# Patient Record
Sex: FEMALE | Race: WHITE | NOT HISPANIC OR LATINO | Employment: PART TIME | URBAN - METROPOLITAN AREA
[De-identification: names, ages, dates, MRNs, and addresses within clinical notes are randomized per-mention and may not be internally consistent; named-entity substitution may affect disease eponyms.]

---

## 2020-08-19 ENCOUNTER — OFFICE VISIT (OUTPATIENT)
Dept: FAMILY MEDICINE | Facility: CLINIC | Age: 60
End: 2020-08-19
Payer: MEDICAID

## 2020-08-19 VITALS
HEART RATE: 93 BPM | TEMPERATURE: 99 F | SYSTOLIC BLOOD PRESSURE: 128 MMHG | WEIGHT: 292 LBS | OXYGEN SATURATION: 98 % | DIASTOLIC BLOOD PRESSURE: 86 MMHG | HEIGHT: 65 IN | BODY MASS INDEX: 48.65 KG/M2

## 2020-08-19 DIAGNOSIS — R79.9 ABNORMAL BLOOD CHEMISTRY: ICD-10-CM

## 2020-08-19 DIAGNOSIS — Z11.59 ENCOUNTER FOR HEPATITIS C SCREENING TEST FOR LOW RISK PATIENT: ICD-10-CM

## 2020-08-19 DIAGNOSIS — R53.83 FATIGUE, UNSPECIFIED TYPE: ICD-10-CM

## 2020-08-19 DIAGNOSIS — Z11.4 SCREENING FOR HIV (HUMAN IMMUNODEFICIENCY VIRUS): ICD-10-CM

## 2020-08-19 DIAGNOSIS — G89.29 CHRONIC PAIN OF LEFT KNEE: ICD-10-CM

## 2020-08-19 DIAGNOSIS — F41.8 DEPRESSION WITH ANXIETY: ICD-10-CM

## 2020-08-19 DIAGNOSIS — E55.9 VITAMIN D DEFICIENCY: ICD-10-CM

## 2020-08-19 DIAGNOSIS — I10 ESSENTIAL HYPERTENSION: Primary | ICD-10-CM

## 2020-08-19 DIAGNOSIS — E53.8 VITAMIN B 12 DEFICIENCY: ICD-10-CM

## 2020-08-19 DIAGNOSIS — Z12.11 ENCOUNTER FOR SCREENING COLONOSCOPY FOR NON-HIGH-RISK PATIENT: ICD-10-CM

## 2020-08-19 DIAGNOSIS — Z23 NEED FOR DIPHTHERIA-TETANUS-PERTUSSIS (TDAP) VACCINE: ICD-10-CM

## 2020-08-19 DIAGNOSIS — M79.7 FIBROMYALGIA: ICD-10-CM

## 2020-08-19 DIAGNOSIS — Z12.31 ENCOUNTER FOR SCREENING MAMMOGRAM FOR BREAST CANCER: ICD-10-CM

## 2020-08-19 DIAGNOSIS — M25.50 ARTHRALGIA, UNSPECIFIED JOINT: ICD-10-CM

## 2020-08-19 DIAGNOSIS — Z13.220 SCREENING, LIPID: ICD-10-CM

## 2020-08-19 DIAGNOSIS — M25.562 CHRONIC PAIN OF LEFT KNEE: ICD-10-CM

## 2020-08-19 PROCEDURE — 90471 IMMUNIZATION ADMIN: CPT | Mod: PBBFAC | Performed by: NURSE PRACTITIONER

## 2020-08-19 PROCEDURE — 99205 PR OFFICE/OUTPT VISIT, NEW, LEVL V, 60-74 MIN: ICD-10-PCS | Mod: S$PBB,,, | Performed by: NURSE PRACTITIONER

## 2020-08-19 PROCEDURE — 99205 OFFICE O/P NEW HI 60 MIN: CPT | Performed by: NURSE PRACTITIONER

## 2020-08-19 PROCEDURE — 99205 OFFICE O/P NEW HI 60 MIN: CPT | Mod: S$PBB,,, | Performed by: NURSE PRACTITIONER

## 2020-08-19 RX ORDER — DULOXETIN HYDROCHLORIDE 60 MG/1
60 CAPSULE, DELAYED RELEASE ORAL DAILY
COMMUNITY
End: 2020-08-19 | Stop reason: SDUPTHER

## 2020-08-19 RX ORDER — AMLODIPINE BESYLATE 2.5 MG/1
2.5 TABLET ORAL DAILY
COMMUNITY
End: 2020-08-19 | Stop reason: SDUPTHER

## 2020-08-19 RX ORDER — HYDROCHLOROTHIAZIDE 25 MG/1
25 TABLET ORAL DAILY
COMMUNITY
End: 2020-08-19 | Stop reason: SDUPTHER

## 2020-08-19 RX ORDER — ALPRAZOLAM 1 MG/1
1 TABLET ORAL DAILY
COMMUNITY
End: 2020-08-19 | Stop reason: SDUPTHER

## 2020-08-19 RX ORDER — AMLODIPINE BESYLATE 2.5 MG/1
2.5 TABLET ORAL DAILY
Qty: 30 TABLET | Refills: 5 | Status: SHIPPED | OUTPATIENT
Start: 2020-08-19 | End: 2021-02-22 | Stop reason: SDUPTHER

## 2020-08-19 RX ORDER — LOSARTAN POTASSIUM 100 MG/1
100 TABLET ORAL DAILY
COMMUNITY
End: 2020-08-19 | Stop reason: SDUPTHER

## 2020-08-19 RX ORDER — FUROSEMIDE 20 MG/1
20 TABLET ORAL 2 TIMES DAILY
COMMUNITY
End: 2020-08-19 | Stop reason: SDUPTHER

## 2020-08-19 RX ORDER — DULOXETIN HYDROCHLORIDE 60 MG/1
60 CAPSULE, DELAYED RELEASE ORAL DAILY
Qty: 30 CAPSULE | Refills: 5 | Status: SHIPPED | OUTPATIENT
Start: 2020-08-19 | End: 2021-02-22 | Stop reason: SDUPTHER

## 2020-08-19 RX ORDER — MELOXICAM 15 MG/1
15 TABLET ORAL DAILY
Qty: 30 TABLET | Refills: 4 | Status: SHIPPED | OUTPATIENT
Start: 2020-08-19 | End: 2021-02-22 | Stop reason: SDUPTHER

## 2020-08-19 RX ORDER — MELOXICAM 15 MG/1
15 TABLET ORAL DAILY
COMMUNITY
End: 2020-08-19 | Stop reason: SDUPTHER

## 2020-08-19 RX ORDER — FUROSEMIDE 20 MG/1
20 TABLET ORAL 2 TIMES DAILY
Qty: 30 TABLET | Refills: 0 | Status: SHIPPED | OUTPATIENT
Start: 2020-08-19 | End: 2021-03-23 | Stop reason: SDUPTHER

## 2020-08-19 RX ORDER — LOSARTAN POTASSIUM 100 MG/1
100 TABLET ORAL DAILY
Qty: 30 TABLET | Refills: 5 | Status: ON HOLD | OUTPATIENT
Start: 2020-08-19 | End: 2020-10-20 | Stop reason: SDUPTHER

## 2020-08-19 RX ORDER — ALPRAZOLAM 1 MG/1
1 TABLET ORAL DAILY
Qty: 30 TABLET | Refills: 1 | Status: SHIPPED | OUTPATIENT
Start: 2020-08-19 | End: 2020-12-17 | Stop reason: SDUPTHER

## 2020-08-19 RX ORDER — HYDROCHLOROTHIAZIDE 25 MG/1
25 TABLET ORAL DAILY
Qty: 30 TABLET | Refills: 5 | Status: SHIPPED | OUTPATIENT
Start: 2020-08-19 | End: 2021-02-22 | Stop reason: SDUPTHER

## 2020-08-19 NOTE — PATIENT INSTRUCTIONS
Potassium-Rich Foods  The normal adult diet usually contains 2,000 mg to 4,000 mg of potassium per day. More potassium is needed when you lose too much potassium from your body. This can happen if you have diarrhea or vomiting. It can also happen if you take a medicine to make you urinate more (diuretic). To increase the amount of potassium in your diet, include these high-potassium foods.     [The (*) indicates foods highest in potassium.]  Vegetables  Artichokes. Cooked 1/2 cup, 200 mg to 300 mg*  Asparagus. Cooked 1/2 cup, 200 mg to 300 mg  Beans. White, red, robin cooked 1/2 cup, 300 mg to 500 mg*  Beets. Cooked 1/2 cup, 200 mg to 300 mg  Broccoli. Cooked or raw 1 cup, 200 mg to 500 mg*  [place] sprouts. Cooked 1/2 cup, 200 mg to 300 mg  Cabbage. Raw 1 cup, 100 mg to 200 mg  Carrots. Raw or cooked 1/2 cup, 100 mg to 200 mg  Celery. Raw 1 cup, 200 mg to 300 mg  Lima beans. Fresh or frozen 1/2 cup, 300 mg to 500 mg*   Mushrooms. Raw or cooked 1/2 cup, 100 mg to 300 mg  Peas. Cooked 1/2 cup, 150 mg to 250 mg   Potatoes. Baked 1 medium, 500 mg to 900 mg*   Spinach. Cooked 1 cup, 800 mg to 900 mg*   Spinach. Raw 2 cups, 300 mg to 400 mg *  Squash, winter. Fresh, frozen, or cooked 1/2 cup, 200 mg to 400 mg   Tomato. Fresh 1 medium, 200 mg to 300 mg   Tomato juice. Canned 1/2 cup, 200 mg to 300 mg   Fruits  Apple juice. Unsweetened 1 cup, 200 mg to 300 mg   Apricots. Canned 1/2 cup, 200 mg to 300 mg   Apricots. Dried 4 pieces, 100 mg to 200 mg   Avocado. Raw 1/2 cup, 300 mg to 400 mg*  Banana. Fresh 1 small, 300 mg to 400 mg*   Cantaloupe. Fresh 1 cup diced, 300 mg to 400 mg*   Grape juice. Unsweetened 1 cup, 200 mg to 300 mg   Honeydew melon. Fresh 1 cup diced, 300 mg to 400 mg*   Orange. Fresh 1 medium, 200 mg to 300 mg    Orange juice. Unsweetened, fresh or frozen 1/2 cup, 200 mg to 300 mg  Pineapple juice. Unsweetened 1 cup, 300 mg to 400 mg   Prune juice. Unsweetened 1/2 cup, 300 mg to 400 mg*   Prunes. Dried 5  pieces, 300 mg to 400 mg*   Strawberries. Fresh or frozen 1 cup, 200 mg to 300 mg  Meat  Red meat. Cooked 3 ounces, 100 mg to 300 mg   Seafood  Cod, flounder, halibut. Cooked 3 ounces, 100 mg to 300 mg*  [place]. Cooked, 3 ounces 300 mg to 400 mg*   Scallops. Cooked 3 ounces, 200 mg to 300 mg*  Shrimp. Cooked 3/4 cup, 100 mg to 200 mg   Tuna. Fresh or canned 3/4 cup, 200 mg to 500 mg   Date Last Reviewed: 10/1/2016  © 9044-8603 [employer]. [address] [place] [address], [place] [address]. All rights reserved. This information is not intended as a substitute for professional medical care. Always follow your healthcare professional's instructions.        Low-Salt Diet  This diet removes foods that are high in salt. It also limits the amount of salt you use when cooking. It is most often used for people with high blood pressure, edema (fluid retention), and kidney, liver, or heart disease.  Table salt contains the mineral sodium. Your body needs sodium to work normally. But too much sodium can make your health problems worse. Your healthcare provider is recommending a low-salt (also called low-sodium) diet for you. Your total daily allowance of salt is 1,500 to 2,300 milligrams (mg). It is less than 1 teaspoon of table salt. This means you can have only about 500 to 700 mg of sodium at each meal. People with certain health problems should limit salt intake to the lower end of the recommended range.    When you cook, dont add much salt. If you can cook without using salt, even better. Dont add salt to your food at the table.  When shopping, read food labels. Salt is often called sodium on the label. Choose foods that are salt-free, low salt, or very low salt. Note that foods with reduced salt may not lower your salt intake enough.    Beans, potatoes, and pasta  Ok: Dry beans, split peas, lentils, potatoes, rice, macaroni, pasta, spaghetti without added salt  Avoid: Potato chips, tortilla chips, and similar  products  Breads and cereals  Ok: Low-sodium breads, rolls, cereals, and cakes; low-salt crackers, matzo crackers  Avoid: Salted crackers, pretzels, popcorn, [place] toast, pancakes, muffins  Dairy  Ok: Milk, chocolate milk, hot chocolate mix, low-salt cheeses, and yogurt  Avoid: Processed cheese and cheese spreads; Roquefort, Camembert, and cottage cheese; buttermilk, instant breakfast drink  Desserts  Ok: Ice cream, frozen yogurt, juice bars, gelatin, cookies and pies, sugar, honey, jelly, hard candy  Avoid: Most pies, cakes and cookies prepared or processed with salt; instant pudding  Drinks  Ok: Tea, coffee, fizzy (carbonated) drinks, juices  Avoid: Flavored coffees, electrolyte replacement drinks, sports drinks  Meats  Ok: All fresh meat, fish, poultry, low-salt tuna, eggs, egg substitute  Avoid: Smoked, pickled, brine-cured, or salted meats and fish. This includes cheema, chipped beef, corned beef, hot dogs, deli meats, ham, kosher meats, salt pork, sausage, canned tuna, salted codfish, smoked salmon, herring, sardines, or anchovies.  Seasonings and spices  Ok: Most seasonings are okay. Good substitutes for salt include: fresh herb blends, hot sauce, lemon, garlic, leg[place], vinegar, dry mustard, parsley, cilantro, horseradish, tomato paste, regular margarine, mayonnaise, unsalted butter, cream cheese, vegetable oil, cream, low-salt salad dressing and gravy.  Avoid: Regular ketchup, relishes, pickles, soy sauce, teriyaki sauce, Worcestershire sauce, BBQ sauce, tartar sauce, meat tenderizer, chili sauce, regular gravy, regular salad dressing, salted butter  Soups  Ok: Low-salt soups and broths made with allowed foods  Avoid: Bouillon cubes, soups with smoked or salted meats, regular soup and broth  Vegetables  Ok: Most vegetables are okay; also low-salt tomato and vegetable juices  Avoid: Sauerkraut and other brine-soaked vegetables; pickles and other pickled vegetables; tomato juice, olives  Date Last Reviewed:  8/1/2016  © 5836-4238 [employer]. [address] [place] [address], [place] [address]. All rights reserved. This information is not intended as a substitute for professional medical care. Always follow your healthcare professional's instructions.        Eating Heart-Healthy Foods  Eating has a big impact on your heart health. In fact, eating healthier can improve several of your heart risks at once. For instance, it helps you manage weight, cholesterol, and blood pressure. Here are ideas to help you make heart-healthy changes without giving up all the foods and flavors you love.  Getting started  · Talk with your health care provider about eating plans, such as the DASH or Mediterranean diet. You may also be referred to a dietitian.  · Change a few things at a time. Give yourself time to get used to a few eating changes before adding more.  · Work to create a tasty, healthy eating plan that you can stick to for the rest of your life.    Goals for healthy eating  Below are some tips to improve your eating habits:  · Limit saturated fats and trans fats. Saturated fats raise your levels of cholesterol, so keep these fats to a minimum. They are found in foods such as fatty meats, whole milk, cheese, and palm and coconut oils. Avoid trans fats because they lower good cholesterol as well as raise bad cholesterol. Trans fats are most often found in processed foods.  · Reduce sodium (salt) intake. Eating too much salt may increase your blood pressure. Limit your sodium intake to 2,300 milligrams (mg) per day, or less if your health care provider recommends it. Dining out less often and eating fewer processed foods are two great ways to decrease the amount of salt you consume.  · Managing calories. A calorie is a unit of energy. Your body burns calories for fuel, but if you eat more calories than your body burns, the extras are stored as fat. Your health care provider can help you create a diet plan to manage your  calories. This will likely include eating healthier foods as well as exercising regularly. To help you track your progress, keep a diary to record what you eat and how often you exercise.  Choose the right foods  Aim to make these foods staples of your diet. If you have diabetes, you may have different recommendations than what is listed here:  · Fruits and vegetable provide plenty of nutrients without a lot of calories. At meals, fill half your plate with these foods. Split the other half of your plate between whole grains and lean protein.  · Whole grains are high in fiber and rich in vitamins and nutrients. Good choices include whole-wheat bread, pasta, and brown rice.  · Lean proteins give you nutrition with less fat. Good choices include fish, skinless chicken, and beans.  · Low-fat or nonfat dairy provides nutrients without a lot of fat. Try low-fat or nonfat milk, cheese, or yogurt.  · Healthy fats can be good for you in small amounts. These are unsaturated fats, such as olive oil, nuts, and fish. Try to have at least 2 servings per week of fatty fish such as salmon, sardines, mackerel, rainbow trout, and albacore tuna. These contain omega-3 fatty acids, which are good for your heart. Flaxseed is another source of a heart-healthy fat.  More on heart healthy eating    Read food labels  Healthy eating starts at the grocery store. Be sure to pay attention to food labels on packaged foods. Look for products that are high in fiber and protein, and low in saturated fat, cholesterol, and sodium. Avoid products that contain trans fat. And pay close attention to serving size. For instance, if you plan to eat two servings, double all the numbers on the label.  Prepare food right  A key part of healthy cooking is cutting down on added fat and salt. Look on the internet for lower-fat, lower-sodium recipes. Also, try these tips:  · Remove fat from meat and skin from poultry before cooking.  · Skim fat from the surface of  soups and sauces.  · Broil, boil, bake, steam, grill, and microwave food without added fats.  · Choose ingredients that spice up your food without adding calories, fat, or sodium. Try these items: horseradish, hot sauce, lemon, mustard, nonfat salad dressings, and vinegar. For salt-free herbs and spices, try basil, cilantro, cinnamon, pepper, and rosemary.  Date Last Reviewed: 6/25/2015  © 2907-6662 [employer]. [address] [place] [address]. All rights reserved. This information is not intended as a substitute for professional medical care. Always follow your healthcare professional's instructions.

## 2020-08-19 NOTE — PROGRESS NOTES
SUBJECTIVE:    Patient ID: Sonia Muse is a 60 y.o. female.    Chief Complaint: Hypertension, Anxiety, and Depression    Denies any chest pain, palpitations or dyspnea.  Denies dyspepsia, cough, hemoptysis or melena.      Hypertension  This is a chronic problem. The current episode started more than 1 year ago. The problem is unchanged. The problem is controlled. Associated symptoms include anxiety. There are no associated agents to hypertension. Risk factors for coronary artery disease include stress, post-menopausal state, obesity, smoking/tobacco exposure and sedentary lifestyle. Past treatments include calcium channel blockers, angiotensin blockers, diuretics and lifestyle changes. The current treatment provides significant improvement. Compliance problems include exercise.    Anxiety  Presents for initial visit. Onset was 1 to 5 years ago. The problem has been waxing and waning. Symptoms include decreased concentration, depressed mood, excessive worry and irritability. Symptoms occur most days. The symptoms are aggravated by family issues.     Risk factors include a major life event and family history. Her past medical history is significant for anxiety/panic attacks and depression. There is no history of hyperthyroidism or suicide attempts. Past treatments include benzodiazephines and SSRIs.   Depression  Visit Type: initial  Onset of symptoms: more than 5 years ago  Progression since onset: stable  Patient presents with the following symptoms: decreased concentration, depressed mood, excessive worry and irritability.  Severity: moderate   Aggravated by: family issues  Sleep quality: fair  Patient has a history of: anxiety/panic attacks  No history of: hyperthyroidism        Past Medical History:   Diagnosis Date    Allergy     Anxiety     Asthma     DDD (degenerative disc disease), cervical     Depression     Fibromyalgia     Hypertension 2005    Neuromuscular disorder      Past Surgical  History:   Procedure Laterality Date    ADENOIDECTOMY      carpel tunnel Right      SECTION      knee replacement Right     ROTATOR CUFF REPAIR Right     TONSILLECTOMY       Family History   Problem Relation Age of Onset    Diabetes Mother     Hypertension Mother     Heart disease Mother     Stroke Mother     Glaucoma Mother     Diabetes Father     Hypertension Father     Heart disease Father     Hypertension Sister     Heart disease Sister     Graves' disease Daughter     Anxiety disorder Daughter        Marital Status:   Alcohol History:  reports current alcohol use.  Tobacco History:  reports that she quit smoking about 36 years ago. She has a 11.00 pack-year smoking history. She has never used smokeless tobacco.  Drug History:  reports no history of drug use.    Review of patient's allergies indicates:   Allergen Reactions    Erythromycin Swelling    Codeine        Current Outpatient Medications:     albuterol sulfate (VENTOLIN INHL), Inhale into the lungs., Disp: , Rfl:     ALPRAZolam (XANAX) 1 MG tablet, Take 1 tablet (1 mg total) by mouth once daily., Disp: 30 tablet, Rfl: 1    amLODIPine (NORVASC) 2.5 MG tablet, Take 1 tablet (2.5 mg total) by mouth once daily., Disp: 30 tablet, Rfl: 5    DULoxetine (CYMBALTA) 60 MG capsule, Take 1 capsule (60 mg total) by mouth once daily., Disp: 30 capsule, Rfl: 5    furosemide (LASIX) 20 MG tablet, Take 1 tablet (20 mg total) by mouth 2 (two) times daily., Disp: 30 tablet, Rfl: 0    hydroCHLOROthiazide (HYDRODIURIL) 25 MG tablet, Take 1 tablet (25 mg total) by mouth once daily., Disp: 30 tablet, Rfl: 5    losartan (COZAAR) 100 MG tablet, Take 1 tablet (100 mg total) by mouth once daily., Disp: 30 tablet, Rfl: 5    meloxicam (MOBIC) 15 MG tablet, Take 1 tablet (15 mg total) by mouth once daily., Disp: 30 tablet, Rfl: 4    Review of Systems   Constitutional: Positive for irritability.   Psychiatric/Behavioral: Positive for  "decreased concentration and depression.          Objective:      Vitals:    08/19/20 1355   BP: 128/86   Pulse: 93   Temp: 98.7 °F (37.1 °C)   SpO2: 98%   Weight: 132.5 kg (292 lb)   Height: 5' 4.5" (1.638 m)     Body mass index is 49.35 kg/m².  Physical Exam      Assessment:       1. Essential hypertension    2. Arthralgia, unspecified joint    3. Chronic pain of left knee    4. Depression with anxiety    5. Fibromyalgia    6. Fatigue, unspecified type    7. Need for diphtheria-tetanus-pertussis (Tdap) vaccine    8. Vitamin D deficiency    9. Vitamin B 12 deficiency    10. Abnormal blood chemistry    11. Screening, lipid    12. Encounter for screening colonoscopy for non-high-risk patient    13. Encounter for screening mammogram for breast cancer    14. Screening for HIV (human immunodeficiency virus)    15. Encounter for hepatitis C screening test for low risk patient         Plan:       Essential hypertension  -     amLODIPine (NORVASC) 2.5 MG tablet; Take 1 tablet (2.5 mg total) by mouth once daily.  Dispense: 30 tablet; Refill: 5  -     furosemide (LASIX) 20 MG tablet; Take 1 tablet (20 mg total) by mouth 2 (two) times daily.  Dispense: 30 tablet; Refill: 0  -     hydroCHLOROthiazide (HYDRODIURIL) 25 MG tablet; Take 1 tablet (25 mg total) by mouth once daily.  Dispense: 30 tablet; Refill: 5  -     losartan (COZAAR) 100 MG tablet; Take 1 tablet (100 mg total) by mouth once daily.  Dispense: 30 tablet; Refill: 5    Arthralgia, unspecified joint  -     DULoxetine (CYMBALTA) 60 MG capsule; Take 1 capsule (60 mg total) by mouth once daily.  Dispense: 30 capsule; Refill: 5  -     meloxicam (MOBIC) 15 MG tablet; Take 1 tablet (15 mg total) by mouth once daily.  Dispense: 30 tablet; Refill: 4    Chronic pain of left knee  -     Ambulatory referral/consult to Orthopedics; Future; Expected date: 08/26/2020    Depression with anxiety  -     ALPRAZolam (XANAX) 1 MG tablet; Take 1 tablet (1 mg total) by mouth once daily.  " Dispense: 30 tablet; Refill: 1  -     DULoxetine (CYMBALTA) 60 MG capsule; Take 1 capsule (60 mg total) by mouth once daily.  Dispense: 30 capsule; Refill: 5    Fibromyalgia  -     DULoxetine (CYMBALTA) 60 MG capsule; Take 1 capsule (60 mg total) by mouth once daily.  Dispense: 30 capsule; Refill: 5    Fatigue, unspecified type  -     CBC auto differential; Future; Expected date: 08/19/2020  -     Comprehensive metabolic panel; Future; Expected date: 08/19/2020  -     TSH; Future; Expected date: 08/19/2020  -     T4, free; Future; Expected date: 08/19/2020    Need for diphtheria-tetanus-pertussis (Tdap) vaccine  -     Tdap Vaccine    Vitamin D deficiency  -     Vitamin D; Future; Expected date: 08/19/2020    Vitamin B 12 deficiency  -     Vitamin B12; Future; Expected date: 08/19/2020    Abnormal blood chemistry  -     Hemoglobin A1C; Future; Expected date: 08/19/2020    Screening, lipid  -     Lipid Panel; Future; Expected date: 08/19/2020    Encounter for screening colonoscopy for non-high-risk patient  -     Ambulatory referral/consult to Gastroenterology; Future; Expected date: 08/26/2020    Encounter for screening mammogram for breast cancer  -     Mammo Digital Screening Bilat without CA; Future    Screening for HIV (human immunodeficiency virus)  -     HIV 1/2 Ag/Ab (4th Gen); Future; Expected date: 08/19/2020    Encounter for hepatitis C screening test for low risk patient  -     Hepatitis C Antibody; Future; Expected date: 08/19/2020      Follow up in about 3 months (around 11/19/2020), or if symptoms worsen or fail to improve.

## 2020-08-27 ENCOUNTER — OFFICE VISIT (OUTPATIENT)
Dept: ORTHOPEDICS | Facility: CLINIC | Age: 60
End: 2020-08-27
Payer: MEDICAID

## 2020-08-27 ENCOUNTER — TELEPHONE (OUTPATIENT)
Dept: ORTHOPEDICS | Facility: CLINIC | Age: 60
End: 2020-08-27

## 2020-08-27 VITALS
DIASTOLIC BLOOD PRESSURE: 102 MMHG | WEIGHT: 284 LBS | BODY MASS INDEX: 48 KG/M2 | SYSTOLIC BLOOD PRESSURE: 146 MMHG | HEART RATE: 99 BPM

## 2020-08-27 DIAGNOSIS — M25.562 CHRONIC PAIN OF LEFT KNEE: ICD-10-CM

## 2020-08-27 DIAGNOSIS — M17.12 PRIMARY OSTEOARTHRITIS OF LEFT KNEE: Primary | ICD-10-CM

## 2020-08-27 DIAGNOSIS — G89.29 CHRONIC PAIN OF LEFT KNEE: ICD-10-CM

## 2020-08-27 PROCEDURE — 99203 OFFICE O/P NEW LOW 30 MIN: CPT | Mod: S$GLB,,, | Performed by: ORTHOPAEDIC SURGERY

## 2020-08-27 PROCEDURE — 99203 PR OFFICE/OUTPT VISIT, NEW, LEVL III, 30-44 MIN: ICD-10-PCS | Mod: S$GLB,,, | Performed by: ORTHOPAEDIC SURGERY

## 2020-08-27 NOTE — PROGRESS NOTES
[facility]    Subjective:     Chief Complaint:   Chief Complaint   Patient presents with    Left Knee - Pain     Left knee pain x years. No injury       Past Medical History:   Diagnosis Date    Allergy     Anxiety     Asthma     DDD (degenerative disc disease), cervical     Depression     Fibromyalgia     Hypertension 2005    Neuromuscular disorder        Past Surgical History:   Procedure Laterality Date    ADENOIDECTOMY      carpel tunnel Right      SECTION      knee replacement Right     ROTATOR CUFF REPAIR Right     TONSILLECTOMY         Current Outpatient Medications   Medication Sig    albuterol sulfate (VENTOLIN INHL) Inhale into the lungs.    ALPRAZolam (XANAX) 1 MG tablet Take 1 tablet (1 mg total) by mouth once daily.    amLODIPine (NORVASC) 2.5 MG tablet Take 1 tablet (2.5 mg total) by mouth once daily.    DULoxetine (CYMBALTA) 60 MG capsule Take 1 capsule (60 mg total) by mouth once daily.    furosemide (LASIX) 20 MG tablet Take 1 tablet (20 mg total) by mouth 2 (two) times daily.    hydroCHLOROthiazide (HYDRODIURIL) 25 MG tablet Take 1 tablet (25 mg total) by mouth once daily.    losartan (COZAAR) 100 MG tablet Take 1 tablet (100 mg total) by mouth once daily.    meloxicam (MOBIC) 15 MG tablet Take 1 tablet (15 mg total) by mouth once daily.     No current facility-administered medications for this visit.        Review of patient's allergies indicates:   Allergen Reactions    Erythromycin Swelling    Codeine        Family History   Problem Relation Age of Onset    Diabetes Mother     Hypertension Mother     Heart disease Mother     Stroke Mother     Glaucoma Mother     Diabetes Father     Hypertension Father     Heart disease Father     Hypertension Sister     Heart disease Sister     Graves' disease Daughter     Anxiety disorder Daughter        Social History     Socioeconomic History    Marital status:      Spouse name: Not on file     Number of children: 3    Years of education: Not on file    Highest education level: Not on file   Occupational History    Occupation: [employer]     Comment: primary sub for [employer]   Social Needs    Financial resource strain: Not on file    Food insecurity     Worry: Not on file     Inability: Not on file    Transportation needs     Medical: Not on file     Non-medical: Not on file   Tobacco Use    Smoking status: Former Smoker     Packs/day: 1.00     Years: 11.00     Pack years: 11.00     Quit date:      Years since quittin.6    Smokeless tobacco: Never Used   Substance and Sexual Activity    Alcohol use: Yes     Comment: occasional 2x/y    Drug use: Never    Sexual activity: Yes     Partners: Male   Lifestyle    Physical activity     Days per week: Not on file     Minutes per session: Not on file    Stress: Rather much   Relationships    Social connections     Talks on phone: Not on file     Gets together: Not on file     Attends Gnosticist service: Not on file     Active member of club or organization: Not on file     Attends meetings of clubs or organizations: Not on file     Relationship status: Not on file   Other Topics Concern    Not on file   Social History Narrative    Not on file       History of present illness:  Patient comes in today for the left knee.  She has had longstanding severe left knee pain.  She has had a right total knee.  She has been told that she needs a left total knee for many years.  She has had multiple Depo-Medrol injections.  Her knee gives out frequently.  Locks up.      Review of Systems:    Constitution: Negative for chills, fever, and sweats.  Negative for unexplained weight loss.    HENT:  Negative for headaches and blurry vision.    Cardiovascular:Negative for chest pain or irregular heart beat. Negative for hypertension.    Respiratory:  Negative for cough and shortness of breath.    Gastrointestinal: Negative for abdominal pain, heartburn, melena,  nausea, and vomitting.    Genitourinary:  Negative bladder incontinence and dysuria.    Musculoskeletal:  See HPI for details.     Neurological: Negative for numbness.    Psychiatric/Behavioral: Negative for depression.  The patient is not nervous/anxious.      Endocrine: Negative for polyuria    Hematologic/Lymphatic: Negative for bleeding problem.  Does not bruise/bleed easily.    Skin: Negative for poor would healing and rash    Objective:      Physical Examination:    Vital Signs:    Vitals:    08/27/20 1303   BP: (!) 146/102   Pulse: 99       Body mass index is 48 kg/m².    This a well-developed, well nourished patient in no acute distress.  They are alert and oriented and cooperative to examination.        Patient appears to be stated age.  She has range of motion over left knee 5-90 degrees.  She has palpable loose bodies.  She has a 2+ effusion.  She has well-healed incision over the right knee.  Dorsalis pedis is 2/2.  Negative straight leg raises  Pertinent New Results:    XRAY Report / Interpretation:   AP lateral sunrise views of the left knee demonstrates severe end-stage bone-on-bone arthritis with complete loss of the joint space and multiple loose bodies.    Assessment/Plan:      Severe degenerative arthritis of the left knee.  I have offered her left total knee.  Risks and benefits discussed with her in great detail including DVT RSD infection other complications.  She understood and wished to proceed      This note was created using Dragon voice recognition software that occasionally misinterpreted phrases or words.

## 2020-08-27 NOTE — LETTER
August 27, 2020      Dorina Murillo, KALIA  [address] [facility]  [address]  [facility] [address]           [facility]  [address] [facility] [address]  [facility] [address]  Phone: [phone]  Fax: [phone]          Patient: Sonia Muse   MR Number: [id number]   YOB: 1960   Date of Visit: 8/27/2020       Dear Dorina Murillo:    Thank you for referring Sonia Muse to me for evaluation. Attached you will find relevant portions of my assessment and plan of care.    If you have questions, please do not hesitate to call me. I look forward to following Sonia Muse along with you.    Sincerely,    Felipe Herring MD    Enclosure  CC:  No Recipients    If you would like to receive this communication electronically, please contact [email or web address] or [phone] to request more information on [employer] access.    For providers and/or their staff who would like to refer a patient to [facility], please contact us through our one-stop-shop provider referral line, [facility], at [phone].    If you feel you have received this communication in error or would no longer like to receive these types of communications, please e-mail [email or web address]

## 2020-09-17 DIAGNOSIS — M17.12 OSTEOARTHRITIS OF LEFT KNEE: ICD-10-CM

## 2020-09-17 DIAGNOSIS — Z01.818 PREOP EXAMINATION: ICD-10-CM

## 2020-09-17 DIAGNOSIS — M17.12 PRIMARY OSTEOARTHRITIS OF LEFT KNEE: Primary | ICD-10-CM

## 2020-09-17 RX ORDER — MUPIROCIN 20 MG/G
OINTMENT TOPICAL
Status: CANCELLED | OUTPATIENT
Start: 2020-09-17

## 2020-09-22 ENCOUNTER — OFFICE VISIT (OUTPATIENT)
Dept: FAMILY MEDICINE | Facility: CLINIC | Age: 60
End: 2020-09-22
Payer: MEDICAID

## 2020-09-22 VITALS
DIASTOLIC BLOOD PRESSURE: 82 MMHG | WEIGHT: 292 LBS | HEART RATE: 95 BPM | OXYGEN SATURATION: 98 % | HEIGHT: 65 IN | BODY MASS INDEX: 48.65 KG/M2 | SYSTOLIC BLOOD PRESSURE: 128 MMHG | TEMPERATURE: 98 F

## 2020-09-22 DIAGNOSIS — J45.909 ASTHMA, UNSPECIFIED ASTHMA SEVERITY, UNSPECIFIED WHETHER COMPLICATED, UNSPECIFIED WHETHER PERSISTENT: ICD-10-CM

## 2020-09-22 DIAGNOSIS — J00 CORYZA: Primary | ICD-10-CM

## 2020-09-22 DIAGNOSIS — R05.9 COUGH: ICD-10-CM

## 2020-09-22 DIAGNOSIS — M17.12 PRIMARY OSTEOARTHRITIS OF LEFT KNEE: ICD-10-CM

## 2020-09-22 DIAGNOSIS — I10 ESSENTIAL HYPERTENSION: ICD-10-CM

## 2020-09-22 DIAGNOSIS — L71.9 ROSACEA: ICD-10-CM

## 2020-09-22 PROCEDURE — 99213 OFFICE O/P EST LOW 20 MIN: CPT | Mod: S$PBB,,, | Performed by: NURSE PRACTITIONER

## 2020-09-22 PROCEDURE — 99213 PR OFFICE/OUTPT VISIT, EST, LEVL III, 20-29 MIN: ICD-10-PCS | Mod: S$PBB,,, | Performed by: NURSE PRACTITIONER

## 2020-09-22 PROCEDURE — 99215 OFFICE O/P EST HI 40 MIN: CPT | Performed by: NURSE PRACTITIONER

## 2020-09-22 RX ORDER — ALBUTEROL SULFATE 90 UG/1
2 AEROSOL, METERED RESPIRATORY (INHALATION) EVERY 6 HOURS PRN
Qty: 18 G | Refills: 1 | Status: SHIPPED | OUTPATIENT
Start: 2020-09-22 | End: 2021-03-23 | Stop reason: SDUPTHER

## 2020-09-22 RX ORDER — BENZONATATE 200 MG/1
200 CAPSULE ORAL 3 TIMES DAILY PRN
Qty: 30 CAPSULE | Refills: 0 | Status: SHIPPED | OUTPATIENT
Start: 2020-09-22 | End: 2020-10-02

## 2020-09-22 RX ORDER — CETIRIZINE HYDROCHLORIDE 10 MG/1
10 TABLET ORAL DAILY
Qty: 30 TABLET | Refills: 5 | Status: SHIPPED | OUTPATIENT
Start: 2020-09-22 | End: 2021-03-23 | Stop reason: SDUPTHER

## 2020-09-22 RX ORDER — MONTELUKAST SODIUM 10 MG/1
10 TABLET ORAL NIGHTLY
Qty: 30 TABLET | Refills: 5 | Status: SHIPPED | OUTPATIENT
Start: 2020-09-22 | End: 2020-10-22

## 2020-09-22 RX ORDER — DEXAMETHASONE SODIUM PHOSPHATE 4 MG/ML
8 INJECTION, SOLUTION INTRA-ARTICULAR; INTRALESIONAL; INTRAMUSCULAR; INTRAVENOUS; SOFT TISSUE ONCE
Status: COMPLETED | OUTPATIENT
Start: 2020-09-22 | End: 2020-09-22

## 2020-09-22 RX ADMIN — DEXAMETHASONE SODIUM PHOSPHATE 8 MG: 4 INJECTION, SOLUTION INTRA-ARTICULAR; INTRALESIONAL; INTRAMUSCULAR; INTRAVENOUS; SOFT TISSUE at 02:09

## 2020-09-22 NOTE — PATIENT INSTRUCTIONS
**Mucinex over the counter as directed to help thin secretions, must keep well-hydrated, increase water/fluid intake or it cannot work. Nasal saline spray in addition will also help. Continue other prescriptions.      Adult Self-Care for Colds  Colds are caused by viruses. They can't be cured with antibiotics. However, you can ease symptoms and support your body's efforts to heal itself.  No matter which symptoms you have, be sure to:  · Drink plenty of fluids (water or clear soup)  · Stop smoking and drinking alcohol  · Get plenty of rest    Understand a fever  · Take your temperature several times a day. If your fever is 100.4°F (38.0°C) for more than a day, call your healthcare provider.  · Relax, lie down. Go to bed if you want. Just get off your feet and rest. Also, drink plenty of fluids to avoid dehydration.  · Take acetaminophen or a nonsteroidal anti-inflammatory agent (NSAID), such as ibuprofen.  Treat a troubled nose kindly  · Breathe steam or heated humidified air to open blocked nasal passages.  a hot shower or use a vaporizer. Be careful not to get burned by the steam.  · Saline nasal sprays and decongestant tablets help open a stuffy nose. Antihistamines can also help, but they can cause side effects such as drowsiness and drying of the eyes, nose, and mouth.  Soothe a sore throat and cough  · Gargle every 2 hours with 1/4 teaspoon of salt dissolved in 1/2 cup of warm water. Suck on throat lozenges and cough drops to moisten your throat.  · Cough medicines are available but it is unclear how well they actually work.  · Take acetaminophen or an NSAID, such as ibuprofen, to ease throat pain  Ease digestive problems  · Put fluids back into your body. Take frequent sips of clear liquids such as water or broth. Avoid drinks that have a lot of sugar in them, such as juices and sodas. These can make diarrhea worse. Older children and adults can drink sports drinks.  · As your appetite returns, you  can resume your normal diet. Ask your healthcare provider if there are any foods you should avoid.  When to seek medical care  When you first notice symptoms, ask your healthcare provider if antiviral medicines are appropriate. Antibiotics should not be taken for colds or flu. Also, call your healthcare provider if you have any of the following symptoms or if you aren't feeling better after 7 days:  · Shortness of breath  · Pain or pressure in the chest or belly (abdomen)  · Worsening symptoms, especially after a period of improvement  · Fever of 100.4°F  (38.0°C) or higher, or fever that doesn't go down with medicine  · Sudden dizziness or confusion  · Severe or continued vomiting  · Signs of dehydration, including extreme thirst, dark urine, infrequent urination, dry mouth  · Spotted, red, or very sore throat   Date Last Reviewed: 12/1/2016  © 8511-8941 [employer]. [address] [place] [address]. All rights reserved. This information is not intended as a substitute for professional medical care. Always follow your healthcare professional's instructions.          Potassium-Rich Foods  The normal adult diet usually contains 2,000 mg to 4,000 mg of potassium per day. More potassium is needed when you lose too much potassium from your body. This can happen if you have diarrhea or vomiting. It can also happen if you take a medicine to make you urinate more (diuretic). To increase the amount of potassium in your diet, include these high-potassium foods.     [The (*) indicates foods highest in potassium.]  Vegetables  Artichokes. Cooked 1/2 cup, 200 mg to 300 mg*  Asparagus. Cooked 1/2 cup, 200 mg to 300 mg  Beans. White, red, robin cooked 1/2 cup, 300 mg to 500 mg*  Beets. Cooked 1/2 cup, 200 mg to 300 mg  Broccoli. Cooked or raw 1 cup, 200 mg to 500 mg*  [place] sprouts. Cooked 1/2 cup, 200 mg to 300 mg  Cabbage. Raw 1 cup, 100 mg to 200 mg  Carrots. Raw or cooked 1/2 cup, 100 mg to 200  mg  Celery. Raw 1 cup, 200 mg to 300 mg  Lima beans. Fresh or frozen 1/2 cup, 300 mg to 500 mg*   Mushrooms. Raw or cooked 1/2 cup, 100 mg to 300 mg  Peas. Cooked 1/2 cup, 150 mg to 250 mg   Potatoes. Baked 1 medium, 500 mg to 900 mg*   Spinach. Cooked 1 cup, 800 mg to 900 mg*   Spinach. Raw 2 cups, 300 mg to 400 mg *  Squash, winter. Fresh, frozen, or cooked 1/2 cup, 200 mg to 400 mg   Tomato. Fresh 1 medium, 200 mg to 300 mg   Tomato juice. Canned 1/2 cup, 200 mg to 300 mg   Fruits  Apple juice. Unsweetened 1 cup, 200 mg to 300 mg   Apricots. Canned 1/2 cup, 200 mg to 300 mg   Apricots. Dried 4 pieces, 100 mg to 200 mg   Avocado. Raw 1/2 cup, 300 mg to 400 mg*  Banana. Fresh 1 small, 300 mg to 400 mg*   Cantaloupe. Fresh 1 cup diced, 300 mg to 400 mg*   Grape juice. Unsweetened 1 cup, 200 mg to 300 mg   Honeydew melon. Fresh 1 cup diced, 300 mg to 400 mg*   Orange. Fresh 1 medium, 200 mg to 300 mg    Orange juice. Unsweetened, fresh or frozen 1/2 cup, 200 mg to 300 mg  Pineapple juice. Unsweetened 1 cup, 300 mg to 400 mg   Prune juice. Unsweetened 1/2 cup, 300 mg to 400 mg*   Prunes. Dried 5 pieces, 300 mg to 400 mg*   Strawberries. Fresh or frozen 1 cup, 200 mg to 300 mg  Meat  Red meat. Cooked 3 ounces, 100 mg to 300 mg   Seafood  Cod, flounder, halibut. Cooked 3 ounces, 100 mg to 300 mg*  [place]. Cooked, 3 ounces 300 mg to 400 mg*   Scallops. Cooked 3 ounces, 200 mg to 300 mg*  Shrimp. Cooked 3/4 cup, 100 mg to 200 mg   Tuna. Fresh or canned 3/4 cup, 200 mg to 500 mg   Date Last Reviewed: 10/1/2016  © 1671-6057 [employer]. [address] [place] [address]. All rights reserved. This information is not intended as a substitute for professional medical care. Always follow your healthcare professional's instructions.        Low-Salt Diet  This diet removes foods that are high in salt. It also limits the amount of salt you use when cooking. It is most often used for people with high blood  pressure, edema (fluid retention), and kidney, liver, or heart disease.  Table salt contains the mineral sodium. Your body needs sodium to work normally. But too much sodium can make your health problems worse. Your healthcare provider is recommending a low-salt (also called low-sodium) diet for you. Your total daily allowance of salt is 1,500 to 2,300 milligrams (mg). It is less than 1 teaspoon of table salt. This means you can have only about 500 to 700 mg of sodium at each meal. People with certain health problems should limit salt intake to the lower end of the recommended range.    When you cook, dont add much salt. If you can cook without using salt, even better. Dont add salt to your food at the table.  When shopping, read food labels. Salt is often called sodium on the label. Choose foods that are salt-free, low salt, or very low salt. Note that foods with reduced salt may not lower your salt intake enough.    Beans, potatoes, and pasta  Ok: Dry beans, split peas, lentils, potatoes, rice, macaroni, pasta, spaghetti without added salt  Avoid: Potato chips, tortilla chips, and similar products  Breads and cereals  Ok: Low-sodium breads, rolls, cereals, and cakes; low-salt crackers, matzo crackers  Avoid: Salted crackers, pretzels, popcorn, [place] toast, pancakes, muffins  Dairy  Ok: Milk, chocolate milk, hot chocolate mix, low-salt cheeses, and yogurt  Avoid: Processed cheese and cheese spreads; Roquefort, Camembert, and cottage cheese; buttermilk, instant breakfast drink  Desserts  Ok: Ice cream, frozen yogurt, juice bars, gelatin, cookies and pies, sugar, honey, jelly, hard candy  Avoid: Most pies, cakes and cookies prepared or processed with salt; instant pudding  Drinks  Ok: Tea, coffee, fizzy (carbonated) drinks, juices  Avoid: Flavored coffees, electrolyte replacement drinks, sports drinks  Meats  Ok: All fresh meat, fish, poultry, low-salt tuna, eggs, egg substitute  Avoid: Smoked, pickled,  brine-cured, or salted meats and fish. This includes cheema, chipped beef, corned beef, hot dogs, deli meats, ham, kosher meats, salt pork, sausage, canned tuna, salted codfish, smoked salmon, herring, sardines, or anchovies.  Seasonings and spices  Ok: Most seasonings are okay. Good substitutes for salt include: fresh herb blends, hot sauce, lemon, garlic, leggett, vinegar, dry mustard, parsley, cilantro, horseradish, tomato paste, regular margarine, mayonnaise, unsalted butter, cream cheese, vegetable oil, cream, low-salt salad dressing and gravy.  Avoid: Regular ketchup, relishes, pickles, soy sauce, teriyaki sauce, Worcestershire sauce, BBQ sauce, tartar sauce, meat tenderizer, chili sauce, regular gravy, regular salad dressing, salted butter  Soups  Ok: Low-salt soups and broths made with allowed foods  Avoid: Bouillon cubes, soups with smoked or salted meats, regular soup and broth  Vegetables  Ok: Most vegetables are okay; also low-salt tomato and vegetable juices  Avoid: Sauerkraut and other brine-soaked vegetables; pickles and other pickled vegetables; tomato juice, olives  Date Last Reviewed: 8/1/2016  © 9867-8282 [employer]. [address] [place] [address]. All rights reserved. This information is not intended as a substitute for professional medical care. Always follow your healthcare professional's instructions.        Eating Heart-Healthy Foods  Eating has a big impact on your heart health. In fact, eating healthier can improve several of your heart risks at once. For instance, it helps you manage weight, cholesterol, and blood pressure. Here are ideas to help you make heart-healthy changes without giving up all the foods and flavors you love.  Getting started  · Talk with your health care provider about eating plans, such as the DASH or Mediterranean diet. You may also be referred to a dietitian.  · Change a few things at a time. Give yourself time to get used to a few eating  changes before adding more.  · Work to create a tasty, healthy eating plan that you can stick to for the rest of your life.    Goals for healthy eating  Below are some tips to improve your eating habits:  · Limit saturated fats and trans fats. Saturated fats raise your levels of cholesterol, so keep these fats to a minimum. They are found in foods such as fatty meats, whole milk, cheese, and palm and coconut oils. Avoid trans fats because they lower good cholesterol as well as raise bad cholesterol. Trans fats are most often found in processed foods.  · Reduce sodium (salt) intake. Eating too much salt may increase your blood pressure. Limit your sodium intake to 2,300 milligrams (mg) per day, or less if your health care provider recommends it. Dining out less often and eating fewer processed foods are two great ways to decrease the amount of salt you consume.  · Managing calories. A calorie is a unit of energy. Your body burns calories for fuel, but if you eat more calories than your body burns, the extras are stored as fat. Your health care provider can help you create a diet plan to manage your calories. This will likely include eating healthier foods as well as exercising regularly. To help you track your progress, keep a diary to record what you eat and how often you exercise.  Choose the right foods  Aim to make these foods staples of your diet. If you have diabetes, you may have different recommendations than what is listed here:  · Fruits and vegetable provide plenty of nutrients without a lot of calories. At meals, fill half your plate with these foods. Split the other half of your plate between whole grains and lean protein.  · Whole grains are high in fiber and rich in vitamins and nutrients. Good choices include whole-wheat bread, pasta, and brown rice.  · Lean proteins give you nutrition with less fat. Good choices include fish, skinless chicken, and beans.  · Low-fat or nonfat dairy provides nutrients  without a lot of fat. Try low-fat or nonfat milk, cheese, or yogurt.  · Healthy fats can be good for you in small amounts. These are unsaturated fats, such as olive oil, nuts, and fish. Try to have at least 2 servings per week of fatty fish such as salmon, sardines, mackerel, rainbow trout, and albacore tuna. These contain omega-3 fatty acids, which are good for your heart. Flaxseed is another source of a heart-healthy fat.  More on heart healthy eating    Read food labels  Healthy eating starts at the grocery store. Be sure to pay attention to food labels on packaged foods. Look for products that are high in fiber and protein, and low in saturated fat, cholesterol, and sodium. Avoid products that contain trans fat. And pay close attention to serving size. For instance, if you plan to eat two servings, double all the numbers on the label.  Prepare food right  A key part of healthy cooking is cutting down on added fat and salt. Look on the internet for lower-fat, lower-sodium recipes. Also, try these tips:  · Remove fat from meat and skin from poultry before cooking.  · Skim fat from the surface of soups and sauces.  · Broil, boil, bake, steam, grill, and microwave food without added fats.  · Choose ingredients that spice up your food without adding calories, fat, or sodium. Try these items: horseradish, hot sauce, lemon, mustard, nonfat salad dressings, and vinegar. For salt-free herbs and spices, try basil, cilantro, cinnamon, pepper, and rosemary.  Date Last Reviewed: 6/25/2015  © 1964-5595 [employer]. [address] [place] [address], [place] [address]. All rights reserved. This information is not intended as a substitute for professional medical care. Always follow your healthcare professional's instructions.

## 2020-09-22 NOTE — PROGRESS NOTES
SUBJECTIVE:    Patient ID: Sonia Muse is a 60 y.o. female.    Chief Complaint: URI (3 days), Cough, Fever, Sinus Problem, and Otalgia (right)    Ms Muse is a 59yo lady here today with complaint of UR s/s x3d. History of exercise induced asthma. Complains of sinus congestion, PND and occasionally productive cough for small amount yellowish sputum. Complains of R ear and facial soreness, but also notes she used her teeth to open a jar. Reports 'fever' this am of 98.2. Has not taken any otc tx. Has not used her rescue inhaler as she did not want to 'mask any symptoms' for today's visit.     Denies any chest pain, palpitations or dyspnea.  Denies dyspepsia, hemoptysis or melena.     Knee replacement is scheduled for  per Dr Herring.     URI   This is a new problem. The current episode started in the past 7 days. The problem has been unchanged. There has been no fever. Associated symptoms include coughing. She has tried nothing for the symptoms.   Cough  This is a new problem. The current episode started in the past 7 days. The problem has been unchanged. The problem occurs every few hours. The cough is productive of sputum. Associated symptoms include postnasal drip. Pertinent negatives include no eye redness or myalgias. She has tried nothing for the symptoms. Her past medical history is significant for asthma and environmental allergies.       Past Medical History:   Diagnosis Date    Allergy     Anxiety     Asthma     DDD (degenerative disc disease), cervical     Depression     Fibromyalgia     Hypertension 2005    Neuromuscular disorder      Past Surgical History:   Procedure Laterality Date    ADENOIDECTOMY      carpel tunnel Right      SECTION      knee replacement Right     ROTATOR CUFF REPAIR Right     TONSILLECTOMY       Family History   Problem Relation Age of Onset    Diabetes Mother     Hypertension Mother     Heart disease Mother     Stroke Mother      Glaucoma Mother     Diabetes Father     Hypertension Father     Heart disease Father     Hypertension Sister     Heart disease Sister     Graves' disease Daughter     Anxiety disorder Daughter        Marital Status:   Alcohol History:  reports current alcohol use.  Tobacco History:  reports that she quit smoking about 36 years ago. She has a 11.00 pack-year smoking history. She has never used smokeless tobacco.  Drug History:  reports no history of drug use.    Review of patient's allergies indicates:   Allergen Reactions    Erythromycin Swelling    Codeine        Current Outpatient Medications:     ALPRAZolam (XANAX) 1 MG tablet, Take 1 tablet (1 mg total) by mouth once daily., Disp: 30 tablet, Rfl: 1    amLODIPine (NORVASC) 2.5 MG tablet, Take 1 tablet (2.5 mg total) by mouth once daily., Disp: 30 tablet, Rfl: 5    DULoxetine (CYMBALTA) 60 MG capsule, Take 1 capsule (60 mg total) by mouth once daily., Disp: 30 capsule, Rfl: 5    furosemide (LASIX) 20 MG tablet, Take 1 tablet (20 mg total) by mouth 2 (two) times daily., Disp: 30 tablet, Rfl: 0    hydroCHLOROthiazide (HYDRODIURIL) 25 MG tablet, Take 1 tablet (25 mg total) by mouth once daily., Disp: 30 tablet, Rfl: 5    losartan (COZAAR) 100 MG tablet, Take 1 tablet (100 mg total) by mouth once daily., Disp: 30 tablet, Rfl: 5    meloxicam (MOBIC) 15 MG tablet, Take 1 tablet (15 mg total) by mouth once daily., Disp: 30 tablet, Rfl: 4    albuterol (VENTOLIN HFA) 90 mcg/actuation inhaler, Inhale 2 puffs into the lungs every 6 (six) hours as needed for Wheezing or Shortness of Breath. Rescue, Disp: 18 g, Rfl: 1    benzonatate (TESSALON) 200 MG capsule, Take 1 capsule (200 mg total) by mouth 3 (three) times daily as needed for Cough., Disp: 30 capsule, Rfl: 0    cetirizine (ZYRTEC) 10 MG tablet, Take 1 tablet (10 mg total) by mouth once daily., Disp: 30 tablet, Rfl: 5    montelukast (SINGULAIR) 10 mg tablet, Take 1 tablet (10 mg total) by mouth  "every evening., Disp: 30 tablet, Rfl: 5  No current facility-administered medications for this visit.     Review of Systems   Constitutional: Negative for activity change, appetite change and fatigue.   HENT: Positive for postnasal drip.    Eyes: Negative for redness and itching.   Respiratory: Positive for cough.    Gastrointestinal: Negative for constipation.   Genitourinary: Negative for difficulty urinating and frequency.   Musculoskeletal: Negative for myalgias.   Allergic/Immunologic: Positive for environmental allergies.   Neurological: Negative for dizziness.   Psychiatric/Behavioral: Negative for agitation, behavioral problems, sleep disturbance and suicidal ideas. The patient is not nervous/anxious.    All other systems reviewed and are negative.         Objective:      Vitals:    09/22/20 1401   BP: 128/82   Pulse: 95   Temp: 97.7 °F (36.5 °C)   SpO2: 98%   Weight: 132.5 kg (292 lb)   Height: 5' 4.5" (1.638 m)     Body mass index is 49.35 kg/m².  Physical Exam  Vitals signs and nursing note reviewed.   Constitutional:       Appearance: Normal appearance. She is well-developed. She is morbidly obese.   HENT:      Head: Normocephalic.      Right Ear: Tympanic membrane, ear canal and external ear normal.      Left Ear: Tympanic membrane, ear canal and external ear normal.      Mouth/Throat:      Pharynx: No oropharyngeal exudate.      Comments: Cobblestoning with clear PND, without erythema  Eyes:      Extraocular Movements: Extraocular movements intact.      Conjunctiva/sclera: Conjunctivae normal.      Pupils: Pupils are equal, round, and reactive to light.   Neck:      Musculoskeletal: Normal range of motion and neck supple.   Cardiovascular:      Rate and Rhythm: Normal rate and regular rhythm.      Pulses: Normal pulses.      Heart sounds: Normal heart sounds.   Pulmonary:      Effort: Pulmonary effort is normal.      Breath sounds: Normal breath sounds.   Abdominal:      General: Bowel sounds are " normal.      Palpations: Abdomen is soft.      Tenderness: There is no abdominal tenderness.      Comments: Protuberant   Musculoskeletal: Normal range of motion.      Right lower leg: Edema (nonpitting) present.      Left lower leg: Edema (nonpitting) present.   Skin:     General: Skin is warm and dry.      Capillary Refill: Capillary refill takes less than 2 seconds.   Neurological:      Mental Status: She is alert and oriented to person, place, and time.   Psychiatric:         Thought Content: Thought content normal.           Assessment:       1. Coryza    2. Cough    3. Asthma, unspecified asthma severity, unspecified whether complicated, unspecified whether persistent    4. Essential hypertension    5. Rosacea    6. Primary osteoarthritis of left knee         Plan:       Coryza  -     dexamethasone injection 8 mg    Cough  -     benzonatate (TESSALON) 200 MG capsule; Take 1 capsule (200 mg total) by mouth 3 (three) times daily as needed for Cough.  Dispense: 30 capsule; Refill: 0    Asthma, unspecified asthma severity, unspecified whether complicated, unspecified whether persistent  -     montelukast (SINGULAIR) 10 mg tablet; Take 1 tablet (10 mg total) by mouth every evening.  Dispense: 30 tablet; Refill: 5  -     cetirizine (ZYRTEC) 10 MG tablet; Take 1 tablet (10 mg total) by mouth once daily.  Dispense: 30 tablet; Refill: 5  -     albuterol (VENTOLIN HFA) 90 mcg/actuation inhaler; Inhale 2 puffs into the lungs every 6 (six) hours as needed for Wheezing or Shortness of Breath. Rescue  Dispense: 18 g; Refill: 1    Essential hypertension  Well controlled on current regimen.  Continue same.    Rosacea  -     Ambulatory referral/consult to Dermatology; Future; Expected date: 09/29/2020    Primary osteoarthritis of left knee  To follow-up with Dr. Herring as previously scheduled.      Follow up sooner if symptoms worsen or fail to improve.

## 2020-09-23 ENCOUNTER — TELEPHONE (OUTPATIENT)
Dept: ORTHOPEDICS | Facility: CLINIC | Age: 60
End: 2020-09-23

## 2020-09-23 NOTE — TELEPHONE ENCOUNTER
Spoke with pt, she wanted to change her preop appointment, offered several choices but none work for her. She asked me to leave as it is now

## 2020-09-23 NOTE — TELEPHONE ENCOUNTER
----- Message from Symone Upton sent at 9/23/2020  8:25 AM CDT -----  Contact: Pt  Pt states that she has to work on 10/5 and needs to R/S her pre-admit to 10/6.     Pt call back # [phone]

## 2020-09-30 LAB
25(OH)D3 SERPL-MCNC: 30 NG/ML (ref 30–100)
ALBUMIN SERPL-MCNC: 4 G/DL (ref 3.6–5.1)
ALBUMIN/GLOB SERPL: 1.4 (CALC) (ref 1–2.5)
ALP SERPL-CCNC: 71 U/L (ref 37–153)
ALT SERPL-CCNC: 16 U/L (ref 6–29)
AST SERPL-CCNC: 14 U/L (ref 10–35)
BASOPHILS # BLD AUTO: 60 CELLS/UL (ref 0–200)
BASOPHILS NFR BLD AUTO: 0.9 %
BILIRUB SERPL-MCNC: 0.8 MG/DL (ref 0.2–1.2)
BUN SERPL-MCNC: 22 MG/DL (ref 7–25)
BUN/CREAT SERPL: ABNORMAL (CALC) (ref 6–22)
CALCIUM SERPL-MCNC: 9.1 MG/DL (ref 8.6–10.4)
CHLORIDE SERPL-SCNC: 102 MMOL/L (ref 98–110)
CHOLEST SERPL-MCNC: 171 MG/DL
CHOLEST/HDLC SERPL: 3.4 (CALC)
CO2 SERPL-SCNC: 31 MMOL/L (ref 20–32)
CREAT SERPL-MCNC: 0.83 MG/DL (ref 0.5–1.05)
EOSINOPHIL # BLD AUTO: 302 CELLS/UL (ref 15–500)
EOSINOPHIL NFR BLD AUTO: 4.5 %
ERYTHROCYTE [DISTWIDTH] IN BLOOD BY AUTOMATED COUNT: 13 % (ref 11–15)
GFRSERPLBLD MDRD-ARVRAT: 78 ML/MIN/1.73M2
GLOBULIN SER CALC-MCNC: 2.8 G/DL (CALC) (ref 1.9–3.7)
GLUCOSE SERPL-MCNC: 150 MG/DL (ref 65–99)
HBA1C MFR BLD: 5.7 % OF TOTAL HGB
HCT VFR BLD AUTO: 40.4 % (ref 35–45)
HCV AB S/CO SERPL IA: 0.02
HCV AB SERPL QL IA: NORMAL
HDLC SERPL-MCNC: 50 MG/DL
HGB BLD-MCNC: 13.2 G/DL (ref 11.7–15.5)
HIV 1+2 AB+HIV1 P24 AG SERPL QL IA: NORMAL
LDLC SERPL CALC-MCNC: 94 MG/DL (CALC)
LYMPHOCYTES # BLD AUTO: 1655 CELLS/UL (ref 850–3900)
LYMPHOCYTES NFR BLD AUTO: 24.7 %
MCH RBC QN AUTO: 28.3 PG (ref 27–33)
MCHC RBC AUTO-ENTMCNC: 32.7 G/DL (ref 32–36)
MCV RBC AUTO: 86.7 FL (ref 80–100)
MONOCYTES # BLD AUTO: 496 CELLS/UL (ref 200–950)
MONOCYTES NFR BLD AUTO: 7.4 %
NEUTROPHILS # BLD AUTO: 4188 CELLS/UL (ref 1500–7800)
NEUTROPHILS NFR BLD AUTO: 62.5 %
NONHDLC SERPL-MCNC: 121 MG/DL (CALC)
PLATELET # BLD AUTO: 381 THOUSAND/UL (ref 140–400)
PMV BLD REES-ECKER: 10 FL (ref 7.5–12.5)
POTASSIUM SERPL-SCNC: 4.1 MMOL/L (ref 3.5–5.3)
PROT SERPL-MCNC: 6.8 G/DL (ref 6.1–8.1)
RBC # BLD AUTO: 4.66 MILLION/UL (ref 3.8–5.1)
SODIUM SERPL-SCNC: 142 MMOL/L (ref 135–146)
T4 FREE SERPL-MCNC: 1 NG/DL (ref 0.8–1.8)
TRIGL SERPL-MCNC: 176 MG/DL
TSH SERPL-ACNC: 3.6 MIU/L (ref 0.4–4.5)
VIT B12 SERPL-MCNC: 459 PG/ML (ref 200–1100)
WBC # BLD AUTO: 6.7 THOUSAND/UL (ref 3.8–10.8)

## 2020-10-01 ENCOUNTER — TELEPHONE (OUTPATIENT)
Dept: FAMILY MEDICINE | Facility: CLINIC | Age: 60
End: 2020-10-01

## 2020-10-01 NOTE — TELEPHONE ENCOUNTER
----- Message from Dorina Murillo NP sent at 9/30/2020 10:24 AM CDT -----  Please contact the patient and let them know that their labs were fine and do not require any change in treatment.

## 2020-10-02 ENCOUNTER — TELEPHONE (OUTPATIENT)
Dept: FAMILY MEDICINE | Facility: CLINIC | Age: 60
End: 2020-10-02

## 2020-10-05 ENCOUNTER — HOSPITAL ENCOUNTER (OUTPATIENT)
Dept: RADIOLOGY | Facility: HOSPITAL | Age: 60
Discharge: HOME | End: 2020-10-05
Attending: ORTHOPAEDIC SURGERY
Payer: MEDICAID

## 2020-10-05 ENCOUNTER — TELEPHONE (OUTPATIENT)
Dept: ORTHOPEDICS | Facility: CLINIC | Age: 60
End: 2020-10-05

## 2020-10-05 ENCOUNTER — ANESTHESIA EVENT (OUTPATIENT)
Dept: SURGERY | Facility: HOSPITAL | Age: 60
End: 2020-10-05
Payer: MEDICAID

## 2020-10-05 ENCOUNTER — HOSPITAL ENCOUNTER (OUTPATIENT)
Dept: PREADMISSION TESTING | Facility: HOSPITAL | Age: 60
Discharge: HOME | End: 2020-10-05
Attending: ORTHOPAEDIC SURGERY
Payer: MEDICAID

## 2020-10-05 VITALS — WEIGHT: 292 LBS | BODY MASS INDEX: 48.65 KG/M2 | HEIGHT: 65 IN

## 2020-10-05 DIAGNOSIS — Z01.818 PREOP EXAMINATION: Primary | ICD-10-CM

## 2020-10-05 DIAGNOSIS — M17.12 PRIMARY OSTEOARTHRITIS OF LEFT KNEE: ICD-10-CM

## 2020-10-05 LAB
ABO + RH BLD: NORMAL
BLD GP AB SCN CELLS X3 SERPL QL: NORMAL

## 2020-10-05 PROCEDURE — 71046 X-RAY EXAM CHEST 2 VIEWS: CPT | Mod: 26,,, | Performed by: RADIOLOGY

## 2020-10-05 PROCEDURE — 93010 ELECTROCARDIOGRAM REPORT: CPT | Mod: ,,, | Performed by: INTERNAL MEDICINE

## 2020-10-05 PROCEDURE — 71046 XR CHEST PA AND LATERAL: ICD-10-PCS | Mod: 26,,, | Performed by: RADIOLOGY

## 2020-10-05 PROCEDURE — 93010 EKG 12-LEAD: ICD-10-PCS | Mod: ,,, | Performed by: INTERNAL MEDICINE

## 2020-10-05 PROCEDURE — 93005 ELECTROCARDIOGRAM TRACING: CPT

## 2020-10-05 PROCEDURE — 99900104 DSU ONLY-NO CHARGE-EA ADD'L HR (STAT)

## 2020-10-05 PROCEDURE — 86850 RBC ANTIBODY SCREEN: CPT

## 2020-10-05 PROCEDURE — 71046 X-RAY EXAM CHEST 2 VIEWS: CPT | Mod: TC,FY

## 2020-10-05 PROCEDURE — 36415 COLL VENOUS BLD VENIPUNCTURE: CPT

## 2020-10-05 PROCEDURE — 87081 CULTURE SCREEN ONLY: CPT

## 2020-10-05 PROCEDURE — 99900103 DSU ONLY-NO CHARGE-INITIAL HR (STAT)

## 2020-10-05 NOTE — PRE ADMISSION SCREENING
"               [facility] Risk Assessment Scale    Patient Name: Sonia Muse  YOB: 1960  MRN: [id number]            RIsk Factor Measure Recommendation Patient Data Scale/Score   BMI >40 Reconsider surgery, weight loss   Estimated body mass index is 49.35 kg/m² as calculated from the following:    Height as of this encounter: 5' 4.5" (1.638 m).    Weight as of this encounter: 132.5 kg (292 lb).   [] 0 = 1 - 24.9  [] 1 = 25-29.9  [] 2 = 30-34.9  [] 3 = 35-39.9  [] 4 = 40-44.9  [x] 5 = 45-99.9   Hemoglobin AIC (if applicable) >9 Delay surgery until DM under control  Refer for:  · Nutrition Therapy  · Exercise   · Medication    Lab Results   Component Value Date    HGBA1C 5.7 (H) 09/29/2020       Lab Results   Component Value Date     (H) 09/29/2020      [] 0 = 4.0-5.6  [x] 1 = 5.7-6.4  [] 2 = 6.5-6.9  [] 3 = 7.0-7.9  [] 4 = 8.0-8.9  [] 5 = 9.0-12   Hemoglobin (Anemia) <9 Delay surgery   Correct anemia Lab Results   Component Value Date    HGB 13.2 09/29/2020    [] 20 - <9.0                    Albumin <3 Delay surgery &Workup Lab Results   Component Value Date    ALBUMIN 4.0 09/29/2020    [] 20 - <3.0   Smoking Cessation >4 Weeks Delay Surgery  Refer to OP Cessation Class    Former Smoker  Quit: 1984 [] 20 - current smoker                                _____ PPD                    Hx of MI, PE, Arrhythmia, CVA, DVT <30 Days Delay Surgery    N/A [] 20      Infection Variable Delay surgery and re-evaluate   N/A [] 20 - recent/current infection     Depression (PHQ) >10 out of 27 Delay Surgery and re-evaluate  Medication  Counseling              [x] 0     []1     []2     []3      []4      [] 5                    (1-4)      (5-9)  (10-14)  (15-19)   (20-27)     Memory Impairment & Memory loss (Mini-Cog Screening Tool) Advanced dementia and/or Parkinson's Reconsider surgery     [x] 0     []1     []2     []3     []4     [] 5     Physical Conditioning (Modified AM-PAC Per Physical Therapy at [facility]) " Unable to ambulate on day of surgery Delay surgery and re-evaluate  Pre-Rehabilitation   (PT evaluation)       [x]  0   []4       []8     []12        []16     []20       (<20%)   (<40%)   (<60%)   (<80% )    (>80%)     Home Environment/Caregiver support  (Per /Navigator Interview)    Availability of basic services and/or approprate assistance during post-operative period Delay surgery and re-evaluate  Safe home environment  Health   1 week post-surgery  Transportation  availability  Ability to obtain DME/Medications post-op    [x] 0     []1     []2     []3     []4     [] 5  [x] 0     []1     []2     []3     []4     [] 5  [x] 0     []1     []2     []3     []4     [] 5  [x] 0     []1     []2     []3     []4     [] 5         MD Contact: Dr. Herring Comments:  Total Score:  6

## 2020-10-05 NOTE — PRE ADMISSION SCREENING
JOINT CAMP ASSESSMENT    Name Sonia Muse   MRN [id number]    Age/Sex 60 y.o. female    Surgeon Dr. Felipe Herring   Joint Camp Date 10/5/2020   Surgery Date 10/19/2020   Procedure Left Knee Arthroplasty   Insurance Payor: MEDICAID / Plan: HE[place] BLUE [place] / Product Type: Managed Medicaid /    Care Team Patient Care Team:  Dorina Murillo NP as PCP - General (Family Medicine)  Felipe Herring MD as Consulting Physician (Orthopedic Surgery)    Pharmacy   [id number] - [place], [place] - [address]  [address] [address]  Phone: [phone] Fax: [phone]     AM-PAC Score   24   Risk Assessment Score 6     Past Medical History:   Diagnosis Date    Allergy     Anxiety     Asthma     DDD (degenerative disc disease), cervical     Depression     Fibromyalgia     Hypertension 2005    Neuromuscular disorder        Past Surgical History:   Procedure Laterality Date    ADENOIDECTOMY      carpel tunnel Right      SECTION      knee replacement Right     ROTATOR CUFF REPAIR Right     TONSILLECTOMY           Home Enviroment     Living Arrangement: Lives with family  Home Environment: 1-story house/ trailer, number of outside stairs: 1, tub-shower  Home Safety Concerns: Pets in the home: dogs (1).    DISCHARGE CAREGIVER/SUPPORT SYSTEM     Identified post-op caregiver: Patient has children / family / friends to help, mother and daughter.  Patient's caregiver(s) will be able to provide physical assistance. Patient will have someone to assist overnight.      Caregiver present at pre-op interview:  No      PRE-OPERATIVE FUNCTIONAL STATUS     Employment: Unemployed    Pre-op Functional Status: Patient is independent with mobility/ambulation, transfers, ADL's, IADL's.    Use of assistive device for ambulation: none  ADL: self care  ADL Limitations: difficulty with walking  Medical Restrictions: Decreased range of motions in extremities    POTENTIAL BARRIERS TO  DISCHARGE/POTENTIAL POST-OP COMPLICATIONS     Patient with hx of post-operative nausea and vomiting, neuromuscular disorder, HTN, fibromyalgia, and asthma.    DISCHARGE PLAN     Expected LOS of 2 days or less for joint replacement discussed with patient.     Patient in agreement with discharge plan: Yes    Discharge to: Outpatient PT and Home with 24 hour assistance     HH:  None per insurance coverage     OP PT: [facility]     Home DME: rolling walker, shower chair and tub transfer bench    Needed DME at D/C: none     Rx: Per Dr. Herring at discharge     Meds to Beds: N/A  Patient expected to discharge on Aspirin 81mg by mouth twice daily for DVT prophylaxis.

## 2020-10-05 NOTE — DISCHARGE INSTRUCTIONS
To confirm, Your doctor has instructed you that surgery is scheduled for: 10/19/20   Ly  [phone]      Covid test  - 10/16/20    Please report to [facility], Registration the morning of surgery. You must check-in and receive a wristband before going to your procedure.    Pre-Op will call the afternoon prior to surgery between 1:00 and 6:00 PM with the final arrival time.  Phone number: [phone]    PLEASE NOTE:  The surgery schedule has many variables which may affect the time of your surgery case.  Family members should be available if your surgery time changes.  Plan to be here the day of your procedure between 4-6 hours.    MEDICATIONS:  TAKE ONLY THESE MEDICATIONS WITH A SMALL SIP OF WATER THE MORNING OF YOUR PROCEDURE:    Inhalers, Xanax, Amlodipine, Cymbalta      DO NOT TAKE THESE MEDICATIONS 5-7 DAYS PRIOR to your procedure or per your surgeon's request: ASPIRIN, ALEVE, ADVIL, MOBIC, IBUPROFEN, FISH OIL VITAMIN E, HERBALS  (May take Tylenol)                                       LAST DOSE -  10/11/20    ONLY if you are prescribed any types of blood thinners such as:  Aspirin, Coumadin, Plavix, Pradaxa, Xarelto, Aggrenox, Effient, Eliquis, Savasya, Brilinta, or any other, ask your surgeon whether you should stop taking them and how long before surgery you should stop.  You may also need to verify with the prescribing physician if it is ok to stop your medication.      INSTRUCTIONS IMPORTANT!!  · Do not eat or drink anything between midnight and the time of your procedure- this includes gum, mints, and candy.  · Do not smoke or drink alcoholic beverages 24 hours prior to your procedure.  · Shower the night before AND the morning of your procedure with a Chlorhexidine wash such as Hibiclens or Dial antibacterial soap from the neck down.  Do not get it on your face or in your eyes.  You may use your own shampoo and face wash. This helps your skin to be as bacteria free as  possible.    · If you wear contact lenses, dentures, hearing aids or glasses, bring a container to put them in during surgery and give to a family member for safe keeping.  Please leave all jewelry, piercing's and valuables at home.   · DO NOT remove hair from the surgery site.  Do not shave the incision site unless you are given specific instructions to do so.    · ONLY if you have been diagnosed with sleep apnea please bring your C-PAP machine.  · ONLY if you wear home oxygen please bring your portable oxygen tank the day of your procedure.  · ONLY if you have a history of OPEN HEART SURGERY you will need a clearance from your Cardiologist per Anesthesia.      · ONLY for patients requiring bowel prep, written instructions will be given by your doctor's office.  · ONLY if you have a neuro stimulator, please bring the controller with you the morning of surgery  · ONLY if a type and screen test is needed before surgery, please return:  · If your doctor has scheduled you for an overnight stay, bring a small overnight bag with any personal items you need.  · Make arrangements in advance for transportation home by a responsible adult.  It is not safe to drive a vehicle during the 24 hours after anesthesia.     · ONLY ONE VISITOR PER PATIENT IS ALLOWED TO COME IN THE HOSPITAL THE DAY OF PROCEDURE.   · Visiting hours are 8:00AM-6:00PM. For the safety of all patients, visitors under the age of 12 are not allowed above the first floor of the hospital.    · All [facility] facilities and properties are tobacco free.  Smoking is NOT allowed.       If you have any questions about these instructions, call Pre-Op Admit  Nursing at [phone] or the Pre-Op Day Surgery Unit at [phone].

## 2020-10-05 NOTE — TELEPHONE ENCOUNTER
----- Message from April Branham sent at 10/5/2020  4:22 PM CDT -----  P#-[phone]-she got appt with Dr Perdue and need to fax clearance forms

## 2020-10-05 NOTE — OR NURSING
Dr Saab reviewed pts chart and most recent EKG (10/5/20). Cardiac clearance needs to be obtained before we can proceed with procedure. Message sent to Yu Krishna LPN.

## 2020-10-05 NOTE — PRE ADMISSION SCREENING
Patient Name: Sonia Muse  YOB: 1960   MRN: [id number]     [facility]-PAC   Basic Mobility Inpatient Short Form 6 Clicks         How much difficulty does the patient currently have  Unable  A Lot  A Little  None      1. Turning over in bed (including adjusting bedclothes, sheets and blankets)?     1 []    2 []    3 []    4 [x]        2. Sitting down on and standing up from a chair with arms (e.g., wheelchair, bedside commode, etc.)     1 []  2 []  3 []     4 [x]      3. Moving from lying on back to sitting on the side of the bed?     1 []  2 []  3 []    4 [x]    How much help from another person does the patient currently need  Total  A Lot  A Little  None      4. Moving to and from a bed to a chair (including a wheelchair)?    1 []  2 []  3 []    4 [x]      5. Need to walk in hospital room?    1 []  2 []  3 []    4 [x]      6. Climbing 3-5 steps with a railing?    1 []  2 []  3 []    4 [x]       Raw Score:      24            CMS 0-100% Score:     0       %   Standardized Score:    61.14           CMS Modifier:       [facility] AM-PAC   Basic Mobility Inpatient Short Form 6 Clicks Score Conversion Table*         *Use this form to convert -PAC Basic Mobility Inpatient Raw Scores.   [facility] Inpatient Basic Mobility Short Form Scoring Example   1. Add the number values associated with the response to each item. For example, items totals yield a Raw Score of 21.   2. Match the raw score to the t-Scale scores (t-Scale score = 50.25, SE = 4.69).   3. Find the associated CMS % (CMS % = 28.97%).   4. Locate the correct CMS Functional Modifier Code, or G Code (G code = CJ)     NOTE: Each -PAC Short Form has a separate conversion table. Make sure that you use the correct conversion table.       Instruction Manual - page 45 contains conversion table

## 2020-10-07 LAB — MRSA SPEC QL CULT: NORMAL

## 2020-10-09 DIAGNOSIS — R94.31 NONSPECIFIC ABNORMAL ELECTROCARDIOGRAM (ECG) (EKG): Primary | ICD-10-CM

## 2020-10-09 DIAGNOSIS — Z01.810 PRE-OPERATIVE CARDIOVASCULAR EXAMINATION: ICD-10-CM

## 2020-10-13 ENCOUNTER — TELEPHONE (OUTPATIENT)
Dept: CARDIOLOGY | Facility: HOSPITAL | Age: 60
End: 2020-10-13

## 2020-10-14 ENCOUNTER — CLINICAL SUPPORT (OUTPATIENT)
Dept: CARDIOLOGY | Facility: HOSPITAL | Age: 60
End: 2020-10-14
Attending: SPECIALIST
Payer: MEDICAID

## 2020-10-14 ENCOUNTER — HOSPITAL ENCOUNTER (OUTPATIENT)
Dept: RADIOLOGY | Facility: HOSPITAL | Age: 60
Discharge: HOME | End: 2020-10-14
Attending: SPECIALIST
Payer: MEDICAID

## 2020-10-14 VITALS — WEIGHT: 293 LBS | BODY MASS INDEX: 48.82 KG/M2 | HEIGHT: 65 IN

## 2020-10-14 DIAGNOSIS — R94.31 NONSPECIFIC ABNORMAL ELECTROCARDIOGRAM (ECG) (EKG): ICD-10-CM

## 2020-10-14 DIAGNOSIS — Z96.652 STATUS POST TOTAL KNEE REPLACEMENT, LEFT: Primary | ICD-10-CM

## 2020-10-14 DIAGNOSIS — Z01.810 PRE-OPERATIVE CARDIOVASCULAR EXAMINATION: ICD-10-CM

## 2020-10-14 LAB
CV PHARM DOSE: 0.4 MG
CV STRESS BASE HR: 91 BPM
DIASTOLIC BLOOD PRESSURE: 92 MMHG
OHS CV CPX 85 PERCENT MAX PREDICTED HEART RATE MALE: 132
OHS CV CPX MAX PREDICTED HEART RATE: 155
OHS CV CPX PATIENT IS FEMALE: 1
OHS CV CPX PATIENT IS MALE: 0
OHS CV CPX PEAK DIASTOLIC BLOOD PRESSURE: 93 MMHG
OHS CV CPX PEAK HEAR RATE: 116 BPM
OHS CV CPX PEAK RATE PRESSURE PRODUCT: NORMAL
OHS CV CPX PEAK SYSTOLIC BLOOD PRESSURE: 154 MMHG
OHS CV CPX PERCENT MAX PREDICTED HEART RATE ACHIEVED: 75
OHS CV CPX RATE PRESSURE PRODUCT PRESENTING: NORMAL
SYSTOLIC BLOOD PRESSURE: 134 MMHG

## 2020-10-14 PROCEDURE — 93016 CV STRESS TEST SUPVJ ONLY: CPT | Mod: ,,, | Performed by: INTERNAL MEDICINE

## 2020-10-14 PROCEDURE — 93016 NUCLEAR STRESS TEST (CUPID ONLY): ICD-10-PCS | Mod: ,,, | Performed by: INTERNAL MEDICINE

## 2020-10-14 PROCEDURE — 93017 CV STRESS TEST TRACING ONLY: CPT

## 2020-10-14 PROCEDURE — 93018 CV STRESS TEST I&R ONLY: CPT | Mod: ,,, | Performed by: INTERNAL MEDICINE

## 2020-10-14 PROCEDURE — 93018 NUCLEAR STRESS TEST (CUPID ONLY): ICD-10-PCS | Mod: ,,, | Performed by: INTERNAL MEDICINE

## 2020-10-14 PROCEDURE — A9502 TC99M TETROFOSMIN: HCPCS

## 2020-10-14 PROCEDURE — 63600175 PHARM REV CODE 636 W HCPCS: Performed by: SPECIALIST

## 2020-10-14 RX ORDER — REGADENOSON 0.08 MG/ML
0.4 INJECTION, SOLUTION INTRAVENOUS ONCE
Status: COMPLETED | OUTPATIENT
Start: 2020-10-14 | End: 2020-10-14

## 2020-10-14 RX ADMIN — REGADENOSON 0.4 MG: 0.08 INJECTION, SOLUTION INTRAVENOUS at 08:10

## 2020-10-15 ENCOUNTER — HOSPITAL ENCOUNTER (OUTPATIENT)
Dept: RADIOLOGY | Facility: HOSPITAL | Age: 60
Discharge: HOME | End: 2020-10-15
Attending: SPECIALIST
Payer: MEDICAID

## 2020-10-16 ENCOUNTER — LAB VISIT (OUTPATIENT)
Dept: PRIMARY CARE CLINIC | Facility: CLINIC | Age: 60
End: 2020-10-16
Payer: MEDICAID

## 2020-10-16 DIAGNOSIS — Z01.818 PREOP EXAMINATION: ICD-10-CM

## 2020-10-16 PROCEDURE — U0003 INFECTIOUS AGENT DETECTION BY NUCLEIC ACID (DNA OR RNA); SEVERE ACUTE RESPIRATORY SYNDROME CORONAVIRUS 2 (SARS-COV-2) (CORONAVIRUS DISEASE [COVID-19]), AMPLIFIED PROBE TECHNIQUE, MAKING USE OF HIGH THROUGHPUT TECHNOLOGIES AS DESCRIBED BY CMS-2020-01-R: HCPCS

## 2020-10-17 LAB — SARS-COV-2 RNA RESP QL NAA+PROBE: NOT DETECTED

## 2020-10-19 ENCOUNTER — HOSPITAL ENCOUNTER (OUTPATIENT)
Facility: HOSPITAL | Age: 60
Discharge: HOME | End: 2020-10-20
Attending: ORTHOPAEDIC SURGERY | Admitting: INTERNAL MEDICINE
Payer: MEDICAID

## 2020-10-19 ENCOUNTER — ANESTHESIA (OUTPATIENT)
Dept: SURGERY | Facility: HOSPITAL | Age: 60
End: 2020-10-19
Payer: MEDICAID

## 2020-10-19 DIAGNOSIS — M17.12 PRIMARY OSTEOARTHRITIS OF LEFT KNEE: ICD-10-CM

## 2020-10-19 DIAGNOSIS — M17.12 OSTEOARTHRITIS OF LEFT KNEE: Primary | ICD-10-CM

## 2020-10-19 DIAGNOSIS — I10 ESSENTIAL HYPERTENSION: ICD-10-CM

## 2020-10-19 DIAGNOSIS — Z01.818 PREOP EXAMINATION: ICD-10-CM

## 2020-10-19 PROCEDURE — 63600175 PHARM REV CODE 636 W HCPCS: Performed by: ANESTHESIOLOGY

## 2020-10-19 PROCEDURE — 99900104 DSU ONLY-NO CHARGE-EA ADD'L HR (STAT): Performed by: ORTHOPAEDIC SURGERY

## 2020-10-19 PROCEDURE — 63600175 PHARM REV CODE 636 W HCPCS: Performed by: ORTHOPAEDIC SURGERY

## 2020-10-19 PROCEDURE — D9220A PRA ANESTHESIA: Mod: ANES,,, | Performed by: ANESTHESIOLOGY

## 2020-10-19 PROCEDURE — 25000003 PHARM REV CODE 250: Performed by: ANESTHESIOLOGY

## 2020-10-19 PROCEDURE — 36000710: Performed by: ORTHOPAEDIC SURGERY

## 2020-10-19 PROCEDURE — C9290 INJ, BUPIVACAINE LIPOSOME: HCPCS | Performed by: ANESTHESIOLOGY

## 2020-10-19 PROCEDURE — 99900103 DSU ONLY-NO CHARGE-INITIAL HR (STAT): Performed by: ORTHOPAEDIC SURGERY

## 2020-10-19 PROCEDURE — 76942 ECHO GUIDE FOR BIOPSY: CPT | Mod: 26,,, | Performed by: ANESTHESIOLOGY

## 2020-10-19 PROCEDURE — 25000003 PHARM REV CODE 250: Performed by: ORTHOPAEDIC SURGERY

## 2020-10-19 PROCEDURE — 63600175 PHARM REV CODE 636 W HCPCS: Performed by: NURSE ANESTHETIST, CERTIFIED REGISTERED

## 2020-10-19 PROCEDURE — 37000008 HC ANESTHESIA 1ST 15 MINUTES: Performed by: ORTHOPAEDIC SURGERY

## 2020-10-19 PROCEDURE — 36000711: Performed by: ORTHOPAEDIC SURGERY

## 2020-10-19 PROCEDURE — 27201423 OPTIME MED/SURG SUP & DEVICES STERILE SUPPLY: Performed by: ORTHOPAEDIC SURGERY

## 2020-10-19 PROCEDURE — C1776 JOINT DEVICE (IMPLANTABLE): HCPCS | Performed by: ORTHOPAEDIC SURGERY

## 2020-10-19 PROCEDURE — D9220A PRA ANESTHESIA: ICD-10-PCS | Mod: CRNA,,, | Performed by: NURSE ANESTHETIST, CERTIFIED REGISTERED

## 2020-10-19 PROCEDURE — 76942 PR U/S GUIDANCE FOR NEEDLE GUIDANCE: ICD-10-PCS | Mod: 26,,, | Performed by: ANESTHESIOLOGY

## 2020-10-19 PROCEDURE — 25000003 PHARM REV CODE 250: Performed by: NURSE ANESTHETIST, CERTIFIED REGISTERED

## 2020-10-19 PROCEDURE — 01402 ANES OPN/ARTH TOT KNE ARTHRP: CPT | Performed by: ORTHOPAEDIC SURGERY

## 2020-10-19 PROCEDURE — 27200688 HC TRAY, SPINAL-HYPER/ ISOBARIC: Performed by: ANESTHESIOLOGY

## 2020-10-19 PROCEDURE — 37000009 HC ANESTHESIA EA ADD 15 MINS: Performed by: ORTHOPAEDIC SURGERY

## 2020-10-19 PROCEDURE — D9220A PRA ANESTHESIA: Mod: CRNA,,, | Performed by: NURSE ANESTHETIST, CERTIFIED REGISTERED

## 2020-10-19 PROCEDURE — 64447 NJX AA&/STRD FEMORAL NRV IMG: CPT | Performed by: ANESTHESIOLOGY

## 2020-10-19 PROCEDURE — 71000039 HC RECOVERY, EACH ADD'L HOUR: Performed by: ORTHOPAEDIC SURGERY

## 2020-10-19 PROCEDURE — C1713 ANCHOR/SCREW BN/BN,TIS/BN: HCPCS | Performed by: ORTHOPAEDIC SURGERY

## 2020-10-19 PROCEDURE — D9220A PRA ANESTHESIA: ICD-10-PCS | Mod: ANES,,, | Performed by: ANESTHESIOLOGY

## 2020-10-19 PROCEDURE — 97162 PT EVAL MOD COMPLEX 30 MIN: CPT

## 2020-10-19 PROCEDURE — 64447 NJX AA&/STRD FEMORAL NRV IMG: CPT | Mod: 59,LT,, | Performed by: ANESTHESIOLOGY

## 2020-10-19 PROCEDURE — 97116 GAIT TRAINING THERAPY: CPT

## 2020-10-19 PROCEDURE — 64447 PR NERVE BLOCK INJ, ANES/STEROID, FEMORAL, INCL IMAG GUIDANCE: ICD-10-PCS | Mod: 59,LT,, | Performed by: ANESTHESIOLOGY

## 2020-10-19 PROCEDURE — 27200750 HC INSULATED NEEDLE/ STIMUPLEX: Performed by: ANESTHESIOLOGY

## 2020-10-19 PROCEDURE — 71000033 HC RECOVERY, INTIAL HOUR: Performed by: ORTHOPAEDIC SURGERY

## 2020-10-19 PROCEDURE — 63600175 PHARM REV CODE 636 W HCPCS

## 2020-10-19 PROCEDURE — 97110 THERAPEUTIC EXERCISES: CPT

## 2020-10-19 PROCEDURE — C1751 CATH, INF, PER/CENT/MIDLINE: HCPCS | Performed by: ANESTHESIOLOGY

## 2020-10-19 DEVICE — CEMENT BONE ORTHOSET 1 40 GRAM: Type: IMPLANTABLE DEVICE | Site: KNEE | Status: FUNCTIONAL

## 2020-10-19 DEVICE — BASEPLATE TIB EVOLTN SZ 4 L: Type: IMPLANTABLE DEVICE | Site: KNEE | Status: FUNCTIONAL

## 2020-10-19 DEVICE — PATELLA ONLAY 32MM TRI PEG: Type: IMPLANTABLE DEVICE | Site: KNEE | Status: FUNCTIONAL

## 2020-10-19 DEVICE — COMP FEM PRI EVOLUTION SZ5 L: Type: IMPLANTABLE DEVICE | Site: KNEE | Status: FUNCTIONAL

## 2020-10-19 RX ORDER — ACETAMINOPHEN 500 MG
1000 TABLET ORAL
Status: DISCONTINUED | OUTPATIENT
Start: 2020-10-19 | End: 2020-10-19 | Stop reason: SDUPTHER

## 2020-10-19 RX ORDER — OXYCODONE HCL 10 MG/1
10 TABLET, FILM COATED, EXTENDED RELEASE ORAL ONCE
Status: DISCONTINUED | OUTPATIENT
Start: 2020-10-19 | End: 2020-10-19 | Stop reason: HOSPADM

## 2020-10-19 RX ORDER — ONDANSETRON HYDROCHLORIDE 2 MG/ML
INJECTION, SOLUTION INTRAMUSCULAR; INTRAVENOUS
Status: DISCONTINUED | OUTPATIENT
Start: 2020-10-19 | End: 2020-10-19

## 2020-10-19 RX ORDER — DULOXETIN HYDROCHLORIDE 30 MG/1
60 CAPSULE, DELAYED RELEASE ORAL DAILY
Status: DISCONTINUED | OUTPATIENT
Start: 2020-10-20 | End: 2020-10-20 | Stop reason: HOSPADM

## 2020-10-19 RX ORDER — LIDOCAINE HYDROCHLORIDE 10 MG/ML
1 INJECTION, SOLUTION EPIDURAL; INFILTRATION; INTRACAUDAL; PERINEURAL ONCE
Status: COMPLETED | OUTPATIENT
Start: 2020-10-19 | End: 2020-10-19

## 2020-10-19 RX ORDER — FAMOTIDINE 20 MG/1
20 TABLET, FILM COATED ORAL 2 TIMES DAILY
Status: DISCONTINUED | OUTPATIENT
Start: 2020-10-19 | End: 2020-10-20 | Stop reason: HOSPADM

## 2020-10-19 RX ORDER — TRANEXAMIC ACID 100 MG/ML
1000 INJECTION, SOLUTION INTRAVENOUS
Status: DISCONTINUED | OUTPATIENT
Start: 2020-10-19 | End: 2020-10-19 | Stop reason: HOSPADM

## 2020-10-19 RX ORDER — OXYCODONE AND ACETAMINOPHEN 7.5; 325 MG/1; MG/1
1 TABLET ORAL EVERY 4 HOURS PRN
Qty: 28 TABLET | Refills: 0 | Status: SHIPPED | OUTPATIENT
Start: 2020-10-19 | End: 2020-10-27 | Stop reason: SDUPTHER

## 2020-10-19 RX ORDER — PREGABALIN 75 MG/1
75 CAPSULE ORAL 2 TIMES DAILY
Status: DISCONTINUED | OUTPATIENT
Start: 2020-10-19 | End: 2020-10-20 | Stop reason: HOSPADM

## 2020-10-19 RX ORDER — DEXTROSE MONOHYDRATE AND SODIUM CHLORIDE 5; .45 G/100ML; G/100ML
INJECTION, SOLUTION INTRAVENOUS CONTINUOUS
Status: DISCONTINUED | OUTPATIENT
Start: 2020-10-19 | End: 2020-10-19

## 2020-10-19 RX ORDER — BUPIVACAINE HYDROCHLORIDE 5 MG/ML
INJECTION, SOLUTION EPIDURAL; INTRACAUDAL
Status: DISCONTINUED | OUTPATIENT
Start: 2020-10-19 | End: 2020-10-19

## 2020-10-19 RX ORDER — KETAMINE HYDROCHLORIDE 100 MG/ML
INJECTION, SOLUTION INTRAMUSCULAR; INTRAVENOUS
Status: DISCONTINUED | OUTPATIENT
Start: 2020-10-19 | End: 2020-10-19

## 2020-10-19 RX ORDER — ALPRAZOLAM 1 MG/1
1 TABLET ORAL DAILY
Status: DISCONTINUED | OUTPATIENT
Start: 2020-10-20 | End: 2020-10-20 | Stop reason: HOSPADM

## 2020-10-19 RX ORDER — EPHEDRINE SULFATE 50 MG/ML
INJECTION, SOLUTION INTRAVENOUS
Status: DISCONTINUED | OUTPATIENT
Start: 2020-10-19 | End: 2020-10-19

## 2020-10-19 RX ORDER — MIDAZOLAM HYDROCHLORIDE 1 MG/ML
INJECTION, SOLUTION INTRAMUSCULAR; INTRAVENOUS
Status: DISCONTINUED | OUTPATIENT
Start: 2020-10-19 | End: 2020-10-19

## 2020-10-19 RX ORDER — FENTANYL CITRATE 50 UG/ML
INJECTION, SOLUTION INTRAMUSCULAR; INTRAVENOUS
Status: DISCONTINUED | OUTPATIENT
Start: 2020-10-19 | End: 2020-10-19

## 2020-10-19 RX ORDER — OXYCODONE HYDROCHLORIDE 5 MG/1
5 TABLET ORAL
Status: DISCONTINUED | OUTPATIENT
Start: 2020-10-19 | End: 2020-10-19

## 2020-10-19 RX ORDER — BUPIVACAINE HYDROCHLORIDE 7.5 MG/ML
INJECTION, SOLUTION INTRASPINAL
Status: DISCONTINUED | OUTPATIENT
Start: 2020-10-19 | End: 2020-10-19

## 2020-10-19 RX ORDER — CELECOXIB 100 MG/1
200 CAPSULE ORAL 2 TIMES DAILY
Status: DISCONTINUED | OUTPATIENT
Start: 2020-10-19 | End: 2020-10-19 | Stop reason: SDUPTHER

## 2020-10-19 RX ORDER — POLYETHYLENE GLYCOL 3350 17 G/17G
17 POWDER, FOR SOLUTION ORAL DAILY
Status: DISCONTINUED | OUTPATIENT
Start: 2020-10-19 | End: 2020-10-20 | Stop reason: HOSPADM

## 2020-10-19 RX ORDER — FUROSEMIDE 20 MG/1
20 TABLET ORAL 2 TIMES DAILY
Status: DISCONTINUED | OUTPATIENT
Start: 2020-10-20 | End: 2020-10-20 | Stop reason: HOSPADM

## 2020-10-19 RX ORDER — ACETAMINOPHEN 500 MG
1000 TABLET ORAL
Status: COMPLETED | OUTPATIENT
Start: 2020-10-19 | End: 2020-10-19

## 2020-10-19 RX ORDER — PREGABALIN 75 MG/1
75 CAPSULE ORAL 2 TIMES DAILY
Status: DISCONTINUED | OUTPATIENT
Start: 2020-10-19 | End: 2020-10-19 | Stop reason: SDUPTHER

## 2020-10-19 RX ORDER — NAPROXEN SODIUM 220 MG/1
81 TABLET, FILM COATED ORAL 2 TIMES DAILY
Status: DISCONTINUED | OUTPATIENT
Start: 2020-10-19 | End: 2020-10-20 | Stop reason: HOSPADM

## 2020-10-19 RX ORDER — BISACODYL 10 MG
10 SUPPOSITORY, RECTAL RECTAL DAILY
Status: DISCONTINUED | OUTPATIENT
Start: 2020-10-19 | End: 2020-10-20 | Stop reason: HOSPADM

## 2020-10-19 RX ORDER — ACETAMINOPHEN 500 MG
1000 TABLET ORAL
Status: DISCONTINUED | OUTPATIENT
Start: 2020-10-19 | End: 2020-10-19

## 2020-10-19 RX ORDER — OXYCODONE HYDROCHLORIDE 10 MG/1
10 TABLET ORAL
Status: DISCONTINUED | OUTPATIENT
Start: 2020-10-19 | End: 2020-10-19

## 2020-10-19 RX ORDER — ZOLPIDEM TARTRATE 5 MG/1
5 TABLET ORAL NIGHTLY PRN
Status: DISCONTINUED | OUTPATIENT
Start: 2020-10-19 | End: 2020-10-20 | Stop reason: HOSPADM

## 2020-10-19 RX ORDER — FENTANYL CITRATE 50 UG/ML
25 INJECTION, SOLUTION INTRAMUSCULAR; INTRAVENOUS EVERY 5 MIN PRN
Status: DISCONTINUED | OUTPATIENT
Start: 2020-10-19 | End: 2020-10-19 | Stop reason: HOSPADM

## 2020-10-19 RX ORDER — SODIUM CHLORIDE 0.9 % (FLUSH) 0.9 %
5 SYRINGE (ML) INJECTION
Status: DISCONTINUED | OUTPATIENT
Start: 2020-10-19 | End: 2020-10-20 | Stop reason: HOSPADM

## 2020-10-19 RX ORDER — MORPHINE SULFATE 2 MG/ML
2 INJECTION, SOLUTION INTRAMUSCULAR; INTRAVENOUS EVERY 4 HOURS PRN
Status: DISCONTINUED | OUTPATIENT
Start: 2020-10-19 | End: 2020-10-20 | Stop reason: HOSPADM

## 2020-10-19 RX ORDER — TRANEXAMIC ACID 100 MG/ML
INJECTION, SOLUTION INTRAVENOUS
Status: DISCONTINUED | OUTPATIENT
Start: 2020-10-19 | End: 2020-10-19

## 2020-10-19 RX ORDER — PREGABALIN 75 MG/1
75 CAPSULE ORAL ONCE
Status: COMPLETED | OUTPATIENT
Start: 2020-10-19 | End: 2020-10-19

## 2020-10-19 RX ORDER — OXYCODONE HYDROCHLORIDE 10 MG/1
10 TABLET ORAL EVERY 4 HOURS PRN
Status: DISCONTINUED | OUTPATIENT
Start: 2020-10-19 | End: 2020-10-19 | Stop reason: SDUPTHER

## 2020-10-19 RX ORDER — CELECOXIB 100 MG/1
200 CAPSULE ORAL 2 TIMES DAILY
Status: DISCONTINUED | OUTPATIENT
Start: 2020-10-20 | End: 2020-10-20 | Stop reason: HOSPADM

## 2020-10-19 RX ORDER — OXYCODONE HYDROCHLORIDE 10 MG/1
10 TABLET ORAL
Status: DISCONTINUED | OUTPATIENT
Start: 2020-10-19 | End: 2020-10-20 | Stop reason: HOSPADM

## 2020-10-19 RX ORDER — OXYCODONE HYDROCHLORIDE 5 MG/1
5 TABLET ORAL
Status: DISCONTINUED | OUTPATIENT
Start: 2020-10-19 | End: 2020-10-19 | Stop reason: HOSPADM

## 2020-10-19 RX ORDER — PROPOFOL 10 MG/ML
VIAL (ML) INTRAVENOUS CONTINUOUS PRN
Status: DISCONTINUED | OUTPATIENT
Start: 2020-10-19 | End: 2020-10-19

## 2020-10-19 RX ORDER — ACETAMINOPHEN 325 MG/1
650 TABLET ORAL EVERY 4 HOURS PRN
Status: DISCONTINUED | OUTPATIENT
Start: 2020-10-19 | End: 2020-10-20 | Stop reason: HOSPADM

## 2020-10-19 RX ORDER — ACETAMINOPHEN 10 MG/ML
1000 INJECTION, SOLUTION INTRAVENOUS ONCE
Status: COMPLETED | OUTPATIENT
Start: 2020-10-19 | End: 2020-10-19

## 2020-10-19 RX ORDER — CEFAZOLIN SODIUM 2 G/50ML
2 SOLUTION INTRAVENOUS
Status: COMPLETED | OUTPATIENT
Start: 2020-10-19 | End: 2020-10-19

## 2020-10-19 RX ORDER — CELECOXIB 100 MG/1
400 CAPSULE ORAL ONCE
Status: COMPLETED | OUTPATIENT
Start: 2020-10-19 | End: 2020-10-19

## 2020-10-19 RX ORDER — OXYCODONE HYDROCHLORIDE 5 MG/1
5 TABLET ORAL
Status: DISCONTINUED | OUTPATIENT
Start: 2020-10-19 | End: 2020-10-20 | Stop reason: HOSPADM

## 2020-10-19 RX ORDER — HYDROMORPHONE HYDROCHLORIDE 2 MG/ML
0.2 INJECTION, SOLUTION INTRAMUSCULAR; INTRAVENOUS; SUBCUTANEOUS EVERY 5 MIN PRN
Status: DISCONTINUED | OUTPATIENT
Start: 2020-10-19 | End: 2020-10-19 | Stop reason: HOSPADM

## 2020-10-19 RX ORDER — CEFAZOLIN SODIUM 2 G/50ML
2 SOLUTION INTRAVENOUS
Status: COMPLETED | OUTPATIENT
Start: 2020-10-19 | End: 2020-10-20

## 2020-10-19 RX ORDER — MUPIROCIN 20 MG/G
OINTMENT TOPICAL
Status: DISCONTINUED | OUTPATIENT
Start: 2020-10-19 | End: 2020-10-19 | Stop reason: HOSPADM

## 2020-10-19 RX ORDER — CETIRIZINE HYDROCHLORIDE 10 MG/1
10 TABLET ORAL DAILY
Status: DISCONTINUED | OUTPATIENT
Start: 2020-10-20 | End: 2020-10-20 | Stop reason: HOSPADM

## 2020-10-19 RX ORDER — SCOLOPAMINE TRANSDERMAL SYSTEM 1 MG/1
1 PATCH, EXTENDED RELEASE TRANSDERMAL
Status: DISCONTINUED | OUTPATIENT
Start: 2020-10-19 | End: 2020-10-19

## 2020-10-19 RX ORDER — PHENYLEPHRINE HYDROCHLORIDE 10 MG/ML
INJECTION INTRAVENOUS
Status: DISCONTINUED | OUTPATIENT
Start: 2020-10-19 | End: 2020-10-19

## 2020-10-19 RX ORDER — ACETAMINOPHEN 325 MG/1
650 TABLET ORAL EVERY 4 HOURS PRN
Status: DISCONTINUED | OUTPATIENT
Start: 2020-10-19 | End: 2020-10-19

## 2020-10-19 RX ORDER — ONDANSETRON 8 MG/1
8 TABLET, ORALLY DISINTEGRATING ORAL EVERY 8 HOURS PRN
Status: DISCONTINUED | OUTPATIENT
Start: 2020-10-19 | End: 2020-10-20 | Stop reason: HOSPADM

## 2020-10-19 RX ADMIN — MIDAZOLAM 1 MG: 1 INJECTION INTRAMUSCULAR; INTRAVENOUS at 12:10

## 2020-10-19 RX ADMIN — ONDANSETRON 4 MG: 2 INJECTION, SOLUTION INTRAMUSCULAR; INTRAVENOUS at 01:10

## 2020-10-19 RX ADMIN — OXYCODONE HYDROCHLORIDE 10 MG: 10 TABLET ORAL at 11:10

## 2020-10-19 RX ADMIN — CELECOXIB 400 MG: 100 CAPSULE ORAL at 09:10

## 2020-10-19 RX ADMIN — PHENYLEPHRINE HYDROCHLORIDE 100 MCG: 10 INJECTION INTRAVENOUS at 01:10

## 2020-10-19 RX ADMIN — EPHEDRINE SULFATE 15 MG: 50 INJECTION, SOLUTION INTRAMUSCULAR; INTRAVENOUS; SUBCUTANEOUS at 12:10

## 2020-10-19 RX ADMIN — SCOPALAMINE 1 PATCH: 1 PATCH, EXTENDED RELEASE TRANSDERMAL at 10:10

## 2020-10-19 RX ADMIN — LIDOCAINE HYDROCHLORIDE 10 MG: 10 INJECTION, SOLUTION EPIDURAL; INFILTRATION; INTRACAUDAL; PERINEURAL at 09:10

## 2020-10-19 RX ADMIN — POLYETHYLENE GLYCOL 3350 17 G: 17 POWDER, FOR SOLUTION ORAL at 03:10

## 2020-10-19 RX ADMIN — ROPIVACAINE HYDROCHLORIDE: 5 INJECTION, SOLUTION EPIDURAL; INFILTRATION; PERINEURAL at 12:10

## 2020-10-19 RX ADMIN — EPHEDRINE SULFATE 20 MG: 50 INJECTION, SOLUTION INTRAMUSCULAR; INTRAVENOUS; SUBCUTANEOUS at 12:10

## 2020-10-19 RX ADMIN — ACETAMINOPHEN 1000 MG: 500 TABLET ORAL at 03:10

## 2020-10-19 RX ADMIN — EPHEDRINE SULFATE 5 MG: 50 INJECTION, SOLUTION INTRAMUSCULAR; INTRAVENOUS; SUBCUTANEOUS at 01:10

## 2020-10-19 RX ADMIN — FENTANYL CITRATE 50 MCG: 50 INJECTION, SOLUTION INTRAMUSCULAR; INTRAVENOUS at 12:10

## 2020-10-19 RX ADMIN — KETAMINE HYDROCHLORIDE 10 MG: 100 INJECTION, SOLUTION, CONCENTRATE INTRAMUSCULAR; INTRAVENOUS at 01:10

## 2020-10-19 RX ADMIN — OXYCODONE HYDROCHLORIDE 5 MG: 5 TABLET ORAL at 08:10

## 2020-10-19 RX ADMIN — CEFAZOLIN SODIUM 2 G: 2 SOLUTION INTRAVENOUS at 12:10

## 2020-10-19 RX ADMIN — KETAMINE HYDROCHLORIDE 10 MG: 100 INJECTION, SOLUTION, CONCENTRATE INTRAMUSCULAR; INTRAVENOUS at 12:10

## 2020-10-19 RX ADMIN — PREGABALIN 75 MG: 75 CAPSULE ORAL at 09:10

## 2020-10-19 RX ADMIN — BUPIVACAINE HYDROCHLORIDE IN DEXTROSE 15 MG: 7.5 INJECTION, SOLUTION SUBARACHNOID at 12:10

## 2020-10-19 RX ADMIN — SODIUM CHLORIDE, SODIUM GLUCONATE, SODIUM ACETATE, POTASSIUM CHLORIDE, MAGNESIUM CHLORIDE, SODIUM PHOSPHATE, DIBASIC, AND POTASSIUM PHOSPHATE: .53; .5; .37; .037; .03; .012; .00082 INJECTION, SOLUTION INTRAVENOUS at 12:10

## 2020-10-19 RX ADMIN — EPHEDRINE SULFATE 10 MG: 50 INJECTION, SOLUTION INTRAMUSCULAR; INTRAVENOUS; SUBCUTANEOUS at 01:10

## 2020-10-19 RX ADMIN — BUPIVACAINE 20 ML: 13.3 INJECTION, SUSPENSION, LIPOSOMAL INFILTRATION at 11:10

## 2020-10-19 RX ADMIN — TRANEXAMIC ACID 1000 MG: 100 INJECTION, SOLUTION INTRAVENOUS at 12:10

## 2020-10-19 RX ADMIN — SODIUM CHLORIDE, SODIUM GLUCONATE, SODIUM ACETATE, POTASSIUM CHLORIDE, MAGNESIUM CHLORIDE, SODIUM PHOSPHATE, DIBASIC, AND POTASSIUM PHOSPHATE: .53; .5; .37; .037; .03; .012; .00082 INJECTION, SOLUTION INTRAVENOUS at 09:10

## 2020-10-19 RX ADMIN — ASPIRIN 81 MG 81 MG: 81 TABLET ORAL at 08:10

## 2020-10-19 RX ADMIN — MUPIROCIN: 20 OINTMENT TOPICAL at 09:10

## 2020-10-19 RX ADMIN — PROPOFOL 40 MCG/KG/MIN: 10 INJECTION, EMULSION INTRAVENOUS at 12:10

## 2020-10-19 RX ADMIN — MORPHINE SULFATE 2 MG: 2 INJECTION, SOLUTION INTRAMUSCULAR; INTRAVENOUS at 03:10

## 2020-10-19 RX ADMIN — BUPIVACAINE HYDROCHLORIDE 10 ML: 5 INJECTION, SOLUTION EPIDURAL; INTRACAUDAL; PERINEURAL at 11:10

## 2020-10-19 RX ADMIN — CEFAZOLIN SODIUM 2 G: 2 SOLUTION INTRAVENOUS at 08:10

## 2020-10-19 RX ADMIN — FAMOTIDINE 20 MG: 20 TABLET ORAL at 08:10

## 2020-10-19 RX ADMIN — MIDAZOLAM 1 MG: 1 INJECTION INTRAMUSCULAR; INTRAVENOUS at 11:10

## 2020-10-19 RX ADMIN — PHENYLEPHRINE HYDROCHLORIDE 200 MCG: 10 INJECTION INTRAVENOUS at 01:10

## 2020-10-19 RX ADMIN — SODIUM CHLORIDE, SODIUM GLUCONATE, SODIUM ACETATE, POTASSIUM CHLORIDE, MAGNESIUM CHLORIDE, SODIUM PHOSPHATE, DIBASIC, AND POTASSIUM PHOSPHATE: .53; .5; .37; .037; .03; .012; .00082 INJECTION, SOLUTION INTRAVENOUS at 01:10

## 2020-10-19 RX ADMIN — ACETAMINOPHEN 1000 MG: 10 INJECTION, SOLUTION INTRAVENOUS at 09:10

## 2020-10-19 RX ADMIN — KETAMINE HYDROCHLORIDE 5 MG: 100 INJECTION, SOLUTION, CONCENTRATE INTRAMUSCULAR; INTRAVENOUS at 01:10

## 2020-10-19 RX ADMIN — PREGABALIN 75 MG: 75 CAPSULE ORAL at 08:10

## 2020-10-19 RX ADMIN — FENTANYL CITRATE 50 MCG: 50 INJECTION, SOLUTION INTRAMUSCULAR; INTRAVENOUS at 11:10

## 2020-10-19 NOTE — ANESTHESIA POSTPROCEDURE EVALUATION
Anesthesia Post Evaluation    Patient: Sonia Muse    Procedure(s) Performed: Procedure(s) (LRB):  ARTHROPLASTY, KNEE (Left)    Final Anesthesia Type: spinal    Patient location during evaluation: PACU  Patient participation: Yes- Able to Participate  Level of consciousness: awake and alert, oriented and awake  Post-procedure vital signs: reviewed and stable  Pain management: adequate  Airway patency: patent    PONV status at discharge: No PONV  Anesthetic complications: no      Cardiovascular status: blood pressure returned to baseline and hemodynamically stable  Respiratory status: unassisted, spontaneous ventilation and room air  Hydration status: euvolemic  Follow-up not needed.          Vitals Value Taken Time   /72 10/19/20 1543   Temp 36.5 °C (97.7 °F) 10/19/20 1543   Pulse 91 10/19/20 1543   Resp 18 10/19/20 1555   SpO2 98 % 10/19/20 1543         Event Time   Out of Recovery 15:28:09         Pain/Lori Score: Pain Rating Prior to Med Admin: 7 (10/19/2020  3:55 PM)  Lori Score: 10 (10/19/2020  3:15 PM)

## 2020-10-19 NOTE — PLAN OF CARE
Problem: Physical Therapy Goal  Goal: Physical Therapy Goal  Description: Goals to be met by: 10-     Patient will increase functional independence with mobility by performin. Supine to sit with Contact Guard Assistance  2. Sit to stand transfer with Contact Guard Assistance  3. Bed to chair transfer with Contact Guard Assistance using Rolling Walker  4. Gait  x 250x2 feet with Contact Guard Assistance using Rolling Walker.   5. Lower extremity exercise program x20 reps     Outcome: Ongoing, Progressing   PT eval and treat completed. Gait 250ft with RW min assist. Thera ex with LK flexion 90 degrees

## 2020-10-19 NOTE — ANESTHESIA PREPROCEDURE EVALUATION
10/19/2020  Sonia Muse is a 60 y.o., female.    Anesthesia Evaluation    I have reviewed the Patient Summary Reports.    I have reviewed the Nursing Notes.       Review of Systems  Anesthesia Hx:  Hx of Anesthetic complications PONV   Cardiovascular:   Hypertension    Pulmonary:   Asthma    Musculoskeletal:   Arthritis     Neurological:   Neuromuscular Disease,    Psych:   Psychiatric History          Physical Exam  General:  Well nourished, Morbid Obesity    Airway/Jaw/Neck:  Airway Findings: Mouth Opening: Normal Tongue: Normal  General Airway Assessment: Adult  Mallampati: I  TM Distance: Normal, at least 6 cm  Jaw/Neck Findings:  Neck ROM: Normal ROM     Eyes/Ears/Nose:  Eyes/Ears/Nose Findings:    Dental:  Dental Findings:   Chest/Lungs:  Chest/Lungs Findings: Normal Respiratory Rate     Heart/Vascular:  Heart Findings: Rate: Normal  Rhythm: Regular Rhythm        Mental Status:  Mental Status Findings:  Cooperative, Alert and Oriented         Anesthesia Plan  Type of Anesthesia, risks & benefits discussed:  Anesthesia Type:  spinal  Patient's Preference:   Intra-op Monitoring Plan: standard ASA monitors  Intra-op Monitoring Plan Comments:   Post Op Pain Control Plan: multimodal analgesia, peripheral nerve block, IV/PO Opioids PRN and per primary service following discharge from PACU  Post Op Pain Control Plan Comments:   Induction:    Beta Blocker:  Patient is not currently on a Beta-Blocker (No further documentation required).       Informed Consent: Patient understands risks and agrees with Anesthesia plan.  Questions answered. Anesthesia consent signed with patient.  ASA Score: 3     Day of Surgery Review of History & Physical:    H&P update referred to the surgeon.         Ready For Surgery From Anesthesia Perspective.

## 2020-10-19 NOTE — H&P
[facility] Hospital Medicine  History & Physical    Patient Name: Sonia Muse  MRN: [id number]  Admission Date: 10/19/2020  Attending Physician: Felipe Herring MD   Primary Care Provider: Dorina Murillo NP         Patient information was obtained from patient.     Subjective:     Principal Problem:Osteoarthritis of left knee    Chief Complaint:  Postop medical management status post left total knee arthroplasty     HPI: 60-year-old  female with past medical history significant for hypertension, osteoarthritis and anxiety/depression who underwent left total knee arthroplasty performed by Dr. Herring. Post-operatively, patient denied chest pain, shortness of breath, abdominal pain, nausea, vomiting, headache, vision changes, focal neuro-deficits, cough or fever.      Past Medical History:   Diagnosis Date    Allergy     Anxiety     Asthma     DDD (degenerative disc disease), cervical     Depression     Fibromyalgia     Hypertension     Neuromuscular disorder     PONV (postoperative nausea and vomiting)        Past Surgical History:   Procedure Laterality Date    ADENOIDECTOMY      carpel tunnel Right      SECTION      JOINT REPLACEMENT Right 2016    knee    knee replacement Right     ROTATOR CUFF REPAIR Right     TONSILLECTOMY         Review of patient's allergies indicates:   Allergen Reactions    Erythromycin Swelling    Codeine Nausea And Vomiting     And migraine h/a       No current facility-administered medications on file prior to encounter.      Current Outpatient Medications on File Prior to Encounter   Medication Sig    ALPRAZolam (XANAX) 1 MG tablet Take 1 tablet (1 mg total) by mouth once daily.    amLODIPine (NORVASC) 2.5 MG tablet Take 1 tablet (2.5 mg total) by mouth once daily.    DULoxetine (CYMBALTA) 60 MG capsule Take 1 capsule (60 mg total) by mouth once daily.    furosemide (LASIX) 20 MG tablet Take 1 tablet (20 mg total) by  mouth 2 (two) times daily.    hydroCHLOROthiazide (HYDRODIURIL) 25 MG tablet Take 1 tablet (25 mg total) by mouth once daily.    losartan (COZAAR) 100 MG tablet Take 1 tablet (100 mg total) by mouth once daily.    meloxicam (MOBIC) 15 MG tablet Take 1 tablet (15 mg total) by mouth once daily.     Family History     Problem Relation (Age of Onset)    Anxiety disorder Daughter    Diabetes Mother, Father    Glaucoma Mother    Graves' disease Daughter    Heart disease Mother, Father, Sister    Hypertension Mother, Father, Sister    Stroke Mother        Tobacco Use    Smoking status: Former Smoker     Packs/day: 1.00     Years: 11.00     Pack years: 11.00     Quit date:      Years since quittin.8    Smokeless tobacco: Never Used   Substance and Sexual Activity    Alcohol use: Yes     Comment: occasional 2x/y    Drug use: Never    Sexual activity: Yes     Partners: Male     Review of Systems   Musculoskeletal:        Left knee pain   All other systems reviewed and are negative.    Objective:     Vital Signs (Most Recent):  Temp: 98 °F (36.7 °C) (10/19/20 0945)  Pulse: 90 (10/19/20 0945)  Resp: 18 (10/19/20 0945)  BP: (!) 142/88 (10/19/20 0945)  SpO2: 97 % (10/19/20 0945) Vital Signs (24h Range):  Temp:  [98 °F (36.7 °C)] 98 °F (36.7 °C)  Pulse:  [90] 90  Resp:  [18] 18  SpO2:  [97 %] 97 %  BP: (142)/(88) 142/88     Weight: 132.5 kg (292 lb)  Body mass index is 49.35 kg/m².    Physical Exam  Constitutional:       Appearance: She is well-developed.   HENT:      Head: Normocephalic and atraumatic.   Eyes:      Conjunctiva/sclera: Conjunctivae normal.      Pupils: Pupils are equal, round, and reactive to light.   Neck:      Musculoskeletal: Neck supple.      Thyroid: No thyromegaly.      Vascular: No JVD.   Cardiovascular:      Rate and Rhythm: Normal rate and regular rhythm.      Heart sounds: No murmur. No friction rub. No gallop.    Pulmonary:      Effort: Pulmonary effort is normal.      Breath sounds:  Normal breath sounds.   Abdominal:      General: Bowel sounds are normal. There is no distension.      Palpations: Abdomen is soft. There is no mass.      Tenderness: There is no abdominal tenderness.   Musculoskeletal: Normal range of motion.      Comments: Left knee surgical dressing clean dry and intact.   Skin:     General: Skin is warm and dry.   Neurological:      Mental Status: She is oriented to person, place, and time.      Cranial Nerves: No cranial nerve deficit.   Psychiatric:         Behavior: Behavior normal.           CRANIAL NERVES     CN III, IV, VI   Pupils are equal, round, and reactive to light.       Significant Labs: CBC: No results for input(s): WBC, HGB, HCT, PLT in the last 48 hours.  CMP: No results for input(s): NA, K, CL, CO2, GLU, BUN, CREATININE, CALCIUM, PROT, ALBUMIN, BILITOT, ALKPHOS, AST, ALT, ANIONGAP, EGFRNONAA in the last 48 hours.    Invalid input(s): ESTGFAFR[place]    Significant Imaging: None    Assessment/Plan:     * Osteoarthritis of left knee  Continue to follow Orthopedic recommendations.  Needs aggressive incentive spirometry.  Follow hemoglobin and hematocrit closely.  Pain control with IV narcotics and antiemetics as needed.  Physical therapy as per Orthopedics protocol with fall precautions.          Depression with anxiety  Continue use of Xanax and Cymbalta as before.      Essential hypertension  Chronic problem. Will continue chronic medications and monitor for any changes, adjusting as needed.            DVT prophylaxis:  Use sequential compression stockings and Perry hose.    On aspirin 81 mg twice daily as per recommendations by Dr. Herring. Consider Lovenox 40 mg SQ q 12 hrs if okay with Dr. Herring.     Sabas Dickey MD  Department of Hospital Medicine   [facility]

## 2020-10-19 NOTE — PT/OT/SLP EVAL
"Physical Therapy Evaluation    Patient Name:  Sonia Muse   MRN:  [id number]    Recommendations:     Discharge Recommendations:  home health PT   Discharge Equipment Recommendations: none   Barriers to discharge: None    Assessment:     Sonia Muse is a 60 y.o. female admitted with a medical diagnosis of Osteoarthritis of left knee.  She presents with the following impairments/functional limitations:  weakness, impaired functional mobilty, gait instability, decreased lower extremity function, impaired balance, pain, decreased ROM, orthopedic precautions . Pt seen post morphine shot. Pt tolerated gait training 250ft with RW and thera ex. OOB chair.    Rehab Prognosis: Good; patient would benefit from acute skilled PT services to address these deficits and reach maximum level of function.    Recent Surgery: Procedure(s) (LRB):  ARTHROPLASTY, KNEE (Left) Day of Surgery    Plan:     During this hospitalization, patient to be seen BID to address the identified rehab impairments via gait training, therapeutic activities, therapeutic exercises and progress toward the following goals:    · Plan of Care Expires:  10/30/20    Subjective   Stated had R shoulder and knee replaced already  Was in a lot of pain prior to this surgery  Stated " my Pain is less walking than when in bed"  Chief Complaint: pain anterior knee  Patient/Family Comments/goals: get well  Pain/Comfort:  · Pain Rating 1: 5/10  · Location - Side 1: Left  · Location - Orientation 1: anterior  · Location 1: knee  · Pain Addressed 1: Pre-medicate for activity, Reposition    Patients cultural, spiritual, Mosque conflicts given the current situation:      Living Environment:  Home with mom and daughter  Prior to admission, patients level of function was independent.  Equipment used at home: none.  DME owned (not currently used): rolling walker.  Upon discharge, patient will have assistance from family.    Objective:     Communicated with nurse " Melody prior to session.  Patient found HOB elevated with SCD, cryotherapy  upon PT entry to room.    General Precautions: Standard, fall   Orthopedic Precautions:LLE weight bearing as tolerated   Braces: N/A     Exams:  · Postural Exam:  Patient presented with the following abnormalities:    · -       Rounded shoulders  · -       Forward head  · -       obese  · RLE ROM: WFL  · RLE Strength: WFL  · LLE ROM: Deficits: 90 degrees flexion  · LLE Strength: Deficits: 3-/5    Functional Mobility:  · Bed Mobility:     · Rolling Right: minimum assistance  · Scooting: minimum assistance  · Supine to Sit: minimum assistance  · Transfers:     · Sit to Stand:  minimum assistance with rolling walker  · Bed to Chair: minimum assistance with  rolling walker  using  Stand Pivot  · Toilet Transfer: minimum assistance with  rolling walker  using  Stand Pivot  · Gait: 250ft with RW min assist    Therapeutic Activities and Exercises:   Patient was educated on the importance of OOB activity and functional mobility to negate negative effects of prolonged bed rest during hospitalization, safe transfers and ambulation, and D/C planning   thera ex x 20 reps with painful L SLR  Gait to hallways  Bathroom use to void min assist  OOB chair/reclined with all needs within reach    AM-PAC 6 CLICK MOBILITY  Total Score:16     Patient left up in chair with all lines intact, call button in reach and friend present.    GOALS:   Multidisciplinary Problems     Physical Therapy Goals        Problem: Physical Therapy Goal    Goal Priority Disciplines Outcome Goal Variances Interventions   Physical Therapy Goal     PT, PT/OT Ongoing, Progressing     Description: Goals to be met by: 10-     Patient will increase functional independence with mobility by performin. Supine to sit with Contact Guard Assistance  2. Sit to stand transfer with Contact Guard Assistance  3. Bed to chair transfer with Contact Guard Assistance using Rolling  Walker  4. Gait  x 250x2 feet with Contact Guard Assistance using Rolling Walker.   5. Lower extremity exercise program x20 reps                      History:     Past Medical History:   Diagnosis Date    Allergy     Anxiety     Asthma     DDD (degenerative disc disease), cervical     Depression     Fibromyalgia     Hypertension     Neuromuscular disorder     PONV (postoperative nausea and vomiting)        Past Surgical History:   Procedure Laterality Date    ADENOIDECTOMY      carpel tunnel Right      SECTION      JOINT REPLACEMENT Right 2016    knee    knee replacement Right     ROTATOR CUFF REPAIR Right     TONSILLECTOMY         Time Tracking:     PT Received On: 10/19/20  PT Start Time: 1620     PT Stop Time: 1658  PT Total Time (min): 38 min     Billable Minutes: Evaluation 10, Gait Training 20 and Therapeutic Exercise 8      Rabia Huffman, PT  10/19/2020

## 2020-10-19 NOTE — SUBJECTIVE & OBJECTIVE
Past Medical History:   Diagnosis Date    Allergy     Anxiety     Asthma     DDD (degenerative disc disease), cervical     Depression     Fibromyalgia     Hypertension 2005    Neuromuscular disorder     PONV (postoperative nausea and vomiting)        Past Surgical History:   Procedure Laterality Date    ADENOIDECTOMY      carpel tunnel Right      SECTION      JOINT REPLACEMENT Right 2016    knee    knee replacement Right     ROTATOR CUFF REPAIR Right     TONSILLECTOMY         Review of patient's allergies indicates:   Allergen Reactions    Erythromycin Swelling    Codeine Nausea And Vomiting     And migraine h/a       No current facility-administered medications on file prior to encounter.      Current Outpatient Medications on File Prior to Encounter   Medication Sig    ALPRAZolam (XANAX) 1 MG tablet Take 1 tablet (1 mg total) by mouth once daily.    amLODIPine (NORVASC) 2.5 MG tablet Take 1 tablet (2.5 mg total) by mouth once daily.    DULoxetine (CYMBALTA) 60 MG capsule Take 1 capsule (60 mg total) by mouth once daily.    furosemide (LASIX) 20 MG tablet Take 1 tablet (20 mg total) by mouth 2 (two) times daily.    hydroCHLOROthiazide (HYDRODIURIL) 25 MG tablet Take 1 tablet (25 mg total) by mouth once daily.    losartan (COZAAR) 100 MG tablet Take 1 tablet (100 mg total) by mouth once daily.    meloxicam (MOBIC) 15 MG tablet Take 1 tablet (15 mg total) by mouth once daily.     Family History     Problem Relation (Age of Onset)    Anxiety disorder Daughter    Diabetes Mother, Father    Glaucoma Mother    Graves' disease Daughter    Heart disease Mother, Father, Sister    Hypertension Mother, Father, Sister    Stroke Mother        Tobacco Use    Smoking status: Former Smoker     Packs/day: 1.00     Years: 11.00     Pack years: 11.00     Quit date:      Years since quittin.8    Smokeless tobacco: Never Used   Substance and Sexual Activity    Alcohol use: Yes     Comment:  occasional 2x/y    Drug use: Never    Sexual activity: Yes     Partners: Male     Review of Systems   Musculoskeletal:        Left knee pain   All other systems reviewed and are negative.    Objective:     Vital Signs (Most Recent):  Temp: 98 °F (36.7 °C) (10/19/20 0945)  Pulse: 90 (10/19/20 0945)  Resp: 18 (10/19/20 0945)  BP: (!) 142/88 (10/19/20 0945)  SpO2: 97 % (10/19/20 0945) Vital Signs (24h Range):  Temp:  [98 °F (36.7 °C)] 98 °F (36.7 °C)  Pulse:  [90] 90  Resp:  [18] 18  SpO2:  [97 %] 97 %  BP: (142)/(88) 142/88     Weight: 132.5 kg (292 lb)  Body mass index is 49.35 kg/m².    Physical Exam  Constitutional:       Appearance: She is well-developed.   HENT:      Head: Normocephalic and atraumatic.   Eyes:      Conjunctiva/sclera: Conjunctivae normal.      Pupils: Pupils are equal, round, and reactive to light.   Neck:      Musculoskeletal: Neck supple.      Thyroid: No thyromegaly.      Vascular: No JVD.   Cardiovascular:      Rate and Rhythm: Normal rate and regular rhythm.      Heart sounds: No murmur. No friction rub. No gallop.    Pulmonary:      Effort: Pulmonary effort is normal.      Breath sounds: Normal breath sounds.   Abdominal:      General: Bowel sounds are normal. There is no distension.      Palpations: Abdomen is soft. There is no mass.      Tenderness: There is no abdominal tenderness.   Musculoskeletal: Normal range of motion.      Comments: Left knee surgical dressing clean dry and intact.   Skin:     General: Skin is warm and dry.   Neurological:      Mental Status: She is oriented to person, place, and time.      Cranial Nerves: No cranial nerve deficit.   Psychiatric:         Behavior: Behavior normal.           CRANIAL NERVES     CN III, IV, VI   Pupils are equal, round, and reactive to light.       Significant Labs: CBC: No results for input(s): WBC, HGB, HCT, PLT in the last 48 hours.  CMP: No results for input(s): NA, K, CL, CO2, GLU, BUN, CREATININE, CALCIUM, PROT, ALBUMIN,  BILITOT, ALKPHOS, AST, ALT, ANIONGAP, EGFRNONAA in the last 48 hours.    Invalid input(s): ADAMAAFHEMANTH    Significant Imaging: None

## 2020-10-19 NOTE — ANESTHESIA PROCEDURE NOTES
Peripheral Block    Patient location during procedure: pre-op   Block not for primary anesthetic.  Reason for block: at surgeon's request and post-op pain management   Post-op Pain Location: Left knee  Start time: 10/19/2020 11:06 AM  Timeout: 10/19/2020 11:05 AM   End time: 10/19/2020 11:10 AM    Staffing  Authorizing Provider: Debbie Pablo MD  Performing Provider: Debbie Pablo MD    Preanesthetic Checklist  Completed: patient identified, site marked, surgical consent, pre-op evaluation, timeout performed, IV checked, risks and benefits discussed and monitors and equipment checked  Peripheral Block  Patient position: supine  Prep: ChloraPrep  Patient monitoring: heart rate, cardiac monitor, continuous pulse ox, continuous capnometry and frequent blood pressure checks  Block type: adductor canal  Laterality: left  Injection technique: single shot  Needle  Needle type: Stimuplex   Needle gauge: 21 G  Needle length: 4 in  Needle localization: anatomical landmarks and ultrasound guidance   -ultrasound image captured on disc.  Assessment  Injection assessment: negative aspiration, negative parasthesia and local visualized surrounding nerve  Paresthesia pain: none  Heart rate change: no  Slow fractionated injection: yes  Additional Notes  VSS.  [facility] RN monitoring vitals throughout procedure.  Patient tolerated procedure well.     Exparel 20mL  Bupivacaine 0.5% 10mL

## 2020-10-19 NOTE — OP NOTE
[facility]  Surgery Department  Operative Note    SUMMARY     Date of Procedure: 10/19/2020     Procedure:  Left total knee arthroplasty    Surgeon(s) and Role:     * Felipe Herring MD - Primary    Assisting Surgeon:  jai    Pre-Operative Diagnosis: Primary osteoarthritis of left knee [M17.12]  Preop examination [Z01.818]    Post-Operative Diagnosis: Post-Op Diagnosis Codes:     * Primary osteoarthritis of left knee [M17.12]     * Preop examination [Z01.818]    Anesthesia: Spinal    Intraoperative Findings:  Severe bone-on-bone arthritis left knee with multiple loose bodies    Description of the Findings of the Procedure:  Patient was placed on the operating table in supine position.  The left knee was prepped and draped in the usual sterile manner for surgery.  An anterior approach was undertaken to the knee.  It was carried down sharply through the skin.  The medial parapatellar arthrotomy was undertaken in the patella was taken laterally.  There were multiple enormous loose bodies in the knee.  Some of them had soft tissue tethers some of them were free-floating.  We spent a lot of time removing them we removed a total of 9 large loose bodies.  We pulsed and irrigated.  Removed the osteophytes from this knee.  They were tremendous and there were many of them.  We now flex the knee to 90°.  There was deformity and absolutely no cartilage.  We used a drill and gained access to the femur.  We placed a jessica up the femur and then placed a distal cutting femoral jig into position such that would make a 5 degree valgus cut intake 9 mm of distal bone.  It was checked secondarily in the cut was made.  We now sized the femur sized to a size 5.  Size 5 cutting jig was placed into position in the cuts were made.  Trial was placed.  It fit perfectly.    The tibia was subluxed anteriorly.  We placed a tibial cutting jig into position such that would make a neutral cut intake 2 mm of lateral bone.  The cut  was checked multiple times secondarily we made the cut.  We now placed a femoral trial and tibial trial we had full extension full flexion and symmetric gaps.  It was very stable.  We now broached the tibia.  The patella was calibrated and cut the button was placed.  There was tremendous deformity to the patella but we were able to place a button.  The construct trialed beautifully with no lift off.  We pulsed and irrigated and dried the bony surfaces.  We mixed up bone cement and cemented 1st the tibia next the femur and finally the patella.  All excess cement was removed at the time see mentation in the construct was held in full extension to all the cement hardened.  We copiously irrigated again.  The actual spacer was tapped into position.  We took intraoperative x-rays because there was so many loose bodies we wanted to confirm that there were none left.  We closed the extensor mechanism with a combination of 2.  FiberWire.  We closed the subcu with 2 0 Vicryl and we closed the skin with 4-0 Monocryl.    Complications: No    Estimated Blood Loss (EBL): * No values recorded between 10/19/2020  1:07 PM and 10/19/2020  1:52 PM *           Implants:   Implant Name Type Inv. Item Serial No.  Lot No. LRB No. Used Action   COMP FEM MITRA EVOLUTION SZ5 L - [id number]  COMP FEM MITRA EVOLUTION SZ5 L  [employer] [id number] Left 1 Implanted   CEMENT BONE ORTHOSET 1 40 GRAM - [id number]  CEMENT BONE ORTHOSET 1 40 GRAM  [employer] [id number] Left 2 Implanted   PATELLA ONLAY 32MM TRI PEG - [id number]  PATELLA ONLAY 32MM TRI PEG  [employer] [id number] Left 1 Implanted   BASEPLATE TIB EVOLTN SZ 4 L - [id number]  BASEPLATE TIB EVOLTN SZ 4 L  [employer] [id number] Left 1 Implanted       Specimens:   Specimen (12h ago, onward)    None                  Condition: Good    Disposition: PACU - hemodynamically stable.

## 2020-10-19 NOTE — H&P
CC/Indication for Procedure: 60 y.o. female with Primary osteoarthritis of left knee [M17.12]  Preop examination [Z01.818]  Osteoarthritis of left knee [M17.12].    Patient scheduled for NC TOTAL KNEE ARTHROPLASTY [[id number]] (ARTHROPLASTY, KNEE).    Past Medical History:   Diagnosis Date    Allergy     Anxiety     Asthma     DDD (degenerative disc disease), cervical     Depression     Fibromyalgia     Hypertension     Neuromuscular disorder     PONV (postoperative nausea and vomiting)      Past Surgical History:   Procedure Laterality Date    ADENOIDECTOMY      carpel tunnel Right      SECTION      JOINT REPLACEMENT Right 2016    knee    knee replacement Right     ROTATOR CUFF REPAIR Right     TONSILLECTOMY       Family History   Problem Relation Age of Onset    Diabetes Mother     Hypertension Mother     Heart disease Mother     Stroke Mother     Glaucoma Mother     Diabetes Father     Hypertension Father     Heart disease Father     Hypertension Sister     Heart disease Sister     Graves' disease Daughter     Anxiety disorder Daughter      Social History     Socioeconomic History    Marital status:      Spouse name: Not on file    Number of children: 3    Years of education: Not on file    Highest education level: Not on file   Occupational History    Occupation: [employer]     Comment: primary sub for [employer]   Social Needs    Financial resource strain: Not on file    Food insecurity     Worry: Not on file     Inability: Not on file    Transportation needs     Medical: Not on file     Non-medical: Not on file   Tobacco Use    Smoking status: Former Smoker     Packs/day: 1.00     Years: 11.00     Pack years: 11.00     Quit date:      Years since quittin.8    Smokeless tobacco: Never Used   Substance and Sexual Activity    Alcohol use: Yes     Comment: occasional 2x/y    Drug use: Never    Sexual activity: Yes     Partners: Male   Lifestyle     Physical activity     Days per week: Not on file     Minutes per session: Not on file    Stress: Rather much   Relationships    Social connections     Talks on phone: Not on file     Gets together: Not on file     Attends Episcopal service: Not on file     Active member of club or organization: Not on file     Attends meetings of clubs or organizations: Not on file     Relationship status: Not on file   Other Topics Concern    Not on file   Social History Narrative    Not on file       Review of patient's allergies indicates:   Allergen Reactions    Erythromycin Swelling    Codeine Nausea And Vomiting     And migraine h/a         Current Facility-Administered Medications:     acetaminophen 1,000 mg/100 mL (10 mg/mL) injection 1,000 mg, 1,000 mg, Intravenous, Once, Debbie Pablo MD    cefazolin (ANCEF) 2 gram in dextrose 5% 50 mL IVPB (premix), 2 g, Intravenous, On Call Procedure, Felipe Herring MD    electrolyte-S (ISOLYTE), , Intravenous, Continuous, Nilton Saab MD    mupirocin 2 % ointment, , Nasal, On Call Procedure, Felipe Herring MD    oxyCODONE 12 hr tablet 10 mg, 10 mg, Oral, Once, Nilton Saab MD    tranexamic acid (CYKLOKAPRON) 1,000 mg in sodium chloride 0.9 % 100 mL (ready to mix system), 1,000 mg, Intravenous, On Call Procedure, Felipe Herring MD    ROS:    Denies chest pain or palpitations  Denies shortness of breath  Denies fevers or chills  Denies chest pain  Denies abdominal pain    PE:    General Appearance: Well nourished  Orientation: Oriented to time, place, person  Mental Status: Alert  Heart: RRR  Lungs: CTA  Abdomen: Soft and non-tender    Anesthesia/Surgery risks, benefits and alternative options discussed and understood by patient/family.    This note was created using Dragon voice recognition software that occasionally misinterpreted phrases or words.

## 2020-10-19 NOTE — HPI
60-year-old  female with past medical history significant for hypertension, osteoarthritis and anxiety/depression who underwent left total knee arthroplasty performed by Dr. Herring. Post-operatively, patient denied chest pain, shortness of breath, abdominal pain, nausea, vomiting, headache, vision changes, focal neuro-deficits, cough or fever.

## 2020-10-19 NOTE — PLAN OF CARE
Patient is stable at this time.  VSS. Anesthesiologist at patient's bedside and is ok for patient to transfer to the floor.  Dressings clean, dry and intact.  Pain is a 3/10 to left hip.  No complaints of nausea or vomiting.  Patient tolerating po intake well. Glasses returned to patient in pacu and other belongings transferred to the room by her friend.  Dr Dickey aware of consult.

## 2020-10-19 NOTE — TRANSFER OF CARE
"Anesthesia Transfer of Care Note    Patient: Sonia Muse    Procedure(s) Performed: Procedure(s) (LRB):  ARTHROPLASTY, KNEE (Left)    Patient location: PACU    Anesthesia Type: spinal    Transport from OR: Transported from OR on 2-3 L/min O2 by NC with adequate spontaneous ventilation    Post pain: adequate analgesia    Post assessment: no apparent anesthetic complications    Post vital signs: stable    Level of consciousness: awake, alert and oriented    Nausea/Vomiting: no nausea/vomiting    Complications: none    Transfer of care protocol was followed      Last vitals:   Visit Vitals  BP (!) 142/80 (BP Location: Left arm, Patient Position: Lying)   Pulse 80   Temp 36.7 °C (98 °F) (Temporal)   Resp 17   Ht 5' 4.5" (1.638 m)   Wt 132.5 kg (292 lb)   SpO2 97%   Breastfeeding No   BMI 49.35 kg/m²     "

## 2020-10-19 NOTE — PLAN OF CARE
Problem: Adult Inpatient Plan of Care  Goal: Plan of Care Review  10/19/2020 1754 by Melody Crane, RN  Outcome: Ongoing, Progressing  10/19/2020 1754 by Melody Crane, RN  Outcome: Ongoing, Progressing   Pt medicated for as needed. Pt up w PT today. NAD noted. POC reviewed w pt and they verbalized understanding.     Pt free from falls.  Bed in low position, side rails up x 2. Call light in reach, bed alarm on and wheels locked. Will continue to monitor.

## 2020-10-19 NOTE — ANESTHESIA PROCEDURE NOTES
Spinal    Diagnosis: OA  Patient location during procedure: OR  Start time: 10/19/2020 12:31 PM  Timeout: 10/19/2020 12:30 PM  End time: 10/19/2020 12:44 PM    Staffing  Authorizing Provider: Debbie Pablo MD  Performing Provider: Debbie Pablo MD    Preanesthetic Checklist  Completed: patient identified, site marked, surgical consent, pre-op evaluation, timeout performed, IV checked, risks and benefits discussed and monitors and equipment checked  Spinal Block  Patient position: sitting  Prep: ChloraPrep  Patient monitoring: heart rate, cardiac monitor, continuous pulse ox and frequent blood pressure checks  Approach: midline  Location: L3-4  Injection technique: single shot  CSF Fluid: clear free-flowing CSF  Needle  Needle type: Shen   Needle gauge: 25 G  Needle length: 5 in  Additional Documentation: incremental injection, negative aspiration for heme and no paresthesia on injection  Needle localization: anatomical landmarks  Assessment  Sensory level: T10   Dermatomal levels determined by alcohol wipe  Ease of block: difficult  Patient's tolerance of the procedure: comfortable throughout block and no complaints  Additional Notes  Bupivacaine hyperbaric 0.75% 15mg

## 2020-10-20 LAB
ANION GAP SERPL CALC-SCNC: 10 MMOL/L (ref 8–16)
BASOPHILS # BLD AUTO: 0.06 K/UL (ref 0–0.2)
BASOPHILS NFR BLD: 0.7 % (ref 0–1.9)
BUN SERPL-MCNC: 16 MG/DL (ref 6–20)
CALCIUM SERPL-MCNC: 8.5 MG/DL (ref 8.7–10.5)
CHLORIDE SERPL-SCNC: 101 MMOL/L (ref 95–110)
CO2 SERPL-SCNC: 29 MMOL/L (ref 23–29)
CREAT SERPL-MCNC: 0.9 MG/DL (ref 0.5–1.4)
DIFFERENTIAL METHOD: ABNORMAL
EOSINOPHIL # BLD AUTO: 0.1 K/UL (ref 0–0.5)
EOSINOPHIL NFR BLD: 1 % (ref 0–8)
ERYTHROCYTE [DISTWIDTH] IN BLOOD BY AUTOMATED COUNT: 13.7 % (ref 11.5–14.5)
EST. GFR  (AFRICAN AMERICAN): >60 ML/MIN/1.73 M^2
EST. GFR  (NON AFRICAN AMERICAN): >60 ML/MIN/1.73 M^2
GLUCOSE SERPL-MCNC: 149 MG/DL (ref 70–110)
HCT VFR BLD AUTO: 35.9 % (ref 37–48.5)
HGB BLD-MCNC: 11.2 G/DL (ref 12–16)
IMM GRANULOCYTES # BLD AUTO: 0.07 K/UL (ref 0–0.04)
IMM GRANULOCYTES NFR BLD AUTO: 0.8 % (ref 0–0.5)
LYMPHOCYTES # BLD AUTO: 0.9 K/UL (ref 1–4.8)
LYMPHOCYTES NFR BLD: 10.2 % (ref 18–48)
MCH RBC QN AUTO: 28.6 PG (ref 27–31)
MCHC RBC AUTO-ENTMCNC: 31.2 G/DL (ref 32–36)
MCV RBC AUTO: 92 FL (ref 82–98)
MONOCYTES # BLD AUTO: 1 K/UL (ref 0.3–1)
MONOCYTES NFR BLD: 11 % (ref 4–15)
NEUTROPHILS # BLD AUTO: 6.6 K/UL (ref 1.8–7.7)
NEUTROPHILS NFR BLD: 76.3 % (ref 38–73)
NRBC BLD-RTO: 0 /100 WBC
PLATELET # BLD AUTO: 320 K/UL (ref 150–350)
PMV BLD AUTO: 9.7 FL (ref 9.2–12.9)
POTASSIUM SERPL-SCNC: 3.9 MMOL/L (ref 3.5–5.1)
RBC # BLD AUTO: 3.92 M/UL (ref 4–5.4)
SODIUM SERPL-SCNC: 140 MMOL/L (ref 136–145)
WBC # BLD AUTO: 8.65 K/UL (ref 3.9–12.7)

## 2020-10-20 PROCEDURE — 36415 COLL VENOUS BLD VENIPUNCTURE: CPT

## 2020-10-20 PROCEDURE — 63600175 PHARM REV CODE 636 W HCPCS: Performed by: ORTHOPAEDIC SURGERY

## 2020-10-20 PROCEDURE — 25000003 PHARM REV CODE 250: Performed by: ANESTHESIOLOGY

## 2020-10-20 PROCEDURE — 94761 N-INVAS EAR/PLS OXIMETRY MLT: CPT

## 2020-10-20 PROCEDURE — 80048 BASIC METABOLIC PNL TOTAL CA: CPT

## 2020-10-20 PROCEDURE — 97116 GAIT TRAINING THERAPY: CPT

## 2020-10-20 PROCEDURE — 25000003 PHARM REV CODE 250: Performed by: INTERNAL MEDICINE

## 2020-10-20 PROCEDURE — 97165 OT EVAL LOW COMPLEX 30 MIN: CPT

## 2020-10-20 PROCEDURE — 97535 SELF CARE MNGMENT TRAINING: CPT

## 2020-10-20 PROCEDURE — 97530 THERAPEUTIC ACTIVITIES: CPT

## 2020-10-20 PROCEDURE — 85025 COMPLETE CBC W/AUTO DIFF WBC: CPT

## 2020-10-20 PROCEDURE — 25000003 PHARM REV CODE 250: Performed by: ORTHOPAEDIC SURGERY

## 2020-10-20 RX ORDER — LOSARTAN POTASSIUM 100 MG/1
50 TABLET ORAL DAILY
Qty: 30 TABLET | Refills: 5
Start: 2020-10-20 | End: 2021-02-22 | Stop reason: SDUPTHER

## 2020-10-20 RX ORDER — NAPROXEN SODIUM 220 MG/1
81 TABLET, FILM COATED ORAL 2 TIMES DAILY
Qty: 60 TABLET | Refills: 0
Start: 2020-10-20 | End: 2020-11-30

## 2020-10-20 RX ADMIN — OXYCODONE HYDROCHLORIDE 10 MG: 10 TABLET ORAL at 03:10

## 2020-10-20 RX ADMIN — FUROSEMIDE 20 MG: 20 TABLET ORAL at 09:10

## 2020-10-20 RX ADMIN — CETIRIZINE HYDROCHLORIDE 10 MG: 10 TABLET, FILM COATED ORAL at 09:10

## 2020-10-20 RX ADMIN — ASPIRIN 81 MG 81 MG: 81 TABLET ORAL at 09:10

## 2020-10-20 RX ADMIN — POLYETHYLENE GLYCOL 3350 17 G: 17 POWDER, FOR SOLUTION ORAL at 09:10

## 2020-10-20 RX ADMIN — OXYCODONE HYDROCHLORIDE 10 MG: 10 TABLET ORAL at 11:10

## 2020-10-20 RX ADMIN — CEFAZOLIN SODIUM 2 G: 2 SOLUTION INTRAVENOUS at 04:10

## 2020-10-20 RX ADMIN — PREGABALIN 75 MG: 75 CAPSULE ORAL at 09:10

## 2020-10-20 RX ADMIN — CELECOXIB 200 MG: 100 CAPSULE ORAL at 09:10

## 2020-10-20 RX ADMIN — FAMOTIDINE 20 MG: 20 TABLET ORAL at 09:10

## 2020-10-20 RX ADMIN — OXYCODONE HYDROCHLORIDE 10 MG: 10 TABLET ORAL at 07:10

## 2020-10-20 RX ADMIN — DULOXETINE 60 MG: 30 CAPSULE, DELAYED RELEASE ORAL at 09:10

## 2020-10-20 NOTE — PLAN OF CARE
Pt alert and oriented. All discharge instructions given. IV removed. Prescription given.  at bedside. Assisted getting dressed. Dressing changed. Left via wheelchair.

## 2020-10-20 NOTE — PLAN OF CARE
Plan of care reviewed with patient, verbalizes understanding. VSS. IV antibiotics infused per order. Pain controlled with PRN meds. Cryotherapy maintained. SCDs/foot pump in place. Safety maintained, bed in lowest position, wheels locked, call light in reach.

## 2020-10-20 NOTE — PLAN OF CARE
Ok to pull rolling walker from [facility] closet per Nevaeh with [facility] [phone].  SW delivered rolling walker to patient's hospital room.  Patient signed delivery ticket.       10/20/20 1253   Post-Acute Status   Post-Acute Authorization E   E Status Set-up Complete

## 2020-10-20 NOTE — SUBJECTIVE & OBJECTIVE
"Principal Problem:Osteoarthritis of left knee    Principal Orthopedic Problem: S/P L TKA    Interval History: pain    Review of patient's allergies indicates:   Allergen Reactions    Erythromycin Swelling    Codeine Nausea And Vomiting     And migraine h/a       Current Facility-Administered Medications   Medication    acetaminophen tablet 650 mg    ALPRAZolam tablet 1 mg    aspirin chewable tablet 81 mg    bisacodyL suppository 10 mg    celecoxib capsule 200 mg    cetirizine tablet 10 mg    DULoxetine DR capsule 60 mg    famotidine tablet 20 mg    furosemide tablet 20 mg    morphine injection 2 mg    ondansetron disintegrating tablet 8 mg    oxyCODONE immediate release tablet 5 mg    oxyCODONE immediate release tablet Tab 10 mg    polyethylene glycol packet 17 g    pregabalin capsule 75 mg    promethazine (PHENERGAN) 6.25 mg in dextrose 5 % 50 mL IVPB    sodium chloride 0.9% flush 5 mL    zolpidem tablet 5 mg     Objective:     Vital Signs (Most Recent):  Temp: 98.6 °F (37 °C) (10/20/20 0420)  Pulse: 98 (10/20/20 0420)  Resp: 20 (10/20/20 0420)  BP: 111/67 (10/20/20 0420)  SpO2: (!) 92 % (10/20/20 0420) Vital Signs (24h Range):  Temp:  [96.7 °F (35.9 °C)-98.8 °F (37.1 °C)] 98.6 °F (37 °C)  Pulse:  [79-98] 98  Resp:  [14-20] 20  SpO2:  [92 %-98 %] 92 %  BP: (100-144)/(57-90) 111/67     Weight: 132.5 kg (292 lb)  Height: 5' 4.5" (163.8 cm)  Body mass index is 49.35 kg/m².      Intake/Output Summary (Last 24 hours) at 10/20/2020 0700  Last data filed at 10/20/2020 0614  Gross per 24 hour   Intake 4010 ml   Output --   Net 4010 ml       General    Nursing note and vitals reviewed.  Constitutional: She is oriented to person, place, and time. She appears well-developed and well-nourished.   Pulmonary/Chest: Effort normal.   Neurological: She is alert and oriented to person, place, and time.   Psychiatric: She has a normal mood and affect. Her behavior is normal.             Left Knee Exam "     Comments:  LLE DNVI. Dressing C/D/I.      Significant Labs:   CBC:   Recent Labs   Lab 10/20/20  0529   WBC 8.65   HGB 11.2*   HCT 35.9*        CMP: No results for input(s): NA, K, CL, CO2, GLU, BUN, CREATININE, CALCIUM, PROT, ALBUMIN, BILITOT, ALKPHOS, AST, ALT, ANIONGAP, EGFRNONAA in the last 48 hours.    Invalid input(s): ESTGF[place]  All pertinent labs within the past 24 hours have been reviewed.    Significant Imaging: X-Ray: I have reviewed all pertinent results/findings and my personal findings are:  The patient has undergone a left knee joint replacement with metallic femoral and tibial components in good position.  An acute fracture is not identified

## 2020-10-20 NOTE — NURSING
Received call from PT, while walking patient complained of feeling a pop in her left knee and then excruciating pain down her leg, but still walked back to room. LEAH Kent notified. Xray ordered, if clear pt can dc.    Yes

## 2020-10-20 NOTE — PLAN OF CARE
Problem: Physical Therapy Goal  Goal: Physical Therapy Goal  Description: Goals to be met by: 10-     Patient will increase functional independence with mobility by performin. Supine to sit with Contact Guard Assistance  2. Sit to stand transfer with Contact Guard Assistance  3. Bed to chair transfer with Contact Guard Assistance using Rolling Walker  4. Gait  x 250x2 feet with Contact Guard Assistance using Rolling Walker.   5. Lower extremity exercise program x20 reps     Outcome: Ongoing, Progressing

## 2020-10-20 NOTE — PLAN OF CARE
SW met with patient at bedside to complete discharge planning assessment.  Patient alert and oriented xs 4.  Patient verified all demographic information on facesheet is correct.  Patient verified PCP is NP Dorina Murillo.  Patient verified primary health insurance is [employer] ([place] Medicaid).  Patient with NO home health or DME.  Patient will need rolling walker and decline bedside commode.  Patient with POA (Josué Heckippo) and Living Will.  Patient not on dialysis or medication coumadin.  Patient with no 30 day admission.  Patient with no financial issues at this time.  Patient family will provide transportation upon discharge from facility.  Patient independent with ADLs, live with spouse and adult daughter, drives self.  Patient uses [facility] - [place], [place] - [address].       10/20/20 7138   Discharge Assessment   Assessment Type Discharge Planning Assessment   Confirmed/corrected address and phone number on facesheet? Yes   Assessment information obtained from? Patient   Communicated expected length of stay with patient/caregiver yes   Prior to hospitilization cognitive status: Alert/Oriented   Prior to hospitalization functional status: Independent   Current cognitive status: Alert/Oriented   Current Functional Status: Independent   Facility Arrived From: home   Lives With spouse   Able to Return to Prior Arrangements yes   Is patient able to care for self after discharge? Yes   Patient's perception of discharge disposition home or selfcare   Readmission Within the Last 30 Days no previous admission in last 30 days   Patient currently being followed by outpatient case management? No   Patient currently receives any other outside agency services? No   Equipment Currently Used at Home none   Do you have any problems affording any of your prescribed medications? No   Is the patient taking medications as prescribed? yes   Does the patient have transportation home? Yes   Transportation  Anticipated family or friend will provide   Does the patient receive services at the [facility]? No   Discharge Plan A Home   Discharge Plan B Home with family   DME Needed Upon Discharge  walker, rolling   Patient/Family in Agreement with Plan yes

## 2020-10-20 NOTE — PROGRESS NOTES
"[facility]  Orthopedics  Progress Note    Patient Name: Sonia Muse  MRN: [id number]  Admission Date: 10/19/2020  Hospital Length of Stay: 0 days  Attending Provider: Felipe Herring MD  Primary Care Provider: Dorina Murillo NP  Follow-up For: Procedure(s) (LRB):  ARTHROPLASTY, KNEE (Left)    Post-Operative Day: 1 Day Post-Op  Subjective:     Principal Problem:Osteoarthritis of left knee    Principal Orthopedic Problem: S/P L TKA    Interval History: pain    Review of patient's allergies indicates:   Allergen Reactions    Erythromycin Swelling    Codeine Nausea And Vomiting     And migraine h/a       Current Facility-Administered Medications   Medication    acetaminophen tablet 650 mg    ALPRAZolam tablet 1 mg    aspirin chewable tablet 81 mg    bisacodyL suppository 10 mg    celecoxib capsule 200 mg    cetirizine tablet 10 mg    DULoxetine DR capsule 60 mg    famotidine tablet 20 mg    furosemide tablet 20 mg    morphine injection 2 mg    ondansetron disintegrating tablet 8 mg    oxyCODONE immediate release tablet 5 mg    oxyCODONE immediate release tablet Tab 10 mg    polyethylene glycol packet 17 g    pregabalin capsule 75 mg    promethazine (PHENERGAN) 6.25 mg in dextrose 5 % 50 mL IVPB    sodium chloride 0.9% flush 5 mL    zolpidem tablet 5 mg     Objective:     Vital Signs (Most Recent):  Temp: 98.6 °F (37 °C) (10/20/20 0420)  Pulse: 98 (10/20/20 0420)  Resp: 20 (10/20/20 0420)  BP: 111/67 (10/20/20 0420)  SpO2: (!) 92 % (10/20/20 0420) Vital Signs (24h Range):  Temp:  [96.7 °F (35.9 °C)-98.8 °F (37.1 °C)] 98.6 °F (37 °C)  Pulse:  [79-98] 98  Resp:  [14-20] 20  SpO2:  [92 %-98 %] 92 %  BP: (100-144)/(57-90) 111/67     Weight: 132.5 kg (292 lb)  Height: 5' 4.5" (163.8 cm)  Body mass index is 49.35 kg/m².      Intake/Output Summary (Last 24 hours) at 10/20/2020 0700  Last data filed at 10/20/2020 0614  Gross per 24 hour   Intake 4010 ml   Output --   Net 4010 ml "       General    Nursing note and vitals reviewed.  Constitutional: She is oriented to person, place, and time. She appears well-developed and well-nourished.   Pulmonary/Chest: Effort normal.   Neurological: She is alert and oriented to person, place, and time.   Psychiatric: She has a normal mood and affect. Her behavior is normal.             Left Knee Exam     Comments:  LLE DNVI. Dressing C/D/I.      Significant Labs:   CBC:   Recent Labs   Lab 10/20/20  0529   WBC 8.65   HGB 11.2*   HCT 35.9*        CMP: No results for input(s): NA, K, CL, CO2, GLU, BUN, CREATININE, CALCIUM, PROT, ALBUMIN, BILITOT, ALKPHOS, AST, ALT, ANIONGAP, EGFRNONAA in the last 48 hours.    Invalid input(s): [place]  All pertinent labs within the past 24 hours have been reviewed.    Significant Imaging: X-Ray: I have reviewed all pertinent results/findings and my personal findings are:  The patient has undergone a left knee joint replacement with metallic femoral and tibial components in good position.  An acute fracture is not identified    Assessment/Plan:     * Osteoarthritis of left knee  Cont to increase OOB with PT and nursing  Change dressing this am  DC home with LEAH MORALEZ  Orthopedics  [facility]

## 2020-10-20 NOTE — PT/OT/SLP PROGRESS
Physical Therapy Treatment    Patient Name:  Sonia Muse   MRN:  [id number]    Recommendations:     Discharge Recommendations:  home health PT   Discharge Equipment Recommendations: none   Barriers to discharge: None    Assessment:     Sonia Muse is a 60 y.o. female admitted with a medical diagnosis of Osteoarthritis of left knee.  She presents with the following impairments/functional limitations:  weakness, impaired endurance, impaired functional mobilty, gait instability, pain, decreased ROM, edema, orthopedic precautions. Pt reported having a difficult night due to pain, but eager to walk again this am. Pt is moving well with antalgic gait pattern, but unable to walk as far today, limited by pain.    Rehab Prognosis: Good; patient would benefit from acute skilled PT services to address these deficits and reach maximum level of function.    Recent Surgery: Procedure(s) (LRB):  ARTHROPLASTY, KNEE (Left) 1 Day Post-Op    Plan:     During this hospitalization, patient to be seen BID to address the identified rehab impairments via gait training, therapeutic activities, therapeutic exercises and progress toward the following goals:    · Plan of Care Expires:  10/30/20    Subjective     Chief Complaint: pain  Patient/Family Comments/goals: Pt hoping to go home today as planned  Pain/Comfort:  · Pain Rating 1: 4/10  · Location - Side 1: Left  · Location 1: knee  · Pain Addressed 1: Pre-medicate for activity, Reposition, Distraction, Nurse notified  · Pain Rating Post-Intervention 1: 7/10      Objective:     Communicated with DAVEY Hatch prior to session.  Patient found HOB elevated with cryotherapy, bed alarm, peripheral IV, telemetry, SCD upon PT entry to room.     General Precautions: Standard, fall   Orthopedic Precautions:LLE weight bearing as tolerated   Braces: N/A     Functional Mobility:  · Bed Mobility:     · Scooting: stand by assistance  · Supine to Sit: contact guard assistance  · Transfers:      · Sit to Stand:  stand by assistance and contact guard assistance with rolling walker  · Toilet Transfer: stand by assistance and contact guard assistance with  rolling walker  using  Step Transfer  · Gait: x 2 trials of 100 ft with RW and CGA > SBA for safety.      AM-PAC 6 CLICK MOBILITY          Therapeutic Activities and Exercises:   Toileting with transfers as noted above and mod Indep for hygiene.    Patient left up in chair with all lines intact, call button in reach, chair alarm on and RN notified..    GOALS:   Multidisciplinary Problems     Physical Therapy Goals        Problem: Physical Therapy Goal    Goal Priority Disciplines Outcome Goal Variances Interventions   Physical Therapy Goal     PT, PT/OT Ongoing, Progressing     Description: Goals to be met by: 10-     Patient will increase functional independence with mobility by performin. Supine to sit with Contact Guard Assistance  2. Sit to stand transfer with Contact Guard Assistance  3. Bed to chair transfer with Contact Guard Assistance using Rolling Walker  4. Gait  x 250x2 feet with Contact Guard Assistance using Rolling Walker.   5. Lower extremity exercise program x20 reps                      Time Tracking:     PT Received On: 10/20/20  PT Start Time: 0945     PT Stop Time: 1010  PT Total Time (min): 25 min     Billable Minutes: Gait Training 15 and Therapeutic Activity 10    Treatment Type: Treatment  PT/PTA: PT     PTA Visit Number: 0     Maria Elena Constantino, PT  10/20/2020

## 2020-10-20 NOTE — PLAN OF CARE
Patient cleared for discharge from case management standpoint.       10/20/20 1254   Final Note   Assessment Type Final Discharge Note   Anticipated Discharge Disposition Home   Hospital Follow Up  Appt(s) scheduled? Yes   Discharge plans and expectations educations in teach back method with documentation complete? Yes   Right Care Referral Info   Post Acute Recommendation No Care

## 2020-10-20 NOTE — PT/OT/SLP PROGRESS
Physical Therapy Treatment    Patient Name:  Sonia Muse   MRN:  [id number]    Recommendations:     Discharge Recommendations:  home health PT   Discharge Equipment Recommendations: none   Barriers to discharge: None    Assessment:     Sonia Muse is a 60 y.o. female admitted with a medical diagnosis of Osteoarthritis of left knee.  She presents with the following impairments/functional limitations:  weakness, impaired endurance, impaired functional mobilty, gait instability, pain, decreased ROM, edema, orthopedic precautions. Pt tolerated gait during pm session better than in am until she suddenly c/o feeling a pop at lateral aspect of L knee.     Rehab Prognosis: Good; patient would benefit from acute skilled PT services to address these deficits and reach maximum level of function.    Recent Surgery: Procedure(s) (LRB):  ARTHROPLASTY, KNEE (Left) 1 Day Post-Op    Plan:     During this hospitalization, patient to be seen BID to address the identified rehab impairments via gait training, therapeutic activities, therapeutic exercises and progress toward the following goals:    · Plan of Care Expires:  10/30/20    Subjective     Chief Complaint: pain & a pop at lateral aspect of L knee during gait training.  Patient/Family Comments/goals:   Pain/Comfort:  · Pain Rating 1: 6/10  · Location - Side 1: Left  · Location 1: knee  · Pain Addressed 1: Pre-medicate for activity, Reposition, Distraction, Nurse notified  · Pain Rating Post-Intervention 1: 8/10      Objective:     Communicated with Charge DAVEY Randolph and pt's DAVEY Hatch prior to session.  Patient found up in chair with cryotherapy, peripheral IV, telemetry upon PT entry to room.     General Precautions: Standard, fall   Orthopedic Precautions:LLE weight bearing as tolerated   Braces: N/A     Functional Mobility:  · Transfers:     · Sit to Stand:  stand by assistance with rolling walker  · Toilet Transfer: stand by assistance with  rolling walker and grab  bars  using  Step Transfer  · Gait: x 175' and 20' and 60' with RW and CGA > SBA for safety. During 2nd trial pt suddenly vick up L LE (PT thought pt had a spasm, but she reported feeling a pop in L knee). After sitting a few minutes pt was able to continue with gait training back to her room and RN was notified.      AM-PAC 6 CLICK MOBILITY          Patient left up in chair with call button in reach, chair alarm on and RN notified..    GOALS:   Multidisciplinary Problems     Physical Therapy Goals        Problem: Physical Therapy Goal    Goal Priority Disciplines Outcome Goal Variances Interventions   Physical Therapy Goal     PT, PT/OT Ongoing, Progressing     Description: Goals to be met by: 10-     Patient will increase functional independence with mobility by performin. Supine to sit with Contact Guard Assistance  2. Sit to stand transfer with Contact Guard Assistance  3. Bed to chair transfer with Contact Guard Assistance using Rolling Walker  4. Gait  x 250x2 feet with Contact Guard Assistance using Rolling Walker.   5. Lower extremity exercise program x20 reps                      Time Tracking:     PT Received On: 10/20/20  PT Start Time: 1415     PT Stop Time: 1440  PT Total Time (min): 25 min     Billable Minutes: Gait Training 25    Treatment Type: Treatment  PT/PTA: PT     PTA Visit Number: 0     Maria Elena Constantino, PT  10/20/2020

## 2020-10-20 NOTE — PT/OT/SLP EVAL
Occupational Therapy   Evaluation and Discharge Note    Name: Sonia Muse  MRN: [id number]  Admitting Diagnosis:  Osteoarthritis of left knee 1 Day Post-Op    Recommendations:     Discharge Recommendations: home with home health  Discharge Equipment Recommendations:  none  Barriers to discharge:  None    Assessment:     Sonia Muse is a 60 y.o. female with a medical diagnosis of Osteoarthritis of left knee. OT education completed on LB ADLs and toilet transfers with use of adaptive equipment and on safety with all ADL tasks using assistive devices with pt able to provide return demonstration. No further OT needs at this time.    Plan:     During this hospitalization, patient does not require further acute OT services.  Please re-consult if situation changes.    · Plan of Care Reviewed with: patient    Subjective     Chief Complaint: L knee pain  Patient/Family Comments/goals: To get my pain under control    Occupational Profile:  Living Environment: Pt lives with her mother and her daughter in a [facility] with 1 BECCA and a tub.  Previous level of function: (I)/Mod I with all IADL's, driving, working and using a walker as needed.  Roles and Routines: Pt is a .  Equipment Used at home:  raised toilet, walker, rolling, shower chair, grab bars  Assistance upon Discharge: Family will assist pt at home.    Pain/Comfort:  · Pain Rating 1: 7/10  · Location - Side 1: Left  · Location 1: knee  · Pain Addressed 1: Reposition, Distraction, Cessation of Activity, Pre-medicate for activity  · Pain Rating Post-Intervention 1: (no c/o pain)    Patients cultural, spiritual, Quaker conflicts given the current situation:      Objective:     Communicated with: nurse Mamie prior to session.  Patient found up in chair with cryotherapy(chair alarm) upon OT entry to room.    General Precautions: Standard, fall   Orthopedic Precautions:LLE weight bearing as tolerated   Braces: N/A     Occupational  Performance:    Functional Mobility/Transfers:  · Patient completed Sit <> Stand Transfer with stand by assistance  with  rolling walker   · Patient completed Toilet Transfer Stand Pivot technique with stand by assistance with  rolling walker and grab bars  · Functional Mobility: Pt walked from bedside to bathroom, then to sink & back to bedside using walker with SBA & no LOB noted.  · Pt needed cues to keep walker in front of her as she approached the sink.    Activities of Daily Living:  · Grooming: supervision standing at sink for all tasks  · Lower Body Dressing: Pt declined, stating she preferred to have her family assist her at home    · Toileting: supervision at toilet for all task components    Cognitive/Visual Perceptual:  Cognitive/Psychosocial Skills:     -       Safety awareness/insight to disability: intact   -       Mood/Affect/Coping skills/emotional control: Appropriate to situation and Cooperative    Physical Exam:  Edema:  None noted  Upper Extremity Range of Motion:     -       Right Upper Extremity: WFL  -       Left Upper Extremity: WFL  Upper Extremity Strength:    -       Right Upper Extremity: WNL  -       Left Upper Extremity: WNL   Strength:    -       Right Upper Extremity: WNL  -       Left Upper Extremity: WNL  Fine Motor Coordination:    -       Intact  Gross motor coordination:   WFL    AMPAC 6 Click ADL:  AMPAC Total Score: 19    Treatment & Education:  OT ed pt on OT role & discharge recommendations including a transfer tub bench to increase safety with bathing. Pt stated that she would just sponge bather till she can step into her tub.  OT educated patient on operation of polar ice machine including, use of power cord, how to fill it with ice/frozen water bottles and water, how to connect and disconnect the tube, placement of bladder over knee with a towel between skin and bladder and securing it with Velcro straps.  Pt verbalized understanding.  OT educated patient on toilet  transfer techniques using rolling walker.  OT ed pt on fall risk and strongly advised pt to call for help for all OOB mobility.  Pt verbalized understanding.    Education:    Patient left up in chair with all lines intact, call button in reach, chair alarm on and nurse Mamie notified    GOALS:   Multidisciplinary Problems     Occupational Therapy Goals     Not on file                History:     Past Medical History:   Diagnosis Date    Allergy     Anxiety     Asthma     DDD (degenerative disc disease), cervical     Depression     Fibromyalgia     Hypertension     Neuromuscular disorder     PONV (postoperative nausea and vomiting)        Past Surgical History:   Procedure Laterality Date    ADENOIDECTOMY      carpel tunnel Right      SECTION      JOINT REPLACEMENT Right 2016    knee    KNEE ARTHROPLASTY Left 10/19/2020    Procedure: ARTHROPLASTY, KNEE;  Surgeon: Felipe Herring MD;  Location: [facility];  Service: Orthopedics;  Laterality: Left;  Louie Liz    knee replacement Right     ROTATOR CUFF REPAIR Right     TONSILLECTOMY         Time Tracking:     OT Date of Treatment: 10/20/20  OT Start Time: 1024  OT Stop Time: 1048  OT Total Time (min): 24 min    Billable Minutes:Evaluation 10  Self Care/Home Management 14    BIN Chou  10/20/2020

## 2020-10-20 NOTE — RESPIRATORY THERAPY
10/20/20 0822   Patient Assessment/Suction   Level of Consciousness (AVPU) alert   All Lung Fields Breath Sounds diminished   PRE-TX-O2   O2 Device (Oxygen Therapy) room air   SpO2 (!) 94 %   Pulse Oximetry Type Intermittent   $ Pulse Oximetry - Multiple Charge Pulse Oximetry - Multiple   Pulse 94   Resp 18

## 2020-10-20 NOTE — DISCHARGE SUMMARY
[facility] Medicine  Discharge Summary      Patient Name: Sonia Muse  MRN: [id number]  Admission Date: 10/19/2020  Hospital Length of Stay: 0 days  Discharge Date and Time:  10/20/2020 11:02 AM  Attending Physician: Sabas Dickey MD   Discharging Provider: Sabas Dickey MD  Primary Care Provider: Dorina Murillo NP      HPI:   60-year-old  female with past medical history significant for hypertension, osteoarthritis and anxiety/depression who underwent left total knee arthroplasty performed by Dr. Herring. Post-operatively, patient denied chest pain, shortness of breath, abdominal pain, nausea, vomiting, headache, vision changes, focal neuro-deficits, cough or fever.    Procedure(s) (LRB):  ARTHROPLASTY, KNEE (Left)      Hospital Course:   Post-operative, patient did well. Pain adequately controlled. Patient participated with physical therapy. Home health and home physical therapy has been arranged. Fall precautions discussed with the patient. Patient to follow up with primary care physician next week and orthopedic doctor in 2 weeks. Post-operative anti-coagulation as per orthopedics recommendations advised. In case of chest pain, shortness of breath, stroke or stroke like symptoms, high grade fever or any signs or symptoms of surgical site wound infection symptoms, patient to return to nearest emergency room as soon as possible.    Consults:  Dr. Herring    Final Active Diagnoses:    Diagnosis Date Noted POA    PRINCIPAL PROBLEM:  Osteoarthritis of left knee [M17.12] 09/17/2020 Yes    Depression with anxiety [F41.8] 08/19/2020 Yes    Essential hypertension [I10] 08/19/2020 Yes      Problems Resolved During this Admission:     Discharged Condition: good    Disposition:     Follow Up:  Follow-up Information     Go to [facility] [address]    Why: Oct 21, 2020 @ 3:00pm  Contact information:  [address]   [place] [address] [phone]      "      Go to [facility].    Specialty: Orthopedic Surgery  Why: Nov 2, 2020 @ 10:00am  Contact information:  [facility] [address]  [phone]                 Patient Instructions:      WALKER FOR HOME USE     Order Specific Question Answer Comments   Type of Walker: Adult (5'4"-6'6")    With wheels? Yes    Height: 5' 4.5" (1.638 m)    Weight: 132.5 kg (292 lb)    Length of need (1-99 months): 12    Does patient have medical equipment at home? none    Please check all that apply: Patient's condition impairs ambulation.      Referral to Home health   Referral Priority: Routine Referral Type: Home Health   Referral Reason: Specialty Services Required   Requested Specialty: Home Health Services   Number of Visits Requested: 1     Diet Cardiac     Other restrictions (specify):   Order Comments: PLEASE OBSERVE FALL PRECAUTIONS     Call MD for:   Order Comments: For worsening symptoms, chest pain, shortness of breath, increased abdominal pain, high grade fever, stroke or stroke like symptoms, immediately go to the nearest Emergency Room or call 911 as soon as possible.       Significant Diagnostic Studies: Labs:   CMP   Recent Labs   Lab 10/20/20  0529      K 3.9      CO2 29   *   BUN 16   CREATININE 0.9   CALCIUM 8.5*   ANIONGAP 10   ESTGFRAFRICA >60   EGFRNONAA >60    and CBC   Recent Labs   Lab 10/20/20  0529   WBC 8.65   HGB 11.2*   HCT 35.9*          Pending Diagnostic Studies:     None         Medications:  Reconciled Home Medications:      Medication List      START taking these medications    aspirin 81 MG Chew  Take 1 tablet (81 mg total) by mouth 2 (two) times daily.     oxyCODONE-acetaminophen 7.5-325 mg per tablet  Commonly known as: PERCOCET  Take 1 tablet by mouth every 4 (four) hours as needed for Pain.        CHANGE how you take these medications    losartan 100 MG tablet  Commonly known as: COZAAR  Take 0.5 tablets (50 mg total) by mouth once " daily.  What changed: how much to take        CONTINUE taking these medications    albuterol 90 mcg/actuation inhaler  Commonly known as: VENTOLIN HFA  Inhale 2 puffs into the lungs every 6 (six) hours as needed for Wheezing or Shortness of Breath. Rescue     ALPRAZolam 1 MG tablet  Commonly known as: XANAX  Take 1 tablet (1 mg total) by mouth once daily.     amLODIPine 2.5 MG tablet  Commonly known as: NORVASC  Take 1 tablet (2.5 mg total) by mouth once daily.     cetirizine 10 MG tablet  Commonly known as: ZYRTEC  Take 1 tablet (10 mg total) by mouth once daily.     DULoxetine 60 MG capsule  Commonly known as: CYMBALTA  Take 1 capsule (60 mg total) by mouth once daily.     furosemide 20 MG tablet  Commonly known as: LASIX  Take 1 tablet (20 mg total) by mouth 2 (two) times daily.     hydroCHLOROthiazide 25 MG tablet  Commonly known as: HYDRODIURIL  Take 1 tablet (25 mg total) by mouth once daily.     meloxicam 15 MG tablet  Commonly known as: MOBIC  Take 1 tablet (15 mg total) by mouth once daily.     montelukast 10 mg tablet  Commonly known as: SINGULAIR  Take 1 tablet (10 mg total) by mouth every evening.            Indwelling Lines/Drains at time of discharge:   Lines/Drains/Airways     None                 Time spent on the discharge of patient: 33 minutes  Patient was seen and examined on the date of discharge and determined to be suitable for discharge.         Sabas Dickey MD  Department of Hospital Medicine  [facility]

## 2020-10-21 ENCOUNTER — CLINICAL SUPPORT (OUTPATIENT)
Dept: REHABILITATION | Facility: HOSPITAL | Age: 60
End: 2020-10-21
Attending: ORTHOPAEDIC SURGERY
Payer: MEDICAID

## 2020-10-21 DIAGNOSIS — R29.898 LEFT LEG WEAKNESS: ICD-10-CM

## 2020-10-21 DIAGNOSIS — Z96.652 STATUS POST TOTAL KNEE REPLACEMENT, LEFT: ICD-10-CM

## 2020-10-21 DIAGNOSIS — M25.662 DECREASED RANGE OF MOTION (ROM) OF LEFT KNEE: Primary | ICD-10-CM

## 2020-10-21 DIAGNOSIS — R26.9 ABNORMAL GAIT: ICD-10-CM

## 2020-10-21 PROCEDURE — 97161 PT EVAL LOW COMPLEX 20 MIN: CPT | Mod: PN

## 2020-10-21 NOTE — PLAN OF CARE
[facility]  Physical Therapy Initial Evaluation    Name: Sonia Muse  Clinic Number: [id number]    Therapy Diagnosis:   Encounter Diagnoses   Name Primary?    Status post total knee replacement, left     Decreased range of motion (ROM) of left knee Yes    Abnormal gait     Left leg weakness      Physician: Felipe Herring MD  Physician Orders: PT Eval and Treat  Medical Diagnosis from Referral: Z96.652 (ICD-10-CM) - Status post total knee replacement, left    Evaluation Date: 10/21/2020  Plan of Care Certification Period: 10/21/2020 - 2020  Authorization Period Start - Expiration: 10/19/2020 - 2020  Visit # / Visits POC / Visits authorized:     Time In: 1500  Time Out: 1545  Total Billable Timed: 0 minutes  Total Billable Un-timed: 45 minutes    Precautions: Standard    Subjective   Date of onset: DOS 10/19/2020  History of current condition - Sonia reports: s/p left TKA performed on 10/19. Currently living with family for support. Reports hx of right TKA ~4years ago with successful rehab. She just returned home from hospital and has been performing heel slides     Medical History:   Past Medical History:   Diagnosis Date    Allergy     Anxiety     Asthma     DDD (degenerative disc disease), cervical     Depression     Fibromyalgia     Hypertension 2005    Neuromuscular disorder     PONV (postoperative nausea and vomiting)      Surgical History:   Sonia Muse  has a past surgical history that includes knee replacement (Right); carpel tunnel (Right); Rotator cuff repair (Right);  section; Tonsillectomy; Adenoidectomy; Joint replacement (Right, ); and Knee Arthroplasty (Left, 10/19/2020).    Medications:   Sonia has a current medication list which includes the following prescription(s): albuterol, alprazolam, amlodipine, aspirin, cetirizine, duloxetine, furosemide, hydrochlorothiazide, losartan, meloxicam, montelukast, and  oxycodone-acetaminophen.    Allergies:   Review of patient's allergies indicates:   Allergen Reactions    Erythromycin Swelling    Codeine Nausea And Vomiting     And migraine h/a      Imaging, none:    Prior Therapy: right RTC, right TKA  Social History: lives with their family  Occupation: none  Prior Level of Function: independent   Current Level of Function: ADL Min A, gait SBA, bed mobility Min Ax1, transfers Mod I    Pain:  Current 3/10, worst 9/10, best 3/10   Location: left knee   Description: Aching, Throbbing and Tight  Aggravating Factors: Standing, Bending, Walking, Flexing and Getting out of bed/chair  Easing Factors: pain medication and rest    Pts goals: improve left knee ROM and strength in order to return to independent function    Objective     Obese female ambulating with 4 wheel RW Mod I and step to pattern with increased BUE support  Sit <> stand with BUE Mod  Bed mobility supine <> sit Min A   Knee circumference suprapatellar: left 25.5, right 23  Knee circumference peripatellar: left 22.5, right 20.5  Wound demonstrates good heeling with no discharge, noted ecchymosis posterior knee  Left knee AROM (8,85)  Left knee PROM (5,91)  Right LE MMT WFL  Left LE MMT hip flexion/extension/IR/ER 4+/5, left knee ext 3/5, flexion 3+/5    Limitation/Restriction for FOTO LEFS Survey    Therapist reviewed FOTO scores for Sonia Muse on 10/21/2020.   FOTO documents entered into [employer] - see Media section.    Limitation Score: 67%       TREATMENT   Treatment Time In: 1535  Treatment Time Out: 1545  Total Treatment time separate from Evaluation: 10 minutes    Sonia received 1:1 therapeutic exercises to develop strength, endurance, ROM and flexibility for 10 minutes including:  Heel slides 10 x 10 with 5 sec hold  Hamstring stretch 3 x 30 sec  Calf stretch 3 x 30 sec  Quad set 10 x 10 with 10 sec hold    To be preformed:  Edema management  Patellar mobilizations  Bike pendulums   Manual  stretches  Supine hip abd/add/IR/ER    Patient Education and Home Exercise Program     Education provided:   - gait with RW, ice application, HEP    Written Home Exercises Provided: yes.  Exercises were reviewed and Sonia was able to demonstrate them prior to the end of the session.  Sonia demonstrated good  understanding of the education provided.     See EMR under Patient Instructions for exercises provided 10/21/2020.    Assessment   Sonia is a 60 y.o. female referred to outpatient Physical Therapy with a medical diagnosis of s/p TKA. The patient demonstrates decreased left knee ROM and strength decreasing her functional independence. Pt will benefit from skilled PT to address impairments in order to restore PLOF.     The patient will benefit from skilled outpatient Physical Therapy to address the deficits stated above and in the chart below, provide pt/family education, and to maximize pt's level of independence.     Pt prognosis is Excellent.     Plan of care discussed with patient: Yes  Pt's spiritual, cultural and educational needs considered and patient is agreeable to the plan of care and goals as stated below:     Anticipated Barriers for therapy: none    Medical Necessity is demonstrated by the following  History  Co-morbidities and personal factors that may impact the plan of care Co-morbidities:   depression    Personal Factors:   no deficits     low   Examination  Body Structures and Functions, activity limitations and participation restrictions that may impact the plan of care Body Regions:   lower extremities    Body Systems:    gross symmetry  ROM  strength  balance  gait  transfers  motor control  edema  skin integrity    Participation Restrictions:   none    Activity limitations:   Learning and applying knowledge  no deficits    General Tasks and Commands  no deficits    Communication  no deficits    Mobility  lifting and carrying objects  walking  driving (bike, car, motorcycle)    Self  "care  washing oneself (bathing, drying, washing hands)  caring for body parts (brushing teeth, shaving, grooming)  toileting  dressing  looking after one's health    Domestic Life  shopping  cooking  doing house work (cleaning house, washing dishes, laundry)  assisting others    Interactions/Relationships  no deficits    Life Areas  no deficits    Community and Social Life  no deficits         low   Clinical Presentation stable and uncomplicated low   Decision Making/ Complexity Score: low     Goals:  Short Term Goals (STG) # weeks Goal Review Date Reviewed Date Met   Pt will demonstrate independence with initial HEP to facilitate therex progression and improvements in functional mobility 1 Initial 10/21/2020    The patient will increase left knee AROM to 0-105 3 Initial 10/21/2020    Decrease left knee circumference by 0.5" to facilitate ROM goals  Initial 10/21/2020    Pt to ambulate community distances with SPC independently and safely 3 Initial 10/21/2020    Pt to perform bed mobility and transfers independently  3 Initial 10/21/2020                                           Long Term Goals (LTG) # weeks Goal Review Date Reviewed Date Met   The patient will improve the Lower Extremity Functional Scale to less than 46% Disability 6 Initial 10/21/2020    The patient will increase left knee AROM to WFL  6 Initial 10/21/2020    The patient will increase left LE MMT to WFL in order to return to independent home management 6 Initial 10/21/2020    Pt will be able to ambulate community distances independently and safely  6 Initial 10/21/2020                                                  Plan   Plan of care Certification: 10/21/2020 to 12/28/2020.    Outpatient Physical Therapy 3 times weekly for 6 weeks to include the following interventions: Electrical Stimulation IFC, NMES, Gait Training, Manual Therapy, Moist Heat/ Ice, Neuromuscular Re-ed, Orthotic Management and Training, Patient Education, Self Care, " Therapeutic Activites, Therapeutic Exercise and Ultrasound.     Ai Ramos, PT

## 2020-10-22 ENCOUNTER — TELEPHONE (OUTPATIENT)
Dept: ORTHOPEDICS | Facility: CLINIC | Age: 60
End: 2020-10-22

## 2020-10-22 VITALS
RESPIRATION RATE: 18 BRPM | WEIGHT: 292 LBS | HEIGHT: 65 IN | DIASTOLIC BLOOD PRESSURE: 57 MMHG | SYSTOLIC BLOOD PRESSURE: 107 MMHG | TEMPERATURE: 100 F | OXYGEN SATURATION: 93 % | BODY MASS INDEX: 48.65 KG/M2 | HEART RATE: 90 BPM

## 2020-10-22 NOTE — TELEPHONE ENCOUNTER
----- Message from Virginie Martell sent at 10/22/2020 10:38 AM CDT -----  Regarding: Post Op Instructions  Contact: Patient  She lost her instructions to care for her knee, call her back at [phone].

## 2020-10-23 ENCOUNTER — CLINICAL SUPPORT (OUTPATIENT)
Dept: REHABILITATION | Facility: HOSPITAL | Age: 60
End: 2020-10-23
Attending: ORTHOPAEDIC SURGERY
Payer: MEDICAID

## 2020-10-23 DIAGNOSIS — R29.898 LEFT LEG WEAKNESS: ICD-10-CM

## 2020-10-23 DIAGNOSIS — M25.662 DECREASED RANGE OF MOTION (ROM) OF LEFT KNEE: ICD-10-CM

## 2020-10-23 DIAGNOSIS — R26.9 ABNORMAL GAIT: ICD-10-CM

## 2020-10-23 PROCEDURE — 97110 THERAPEUTIC EXERCISES: CPT | Mod: PN,CQ

## 2020-10-23 NOTE — PROGRESS NOTES
"  Physical Therapy Daily Treatment Note     Name: Sonia GIL [facility]  Clinic Number: [id number]    Therapy Diagnosis:   Encounter Diagnoses   Name Primary?    Decreased range of motion (ROM) of left knee     Abnormal gait     Left leg weakness      Physician: Dorina Murillo NP    Visit Date: 10/23/2020  Physician Orders: PT Eval and Treat  Medical Diagnosis from Referral: Z96.652 (ICD-10-CM) - Status post total knee replacement, left     Evaluation Date: 10/21/2020  Plan of Care Certification Period: 10/21/2020 - 12/28/2020  Authorization Period Start - Expiration: 10/19/2020 - 11/19/2020  Visit # / Visits POC / Visits authorized: 2/ 18 / 1    Time In: 1315  Time Out:1400  Total Billable Time: 45 minutes    Precautions: Standard    Subjective     Pt reports: Pain radiating into hip.  She was compliant with home exercise program.  Response to previous treatment: positive  Functional change: First treat after eval    Pain: 6/10  Location: left knee      Objective     Sonia  1:1 therapeutic exercises to develop strength, endurance, ROM and flexibility for 35 minutes including:  Heel slides 10 x 10 with 5 sec hold  Hamstring stretch 3 x 30 sec in long sitting  Calf stretch 3 x 30 sec  Flexion stretch: 3/30" with R foot over L  LAQ x10   SAQ-NP       To be preformed:  Edema management  Patellar mobilizations  Bike pendulums   Manual stretches  Supine hip abd/add/IR/ER    Sonia participated in neuromuscular re-education activities to improve: Kinesthetic for 5 minutes. The following activities were included:  Quad set 10 x 10 with 10 sec hold    Reapplied surgical bandage to cover incision this visit      Education provided:   - Continue HEP    Written Home Exercises Provided: Patient instructed to cont prior HEP.  Exercises were reviewed and Sonia was able to demonstrate them prior to the end of the session.  Sonia demonstrated good  understanding of the education provided.     See EMR under Patient Instructions " "for exercises provided prior visit.    Assessment   Pt ambulated into clinic using RW with antalgic gait. Pt slow to perform exercises secondary to post-op pain and weakness. Pt demo ability to perform exercises with proper form and control with extra time.   Sonia is progressing well towards her goals. Pt with short step length on L with lack of hip flex and extension.   Pt prognosis is Good.     Pt will continue to benefit from skilled outpatient physical therapy to address the deficits listed in the problem list box on initial evaluation, provide pt/family education and to maximize pt's level of independence in the home and community environment.     Pt's spiritual, cultural and educational needs considered and pt agreeable to plan of care and goals.    Anticipated barriers to physical therapy: none    Goals:   Short Term Goals (STG) # weeks Goal Review Date Reviewed Date Met   Pt will demonstrate independence with initial HEP to facilitate therex progression and improvements in functional mobility 1 Initial 10/21/2020     The patient will increase left knee AROM to 0-105 3 Initial 10/21/2020     Decrease left knee circumference by 0.5" to facilitate ROM goals   Initial 10/21/2020     Pt to ambulate community distances with SPC independently and safely 3 Initial 10/21/2020     Pt to perform bed mobility and transfers independently  3 Initial 10/21/2020                                                                       Long Term Goals (LTG) # weeks Goal Review Date Reviewed Date Met   The patient will improve the Lower Extremity Functional Scale to less than 46% Disability 6 Initial 10/21/2020     The patient will increase left knee AROM to WFL  6 Initial 10/21/2020     The patient will increase left LE MMT to WFL in order to return to independent home management 6 Initial 10/21/2020     Pt will be able to ambulate community distances independently and safely  6 Initial 10/21/2020                               "                                                      Plan     Continue to treat and progress per POC and pt tolerance       Rakesh Calle PTA

## 2020-10-26 ENCOUNTER — CLINICAL SUPPORT (OUTPATIENT)
Dept: REHABILITATION | Facility: HOSPITAL | Age: 60
End: 2020-10-26
Attending: ORTHOPAEDIC SURGERY
Payer: MEDICAID

## 2020-10-26 DIAGNOSIS — R26.9 ABNORMAL GAIT: ICD-10-CM

## 2020-10-26 DIAGNOSIS — R29.898 LEFT LEG WEAKNESS: ICD-10-CM

## 2020-10-26 DIAGNOSIS — M25.662 DECREASED RANGE OF MOTION (ROM) OF LEFT KNEE: ICD-10-CM

## 2020-10-26 PROCEDURE — 97110 THERAPEUTIC EXERCISES: CPT | Mod: PN

## 2020-10-26 NOTE — PROGRESS NOTES
"  Physical Therapy Daily Treatment Note     Name: Sonia GIL [facility] Number: [id number]    Therapy Diagnosis:   Encounter Diagnoses   Name Primary?    Decreased range of motion (ROM) of left knee     Abnormal gait     Left leg weakness      Physician: Dorina Murillo NP    Visit Date: 10/26/2020  Physician Orders: PT Eval and Treat  Medical Diagnosis from Referral: Z96.652 (ICD-10-CM) - Status post total knee replacement, left     Evaluation Date: 10/21/2020  Plan of Care Certification Period: 10/21/2020 - 12/28/2020  Authorization Period Start - Expiration: 10/19/2020 - 11/19/2020  Visit # / Visits POC / Visits authorized: 3/ 18 / 1    Time In: 1:53 PM  Time Out: 2:45 PM  Total Billable Time:  42 minutes    Precautions: Standard    Subjective     Pt reports: Pt reports left hip pain. States she has had pain in the buttock and posterior and lateral hip since surgery. "It keeps me up at night"  She was compliant with home exercise program.  Response to previous treatment: positive  Functional change: None reported    Pain: 3/10  Location: left knee      Objective   Manual Therapy 20min:  Pt with + long sit to supine test indicating left anterior innominate rotation.  MET performed using left HS/ right hip flexors to improve SIJ symmetry.  Lateral hip distraction mobilizations with strap   IASTM with roller left hip, buttock and ITB  Patellar mobilizations   Posterior distal femoral glides to improve knee flexion - Grade II/III    Sonia  1:1 therapeutic exercises to develop strength, endurance, ROM and flexibility for 22 minutes including:  Heel slides 10 x 10 with 5 sec hold  Hamstring stretch 3 x 30 sec in long sitting  Calf stretch 3 x 30 sec  Flexion stretch: 3/30" with R foot over L  LAQ  2x10   +Standing heel raises 1 x 10  +Mini squats 1 x 10     To be preformed:  Edema management  Bike pendulums   Manual stretches  Supine hip abd/add/IR/ER    Sonia participated in neuromuscular re-education " "activities to improve: Kinesthetic for 5 minutes. The following activities were included:  Quad set 3 x 10 with 10 sec hold    CP applied to left knee x 10 min after treatment      Education provided:   - Continue HEP    Written Home Exercises Provided: Patient instructed to cont prior HEP.  Exercises were reviewed and Sonia was able to demonstrate them prior to the end of the session.  Sonia demonstrated good  understanding of the education provided.     See EMR under Patient Instructions for exercises provided prior visit.    Assessment   Pt with noted SIJ dysfunction which is causing hip/LBP. Corrected with MET today with pt reporting decreased left hip pain with ambulation following session. Continued quad weakness, decreased left knee AROM 1 week s/p TKA surgery.      Sonia is progressing well towards her goals. Pt with short step length on L with lack of hip flex and extension.   Pt prognosis is Good.     Pt will continue to benefit from skilled outpatient physical therapy to address the deficits listed in the problem list box on initial evaluation, provide pt/family education and to maximize pt's level of independence in the home and community environment.     Pt's spiritual, cultural and educational needs considered and pt agreeable to plan of care and goals.    Anticipated barriers to physical therapy: none    Goals:   Short Term Goals (STG) # weeks Goal Review Date Reviewed Date Met   Pt will demonstrate independence with initial HEP to facilitate therex progression and improvements in functional mobility 1 Initial 10/21/2020     The patient will increase left knee AROM to 0-105 3 Initial 10/21/2020     Decrease left knee circumference by 0.5" to facilitate ROM goals   Initial 10/21/2020     Pt to ambulate community distances with SPC independently and safely 3 Initial 10/21/2020     Pt to perform bed mobility and transfers independently  3 Initial 10/21/2020                                              "                          Long Term Goals (LTG) # weeks Goal Review Date Reviewed Date Met   The patient will improve the Lower Extremity Functional Scale to less than 46% Disability 6 Initial 10/21/2020     The patient will increase left knee AROM to WFL  6 Initial 10/21/2020     The patient will increase left LE MMT to WFL in order to return to independent home management 6 Initial 10/21/2020     Pt will be able to ambulate community distances independently and safely  6 Initial 10/21/2020                                                                                    Plan     Continue to treat and progress per POC and pt tolerance       Melody Roche, PT

## 2020-10-27 RX ORDER — OXYCODONE AND ACETAMINOPHEN 7.5; 325 MG/1; MG/1
1 TABLET ORAL EVERY 6 HOURS PRN
Qty: 28 TABLET | Refills: 0 | Status: SHIPPED | OUTPATIENT
Start: 2020-10-27 | End: 2020-11-03

## 2020-11-02 ENCOUNTER — OFFICE VISIT (OUTPATIENT)
Dept: ORTHOPEDICS | Facility: CLINIC | Age: 60
End: 2020-11-02
Payer: MEDICAID

## 2020-11-02 ENCOUNTER — TELEPHONE (OUTPATIENT)
Dept: ORTHOPEDICS | Facility: CLINIC | Age: 60
End: 2020-11-02

## 2020-11-02 VITALS
SYSTOLIC BLOOD PRESSURE: 140 MMHG | HEIGHT: 65 IN | OXYGEN SATURATION: 97 % | DIASTOLIC BLOOD PRESSURE: 92 MMHG | WEIGHT: 292 LBS | BODY MASS INDEX: 48.65 KG/M2 | HEART RATE: 102 BPM

## 2020-11-02 DIAGNOSIS — Z96.652 STATUS POST TOTAL KNEE REPLACEMENT, LEFT: Primary | ICD-10-CM

## 2020-11-02 PROCEDURE — 99024 POSTOP FOLLOW-UP VISIT: CPT | Mod: S$GLB,,, | Performed by: PHYSICIAN ASSISTANT

## 2020-11-02 PROCEDURE — 99024 PR POST-OP FOLLOW-UP VISIT: ICD-10-PCS | Mod: S$GLB,,, | Performed by: PHYSICIAN ASSISTANT

## 2020-11-02 NOTE — PROGRESS NOTES
[facility]  [address]  [place] [address]  Phone: [phone]   [phone]    Patient's PCP:Dorina Murillo NP  Referring Provider: No ref. provider found    POST-OP Note:    Subjective:        Chief Complaint:   Chief Complaint   Patient presents with    Left Knee - Post-op Evaluation     Post op left TKA and suture removal       Past Medical History:   Diagnosis Date    Allergy     Anxiety     Asthma     DDD (degenerative disc disease), cervical     Depression     Fibromyalgia     Hypertension     Neuromuscular disorder     PONV (postoperative nausea and vomiting)        Past Surgical History:   Procedure Laterality Date    ADENOIDECTOMY      carpel tunnel Right      SECTION      JOINT REPLACEMENT Right 2016    knee    KNEE ARTHROPLASTY Left 10/19/2020    Procedure: ARTHROPLASTY, KNEE;  Surgeon: Felipe Herring MD;  Location: [facility];  Service: Orthopedics;  Laterality: Left;  Louie Liz    knee replacement Right     ROTATOR CUFF REPAIR Right     TONSILLECTOMY         Current Outpatient Medications   Medication Sig    albuterol (VENTOLIN HFA) 90 mcg/actuation inhaler Inhale 2 puffs into the lungs every 6 (six) hours as needed for Wheezing or Shortness of Breath. Rescue    ALPRAZolam (XANAX) 1 MG tablet Take 1 tablet (1 mg total) by mouth once daily.    amLODIPine (NORVASC) 2.5 MG tablet Take 1 tablet (2.5 mg total) by mouth once daily.    aspirin 81 MG Chew Take 1 tablet (81 mg total) by mouth 2 (two) times daily.    cetirizine (ZYRTEC) 10 MG tablet Take 1 tablet (10 mg total) by mouth once daily.    DULoxetine (CYMBALTA) 60 MG capsule Take 1 capsule (60 mg total) by mouth once daily.    furosemide (LASIX) 20 MG tablet Take 1 tablet (20 mg total) by mouth 2 (two) times daily.    hydroCHLOROthiazide (HYDRODIURIL) 25 MG tablet Take 1 tablet (25 mg total) by mouth once daily.    losartan (COZAAR) 100 MG tablet Take 0.5 tablets (50 mg total)  by mouth once daily.    meloxicam (MOBIC) 15 MG tablet Take 1 tablet (15 mg total) by mouth once daily.    oxyCODONE-acetaminophen (PERCOCET) 7.5-325 mg per tablet Take 1 tablet by mouth every 6 (six) hours as needed for Pain.     No current facility-administered medications for this visit.        Review of patient's allergies indicates:   Allergen Reactions    Erythromycin Swelling    Codeine Nausea And Vomiting     And migraine h/a       Family History   Problem Relation Age of Onset    Diabetes Mother     Hypertension Mother     Heart disease Mother     Stroke Mother     Glaucoma Mother     Diabetes Father     Hypertension Father     Heart disease Father     Hypertension Sister     Heart disease Sister     Graves' disease Daughter     Anxiety disorder Daughter        Social History     Socioeconomic History    Marital status:      Spouse name: Not on file    Number of children: 3    Years of education: Not on file    Highest education level: Not on file   Occupational History    Occupation: [employer]     Comment: primary sub for [employer]   Social Needs    Financial resource strain: Not on file    Food insecurity     Worry: Not on file     Inability: Not on file    Transportation needs     Medical: Not on file     Non-medical: Not on file   Tobacco Use    Smoking status: Former Smoker     Packs/day: 1.00     Years: 11.00     Pack years: 11.00     Quit date:      Years since quittin.8    Smokeless tobacco: Never Used   Substance and Sexual Activity    Alcohol use: Yes     Comment: occasional 2x/y    Drug use: Never    Sexual activity: Yes     Partners: Male   Lifestyle    Physical activity     Days per week: Not on file     Minutes per session: Not on file    Stress: Rather much   Relationships    Social connections     Talks on phone: Not on file     Gets together: Not on file     Attends Jew service: Not on file     Active member of club or organization: Not  on file     Attends meetings of clubs or organizations: Not on file     Relationship status: Not on file   Other Topics Concern    Not on file   Social History Narrative    Not on file       History of present illness:  Patient returns to the clinic today to follow-up on her left total knee.  She had left total knee arthroplasty on October 19th.  She has been complaining of some mild pain and some feelings of instability.  This is much different than when she had her right knee.      Review of Systems:    Musculoskeletal:  See HPI       Objective:        Physical Examination:    Vital Signs:   Vitals:    11/02/20 1004   BP: (!) 140/92   Pulse: 102       Body mass index is 49.35 kg/m².    This a well-developed, well nourished patient in no acute distress.  They are alert and oriented and cooperative to examination.        Examination of the left knee, midline surgical incision consistent with history of left total knee arthroplasty.  Wound appears to be well healing, no evidence of wound failure dehiscence or infection.  Patient has good range of motion she has extension to -10, flexion to greater than 90.  Calf is soft and nontender.    Pertinent New Results:     XRAY Report / Interpretation:        Assessment:       1. Status post total knee replacement, left      Plan:     Status post total knee replacement, left        Follow up in about 4 weeks (around 11/30/2020) for P/O L TKA 10/19/2020.    Stable status post left total knee arthroplasty, continue to take aspirin b.i.d. for another 2 weeks.  Start physical therapy, she does not need a refill on her pain medication at this time.  Follow-up in 1 month    [unfilled]  ALENA Mendez PA-C    This note was created using [employer] voice recognition software that occasionally misinterprets words or phrases.

## 2020-11-04 ENCOUNTER — CLINICAL SUPPORT (OUTPATIENT)
Dept: REHABILITATION | Facility: HOSPITAL | Age: 60
End: 2020-11-04
Attending: ORTHOPAEDIC SURGERY
Payer: MEDICAID

## 2020-11-04 DIAGNOSIS — R29.898 LEFT LEG WEAKNESS: ICD-10-CM

## 2020-11-04 DIAGNOSIS — M25.662 DECREASED RANGE OF MOTION (ROM) OF LEFT KNEE: ICD-10-CM

## 2020-11-04 DIAGNOSIS — R26.9 ABNORMAL GAIT: ICD-10-CM

## 2020-11-04 PROCEDURE — 97110 THERAPEUTIC EXERCISES: CPT | Mod: PN,CQ

## 2020-11-04 NOTE — PROGRESS NOTES
"  Physical Therapy Daily Treatment Note     Name: Sonia GIL [facility]  Clinic Number: [id number]    Therapy Diagnosis:   Encounter Diagnoses   Name Primary?    Decreased range of motion (ROM) of left knee     Abnormal gait     Left leg weakness      Physician: Dorina Murillo NP    Visit Date: 11/4/2020  Physician Orders: PT Eval and Treat  Medical Diagnosis from Referral: Z96.652 (ICD-10-CM) - Status post total knee replacement, left     Evaluation Date: 10/21/2020  Plan of Care Certification Period: 10/21/2020 - 12/28/2020  Authorization Period Start - Expiration: 10/19/2020 - 11/19/2020  Visit # / Visits POC / Visits authorized: 4/ 18 / 1    Time In: 1655  Time Out: 1803  Total Billable Time:  68 minutes    Precautions: Standard    Subjective     Pt reports: Been walking a lot today, it is really swollen.  She was compliant with home exercise program.  Response to previous treatment: positive  Functional change: None reported    Pain: 3/10  Post: 0/10  Location: left knee      Objective   Manual Therapy 10min:  STM to posterior L knee and HS for TrP releasex: 13 min    Sonia  1:1 therapeutic exercises to develop strength, endurance, ROM and flexibility for 50 minutes including:  Heel slides 2 x 10 with 5 sec hold with QS at full ext 3" hold   Hamstring stretch 3 x 30 sec in long sitting  Calf stretch 3 x 30 sec  Flexion stretch: 3/30" with R foot over L  Knee flexion stretch on HOB with R assist  LAQ  2x10   Standing heel raises 1 x 10  Mini squats 1 x 10       To be preformed:  Edema management  Bike pendulums   Manual stretches  Supine hip abd/add/IR/ER    Sonia participated in neuromuscular re-education activities to improve: Kinesthetic for 5 minutes. The following activities were included:  Quad set 3 x 10 with 10 sec hold    CP applied to left knee x 10 min after treatment      Education provided:   - Continue HEP    Written Home Exercises Provided: Patient instructed to cont prior HEP.  Exercises were " "reviewed and Sonia was able to demonstrate them prior to the end of the session.  Sonia demonstrated good  understanding of the education provided.     See EMR under Patient Instructions for exercises provided prior visit.    Assessment   Pt with increased swelling today but continues to demo improved ROM. Pt able to perform increased number of ROM activities with moderate increased pain.     Sonia is progressing well towards her goals. Pt with short step length on L with lack of hip flex and extension.   Pt prognosis is Good.     Pt will continue to benefit from skilled outpatient physical therapy to address the deficits listed in the problem list box on initial evaluation, provide pt/family education and to maximize pt's level of independence in the home and community environment.     Pt's spiritual, cultural and educational needs considered and pt agreeable to plan of care and goals.    Anticipated barriers to physical therapy: none    Goals:   Short Term Goals (STG) # weeks Goal Review Date Reviewed Date Met   Pt will demonstrate independence with initial HEP to facilitate therex progression and improvements in functional mobility 1 Initial 10/21/2020     The patient will increase left knee AROM to 0-105 3 Initial 10/21/2020     Decrease left knee circumference by 0.5" to facilitate ROM goals   Initial 10/21/2020     Pt to ambulate community distances with SPC independently and safely 3 Initial 10/21/2020     Pt to perform bed mobility and transfers independently  3 Initial 10/21/2020                                                                       Long Term Goals (LTG) # weeks Goal Review Date Reviewed Date Met   The patient will improve the Lower Extremity Functional Scale to less than 46% Disability 6 Initial 10/21/2020     The patient will increase left knee AROM to WFL  6 Initial 10/21/2020     The patient will increase left LE MMT to WFL in order to return to independent home management 6 Initial " 10/21/2020     Pt will be able to ambulate community distances independently and safely  6 Initial 10/21/2020                                                                                    Plan     Continue to treat and progress per POC and pt tolerance       Rakesh Calle, GURU

## 2020-11-06 ENCOUNTER — CLINICAL SUPPORT (OUTPATIENT)
Dept: REHABILITATION | Facility: HOSPITAL | Age: 60
End: 2020-11-06
Attending: ORTHOPAEDIC SURGERY
Payer: MEDICAID

## 2020-11-06 DIAGNOSIS — R26.9 ABNORMAL GAIT: ICD-10-CM

## 2020-11-06 DIAGNOSIS — R29.898 LEFT LEG WEAKNESS: ICD-10-CM

## 2020-11-06 DIAGNOSIS — M25.662 DECREASED RANGE OF MOTION (ROM) OF LEFT KNEE: ICD-10-CM

## 2020-11-06 PROCEDURE — 97110 THERAPEUTIC EXERCISES: CPT | Mod: PN

## 2020-11-06 NOTE — PROGRESS NOTES
"  Physical Therapy Daily Treatment Note     Name: Sonia GIL [facility]  Clinic Number: [id number]    Therapy Diagnosis:   Encounter Diagnoses   Name Primary?    Decreased range of motion (ROM) of left knee     Abnormal gait     Left leg weakness      Physician: Dorina Murillo NP    Visit Date: 11/6/2020  Physician Orders: PT Eval and Treat  Medical Diagnosis from Referral: Z96.652 (ICD-10-CM) - Status post total knee replacement, left     Evaluation Date: 10/21/2020  Plan of Care Certification Period: 10/21/2020 - 12/28/2020  Authorization Period Start - Expiration: 10/19/2020 - 11/30/2020  Visit # / Visits POC / Visits authorized: 4/ 18 / 24    Time In: 1:40 PM  Time Out: 2:40  Total Billable Time:  50 minutes    Precautions: Standard    Subjective     Pt reports: Having an OK day; not feeling great - stomach upset from pain meds  She was compliant with home exercise program.  Response to previous treatment: positive - massage helped pain in posterior knee  Functional change: None reported    Pain: 3/10  Post: 0/10  Location: left knee      Objective   Manual Therapy 0 min:  STM to posterior L knee and HS for TrP releasex:     Sonia  1:1 therapeutic exercises to develop strength, endurance, ROM and flexibility for 30 minutes including:    Heel slides 2 x 10 with 5 sec hold with strap and QS at full ext 3" hold   Hamstring stretch 3 x 30 sec in long sitting  Flexion stretch: 3/30" with R foot over L  Knee flexion stretch on HOB with R assist  LAQ  2x10   Calf stretch on wedge 3 x 30 sec  Standing hip abduction 3 x 5 reps B (holding // bar)  Standing heel raises 1 x 10  Mini squats 1 x 10        To be preformed:  Edema management  Bike pendulums   Manual stretches  Supine hip abd/add/IR/ER    Sonia participated in neuromuscular re-education activities to improve: Kinesthetic for 20 minutes. The following activities were included:  Quad set 3 x 10 with 10 sec hold  Gait training in // bars heel strike with glute " "activation through push off at toe off. Pt able to take steps in // bars without holding bar.      CP applied to left knee with elevation x 10 min after treatment      Education provided:   - Continue HEP    Written Home Exercises Provided: Patient instructed to cont prior HEP.  Exercises were reviewed and Sonia was able to demonstrate them prior to the end of the session.  Sonia demonstrated good  understanding of the education provided.     See EMR under Patient Instructions for exercises provided prior visit.    Assessment   Pt is demonstrating improved knee flexion. Able to perform gait with correct sequencing in // bars and should wean from walker shortly.      Sonia is progressing well towards her goals. Pt with short step length on L with lack of hip flex and extension.   Pt prognosis is Good.     Pt will continue to benefit from skilled outpatient physical therapy to address the deficits listed in the problem list box on initial evaluation, provide pt/family education and to maximize pt's level of independence in the home and community environment.     Pt's spiritual, cultural and educational needs considered and pt agreeable to plan of care and goals.    Anticipated barriers to physical therapy: none    Goals:   Short Term Goals (STG) # weeks Goal Review Date Reviewed Date Met   Pt will demonstrate independence with initial HEP to facilitate therex progression and improvements in functional mobility 1 Initial 10/21/2020     The patient will increase left knee AROM to 0-105 3 Initial 10/21/2020     Decrease left knee circumference by 0.5" to facilitate ROM goals   Initial 10/21/2020     Pt to ambulate community distances with SPC independently and safely 3 Initial 10/21/2020     Pt to perform bed mobility and transfers independently  3 Initial 10/21/2020                                                                       Long Term Goals (LTG) # weeks Goal Review Date Reviewed Date Met   The patient will " improve the Lower Extremity Functional Scale to less than 46% Disability 6 Initial 10/21/2020     The patient will increase left knee AROM to WFL  6 Initial 10/21/2020     The patient will increase left LE MMT to WFL in order to return to independent home management 6 Initial 10/21/2020     Pt will be able to ambulate community distances independently and safely  6 Initial 10/21/2020                                                                                    Plan     Continue to treat and progress per POC and pt tolerance       Melody Roche, PT

## 2020-11-09 ENCOUNTER — CLINICAL SUPPORT (OUTPATIENT)
Dept: REHABILITATION | Facility: HOSPITAL | Age: 60
End: 2020-11-09
Attending: ORTHOPAEDIC SURGERY
Payer: MEDICAID

## 2020-11-09 DIAGNOSIS — R26.9 ABNORMAL GAIT: ICD-10-CM

## 2020-11-09 DIAGNOSIS — R29.898 LEFT LEG WEAKNESS: ICD-10-CM

## 2020-11-09 DIAGNOSIS — M25.662 DECREASED RANGE OF MOTION (ROM) OF LEFT KNEE: ICD-10-CM

## 2020-11-09 PROCEDURE — 97140 MANUAL THERAPY 1/> REGIONS: CPT | Mod: PN,CQ

## 2020-11-09 PROCEDURE — 97110 THERAPEUTIC EXERCISES: CPT | Mod: PN,CQ

## 2020-11-09 NOTE — PROGRESS NOTES
"  Physical Therapy Daily Treatment Note     Name: Sonia GIL [facility]  Clinic Number: [id number]    Therapy Diagnosis:   Encounter Diagnoses   Name Primary?    Decreased range of motion (ROM) of left knee     Abnormal gait     Left leg weakness      Physician: Dorina Murillo NP    Visit Date: 11/9/2020  Physician Orders: PT Eval and Treat  Medical Diagnosis from Referral: Z96.652 (ICD-10-CM) - Status post total knee replacement, left     Evaluation Date: 10/21/2020  Plan of Care Certification Period: 10/21/2020 - 12/28/2020  Authorization Period Start - Expiration: 10/19/2020 - 11/30/2020  Visit # / Visits POC / Visits authorized: 4/ 18 / 24    Time In: 1400  Time Out: 1500  Total Billable Time:  60 minutes    Precautions: Standard    Subjective     Pt reports: Pain and tightness in L knee is returning  She was compliant with home exercise program.  Response to previous treatment: positive - massage helped pain in posterior knee  Functional change: None reported    Pain: 2/10  Post: 2/10  Location: left knee      Objective   Manual Therapy 10 min:  STM to posterior L knee and HS for TrP release    Sonia  1:1 therapeutic exercises to develop strength, endurance, ROM and flexibility for 50 minutes including:  Recumbent Bike: x8'      Heel slides 2 x 10 with 5 sec hold with strap and QS at full ext 3" hold   Hamstring stretch 3 x 30 sec in long sitting  Flexion stretch: 3/30" with R foot over L  Knee flexion stretch on HOB with R assist:  LAQ  2x10   Calf stretch on wedge 3 x 30 sec  Standing hip abduction 3 x 5 reps B (holding // bar)  Standing heel raises 1 x 10  Mini squats 1 x 10        To be preformed:  Edema management  Bike pendulums   Manual stretches  Supine hip abd/add/IR/ER    Manual TX:  STM to calf for TrP release x10 min    Sonia participated in neuromuscular re-education activities to improve: Kinesthetic for 0 minutes. The following activities were included:  Quad set 3 x 10 with 10 sec hold  Gait " "training in // bars heel strike with glute activation through push off at toe off. Pt able to take steps in // bars without holding bar.      CP applied to left knee with elevation x 10 min after treatment-NP      Education provided:   - Continue HEP    Written Home Exercises Provided: Patient instructed to cont prior HEP.  Exercises were reviewed and Sonia was able to demonstrate them prior to the end of the session.  Sonia demonstrated good  understanding of the education provided.     See EMR under Patient Instructions for exercises provided prior visit.    Assessment   Pt able to ambulate inside clinic without RW today with good gait cycle; slightly decreased stance time and step length on L. Pt with increased knee flexion with the ability to use the recumbent bike x8min performing full revolutions. Pt continue to have multiple TrP's and tightness in L HS. Pt @ 130* AROM L knee flexion, 134* PROM.     Sonia is progressing well towards her goals. Pt with short step length on L with lack of hip flex and extension.   Pt prognosis is Good.     Pt will continue to benefit from skilled outpatient physical therapy to address the deficits listed in the problem list box on initial evaluation, provide pt/family education and to maximize pt's level of independence in the home and community environment.     Pt's spiritual, cultural and educational needs considered and pt agreeable to plan of care and goals.    Anticipated barriers to physical therapy: none    Goals:   Short Term Goals (STG) # weeks Goal Review Date Reviewed Date Met   Pt will demonstrate independence with initial HEP to facilitate therex progression and improvements in functional mobility 1 Initial 10/21/2020     The patient will increase left knee AROM to 0-105 3 Initial 10/21/2020     Decrease left knee circumference by 0.5" to facilitate ROM goals   Initial 10/21/2020     Pt to ambulate community distances with SPC independently and safely 3 Initial " 10/21/2020     Pt to perform bed mobility and transfers independently  3 Initial 10/21/2020                                                                       Long Term Goals (LTG) # weeks Goal Review Date Reviewed Date Met   The patient will improve the Lower Extremity Functional Scale to less than 46% Disability 6 Initial 10/21/2020     The patient will increase left knee AROM to WFL  6 Initial 10/21/2020     The patient will increase left LE MMT to WFL in order to return to independent home management 6 Initial 10/21/2020     Pt will be able to ambulate community distances independently and safely  6 Initial 10/21/2020                                                                                    Plan     Continue to treat and progress per POC and pt tolerance       Rakesh Calle, GURU

## 2020-11-11 ENCOUNTER — CLINICAL SUPPORT (OUTPATIENT)
Dept: REHABILITATION | Facility: HOSPITAL | Age: 60
End: 2020-11-11
Attending: ORTHOPAEDIC SURGERY
Payer: MEDICAID

## 2020-11-11 DIAGNOSIS — M25.662 DECREASED RANGE OF MOTION (ROM) OF LEFT KNEE: ICD-10-CM

## 2020-11-11 DIAGNOSIS — R26.9 ABNORMAL GAIT: ICD-10-CM

## 2020-11-11 DIAGNOSIS — R29.898 LEFT LEG WEAKNESS: ICD-10-CM

## 2020-11-11 PROCEDURE — 97010 HOT OR COLD PACKS THERAPY: CPT | Mod: PN,CQ

## 2020-11-11 PROCEDURE — 97110 THERAPEUTIC EXERCISES: CPT | Mod: PN,CQ

## 2020-11-11 NOTE — PROGRESS NOTES
"  Physical Therapy Daily Treatment Note     Name: Sonia GIL [facility]  Clinic Number: [id number]    Therapy Diagnosis:   Encounter Diagnoses   Name Primary?    Decreased range of motion (ROM) of left knee     Abnormal gait     Left leg weakness      Physician: Dorina Murillo NP    Visit Date: 11/11/2020  Physician Orders: PT Eval and Treat  Medical Diagnosis from Referral: Z96.652 (ICD-10-CM) - Status post total knee replacement, left     Evaluation Date: 10/21/2020  Plan of Care Certification Period: 10/21/2020 - 12/28/2020  Authorization Period Start - Expiration: 10/19/2020 - 11/30/2020  Visit # / Visits POC / Visits authorized: 5/ 18 / 24    Time In: 1304  Time Out: 1412  Total Billable Time: 68 minutes    Precautions: Standard    Subjective     Pt reports: Stiffness in knee; did well all day yesterday with little to no pain and could tell she was walking better.   She was compliant with home exercise program.  Response to previous treatment: positive - massage helped pain in posterior knee  Functional change: Walking better    Pain: 1/10  Post: 0/10  Location: left knee      Objective   Manual Therapy 0  min:  STM to posterior L knee and HS for TrP release    Sonia  1:1 therapeutic exercises to develop strength, endurance, ROM and flexibility for 58 minutes including:  Recumbent Bike: x10'      Heel slides 2 x 10 with 5 sec hold with strap and QS at full ext 3" hold   Hamstring stretch 3 x 30 sec in long sitting  Flexion stretch: 3/30" with R foot over L-NP  Knee flexion stretch on HOB with R assist: 10x10"  LAQ  2x10 2#  Calf stretch on wedge 3 x 30 sec  Standing hip abduction 3 x 5 reps B (holding // bar)-NP  Standing heel raises 2x10  Mini squats 2 x 10        To be preformed:  Edema management  Manual stretches  Supine hip abd/add/IR/ER        Sonia participated in neuromuscular re-education activities to improve: Kinesthetic for 0 minutes. The following activities were included:  Quad set 3 x 10 with " "10 sec hold  Gait training in // bars heel strike with glute activation through push off at toe off. Pt able to take steps in // bars without holding bar.      CP applied to left knee with elevation x 10 min after treatment      Education provided:   - Continue HEP    Written Home Exercises Provided: Patient instructed to cont prior HEP.  Exercises were reviewed and Sonia was able to demonstrate them prior to the end of the session.  Sonia demonstrated good  understanding of the education provided.     See EMR under Patient Instructions for exercises provided prior visit.    Assessment   Pt able to progress to increased weight with LAQ to 2# and increase reps to 30 with no c/o increased pan or loss of quality with exercises. Pt continuing to demo good ROM and strength in LLE; able to get all the way around on recumbent bike and increase time on recumbent bike with no adverse effects.  Sonia is progressing well towards her goals. Pt with short step length on L with lack of hip flex and extension.   Pt prognosis is Good.     Pt will continue to benefit from skilled outpatient physical therapy to address the deficits listed in the problem list box on initial evaluation, provide pt/family education and to maximize pt's level of independence in the home and community environment.     Pt's spiritual, cultural and educational needs considered and pt agreeable to plan of care and goals.    Anticipated barriers to physical therapy: none    Goals:   Short Term Goals (STG) # weeks Goal Review Date Reviewed Date Met   Pt will demonstrate independence with initial HEP to facilitate therex progression and improvements in functional mobility 1 Initial 10/21/2020     The patient will increase left knee AROM to 0-105 3 Initial 10/21/2020     Decrease left knee circumference by 0.5" to facilitate ROM goals   Initial 10/21/2020     Pt to ambulate community distances with SPC independently and safely 3 Initial 10/21/2020     Pt to " perform bed mobility and transfers independently  3 Initial 10/21/2020                                                                       Long Term Goals (LTG) # weeks Goal Review Date Reviewed Date Met   The patient will improve the Lower Extremity Functional Scale to less than 46% Disability 6 Initial 10/21/2020     The patient will increase left knee AROM to WFL  6 Initial 10/21/2020     The patient will increase left LE MMT to WFL in order to return to independent home management 6 Initial 10/21/2020     Pt will be able to ambulate community distances independently and safely  6 Initial 10/21/2020                                                                                    Plan     Continue to treat and progress per POC and pt tolerance       Rakesh Calle, GURU

## 2020-11-13 ENCOUNTER — CLINICAL SUPPORT (OUTPATIENT)
Dept: REHABILITATION | Facility: HOSPITAL | Age: 60
End: 2020-11-13
Attending: ORTHOPAEDIC SURGERY
Payer: MEDICAID

## 2020-11-13 DIAGNOSIS — R29.898 LEFT LEG WEAKNESS: ICD-10-CM

## 2020-11-13 DIAGNOSIS — M25.662 DECREASED RANGE OF MOTION (ROM) OF LEFT KNEE: ICD-10-CM

## 2020-11-13 DIAGNOSIS — R26.9 ABNORMAL GAIT: ICD-10-CM

## 2020-11-13 PROCEDURE — 97110 THERAPEUTIC EXERCISES: CPT | Mod: PN

## 2020-11-13 NOTE — PROGRESS NOTES
"  Physical Therapy Daily Treatment Note     Name: Sonia GIL [facility]  Clinic Number: [id number]    Therapy Diagnosis:   Encounter Diagnoses   Name Primary?    Decreased range of motion (ROM) of left knee     Abnormal gait     Left leg weakness      Physician: Dorina Murillo NP    Visit Date: 11/13/2020  Physician Orders: PT Eval and Treat  Medical Diagnosis from Referral: Z96.652 (ICD-10-CM) - Status post total knee replacement, left     Evaluation Date: 10/21/2020  Plan of Care Certification Period: 10/21/2020 - 12/28/2020  Authorization Period Start - Expiration: 10/19/2020 - 11/30/2020  Visit # / Visits POC / Visits authorized: 6/ 18 / 24    Time In: 1:45 PM  Time Out: 2:35 PM  Total Billable Time: 50  minutes    Precautions: Standard    Subjective     Pt reports: Sat at sewing machine for 40 min and had increased edema and redness in knee.    She was compliant with home exercise program.  Response to previous treatment: positive - massage helped pain in posterior knee  Functional change: No longer using walker    Pain: 2/10  Post: 0/10  Location: left knee      Objective     Manual Therapy 13  min:  IASTM to posterior knee/hamstrings  Prone anterior proximal tibial glides Grade II/III      Sonia  1:1 therapeutic exercises to develop strength, endurance, ROM and flexibility for 37 minutes including:    Recumbent Bike: x10'    Heel slides 2 x 10 with 5 sec hold with strap and QS at full ext 3" hold   +Prone quad stretch/knee flexion with strap 10 x 10 sec  Hamstring stretch 3 x 30 sec in long sitting  Flexion stretch: 3/30" with R foot over L-NP  Knee flexion stretch on HOB with R assist: 10x10" NP  LAQ  2x10 2#  Calf stretch on wedge 3 x 30 sec  Standing hip abduction 3 x 5 reps B (holding // bar)-NP  Standing heel raises 2x10  Mini squats 2 x 10        To be preformed:  Edema management  Supine hip abd/add/IR/ER        Sonia participated in neuromuscular re-education activities to improve: Kinesthetic for " "0 minutes. The following activities were included:  Quad set 3 x 10 with 10 sec hold  Gait training in // bars heel strike with glute activation through push off at toe off. Pt able to take steps in // bars without holding bar.      CP applied to left knee with elevation x 0 min after treatment      Education provided:   - Continue HEP    Written Home Exercises Provided: Patient instructed to cont prior HEP.  Exercises were reviewed and Sonia was able to demonstrate them prior to the end of the session.  Sonia demonstrated good  understanding of the education provided.     See EMR under Patient Instructions for exercises provided prior visit.    Assessment   Pt continues to have pain at lateral HS insertion which was addressed with IASTM. AROM is improving with pt able to perform quad/ hip flexor stretch in prone today with strap.   Sonia is progressing well towards her goals. Pt with short step length on L with lack of hip flex and extension.   Pt prognosis is Good.     Pt will continue to benefit from skilled outpatient physical therapy to address the deficits listed in the problem list box on initial evaluation, provide pt/family education and to maximize pt's level of independence in the home and community environment.     Pt's spiritual, cultural and educational needs considered and pt agreeable to plan of care and goals.    Anticipated barriers to physical therapy: none    Goals:   Short Term Goals (STG) # weeks Goal Review Date Reviewed Date Met   Pt will demonstrate independence with initial HEP to facilitate therex progression and improvements in functional mobility 1 Initial 10/21/2020     The patient will increase left knee AROM to 0-105 3 Initial 10/21/2020     Decrease left knee circumference by 0.5" to facilitate ROM goals   Initial 10/21/2020     Pt to ambulate community distances with SPC independently and safely 3 Initial 10/21/2020     Pt to perform bed mobility and transfers independently  3 " Initial 10/21/2020                                                                       Long Term Goals (LTG) # weeks Goal Review Date Reviewed Date Met   The patient will improve the Lower Extremity Functional Scale to less than 46% Disability 6 Initial 10/21/2020     The patient will increase left knee AROM to WFL  6 Initial 10/21/2020     The patient will increase left LE MMT to WFL in order to return to independent home management 6 Initial 10/21/2020     Pt will be able to ambulate community distances independently and safely  6 Initial 10/21/2020                                                                                    Plan     Continue to treat and progress per POC and pt tolerance       Melody Roche, PT

## 2020-11-16 ENCOUNTER — CLINICAL SUPPORT (OUTPATIENT)
Dept: REHABILITATION | Facility: HOSPITAL | Age: 60
End: 2020-11-16
Attending: ORTHOPAEDIC SURGERY
Payer: MEDICAID

## 2020-11-16 DIAGNOSIS — R26.9 ABNORMAL GAIT: ICD-10-CM

## 2020-11-16 DIAGNOSIS — R29.898 LEFT LEG WEAKNESS: ICD-10-CM

## 2020-11-16 DIAGNOSIS — M25.662 DECREASED RANGE OF MOTION (ROM) OF LEFT KNEE: ICD-10-CM

## 2020-11-16 PROCEDURE — 97110 THERAPEUTIC EXERCISES: CPT | Mod: PN

## 2020-11-16 NOTE — PROGRESS NOTES
"  Physical Therapy Daily Treatment Note     Name: Sonia GIL [facility]  Clinic Number: [id number]    Therapy Diagnosis:   Encounter Diagnoses   Name Primary?    Decreased range of motion (ROM) of left knee     Abnormal gait     Left leg weakness      Physician: Dorina Murillo NP    Visit Date: 11/16/2020  Physician Orders: PT Eval and Treat  Medical Diagnosis from Referral: Z96.652 (ICD-10-CM) - Status post total knee replacement, left     Evaluation Date: 10/21/2020  Plan of Care Certification Period: 10/21/2020 - 12/28/2020  Authorization Period Start - Expiration: 10/19/2020 - 11/30/2020  Visit # / Visits POC / Visits authorized: 7/ 18 / 24    Time In: 11:45 AM  Time Out:  12:44 PM  Total Billable Time: 59  minutes    Precautions: Standard    Subjective     Pt reports: Still having pain at posterior-lateral knee which she feels is hindering her ability to walk without a limp.   She was compliant with home exercise program.  Response to previous treatment: positive - massage helped pain in posterior knee  Functional change: No longer using walker    Pain: 3/10  Post: 0/10  Location: left knee      Objective     Manual Therapy 13  min:  IASTM to posterior knee/hamstrings  Prone anterior proximal tibial glides Grade II/III NP  Contract/relax technique hamstrings  Scar mobilization  Patella mobilization      Sonia  1:1 therapeutic exercises to develop strength, endurance, ROM and flexibility for 42 minutes including:    Recumbent Bike: x10'    Heel slides  x 10 with 10 sec hold with strap   Prone quad stretch/knee flexion with strap 10 x 10 sec    Hamstring stretch 3 x 30 sec in long sitting  Flexion stretch: 3/30" with R foot over L-NP  Knee flexion stretch on HOB with R assist: 10x10" NP  +SLR 2x10  +Bridging 2 x 10  LAQ  2x10 2#    Not performed today:  Calf stretch on wedge 3 x 30 sec  Standing hip abduction 3 x 5 reps B (holding // bar)-NP  Standing heel raises 2x10  Mini squats 2 x 10        To be " "preformed:  Edema management  Supine hip abd/add/IR/ER      Sonia participated in neuromuscular re-education activities to improve: Kinesthetic for 5 minutes. The following activities were included:  Quad set 3 x 10 with 5 sec hold  +Slow controlled prone HS curl with YTB 1 x 10        Education provided:   - Continue HEP  -Use of desensitization and massage at home for scar    Written Home Exercises Provided: Patient instructed to cont prior HEP.  Exercises were reviewed and Sonia was able to demonstrate them prior to the end of the session.  Sonia demonstrated good  understanding of the education provided.     See EMR under Patient Instructions for exercises provided prior visit.    Assessment   Pt continues to have pain at lateral HS insertion which was addressed with IASTM. AROM is improving with pt able to perform quad/ hip flexor stretch in prone with strap. Some incision adhesion noted at distal 1/3 with scar hypersensitivity - addressed with STM.    Sonia is progressing well towards her goals. Pt with short step length on L with lack of hip flex and extension.   Pt prognosis is Good.     Pt will continue to benefit from skilled outpatient physical therapy to address the deficits listed in the problem list box on initial evaluation, provide pt/family education and to maximize pt's level of independence in the home and community environment.     Pt's spiritual, cultural and educational needs considered and pt agreeable to plan of care and goals.    Anticipated barriers to physical therapy: none    Goals:   Short Term Goals (STG) # weeks Goal Review Date Reviewed Date Met   Pt will demonstrate independence with initial HEP to facilitate therex progression and improvements in functional mobility 1 Initial  Met 11/16/2020    The patient will increase left knee AROM to 0-105 3 Initial 10/21/2020     Decrease left knee circumference by 0.5" to facilitate ROM goals   Initial 10/21/2020     Pt to ambulate " community distances with SPC independently and safely 3 Initial  Met 11/16/2020    Pt to perform bed mobility and transfers independently  3 Initial     Met 11/16/2020                                                                   Long Term Goals (LTG) # weeks Goal Review Date Reviewed Date Met   The patient will improve the Lower Extremity Functional Scale to less than 46% Disability 6 Initial 10/21/2020     The patient will increase left knee AROM to WFL  6 Initial 10/21/2020     The patient will increase left LE MMT to WFL in order to return to independent home management 6 Initial 10/21/2020     Pt will be able to ambulate community distances independently and safely  6 Initial 10/21/2020                                                                                    Plan     Continue to treat and progress per POC and pt tolerance       Melody Roche, PT

## 2020-11-18 ENCOUNTER — CLINICAL SUPPORT (OUTPATIENT)
Dept: REHABILITATION | Facility: HOSPITAL | Age: 60
End: 2020-11-18
Attending: ORTHOPAEDIC SURGERY
Payer: MEDICAID

## 2020-11-18 DIAGNOSIS — M25.662 DECREASED RANGE OF MOTION (ROM) OF LEFT KNEE: ICD-10-CM

## 2020-11-18 DIAGNOSIS — R29.898 LEFT LEG WEAKNESS: ICD-10-CM

## 2020-11-18 DIAGNOSIS — R26.9 ABNORMAL GAIT: ICD-10-CM

## 2020-11-18 PROCEDURE — 97110 THERAPEUTIC EXERCISES: CPT | Mod: PN,CQ

## 2020-11-18 NOTE — PROGRESS NOTES
"  Physical Therapy Daily Treatment Note     Name: Sonia GIL [facility]  Clinic Number: [id number]    Therapy Diagnosis:   No diagnosis found.  Physician: Dorina Murillo NP    Visit Date: 11/18/2020  Physician Orders: PT Eval and Treat  Medical Diagnosis from Referral: Z96.652 (ICD-10-CM) - Status post total knee replacement, left     Evaluation Date: 10/21/2020  Plan of Care Certification Period: 10/21/2020 - 12/28/2020  Authorization Period Start - Expiration: 10/19/2020 - 11/30/2020  Visit # / Visits POC / Visits authorized: 8/ 18 / 24    Time In: 1301  Time Out:  1355  Total Billable Time: 54  minutes    Precautions: Standard    Subjective     Pt reports: "Tight behind me knee"  She was compliant with home exercise program.  Response to previous treatment: positive - massage helped pain in posterior knee  Functional change: No longer using walker    Pain: 1/10  Post: 1/10  Location: left knee      Objective     Manual Therapy 10 min:  IASTM to posterior knee/hamstrings  Prone anterior proximal tibial glides Grade II/III NP  Contract/relax technique hamstrings-NP  Scar mobilization-NP  Patella mobilization-NP      Sonia  1:1 therapeutic exercises to develop strength, endurance, ROM and flexibility for 44  minutes including:    Recumbent Bike: x10' seat # 7    Heel slides  x 10 with 10 sec hold with strap-NP  Prone quad stretch/knee flexion with strap 10 x 10 sec-NP  Hamstring stretch 3 x 30 sec in long sitting  Flexion stretch: 3/30" with R foot over L  Knee flexion stretch on HOB with R assist: 10x10" NP  SLR 2x10  Bridging 2 x 10  LAQ  3x10 2#  Calf stretch on 1/2 roll 3 x 30 sec  Standing hip abduction 3 x 5 reps B (holding // bar)-NP  Not performed today  Standing heel raises 2x10  Mini squats 2 x 10        To be preformed:  Edema management  Supine hip abd/add/IR/ER      Sonia participated in neuromuscular re-education activities to improve: Kinesthetic for 0 minutes. The following activities were " "included:  Quad set 3 x 10 with 5 sec hold  +Slow controlled prone HS curl with YTB 1 x 10        Education provided:   - Continue HEP  -Use of desensitization and massage at home for scar    Written Home Exercises Provided: Patient instructed to cont prior HEP.  Exercises were reviewed and Sonia was able to demonstrate them prior to the end of the session.  Sonia demonstrated good  understanding of the education provided.     See EMR under Patient Instructions for exercises provided prior visit.    Assessment   Pt with increased TrP's and tightness in HS today; improved ROM and decreased pin after STM/IASTM with mm rolling. Pt continues to demo increased active flexion of L knee.   Sonia is progressing well towards her goals. Pt with short step length on L with lack of hip flex and extension.   Pt prognosis is Good.     Pt will continue to benefit from skilled outpatient physical therapy to address the deficits listed in the problem list box on initial evaluation, provide pt/family education and to maximize pt's level of independence in the home and community environment.     Pt's spiritual, cultural and educational needs considered and pt agreeable to plan of care and goals.    Anticipated barriers to physical therapy: none    Goals:   Short Term Goals (STG) # weeks Goal Review Date Reviewed Date Met   Pt will demonstrate independence with initial HEP to facilitate therex progression and improvements in functional mobility 1 Initial  Met 11/16/2020    The patient will increase left knee AROM to 0-105 3 Initial 10/21/2020     Decrease left knee circumference by 0.5" to facilitate ROM goals   Initial 10/21/2020     Pt to ambulate community distances with SPC independently and safely 3 Initial  Met 11/16/2020    Pt to perform bed mobility and transfers independently  3 Initial     Met 11/16/2020                                                                   Long Term Goals (LTG) # weeks Goal Review Date " Reviewed Date Met   The patient will improve the Lower Extremity Functional Scale to less than 46% Disability 6 Initial 10/21/2020     The patient will increase left knee AROM to WFL  6 Initial 10/21/2020     The patient will increase left LE MMT to WFL in order to return to independent home management 6 Initial 10/21/2020     Pt will be able to ambulate community distances independently and safely  6 Initial 10/21/2020                                                                                    Plan     Continue to treat and progress per POC and pt tolerance       Rakesh Calle PTA

## 2020-11-19 ENCOUNTER — OFFICE VISIT (OUTPATIENT)
Dept: FAMILY MEDICINE | Facility: CLINIC | Age: 60
End: 2020-11-19
Payer: MEDICAID

## 2020-11-19 VITALS
HEART RATE: 103 BPM | HEIGHT: 65 IN | SYSTOLIC BLOOD PRESSURE: 122 MMHG | BODY MASS INDEX: 48.15 KG/M2 | OXYGEN SATURATION: 98 % | DIASTOLIC BLOOD PRESSURE: 72 MMHG | TEMPERATURE: 98 F | WEIGHT: 289 LBS

## 2020-11-19 DIAGNOSIS — R00.0 TACHYCARDIA: ICD-10-CM

## 2020-11-19 DIAGNOSIS — R73.9 HYPERGLYCEMIA: ICD-10-CM

## 2020-11-19 DIAGNOSIS — R53.83 FATIGUE, UNSPECIFIED TYPE: ICD-10-CM

## 2020-11-19 DIAGNOSIS — R79.9 ABNORMAL BLOOD CHEMISTRY: ICD-10-CM

## 2020-11-19 DIAGNOSIS — Z83.438 FAMILY HISTORY OF ELEVATED BLOOD LIPIDS: ICD-10-CM

## 2020-11-19 DIAGNOSIS — R01.1 CARDIAC MURMUR: Primary | ICD-10-CM

## 2020-11-19 DIAGNOSIS — Z12.11 ENCOUNTER FOR SCREENING COLONOSCOPY: ICD-10-CM

## 2020-11-19 PROCEDURE — 99215 OFFICE O/P EST HI 40 MIN: CPT | Performed by: NURSE PRACTITIONER

## 2020-11-19 PROCEDURE — 99214 OFFICE O/P EST MOD 30 MIN: CPT | Mod: S$PBB,,, | Performed by: NURSE PRACTITIONER

## 2020-11-19 PROCEDURE — 99214 PR OFFICE/OUTPT VISIT, EST, LEVL IV, 30-39 MIN: ICD-10-PCS | Mod: S$PBB,,, | Performed by: NURSE PRACTITIONER

## 2020-11-19 RX ORDER — METOPROLOL SUCCINATE 25 MG/1
25 TABLET, EXTENDED RELEASE ORAL DAILY
Qty: 30 TABLET | Refills: 5 | Status: SHIPPED | OUTPATIENT
Start: 2020-11-19 | End: 2021-03-23 | Stop reason: SDUPTHER

## 2020-11-19 NOTE — PROGRESS NOTES
SUBJECTIVE:    Patient ID: Sonia Muse is a 60 y.o. female.    Chief Complaint: Hypertension, Obesity, Depression, Anxiety, and Fibromyalgia    Ms Muse is a 61yo lady here today for routine follow up and medication refill. History of exercise induced asthma.      Denies any chest pain, palpitations or dyspnea.  Denies dyspepsia, hemoptysis or melena.     Healing well after L TKR per Dr Herring in October.  Continues in PT.     Note she had abnormal EKG preoperatively.  Stress test did not show any indication reversible ischemia per Dr. Perdue.        10/14/2020 - [facility]vina stress MV:      The EKG portion of this study is negative for ischemia.    The patient reported no chest pain during the stress test.    ECG Stress Nuclear portion of this study will be reported separately.    IMPRESSION:  1. No definite scintigraphic evidence of reversible myocardial  ischemia.    Diminished uptake at the anterior wall and anteroapex is  likely on the basis of soft tissue attenuation artifact.  2. Estimated ejection fraction is 60 %.     Electronically Signed by Lucian Garza M.D. on 10/15/2020 1:43 PM            Past Medical History:   Diagnosis Date    Allergy     Anxiety     Asthma     DDD (degenerative disc disease), cervical     Depression     Fibromyalgia     Hypertension     Neuromuscular disorder     PONV (postoperative nausea and vomiting)      Past Surgical History:   Procedure Laterality Date    ADENOIDECTOMY      carpel tunnel Right      SECTION      JOINT REPLACEMENT Right     knee    KNEE ARTHROPLASTY Left 10/19/2020    Procedure: ARTHROPLASTY, KNEE;  Surgeon: Felipe Herring MD;  Location: [facility];  Service: Orthopedics;  Laterality: Left;  Louie Liz    knee replacement Right     ROTATOR CUFF REPAIR Right     TONSILLECTOMY       Family History   Problem Relation Age of Onset    Diabetes Mother     Hypertension Mother     Heart disease Mother     Stroke Mother      Glaucoma Mother     Diabetes Father     Hypertension Father     Heart disease Father     Hypertension Sister     Heart disease Sister     Graves' disease Daughter     Anxiety disorder Daughter        Marital Status:   Alcohol History:  reports current alcohol use.  Tobacco History:  reports that she quit smoking about 36 years ago. She has a 11.00 pack-year smoking history. She has never used smokeless tobacco.  Drug History:  reports no history of drug use.    Review of patient's allergies indicates:   Allergen Reactions    Erythromycin Swelling    Codeine Nausea And Vomiting     And migraine h/a       Current Outpatient Medications:     albuterol (VENTOLIN HFA) 90 mcg/actuation inhaler, Inhale 2 puffs into the lungs every 6 (six) hours as needed for Wheezing or Shortness of Breath. Rescue, Disp: 18 g, Rfl: 1    ALPRAZolam (XANAX) 1 MG tablet, Take 1 tablet (1 mg total) by mouth once daily., Disp: 30 tablet, Rfl: 1    amLODIPine (NORVASC) 2.5 MG tablet, Take 1 tablet (2.5 mg total) by mouth once daily., Disp: 30 tablet, Rfl: 5    aspirin 81 MG Chew, Take 1 tablet (81 mg total) by mouth 2 (two) times daily., Disp: 60 tablet, Rfl: 0    cetirizine (ZYRTEC) 10 MG tablet, Take 1 tablet (10 mg total) by mouth once daily., Disp: 30 tablet, Rfl: 5    DULoxetine (CYMBALTA) 60 MG capsule, Take 1 capsule (60 mg total) by mouth once daily., Disp: 30 capsule, Rfl: 5    furosemide (LASIX) 20 MG tablet, Take 1 tablet (20 mg total) by mouth 2 (two) times daily., Disp: 30 tablet, Rfl: 0    hydroCHLOROthiazide (HYDRODIURIL) 25 MG tablet, Take 1 tablet (25 mg total) by mouth once daily., Disp: 30 tablet, Rfl: 5    losartan (COZAAR) 100 MG tablet, Take 0.5 tablets (50 mg total) by mouth once daily., Disp: 30 tablet, Rfl: 5    meloxicam (MOBIC) 15 MG tablet, Take 1 tablet (15 mg total) by mouth once daily., Disp: 30 tablet, Rfl: 4    metoprolol succinate (TOPROL-XL) 25 MG 24 hr tablet, Take 1 tablet (25  "mg total) by mouth once daily., Disp: 30 tablet, Rfl: 5    Review of Systems   Constitutional: Negative for activity change, appetite change, chills, fatigue and fever.   HENT: Negative for congestion, ear pain, rhinorrhea and sore throat.    Eyes: Negative for redness and itching.   Respiratory: Negative for cough and shortness of breath.    Cardiovascular: Negative for chest pain.   Gastrointestinal: Negative for abdominal pain, constipation, diarrhea and nausea.   Genitourinary: Negative for difficulty urinating and frequency.   Musculoskeletal: Negative for myalgias.   Neurological: Negative for dizziness and headaches.   Psychiatric/Behavioral: Negative for agitation, behavioral problems, sleep disturbance and suicidal ideas. The patient is not nervous/anxious.    All other systems reviewed and are negative.         Objective:      Vitals:    11/19/20 1502   BP: 122/72   Pulse: 103   Temp: 98.1 °F (36.7 °C)   SpO2: 98%   Weight: 131.1 kg (289 lb)   Height: 5' 4.5" (1.638 m)     Body mass index is 48.84 kg/m².  Physical Exam  Vitals signs and nursing note reviewed.   Constitutional:       Appearance: Normal appearance. She is well-developed. She is morbidly obese.   HENT:      Head: Normocephalic.   Eyes:      Extraocular Movements: Extraocular movements intact.      Conjunctiva/sclera: Conjunctivae normal.      Pupils: Pupils are equal, round, and reactive to light.   Neck:      Musculoskeletal: Normal range of motion and neck supple.      Vascular: No carotid bruit.   Cardiovascular:      Rate and Rhythm: Regular rhythm. Tachycardia present.      Pulses: Normal pulses.      Heart sounds: Murmur present. Systolic murmur present with a grade of 1/6.   Pulmonary:      Effort: Pulmonary effort is normal.      Breath sounds: Normal breath sounds.   Abdominal:      General: Bowel sounds are normal.      Palpations: Abdomen is soft.      Tenderness: There is no abdominal tenderness.      Comments: protuberant "   Musculoskeletal: Normal range of motion.      Right lower leg: Edema (trace) present.      Left lower leg: Edema (trace) present.   Skin:     General: Skin is warm and dry.      Capillary Refill: Capillary refill takes less than 2 seconds.   Neurological:      Mental Status: She is alert and oriented to person, place, and time.   Psychiatric:         Thought Content: Thought content normal.           Assessment:       1. Cardiac murmur    2. Tachycardia    3. Fatigue, unspecified type    4. Abnormal blood chemistry    5. Hyperglycemia    6. Family history of elevated blood lipids    7. Encounter for screening colonoscopy         Plan:       Cardiac murmur  -     Echo Color Flow Doppler? Yes; Future    Tachycardia  -     metoprolol succinate (TOPROL-XL) 25 MG 24 hr tablet; Take 1 tablet (25 mg total) by mouth once daily.  Dispense: 30 tablet; Refill: 5  -     CBC Auto Differential; Future; Expected date: 11/19/2020  -     TSH; Future; Expected date: 11/19/2020  -     T4, Free; Future; Expected date: 11/19/2020    Fatigue, unspecified type  Check CBC, TSH.    Abnormal blood chemistry  -     Comprehensive Metabolic Panel; Future; Expected date: 11/19/2020    Hyperglycemia  -     Comprehensive Metabolic Panel; Future; Expected date: 11/19/2020  -     Hemoglobin A1C; Future; Expected date: 11/19/2020    Family history of elevated blood lipids  -     Lipid Panel; Future; Expected date: 11/19/2020    Encounter for screening colonoscopy  -     Ambulatory referral/consult to Gastroenterology; Future; Expected date: 11/26/2020      Follow up in about 4 months (around 3/19/2021), or if symptoms worsen or fail to improve.

## 2020-11-19 NOTE — PATIENT INSTRUCTIONS
Potassium-Rich Foods  The normal adult diet usually contains 2,000 mg to 4,000 mg of potassium per day. More potassium is needed when you lose too much potassium from your body. This can happen if you have diarrhea or vomiting. It can also happen if you take a medicine to make you urinate more (diuretic). To increase the amount of potassium in your diet, include these high-potassium foods.     [The (*) indicates foods highest in potassium.]  Vegetables  Artichokes. Cooked 1/2 cup, 200 mg to 300 mg*  Asparagus. Cooked 1/2 cup, 200 mg to 300 mg  Beans. White, red, robin cooked 1/2 cup, 300 mg to 500 mg*  Beets. Cooked 1/2 cup, 200 mg to 300 mg  Broccoli. Cooked or raw 1 cup, 200 mg to 500 mg*  Irving sprouts. Cooked 1/2 cup, 200 mg to 300 mg  Cabbage. Raw 1 cup, 100 mg to 200 mg  Carrots. Raw or cooked 1/2 cup, 100 mg to 200 mg  Celery. Raw 1 cup, 200 mg to 300 mg  Lima beans. Fresh or frozen 1/2 cup, 300 mg to 500 mg*   Mushrooms. Raw or cooked 1/2 cup, 100 mg to 300 mg  Peas. Cooked 1/2 cup, 150 mg to 250 mg   Potatoes. Baked 1 medium, 500 mg to 900 mg*   Spinach. Cooked 1 cup, 800 mg to 900 mg*   Spinach. Raw 2 cups, 300 mg to 400 mg *  Squash, winter. Fresh, frozen, or cooked 1/2 cup, 200 mg to 400 mg   Tomato. Fresh 1 medium, 200 mg to 300 mg   Tomato juice. Canned 1/2 cup, 200 mg to 300 mg   Fruits  Apple juice. Unsweetened 1 cup, 200 mg to 300 mg   Apricots. Canned 1/2 cup, 200 mg to 300 mg   Apricots. Dried 4 pieces, 100 mg to 200 mg   Avocado. Raw 1/2 cup, 300 mg to 400 mg*  Banana. Fresh 1 small, 300 mg to 400 mg*   Cantaloupe. Fresh 1 cup diced, 300 mg to 400 mg*   Grape juice. Unsweetened 1 cup, 200 mg to 300 mg   Honeydew melon. Fresh 1 cup diced, 300 mg to 400 mg*   Orange. Fresh 1 medium, 200 mg to 300 mg    Orange juice. Unsweetened, fresh or frozen 1/2 cup, 200 mg to 300 mg  Pineapple juice. Unsweetened 1 cup, 300 mg to 400 mg   Prune juice. Unsweetened 1/2 cup, 300 mg to 400 mg*   Prunes. Dried 5  pieces, 300 mg to 400 mg*   Strawberries. Fresh or frozen 1 cup, 200 mg to 300 mg  Meat  Red meat. Cooked 3 ounces, 100 mg to 300 mg   Seafood  Cod, flounder, halibut. Cooked 3 ounces, 100 mg to 300 mg*  Noonan. Cooked, 3 ounces 300 mg to 400 mg*   Scallops. Cooked 3 ounces, 200 mg to 300 mg*  Shrimp. Cooked 3/4 cup, 100 mg to 200 mg   Tuna. Fresh or canned 3/4 cup, 200 mg to 500 mg   Date Last Reviewed: 10/1/2016  © 6856-1080 [employer] [address] [place] [address], [place] [address]. All rights reserved. This information is not intended as a substitute for professional medical care. Always follow your healthcare professional's instructions.        Low-Salt Diet  This diet removes foods that are high in salt. It also limits the amount of salt you use when cooking. It is most often used for people with high blood pressure, edema (fluid retention), and kidney, liver, or heart disease.  Table salt contains the mineral sodium. Your body needs sodium to work normally. But too much sodium can make your health problems worse. Your healthcare provider is recommending a low-salt (also called low-sodium) diet for you. Your total daily allowance of salt is 1,500 to 2,300 milligrams (mg). It is less than 1 teaspoon of table salt. This means you can have only about 500 to 700 mg of sodium at each meal. People with certain health problems should limit salt intake to the lower end of the recommended range.    When you cook, dont add much salt. If you can cook without using salt, even better. Dont add salt to your food at the table.  When shopping, read food labels. Salt is often called sodium on the label. Choose foods that are salt-free, low salt, or very low salt. Note that foods with reduced salt may not lower your salt intake enough.    Beans, potatoes, and pasta  Ok: Dry beans, split peas, lentils, potatoes, rice, macaroni, pasta, spaghetti without added salt  Avoid: Potato chips, tortilla chips, and similar  products  Breads and cereals  Ok: Low-sodium breads, rolls, cereals, and cakes; low-salt crackers, matzo crackers  Avoid: Salted crackers, pretzels, popcorn, Bulgarian toast, pancakes, muffins  Dairy  Ok: Milk, chocolate milk, hot chocolate mix, low-salt cheeses, and yogurt  Avoid: Processed cheese and cheese spreads; Roquefort, Camembert, and cottage cheese; buttermilk, instant breakfast drink  Desserts  Ok: Ice cream, frozen yogurt, juice bars, gelatin, cookies and pies, sugar, honey, jelly, hard candy  Avoid: Most pies, cakes and cookies prepared or processed with salt; instant pudding  Drinks  Ok: Tea, coffee, fizzy (carbonated) drinks, juices  Avoid: Flavored coffees, electrolyte replacement drinks, sports drinks  Meats  Ok: All fresh meat, fish, poultry, low-salt tuna, eggs, egg substitute  Avoid: Smoked, pickled, brine-cured, or salted meats and fish. This includes cheema, chipped beef, corned beef, hot dogs, deli meats, ham, kosher meats, salt pork, sausage, canned tuna, salted codfish, smoked salmon, herring, sardines, or anchovies.  Seasonings and spices  Ok: Most seasonings are okay. Good substitutes for salt include: fresh herb blends, hot sauce, lemon, garlic, leggett, vinegar, dry mustard, parsley, cilantro, horseradish, tomato paste, regular margarine, mayonnaise, unsalted butter, cream cheese, vegetable oil, cream, low-salt salad dressing and gravy.  Avoid: Regular ketchup, relishes, pickles, soy sauce, teriyaki sauce, Worcestershire sauce, BBQ sauce, tartar sauce, meat tenderizer, chili sauce, regular gravy, regular salad dressing, salted butter  Soups  Ok: Low-salt soups and broths made with allowed foods  Avoid: Bouillon cubes, soups with smoked or salted meats, regular soup and broth  Vegetables  Ok: Most vegetables are okay; also low-salt tomato and vegetable juices  Avoid: Sauerkraut and other brine-soaked vegetables; pickles and other pickled vegetables; tomato juice, olives  Date Last Reviewed:  8/1/2016  © 1483-0501 [employer]. [address] [place] [address], [place] [address]. All rights reserved. This information is not intended as a substitute for professional medical care. Always follow your healthcare professional's instructions.        Eating Heart-Healthy Foods  Eating has a big impact on your heart health. In fact, eating healthier can improve several of your heart risks at once. For instance, it helps you manage weight, cholesterol, and blood pressure. Here are ideas to help you make heart-healthy changes without giving up all the foods and flavors you love.  Getting started  · Talk with your health care provider about eating plans, such as the DASH or Mediterranean diet. You may also be referred to a dietitian.  · Change a few things at a time. Give yourself time to get used to a few eating changes before adding more.  · Work to create a tasty, healthy eating plan that you can stick to for the rest of your life.    Goals for healthy eating  Below are some tips to improve your eating habits:  · Limit saturated fats and trans fats. Saturated fats raise your levels of cholesterol, so keep these fats to a minimum. They are found in foods such as fatty meats, whole milk, cheese, and palm and coconut oils. Avoid trans fats because they lower good cholesterol as well as raise bad cholesterol. Trans fats are most often found in processed foods.  · Reduce sodium (salt) intake. Eating too much salt may increase your blood pressure. Limit your sodium intake to 2,300 milligrams (mg) per day, or less if your health care provider recommends it. Dining out less often and eating fewer processed foods are two great ways to decrease the amount of salt you consume.  · Managing calories. A calorie is a unit of energy. Your body burns calories for fuel, but if you eat more calories than your body burns, the extras are stored as fat. Your health care provider can help you create a diet plan to manage your  calories. This will likely include eating healthier foods as well as exercising regularly. To help you track your progress, keep a diary to record what you eat and how often you exercise.  Choose the right foods  Aim to make these foods staples of your diet. If you have diabetes, you may have different recommendations than what is listed here:  · Fruits and vegetable provide plenty of nutrients without a lot of calories. At meals, fill half your plate with these foods. Split the other half of your plate between whole grains and lean protein.  · Whole grains are high in fiber and rich in vitamins and nutrients. Good choices include whole-wheat bread, pasta, and brown rice.  · Lean proteins give you nutrition with less fat. Good choices include fish, skinless chicken, and beans.  · Low-fat or nonfat dairy provides nutrients without a lot of fat. Try low-fat or nonfat milk, cheese, or yogurt.  · Healthy fats can be good for you in small amounts. These are unsaturated fats, such as olive oil, nuts, and fish. Try to have at least 2 servings per week of fatty fish such as salmon, sardines, mackerel, rainbow trout, and albacore tuna. These contain omega-3 fatty acids, which are good for your heart. Flaxseed is another source of a heart-healthy fat.  More on heart healthy eating    Read food labels  Healthy eating starts at the grocery store. Be sure to pay attention to food labels on packaged foods. Look for products that are high in fiber and protein, and low in saturated fat, cholesterol, and sodium. Avoid products that contain trans fat. And pay close attention to serving size. For instance, if you plan to eat two servings, double all the numbers on the label.  Prepare food right  A key part of healthy cooking is cutting down on added fat and salt. Look on the internet for lower-fat, lower-sodium recipes. Also, try these tips:  · Remove fat from meat and skin from poultry before cooking.  · Skim fat from the surface of  soups and sauces.  · Broil, boil, bake, steam, grill, and microwave food without added fats.  · Choose ingredients that spice up your food without adding calories, fat, or sodium. Try these items: horseradish, hot sauce, lemon, mustard, nonfat salad dressings, and vinegar. For salt-free herbs and spices, try basil, cilantro, cinnamon, pepper, and rosemary.  Date Last Reviewed: 6/25/2015 © 2000-2017 [employer]. [address] [place] [address]. All rights reserved. This information is not intended as a substitute for professional medical care. Always follow your healthcare professional's instructions.    Diabetes: Learning About Serving and Portion Sizes     A good rule of thumb: Devote half your plate to vegetables and green salad. Split the other half between protein and starchy carbohydrates. Fruit makes a good dessert.     Servings and portions. Whats the difference? These terms can be very confusing. But learning to measure serving sizes can help you figure out how many carbohydrates (carbs) and other foods you eat each day. They are also powerful tools for managing your weight.  Servings and portions  Many different words are used to describe amounts of food. If your health care provider uses a term youre not sure of, dont be afraid to ask. It helps to know the difference between servings and portions:  · A serving size is a fixed size. Food producers use this term to describe their products. For example, the label on a cereal box could say that 1 cup of dry cereal = 1 serving.  · A portion (also called a helping) is how much you eat or how much you put on your plate at a meal. For example, you might eat 2 cups of cereal at breakfast.  Using serving information  The portion you choose to eat (such as 2 cups of cereal) may be more than one serving as listed on the food label (such as 1 cup of cereal). Thats why it helps to measure or weigh the food you eat. Because the food label  values are based on servings, youll need to know how many servings you eat at one sitting.     Ounces: 2 to 3 ounces is about the size of your palm.       1 Cup: 1 cup (or a medium-sized piece) is about the size of your fist.       1/2 Cup: 1/2 cup is about the size of your cupped hand.      One tablespoon is about the size of your thumb.  One teaspoon is about the size of the tip of your thumb.  Keeping track of serving sizes  When youre planning for a snack or a meal, keep servings in mind. If you dont have measuring cups or a scale handy, there are ways to eyeball serving sizes, such as comparing your food to the size of your hand (see pictures above).  Managing portion sizes  If your weight is a concern, reducing your portions can help. You can eat more than one serving of a food at once. But to keep from eating too much at one meal, learn how to manage your portions. A portion is the amount of each type of food on your plate. See the plate diagram for an example of balanced portions.  Date Last Reviewed: 3/1/2016  © 7282-3651 [employer]. [address] [place] [address], [place] [address]. All rights reserved. This information is not intended as a substitute for professional medical care. Always follow your healthcare professional's instructions.        Eating Out When You Have Diabetes  Eating right is an important part of keeping your blood sugar in your target range. You just need to make healthy choices.    Tips for restaurant meals  When you eat away from home try these tips:  · Try to schedule your dining-out meal at your normal meal time. Make a reservation if possible, so you don't have to wait to eat. If you can't make a reservation, try to arrive at the restaurant at a less-busy time to cut down your wait time. Eat a small fruit or starch snack at your regular mealtime if your restaurant meal is going to be later than usual.   · Call ahead to see if the restaurant can meet your dietary needs  if you've never been there before. Or you can go online to see the menu ahead of time.  · Carry some crackers with you in case the restaurant needs you to wait until you can be served.  · Ask how foods are prepared before you order.  · Instead of fried, sautéed, or breaded foods, choose ones that are broiled, steamed, grilled, or baked.  · Ask for sauces, gravies, and dressings on the side.  · Only eat an amount that fits your meal plan. Remember: You can take home the leftovers.  · Save dessert for special occasions. Then choose a small dessert or share one with a friend or family member.  Make healthy choices  Fast food  · Garden salad with light dressing on the side  · Baked potato with vegetables or herbs  · Broiled, roasted, or grilled chicken sandwich  · Sliced turkey or lean roast beef sandwich  Mexican  · Chicken enchilada, without cheese or sour cream   · Small burrito with whole beans and chicken  · Whole beans (not refried) and rice  · Chicken or fish fajitas  Steakhouse  · Grilled or broiled lean cuts of beef  · Baked potato with vegetables or herbs  · Broiled or baked chicken. Dont eat the skin.  · Steamed vegetables  Asian  · Steamed dumplings or potstickers  · Broiled, boiled, or steamed meats or fish  · Sushi or sashimi  · Steamed rice or boiled noodles. One serving is equal to 1/3 cup.  Date Last Reviewed: 6/1/2016  © 2520-2616 [employer]. [address] [place] [address]. All rights reserved. This information is not intended as a substitute for professional medical care. Always follow your healthcare professional's instructions.        Diabetes: Meal Planning    You can help keep your blood sugar level in your target range by eating healthy foods. Your healthcare team can help you create a low-fat, nutritious meal plan. Take an active role in your diabetes management by following your meal plan and working with your healthcare team.  Make your meal plan  A meal plan gives  guidelines for the types and amounts of food you should eat. The goal is to balance food and insulin (or other diabetes medications) so your blood sugars will be in your target range. Your dietitian will help you make a flexible meal plan that includes many foods that you like.  Watch serving sizes  Your meal plan will group foods by servings. To learn how much a serving is, start by measuring food portions at each meal. Soon youll know what a serving looks like on your plate. Ask your healthcare provider about how to balance servings of different foods.  Eat from all the food groups  The basis of a healthy meal plan is variety (eating lots of different foods). Choose lean meats, fresh fruits and vegetables, whole grains, and low-fat or nonfat dairy products. Eating a wide variety of foods provides the nutrients your body needs. It can also keep you from getting bored with your meal plan.  Learn about carbohydrates, fats, and protein  · Carbohydrates are starches, sugars, and fiber. They are found in many foods, including fruit, bread, pasta, milk, and sweets. Of all the foods you eat, carbohydrates have the most effect on your blood sugar. Your dietitian may teach you about carb counting, a way to figure out the number of carbohydrates in a meal.  · Fats have the most calories. They also have the most effect on your weight and your risk of heart disease. When you have diabetes, its important to control your weight and protect your heart. Foods that are high in fat include whole milk, cheese, snack foods, and desserts.  · Protein is important for building and repairing muscles and bones. Choose low-fat protein sources, such as fish, egg whites, and skinless chicken.  Reduce liquid sugars  Extra calories from sodas, sports drinks, and fruit drinks make it hard to keep blood sugar in range. Cut as many liquid sugars from your meal plan as you can.  This includes most fruit juices, which are often high in natural or  added sugar. Instead, drink plenty of water and other sugar-free beverages.  Eat less fat  If you need to lose weight, try to reduce the amount of fat in your diet. This can also help lower your cholesterol level to keep blood vessels healthier. Cut fat by using only small amounts of liquid oil for cooking. Read food labels carefully to avoid foods with unhealthy trans fats.  Timing your meals  When it comes to blood sugar control, when you eat is as important as what you eat. You may need to eat several small meals spaced evenly throughout the day to stay in your target range. So dont skip breakfast or wait until late in the day to get most of your calories. Doing so can cause your blood sugar to rise too high or fall too low.   Date Last Reviewed: 3/1/2016  © 2810-7259 [employer]. [address] [place] [address], [place] [address]. All rights reserved. This information is not intended as a substitute for professional medical care. Always follow your healthcare professional's instructions.        Healthy Meals for Diabetes     A healthcare provider will help you develop a meal plan that fits your needs.   Ask your healthcare team to help you make a meal plan that fits your needs. Your meal plan tells you when to eat your meals and snacks, what kinds of foods to eat, and how much of each food to eat. You dont have to give up all the foods you like. But you do need to follow some guidelines.  Choose healthy carbohydrates  Starches, sugars, and fiber are all types of carbohydrates. Fiber can help lower your cholesterol and triglycerides. Fiber is also healthy for your heart. You should have 20 to 35 grams of total fiber each day. Fiber-rich foods include:  · Whole-grain breads and cereals  · Bulgur wheat  · Brown rice     · Whole-wheat pasta  · Fruits and vegetables  · Dry beans, and peas   Keep track of the amount of carbohydrates you eat. This can help you keep the right balance of physical activity and  medicine. The amount of carbohydrates needed will vary for each person. It depends on many things such as your health, the medicines you take, and how active you are. Your healthcare team will help you figure out the right amount of carbohydrates for you. You may start with around 45 to 60 grams of carbohydrates per meal, depending on your situation.   Here are some examples of foods containing about 15 grams of carbohydrates (1 serving of carbohydrates):  · 1/2 cup of canned or frozen fruit  · A small piece of fresh fruit (4 ounces)  · 1 slice of bread  · 1/2 cup of oatmeal  · 1/3 cup of rice  · 4 to 6 crackers  · 1/2 English muffin  · 1/2 cup of black beans  · 1/4 of a large baked potato (3 ounces)  · 2/3 cup of plain fat-free yogurt  · 1 cup of soup  · 1/2 cup of casserole  · 6 chicken nuggets  · 2-inch-square brownie or cake without frosting  · 2 small cookies  · 1/2 cup of ice cream or sherbet  Choose healthy protein foods  Eating protein that is low in fat can help you control your weight. It also helps keep your heart healthy. Low-fat protein foods include:  · Fish  · Plant proteins, such as dry beans and peas, nuts, and soy products like tofu and soymilk  · Lean meat with all visible fat removed  · Poultry with the skin removed  · Low-fat or nonfat milk, cheese, and yogurt  Limit unhealthy fats and sugar  Saturated and trans fats are unhealthy for your heart. They raise LDL (bad) cholesterol. Fat is also high in calories, so it can make you gain weight. To cut down on unhealthy fats and sugar, limit these foods:  · Butter or margarine  · Palm and palm kernel oils and coconut oil  · Cream  · Cheese  · Early  · Lunch meats     · Ice cream  · Sweet bakery goods such as pies, muffins, and donuts  · Jams and jellies  · Candy bars  · Regular sodas   How much to eat  The amount of food you eat affects your blood sugar. It also affects your weight. Your healthcare team will tell you how much of each type of food you  should eat.  · Use measuring cups and spoons and a food scale to measure serving sizes.  · Learn what a correct serving size looks like on your plate. This will help when you are away from home and cant measure your servings.  · Eat only the number of servings given on your meal plan for each food. Dont take seconds.  · Learn to read food labels. Be sure to look at serving size, total carbohydrates, fiber, calories, sugar, and saturated and trans fats. Look for healthier alternatives to foods that have added sugar.  · Plan ahead for parties so you can still have a good time without going overboard with unhealthy food choices. Set a good example yourself by bringing a healthy dish to pot lucks.   Choose healthy snacks  When it comes to snacks, we usually think about foods with added sugar and fats. But there are many other options for healthier snack choices. Here are a few snack ideas to choose from:  Snacks with less than 5 grams of carbohydrates  · 1 piece of string cheese  · 3 celery sticks plus 1 tablespoon of peanut butter  · 5 cherry tomatoes plus 1 tablespoon of ranch dressing  · 1 hard-boiled egg  · 1/4 cup of fresh blueberries  ·  5 baby carrots  · 1 cup of light popcorn  · 1/2 cup of sugar-free gelatin  · 15 almonds  Snacks with about 10 to 20 grams of carbohydrates  · 1/3 cup of hummus plus 1 cup of fresh cut nonstarchy vegetables (carrots, green peppers, broccoli, celery, or a combination)  · 1/2 cup of fresh or canned fruit plus 1/4 cup of cottage cheese  · 1/2 cup of tuna salad with 4 crackers  · 2 rice cakes and a tablespoon of peanut butter  · 1 small apple or orange  · 3 cups light popcorn  · 1/2 of a turkey sandwich (1 slice of whole-wheat bread, 2 ounces of turkey, and mustard)  Portion sizes are important to controlling your blood sugar and staying at a healthy weight. Stock up on healthy snack items so you always have them on hand.  When to eat  Your meal plan will likely include breakfast,  lunch, dinner, and some snacks.  · Try to eat your meals and snacks at about the same times each day.  · Eat all your meals and snacks. Skipping a meal or snack can make your blood sugar drop too low. It can also cause you to eat too much at the next meal or snack. Then your blood sugar could get too high.  Date Last Reviewed: 7/1/2016  © 2040-8560 [employer]. [address] [place] [address], [place] [address]. All rights reserved. This information is not intended as a substitute for professional medical care. Always follow your healthcare professional's instructions.        Diet: Diabetes  Food is an important tool that you can use to control diabetes and stay healthy. Eating well-balanced meals in the correct amounts will help you control your blood glucose levels and prevent low blood sugar reactions. It will also help you reduce the health risks of diabetes. There is no one specific diet that is right for everyone with diabetes. But there are general guidelines to follow. A registered dietitian (RD) will create a tailored diet approach thats just right for you. He or she will also help you plan healthy meals and snacks. If you have any questions, call your dietitian for advice.     Guidelines for success  Talk with your healthcare provider before starting a diabetes diet or weight loss program. If you haven't talked with a dietitian yet, ask your provider for a referral. The following guidelines can help you succeed:  · Select foods from the 6 food groups below. Your dietitian will help you find food choices within each group. He or she will also show you serving sizes and how many servings you can have at each meal.  ¨ Grains, beans, and starchy vegetables  ¨ Vegetables  ¨ Fruit  ¨ Milk or yogurt  ¨ Meat, poultry, fish, or tofu  ¨ Healthy fats  · Check your blood sugar levels as directed by your provider. Take any medicine as prescribed by your provider.  · Learn to read food labels and pick the right  portion sizes.  · Eat only the amount of food in your meal plan. Eat about the same amount of food at regular times each day. Dont skip meals. Eat meals 4 to 5 hours apart, with snacks in between.  · Limit alcohol. It raises blood sugar levels. Drink water or calorie-free diet drinks that use safe sweeteners.  · Eat less fat to help lower your risk of heart disease. Use nonfat or low-fat dairy products and lean meats. Avoid fried foods. Use cooking oils that are unsaturated, such as olive, canola, or peanut oil.  · Talk with your dietitian about safe sugar substitutes.  · Avoid added salt. It can contribute to high blood pressure, which can cause heart disease. People with diabetes already have a risk of high blood pressure and heart disease.  · Stay at a healthy weight. If you need to lose weight, cut down on your portion sizes. But dont skip meals. Exercise is an important part of any weight management program. Talk with your provider about an exercise program thats right for you.  · For more information about the best diet plan for you, talk with a registered dietitian (RD). To find an RD in your area, contact:  ¨ Academy of Nutrition and Dietetics www.eatright.org  ¨ The American Diabetes Association 827-851-7970 www.diabetes.org  Date Last Reviewed: 8/1/2016 © 2000-2017 [employer]. [address] [place] [address], [place] [address]. All rights reserved. This information is not intended as a substitute for professional medical care. Always follow your healthcare professional's instructions.

## 2020-11-20 ENCOUNTER — CLINICAL SUPPORT (OUTPATIENT)
Dept: REHABILITATION | Facility: HOSPITAL | Age: 60
End: 2020-11-20
Attending: ORTHOPAEDIC SURGERY
Payer: MEDICAID

## 2020-11-20 DIAGNOSIS — R29.898 LEFT LEG WEAKNESS: ICD-10-CM

## 2020-11-20 DIAGNOSIS — R26.9 ABNORMAL GAIT: ICD-10-CM

## 2020-11-20 DIAGNOSIS — M25.662 DECREASED RANGE OF MOTION (ROM) OF LEFT KNEE: ICD-10-CM

## 2020-11-20 PROCEDURE — 97110 THERAPEUTIC EXERCISES: CPT | Mod: PN

## 2020-11-20 NOTE — PROGRESS NOTES
"  Physical Therapy Daily Treatment Note     Name: Sonia GIL [facility]  Clinic Number: [id number]    Therapy Diagnosis:   Encounter Diagnoses   Name Primary?    Decreased range of motion (ROM) of left knee     Abnormal gait     Left leg weakness      Physician: Dorina Murillo NP    Visit Date: 11/20/2020  Physician Orders: PT Eval and Treat  Medical Diagnosis from Referral: Z96.652 (ICD-10-CM) - Status post total knee replacement, left     Evaluation Date: 10/21/2020  Plan of Care Certification Period: 10/21/2020 - 12/28/2020  Authorization Period Start - Expiration: 10/19/2020 - 11/30/2020  Visit # / Visits POC / Visits authorized: 9/ 18 / 24    Time In: 1:45 PM  Time Out:  2:35 PM  Total Billable Time:  50  minutes    Precautions: Standard    Subjective     Pt reports: Still has one spot behind knee that hurts when I bend the knee.   She was compliant with home exercise program.  Response to previous treatment: positive - massage helped pain in posterior knee  Functional change: No longer using walker    Pain: 1/10  Post: 1/10  Location: left knee      Objective     Manual Therapy 15 min:  IASTM to posterior knee/hamstrings  Prone anterior proximal tibial glides Grade II/III   Contract/relax technique hamstrings  Scar mobilization  Patella mobilization      Sonia  1:1 therapeutic exercises to develop strength, endurance, ROM and flexibility for 35  minutes including:    Recumbent Bike: 5 min Level 1.2 Seat #10    Heel slides  x 10 with 10 sec hold with strap  Prone quad stretch/knee flexion with strap 10 x 10 sec  Hamstring stretch 3 x 30 sec in long sitting  Flexion stretch: 3/30" with R foot over L NP  Knee flexion stretch on HOB with R assist: 10x10" NP  SLR  1# 3x10  Bridging 2 x 10  LAQ  3x10 2#    Calf stretch on 1/2 roll 3 x 30 sec  Standing hip abduction 3 x 5 reps B (holding // bar)-NP  Standing heel raises 2x10  Mini squats 2 x 10    Gait sequence on Airtex (heel strike with knee extended/ push off " "through toe off)        Sonia participated in neuromuscular re-education activities to improve: Kinesthetic awareness during gait for 5 minutes. The following activities were included:    TM .9mph x 5 min to improve gait quality.    Education provided:   - Continue HEP  -Use of desensitization and massage at home for scar    Written Home Exercises Provided: Patient instructed to cont prior HEP.  Exercises were reviewed and Sonia was able to demonstrate them prior to the end of the session.  Snoia demonstrated good  understanding of the education provided.     See EMR under Patient Instructions for exercises provided prior visit.    Assessment   Pt with decreased left knee flexion following toe off during swing phase of gait. Improved with gait sequencing and TM walking. Continues to have posterior knee pain mid range flexion but improving.    Sonia is progressing well towards her goals.   Pt prognosis is Good.     Pt will continue to benefit from skilled outpatient physical therapy to address the deficits listed in the problem list box on initial evaluation, provide pt/family education and to maximize pt's level of independence in the home and community environment.     Pt's spiritual, cultural and educational needs considered and pt agreeable to plan of care and goals.    Anticipated barriers to physical therapy: none    Goals:   Short Term Goals (STG) # weeks Goal Review Date Reviewed Date Met   Pt will demonstrate independence with initial HEP to facilitate therex progression and improvements in functional mobility 1 Initial  Met 11/16/2020    The patient will increase left knee AROM to 0-105 3 Initial 10/21/2020     Decrease left knee circumference by 0.5" to facilitate ROM goals   Initial 10/21/2020     Pt to ambulate community distances with SPC independently and safely 3 Initial  Met 11/16/2020    Pt to perform bed mobility and transfers independently  3 Initial     Met 11/16/2020                          "                                          Long Term Goals (LTG) # weeks Goal Review Date Reviewed Date Met   The patient will improve the Lower Extremity Functional Scale to less than 46% Disability 6 Initial 10/21/2020     The patient will increase left knee AROM to WFL  6 Initial 10/21/2020     The patient will increase left LE MMT to WFL in order to return to independent home management 6 Initial 10/21/2020     Pt will be able to ambulate community distances independently and safely  6 Initial 10/21/2020                                                                                    Plan     Continue to treat and progress per POC and pt tolerance       Melody Roche, PT

## 2020-11-23 ENCOUNTER — CLINICAL SUPPORT (OUTPATIENT)
Dept: REHABILITATION | Facility: HOSPITAL | Age: 60
End: 2020-11-23
Attending: ORTHOPAEDIC SURGERY
Payer: MEDICAID

## 2020-11-23 DIAGNOSIS — R29.898 LEFT LEG WEAKNESS: ICD-10-CM

## 2020-11-23 DIAGNOSIS — R26.9 ABNORMAL GAIT: ICD-10-CM

## 2020-11-23 DIAGNOSIS — M25.662 DECREASED RANGE OF MOTION (ROM) OF LEFT KNEE: ICD-10-CM

## 2020-11-23 PROCEDURE — 97110 THERAPEUTIC EXERCISES: CPT | Mod: PN

## 2020-11-23 NOTE — PROGRESS NOTES
"  Physical Therapy Daily Treatment Note     Name: Sonia GIL [facility]  Clinic Number: [id number]    Therapy Diagnosis:   Encounter Diagnoses   Name Primary?    Decreased range of motion (ROM) of left knee     Abnormal gait     Left leg weakness      Physician: Dorina Murillo NP    Visit Date: 11/23/2020  Physician Orders: PT Eval and Treat  Medical Diagnosis from Referral: Z96.652 (ICD-10-CM) - Status post total knee replacement, left     Evaluation Date: 10/21/2020  Plan of Care Certification Period: 10/21/2020 - 12/28/2020  Authorization Period Start - Expiration: 10/19/2020 - 11/30/2020  Visit # / Visits POC / Visits authorized: 10/ 18 / 24    Time In: 12:30 PM  Time Out: 1:20 PM  Total Billable Time:  50  minutes    Precautions: Standard    Subjective     Pt reports: Posterior knee pain is improving. Limp is improving.   She was compliant with home exercise program.  Response to previous treatment: Treadmill walking helped improve walking pattern.   Functional change: Walking with less limp    Pain: 1/10  Post: 1/10  Location: left posterior knee      Objective     Manual Therapy 10 min:  IASTM to posterior knee/hamstrings/gastroc  Prone anterior proximal tibial glides Grade II/III     Sonia participated in neuromuscular re-education activities to improve: Kinesthetic awareness during gait for 7 minutes. The following activities were included:    TM .9mph x 5 min to improve gait quality.  Gait following TM walk to reinforce correct gait pattern    Sonia  1:1 therapeutic exercises to develop strength, endurance, ROM and flexibility for 33  minutes including:    Recumbent Bike: 7 min Level 1.7 Seat #10    Heel slides  x 10 with 10 sec hold with strap  Prone quad stretch/knee flexion with strap 10 x 10 sec  Hamstring stretch 3 x 30 sec in long sitting  Flexion stretch: 3/30" with R foot over L NP  Knee flexion stretch on HOB with R assist: 10x10" NP  SLR  1# 3x10  Bridging 2 x 10  LAQ  3x10 2#    Not " "performed:  Calf stretch on 1/2 roll 3 x 30 sec  Standing hip abduction 3 x 5 reps B (holding // bar)-NP  Standing heel raises 2x10  Mini squats 2 x 10    Gait sequence on Airtex (heel strike with knee extended/ push off through toe off)      Education provided:   - Continue HEP  -Correct gait sequence    Written Home Exercises Provided: Patient instructed to cont prior HEP.  Exercises were reviewed and Sonia was able to demonstrate them prior to the end of the session.  Sonia demonstrated good  understanding of the education provided.     See EMR under Patient Instructions for exercises provided prior visit.    Assessment   Pt with improving left knee flexion following toe off during swing phase of gait.  Continues to have posterior knee pain mid range flexion but improving.    Sonia is progressing well towards her goals.   Pt prognosis is Good.     Pt will continue to benefit from skilled outpatient physical therapy to address the deficits listed in the problem list box on initial evaluation, provide pt/family education and to maximize pt's level of independence in the home and community environment.     Pt's spiritual, cultural and educational needs considered and pt agreeable to plan of care and goals.    Anticipated barriers to physical therapy: none    Goals:   Short Term Goals (STG) # weeks Goal Review Date Reviewed Date Met   Pt will demonstrate independence with initial HEP to facilitate therex progression and improvements in functional mobility 1 Initial  Met 11/16/2020    The patient will increase left knee AROM to 0-105 3 Initial 10/21/2020     Decrease left knee circumference by 0.5" to facilitate ROM goals   Initial 10/21/2020     Pt to ambulate community distances with SPC independently and safely 3 Initial  Met 11/16/2020    Pt to perform bed mobility and transfers independently  3 Initial     Met 11/16/2020                                                                   Long Term Goals (LTG) # " weeks Goal Review Date Reviewed Date Met   The patient will improve the Lower Extremity Functional Scale to less than 46% Disability 6 Initial 10/21/2020     The patient will increase left knee AROM to WFL  6 Initial 10/21/2020     The patient will increase left LE MMT to WFL in order to return to independent home management 6 Initial 10/21/2020     Pt will be able to ambulate community distances independently and safely  6 Initial 10/21/2020                                                                                    Plan     Continue to treat and progress per POC and pt tolerance       Melody Roche, PT

## 2020-11-30 ENCOUNTER — OFFICE VISIT (OUTPATIENT)
Dept: ORTHOPEDICS | Facility: CLINIC | Age: 60
End: 2020-11-30
Payer: MEDICAID

## 2020-11-30 VITALS
BODY MASS INDEX: 48.15 KG/M2 | HEIGHT: 65 IN | SYSTOLIC BLOOD PRESSURE: 132 MMHG | HEART RATE: 84 BPM | WEIGHT: 289 LBS | DIASTOLIC BLOOD PRESSURE: 82 MMHG

## 2020-11-30 DIAGNOSIS — Z96.652 STATUS POST TOTAL KNEE REPLACEMENT, LEFT: Primary | ICD-10-CM

## 2020-11-30 PROCEDURE — 99024 PR POST-OP FOLLOW-UP VISIT: ICD-10-PCS | Mod: S$GLB,,, | Performed by: PHYSICIAN ASSISTANT

## 2020-11-30 PROCEDURE — 99024 POSTOP FOLLOW-UP VISIT: CPT | Mod: S$GLB,,, | Performed by: PHYSICIAN ASSISTANT

## 2020-11-30 NOTE — PROGRESS NOTES
[facility]  [address]  [place] [address]  Phone: [phone]   [phone]    Patient's PCP:Dorina Murillo NP  Referring Provider: No ref. provider found    POST-OP Note:    Subjective:        Chief Complaint:   Chief Complaint   Patient presents with    Left Knee - Post-op Evaluation     PO LT TKA 10/19/20 f/u from 20. She is attending PT. She has some stiffness intermittently, overall doing well. Some sharp pains at night. She has some sensitivity in her LT shin/ankle area       Past Medical History:   Diagnosis Date    Allergy     Anxiety     Asthma     DDD (degenerative disc disease), cervical     Depression     Fibromyalgia     Hypertension     Neuromuscular disorder     PONV (postoperative nausea and vomiting)        Past Surgical History:   Procedure Laterality Date    ADENOIDECTOMY      carpel tunnel Right      SECTION      JOINT REPLACEMENT Right     knee    KNEE ARTHROPLASTY Left 10/19/2020    Procedure: ARTHROPLASTY, KNEE;  Surgeon: Felipe Herring MD;  Location: [facility];  Service: Orthopedics;  Laterality: Left;  Louie Liz    knee replacement Right     ROTATOR CUFF REPAIR Right     TONSILLECTOMY         Current Outpatient Medications   Medication Sig    albuterol (VENTOLIN HFA) 90 mcg/actuation inhaler Inhale 2 puffs into the lungs every 6 (six) hours as needed for Wheezing or Shortness of Breath. Rescue    ALPRAZolam (XANAX) 1 MG tablet Take 1 tablet (1 mg total) by mouth once daily.    amLODIPine (NORVASC) 2.5 MG tablet Take 1 tablet (2.5 mg total) by mouth once daily.    cetirizine (ZYRTEC) 10 MG tablet Take 1 tablet (10 mg total) by mouth once daily.    DULoxetine (CYMBALTA) 60 MG capsule Take 1 capsule (60 mg total) by mouth once daily.    furosemide (LASIX) 20 MG tablet Take 1 tablet (20 mg total) by mouth 2 (two) times daily.    hydroCHLOROthiazide (HYDRODIURIL) 25 MG tablet Take 1 tablet (25 mg total) by mouth  once daily.    losartan (COZAAR) 100 MG tablet Take 0.5 tablets (50 mg total) by mouth once daily.    meloxicam (MOBIC) 15 MG tablet Take 1 tablet (15 mg total) by mouth once daily.    metoprolol succinate (TOPROL-XL) 25 MG 24 hr tablet Take 1 tablet (25 mg total) by mouth once daily.     No current facility-administered medications for this visit.        Review of patient's allergies indicates:   Allergen Reactions    Erythromycin Swelling    Codeine Nausea And Vomiting     And migraine h/a       Family History   Problem Relation Age of Onset    Diabetes Mother     Hypertension Mother     Heart disease Mother     Stroke Mother     Glaucoma Mother     Diabetes Father     Hypertension Father     Heart disease Father     Hypertension Sister     Heart disease Sister     Graves' disease Daughter     Anxiety disorder Daughter        Social History     Socioeconomic History    Marital status:      Spouse name: Not on file    Number of children: 3    Years of education: Not on file    Highest education level: Not on file   Occupational History    Occupation: [employer]     Comment: primary sub for [employer]   Social Needs    Financial resource strain: Not on file    Food insecurity     Worry: Not on file     Inability: Not on file    Transportation needs     Medical: Not on file     Non-medical: Not on file   Tobacco Use    Smoking status: Former Smoker     Packs/day: 1.00     Years: 11.00     Pack years: 11.00     Quit date:      Years since quittin.9    Smokeless tobacco: Never Used   Substance and Sexual Activity    Alcohol use: Yes     Comment: occasional 2x/y    Drug use: Never    Sexual activity: Yes     Partners: Male   Lifestyle    Physical activity     Days per week: Not on file     Minutes per session: Not on file    Stress: Rather much   Relationships    Social connections     Talks on phone: Not on file     Gets together: Not on file     Attends Worship service:  Not on file     Active member of club or organization: Not on file     Attends meetings of clubs or organizations: Not on file     Relationship status: Not on file   Other Topics Concern    Not on file   Social History Narrative    Not on file       History of present illness:  Follow-up left total knee arthroplasty done mid October, 6 week follow-up.  Still has some vague complaints of discomfort nothing specific.  Much better than preoperatively however!      Review of Systems:    Musculoskeletal:  See HPI       Objective:        Physical Examination:    Vital Signs:   Vitals:    11/30/20 1130   BP: 132/82   Pulse: 84       Body mass index is 48.84 kg/m².    This a well-developed, well nourished patient in no acute distress.  They are alert and oriented and cooperative to examination.        Examination of the left knee, patient lacks about 5° of extension, flexion is to 135.  Calf is soft and nontender.  Surgical site well healed.    Pertinent New Results:     XRAY Report / Interpretation:  AP lateral sunrise views of the left knee taken today in the office demonstrate a left total knee arthroplasty to be in appropriate position without any evidence of hardware failure or loosening.  Visualized soft tissues appear normal.       Assessment:       1. Status post total knee replacement, left      Plan:     Status post total knee replacement, left  -     X-Ray Knee 3 View Left        Follow up in about 6 weeks (around 1/12/2021) for LT TKA 10/19/2020 w/ Dr. Barakat.    Stable status post left total knee arthroplasty, patient will follow-up in 6 weeks.  Continue physical therapy, she does need a refill of her pain medications.    [unfilled]  ALENA Mendez PA-C    This note was created using [employer] voice recognition software that occasionally misinterprets words or phrases.

## 2020-12-02 ENCOUNTER — TELEPHONE (OUTPATIENT)
Dept: CARDIOLOGY | Facility: HOSPITAL | Age: 60
End: 2020-12-02

## 2020-12-03 ENCOUNTER — CLINICAL SUPPORT (OUTPATIENT)
Dept: CARDIOLOGY | Facility: HOSPITAL | Age: 60
End: 2020-12-03
Attending: NURSE PRACTITIONER
Payer: MEDICAID

## 2020-12-03 VITALS — BODY MASS INDEX: 49.34 KG/M2 | HEIGHT: 64 IN | WEIGHT: 289 LBS

## 2020-12-03 DIAGNOSIS — R01.1 CARDIAC MURMUR: ICD-10-CM

## 2020-12-03 PROCEDURE — 93306 TTE W/DOPPLER COMPLETE: CPT | Mod: 26,,, | Performed by: INTERNAL MEDICINE

## 2020-12-03 PROCEDURE — 93306 ECHO (CUPID ONLY): ICD-10-PCS | Mod: 26,,, | Performed by: INTERNAL MEDICINE

## 2020-12-03 PROCEDURE — 93306 TTE W/DOPPLER COMPLETE: CPT

## 2020-12-04 ENCOUNTER — CLINICAL SUPPORT (OUTPATIENT)
Dept: REHABILITATION | Facility: HOSPITAL | Age: 60
End: 2020-12-04
Attending: ORTHOPAEDIC SURGERY
Payer: MEDICAID

## 2020-12-04 DIAGNOSIS — R26.9 ABNORMAL GAIT: ICD-10-CM

## 2020-12-04 DIAGNOSIS — R29.898 LEFT LEG WEAKNESS: ICD-10-CM

## 2020-12-04 DIAGNOSIS — M25.662 DECREASED RANGE OF MOTION (ROM) OF LEFT KNEE: ICD-10-CM

## 2020-12-04 LAB
AORTIC ROOT ANNULUS: 2.91 CM
AORTIC VALVE CUSP SEPERATION: 2.2 CM
AV INDEX (PROSTH): 0.77
AV MEAN GRADIENT: 4 MMHG
AV PEAK GRADIENT: 6 MMHG
AV VALVE AREA: 3.06 CM2
AV VELOCITY RATIO: 93.84
BSA FOR ECHO PROCEDURE: 2.43 M2
CV ECHO LV RWT: 0.44 CM
DOP CALC AO PEAK VEL: 1.24 M/S
DOP CALC AO VTI: 27.83 CM
DOP CALC LVOT AREA: 4 CM2
DOP CALC LVOT DIAMETER: 2.25 CM
DOP CALC LVOT PEAK VEL: 116.36 M/S
DOP CALC LVOT STROKE VOLUME: 85.12 CM3
DOP CALCLVOT PEAK VEL VTI: 21.42 CM
E WAVE DECELERATION TIME: 231.25 MSEC
E/A RATIO: 0.81
E/E' RATIO: 10.17 M/S
ECHO LV POSTERIOR WALL: 1.1 CM (ref 0.6–1.1)
FRACTIONAL SHORTENING: 43 % (ref 28–44)
INTERVENTRICULAR SEPTUM: 1 CM (ref 0.6–1.1)
IVRT: 124.32 MSEC
LEFT INTERNAL DIMENSION IN SYSTOLE: 2.86 CM (ref 2.1–4)
LEFT VENTRICLE MASS INDEX: 85 G/M2
LEFT VENTRICULAR INTERNAL DIMENSION IN DIASTOLE: 5 CM (ref 3.5–6)
LEFT VENTRICULAR MASS: 194.38 G
LV LATERAL E/E' RATIO: 8.71 M/S
LV SEPTAL E/E' RATIO: 12.2 M/S
MV PEAK A VEL: 0.75 M/S
MV PEAK E VEL: 0.61 M/S
PISA TR MAX VEL: 2.49 M/S
PV PEAK VELOCITY: 76.59 CM/S
RA PRESSURE: 3 MMHG
RIGHT VENTRICULAR END-DIASTOLIC DIMENSION: 370 CM
TDI LATERAL: 0.07 M/S
TDI SEPTAL: 0.05 M/S
TDI: 0.06 M/S
TR MAX PG: 25 MMHG
TV REST PULMONARY ARTERY PRESSURE: 28 MMHG

## 2020-12-04 PROCEDURE — 97110 THERAPEUTIC EXERCISES: CPT | Mod: PN

## 2020-12-04 NOTE — PROGRESS NOTES
"  Physical Therapy Daily Treatment Note     Name: Sonia GIL [facility]  Clinic Number: [id number]    Therapy Diagnosis:   Encounter Diagnoses   Name Primary?    Decreased range of motion (ROM) of left knee     Abnormal gait     Left leg weakness      Physician: Dorina Murillo NP    Visit Date: 12/4/2020  Physician Orders: PT Eval and Treat  Medical Diagnosis from Referral: Z96.652 (ICD-10-CM) - Status post total knee replacement, left     Evaluation Date: 10/21/2020  Plan of Care Certification Period: 10/21/2020 - 12/28/2020  Authorization Period Start - Expiration: 12/1/2020 - 1/31/2021  Visit # / Visits authorized: 1/12 (new auth)  (POC 12/18)    Time In: 1:00 PM  Time Out: 1:53 PM  Total Billable Time:  53  minutes    Precautions: Standard    Subjective     Pt reports: Posterior knee pain is improving. Limp is improving. Saw PA and he was very pleased with her progress.    She was compliant with home exercise program.  Response to previous treatment: Treadmill walking helped improve walking pattern.   Functional change: Walking with less limp    Pain: 1/10  Post: 1/10  Location: left posterior knee      Objective     AROM left knee: -4 to 120 deg    Manual Therapy 0 min:  IASTM to posterior knee/hamstrings/gastroc  Prone anterior proximal tibial glides Grade II/III     Sonia participated in neuromuscular re-education activities to improve: Kinesthetic awareness during gait for 15  minutes. The following activities were included:    TM 1.0 -  mph x 7 min to improve gait quality.  Gait following TM walk to reinforce correct gait pattern  Mini squats 2 x 10  (verbal cues for form - glute activation and no UE support)  Step ups 4" step 2 x 10 (using LLE concentrically ascending and eccentrically descending)    Sonia  1:1 therapeutic exercises to develop strength, endurance, ROM and flexibility for 38  minutes including:    Recumbent Bike: 7 min Level 1.7 Seat #10     Heel slides  x 20 with 10 sec hold with " "strap  Prone quad stretch/knee flexion with strap 10 x 10 sec  Hamstring stretch 3 x 30 sec in long sitting  SLR  1# 3x10  Bridging 3 x 10  SAQ  3x10 2#    Not performed:  Calf stretch on 1/2 roll 3 x 30 sec  Standing hip abduction 3 x 5 reps B (holding // bar)-NP  Standing heel raises 2x10  Gait sequence on Airtex (heel strike with knee extended/ push off through toe off)      Education provided:   - Continue HEP  -Correct gait sequence    Written Home Exercises Provided: Patient instructed to cont prior HEP.  Exercises were reviewed and Sonia was able to demonstrate them prior to the end of the session.  Sonia demonstrated good  understanding of the education provided.     See EMR under Patient Instructions for exercises provided prior visit.    Assessment   Excellent ROM. Continued weakness noted in hip and quads...most notably with eccentric loading. Progressed to step ups without difficulty and eliminated hand support with squats.   Sonia is progressing well towards her goals.   Pt prognosis is Good.     Pt will continue to benefit from skilled outpatient physical therapy to address the deficits listed in the problem list box on initial evaluation, provide pt/family education and to maximize pt's level of independence in the home and community environment.     Pt's spiritual, cultural and educational needs considered and pt agreeable to plan of care and goals.    Anticipated barriers to physical therapy: none    Goals:   Short Term Goals (STG) # weeks Goal Review Date Reviewed Date Met   Pt will demonstrate independence with initial HEP to facilitate therex progression and improvements in functional mobility 1 Initial  Met 11/16/2020    The patient will increase left knee AROM to 0-105 3 Initial 10/21/2020     Decrease left knee circumference by 0.5" to facilitate ROM goals   Initial 10/21/2020     Pt to ambulate community distances with SPC independently and safely 3 Initial  Met 11/16/2020    Pt to perform " bed mobility and transfers independently  3 Initial     Met 11/16/2020                                                                   Long Term Goals (LTG) # weeks Goal Review Date Reviewed Date Met   The patient will improve the Lower Extremity Functional Scale to less than 46% Disability 6 Initial 10/21/2020     The patient will increase left knee AROM to WFL  6 Initial 10/21/2020     The patient will increase left LE MMT to WFL in order to return to independent home management 6 Initial 10/21/2020     Pt will be able to ambulate community distances independently and safely  6 Initial 10/21/2020                                                                                    Plan     Continue to treat and progress per POC and pt tolerance. Progress [facility] ex.       Melody Roche, PT

## 2020-12-07 ENCOUNTER — TELEPHONE (OUTPATIENT)
Dept: FAMILY MEDICINE | Facility: CLINIC | Age: 60
End: 2020-12-07

## 2020-12-07 NOTE — TELEPHONE ENCOUNTER
----- Message from Dorina Murillo NP sent at 12/4/2020  2:06 PM CST -----  Please contact the patient and let them know that her echocardiogram showed some mild leaking of the mitral and tricuspid valves. Need to keep control of her BP.  Will review in detail at her next appt.

## 2020-12-08 ENCOUNTER — CLINICAL SUPPORT (OUTPATIENT)
Dept: REHABILITATION | Facility: HOSPITAL | Age: 60
End: 2020-12-08
Attending: ORTHOPAEDIC SURGERY
Payer: MEDICAID

## 2020-12-08 DIAGNOSIS — M25.662 DECREASED RANGE OF MOTION (ROM) OF LEFT KNEE: ICD-10-CM

## 2020-12-08 DIAGNOSIS — R26.9 ABNORMAL GAIT: ICD-10-CM

## 2020-12-08 DIAGNOSIS — R29.898 LEFT LEG WEAKNESS: ICD-10-CM

## 2020-12-08 PROCEDURE — 97110 THERAPEUTIC EXERCISES: CPT | Mod: PN

## 2020-12-08 NOTE — PROGRESS NOTES
"  Physical Therapy Daily Treatment Note     Name: Sonia GIL [facility]  Clinic Number: [id number]    Therapy Diagnosis:   Encounter Diagnoses   Name Primary?    Decreased range of motion (ROM) of left knee     Abnormal gait     Left leg weakness      Physician: Dorina Murillo NP    Visit Date: 12/8/2020  Physician Orders: PT Eval and Treat  Medical Diagnosis from Referral: Z96.652 (ICD-10-CM) - Status post total knee replacement, left     Evaluation Date: 10/21/2020  Plan of Care Certification Period: 10/21/2020 - 12/28/2020  Authorization Period Start - Expiration: 12/1/2020 - 1/31/2021  Visit # / Visits authorized: 2/12 (new auth)  (POC 13/18)    Time In: 1:03 PM  Time Out: 1:56 PM  Total Billable Time: 53 minutes    Precautions: Standard    Subjective     Pt reports: Posterior knee pain is improving. Sore in front of shin today. [facility]. Family members are commenting on her improved posture and gait.   She was compliant with home exercise program.  Response to previous treatment: Positive  Functional change: Walking with less limp    Pain: 1/10  Post: 1/10  Location: left posterior knee and anterior lower leg (soreness)     Objective     AROM left knee: -4 to 120 deg    Manual Therapy 0 min:  IASTM to posterior knee/hamstrings/gastroc  Prone anterior proximal tibial glides Grade II/III     Sonia participated in neuromuscular re-education activities to improve: Kinesthetic awareness during gait for 30 minutes. The following activities were included:    TM 1.2 - 1.5   mph x 7 min to improve gait quality.  Mini squats 2 x 10  (verbal cues for form - glute activation and no UE support)  Step ups 4" step 2 x 10 (using LLE concentrically ascending and eccentrically descending)  +Eccentric step down off 2" step (LLE on step) 2 x 10  +TKE with ball x30 hold 5 sec (cues to activate quads and glutes)  +Lateral stepping with RTB     Sonia  1:1 therapeutic exercises to develop " "strength, endurance, ROM and flexibility for 23 minutes including:    Recumbent Bike: 5 min Level 2 Seat #10     Heel slides  x 20 with 10 sec hold with strap  Prone quad stretch/knee flexion with strap 10 x 10 sec  Hamstring stretch 3 x 30 sec in long sitting  SLR  1# 3x10  Bridging 2 x 10  LAQ  3x10 3#    Not performed:  Calf stretch on 1/2 roll 3 x 30 sec  Standing hip abduction 3 x 5 reps B (holding // bar)-NP  Standing heel raises 2x10    Education provided:   - Continue HEP  -Correct gait sequence    Written Home Exercises Provided: Patient instructed to cont prior HEP.  Exercises were reviewed and Sonia was able to demonstrate them prior to the end of the session.  Sonia demonstrated good  understanding of the education provided.     See EMR under Patient Instructions for exercises provided prior visit.    Assessment   Excellent ROM. Continued weakness noted in hip and quads...most notably with eccentric loading. Progressed CKC ex without symptom provocation.    Sonia is progressing well towards her goals.   Pt prognosis is Good.     Pt will continue to benefit from skilled outpatient physical therapy to address the deficits listed in the problem list box on initial evaluation, provide pt/family education and to maximize pt's level of independence in the home and community environment.     Pt's spiritual, cultural and educational needs considered and pt agreeable to plan of care and goals.    Anticipated barriers to physical therapy: none    Goals:   Short Term Goals (STG) # weeks Goal Review Date Reviewed Date Met   Pt will demonstrate independence with initial HEP to facilitate therex progression and improvements in functional mobility 1 Initial  Met 11/16/2020    The patient will increase left knee AROM to 0-105 3 Initial 10/21/2020     Decrease left knee circumference by 0.5" to facilitate ROM goals   Initial 10/21/2020     Pt to ambulate community distances with SPC independently and safely 3 Initial  " Met 11/16/2020    Pt to perform bed mobility and transfers independently  3 Initial     Met 11/16/2020                                                                   Long Term Goals (LTG) # weeks Goal Review Date Reviewed Date Met   The patient will improve the Lower Extremity Functional Scale to less than 46% Disability 6 Initial 10/21/2020     The patient will increase left knee AROM to WFL  6 Initial 10/21/2020     The patient will increase left LE MMT to WFL in order to return to independent home management 6 Initial 10/21/2020     Pt will be able to ambulate community distances independently and safely  6 Initial 10/21/2020                                                                                    Plan     Continue to treat and progress per POC and pt tolerance. Progress CKC ex.       Melody Roche, PT

## 2020-12-10 ENCOUNTER — CLINICAL SUPPORT (OUTPATIENT)
Dept: REHABILITATION | Facility: HOSPITAL | Age: 60
End: 2020-12-10
Attending: ORTHOPAEDIC SURGERY
Payer: MEDICAID

## 2020-12-10 DIAGNOSIS — M25.662 DECREASED RANGE OF MOTION (ROM) OF LEFT KNEE: ICD-10-CM

## 2020-12-10 DIAGNOSIS — R29.898 LEFT LEG WEAKNESS: ICD-10-CM

## 2020-12-10 DIAGNOSIS — R26.9 ABNORMAL GAIT: ICD-10-CM

## 2020-12-10 PROCEDURE — 97110 THERAPEUTIC EXERCISES: CPT | Mod: PN,CQ

## 2020-12-10 NOTE — PROGRESS NOTES
"  Physical Therapy Daily Treatment Note     Name: Sonia GIL [facility]  Clinic Number: [id number]    Therapy Diagnosis:   Encounter Diagnoses   Name Primary?    Decreased range of motion (ROM) of left knee     Abnormal gait     Left leg weakness      Physician: Dorina Murillo NP    Visit Date: 12/10/2020  Physician Orders: PT Eval and Treat  Medical Diagnosis from Referral: Z96.652 (ICD-10-CM) - Status post total knee replacement, left     Evaluation Date: 10/21/2020  Plan of Care Certification Period: 10/21/2020 - 12/28/2020  Authorization Period Start - Expiration: 12/1/2020 - 1/31/2021  Visit # / Visits authorized: 4/12 (new auth)  (POC 15/18)    Time In: 1145  Time Out: 1238  Total Billable Time: 53 minutes    Precautions: Standard    Subjective     Pt reports: Knee has been feeling better pain is noticeable but not as severe.  She was compliant with home exercise program.  Response to previous treatment: Positive  Functional change: none new    Pain: 1/10  Post: 0/10  Location: left posterior knee and anterior lower leg (soreness)     Objective     AROM left knee: -4 to 120 deg    Manual Therapy 0 min:  IASTM to posterior knee/hamstrings/gastroc  Prone anterior proximal tibial glides Grade II/III     Sonia participated in neuromuscular re-education activities to improve: Kinesthetic awareness during gait for 30 minutes. The following activities were included:    TM 1.2 - 1.5   mph x 7 min to improve gait quality.  Mini squats 2 x 10  (verbal cues for form - glute activation and no UE support)  Step ups 6" step 2 x 10 (using LLE concentrically ascending and eccentrically descending)  Eccentric step down off 2" step (LLE on step) 2 x 10  TKE with ball x30 hold 5 sec (cues to activate quads and glutes)  Lateral stepping with RTB in alvaro, x1 lap    Sonia  1:1 therapeutic exercises to develop strength, endurance, ROM and flexibility for 23 minutes including:    Recumbent Bike: 5 min Level 2 Seat #10     Heel " "slides  x 20 with 10 sec hold with strap  Prone quad stretch/knee flexion with strap 10 x 10 sec  Hamstring stretch 3 x 30 sec in long sitting  SLR  1# 3x10  Bridging 2 x 10  LAQ  3x10 3#    Not performed:  Calf stretch on 1/2 roll 3 x 30 sec  Standing hip abduction 3 x 5 reps B (holding // bar)-NP  Standing heel raises 2x10    Education provided:   - Continue HEP  -Correct gait sequence    Written Home Exercises Provided: Patient instructed to cont prior HEP.  Exercises were reviewed and Sonai was able to demonstrate them prior to the end of the session.  Sonia demonstrated good  understanding of the education provided.     See EMR under Patient Instructions for exercises provided prior visit.    Assessment   Pt completed therapy session with good tolerance with reported decreased pain and improved ROM. Pt continues to lack strength with knee stabilizers but is making gains with the progression to 4" step with step up exercises with mm fasciculations with eccentric movements.     Sonia is progressing well towards her goals.   Pt prognosis is Good.     Pt will continue to benefit from skilled outpatient physical therapy to address the deficits listed in the problem list box on initial evaluation, provide pt/family education and to maximize pt's level of independence in the home and community environment.     Pt's spiritual, cultural and educational needs considered and pt agreeable to plan of care and goals.    Anticipated barriers to physical therapy: none    Goals:   Short Term Goals (STG) # weeks Goal Review Date Reviewed Date Met   Pt will demonstrate independence with initial HEP to facilitate therex progression and improvements in functional mobility 1 Initial  Met 11/16/2020    The patient will increase left knee AROM to 0-105 3 Initial 10/21/2020     Decrease left knee circumference by 0.5" to facilitate ROM goals   Initial 10/21/2020     Pt to ambulate community distances with SPC independently and " safely 3 Initial  Met 11/16/2020    Pt to perform bed mobility and transfers independently  3 Initial     Met 11/16/2020                                                                   Long Term Goals (LTG) # weeks Goal Review Date Reviewed Date Met   The patient will improve the Lower Extremity Functional Scale to less than 46% Disability 6 Initial 10/21/2020     The patient will increase left knee AROM to WFL  6 Initial 10/21/2020     The patient will increase left LE MMT to WFL in order to return to independent home management 6 Initial 10/21/2020     Pt will be able to ambulate community distances independently and safely  6 Initial 10/21/2020                                                                                    Plan     Continue to treat and progress per POC and pt tolerance. Progress CKC ex.       Rakesh Calle, PTA

## 2020-12-15 ENCOUNTER — CLINICAL SUPPORT (OUTPATIENT)
Dept: REHABILITATION | Facility: HOSPITAL | Age: 60
End: 2020-12-15
Attending: ORTHOPAEDIC SURGERY
Payer: MEDICAID

## 2020-12-15 DIAGNOSIS — R26.9 ABNORMAL GAIT: ICD-10-CM

## 2020-12-15 DIAGNOSIS — R29.898 LEFT LEG WEAKNESS: ICD-10-CM

## 2020-12-15 DIAGNOSIS — M25.662 DECREASED RANGE OF MOTION (ROM) OF LEFT KNEE: ICD-10-CM

## 2020-12-15 PROCEDURE — 97110 THERAPEUTIC EXERCISES: CPT | Mod: PN

## 2020-12-15 PROCEDURE — 97112 NEUROMUSCULAR REEDUCATION: CPT | Mod: PN

## 2020-12-15 NOTE — PROGRESS NOTES
"  Physical Therapy Daily Treatment Note     Name: Sonia GIL [facility]  Clinic Number: [id number]    Therapy Diagnosis:   Encounter Diagnoses   Name Primary?    Decreased range of motion (ROM) of left knee     Abnormal gait     Left leg weakness      Physician: Dorina Murillo NP    Visit Date: 12/15/2020  Physician Orders: PT Eval and Treat  Medical Diagnosis from Referral: Z96.652 (ICD-10-CM) - Status post total knee replacement, left     Evaluation Date: 10/21/2020  Plan of Care Certification Period: 10/21/2020 - 12/28/2020  Authorization Period Start - Expiration: 12/1/2020 - 1/31/2021  Visit # / Visits authorized: 4/12 (new auth)  (POC 16/18)    Time In: 1:05  Time Out:  2:04  Total Billable Time: 59 minutes    Precautions: Standard    Subjective     Pt reports: Went walking with mother for exercise. Did squats with her grandson - knee is a little sore  She was compliant with home exercise program.  Response to previous treatment: Positive  Functional change:     Pain: 2/10  Post: 0/10  Location: left posterior knee and anterior lower leg (soreness)     Objective       Manual Therapy 0 min:  IASTM to posterior knee/hamstrings/gastroc  Prone anterior proximal tibial glides Grade II/III     Sonia participated in neuromuscular re-education activities to improve: Kinesthetic awareness during gait for 34 minutes. The following activities were included:    TM 1.4 -1.6 mph x 8':30"  to improve gait quality.  Mini squats 2 x 10  (verbal cues for form - glute activation and no UE support)  Step ups 8" step 2 x 10 (using LLE concentrically ascending and eccentrically descending)  Eccentric step down off 4" step (LLE on step) 2 x 10 (requires hands on // bar)  TKE with ball x30 hold 5 sec (cues to activate quads and glutes)  Lateral stepping with GTB in claros, x1 lap    Sonia  1:1 therapeutic exercises to develop strength, endurance, ROM and flexibility for 23 minutes including:    Recumbent Bike: 5 min Level 2 Seat #10 " "    Heel slides  x 20 with 10 sec hold with strap  Prone quad stretch/knee flexion with strap 10 x 10 sec  Hamstring stretch 3 x 30 sec in long sitting  SLR  2# 2 x10  Bridging 3 x 10  LAQ  3x10 3#    Not performed:  Calf stretch on 1/2 roll 3 x 30 sec  Standing hip abduction 3 x 5 reps B (holding // bar)-NP  Standing heel raises 2x10    Education provided:   - Continue HEP  -Correct gait sequence    Written Home Exercises Provided: Patient instructed to cont prior HEP.  Exercises were reviewed and Sonia was able to demonstrate them prior to the end of the session.  Sonia demonstrated good  understanding of the education provided.     See EMR under Patient Instructions for exercises provided prior visit.    Assessment   Pt is demonstrating excellent AROM of left knee s/p TKA. Continued weakness of quads and posterior chain musculature limits ability to perform reciprocal stair descent, functional squatting, getting up from the floor and multi-directional gait required for her job duties. Requires continued PT to progress high level balance and mobility training.      Sonia is progressing well towards her goals.   Pt prognosis is Good.     Pt will continue to benefit from skilled outpatient physical therapy to address the deficits listed in the problem list box on initial evaluation, provide pt/family education and to maximize pt's level of independence in the home and community environment.     Pt's spiritual, cultural and educational needs considered and pt agreeable to plan of care and goals.    Anticipated barriers to physical therapy: none    Goals:   Short Term Goals (STG) # weeks Goal Review Date Reviewed Date Met   Pt will demonstrate independence with initial HEP to facilitate therex progression and improvements in functional mobility 1 Initial  Met 11/16/2020    The patient will increase left knee AROM to 0-105 3 Initial 10/21/2020     Decrease left knee circumference by 0.5" to facilitate ROM goals   " Initial 10/21/2020     Pt to ambulate community distances with SPC independently and safely 3 Initial  Met 11/16/2020    Pt to perform bed mobility and transfers independently  3 Initial     Met 11/16/2020                                                                   Long Term Goals (LTG) # weeks Goal Review Date Reviewed Date Met   The patient will improve the Lower Extremity Functional Scale to less than 46% Disability 6 Initial 10/21/2020     The patient will increase left knee AROM to WFL  6 Initial 10/21/2020     The patient will increase left LE MMT to WFL in order to return to independent home management 6 Initial 10/21/2020     Pt will be able to ambulate community distances independently and safely  6 Initial 10/21/2020                                                                                    Plan     Continue to treat and progress per POC and pt tolerance. Progress CKC ex.       Melody Roche, PT

## 2020-12-17 ENCOUNTER — CLINICAL SUPPORT (OUTPATIENT)
Dept: REHABILITATION | Facility: HOSPITAL | Age: 60
End: 2020-12-17
Attending: ORTHOPAEDIC SURGERY
Payer: MEDICAID

## 2020-12-17 ENCOUNTER — PATIENT MESSAGE (OUTPATIENT)
Dept: FAMILY MEDICINE | Facility: CLINIC | Age: 60
End: 2020-12-17

## 2020-12-17 DIAGNOSIS — R29.898 LEFT LEG WEAKNESS: ICD-10-CM

## 2020-12-17 DIAGNOSIS — R26.9 ABNORMAL GAIT: ICD-10-CM

## 2020-12-17 DIAGNOSIS — F41.8 DEPRESSION WITH ANXIETY: ICD-10-CM

## 2020-12-17 DIAGNOSIS — M25.662 DECREASED RANGE OF MOTION (ROM) OF LEFT KNEE: ICD-10-CM

## 2020-12-17 PROCEDURE — 97110 THERAPEUTIC EXERCISES: CPT | Mod: PN,CQ

## 2020-12-17 RX ORDER — ALPRAZOLAM 1 MG/1
1 TABLET ORAL DAILY
Qty: 30 TABLET | Refills: 0 | Status: SHIPPED | OUTPATIENT
Start: 2020-12-17 | End: 2021-03-23 | Stop reason: SDUPTHER

## 2020-12-17 NOTE — PROGRESS NOTES
"  Physical Therapy Daily Treatment Note     Name: Sonia GIL [facility]  Clinic Number: [id number]    Therapy Diagnosis:   No diagnosis found.  Physician: Dorina Murillo NP    Visit Date: 12/17/2020  Physician Orders: PT Eval and Treat  Medical Diagnosis from Referral: Z96.652 (ICD-10-CM) - Status post total knee replacement, left     Evaluation Date: 10/21/2020  Plan of Care Certification Period: 10/21/2020 - 12/28/2020  Authorization Period Start - Expiration: 12/1/2020 - 1/31/2021  Visit # / Visits authorized: 5/12 (new auth)  (POC 16/18)    Time In: 1:03  Time Out:  1:50  Total Billable Time: 47 minutes    Precautions: Standard    Subjective     Pt reports: Sore after last session. Pt reports having to get on the floor to prep for the holiday which caused pain in knee.   She was compliant with home exercise program.  Response to previous treatment: Positive  Functional change:     Pain: 3/10  Post: 0/10  Location: left posterior knee and anterior lower leg (soreness)     Objective       Manual Therapy 10 min:  STM to L quad to decrease tightness  IASTM to posterior knee/hamstrings/gastroc-NP  Prone anterior proximal tibial glides Grade II/III-NP    Sonia participated in neuromuscular re-education activities to improve: Kinesthetic awareness during gait for 14 minutes. The following activities were included:    TM 1.4 -1.6 mph x 8':30"  to improve gait quality.-NP  Mini squats 2 x 10  (verbal cues for form - glute activation and no UE support)  Step ups 8" step 2 x 10 (using LLE concentrically ascending and eccentrically descending)  Eccentric step down off 4" step (LLE on step) 2 x 10 (requires hands on // bar)  TKE with ball x30 hold 5 sec (cues to activate quads and glutes)-NP  Lateral stepping with GTB in claros, x1 lap-NP    Sonia  1:1 therapeutic exercises to develop strength, endurance, ROM and flexibility for 23 minutes including:    Recumbent Bike: 5 min Level 2 Seat #10 -NP    Heel slides  x 20 with 10 " "sec hold with strap  Prone quad stretch/knee flexion with strap 10 x 10 sec  Hamstring stretch 3 x 30 sec in long sitting  SLR  2# 2 x10  Bridging 3 x 10  LAQ  3x10 3#    Not performed:  Calf stretch on 1/2 roll 3 x 30 sec  Standing hip abduction 3 x 5 reps B (holding // bar)-NP  Standing heel raises 2x10    Education provided:   - Continue HEP  -Correct gait sequence    Written Home Exercises Provided: Patient instructed to cont prior HEP.  Exercises were reviewed and Sonia was able to demonstrate them prior to the end of the session.  Sonia demonstrated good  understanding of the education provided.     See EMR under Patient Instructions for exercises provided prior visit.    Assessment     Pt presents with increased mm tightness in L quad with tenderness; improved after STM to quad with mm roller. Pt with improved form and tolerance with squats in the // bars with no rest breaks required to complete. Pt with difficulty breathing after standing therex and required use of her inhaler and 5 mins seated to recover. Pt able to ambulate out of clinic independently with no c/o increased pain.      Sonia is progressing well towards her goals.   Pt prognosis is Good.     Pt will continue to benefit from skilled outpatient physical therapy to address the deficits listed in the problem list box on initial evaluation, provide pt/family education and to maximize pt's level of independence in the home and community environment.     Pt's spiritual, cultural and educational needs considered and pt agreeable to plan of care and goals.    Anticipated barriers to physical therapy: none    Goals:   Short Term Goals (STG) # weeks Goal Review Date Reviewed Date Met   Pt will demonstrate independence with initial HEP to facilitate therex progression and improvements in functional mobility 1 Initial  Met 11/16/2020    The patient will increase left knee AROM to 0-105 3 Initial 10/21/2020     Decrease left knee circumference by 0.5" " to facilitate ROM goals   Initial 10/21/2020     Pt to ambulate community distances with SPC independently and safely 3 Initial  Met 11/16/2020    Pt to perform bed mobility and transfers independently  3 Initial     Met 11/16/2020                                                                   Long Term Goals (LTG) # weeks Goal Review Date Reviewed Date Met   The patient will improve the Lower Extremity Functional Scale to less than 46% Disability 6 Initial 10/21/2020     The patient will increase left knee AROM to WFL  6 Initial 10/21/2020     The patient will increase left LE MMT to WFL in order to return to independent home management 6 Initial 10/21/2020     Pt will be able to ambulate community distances independently and safely  6 Initial 10/21/2020                                                                                    Plan     Continue to treat and progress per POC and pt tolerance.        Rakesh Calle, PTA

## 2020-12-22 ENCOUNTER — CLINICAL SUPPORT (OUTPATIENT)
Dept: REHABILITATION | Facility: HOSPITAL | Age: 60
End: 2020-12-22
Attending: ORTHOPAEDIC SURGERY
Payer: MEDICAID

## 2020-12-22 DIAGNOSIS — M25.662 DECREASED RANGE OF MOTION (ROM) OF LEFT KNEE: ICD-10-CM

## 2020-12-22 DIAGNOSIS — R29.898 LEFT LEG WEAKNESS: ICD-10-CM

## 2020-12-22 DIAGNOSIS — R26.9 ABNORMAL GAIT: ICD-10-CM

## 2020-12-22 PROCEDURE — 97110 THERAPEUTIC EXERCISES: CPT | Mod: PN

## 2020-12-22 NOTE — PLAN OF CARE
Outpatient Therapy Updated Plan of Care     Visit Date: 12/22/2020    Name: Sonia Heckippo  Clinic Number: [id number]    Therapy Diagnosis:   Encounter Diagnoses   Name Primary?    Decreased range of motion (ROM) of left knee     Abnormal gait     Left leg weakness      Physician: Dorina Murillo NP    Physician Orders: PT Eval and Treat  Medical Diagnosis from Referral: Z96.652 (ICD-10-CM) - Status post total knee replacement, left     Evaluation Date: 10/21/2020  Plan of Care Certification Period: 10/21/2020 - 12/28/2020  Authorization Period Start - Expiration: 12/1/2020 - 1/31/2021  Visit # / Visits authorized: 6/12 (new auth)  (18 total)    Precautions: Standard  Functional Level Prior to Evaluation:  Independent    Subjective     Update:    Objective     Update: Sit <> stand I with no UE assist  Bed mobility supine <> Independent  Knee circumference suprapatellar: left 25.5, right 23  Knee circumference peripatellar: left 22.5, right 20.5    Left knee AROM 5* hyperextension -120*  Left knee PROM 123* flexiont  Right LE MMT WFL  Left LE MMT hip flexion/extension/IR/ER 4+/5,  Hip abduction 4/5, left knee ext 5/5, flexion 4-/5  SLS: R 15 sec   L 10 sec    Assessment     Update:  Pt has progressed well with functional AROM noted in left knee. She is  Independent with community gait without AD. Continues to have antalgic gait pattern but is working on TM to correct. Continued weakness in hip and hamstrings persists. Pt is now able to reciprocally ascend and descend 5 steps with railing support. 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 She requires continued PT to reach full functional strength and mobility s/p left TKA.        Short Term Goals (STG) # weeks Goal Review Date Reviewed Date Met   Pt will demonstrate independence with initial HEP to facilitate therex progression and improvements in functional mobility 1 Initial  Met 11/16/2020    The  "patient will increase left knee AROM to 0-105 3 Initial  12/22/2020    Decrease left knee circumference by 0.5" to facilitate ROM goals   Initial 12/22/2020     Pt to ambulate community distances with SPC independently and safely 3 Initial  Met 11/16/2020    Pt to perform bed mobility and transfers independently  3 Initial     Met 11/16/2020     New: Pt will ambulate multi-directionally without difficulty  2                                                            Long Term Goals (LTG) # weeks Goal Review Date Reviewed Date Met   The patient will improve the Lower Extremity Functional Scale to less than 46% Disability 6 Initial 10/21/2020     The patient will increase left knee AROM to WFL  6 Initial 10/21/2020 12/22/2020    The patient will increase left LE MMT to WFL in order to return to independent home management 6 Initial 12/22/2020     Pt will be able to ambulate community distances independently and safely  6 Initial 10/21/2020 12/22/2020    NEW: Pt will reciprocally ascend and descend a flight of stairs without pain 4       NEW: Pt will get on and off ground independently as required for job duties  4              Reasons for Recertification of Therapy:     -Muscle weakness  -Limited ability to climb a full flight of stairs  -Decreased SLS/balance prevents multi-directional gait  -Unable to get on and off of floor    Plan     Updated Certification Period: 12/22/2020 to 1/22/2021  Recommended Treatment Plan: 2 times per week for 4 weeks: Manual Therapy, Neuromuscular Re-ed, Patient Education, Therapeutic Activites and Therapeutic Exercise      Melody Roche, PT  12/22/2020      I CERTIFY THE NEED FOR THESE SERVICES FURNISHED UNDER THIS PLAN OF TREATMENT AND WHILE UNDER MY CARE    Physician's comments:        Physician's Signature: ___________________________________________________    "

## 2020-12-22 NOTE — PROGRESS NOTES
"  Physical Therapy Daily Treatment Note     Name: Sonia GIL [facility]  Clinic Number: [id number]    Therapy Diagnosis:   Encounter Diagnoses   Name Primary?    Decreased range of motion (ROM) of left knee     Abnormal gait     Left leg weakness      Physician: Dorina Murillo NP    Visit Date: 12/22/2020  Physician Orders: PT Eval and Treat  Medical Diagnosis from Referral: Z96.652 (ICD-10-CM) - Status post total knee replacement, left     Evaluation Date: 10/21/2020  Plan of Care Certification Period: 10/21/2020 - 12/28/2020  Authorization Period Start - Expiration: 12/1/2020 - 1/31/2021  Visit # / Visits authorized: 5/12 (new auth)  (POC 16/18)    Time In: 1:05  Time Out:  2:45  Total Billable Time: 40  minutes    Precautions: Standard    Subjective     Pt reports: Sore at posterior and ant medial knee today. Didn't sleep well last night.   See updated POC     She was compliant with home exercise program.  Response to previous treatment: Positive  Functional change:     Pain: 3/10  Post: 0/10  Location: left posterior knee and anterior knee (soreness)     Objective       Manual Therapy 10 min:    IASTM to posterior knee/hamstrings/gastroc and anterior knee/quad  Prone anterior proximal tibial glides Grade II/III-NP    Sonia participated in neuromuscular re-education activities to improve: Kinesthetic awareness during gait for 15 minutes. The following activities were included:    TM 1.4 -1.6 mph x 8':30"  to improve gait quality.-NP  Mini squats 2 x 10  (verbal cues for form - glute activation and no UE support)  Step ups 8" step 2 x 10 (using LLE concentrically ascending and eccentrically descending)  Eccentric step down off 4" step (LLE on step) 2 x 10 (requires hands on // bar)  TKE with ball x30 hold 5 sec (cues to activate quads and glutes)-NP  Lateral stepping with GTB in claros, x1 lap-KALIA Scott  1:1 therapeutic exercises to develop strength, endurance, ROM and flexibility for 15 minutes " "including:    Recumbent Bike: 5 min Level 2 Seat #10 -NP    Heel slides  x 20 with 10 sec hold with strap  Prone quad stretch/knee flexion with strap 10 x 10 sec  Hamstring stretch 3 x 30 sec in long sitting  SLR  2# 2 x10  Bridging 3 x 10  LAQ  3x10 3#    Not performed:  Calf stretch on 1/2 roll 3 x 30 sec  Standing hip abduction 3 x 5 reps B (holding // bar)-NP  Standing heel raises 2x10    Education provided:   - Continue HEP  -Correct gait sequence    Written Home Exercises Provided: Patient instructed to cont prior HEP.  Exercises were reviewed and Sonia was able to demonstrate them prior to the end of the session.  Sonia demonstrated good  understanding of the education provided.     See EMR under Patient Instructions for exercises provided prior visit.    Assessment     See updated POC        Sonia is progressing well towards her goals.   Pt prognosis is Good.     Pt will continue to benefit from skilled outpatient physical therapy to address the deficits listed in the problem list box on initial evaluation, provide pt/family education and to maximize pt's level of independence in the home and community environment.     Pt's spiritual, cultural and educational needs considered and pt agreeable to plan of care and goals.    Anticipated barriers to physical therapy: none    Goals:   Short Term Goals (STG) # weeks Goal Review Date Reviewed Date Met   Pt will demonstrate independence with initial HEP to facilitate therex progression and improvements in functional mobility 1 Initial   Met 11/16/2020    The patient will increase left knee AROM to 0-105 3 Initial   12/22/2020    Decrease left knee circumference by 0.5" to facilitate ROM goals   Initial 12/22/2020     Pt to ambulate community distances with SPC independently and safely 3 Initial   Met 11/16/2020    Pt to perform bed mobility and transfers independently  3 Initial      Met 11/16/2020     New: Pt will ambulate multi-directionally without difficulty "  2                                                            Long Term Goals (LTG) # weeks Goal Review Date Reviewed Date Met   The patient will improve the Lower Extremity Functional Scale to less than 46% Disability 6 Initial 10/21/2020     The patient will increase left knee AROM to WFL  6 Initial 10/21/2020 12/22/2020    The patient will increase left LE MMT to WFL in order to return to independent home management 6 Initial 12/22/2020     Pt will be able to ambulate community distances independently and safely  6 Initial 10/21/2020 12/22/2020    NEW: Pt will reciprocally ascend and descend a flight of stairs without pain 4          NEW: Pt will get on and off ground independently as required for job duties  4                  Plan   Updated POC 12/22/2020 to 1/22/2021  Continue to treat and progress per updated POC and pt tolerance.        Melody Roche, PT

## 2020-12-24 ENCOUNTER — CLINICAL SUPPORT (OUTPATIENT)
Dept: REHABILITATION | Facility: HOSPITAL | Age: 60
End: 2020-12-24
Attending: ORTHOPAEDIC SURGERY
Payer: MEDICAID

## 2020-12-24 DIAGNOSIS — R29.898 LEFT LEG WEAKNESS: ICD-10-CM

## 2020-12-24 DIAGNOSIS — R26.9 ABNORMAL GAIT: ICD-10-CM

## 2020-12-24 DIAGNOSIS — M25.662 DECREASED RANGE OF MOTION (ROM) OF LEFT KNEE: ICD-10-CM

## 2020-12-24 PROCEDURE — 97110 THERAPEUTIC EXERCISES: CPT | Mod: PN,CQ

## 2020-12-24 NOTE — PROGRESS NOTES
"  Physical Therapy Daily Treatment Note     Name: Sonia GIL [facility]  Clinic Number: [id number]    Therapy Diagnosis:        Encounter Diagnoses   Name Primary?    Decreased range of motion (ROM) of left knee      Abnormal gait      Left leg weakness        Physician: Dorina Murillo NP    Visit Date: 12/24/2020  Physician Orders: PT Eval and Treat  Medical Diagnosis from Referral: Z96.652 (ICD-10-CM) - Status post total knee replacement, left     Evaluation Date: 10/21/2020  Plan of Care Certification Period: 10/21/2020 - 12/28/2020  Authorization Period Start - Expiration: 12/1/2020 - 1/31/2021  Visit # / Visits authorized: 7/12 (new auth)  (POC 17/18)    Time In: 1100  Time Out:  1156  Total Billable Time: 56 minutes    Precautions: Standard    Subjective     Pt reports: 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  Response to previous treatment: Positive  Functional change:     Pain: 2/10   Post: 0/10  Location: left posterior knee and anterior knee (soreness)     Objective       Manual Therapy 10 min:  Retrograde massage for edema- x10  IASTM to posterior knee/hamstrings/gastroc and anterior knee/quad-NP  Prone anterior proximal tibial glides Grade II/III-NP    Sonia participated in neuromuscular re-education activities to improve: Kinesthetic awareness during gait for 23 minutes. The following activities were included:    TM 1.4 -1.6 mph x 8':30"  to improve gait quality.-NP  Mini squats 2 x 10  (verbal cues for form - glute activation and no UE support)-NP  Step ups 8" step 2 x 10 (using LLE concentrically ascending and eccentrically descending)  Eccentric step down off 4" step (LLE on step) 2 x 10 (requires hands on // bar)  TKE with ball x30 hold 5 sec (cues to activate quads and glutes)-NP  Lateral stepping with in claros, x1 lap YTB at ankle  Sonia  1:1 therapeutic exercises to develop strength, endurance, ROM and flexibility for 23 minutes " "including:    Recumbent Bike: 5 min Level 2 Seat #10 -NP    Heel slides  x 20 with 10 sec hold with strap  Prone quad stretch/knee flexion with strap 10 x 10 sec  Hamstring stretch 3 x 30 sec in long sitting-NP  SLR  2# 2 x10  Bridging with HS iso  3 x 10  LAQ  3x10 3#    Not performed:  Calf stretch on 1/2 roll 3 x 30 sec  Standing hip abduction 3 x 5 reps B (holding // bar)-NP  Standing heel raises 2x10    Education provided:   - Continue HEP  -Correct gait sequence    Written Home Exercises Provided: Patient instructed to cont prior HEP.  Exercises were reviewed and Sonia was able to demonstrate them prior to the end of the session.  Sonia demonstrated good  understanding of the education provided.     See EMR under Patient Instructions for exercises provided prior visit.    Assessment   Pt with increased soreness this visit due to holiday preparations, peripatellar swelling in LLE improved after MT. Pt with good toleration to therapy session with decreased pain reported at end of session.    Sonia is progressing well towards her goals.   Pt prognosis is Good.     Pt will continue to benefit from skilled outpatient physical therapy to address the deficits listed in the problem list box on initial evaluation, provide pt/family education and to maximize pt's level of independence in the home and community environment.     Pt's spiritual, cultural and educational needs considered and pt agreeable to plan of care and goals.    Anticipated barriers to physical therapy: none    Goals:   Short Term Goals (STG) # weeks Goal Review Date Reviewed Date Met   Pt will demonstrate independence with initial HEP to facilitate therex progression and improvements in functional mobility 1 Progressing   Met 11/16/2020    The patient will increase left knee AROM to 0-105 3 Progressing   12/22/2020    Decrease left knee circumference by 0.5" to facilitate ROM goals   Progressing 12/22/2020     Pt to ambulate community distances " with SPC independently and safely 3 Progressing   Met 11/16/2020    Pt to perform bed mobility and transfers independently  3 Progressing      Met 11/16/2020     New: Pt will ambulate multi-directionally without difficulty  2                                                            Long Term Goals (LTG) # weeks Goal Review Date Reviewed Date Met   The patient will improve the Lower Extremity Functional Scale to less than 46% Disability 6 Progressing 10/21/2020     The patient will increase left knee AROM to WFL  6 Progressing 10/21/2020 12/22/2020    The patient will increase left LE MMT to WFL in order to return to independent home management 6 Progressing 12/22/2020     Pt will be able to ambulate community distances independently and safely  6 Progressing 10/21/2020 12/22/2020    NEW: Pt will reciprocally ascend and descend a flight of stairs without pain 4          NEW: Pt will get on and off ground independently as required for job duties  4                  Plan   Updated POC 12/22/2020 to 1/22/2021  Continue to treat and progress per updated POC and pt tolerance.        Rakesh Calle, PTA

## 2020-12-30 ENCOUNTER — CLINICAL SUPPORT (OUTPATIENT)
Dept: REHABILITATION | Facility: HOSPITAL | Age: 60
End: 2020-12-30
Attending: ORTHOPAEDIC SURGERY
Payer: MEDICAID

## 2020-12-30 DIAGNOSIS — R26.9 ABNORMAL GAIT: ICD-10-CM

## 2020-12-30 DIAGNOSIS — M25.662 DECREASED RANGE OF MOTION (ROM) OF LEFT KNEE: ICD-10-CM

## 2020-12-30 DIAGNOSIS — R29.898 LEFT LEG WEAKNESS: ICD-10-CM

## 2020-12-30 PROCEDURE — 97110 THERAPEUTIC EXERCISES: CPT | Mod: PN,CQ

## 2020-12-30 NOTE — PROGRESS NOTES
"  Physical Therapy Daily Treatment Note     Name: Sonia GIL [facility]  Clinic Number: [id number]    Therapy Diagnosis:        Encounter Diagnoses   Name Primary?    Decreased range of motion (ROM) of left knee      Abnormal gait      Left leg weakness        Physician: Dorina Murillo NP    Visit Date: 12/30/2020  Physician Orders: PT Eval and Treat  Medical Diagnosis from Referral: Z96.652 (ICD-10-CM) - Status post total knee replacement, left     Evaluation Date: 10/21/2020  Plan of Care Certification Period: 10/21/2020 - 12/28/2020  Authorization Period Start - Expiration: 12/1/2020 - 1/31/2021  Visit # / Visits authorized: 8/12 (new auth)  (POC 18/18)    Time In: 1515  Time Out:  1609  Total Billable Time: 54 minutes    Precautions: Standard    Subjective     Pt reports: Improved knee pain this visit. Tight behind L knee  She was compliant with home exercise program.  Response to previous treatment: Positive  Functional change:     Pain: 2/10   Post: 0/10  Location: left posterior knee and anterior knee (soreness)     Objective       Manual Therapy 0 min:  Retrograde massage for edema- x10  IASTM to posterior knee/hamstrings/gastroc and anterior knee/quad-NP  Prone anterior proximal tibial glides Grade II/III-NP    Sonia participated in neuromuscular re-education activities to improve: Kinesthetic awareness during gait for 23 minutes. The following activities were included:    TM 1.4 -1.6 mph x 8'  to improve gait quality  Mini squats 2 x 10  (verbal cues for form - glute activation and no UE support)  Step ups 8" step 2 x 10 (using LLE concentrically ascending and eccentrically descending)  Eccentric step down off 4" step (LLE on step) 2 x 10 (requires hands on // bar)  TKE with ball x30 hold 5 sec (cues to activate quads and glutes)-NP  Lateral stepping with in claros, x1 lap YTB at ankle  Sonia  1:1 therapeutic exercises to develop strength, endurance, ROM and flexibility for 30 minutes " "including:    Recumbent Bike: 5 min Level 2 Seat #10 -NP    Heel slides  x 20 with 10 sec hold with strap  Prone quad stretch/knee flexion with strap 10 x 10 sec  Hamstring stretch 3 x 30 sec   SLR  2# 2 x10  Bridging 3 x 10  LAQ  3x10 3#     Not performed:  Calf stretch on 1/2 roll 3 x 30 sec  Standing hip abduction 3 x 5 reps B (holding // bar)-NP  Standing heel raises 2x10    Education provided:   - Continue HEP  -Correct gait sequence    Written Home Exercises Provided: Patient instructed to cont prior HEP.  Exercises were reviewed and Sonia was able to demonstrate them prior to the end of the session.  Sonia demonstrated good  understanding of the education provided.     See EMR under Patient Instructions for exercises provided prior visit.    Assessment   Pt continues to have quad weakness on L side effecting gait and quality with step downs; requires use of UE's. Pt required VC to use less UE's and land on heel during step downs for increased quad activation promoting strengthening. Pt able to perform 8' on TM with no adverse effects with improved gait.    Sonia is progressing well towards her goals.   Pt prognosis is Good.     Pt will continue to benefit from skilled outpatient physical therapy to address the deficits listed in the problem list box on initial evaluation, provide pt/family education and to maximize pt's level of independence in the home and community environment.     Pt's spiritual, cultural and educational needs considered and pt agreeable to plan of care and goals.    Anticipated barriers to physical therapy: none    Goals:   Short Term Goals (STG) # weeks Goal Review Date Reviewed Date Met   Pt will demonstrate independence with initial HEP to facilitate therex progression and improvements in functional mobility 1 Progressing   Met 11/16/2020    The patient will increase left knee AROM to 0-105 3 Progressing   12/22/2020    Decrease left knee circumference by 0.5" to facilitate ROM " goals   Progressing 12/22/2020     Pt to ambulate community distances with SPC independently and safely 3 Progressing   Met 11/16/2020    Pt to perform bed mobility and transfers independently  3 Progressing      Met 11/16/2020     New: Pt will ambulate multi-directionally without difficulty  2                                                            Long Term Goals (LTG) # weeks Goal Review Date Reviewed Date Met   The patient will improve the Lower Extremity Functional Scale to less than 46% Disability 6 Progressing 10/21/2020     The patient will increase left knee AROM to WFL  6 Progressing 10/21/2020 12/22/2020    The patient will increase left LE MMT to WFL in order to return to independent home management 6 Progressing 12/22/2020     Pt will be able to ambulate community distances independently and safely  6 Progressing 10/21/2020 12/22/2020    NEW: Pt will reciprocally ascend and descend a flight of stairs without pain 4          NEW: Pt will get on and off ground independently as required for job duties  4                  Plan   Updated POC 12/22/2020 to 1/22/2021  Continue to treat and progress per updated POC and pt tolerance.        Rakesh Calle, PTA

## 2021-01-05 ENCOUNTER — CLINICAL SUPPORT (OUTPATIENT)
Dept: REHABILITATION | Facility: HOSPITAL | Age: 61
End: 2021-01-05
Attending: ORTHOPAEDIC SURGERY
Payer: MEDICAID

## 2021-01-05 DIAGNOSIS — R29.898 LEFT LEG WEAKNESS: ICD-10-CM

## 2021-01-05 DIAGNOSIS — R26.9 ABNORMAL GAIT: ICD-10-CM

## 2021-01-05 DIAGNOSIS — M25.662 DECREASED RANGE OF MOTION (ROM) OF LEFT KNEE: ICD-10-CM

## 2021-01-05 PROCEDURE — 97110 THERAPEUTIC EXERCISES: CPT | Mod: PN

## 2021-01-12 ENCOUNTER — OFFICE VISIT (OUTPATIENT)
Dept: ORTHOPEDICS | Facility: CLINIC | Age: 61
End: 2021-01-12
Payer: MEDICAID

## 2021-01-12 VITALS — WEIGHT: 285 LBS | HEIGHT: 64 IN | BODY MASS INDEX: 48.65 KG/M2

## 2021-01-12 DIAGNOSIS — Z96.652 STATUS POST TOTAL KNEE REPLACEMENT, LEFT: Primary | ICD-10-CM

## 2021-01-12 PROCEDURE — 99024 PR POST-OP FOLLOW-UP VISIT: ICD-10-PCS | Mod: S$GLB,,, | Performed by: ORTHOPAEDIC SURGERY

## 2021-01-12 PROCEDURE — 99024 POSTOP FOLLOW-UP VISIT: CPT | Mod: S$GLB,,, | Performed by: ORTHOPAEDIC SURGERY

## 2021-01-12 RX ORDER — LIDOCAINE AND PRILOCAINE 25; 25 MG/G; MG/G
2.5 CREAM TOPICAL
COMMUNITY
Start: 2021-01-11 | End: 2023-07-18

## 2021-01-12 RX ORDER — METRONIDAZOLE 7.5 MG/G
0.75 CREAM TOPICAL
COMMUNITY
Start: 2021-01-11 | End: 2021-07-26 | Stop reason: SDUPTHER

## 2021-01-12 RX ORDER — HYDROCORTISONE 25 MG/G
2.5 CREAM TOPICAL
COMMUNITY
Start: 2021-01-11 | End: 2021-07-26 | Stop reason: SDUPTHER

## 2021-01-13 DIAGNOSIS — F41.8 DEPRESSION WITH ANXIETY: ICD-10-CM

## 2021-01-13 RX ORDER — ALPRAZOLAM 1 MG/1
1 TABLET ORAL DAILY
Qty: 30 TABLET | Refills: 0 | OUTPATIENT
Start: 2021-01-13

## 2021-01-19 ENCOUNTER — PATIENT MESSAGE (OUTPATIENT)
Dept: FAMILY MEDICINE | Facility: CLINIC | Age: 61
End: 2021-01-19

## 2021-02-21 ENCOUNTER — PATIENT MESSAGE (OUTPATIENT)
Dept: FAMILY MEDICINE | Facility: CLINIC | Age: 61
End: 2021-02-21

## 2021-02-21 DIAGNOSIS — F41.8 DEPRESSION WITH ANXIETY: ICD-10-CM

## 2021-02-21 DIAGNOSIS — M25.50 ARTHRALGIA, UNSPECIFIED JOINT: ICD-10-CM

## 2021-02-21 DIAGNOSIS — M79.7 FIBROMYALGIA: ICD-10-CM

## 2021-02-21 DIAGNOSIS — I10 ESSENTIAL HYPERTENSION: ICD-10-CM

## 2021-02-23 RX ORDER — HYDROCHLOROTHIAZIDE 25 MG/1
25 TABLET ORAL DAILY
Qty: 30 TABLET | Refills: 1 | Status: SHIPPED | OUTPATIENT
Start: 2021-02-23 | End: 2021-03-23 | Stop reason: SDUPTHER

## 2021-02-23 RX ORDER — AMLODIPINE BESYLATE 2.5 MG/1
2.5 TABLET ORAL DAILY
Qty: 30 TABLET | Refills: 1 | Status: SHIPPED | OUTPATIENT
Start: 2021-02-23 | End: 2021-03-23 | Stop reason: SDUPTHER

## 2021-02-23 RX ORDER — LOSARTAN POTASSIUM 100 MG/1
50 TABLET ORAL DAILY
Qty: 30 TABLET | Refills: 1
Start: 2021-02-23 | End: 2021-02-24 | Stop reason: SDUPTHER

## 2021-02-23 RX ORDER — MELOXICAM 15 MG/1
15 TABLET ORAL DAILY
Qty: 30 TABLET | Refills: 1 | Status: SHIPPED | OUTPATIENT
Start: 2021-02-23 | End: 2021-03-23 | Stop reason: SDUPTHER

## 2021-02-23 RX ORDER — DULOXETIN HYDROCHLORIDE 60 MG/1
60 CAPSULE, DELAYED RELEASE ORAL DAILY
Qty: 30 CAPSULE | Refills: 1 | Status: SHIPPED | OUTPATIENT
Start: 2021-02-23 | End: 2021-03-23 | Stop reason: SDUPTHER

## 2021-02-24 DIAGNOSIS — I10 ESSENTIAL HYPERTENSION: ICD-10-CM

## 2021-02-24 RX ORDER — LOSARTAN POTASSIUM 100 MG/1
50 TABLET ORAL DAILY
Qty: 30 TABLET | Refills: 1
Start: 2021-02-24 | End: 2021-03-23 | Stop reason: SDUPTHER

## 2021-02-25 ENCOUNTER — TELEPHONE (OUTPATIENT)
Dept: FAMILY MEDICINE | Facility: CLINIC | Age: 61
End: 2021-02-25

## 2021-03-22 ENCOUNTER — LAB VISIT (OUTPATIENT)
Dept: LAB | Facility: HOSPITAL | Age: 61
End: 2021-03-22
Attending: NURSE PRACTITIONER
Payer: MEDICAID

## 2021-03-22 DIAGNOSIS — R00.0 TACHYCARDIA, UNSPECIFIED: Primary | ICD-10-CM

## 2021-03-22 DIAGNOSIS — Z83.438 FAMILY HISTORY OF HYPERLIPIDEMIA: ICD-10-CM

## 2021-03-22 DIAGNOSIS — R73.9 BLOOD GLUCOSE ELEVATED: ICD-10-CM

## 2021-03-22 LAB
ALBUMIN SERPL BCP-MCNC: 3.8 G/DL (ref 3.5–5.2)
ALP SERPL-CCNC: 66 U/L (ref 55–135)
ALT SERPL W/O P-5'-P-CCNC: 20 U/L (ref 10–44)
ANION GAP SERPL CALC-SCNC: 10 MMOL/L (ref 8–16)
AST SERPL-CCNC: 19 U/L (ref 10–40)
BASOPHILS # BLD AUTO: 0.04 K/UL (ref 0–0.2)
BASOPHILS NFR BLD: 0.6 % (ref 0–1.9)
BILIRUB SERPL-MCNC: 0.9 MG/DL (ref 0.1–1)
BUN SERPL-MCNC: 18 MG/DL (ref 6–20)
CALCIUM SERPL-MCNC: 9.4 MG/DL (ref 8.7–10.5)
CHLORIDE SERPL-SCNC: 102 MMOL/L (ref 95–110)
CHOLEST SERPL-MCNC: 181 MG/DL (ref 120–199)
CHOLEST/HDLC SERPL: 3.9 {RATIO} (ref 2–5)
CO2 SERPL-SCNC: 29 MMOL/L (ref 23–29)
CREAT SERPL-MCNC: 0.8 MG/DL (ref 0.5–1.4)
DIFFERENTIAL METHOD: ABNORMAL
EOSINOPHIL # BLD AUTO: 0.3 K/UL (ref 0–0.5)
EOSINOPHIL NFR BLD: 3.9 % (ref 0–8)
ERYTHROCYTE [DISTWIDTH] IN BLOOD BY AUTOMATED COUNT: 13.8 % (ref 11.5–14.5)
EST. GFR  (AFRICAN AMERICAN): >60 ML/MIN/1.73 M^2
EST. GFR  (NON AFRICAN AMERICAN): >60 ML/MIN/1.73 M^2
ESTIMATED AVG GLUCOSE: 123 MG/DL (ref 68–131)
GLUCOSE SERPL-MCNC: 135 MG/DL (ref 70–110)
HBA1C MFR BLD: 5.9 % (ref 4.5–6.2)
HCT VFR BLD AUTO: 39.2 % (ref 37–48.5)
HDLC SERPL-MCNC: 47 MG/DL (ref 40–75)
HDLC SERPL: 26 % (ref 20–50)
HGB BLD-MCNC: 12.4 G/DL (ref 12–16)
IMM GRANULOCYTES # BLD AUTO: 0.05 K/UL (ref 0–0.04)
IMM GRANULOCYTES NFR BLD AUTO: 0.7 % (ref 0–0.5)
LDLC SERPL CALC-MCNC: 105.2 MG/DL (ref 63–159)
LYMPHOCYTES # BLD AUTO: 1.7 K/UL (ref 1–4.8)
LYMPHOCYTES NFR BLD: 24.6 % (ref 18–48)
MCH RBC QN AUTO: 28 PG (ref 27–31)
MCHC RBC AUTO-ENTMCNC: 31.6 G/DL (ref 32–36)
MCV RBC AUTO: 89 FL (ref 82–98)
MONOCYTES # BLD AUTO: 0.6 K/UL (ref 0.3–1)
MONOCYTES NFR BLD: 8.1 % (ref 4–15)
NEUTROPHILS # BLD AUTO: 4.3 K/UL (ref 1.8–7.7)
NEUTROPHILS NFR BLD: 62.1 % (ref 38–73)
NONHDLC SERPL-MCNC: 134 MG/DL
NRBC BLD-RTO: 0 /100 WBC
PLATELET # BLD AUTO: 324 K/UL (ref 150–350)
PMV BLD AUTO: 9.1 FL (ref 9.2–12.9)
POTASSIUM SERPL-SCNC: 4 MMOL/L (ref 3.5–5.1)
PROT SERPL-MCNC: 7.4 G/DL (ref 6–8.4)
RBC # BLD AUTO: 4.43 M/UL (ref 4–5.4)
SODIUM SERPL-SCNC: 141 MMOL/L (ref 136–145)
T4 FREE SERPL-MCNC: 0.72 NG/DL (ref 0.71–1.51)
TRIGL SERPL-MCNC: 144 MG/DL (ref 30–150)
TSH SERPL DL<=0.005 MIU/L-ACNC: 5.03 UIU/ML (ref 0.34–5.6)
WBC # BLD AUTO: 6.88 K/UL (ref 3.9–12.7)

## 2021-03-22 PROCEDURE — 36415 COLL VENOUS BLD VENIPUNCTURE: CPT | Performed by: NURSE PRACTITIONER

## 2021-03-22 PROCEDURE — 80053 COMPREHEN METABOLIC PANEL: CPT | Performed by: NURSE PRACTITIONER

## 2021-03-22 PROCEDURE — 80061 LIPID PANEL: CPT | Performed by: NURSE PRACTITIONER

## 2021-03-22 PROCEDURE — 84439 ASSAY OF FREE THYROXINE: CPT | Performed by: NURSE PRACTITIONER

## 2021-03-22 PROCEDURE — 84443 ASSAY THYROID STIM HORMONE: CPT | Performed by: NURSE PRACTITIONER

## 2021-03-22 PROCEDURE — 83036 HEMOGLOBIN GLYCOSYLATED A1C: CPT | Performed by: NURSE PRACTITIONER

## 2021-03-22 PROCEDURE — 85025 COMPLETE CBC W/AUTO DIFF WBC: CPT | Performed by: NURSE PRACTITIONER

## 2021-03-23 ENCOUNTER — OFFICE VISIT (OUTPATIENT)
Dept: FAMILY MEDICINE | Facility: CLINIC | Age: 61
End: 2021-03-23
Payer: MEDICAID

## 2021-03-23 VITALS
SYSTOLIC BLOOD PRESSURE: 126 MMHG | TEMPERATURE: 99 F | DIASTOLIC BLOOD PRESSURE: 80 MMHG | WEIGHT: 289 LBS | HEIGHT: 64 IN | BODY MASS INDEX: 49.34 KG/M2 | OXYGEN SATURATION: 97 % | HEART RATE: 88 BPM

## 2021-03-23 DIAGNOSIS — Z12.11 ENCOUNTER FOR SCREENING COLONOSCOPY: ICD-10-CM

## 2021-03-23 DIAGNOSIS — M79.7 FIBROMYALGIA: ICD-10-CM

## 2021-03-23 DIAGNOSIS — R00.0 TACHYCARDIA: Primary | ICD-10-CM

## 2021-03-23 DIAGNOSIS — J45.909 ASTHMA, UNSPECIFIED ASTHMA SEVERITY, UNSPECIFIED WHETHER COMPLICATED, UNSPECIFIED WHETHER PERSISTENT: ICD-10-CM

## 2021-03-23 DIAGNOSIS — M25.50 ARTHRALGIA, UNSPECIFIED JOINT: ICD-10-CM

## 2021-03-23 DIAGNOSIS — F41.8 DEPRESSION WITH ANXIETY: ICD-10-CM

## 2021-03-23 DIAGNOSIS — I10 ESSENTIAL HYPERTENSION: ICD-10-CM

## 2021-03-23 PROCEDURE — 99214 PR OFFICE/OUTPT VISIT, EST, LEVL IV, 30-39 MIN: ICD-10-PCS | Mod: S$PBB,,, | Performed by: NURSE PRACTITIONER

## 2021-03-23 PROCEDURE — 99214 OFFICE O/P EST MOD 30 MIN: CPT | Mod: S$PBB,,, | Performed by: NURSE PRACTITIONER

## 2021-03-23 PROCEDURE — 99215 OFFICE O/P EST HI 40 MIN: CPT | Performed by: NURSE PRACTITIONER

## 2021-03-23 RX ORDER — CETIRIZINE HYDROCHLORIDE 10 MG/1
10 TABLET ORAL DAILY
Qty: 90 TABLET | Refills: 1 | Status: SHIPPED | OUTPATIENT
Start: 2021-03-23 | End: 2021-07-26 | Stop reason: SDUPTHER

## 2021-03-23 RX ORDER — AMLODIPINE BESYLATE 2.5 MG/1
2.5 TABLET ORAL DAILY
Qty: 90 TABLET | Refills: 1 | Status: SHIPPED | OUTPATIENT
Start: 2021-03-23 | End: 2021-07-26 | Stop reason: SDUPTHER

## 2021-03-23 RX ORDER — ALBUTEROL SULFATE 90 UG/1
2 AEROSOL, METERED RESPIRATORY (INHALATION) EVERY 6 HOURS PRN
Qty: 18 G | Refills: 1 | Status: SHIPPED | OUTPATIENT
Start: 2021-03-23 | End: 2021-07-26 | Stop reason: SDUPTHER

## 2021-03-23 RX ORDER — METOPROLOL SUCCINATE 25 MG/1
25 TABLET, EXTENDED RELEASE ORAL DAILY
Qty: 90 TABLET | Refills: 1 | Status: SHIPPED | OUTPATIENT
Start: 2021-03-23 | End: 2021-07-26 | Stop reason: SDUPTHER

## 2021-03-23 RX ORDER — POTASSIUM CHLORIDE 750 MG/1
10 CAPSULE, EXTENDED RELEASE ORAL DAILY
Qty: 90 CAPSULE | Refills: 1 | Status: SHIPPED | OUTPATIENT
Start: 2021-03-23 | End: 2022-04-21 | Stop reason: SDUPTHER

## 2021-03-23 RX ORDER — MINOCYCLINE HYDROCHLORIDE 100 MG/1
1 CAPSULE ORAL 2 TIMES DAILY PRN
COMMUNITY
Start: 2021-02-18 | End: 2023-02-07

## 2021-03-23 RX ORDER — MONTELUKAST SODIUM 10 MG/1
10 TABLET ORAL NIGHTLY
Qty: 90 TABLET | Refills: 1 | Status: SHIPPED | OUTPATIENT
Start: 2021-03-23 | End: 2021-06-29 | Stop reason: SDUPTHER

## 2021-03-23 RX ORDER — MELOXICAM 15 MG/1
15 TABLET ORAL DAILY
Qty: 30 TABLET | Refills: 1 | Status: SHIPPED | OUTPATIENT
Start: 2021-03-23 | End: 2021-06-16 | Stop reason: SDUPTHER

## 2021-03-23 RX ORDER — MONTELUKAST SODIUM 10 MG/1
1 TABLET ORAL NIGHTLY
COMMUNITY
Start: 2021-02-18 | End: 2021-03-23 | Stop reason: SDUPTHER

## 2021-03-23 RX ORDER — HYDROCHLOROTHIAZIDE 25 MG/1
25 TABLET ORAL DAILY
Qty: 90 TABLET | Refills: 1 | Status: SHIPPED | OUTPATIENT
Start: 2021-03-23 | End: 2021-07-26 | Stop reason: SDUPTHER

## 2021-03-23 RX ORDER — LOSARTAN POTASSIUM 100 MG/1
50 TABLET ORAL DAILY
Qty: 90 TABLET | Refills: 1 | Status: SHIPPED | OUTPATIENT
Start: 2021-03-23 | End: 2021-07-26 | Stop reason: SDUPTHER

## 2021-03-23 RX ORDER — ALPRAZOLAM 1 MG/1
1 TABLET ORAL DAILY
Qty: 30 TABLET | Refills: 0 | Status: SHIPPED | OUTPATIENT
Start: 2021-03-23 | End: 2021-07-26 | Stop reason: SDUPTHER

## 2021-03-23 RX ORDER — FUROSEMIDE 20 MG/1
20 TABLET ORAL DAILY
Qty: 90 TABLET | Refills: 1 | Status: SHIPPED | OUTPATIENT
Start: 2021-03-23 | End: 2022-01-25

## 2021-03-23 RX ORDER — DULOXETIN HYDROCHLORIDE 60 MG/1
60 CAPSULE, DELAYED RELEASE ORAL DAILY
Qty: 90 CAPSULE | Refills: 1 | Status: SHIPPED | OUTPATIENT
Start: 2021-03-23 | End: 2021-07-26 | Stop reason: SDUPTHER

## 2021-04-13 ENCOUNTER — OFFICE VISIT (OUTPATIENT)
Dept: ORTHOPEDICS | Facility: CLINIC | Age: 61
End: 2021-04-13
Payer: MEDICAID

## 2021-04-13 VITALS
HEART RATE: 73 BPM | SYSTOLIC BLOOD PRESSURE: 126 MMHG | DIASTOLIC BLOOD PRESSURE: 82 MMHG | HEIGHT: 64 IN | BODY MASS INDEX: 49.34 KG/M2 | WEIGHT: 289 LBS

## 2021-04-13 DIAGNOSIS — M70.50 PES ANSERINE BURSITIS: ICD-10-CM

## 2021-04-13 DIAGNOSIS — Z96.652 HISTORY OF TOTAL KNEE ARTHROPLASTY, LEFT: Primary | ICD-10-CM

## 2021-04-13 DIAGNOSIS — Z96.651 HISTORY OF TOTAL KNEE ARTHROPLASTY, RIGHT: ICD-10-CM

## 2021-04-13 PROCEDURE — 99213 OFFICE O/P EST LOW 20 MIN: CPT | Mod: 25,S$GLB,, | Performed by: ORTHOPAEDIC SURGERY

## 2021-04-13 PROCEDURE — 20551 TENDON ORIGIN: ICD-10-PCS | Mod: LT,S$GLB,, | Performed by: ORTHOPAEDIC SURGERY

## 2021-04-13 PROCEDURE — 99213 PR OFFICE/OUTPT VISIT, EST, LEVL III, 20-29 MIN: ICD-10-PCS | Mod: 25,S$GLB,, | Performed by: ORTHOPAEDIC SURGERY

## 2021-04-13 PROCEDURE — 20551 NJX 1 TENDON ORIGIN/INSJ: CPT | Mod: LT,S$GLB,, | Performed by: ORTHOPAEDIC SURGERY

## 2021-04-13 RX ORDER — METHYLPREDNISOLONE ACETATE 40 MG/ML
40 INJECTION, SUSPENSION INTRA-ARTICULAR; INTRALESIONAL; INTRAMUSCULAR; SOFT TISSUE
Status: DISCONTINUED | OUTPATIENT
Start: 2021-04-13 | End: 2021-04-13 | Stop reason: HOSPADM

## 2021-04-13 RX ADMIN — METHYLPREDNISOLONE ACETATE 40 MG: 40 INJECTION, SUSPENSION INTRA-ARTICULAR; INTRALESIONAL; INTRAMUSCULAR; SOFT TISSUE at 10:04

## 2021-04-29 ENCOUNTER — PATIENT MESSAGE (OUTPATIENT)
Dept: RESEARCH | Facility: HOSPITAL | Age: 61
End: 2021-04-29

## 2021-06-16 DIAGNOSIS — M25.50 ARTHRALGIA, UNSPECIFIED JOINT: ICD-10-CM

## 2021-06-16 RX ORDER — MELOXICAM 15 MG/1
15 TABLET ORAL DAILY
Qty: 30 TABLET | Refills: 1 | Status: SHIPPED | OUTPATIENT
Start: 2021-06-16 | End: 2021-07-26 | Stop reason: SDUPTHER

## 2021-06-22 ENCOUNTER — DOCUMENTATION ONLY (OUTPATIENT)
Dept: REHABILITATION | Facility: HOSPITAL | Age: 61
End: 2021-06-22

## 2021-06-22 PROBLEM — M25.662 DECREASED RANGE OF MOTION (ROM) OF LEFT KNEE: Status: RESOLVED | Noted: 2020-10-21 | Resolved: 2021-06-22

## 2021-06-22 PROBLEM — R26.9 ABNORMAL GAIT: Status: RESOLVED | Noted: 2020-10-21 | Resolved: 2021-06-22

## 2021-06-22 PROBLEM — R29.898 LEFT LEG WEAKNESS: Status: RESOLVED | Noted: 2020-10-21 | Resolved: 2021-06-22

## 2021-06-29 ENCOUNTER — OFFICE VISIT (OUTPATIENT)
Dept: FAMILY MEDICINE | Facility: CLINIC | Age: 61
End: 2021-06-29
Payer: MEDICAID

## 2021-06-29 ENCOUNTER — TELEPHONE (OUTPATIENT)
Dept: FAMILY MEDICINE | Facility: CLINIC | Age: 61
End: 2021-06-29

## 2021-06-29 VITALS
WEIGHT: 293 LBS | HEIGHT: 64 IN | HEART RATE: 85 BPM | SYSTOLIC BLOOD PRESSURE: 136 MMHG | BODY MASS INDEX: 50.02 KG/M2 | OXYGEN SATURATION: 98 % | TEMPERATURE: 99 F | DIASTOLIC BLOOD PRESSURE: 82 MMHG

## 2021-06-29 DIAGNOSIS — M12.812 ROTATOR CUFF ARTHROPATHY, LEFT: Primary | ICD-10-CM

## 2021-06-29 DIAGNOSIS — Z83.438 FAMILY HISTORY OF ELEVATED BLOOD LIPIDS: ICD-10-CM

## 2021-06-29 DIAGNOSIS — R60.9 DEPENDENT EDEMA: ICD-10-CM

## 2021-06-29 DIAGNOSIS — I10 ESSENTIAL HYPERTENSION: ICD-10-CM

## 2021-06-29 DIAGNOSIS — L98.9 SKIN LESION OF FACE: ICD-10-CM

## 2021-06-29 DIAGNOSIS — H10.9 BACTERIAL CONJUNCTIVITIS OF BOTH EYES: Primary | ICD-10-CM

## 2021-06-29 DIAGNOSIS — Z12.11 ENCOUNTER FOR SCREENING COLONOSCOPY: ICD-10-CM

## 2021-06-29 DIAGNOSIS — R73.9 HYPERGLYCEMIA: ICD-10-CM

## 2021-06-29 DIAGNOSIS — B96.89 BACTERIAL CONJUNCTIVITIS OF BOTH EYES: Primary | ICD-10-CM

## 2021-06-29 DIAGNOSIS — M25.512 CHRONIC LEFT SHOULDER PAIN: ICD-10-CM

## 2021-06-29 DIAGNOSIS — R53.83 FATIGUE, UNSPECIFIED TYPE: ICD-10-CM

## 2021-06-29 DIAGNOSIS — G89.29 CHRONIC LEFT SHOULDER PAIN: ICD-10-CM

## 2021-06-29 DIAGNOSIS — J45.909 ASTHMA, UNSPECIFIED ASTHMA SEVERITY, UNSPECIFIED WHETHER COMPLICATED, UNSPECIFIED WHETHER PERSISTENT: ICD-10-CM

## 2021-06-29 PROCEDURE — 99215 OFFICE O/P EST HI 40 MIN: CPT | Performed by: NURSE PRACTITIONER

## 2021-06-29 PROCEDURE — 99214 OFFICE O/P EST MOD 30 MIN: CPT | Mod: S$PBB,,, | Performed by: NURSE PRACTITIONER

## 2021-06-29 PROCEDURE — 99214 PR OFFICE/OUTPT VISIT, EST, LEVL IV, 30-39 MIN: ICD-10-PCS | Mod: S$PBB,,, | Performed by: NURSE PRACTITIONER

## 2021-06-29 RX ORDER — MONTELUKAST SODIUM 10 MG/1
10 TABLET ORAL NIGHTLY
Qty: 90 TABLET | Refills: 1 | Status: SHIPPED | OUTPATIENT
Start: 2021-06-29 | End: 2021-07-26 | Stop reason: SDUPTHER

## 2021-06-29 RX ORDER — MUPIROCIN 20 MG/G
OINTMENT TOPICAL 3 TIMES DAILY
Qty: 15 G | Refills: 0 | Status: SHIPPED | OUTPATIENT
Start: 2021-06-29

## 2021-06-29 RX ORDER — GENTAMICIN SULFATE 3 MG/ML
1 SOLUTION/ DROPS OPHTHALMIC EVERY 4 HOURS
Qty: 7 ML | Refills: 0 | Status: SHIPPED | OUTPATIENT
Start: 2021-06-29 | End: 2021-07-06

## 2021-07-23 ENCOUNTER — LAB VISIT (OUTPATIENT)
Dept: LAB | Facility: HOSPITAL | Age: 61
End: 2021-07-23
Attending: NURSE PRACTITIONER
Payer: MEDICAID

## 2021-07-23 DIAGNOSIS — I10 ESSENTIAL HYPERTENSION: ICD-10-CM

## 2021-07-23 DIAGNOSIS — Z83.438 FAMILY HISTORY OF ELEVATED BLOOD LIPIDS: ICD-10-CM

## 2021-07-23 DIAGNOSIS — R53.83 FATIGUE, UNSPECIFIED TYPE: ICD-10-CM

## 2021-07-23 DIAGNOSIS — R73.9 HYPERGLYCEMIA: ICD-10-CM

## 2021-07-23 DIAGNOSIS — R60.9 DEPENDENT EDEMA: ICD-10-CM

## 2021-07-23 LAB
ALBUMIN SERPL BCP-MCNC: 3.9 G/DL (ref 3.5–5.2)
ALBUMIN/CREAT UR: 9.8 UG/MG (ref 0–30)
ALP SERPL-CCNC: 80 U/L (ref 55–135)
ALT SERPL W/O P-5'-P-CCNC: 23 U/L (ref 10–44)
ANION GAP SERPL CALC-SCNC: 13 MMOL/L (ref 8–16)
AST SERPL-CCNC: 22 U/L (ref 10–40)
BASOPHILS # BLD AUTO: 0.05 K/UL (ref 0–0.2)
BASOPHILS NFR BLD: 0.8 % (ref 0–1.9)
BILIRUB SERPL-MCNC: 1.2 MG/DL (ref 0.1–1)
BNP SERPL-MCNC: 15 PG/ML (ref 0–99)
BUN SERPL-MCNC: 18 MG/DL (ref 6–20)
CALCIUM SERPL-MCNC: 9.1 MG/DL (ref 8.7–10.5)
CHLORIDE SERPL-SCNC: 100 MMOL/L (ref 95–110)
CHOLEST SERPL-MCNC: 182 MG/DL (ref 120–199)
CHOLEST/HDLC SERPL: 4 {RATIO} (ref 2–5)
CO2 SERPL-SCNC: 27 MMOL/L (ref 23–29)
CREAT SERPL-MCNC: 0.9 MG/DL (ref 0.5–1.4)
CREAT UR-MCNC: 181 MG/DL (ref 15–325)
DIFFERENTIAL METHOD: ABNORMAL
EOSINOPHIL # BLD AUTO: 0.3 K/UL (ref 0–0.5)
EOSINOPHIL NFR BLD: 4 % (ref 0–8)
ERYTHROCYTE [DISTWIDTH] IN BLOOD BY AUTOMATED COUNT: 13.3 % (ref 11.5–14.5)
EST. GFR  (AFRICAN AMERICAN): >60 ML/MIN/1.73 M^2
EST. GFR  (NON AFRICAN AMERICAN): >60 ML/MIN/1.73 M^2
ESTIMATED AVG GLUCOSE: 117 MG/DL (ref 68–131)
GLUCOSE SERPL-MCNC: 157 MG/DL (ref 70–110)
HBA1C MFR BLD: 5.7 % (ref 4.5–6.2)
HCT VFR BLD AUTO: 40.6 % (ref 37–48.5)
HDLC SERPL-MCNC: 46 MG/DL (ref 40–75)
HDLC SERPL: 25.3 % (ref 20–50)
HGB BLD-MCNC: 13.2 G/DL (ref 12–16)
IMM GRANULOCYTES # BLD AUTO: 0.05 K/UL (ref 0–0.04)
IMM GRANULOCYTES NFR BLD AUTO: 0.8 % (ref 0–0.5)
LDLC SERPL CALC-MCNC: 108 MG/DL (ref 63–159)
LYMPHOCYTES # BLD AUTO: 1.4 K/UL (ref 1–4.8)
LYMPHOCYTES NFR BLD: 21.2 % (ref 18–48)
MCH RBC QN AUTO: 28.9 PG (ref 27–31)
MCHC RBC AUTO-ENTMCNC: 32.5 G/DL (ref 32–36)
MCV RBC AUTO: 89 FL (ref 82–98)
MICROALBUMIN UR DL<=1MG/L-MCNC: 17.8 UG/ML
MONOCYTES # BLD AUTO: 0.5 K/UL (ref 0.3–1)
MONOCYTES NFR BLD: 7.6 % (ref 4–15)
NEUTROPHILS # BLD AUTO: 4.3 K/UL (ref 1.8–7.7)
NEUTROPHILS NFR BLD: 65.6 % (ref 38–73)
NONHDLC SERPL-MCNC: 136 MG/DL
NRBC BLD-RTO: 0 /100 WBC
PLATELET # BLD AUTO: 346 K/UL (ref 150–450)
PMV BLD AUTO: 9.4 FL (ref 9.2–12.9)
POTASSIUM SERPL-SCNC: 3.9 MMOL/L (ref 3.5–5.1)
PROT SERPL-MCNC: 7.8 G/DL (ref 6–8.4)
RBC # BLD AUTO: 4.57 M/UL (ref 4–5.4)
SODIUM SERPL-SCNC: 140 MMOL/L (ref 136–145)
TRIGL SERPL-MCNC: 140 MG/DL (ref 30–150)
TSH SERPL DL<=0.005 MIU/L-ACNC: 4.74 UIU/ML (ref 0.34–5.6)
WBC # BLD AUTO: 6.57 K/UL (ref 3.9–12.7)

## 2021-07-23 PROCEDURE — 82570 ASSAY OF URINE CREATININE: CPT | Performed by: NURSE PRACTITIONER

## 2021-07-23 PROCEDURE — 83036 HEMOGLOBIN GLYCOSYLATED A1C: CPT | Performed by: NURSE PRACTITIONER

## 2021-07-23 PROCEDURE — 80053 COMPREHEN METABOLIC PANEL: CPT | Performed by: NURSE PRACTITIONER

## 2021-07-23 PROCEDURE — 36415 COLL VENOUS BLD VENIPUNCTURE: CPT | Performed by: NURSE PRACTITIONER

## 2021-07-23 PROCEDURE — 85025 COMPLETE CBC W/AUTO DIFF WBC: CPT | Performed by: NURSE PRACTITIONER

## 2021-07-23 PROCEDURE — 82043 UR ALBUMIN QUANTITATIVE: CPT | Performed by: NURSE PRACTITIONER

## 2021-07-23 PROCEDURE — 83880 ASSAY OF NATRIURETIC PEPTIDE: CPT | Performed by: NURSE PRACTITIONER

## 2021-07-23 PROCEDURE — 80061 LIPID PANEL: CPT | Performed by: NURSE PRACTITIONER

## 2021-07-23 PROCEDURE — 84443 ASSAY THYROID STIM HORMONE: CPT | Performed by: NURSE PRACTITIONER

## 2021-07-26 ENCOUNTER — TELEPHONE (OUTPATIENT)
Dept: FAMILY MEDICINE | Facility: CLINIC | Age: 61
End: 2021-07-26

## 2021-07-26 ENCOUNTER — OFFICE VISIT (OUTPATIENT)
Dept: FAMILY MEDICINE | Facility: CLINIC | Age: 61
End: 2021-07-26
Payer: MEDICAID

## 2021-07-26 VITALS
WEIGHT: 293 LBS | HEART RATE: 83 BPM | SYSTOLIC BLOOD PRESSURE: 128 MMHG | TEMPERATURE: 98 F | OXYGEN SATURATION: 95 % | BODY MASS INDEX: 50.02 KG/M2 | DIASTOLIC BLOOD PRESSURE: 72 MMHG | HEIGHT: 64 IN

## 2021-07-26 DIAGNOSIS — I10 ESSENTIAL HYPERTENSION: ICD-10-CM

## 2021-07-26 DIAGNOSIS — R73.9 HYPERGLYCEMIA: ICD-10-CM

## 2021-07-26 DIAGNOSIS — M25.50 ARTHRALGIA, UNSPECIFIED JOINT: ICD-10-CM

## 2021-07-26 DIAGNOSIS — Z83.438 FAMILY HISTORY OF ELEVATED BLOOD LIPIDS: ICD-10-CM

## 2021-07-26 DIAGNOSIS — J45.909 ASTHMA, UNSPECIFIED ASTHMA SEVERITY, UNSPECIFIED WHETHER COMPLICATED, UNSPECIFIED WHETHER PERSISTENT: ICD-10-CM

## 2021-07-26 DIAGNOSIS — L30.9 ECZEMA, UNSPECIFIED TYPE: ICD-10-CM

## 2021-07-26 DIAGNOSIS — R00.0 TACHYCARDIA: ICD-10-CM

## 2021-07-26 DIAGNOSIS — M79.7 FIBROMYALGIA: ICD-10-CM

## 2021-07-26 DIAGNOSIS — R60.9 DEPENDENT EDEMA: ICD-10-CM

## 2021-07-26 DIAGNOSIS — F41.8 DEPRESSION WITH ANXIETY: Primary | ICD-10-CM

## 2021-07-26 PROCEDURE — 99214 OFFICE O/P EST MOD 30 MIN: CPT | Performed by: NURSE PRACTITIONER

## 2021-07-26 PROCEDURE — 99214 OFFICE O/P EST MOD 30 MIN: CPT | Mod: S$PBB,,, | Performed by: NURSE PRACTITIONER

## 2021-07-26 PROCEDURE — 99214 PR OFFICE/OUTPT VISIT, EST, LEVL IV, 30-39 MIN: ICD-10-PCS | Mod: S$PBB,,, | Performed by: NURSE PRACTITIONER

## 2021-07-26 RX ORDER — DULOXETIN HYDROCHLORIDE 60 MG/1
60 CAPSULE, DELAYED RELEASE ORAL DAILY
Qty: 90 CAPSULE | Refills: 1 | Status: SHIPPED | OUTPATIENT
Start: 2021-07-26 | End: 2022-01-25 | Stop reason: SDUPTHER

## 2021-07-26 RX ORDER — HYDROCHLOROTHIAZIDE 25 MG/1
25 TABLET ORAL DAILY
Qty: 90 TABLET | Refills: 1 | Status: SHIPPED | OUTPATIENT
Start: 2021-07-26 | End: 2022-04-21 | Stop reason: SDUPTHER

## 2021-07-26 RX ORDER — ALBUTEROL SULFATE 90 UG/1
2 AEROSOL, METERED RESPIRATORY (INHALATION) EVERY 6 HOURS PRN
Qty: 18 G | Refills: 1 | Status: SHIPPED | OUTPATIENT
Start: 2021-07-26 | End: 2023-10-11 | Stop reason: SDUPTHER

## 2021-07-26 RX ORDER — METRONIDAZOLE 7.5 MG/G
0.75 CREAM TOPICAL 2 TIMES DAILY
Qty: 1 TUBE | Refills: 5 | Status: SHIPPED | OUTPATIENT
Start: 2021-07-26 | End: 2021-07-26 | Stop reason: SDUPTHER

## 2021-07-26 RX ORDER — CETIRIZINE HYDROCHLORIDE 10 MG/1
10 TABLET ORAL DAILY
Qty: 90 TABLET | Refills: 1 | Status: SHIPPED | OUTPATIENT
Start: 2021-07-26 | End: 2022-03-16 | Stop reason: SDUPTHER

## 2021-07-26 RX ORDER — MELOXICAM 15 MG/1
15 TABLET ORAL DAILY
Qty: 30 TABLET | Refills: 1 | Status: SHIPPED | OUTPATIENT
Start: 2021-07-26 | End: 2021-10-20 | Stop reason: SDUPTHER

## 2021-07-26 RX ORDER — LOSARTAN POTASSIUM 100 MG/1
50 TABLET ORAL DAILY
Qty: 90 TABLET | Refills: 1 | Status: SHIPPED | OUTPATIENT
Start: 2021-07-26 | End: 2021-08-10 | Stop reason: SDUPTHER

## 2021-07-26 RX ORDER — AMLODIPINE BESYLATE 2.5 MG/1
2.5 TABLET ORAL DAILY
Qty: 90 TABLET | Refills: 1 | Status: SHIPPED | OUTPATIENT
Start: 2021-07-26 | End: 2022-01-25 | Stop reason: SDUPTHER

## 2021-07-26 RX ORDER — ALPRAZOLAM 1 MG/1
1 TABLET ORAL DAILY
Qty: 30 TABLET | Refills: 0 | Status: SHIPPED | OUTPATIENT
Start: 2021-07-26 | End: 2021-12-20 | Stop reason: SDUPTHER

## 2021-07-26 RX ORDER — METOPROLOL SUCCINATE 25 MG/1
25 TABLET, EXTENDED RELEASE ORAL DAILY
Qty: 90 TABLET | Refills: 3 | Status: SHIPPED | OUTPATIENT
Start: 2021-07-26 | End: 2022-01-25 | Stop reason: SDUPTHER

## 2021-07-26 RX ORDER — HYDROCORTISONE 25 MG/G
2.5 CREAM TOPICAL 2 TIMES DAILY
Qty: 15 G | Refills: 1 | Status: SHIPPED | OUTPATIENT
Start: 2021-07-26

## 2021-07-26 RX ORDER — ALPRAZOLAM 1 MG/1
1 TABLET ORAL DAILY
Qty: 30 TABLET | Refills: 0 | Status: CANCELLED | OUTPATIENT
Start: 2021-07-26

## 2021-07-26 RX ORDER — HYDROCORTISONE 25 MG/G
2.5 CREAM TOPICAL 2 TIMES DAILY
Qty: 1 TUBE | Refills: 5 | Status: CANCELLED | OUTPATIENT
Start: 2021-07-26

## 2021-07-26 RX ORDER — MONTELUKAST SODIUM 10 MG/1
10 TABLET ORAL NIGHTLY
Qty: 90 TABLET | Refills: 1 | Status: SHIPPED | OUTPATIENT
Start: 2021-07-26 | End: 2022-01-25 | Stop reason: SDUPTHER

## 2021-07-26 RX ORDER — METRONIDAZOLE 7.5 MG/G
1 CREAM TOPICAL 2 TIMES DAILY
Qty: 1 TUBE | Refills: 5 | Status: SHIPPED | OUTPATIENT
Start: 2021-07-26

## 2021-08-10 DIAGNOSIS — I10 ESSENTIAL HYPERTENSION: ICD-10-CM

## 2021-08-10 RX ORDER — LOSARTAN POTASSIUM 100 MG/1
50 TABLET ORAL DAILY
Qty: 90 TABLET | Refills: 0 | Status: SHIPPED | OUTPATIENT
Start: 2021-08-10 | End: 2021-08-16 | Stop reason: SDUPTHER

## 2021-08-15 ENCOUNTER — PATIENT MESSAGE (OUTPATIENT)
Dept: FAMILY MEDICINE | Facility: CLINIC | Age: 61
End: 2021-08-15

## 2021-08-15 DIAGNOSIS — I10 ESSENTIAL HYPERTENSION: ICD-10-CM

## 2021-08-16 RX ORDER — LOSARTAN POTASSIUM 100 MG/1
100 TABLET ORAL DAILY
Qty: 90 TABLET | Refills: 1 | Status: SHIPPED | OUTPATIENT
Start: 2021-08-16 | End: 2022-02-16 | Stop reason: SDUPTHER

## 2021-09-03 ENCOUNTER — PATIENT MESSAGE (OUTPATIENT)
Dept: FAMILY MEDICINE | Facility: CLINIC | Age: 61
End: 2021-09-03

## 2021-10-20 DIAGNOSIS — M25.50 ARTHRALGIA, UNSPECIFIED JOINT: ICD-10-CM

## 2021-10-20 RX ORDER — MELOXICAM 15 MG/1
15 TABLET ORAL DAILY
Qty: 30 TABLET | Refills: 1 | Status: SHIPPED | OUTPATIENT
Start: 2021-10-20 | End: 2021-12-20

## 2021-12-20 ENCOUNTER — PATIENT MESSAGE (OUTPATIENT)
Dept: FAMILY MEDICINE | Facility: CLINIC | Age: 61
End: 2021-12-20
Payer: MEDICAID

## 2021-12-20 DIAGNOSIS — F41.8 DEPRESSION WITH ANXIETY: ICD-10-CM

## 2021-12-20 DIAGNOSIS — M25.50 ARTHRALGIA, UNSPECIFIED JOINT: ICD-10-CM

## 2021-12-22 RX ORDER — ALPRAZOLAM 1 MG/1
1 TABLET ORAL DAILY
Qty: 30 TABLET | Refills: 0 | Status: SHIPPED | OUTPATIENT
Start: 2021-12-22 | End: 2022-04-21 | Stop reason: SDUPTHER

## 2021-12-22 RX ORDER — MELOXICAM 15 MG/1
15 TABLET ORAL DAILY
Qty: 30 TABLET | Refills: 1 | Status: SHIPPED | OUTPATIENT
Start: 2021-12-22 | End: 2022-03-21 | Stop reason: SDUPTHER

## 2022-01-17 ENCOUNTER — PATIENT MESSAGE (OUTPATIENT)
Dept: FAMILY MEDICINE | Facility: CLINIC | Age: 62
End: 2022-01-17
Payer: MEDICAID

## 2022-01-24 ENCOUNTER — LAB VISIT (OUTPATIENT)
Dept: LAB | Facility: HOSPITAL | Age: 62
End: 2022-01-24
Attending: NURSE PRACTITIONER
Payer: MEDICAID

## 2022-01-24 DIAGNOSIS — I10 ESSENTIAL HYPERTENSION: ICD-10-CM

## 2022-01-24 DIAGNOSIS — R60.9 DEPENDENT EDEMA: ICD-10-CM

## 2022-01-24 DIAGNOSIS — Z83.438 FAMILY HISTORY OF ELEVATED BLOOD LIPIDS: ICD-10-CM

## 2022-01-24 DIAGNOSIS — R00.0 TACHYCARDIA: ICD-10-CM

## 2022-01-24 DIAGNOSIS — R73.9 HYPERGLYCEMIA: ICD-10-CM

## 2022-01-24 LAB
ALBUMIN SERPL BCP-MCNC: 4.2 G/DL (ref 3.5–5.2)
ALP SERPL-CCNC: 77 U/L (ref 55–135)
ALT SERPL W/O P-5'-P-CCNC: 20 U/L (ref 10–44)
ANION GAP SERPL CALC-SCNC: 11 MMOL/L (ref 8–16)
AST SERPL-CCNC: 22 U/L (ref 10–40)
BASOPHILS # BLD AUTO: 0.07 K/UL (ref 0–0.2)
BASOPHILS NFR BLD: 0.9 % (ref 0–1.9)
BILIRUB SERPL-MCNC: 1.1 MG/DL (ref 0.1–1)
BNP SERPL-MCNC: 39 PG/ML (ref 0–99)
BUN SERPL-MCNC: 17 MG/DL (ref 6–20)
CALCIUM SERPL-MCNC: 9.4 MG/DL (ref 8.7–10.5)
CHLORIDE SERPL-SCNC: 100 MMOL/L (ref 95–110)
CHOLEST SERPL-MCNC: 196 MG/DL (ref 120–199)
CHOLEST/HDLC SERPL: 4.3 {RATIO} (ref 2–5)
CO2 SERPL-SCNC: 30 MMOL/L (ref 23–29)
CREAT SERPL-MCNC: 0.9 MG/DL (ref 0.5–1.4)
DIFFERENTIAL METHOD: ABNORMAL
EOSINOPHIL # BLD AUTO: 0.3 K/UL (ref 0–0.5)
EOSINOPHIL NFR BLD: 3.9 % (ref 0–8)
ERYTHROCYTE [DISTWIDTH] IN BLOOD BY AUTOMATED COUNT: 13.4 % (ref 11.5–14.5)
EST. GFR  (AFRICAN AMERICAN): >60 ML/MIN/1.73 M^2
EST. GFR  (NON AFRICAN AMERICAN): >60 ML/MIN/1.73 M^2
ESTIMATED AVG GLUCOSE: 111 MG/DL (ref 68–131)
GLUCOSE SERPL-MCNC: 170 MG/DL (ref 70–110)
HBA1C MFR BLD: 5.5 % (ref 4.5–6.2)
HCT VFR BLD AUTO: 43.9 % (ref 37–48.5)
HDLC SERPL-MCNC: 46 MG/DL (ref 40–75)
HDLC SERPL: 23.5 % (ref 20–50)
HGB BLD-MCNC: 13.9 G/DL (ref 12–16)
IMM GRANULOCYTES # BLD AUTO: 0.05 K/UL (ref 0–0.04)
IMM GRANULOCYTES NFR BLD AUTO: 0.7 % (ref 0–0.5)
LDLC SERPL CALC-MCNC: 115.2 MG/DL (ref 63–159)
LYMPHOCYTES # BLD AUTO: 1.7 K/UL (ref 1–4.8)
LYMPHOCYTES NFR BLD: 22.5 % (ref 18–48)
MCH RBC QN AUTO: 28 PG (ref 27–31)
MCHC RBC AUTO-ENTMCNC: 31.7 G/DL (ref 32–36)
MCV RBC AUTO: 89 FL (ref 82–98)
MONOCYTES # BLD AUTO: 0.6 K/UL (ref 0.3–1)
MONOCYTES NFR BLD: 7.8 % (ref 4–15)
NEUTROPHILS # BLD AUTO: 4.8 K/UL (ref 1.8–7.7)
NEUTROPHILS NFR BLD: 64.2 % (ref 38–73)
NONHDLC SERPL-MCNC: 150 MG/DL
NRBC BLD-RTO: 0 /100 WBC
PLATELET # BLD AUTO: 365 K/UL (ref 150–450)
PMV BLD AUTO: 9.2 FL (ref 9.2–12.9)
POTASSIUM SERPL-SCNC: 4.2 MMOL/L (ref 3.5–5.1)
PROT SERPL-MCNC: 8.2 G/DL (ref 6–8.4)
RBC # BLD AUTO: 4.96 M/UL (ref 4–5.4)
SODIUM SERPL-SCNC: 141 MMOL/L (ref 136–145)
T4 FREE SERPL-MCNC: 0.73 NG/DL (ref 0.71–1.51)
TRIGL SERPL-MCNC: 174 MG/DL (ref 30–150)
TSH SERPL DL<=0.005 MIU/L-ACNC: 6.42 UIU/ML (ref 0.34–5.6)
WBC # BLD AUTO: 7.43 K/UL (ref 3.9–12.7)

## 2022-01-24 PROCEDURE — 83880 ASSAY OF NATRIURETIC PEPTIDE: CPT | Performed by: NURSE PRACTITIONER

## 2022-01-24 PROCEDURE — 83036 HEMOGLOBIN GLYCOSYLATED A1C: CPT | Performed by: NURSE PRACTITIONER

## 2022-01-24 PROCEDURE — 84443 ASSAY THYROID STIM HORMONE: CPT | Performed by: NURSE PRACTITIONER

## 2022-01-24 PROCEDURE — 84439 ASSAY OF FREE THYROXINE: CPT | Performed by: NURSE PRACTITIONER

## 2022-01-24 PROCEDURE — 85025 COMPLETE CBC W/AUTO DIFF WBC: CPT | Performed by: NURSE PRACTITIONER

## 2022-01-24 PROCEDURE — 80053 COMPREHEN METABOLIC PANEL: CPT | Performed by: NURSE PRACTITIONER

## 2022-01-24 PROCEDURE — 36415 COLL VENOUS BLD VENIPUNCTURE: CPT | Performed by: NURSE PRACTITIONER

## 2022-01-24 PROCEDURE — 80061 LIPID PANEL: CPT | Performed by: NURSE PRACTITIONER

## 2022-01-25 ENCOUNTER — OFFICE VISIT (OUTPATIENT)
Dept: FAMILY MEDICINE | Facility: CLINIC | Age: 62
End: 2022-01-25
Payer: MEDICAID

## 2022-01-25 VITALS
HEIGHT: 64 IN | RESPIRATION RATE: 20 BRPM | DIASTOLIC BLOOD PRESSURE: 90 MMHG | SYSTOLIC BLOOD PRESSURE: 130 MMHG | OXYGEN SATURATION: 97 % | TEMPERATURE: 99 F | WEIGHT: 286.19 LBS | HEART RATE: 86 BPM | BODY MASS INDEX: 48.86 KG/M2

## 2022-01-25 DIAGNOSIS — E03.8 SUBCLINICAL HYPOTHYROIDISM: ICD-10-CM

## 2022-01-25 DIAGNOSIS — Z12.31 ENCOUNTER FOR SCREENING MAMMOGRAM FOR BREAST CANCER: ICD-10-CM

## 2022-01-25 DIAGNOSIS — F41.8 DEPRESSION WITH ANXIETY: Primary | ICD-10-CM

## 2022-01-25 DIAGNOSIS — I10 ESSENTIAL HYPERTENSION: ICD-10-CM

## 2022-01-25 DIAGNOSIS — M25.50 ARTHRALGIA, UNSPECIFIED JOINT: ICD-10-CM

## 2022-01-25 DIAGNOSIS — M79.7 FIBROMYALGIA: ICD-10-CM

## 2022-01-25 DIAGNOSIS — J45.909 ASTHMA, UNSPECIFIED ASTHMA SEVERITY, UNSPECIFIED WHETHER COMPLICATED, UNSPECIFIED WHETHER PERSISTENT: ICD-10-CM

## 2022-01-25 PROBLEM — R53.83 FATIGUE: Status: RESOLVED | Noted: 2020-08-19 | Resolved: 2022-01-25

## 2022-01-25 PROBLEM — B96.89 BACTERIAL CONJUNCTIVITIS OF BOTH EYES: Status: RESOLVED | Noted: 2021-06-29 | Resolved: 2022-01-25

## 2022-01-25 PROBLEM — H10.9 BACTERIAL CONJUNCTIVITIS OF BOTH EYES: Status: RESOLVED | Noted: 2021-06-29 | Resolved: 2022-01-25

## 2022-01-25 PROBLEM — Z23 NEED FOR DIPHTHERIA-TETANUS-PERTUSSIS (TDAP) VACCINE: Status: RESOLVED | Noted: 2020-08-19 | Resolved: 2022-01-25

## 2022-01-25 PROBLEM — R79.9 ABNORMAL BLOOD CHEMISTRY: Status: RESOLVED | Noted: 2020-08-19 | Resolved: 2022-01-25

## 2022-01-25 PROCEDURE — 3044F PR MOST RECENT HEMOGLOBIN A1C LEVEL <7.0%: ICD-10-PCS | Mod: ,,, | Performed by: NURSE PRACTITIONER

## 2022-01-25 PROCEDURE — 99215 OFFICE O/P EST HI 40 MIN: CPT | Performed by: NURSE PRACTITIONER

## 2022-01-25 PROCEDURE — 1160F RVW MEDS BY RX/DR IN RCRD: CPT | Mod: ,,, | Performed by: NURSE PRACTITIONER

## 2022-01-25 PROCEDURE — 3075F PR MOST RECENT SYSTOLIC BLOOD PRESS GE 130-139MM HG: ICD-10-PCS | Mod: ,,, | Performed by: NURSE PRACTITIONER

## 2022-01-25 PROCEDURE — 4010F PR ACE/ARB THEARPY RXD/TAKEN: ICD-10-PCS | Mod: ,,, | Performed by: NURSE PRACTITIONER

## 2022-01-25 PROCEDURE — 3008F PR BODY MASS INDEX (BMI) DOCUMENTED: ICD-10-PCS | Mod: ,,, | Performed by: NURSE PRACTITIONER

## 2022-01-25 PROCEDURE — 99214 OFFICE O/P EST MOD 30 MIN: CPT | Mod: S$PBB,,, | Performed by: NURSE PRACTITIONER

## 2022-01-25 PROCEDURE — 4010F ACE/ARB THERAPY RXD/TAKEN: CPT | Mod: ,,, | Performed by: NURSE PRACTITIONER

## 2022-01-25 PROCEDURE — 3044F HG A1C LEVEL LT 7.0%: CPT | Mod: ,,, | Performed by: NURSE PRACTITIONER

## 2022-01-25 PROCEDURE — 3008F BODY MASS INDEX DOCD: CPT | Mod: ,,, | Performed by: NURSE PRACTITIONER

## 2022-01-25 PROCEDURE — 99214 PR OFFICE/OUTPT VISIT, EST, LEVL IV, 30-39 MIN: ICD-10-PCS | Mod: S$PBB,,, | Performed by: NURSE PRACTITIONER

## 2022-01-25 PROCEDURE — 3080F PR MOST RECENT DIASTOLIC BLOOD PRESSURE >= 90 MM HG: ICD-10-PCS | Mod: ,,, | Performed by: NURSE PRACTITIONER

## 2022-01-25 PROCEDURE — 3080F DIAST BP >= 90 MM HG: CPT | Mod: ,,, | Performed by: NURSE PRACTITIONER

## 2022-01-25 PROCEDURE — 3075F SYST BP GE 130 - 139MM HG: CPT | Mod: ,,, | Performed by: NURSE PRACTITIONER

## 2022-01-25 PROCEDURE — 1160F PR REVIEW ALL MEDS BY PRESCRIBER/CLIN PHARMACIST DOCUMENTED: ICD-10-PCS | Mod: ,,, | Performed by: NURSE PRACTITIONER

## 2022-01-25 RX ORDER — MONTELUKAST SODIUM 10 MG/1
10 TABLET ORAL NIGHTLY
Qty: 90 TABLET | Refills: 1 | Status: SHIPPED | OUTPATIENT
Start: 2022-01-25 | End: 2022-07-18

## 2022-01-25 RX ORDER — METOPROLOL SUCCINATE 25 MG/1
25 TABLET, EXTENDED RELEASE ORAL DAILY
Qty: 90 TABLET | Refills: 1 | Status: SHIPPED | OUTPATIENT
Start: 2022-01-25 | End: 2022-12-01 | Stop reason: SDUPTHER

## 2022-01-25 RX ORDER — DULOXETIN HYDROCHLORIDE 60 MG/1
60 CAPSULE, DELAYED RELEASE ORAL DAILY
Qty: 90 CAPSULE | Refills: 1 | Status: SHIPPED | OUTPATIENT
Start: 2022-01-25 | End: 2022-12-01 | Stop reason: SDUPTHER

## 2022-01-25 RX ORDER — AMLODIPINE BESYLATE 2.5 MG/1
2.5 TABLET ORAL DAILY
Qty: 90 TABLET | Refills: 1 | Status: SHIPPED | OUTPATIENT
Start: 2022-01-25 | End: 2022-04-13 | Stop reason: SDUPTHER

## 2022-01-25 NOTE — PROGRESS NOTES
SUBJECTIVE:      Patient ID: Sonia Muse is a 61 y.o. female.    Chief Complaint: Hypertension    Sonia is here to establish care with me today - her previous provider left the practice.  She has a past medical history of hypertension, fibromyalgia anxiety and depression, asthma, allergies and DDD. She is taking her medications as prescribed daily- denies SE or complaints.  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  Reports feeling fine and blood pressure is usually normal at home.  She does take as needed Xanax for very stressful and anxious days.  Who reviewed all recent blood work that was completed.  Patient will monitor her blood pressure at home and return to clinic in 3-4 weeks if still elevated.     We discussed her outstanding health maintenance patient does not have much time to complete them at this time.  Mammogram was ordered the patient will be scheduled for an annual visit to discuss other screenings.  She declines the recommended pneumonia vaccination today but will contemplate it with her history of asthma.    Lab Visit on 01/24/2022  WBC                                           Date: 01/24/2022  Value: 7.43        Ref range: 3.90 - 12.70 K/uL  Status: Final  RBC                                           Date: 01/24/2022  Value: 4.96        Ref range: 4.00 - 5.40 M/uL   Status: Final  Hemoglobin                                    Date: 01/24/2022  Value: 13.9        Ref range: 12.0 - 16.0 g/dL   Status: Final  Hematocrit                                    Date: 01/24/2022  Value: 43.9        Ref range: 37.0 - 48.5 %      Status: Final  MCV                                           Date: 01/24/2022  Value: 89          Ref range: 82 - 98 fL         Status: Final  MCH                                           Date: 01/24/2022  Value: 28.0        Ref range: 27.0 - 31.0  pg     Status: Final  MCHC                                          Date: 01/24/2022  Value: 31.7*       Ref range: 32.0 - 36.0 g/dL   Status: Final  RDW                                           Date: 01/24/2022  Value: 13.4        Ref range: 11.5 - 14.5 %      Status: Final  Platelets                                     Date: 01/24/2022  Value: 365         Ref range: 150 - 450 K/uL     Status: Final  MPV                                           Date: 01/24/2022  Value: 9.2         Ref range: 9.2 - 12.9 fL      Status: Final  Immature Granulocytes                         Date: 01/24/2022  Value: 0.7*        Ref range: 0.0 - 0.5 %        Status: Final  Gran # (ANC)                                  Date: 01/24/2022  Value: 4.8         Ref range: 1.8 - 7.7 K/uL     Status: Final  Immature Grans (Abs)                          Date: 01/24/2022  Value: 0.05*       Ref range: 0.00 - 0.04 K/uL   Status: Final                Comment: Mild elevation in immature granulocytes is non specific and   can be seen in a variety of conditions including stress response,   acute inflammation, trauma and pregnancy. Correlation with other   laboratory and clinical findings is essential.    Lymph #                                       Date: 01/24/2022  Value: 1.7         Ref range: 1.0 - 4.8 K/uL     Status: Final  Mono #                                        Date: 01/24/2022  Value: 0.6         Ref range: 0.3 - 1.0 K/uL     Status: Final  Eos #                                         Date: 01/24/2022  Value: 0.3         Ref range: 0.0 - 0.5 K/uL     Status: Final  Baso #                                        Date: 01/24/2022  Value: 0.07        Ref range: 0.00 - 0.20 K/uL   Status: Final  nRBC                                          Date: 01/24/2022  Value: 0           Ref range: 0 /100 WBC         Status: Final  Gran %                                        Date: 01/24/2022  Value: 64.2        Ref range: 38.0 - 73.0 %      Status:  Final  Lymph %                                       Date: 01/24/2022  Value: 22.5        Ref range: 18.0 - 48.0 %      Status: Final  Mono %                                        Date: 01/24/2022  Value: 7.8         Ref range: 4.0 - 15.0 %       Status: Final  Eosinophil %                                  Date: 01/24/2022  Value: 3.9         Ref range: 0.0 - 8.0 %        Status: Final  Basophil %                                    Date: 01/24/2022  Value: 0.9         Ref range: 0.0 - 1.9 %        Status: Final  Differential Method                           Date: 01/24/2022  Value: Automated     Status: Final  Sodium                                        Date: 01/24/2022  Value: 141         Ref range: 136 - 145 mmol/L   Status: Final  Potassium                                     Date: 01/24/2022  Value: 4.2         Ref range: 3.5 - 5.1 mmol/L   Status: Final  Chloride                                      Date: 01/24/2022  Value: 100         Ref range: 95 - 110 mmol/L    Status: Final  CO2                                           Date: 01/24/2022  Value: 30*         Ref range: 23 - 29 mmol/L     Status: Final  Glucose                                       Date: 01/24/2022  Value: 170*        Ref range: 70 - 110 mg/dL     Status: Final  BUN                                           Date: 01/24/2022  Value: 17          Ref range: 6 - 20 mg/dL       Status: Final  Creatinine                                    Date: 01/24/2022  Value: 0.9         Ref range: 0.5 - 1.4 mg/dL    Status: Final  Calcium                                       Date: 01/24/2022  Value: 9.4         Ref range: 8.7 - 10.5 mg/dL   Status: Final  Total Protein                                 Date: 01/24/2022  Value: 8.2         Ref range: 6.0 - 8.4 g/dL     Status: Final  Albumin                                       Date: 01/24/2022  Value: 4.2         Ref range: 3.5 - 5.2 g/dL     Status: Final  Total Bilirubin                                Date: 01/24/2022  Value: 1.1*        Ref range: 0.1 - 1.0 mg/dL    Status: Final                Comment: For infants and newborns, interpretation of results should be based  on gestational age, weight and in agreement with clinical  observations.    Premature Infant recommended reference ranges:  Up to 24 hours.............<8.0 mg/dL  Up to 48 hours............<12.0 mg/dL  3-5 days..................<15.0 mg/dL  6-29 days.................<15.0 mg/dL    Alkaline Phosphatase                          Date: 01/24/2022  Value: 77          Ref range: 55 - 135 U/L       Status: Final  AST                                           Date: 01/24/2022  Value: 22          Ref range: 10 - 40 U/L        Status: Final  ALT                                           Date: 01/24/2022  Value: 20          Ref range: 10 - 44 U/L        Status: Final  Anion Gap                                     Date: 01/24/2022  Value: 11          Ref range: 8 - 16 mmol/L      Status: Final  eGFR if                       Date: 01/24/2022  Value: >60.0       Ref range: >60 mL/min/1.73 *  Status: Final  eGFR if non                   Date: 01/24/2022  Value: >60.0       Ref range: >60 mL/min/1.73 *  Status: Final                Comment: Calculation used to obtain the estimated glomerular filtration  rate (eGFR) is the CKD-EPI equation.     Cholesterol                                   Date: 01/24/2022  Value: 196         Ref range: 120 - 199 mg/dL    Status: Final                Comment: The National Cholesterol Education Program (NCEP) has set the  following guidelines (reference ranges) for Cholesterol:  Optimal.....................<200 mg/dL  Borderline High.............200-239 mg/dL  High........................> or = 240 mg/dL    Triglycerides                                 Date: 01/24/2022  Value: 174*        Ref range: 30 - 150 mg/dL     Status: Final                Comment: The National Cholesterol Education Program  (NCEP) has set the  following guidelines (reference values) for triglycerides:  Normal......................<150 mg/dL  Borderline High.............150-199 mg/dL  High........................200-499 mg/dL    HDL                                           Date: 01/24/2022  Value: 46          Ref range: 40 - 75 mg/dL      Status: Final                Comment: The National Cholesterol Education Program (NCEP) has set the  following guidelines (reference values) for HDL Cholesterol:  Low...............<40 mg/dL  Optimal...........>60 mg/dL    LDL Cholesterol                               Date: 01/24/2022  Value: 115.2       Ref range: 63.0 - 159.0 mg/*  Status: Final                Comment: The National Cholesterol Education Program (NCEP) has set the  following guidelines (reference values) for LDL Cholesterol:  Optimal.......................<130 mg/dL  Borderline High...............130-159 mg/dL  High..........................160-189 mg/dL  Very High.....................>190 mg/dL    HDL/Cholesterol Ratio                         Date: 01/24/2022  Value: 23.5        Ref range: 20.0 - 50.0 %      Status: Final  Total Cholesterol/HDL Ratio                   Date: 01/24/2022  Value: 4.3         Ref range: 2.0 - 5.0          Status: Final  Non-HDL Cholesterol                           Date: 01/24/2022  Value: 150         Ref range: mg/dL              Status: Final                Comment: Risk category and Non-HDL cholesterol goals:  Coronary heart disease (CHD)or equivalent (10-year risk of CHD >20%):  Non-HDL cholesterol goal     <130 mg/dL  Two or more CHD risk factors and 10-year risk of CHD <= 20%:  Non-HDL cholesterol goal     <160 mg/dL  0 to 1 CHD risk factor:  Non-HDL cholesterol goal     <190 mg/dL    TSH                                           Date: 01/24/2022  Value: 6.420*      Ref range: 0.340 - 5.600 uI*  Status: Final  BNP                                           Date: 01/24/2022  Value: 39          Ref  range: 0 - 99 pg/mL       Status: Final                Comment: Values of less than 100 pg/ml are consistent with non-CHF populations.  Hemoglobin A1C                                Date: 01/24/2022  Value: 5.5         Ref range: 4.5 - 6.2 %        Status: Final                Comment: According to ADA guidelines, hemoglobin A1C <7.0% represents  optimal control in non-pregnant diabetic patients.  Different  metrics may apply to specific populations.   Standards of Medical Care in Diabetes - 2016.    For the purpose of screening for the presence of diabetes:  <5.7%     Consistent with the absence of diabetes  5.7-6.4%  Consistent with increasing risk for diabetes   (prediabetes)  >or=6.5%  Consistent with diabetes    Currently no consensus exists for use of hemoglobin A1C  for diagnosis of diabetes for children.    Estimated Avg Glucose                         Date: 01/24/2022  Value: 111         Ref range: 68 - 131 mg/dL     Status: Final  Free T4                                       Date: 01/24/2022  Value: 0.73        Ref range: 0.71 - 1.51 ng/dL  Status: Final  ------------)  Hypertension  This is a chronic problem. The current episode started more than 1 year ago. The problem has been waxing and waning since onset. The problem is uncontrolled. Associated symptoms include anxiety and peripheral edema. Pertinent negatives include no blurred vision, chest pain, headaches, palpitations or shortness of breath. Agents associated with hypertension include NSAIDs. Risk factors for coronary artery disease include obesity, post-menopausal state, sedentary lifestyle, stress and family history. Past treatments include beta blockers and calcium channel blockers. The current treatment provides moderate improvement.       Family History   Problem Relation Age of Onset    Diabetes Mother     Hypertension Mother     Heart disease Mother     Stroke Mother     Glaucoma Mother     Diabetes Father     Hypertension Father      Heart disease Father     Hypertension Sister     Heart disease Sister     Graves' disease Daughter     Anxiety disorder Daughter       Social History     Socioeconomic History    Marital status:     Number of children: 3   Occupational History    Occupation: [id number]     Comment: primary sub for [employer]   Tobacco Use    Smoking status: Former Smoker     Packs/day: 1.00     Years: 11.00     Pack years: 11.00     Quit date:      Years since quittin.0    Smokeless tobacco: Never Used   Substance and Sexual Activity    Alcohol use: Yes     Comment: occasional 2x/y    Drug use: Never    Sexual activity: Yes     Partners: Male     Current Outpatient Medications   Medication Sig Dispense Refill    albuterol (VENTOLIN HFA) 90 mcg/actuation inhaler Inhale 2 puffs into the lungs every 6 (six) hours as needed for Wheezing or Shortness of Breath. Rescue 18 g 1    ALPRAZolam (XANAX) 1 MG tablet Take 1 tablet (1 mg total) by mouth once daily. 30 tablet 0    cetirizine (ZYRTEC) 10 MG tablet Take 1 tablet (10 mg total) by mouth once daily. 90 tablet 1    hydroCHLOROthiazide (HYDRODIURIL) 25 MG tablet Take 1 tablet (25 mg total) by mouth once daily. 90 tablet 1    hydrocortisone 2.5 % cream Apply 2.5 mg topically 2 (two) times daily. 15 g 1    losartan (COZAAR) 100 MG tablet Take 1 tablet (100 mg total) by mouth once daily. 90 tablet 1    meloxicam (MOBIC) 15 MG tablet Take 1 tablet (15 mg total) by mouth once daily. 30 tablet 1    metronidazole 0.75% (METROCREAM) 0.75 % Crea Apply 1 application topically 2 (two) times daily. 1 Tube 5    minocycline (MINOCIN,DYNACIN) 100 MG capsule Take 1 capsule by mouth 2 (two) times daily as needed.      mupirocin (BACTROBAN) 2 % ointment Apply topically 3 (three) times daily. To skin lesion near left eye, do no instill into the eye 15 g 0    potassium chloride (MICRO-K) 10 MEQ CpSR Take 1 capsule (10 mEq total) by mouth once daily. With lasix  (furosemide) 90 capsule 1    amLODIPine (NORVASC) 2.5 MG tablet Take 1 tablet (2.5 mg total) by mouth once daily. 90 tablet 1    DULoxetine (CYMBALTA) 60 MG capsule Take 1 capsule (60 mg total) by mouth once daily. 90 capsule 1    lidocaine-prilocaine (EMLA) cream 2.5 mg.      metoprolol succinate (TOPROL-XL) 25 MG 24 hr tablet Take 1 tablet (25 mg total) by mouth once daily. 90 tablet 1    montelukast (SINGULAIR) 10 mg tablet Take 1 tablet (10 mg total) by mouth every evening. 90 tablet 1     No current facility-administered medications for this visit.     Review of patient's allergies indicates:   Allergen Reactions    Erythromycin Swelling    Codeine Nausea And Vomiting     And migraine h/a      Past Medical History:   Diagnosis Date    Allergy     Anxiety     Asthma     DDD (degenerative disc disease), cervical     Depression     Fibromyalgia     Hypertension     Neuromuscular disorder     PONV (postoperative nausea and vomiting)      Past Surgical History:   Procedure Laterality Date    ADENOIDECTOMY      carpel tunnel Right      SECTION      JOINT REPLACEMENT Right 2016    knee    KNEE ARTHROPLASTY Left 10/19/2020    Procedure: ARTHROPLASTY, KNEE;  Surgeon: Felipe Herring MD;  Location: [facility];  Service: Orthopedics;  Laterality: Left;  Louie Liz    knee replacement Right     ROTATOR CUFF REPAIR Right     TONSILLECTOMY         Review of Systems   Constitutional: Negative for activity change, appetite change, chills, fatigue, fever and unexpected weight change.   HENT: Negative for congestion, ear pain, rhinorrhea and sore throat.    Eyes: Negative for blurred vision, redness and itching.   Respiratory: Negative for cough and shortness of breath.    Cardiovascular: Negative for chest pain, palpitations and leg swelling.   Gastrointestinal: Negative for abdominal pain, constipation, diarrhea, nausea and vomiting.   Endocrine: Negative for cold intolerance, heat  "intolerance, polydipsia and polyuria.   Genitourinary: Negative for difficulty urinating, dysuria and frequency.   Musculoskeletal: Positive for arthralgias. Negative for myalgias.   Skin: Negative for pallor and rash.   Neurological: Negative for dizziness, weakness and headaches.   Hematological: Negative for adenopathy. Does not bruise/bleed easily.   Psychiatric/Behavioral: Negative for agitation, behavioral problems, dysphoric mood, sleep disturbance and suicidal ideas. The patient is nervous/anxious.       OBJECTIVE:      Vitals:    01/25/22 1101 01/25/22 1111   BP: (!) 160/100 (!) 130/90   BP Location: Right arm Left arm   Patient Position: Sitting Sitting   BP Method: Large (Manual) Large (Manual)   Pulse: 86    Resp: 20    Temp: 98.9 °F (37.2 °C)    TempSrc: Oral    SpO2: 97%    Weight: 129.8 kg (286 lb 3.2 oz)    Height: 5' 4" (1.626 m)      Physical Exam  Vitals and nursing note reviewed.   Constitutional:       General: She is not in acute distress.     Appearance: Normal appearance. She is well-developed. She is obese. She is not ill-appearing.   HENT:      Head: Normocephalic.      Mouth/Throat:      Mouth: Mucous membranes are moist.   Eyes:      Extraocular Movements: Extraocular movements intact.      Conjunctiva/sclera: Conjunctivae normal.      Pupils: Pupils are equal, round, and reactive to light.   Neck:      Thyroid: No thyroid mass, thyromegaly or thyroid tenderness.      Vascular: No carotid bruit.   Cardiovascular:      Rate and Rhythm: Normal rate and regular rhythm.      Pulses: Normal pulses.      Heart sounds: Murmur heard.    Systolic murmur is present with a grade of 1/6.      Pulmonary:      Effort: Pulmonary effort is normal. No respiratory distress.      Breath sounds: Normal breath sounds. No wheezing, rhonchi or rales.   Abdominal:      General: Bowel sounds are normal.      Palpations: Abdomen is soft.      Tenderness: There is no abdominal tenderness.      Comments: protuberant "   Musculoskeletal:         General: Normal range of motion.      Cervical back: Normal range of motion and neck supple.      Right lower leg: No edema.      Left lower leg: No edema.   Lymphadenopathy:      Cervical: No cervical adenopathy.   Skin:     General: Skin is warm and dry.      Capillary Refill: Capillary refill takes less than 2 seconds.      Coloration: Skin is not jaundiced or pale.   Neurological:      Mental Status: She is alert and oriented to person, place, and time.   Psychiatric:         Mood and Affect: Mood normal.         Behavior: Behavior normal.         Thought Content: Thought content normal.         Judgment: Judgment normal.        Assessment:       1. Depression with anxiety    2. Essential hypertension    3. Arthralgia, unspecified joint    4. Fibromyalgia    5. Asthma, unspecified asthma severity, unspecified whether complicated, unspecified whether persistent    6. Subclinical hypothyroidism    7. Encounter for screening mammogram for breast cancer        Plan:       Depression with anxiety  -     DULoxetine (CYMBALTA) 60 MG capsule; Take 1 capsule (60 mg total) by mouth once daily.  Dispense: 90 capsule; Refill: 1    Essential hypertension  -     metoprolol succinate (TOPROL-XL) 25 MG 24 hr tablet; Take 1 tablet (25 mg total) by mouth once daily.  Dispense: 90 tablet; Refill: 1  -     amLODIPine (NORVASC) 2.5 MG tablet; Take 1 tablet (2.5 mg total) by mouth once daily.  Dispense: 90 tablet; Refill: 1  -declines med increase today; monitor and RTC in 3-4 weeks if not below goal 140/90    Arthralgia, unspecified joint  -     DULoxetine (CYMBALTA) 60 MG capsule; Take 1 capsule (60 mg total) by mouth once daily.  Dispense: 90 capsule; Refill: 1    Fibromyalgia  -     DULoxetine (CYMBALTA) 60 MG capsule; Take 1 capsule (60 mg total) by mouth once daily.  Dispense: 90 capsule; Refill: 1    Asthma, unspecified asthma severity, unspecified whether complicated, unspecified whether  persistent  -     montelukast (SINGULAIR) 10 mg tablet; Take 1 tablet (10 mg total) by mouth every evening.  Dispense: 90 tablet; Refill: 1    Subclinical hypothyroidism  -     TSH; Future; Expected date: 03/11/2022   -monitor in 6 weeks    Encounter for screening mammogram for breast cancer  -     Mammo Digital Screening Bilat w/ Danial; Future; Expected date: 01/25/2022        Follow up in about 3 months (around 4/25/2022) for annual .      1/25/2022 JODIE Grider, FNP    This note was created using [employer] voice recognition software that occasionally misinterprets phrases or words.

## 2022-01-25 NOTE — PATIENT INSTRUCTIONS
Patient Education       Pneumococcal Polysaccharide Vaccine (23-Valent) (phyllis salazar i SAK a ele vahannah SEEN, TWEN velma three VAROSANGELA rosenberg)   Brand Names: US Pneumovax 23   Brand Names: Mayo Pneumo 23; Pneumovax 23   What is this drug used for?   · It is used to prevent pneumococcal disease.    What do I need to tell my doctor BEFORE I take this drug?   · If you are allergic to this drug; any part of this drug; or any other drugs, foods, or substances. Tell your doctor about the allergy and what signs you had.  · If you have an infection or an illness with a fever.  This is not a list of all drugs or health problems that interact with this drug.  Tell your doctor and pharmacist about all of your drugs (prescription or OTC, natural products, vitamins) and health problems. You must check to make sure that it is safe for you to take this drug with all of your drugs and health problems. Do not start, stop, or change the dose of any drug without checking with your doctor.  What are some things I need to know or do while I take this drug?   · Tell all of your health care providers that you take this drug. This includes your doctors, nurses, pharmacists, and dentists.  · This drug may not protect all people who use it. Talk with the doctor.  · Tell your doctor if you are pregnant, plan on getting pregnant, or are breast-feeding. You will need to talk about the benefits and risks to you and the baby.    What are some side effects that I need to call my doctor about right away?   WARNING/CAUTION: Even though it may be rare, some people may have very bad and sometimes deadly side effects when taking a drug. Tell your doctor or get medical help right away if you have any of the following signs or symptoms that may be related to a very bad side effect:  · Signs of an allergic reaction, like rash; hives; itching; red, swollen, blistered, or peeling skin with or without fever; wheezing; tightness in the chest or throat; trouble  breathing, swallowing, or talking; unusual hoarseness; or swelling of the mouth, face, lips, tongue, or throat.  What are some other side effects of this drug?   All drugs may cause side effects. However, many people have no side effects or only have minor side effects. Call your doctor or get medical help if any of these side effects or any other side effects bother you or do not go away:  · Pain, redness, or swelling where the shot was given.  · Headache.  · Feeling tired or weak.  · Muscle pain.  These are not all of the side effects that may occur. If you have questions about side effects, call your doctor. Call your doctor for medical advice about side effects.  You may report side effects to your national health agency.  Report side effects to the FDA/CDC Vaccine Adverse Event Reporting System (VAERS) at https://vaers.hhs.gov/reportevent.html or by calling 1-941.237.1014.  How is this drug best taken?   Use this drug as ordered by your doctor. Read all information given to you. Follow all instructions closely.  · It is given as a shot into the fatty part of the skin or a muscle.  What do I do if I miss a dose?   · Call your doctor to find out what to do.    How do I store and/or throw out this drug?   · If you need to store this drug at home, talk with your doctor, nurse, or pharmacist about how to store it.    General drug facts   · If your symptoms or health problems do not get better or if they become worse, call your doctor.  · Do not share your drugs with others and do not take anyone else's drugs.  · Keep all drugs in a safe place. Keep all drugs out of the reach of children and pets.  · Throw away unused or  drugs. Do not flush down a toilet or pour down a drain unless you are told to do so. Check with your pharmacist if you have questions about the best way to throw out drugs. There may be drug take-back programs in your area.  · Some drugs may have another patient information leaflet. If you  have any questions about this drug, please talk with your doctor, nurse, pharmacist, or other health care provider.  · Some drugs may have another patient information leaflet. Check with your pharmacist. If you have any questions about this drug, please talk with your doctor, nurse, pharmacist, or other health care provider.  · If you think there has been an overdose, call your poison control center or get medical care right away. Be ready to tell or show what was taken, how much, and when it happened.    CDC Information   Vaccine Information Statements (VIS) are made by the staff of the Centers for Disease Control and Prevention (CDC). Each VIS gives information to properly inform the adult receiving the vaccine or, in the case of a minor, the child's parent or legal representative about the risks and benefits of each vaccine. Before a doctor vaccinates a child or an adult, the provider is required by the National Childhood Vaccine Injury Act to give a copy of the VIS. You can also get foreign language versions.  https://www.cdc.gov/vaccines/hcp/vis/vis-statements/ppv.html   Consumer Information Use and Disclaimer   This generalized information is a limited summary of diagnosis, treatment, and/or medication information. It is not meant to be comprehensive and should be used as a tool to help the user understand and/or assess potential diagnostic and treatment options. It does NOT include all information about conditions, treatments, medications, side effects, or risks that may apply to a specific patient. It is not intended to be medical advice or a substitute for the medical advice, diagnosis, or treatment of a health care provider based on the health care provider's examination and assessment of a patient's specific and unique circumstances. Patients must speak with a health care provider for complete information about their health, medical questions, and treatment options, including any risks or benefits  regarding use of medications. This information does not endorse any treatments or medications as safe, effective, or approved for treating a specific patient. UpToDate, Inc. and its affiliates disclaim any warranty or liability relating to this information or the use thereof. The use of this information is governed by the Terms of Use, available at https://www.[employer]/en/solutions/lexicomp/about/raul.  Last Reviewed Date   2019-11-25  Copyright   © 2021 UpToDate, Inc. and its affiliates and/or licensors. All rights reserved.

## 2022-02-16 ENCOUNTER — PATIENT MESSAGE (OUTPATIENT)
Dept: FAMILY MEDICINE | Facility: CLINIC | Age: 62
End: 2022-02-16
Payer: MEDICAID

## 2022-02-16 DIAGNOSIS — I10 ESSENTIAL HYPERTENSION: ICD-10-CM

## 2022-02-17 RX ORDER — LOSARTAN POTASSIUM 100 MG/1
100 TABLET ORAL DAILY
Qty: 90 TABLET | Refills: 1 | Status: SHIPPED | OUTPATIENT
Start: 2022-02-17 | End: 2022-05-25

## 2022-03-16 ENCOUNTER — PATIENT MESSAGE (OUTPATIENT)
Dept: FAMILY MEDICINE | Facility: CLINIC | Age: 62
End: 2022-03-16
Payer: MEDICAID

## 2022-03-16 DIAGNOSIS — J45.909 ASTHMA, UNSPECIFIED ASTHMA SEVERITY, UNSPECIFIED WHETHER COMPLICATED, UNSPECIFIED WHETHER PERSISTENT: ICD-10-CM

## 2022-03-16 RX ORDER — CETIRIZINE HYDROCHLORIDE 10 MG/1
10 TABLET ORAL DAILY
Qty: 90 TABLET | Refills: 1 | Status: SHIPPED | OUTPATIENT
Start: 2022-03-16 | End: 2022-09-11 | Stop reason: SDUPTHER

## 2022-03-20 ENCOUNTER — PATIENT MESSAGE (OUTPATIENT)
Dept: FAMILY MEDICINE | Facility: CLINIC | Age: 62
End: 2022-03-20
Payer: MEDICAID

## 2022-03-20 DIAGNOSIS — M25.50 ARTHRALGIA, UNSPECIFIED JOINT: ICD-10-CM

## 2022-03-21 RX ORDER — MELOXICAM 15 MG/1
15 TABLET ORAL DAILY
Qty: 30 TABLET | Refills: 1 | Status: SHIPPED | OUTPATIENT
Start: 2022-03-21 | End: 2022-04-21 | Stop reason: SDUPTHER

## 2022-04-13 ENCOUNTER — PATIENT MESSAGE (OUTPATIENT)
Dept: FAMILY MEDICINE | Facility: CLINIC | Age: 62
End: 2022-04-13

## 2022-04-13 DIAGNOSIS — I10 ESSENTIAL HYPERTENSION: ICD-10-CM

## 2022-04-13 RX ORDER — AMLODIPINE BESYLATE 2.5 MG/1
2.5 TABLET ORAL DAILY
Qty: 90 TABLET | Refills: 1 | Status: SHIPPED | OUTPATIENT
Start: 2022-04-13 | End: 2022-10-12 | Stop reason: SDUPTHER

## 2022-04-13 RX ORDER — AMLODIPINE BESYLATE 2.5 MG/1
2.5 TABLET ORAL DAILY
Qty: 90 TABLET | Refills: 1 | OUTPATIENT
Start: 2022-04-13

## 2022-04-21 ENCOUNTER — OFFICE VISIT (OUTPATIENT)
Dept: FAMILY MEDICINE | Facility: CLINIC | Age: 62
End: 2022-04-21
Payer: MEDICAID

## 2022-04-21 VITALS
WEIGHT: 285 LBS | DIASTOLIC BLOOD PRESSURE: 80 MMHG | HEART RATE: 97 BPM | OXYGEN SATURATION: 97 % | HEIGHT: 64 IN | TEMPERATURE: 99 F | SYSTOLIC BLOOD PRESSURE: 132 MMHG | RESPIRATION RATE: 18 BRPM | BODY MASS INDEX: 48.65 KG/M2

## 2022-04-21 DIAGNOSIS — Z12.31 ENCOUNTER FOR SCREENING MAMMOGRAM FOR BREAST CANCER: ICD-10-CM

## 2022-04-21 DIAGNOSIS — I10 ESSENTIAL HYPERTENSION: ICD-10-CM

## 2022-04-21 DIAGNOSIS — M25.50 ARTHRALGIA, UNSPECIFIED JOINT: ICD-10-CM

## 2022-04-21 DIAGNOSIS — Z12.11 SCREENING FOR COLON CANCER: ICD-10-CM

## 2022-04-21 DIAGNOSIS — F41.8 DEPRESSION WITH ANXIETY: ICD-10-CM

## 2022-04-21 DIAGNOSIS — Z12.4 ENCOUNTER FOR SCREENING FOR CERVICAL CANCER: ICD-10-CM

## 2022-04-21 DIAGNOSIS — Z00.01 ENCOUNTER FOR ROUTINE ADULT HEALTH EXAMINATION WITH ABNORMAL FINDINGS: Primary | ICD-10-CM

## 2022-04-21 DIAGNOSIS — E66.01 MORBID OBESITY WITH BMI OF 45.0-49.9, ADULT: ICD-10-CM

## 2022-04-21 PROCEDURE — 3079F PR MOST RECENT DIASTOLIC BLOOD PRESSURE 80-89 MM HG: ICD-10-PCS | Mod: ,,, | Performed by: NURSE PRACTITIONER

## 2022-04-21 PROCEDURE — 3075F SYST BP GE 130 - 139MM HG: CPT | Mod: ,,, | Performed by: NURSE PRACTITIONER

## 2022-04-21 PROCEDURE — 3008F BODY MASS INDEX DOCD: CPT | Mod: ,,, | Performed by: NURSE PRACTITIONER

## 2022-04-21 PROCEDURE — 1160F RVW MEDS BY RX/DR IN RCRD: CPT | Mod: ,,, | Performed by: NURSE PRACTITIONER

## 2022-04-21 PROCEDURE — 4010F PR ACE/ARB THEARPY RXD/TAKEN: ICD-10-PCS | Mod: ,,, | Performed by: NURSE PRACTITIONER

## 2022-04-21 PROCEDURE — 99215 OFFICE O/P EST HI 40 MIN: CPT | Performed by: NURSE PRACTITIONER

## 2022-04-21 PROCEDURE — 1160F PR REVIEW ALL MEDS BY PRESCRIBER/CLIN PHARMACIST DOCUMENTED: ICD-10-PCS | Mod: ,,, | Performed by: NURSE PRACTITIONER

## 2022-04-21 PROCEDURE — 3044F HG A1C LEVEL LT 7.0%: CPT | Mod: ,,, | Performed by: NURSE PRACTITIONER

## 2022-04-21 PROCEDURE — 3079F DIAST BP 80-89 MM HG: CPT | Mod: ,,, | Performed by: NURSE PRACTITIONER

## 2022-04-21 PROCEDURE — 3044F PR MOST RECENT HEMOGLOBIN A1C LEVEL <7.0%: ICD-10-PCS | Mod: ,,, | Performed by: NURSE PRACTITIONER

## 2022-04-21 PROCEDURE — 99396 PREV VISIT EST AGE 40-64: CPT | Mod: S$PBB,,, | Performed by: NURSE PRACTITIONER

## 2022-04-21 PROCEDURE — 4010F ACE/ARB THERAPY RXD/TAKEN: CPT | Mod: ,,, | Performed by: NURSE PRACTITIONER

## 2022-04-21 PROCEDURE — 99396 PR PREVENTIVE VISIT,EST,40-64: ICD-10-PCS | Mod: S$PBB,,, | Performed by: NURSE PRACTITIONER

## 2022-04-21 PROCEDURE — 3008F PR BODY MASS INDEX (BMI) DOCUMENTED: ICD-10-PCS | Mod: ,,, | Performed by: NURSE PRACTITIONER

## 2022-04-21 PROCEDURE — 3075F PR MOST RECENT SYSTOLIC BLOOD PRESS GE 130-139MM HG: ICD-10-PCS | Mod: ,,, | Performed by: NURSE PRACTITIONER

## 2022-04-21 RX ORDER — ALPRAZOLAM 1 MG/1
1 TABLET ORAL DAILY
Qty: 30 TABLET | Refills: 0 | Status: SHIPPED | OUTPATIENT
Start: 2022-04-21 | End: 2022-07-15 | Stop reason: SDUPTHER

## 2022-04-21 RX ORDER — HYDROCHLOROTHIAZIDE 25 MG/1
25 TABLET ORAL DAILY
Qty: 90 TABLET | Refills: 1 | Status: SHIPPED | OUTPATIENT
Start: 2022-04-21 | End: 2022-07-24 | Stop reason: SDUPTHER

## 2022-04-21 RX ORDER — POTASSIUM CHLORIDE 750 MG/1
10 CAPSULE, EXTENDED RELEASE ORAL DAILY
Qty: 90 CAPSULE | Refills: 1 | Status: SHIPPED | OUTPATIENT
Start: 2022-04-21 | End: 2022-12-01

## 2022-04-21 RX ORDER — MELOXICAM 15 MG/1
15 TABLET ORAL DAILY
Qty: 30 TABLET | Refills: 1 | Status: SHIPPED | OUTPATIENT
Start: 2022-04-21 | End: 2022-07-18

## 2022-04-21 NOTE — PROGRESS NOTES
SUBJECTIVE:      Patient ID: Sonia Muse is a 61 y.o. female.    Chief Complaint: Annual Exam    Sonia is here for an annual physical and medication refills today. She is taking her medications as prescribed daily- denies SE or complaints.  Her blood pressure is normal today in clinic. Having a lot of stress lately at work and home since the death of her mother in February. Anxiety and depression are well-controlled on meds at this time- She does take as needed Xanax for very stressful and anxious days. LA  reviewed today.     Mammogram pending- denies breast complaints.   Cologuard ordered for colon cancer screening- denies fam hx of colon cancer or changes in bowel habits/dark or bloody stools. Declines colonoscopy screening at present due to schedule.   Overdue for pap with gyn by 10 years- will schedule with her gyn, no hx of abnormal paps  Vaccines reviewed today       Family History   Problem Relation Age of Onset    Diabetes Mother     Hypertension Mother     Heart disease Mother     Stroke Mother     Glaucoma Mother     Diabetes Father     Hypertension Father     Heart disease Father     Hypertension Sister     Heart disease Sister     Graves' disease Daughter     Anxiety disorder Daughter       Social History     Socioeconomic History    Marital status:     Number of children: 3   Occupational History    Occupation: [employer]     Comment: primary sub for [employer]   Tobacco Use    Smoking status: Former Smoker     Packs/day: 1.00     Years: 11.00     Pack years: 11.00     Quit date:      Years since quittin.3    Smokeless tobacco: Never Used   Substance and Sexual Activity    Alcohol use: Yes     Comment: occasional 2x/y    Drug use: Never    Sexual activity: Yes     Partners: Male     Current Outpatient Medications   Medication Sig Dispense Refill    albuterol (VENTOLIN HFA) 90 mcg/actuation inhaler Inhale 2 puffs into the lungs every 6 (six) hours as  needed for Wheezing or Shortness of Breath. Rescue 18 g 1    amLODIPine (NORVASC) 2.5 MG tablet Take 1 tablet (2.5 mg total) by mouth once daily. 90 tablet 1    cetirizine (ZYRTEC) 10 MG tablet Take 1 tablet (10 mg total) by mouth once daily. 90 tablet 1    DULoxetine (CYMBALTA) 60 MG capsule Take 1 capsule (60 mg total) by mouth once daily. 90 capsule 1    hydrocortisone 2.5 % cream Apply 2.5 mg topically 2 (two) times daily. 15 g 1    lidocaine-prilocaine (EMLA) cream 2.5 mg.      losartan (COZAAR) 100 MG tablet Take 1 tablet (100 mg total) by mouth once daily. 90 tablet 1    metoprolol succinate (TOPROL-XL) 25 MG 24 hr tablet Take 1 tablet (25 mg total) by mouth once daily. 90 tablet 1    metronidazole 0.75% (METROCREAM) 0.75 % Crea Apply 1 application topically 2 (two) times daily. 1 Tube 5    montelukast (SINGULAIR) 10 mg tablet Take 1 tablet (10 mg total) by mouth every evening. 90 tablet 1    mupirocin (BACTROBAN) 2 % ointment Apply topically 3 (three) times daily. To skin lesion near left eye, do no instill into the eye 15 g 0    ALPRAZolam (XANAX) 1 MG tablet Take 1 tablet (1 mg total) by mouth once daily. 30 tablet 0    hydroCHLOROthiazide (HYDRODIURIL) 25 MG tablet Take 1 tablet (25 mg total) by mouth once daily. 90 tablet 1    meloxicam (MOBIC) 15 MG tablet Take 1 tablet (15 mg total) by mouth once daily. 30 tablet 1    minocycline (MINOCIN,DYNACIN) 100 MG capsule Take 1 capsule by mouth 2 (two) times daily as needed.      potassium chloride (MICRO-K) 10 MEQ CpSR Take 1 capsule (10 mEq total) by mouth once daily. With hctz 90 capsule 1     No current facility-administered medications for this visit.     Review of patient's allergies indicates:   Allergen Reactions    Erythromycin Swelling    Codeine Nausea And Vomiting     And migraine h/a      Past Medical History:   Diagnosis Date    Allergy     Anxiety     Asthma     DDD (degenerative disc disease), cervical     Depression      "Fibromyalgia     Hypertension 2005    Neuromuscular disorder     PONV (postoperative nausea and vomiting)      Past Surgical History:   Procedure Laterality Date    ADENOIDECTOMY      carpel tunnel Right      SECTION      JOINT REPLACEMENT Right 2016    knee    KNEE ARTHROPLASTY Left 10/19/2020    Procedure: ARTHROPLASTY, KNEE;  Surgeon: Felipe Herring MD;  Location: [facility];  Service: Orthopedics;  Laterality: Left;  Louie Liz    knee replacement Right     ROTATOR CUFF REPAIR Right     TONSILLECTOMY         Review of Systems   Constitutional: Negative for activity change, appetite change, chills, fatigue, fever and unexpected weight change.   HENT: Negative for congestion, ear pain, rhinorrhea and sore throat.    Eyes: Negative for redness and itching.   Respiratory: Negative for cough and shortness of breath.    Cardiovascular: Negative for chest pain, palpitations and leg swelling.   Gastrointestinal: Negative for abdominal pain, blood in stool, constipation, diarrhea, nausea and vomiting.   Endocrine: Negative for cold intolerance, heat intolerance, polydipsia and polyuria.   Genitourinary: Negative for dysuria, frequency, hematuria, pelvic pain, vaginal bleeding and vaginal discharge.   Musculoskeletal: Positive for arthralgias. Negative for myalgias.   Skin: Negative for pallor and rash.   Neurological: Negative for dizziness, weakness and headaches.   Hematological: Negative for adenopathy. Does not bruise/bleed easily.   Psychiatric/Behavioral: Negative for agitation, behavioral problems, dysphoric mood, sleep disturbance and suicidal ideas. The patient is nervous/anxious.       OBJECTIVE:      Vitals:    22 1539 22 1558   BP: (!) 140/82 132/80   BP Location: Right arm    Patient Position: Sitting    BP Method: Large (Manual)    Pulse: 97    Resp: 18    Temp: 98.7 °F (37.1 °C)    TempSrc: Oral    SpO2: 97%    Weight: 129.3 kg (285 lb)    Height: 5' 4" (1.626 m)      Physical " Exam  Vitals and nursing note reviewed.   Constitutional:       General: She is not in acute distress.     Appearance: Normal appearance. She is well-developed. She is obese. She is not ill-appearing.   HENT:      Head: Normocephalic and atraumatic.      Right Ear: Tympanic membrane, ear canal and external ear normal.      Left Ear: Tympanic membrane, ear canal and external ear normal.      Nose: Nose normal.      Mouth/Throat:      Mouth: Mucous membranes are moist.      Pharynx: Oropharynx is clear. No oropharyngeal exudate or posterior oropharyngeal erythema.   Eyes:      General: No scleral icterus.     Extraocular Movements: Extraocular movements intact.      Conjunctiva/sclera: Conjunctivae normal.      Pupils: Pupils are equal, round, and reactive to light.   Neck:      Thyroid: No thyroid mass, thyromegaly or thyroid tenderness.      Vascular: No carotid bruit.   Cardiovascular:      Rate and Rhythm: Normal rate and regular rhythm.      Pulses: Normal pulses.      Heart sounds: Murmur heard.    Systolic murmur is present with a grade of 1/6.  Pulmonary:      Effort: Pulmonary effort is normal. No respiratory distress.      Breath sounds: Normal breath sounds. No wheezing, rhonchi or rales.   Abdominal:      General: Bowel sounds are normal.      Palpations: Abdomen is soft.      Tenderness: There is no abdominal tenderness.      Comments: protuberant   Musculoskeletal:         General: Normal range of motion.      Cervical back: Normal range of motion and neck supple.      Right lower leg: No edema.      Left lower leg: No edema.   Lymphadenopathy:      Cervical: No cervical adenopathy.   Skin:     General: Skin is warm and dry.      Capillary Refill: Capillary refill takes less than 2 seconds.      Coloration: Skin is not jaundiced or pale.   Neurological:      Mental Status: She is alert and oriented to person, place, and time.   Psychiatric:         Mood and Affect: Mood normal.         Behavior: Behavior  normal.         Thought Content: Thought content normal.         Judgment: Judgment normal.        Assessment:       1. Encounter for routine adult health examination with abnormal findings    2. Depression with anxiety    3. Essential hypertension    4. Arthralgia, unspecified joint    5. Encounter for screening mammogram for breast cancer    6. Screening for colon cancer    7. Encounter for screening for cervical cancer    8. Morbid obesity with BMI of 45.0-49.9, adult        Plan:       Encounter for routine adult health examination with abnormal findings    Depression with anxiety  -     ALPRAZolam (XANAX) 1 MG tablet; Take 1 tablet (1 mg total) by mouth once daily.  Dispense: 30 tablet; Refill: 0    Essential hypertension  -     hydroCHLOROthiazide (HYDRODIURIL) 25 MG tablet; Take 1 tablet (25 mg total) by mouth once daily.  Dispense: 90 tablet; Refill: 1  -     potassium chloride (MICRO-K) 10 MEQ CpSR; Take 1 capsule (10 mEq total) by mouth once daily. With hctz  Dispense: 90 capsule; Refill: 1    Arthralgia, unspecified joint  -     meloxicam (MOBIC) 15 MG tablet; Take 1 tablet (15 mg total) by mouth once daily.  Dispense: 30 tablet; Refill: 1    Encounter for screening mammogram for breast cancer    Screening for colon cancer  -     Cologuard Screening (Multitarget Stool DNA); Future; Expected date: 04/21/2022    Encounter for screening for cervical cancer    Morbid obesity with BMI of 45.0-49.9, adult        Follow up in about 6 months (around 10/21/2022) for f/u HTN .      4/22/2022 Nasra Galvez, APRN, FNP    This note was created using [employer] voice recognition software that occasionally misinterprets phrases or words.

## 2022-07-15 ENCOUNTER — PATIENT MESSAGE (OUTPATIENT)
Dept: FAMILY MEDICINE | Facility: CLINIC | Age: 62
End: 2022-07-15

## 2022-10-27 ENCOUNTER — HOSPITAL ENCOUNTER (EMERGENCY)
Facility: HOSPITAL | Age: 62
Discharge: HOME | End: 2022-10-27
Attending: EMERGENCY MEDICINE
Payer: OTHER MISCELLANEOUS

## 2022-10-27 VITALS
BODY MASS INDEX: 46.95 KG/M2 | RESPIRATION RATE: 18 BRPM | HEART RATE: 74 BPM | DIASTOLIC BLOOD PRESSURE: 86 MMHG | OXYGEN SATURATION: 99 % | TEMPERATURE: 98 F | SYSTOLIC BLOOD PRESSURE: 141 MMHG | WEIGHT: 275 LBS | HEIGHT: 64 IN
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
## 2022-10-27 DIAGNOSIS — S83.412A SPRAIN OF MEDIAL COLLATERAL LIGAMENT OF LEFT KNEE, INITIAL ENCOUNTER: ICD-10-CM

## 2022-10-27 DIAGNOSIS — S80.02XA CONTUSION OF LEFT KNEE, INITIAL ENCOUNTER: ICD-10-CM

## 2022-10-27 DIAGNOSIS — R52 PAIN: ICD-10-CM

## 2022-10-27 DIAGNOSIS — S89.92XA INJURY OF LEFT KNEE, INITIAL ENCOUNTER: ICD-10-CM

## 2022-10-27 PROCEDURE — 99283 EMERGENCY DEPT VISIT LOW MDM: CPT

## 2022-10-27 NOTE — DISCHARGE INSTRUCTIONS
Wear your knee immobilizer until pain-free.  Use your walker.  Continue meloxicam 15 mg once daily.  Schedule a follow-up appointment with Dr. Herring.

## 2022-10-27 NOTE — ED PROVIDER NOTES
Encounter Date: 10/27/2022       History     Chief Complaint   Patient presents with    Knee Injury     LEFT KNEE INJURED TODAY     62-year-old female who has a history of anxiety, degenerative disc disease, fibromyalgia, hypertension, asthma who also had a left knee arthroplasty done 2 years ago by Dr. 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  Since that time she has noted some swelling in that area.  No complaints of any numbness to the foot.  No complaint of any hip or ankle pain.  Patient still been able ambulate though with a limp.  Incident occurred approximately noon today.    Review of patient's allergies indicates:   Allergen Reactions    Erythromycin Swelling    Codeine Nausea And Vomiting     And migraine h/a     Past Medical History:   Diagnosis Date    Allergy     Anxiety     Asthma     DDD (degenerative disc disease), cervical     Depression     Fibromyalgia     Hypertension 2005    Neuromuscular disorder     PONV (postoperative nausea and vomiting)      Past Surgical History:   Procedure Laterality Date    ADENOIDECTOMY      carpel tunnel Right      SECTION      JOINT REPLACEMENT Right 2016    knee    KNEE ARTHROPLASTY Left 10/19/2020    Procedure: ARTHROPLASTY, KNEE;  Surgeon: Felipe Herring MD;  [facility]: [facility];  Service: Orthopedics;  Laterality: Left;  Louie Liz    knee replacement Right     ROTATOR CUFF REPAIR Right     TONSILLECTOMY       Family History   Problem Relation Age of Onset    Diabetes Mother     Hypertension Mother     Heart disease Mother     Stroke Mother     Glaucoma Mother     Diabetes Father     Hypertension Father     Heart disease Father     Hypertension Sister     Heart disease Sister     Graves' disease Daughter     Anxiety disorder Daughter      Social History     Tobacco Use    Smoking status: Former     Packs/day: 1.00     Years:  11.00     Pack years: 11.00     Types: Cigarettes     Quit date:      Years since quittin.8    Smokeless tobacco: Never   Substance Use Topics    Alcohol use: Yes     Comment: occasional 2x/y    Drug use: Never     Review of Systems   Constitutional:  Positive for activity change. Negative for fever.   Musculoskeletal:  Positive for arthralgias, gait problem and myalgias.   Skin:  Negative for pallor and wound.   Hematological:  Does not bruise/bleed easily.   All other systems reviewed and are negative.    Physical Exam     Initial Vitals [10/27/22 1308]   BP Pulse Resp Temp SpO2   (!) 155/101 80 18 98.3 °F (36.8 °C) 97 %      MAP       --         Physical Exam    Constitutional: She appears well-developed and well-nourished.   Is mildly uncomfortable   Musculoskeletal:         General: Tenderness present.      Comments: Examination of the left knee has a healed surgical scar anteriorly from the prior arthroplasty.  There is some some mild swelling over the anterior knee.  No crepitus the patient is able flex at least beyond 90°.  Negative anterior posterior drawer sign.  Patient has some mild laxity of the medial collateral ligament.  No lateral collateral ligament laxity is noted.  No thigh or calf tenderness.  Distal pulses are intact.     Neurological: She is alert.   Skin: Skin is warm and dry. Capillary refill takes less than 2 seconds.       ED Course   Procedures  Labs Reviewed - No data to display       Imaging Results              X-Ray Knee Complete 4 or more Views Left (Final result)  Result time 10/27/22 14:47:14   Procedure changed from X-Ray Knee 3 View Left     Final result by Marito Perdue MD (10/27/22 14:47:14)                   Narrative:    Reason: Pain    FINDINGS:    Multiple views of the left knee demonstrate postsurgical changes of total left knee arthroplasty . The orthopedic hardware is intact. There is no periarticular lucency. A suprapatellar knee joint effusion is  noted.    IMPRESSION:    1.  Intact postsurgical changes of total left knee arthroplasty.  2.  Suprapatellar knee joint effusion.    Electronically signed by:  Marito Perdue DO  10/27/2022 2:47 PM CDT Workstation: [id number]                                     Medications - No data to display             Attending Attestation:             Attending ED Notes:   X-ray of the left knee does not show any evidence of fracture.  The patient will be placed in a knee immobilizer and advised to continue the use of a walker.  He is instructed to keep an ice pack on the knee 48 hours and continue her daily meloxicam.  She is also suggested to his contact Dr. Herring's office for follow-up appointment.                 Clinical Impression:   Final diagnoses:  [W19.XXXA] Fall, initial encounter (Primary)  [S80.02XA] Contusion of left knee, initial encounter  [S89.92XA] Injury of left knee, initial encounter  [S83.412A] Sprain of medial collateral ligament of left knee, initial encounter      ED Disposition Condition    Discharge Stable          ED Prescriptions    None       Follow-up Information    None          Chris Post Jr., MD  10/27/22 1518       Chris Post Jr., MD  10/27/22 1534

## 2022-11-08 ENCOUNTER — PATIENT MESSAGE (OUTPATIENT)
Dept: FAMILY MEDICINE | Facility: CLINIC | Age: 62
End: 2022-11-08

## 2022-11-08 ENCOUNTER — LAB VISIT (OUTPATIENT)
Dept: LAB | Facility: HOSPITAL | Age: 62
End: 2022-11-08
Attending: NURSE PRACTITIONER
Payer: MEDICAID

## 2022-11-08 DIAGNOSIS — R00.0 TACHYCARDIA: ICD-10-CM

## 2022-11-08 DIAGNOSIS — R73.9 HYPERGLYCEMIA: Primary | ICD-10-CM

## 2022-11-08 DIAGNOSIS — Z83.438 FAMILY HISTORY OF ELEVATED BLOOD LIPIDS: ICD-10-CM

## 2022-11-08 DIAGNOSIS — E66.01 MORBID OBESITY WITH BMI OF 45.0-49.9, ADULT: ICD-10-CM

## 2022-11-08 DIAGNOSIS — E03.8 SUBCLINICAL HYPOTHYROIDISM: ICD-10-CM

## 2022-11-08 DIAGNOSIS — I10 ESSENTIAL HYPERTENSION: ICD-10-CM

## 2022-11-08 LAB — TSH SERPL DL<=0.005 MIU/L-ACNC: 3.45 UIU/ML (ref 0.34–5.6)

## 2022-11-08 PROCEDURE — 84443 ASSAY THYROID STIM HORMONE: CPT | Performed by: NURSE PRACTITIONER

## 2022-11-08 PROCEDURE — 36415 COLL VENOUS BLD VENIPUNCTURE: CPT | Performed by: NURSE PRACTITIONER

## 2022-12-01 ENCOUNTER — OFFICE VISIT (OUTPATIENT)
Dept: FAMILY MEDICINE | Facility: CLINIC | Age: 62
End: 2022-12-01
Payer: MEDICAID

## 2022-12-01 VITALS
WEIGHT: 289.5 LBS | OXYGEN SATURATION: 99 % | TEMPERATURE: 98 F | SYSTOLIC BLOOD PRESSURE: 134 MMHG | RESPIRATION RATE: 20 BRPM | DIASTOLIC BLOOD PRESSURE: 87 MMHG | BODY MASS INDEX: 49.42 KG/M2 | HEIGHT: 64 IN | HEART RATE: 97 BPM

## 2022-12-01 DIAGNOSIS — E03.8 SUBCLINICAL HYPOTHYROIDISM: ICD-10-CM

## 2022-12-01 DIAGNOSIS — F41.8 DEPRESSION WITH ANXIETY: ICD-10-CM

## 2022-12-01 DIAGNOSIS — Z13.1 DIABETES MELLITUS SCREENING: ICD-10-CM

## 2022-12-01 DIAGNOSIS — J45.909 ASTHMA, UNSPECIFIED ASTHMA SEVERITY, UNSPECIFIED WHETHER COMPLICATED, UNSPECIFIED WHETHER PERSISTENT: ICD-10-CM

## 2022-12-01 DIAGNOSIS — E78.5 DYSLIPIDEMIA: ICD-10-CM

## 2022-12-01 DIAGNOSIS — M25.50 ARTHRALGIA, UNSPECIFIED JOINT: ICD-10-CM

## 2022-12-01 DIAGNOSIS — M79.7 FIBROMYALGIA: ICD-10-CM

## 2022-12-01 DIAGNOSIS — I10 ESSENTIAL HYPERTENSION: Primary | ICD-10-CM

## 2022-12-01 PROCEDURE — 99214 OFFICE O/P EST MOD 30 MIN: CPT | Mod: S$PBB,,, | Performed by: NURSE PRACTITIONER

## 2022-12-01 PROCEDURE — 3079F PR MOST RECENT DIASTOLIC BLOOD PRESSURE 80-89 MM HG: ICD-10-PCS | Mod: CPTII,,, | Performed by: NURSE PRACTITIONER

## 2022-12-01 PROCEDURE — 1160F PR REVIEW ALL MEDS BY PRESCRIBER/CLIN PHARMACIST DOCUMENTED: ICD-10-PCS | Mod: CPTII,,, | Performed by: NURSE PRACTITIONER

## 2022-12-01 PROCEDURE — 4010F ACE/ARB THERAPY RXD/TAKEN: CPT | Mod: CPTII,,, | Performed by: NURSE PRACTITIONER

## 2022-12-01 PROCEDURE — 3044F PR MOST RECENT HEMOGLOBIN A1C LEVEL <7.0%: ICD-10-PCS | Mod: CPTII,,, | Performed by: NURSE PRACTITIONER

## 2022-12-01 PROCEDURE — 1159F PR MEDICATION LIST DOCUMENTED IN MEDICAL RECORD: ICD-10-PCS | Mod: CPTII,,, | Performed by: NURSE PRACTITIONER

## 2022-12-01 PROCEDURE — 3008F BODY MASS INDEX DOCD: CPT | Mod: CPTII,,, | Performed by: NURSE PRACTITIONER

## 2022-12-01 PROCEDURE — 4010F PR ACE/ARB THEARPY RXD/TAKEN: ICD-10-PCS | Mod: CPTII,,, | Performed by: NURSE PRACTITIONER

## 2022-12-01 PROCEDURE — 1159F MED LIST DOCD IN RCRD: CPT | Mod: CPTII,,, | Performed by: NURSE PRACTITIONER

## 2022-12-01 PROCEDURE — 99215 OFFICE O/P EST HI 40 MIN: CPT | Performed by: NURSE PRACTITIONER

## 2022-12-01 PROCEDURE — 1160F RVW MEDS BY RX/DR IN RCRD: CPT | Mod: CPTII,,, | Performed by: NURSE PRACTITIONER

## 2022-12-01 PROCEDURE — 3075F PR MOST RECENT SYSTOLIC BLOOD PRESS GE 130-139MM HG: ICD-10-PCS | Mod: CPTII,,, | Performed by: NURSE PRACTITIONER

## 2022-12-01 PROCEDURE — 3008F PR BODY MASS INDEX (BMI) DOCUMENTED: ICD-10-PCS | Mod: CPTII,,, | Performed by: NURSE PRACTITIONER

## 2022-12-01 PROCEDURE — 99214 PR OFFICE/OUTPT VISIT, EST, LEVL IV, 30-39 MIN: ICD-10-PCS | Mod: S$PBB,,, | Performed by: NURSE PRACTITIONER

## 2022-12-01 PROCEDURE — 3044F HG A1C LEVEL LT 7.0%: CPT | Mod: CPTII,,, | Performed by: NURSE PRACTITIONER

## 2022-12-01 PROCEDURE — 3079F DIAST BP 80-89 MM HG: CPT | Mod: CPTII,,, | Performed by: NURSE PRACTITIONER

## 2022-12-01 PROCEDURE — 3075F SYST BP GE 130 - 139MM HG: CPT | Mod: CPTII,,, | Performed by: NURSE PRACTITIONER

## 2022-12-01 RX ORDER — METOPROLOL SUCCINATE 25 MG/1
25 TABLET, EXTENDED RELEASE ORAL DAILY
Qty: 90 TABLET | Refills: 1 | Status: SHIPPED | OUTPATIENT
Start: 2022-12-01 | End: 2023-07-10

## 2022-12-01 RX ORDER — ALPRAZOLAM 1 MG/1
1 TABLET ORAL DAILY
Qty: 30 TABLET | Refills: 0 | Status: SHIPPED | OUTPATIENT
Start: 2022-12-01

## 2022-12-01 RX ORDER — HYDROCHLOROTHIAZIDE 25 MG/1
25 TABLET ORAL DAILY
Qty: 90 TABLET | Refills: 1 | Status: SHIPPED | OUTPATIENT
Start: 2022-12-01 | End: 2023-01-13

## 2022-12-01 RX ORDER — MONTELUKAST SODIUM 10 MG/1
10 TABLET ORAL NIGHTLY
Qty: 90 TABLET | Refills: 1 | Status: SHIPPED | OUTPATIENT
Start: 2022-12-01 | End: 2023-06-27 | Stop reason: SDUPTHER

## 2022-12-01 RX ORDER — DULOXETIN HYDROCHLORIDE 60 MG/1
60 CAPSULE, DELAYED RELEASE ORAL DAILY
Qty: 90 CAPSULE | Refills: 1 | Status: SHIPPED | OUTPATIENT
Start: 2022-12-01 | End: 2023-07-18 | Stop reason: SDUPTHER

## 2022-12-01 RX ORDER — LOSARTAN POTASSIUM 100 MG/1
100 TABLET ORAL DAILY
Qty: 90 TABLET | Refills: 0 | Status: SHIPPED | OUTPATIENT
Start: 2022-12-01 | End: 2023-03-30 | Stop reason: SDUPTHER

## 2022-12-01 NOTE — PROGRESS NOTES
SUBJECTIVE:      Patient ID: Sonia Heckippo is a 62 y.o. female.    Chief Complaint: Hypertension    Sonia is here to for medication refills today. BP is below goal- feeling well overall besides some stress and anxiousness. She is taking her medications as prescribed daily- denies SE or complaints.  She does take as needed Xanax for very stressful and anxious days. Last TSH normal- yearly labs due in 1/23.      We discussed her outstanding health maintenance- patient needs to schedule mammogram, cologuard (lost box) and pap smear; will schedule when she is able- understands the importance of these screenings     Hypertension  This is a chronic problem. The current episode started more than 1 year ago. The problem has been gradually improving since onset. The problem is controlled. Associated symptoms include anxiety and peripheral edema. Pertinent negatives include no blurred vision, chest pain, headaches, malaise/fatigue, palpitations, shortness of breath or sweats. Agents associated with hypertension include NSAIDs. Risk factors for coronary artery disease include obesity, post-menopausal state, sedentary lifestyle, stress and family history. Past treatments include beta blockers, calcium channel blockers and diuretics. The current treatment provides moderate improvement. Compliance problems include diet, exercise and psychosocial issues.  There is no history of a thyroid problem.   Anxiety  Presents for follow-up visit. Symptoms include depressed mood, excessive worry and nervous/anxious behavior. Patient reports no chest pain, confusion, dizziness, insomnia, irritability, nausea, palpitations, shortness of breath or suicidal ideas. The severity of symptoms is mild. The quality of sleep is fair. Nighttime awakenings: occasional.     Compliance with medications is %.     Family History   Problem Relation Age of Onset    Diabetes Mother     Hypertension Mother     Heart disease Mother     Stroke  Mother     Glaucoma Mother     Diabetes Father     Hypertension Father     Heart disease Father     Hypertension Sister     Heart disease Sister     Graves' disease Daughter     Anxiety disorder Daughter       Social History     Socioeconomic History    Marital status:     Number of children: 3   Occupational History    Occupation: [employer]     Comment: primary sub for [employer]   Tobacco Use    Smoking status: Former     Packs/day: 1.00     Years: 11.00     Pack years: 11.00     Types: Cigarettes     Quit date:      Years since quittin.9    Smokeless tobacco: Never   Substance and Sexual Activity    Alcohol use: Yes     Comment: occasional 2x/y    Drug use: Never    Sexual activity: Yes     Partners: Male     Current Outpatient Medications   Medication Sig Dispense Refill    albuterol (VENTOLIN HFA) 90 mcg/actuation inhaler Inhale 2 puffs into the lungs every 6 (six) hours as needed for Wheezing or Shortness of Breath. Rescue 18 g 1    amLODIPine (NORVASC) 2.5 MG tablet Take 1 tablet (2.5 mg total) by mouth once daily. 90 tablet 1    cetirizine (ZYRTEC) 10 MG tablet Take 1 tablet (10 mg total) by mouth once daily. 90 tablet 1    hydrocortisone 2.5 % cream Apply 2.5 mg topically 2 (two) times daily. 15 g 1    meloxicam (MOBIC) 15 MG tablet Take 1 tablet (15 mg total) by mouth once daily. 30 tablet 1    metronidazole 0.75% (METROCREAM) 0.75 % Crea Apply 1 application topically 2 (two) times daily. 1 Tube 5    mupirocin (BACTROBAN) 2 % ointment Apply topically 3 (three) times daily. To skin lesion near left eye, do no instill into the eye 15 g 0    ALPRAZolam (XANAX) 1 MG tablet Take 1 tablet (1 mg total) by mouth once daily. 30 tablet 0    DULoxetine (CYMBALTA) 60 MG capsule Take 1 capsule (60 mg total) by mouth once daily. 90 capsule 1    hydroCHLOROthiazide (HYDRODIURIL) 25 MG tablet Take 1 tablet (25 mg total) by mouth once daily. 90 tablet 1    lidocaine-prilocaine (EMLA) cream 2.5 mg.       losartan (COZAAR) 100 MG tablet Take 1 tablet (100 mg total) by mouth once daily. 90 tablet 0    metoprolol succinate (TOPROL-XL) 25 MG 24 hr tablet Take 1 tablet (25 mg total) by mouth once daily. 90 tablet 1    minocycline (MINOCIN,DYNACIN) 100 MG capsule Take 1 capsule by mouth 2 (two) times daily as needed.      montelukast (SINGULAIR) 10 mg tablet Take 1 tablet (10 mg total) by mouth every evening. 90 tablet 1     No current facility-administered medications for this visit.     Review of patient's allergies indicates:   Allergen Reactions    Erythromycin Swelling    Codeine Nausea And Vomiting     And migraine h/a      Past Medical History:   Diagnosis Date    Allergy     Anxiety     Asthma     DDD (degenerative disc disease), cervical     Depression     Fibromyalgia     Hypertension 2005    Neuromuscular disorder     PONV (postoperative nausea and vomiting)      Past Surgical History:   Procedure Laterality Date    ADENOIDECTOMY      carpel tunnel Right      SECTION      JOINT REPLACEMENT Right 2016    knee    KNEE ARTHROPLASTY Left 10/19/2020    Procedure: ARTHROPLASTY, KNEE;  Surgeon: Felipe Herring MD;  Location: [facility];  Service: Orthopedics;  Laterality: Left;  Louie Liz    knee replacement Right     ROTATOR CUFF REPAIR Right     TONSILLECTOMY         Review of Systems   Constitutional:  Negative for activity change, appetite change, chills, fatigue, fever, irritability, malaise/fatigue and unexpected weight change.   HENT:  Negative for congestion, ear pain, rhinorrhea and sore throat.    Eyes:  Negative for blurred vision, redness and itching.   Respiratory:  Negative for cough and shortness of breath.    Cardiovascular:  Negative for chest pain, palpitations and leg swelling.   Gastrointestinal:  Negative for abdominal pain, blood in stool, constipation, diarrhea, nausea and vomiting.   Endocrine: Negative for cold intolerance, heat intolerance, polydipsia and polyuria.   Genitourinary:   "Negative for dysuria, frequency, hematuria, pelvic pain, vaginal bleeding and vaginal discharge.   Musculoskeletal:  Positive for arthralgias. Negative for back pain and myalgias.   Skin:  Negative for pallor and rash.   Neurological:  Negative for dizziness, weakness and headaches.   Hematological:  Negative for adenopathy. Does not bruise/bleed easily.   Psychiatric/Behavioral:  Negative for agitation, behavioral problems, confusion, dysphoric mood, sleep disturbance and suicidal ideas. The patient is nervous/anxious. The patient does not have insomnia.     OBJECTIVE:      Vitals:    12/01/22 1303   BP: 134/87   BP Location: Left arm   Patient Position: Sitting   BP Method: Medium (Automatic)   Pulse: 97   Resp: 20   Temp: 98.3 °F (36.8 °C)   TempSrc: Oral   SpO2: 99%   Weight: 131.3 kg (289 lb 8 oz)   Height: 5' 4" (1.626 m)     Physical Exam  Vitals and nursing note reviewed.   Constitutional:       General: She is not in acute distress.     Appearance: Normal appearance. She is well-developed. She is obese. She is not ill-appearing.   HENT:      Head: Normocephalic and atraumatic.      Right Ear: Tympanic membrane, ear canal and external ear normal.      Left Ear: Tympanic membrane, ear canal and external ear normal.      Nose: Nose normal.      Mouth/Throat:      Mouth: Mucous membranes are moist.      Pharynx: Oropharynx is clear. No oropharyngeal exudate or posterior oropharyngeal erythema.   Eyes:      General: No scleral icterus.     Extraocular Movements: Extraocular movements intact.      Conjunctiva/sclera: Conjunctivae normal.      Pupils: Pupils are equal, round, and reactive to light.   Neck:      Thyroid: No thyroid mass, thyromegaly or thyroid tenderness.      Vascular: No carotid bruit.   Cardiovascular:      Rate and Rhythm: Normal rate and regular rhythm.      Heart sounds: Murmur heard.   Systolic murmur is present with a grade of 1/6.   Pulmonary:      Effort: Pulmonary effort is normal. No " respiratory distress.      Breath sounds: Normal breath sounds. No wheezing, rhonchi or rales.   Abdominal:      General: Bowel sounds are normal.      Palpations: Abdomen is soft.      Tenderness: There is no abdominal tenderness.      Comments: protuberant   Musculoskeletal:         General: Normal range of motion.      Cervical back: Normal range of motion and neck supple.      Right lower leg: No edema.      Left lower leg: No edema.   Lymphadenopathy:      Cervical: No cervical adenopathy.   Skin:     General: Skin is warm and dry.      Capillary Refill: Capillary refill takes less than 2 seconds.      Coloration: Skin is not jaundiced or pale.   Neurological:      Mental Status: She is alert and oriented to person, place, and time.   Psychiatric:         Mood and Affect: Mood normal.         Behavior: Behavior normal.         Thought Content: Thought content normal.         Judgment: Judgment normal.      Assessment:       1. Essential hypertension    2. Subclinical hypothyroidism    3. Diabetes mellitus screening    4. Dyslipidemia    5. Asthma, unspecified asthma severity, unspecified whether complicated, unspecified whether persistent    6. Depression with anxiety    7. Arthralgia, unspecified joint    8. Fibromyalgia        Plan:       Essential hypertension  -     CBC Auto Differential; Future; Expected date: 12/01/2022  -     Comprehensive Metabolic Panel; Future; Expected date: 12/01/2022  -     Lipid Panel; Future; Expected date: 12/01/2022  -     Urinalysis; Future; Expected date: 12/01/2022  -     losartan (COZAAR) 100 MG tablet; Take 1 tablet (100 mg total) by mouth once daily.  Dispense: 90 tablet; Refill: 0  -     hydroCHLOROthiazide (HYDRODIURIL) 25 MG tablet; Take 1 tablet (25 mg total) by mouth once daily.  Dispense: 90 tablet; Refill: 1  -     metoprolol succinate (TOPROL-XL) 25 MG 24 hr tablet; Take 1 tablet (25 mg total) by mouth once daily.  Dispense: 90 tablet; Refill:  1  -stable    Subclinical hypothyroidism  -     TSH; Future; Expected date: 12/01/2022  -     T4, free; Future; Expected date: 12/01/2022    Diabetes mellitus screening  -     Comprehensive Metabolic Panel; Future; Expected date: 12/01/2022  -     Hemoglobin A1C; Future; Expected date: 12/01/2022    Dyslipidemia  -     Lipid Panel; Future; Expected date: 12/01/2022    Asthma, unspecified asthma severity, unspecified whether complicated, unspecified whether persistent  -     montelukast (SINGULAIR) 10 mg tablet; Take 1 tablet (10 mg total) by mouth every evening.  Dispense: 90 tablet; Refill: 1    Depression with anxiety  -     DULoxetine (CYMBALTA) 60 MG capsule; Take 1 capsule (60 mg total) by mouth once daily.  Dispense: 90 capsule; Refill: 1  -     ALPRAZolam (XANAX) 1 MG tablet; Take 1 tablet (1 mg total) by mouth once daily.  Dispense: 30 tablet; Refill: 0    Arthralgia, unspecified joint  -     DULoxetine (CYMBALTA) 60 MG capsule; Take 1 capsule (60 mg total) by mouth once daily.  Dispense: 90 capsule; Refill: 1    Fibromyalgia  -     DULoxetine (CYMBALTA) 60 MG capsule; Take 1 capsule (60 mg total) by mouth once daily.  Dispense: 90 capsule; Refill: 1      Follow up in about 6 months (around 6/1/2023) for annual.      12/1/2022 JODIE Grider, DERICKP    This note was created using "[employer]" voice recognition software that occasionally misinterprets phrases or words.

## 2023-01-17 DIAGNOSIS — M25.50 ARTHRALGIA, UNSPECIFIED JOINT: ICD-10-CM

## 2023-01-17 RX ORDER — MELOXICAM 15 MG/1
15 TABLET ORAL DAILY
Qty: 30 TABLET | Refills: 1 | Status: SHIPPED | OUTPATIENT
Start: 2023-01-17 | End: 2023-03-20 | Stop reason: SDUPTHER

## 2023-02-07 ENCOUNTER — OFFICE VISIT (OUTPATIENT)
Dept: FAMILY MEDICINE | Facility: CLINIC | Age: 63
End: 2023-02-07
Payer: MEDICAID

## 2023-02-07 VITALS
HEIGHT: 64 IN | TEMPERATURE: 98 F | WEIGHT: 283.5 LBS | HEART RATE: 96 BPM | RESPIRATION RATE: 20 BRPM | SYSTOLIC BLOOD PRESSURE: 136 MMHG | BODY MASS INDEX: 48.4 KG/M2 | DIASTOLIC BLOOD PRESSURE: 92 MMHG | OXYGEN SATURATION: 97 %

## 2023-02-07 DIAGNOSIS — J20.9 ACUTE BRONCHITIS, UNSPECIFIED ORGANISM: ICD-10-CM

## 2023-02-07 DIAGNOSIS — H65.03 NON-RECURRENT ACUTE SEROUS OTITIS MEDIA OF BOTH EARS: ICD-10-CM

## 2023-02-07 DIAGNOSIS — J45.21 MILD INTERMITTENT ASTHMA WITH ACUTE EXACERBATION: Primary | ICD-10-CM

## 2023-02-07 PROCEDURE — 4010F PR ACE/ARB THEARPY RXD/TAKEN: ICD-10-PCS | Mod: CPTII,,, | Performed by: NURSE PRACTITIONER

## 2023-02-07 PROCEDURE — 1160F RVW MEDS BY RX/DR IN RCRD: CPT | Mod: CPTII,,, | Performed by: NURSE PRACTITIONER

## 2023-02-07 PROCEDURE — 3075F SYST BP GE 130 - 139MM HG: CPT | Mod: CPTII,,, | Performed by: NURSE PRACTITIONER

## 2023-02-07 PROCEDURE — 99213 PR OFFICE/OUTPT VISIT, EST, LEVL III, 20-29 MIN: ICD-10-PCS | Mod: S$PBB,,, | Performed by: NURSE PRACTITIONER

## 2023-02-07 PROCEDURE — 3080F PR MOST RECENT DIASTOLIC BLOOD PRESSURE >= 90 MM HG: ICD-10-PCS | Mod: CPTII,,, | Performed by: NURSE PRACTITIONER

## 2023-02-07 PROCEDURE — 1160F PR REVIEW ALL MEDS BY PRESCRIBER/CLIN PHARMACIST DOCUMENTED: ICD-10-PCS | Mod: CPTII,,, | Performed by: NURSE PRACTITIONER

## 2023-02-07 PROCEDURE — 99213 OFFICE O/P EST LOW 20 MIN: CPT | Mod: S$PBB,,, | Performed by: NURSE PRACTITIONER

## 2023-02-07 PROCEDURE — 1159F PR MEDICATION LIST DOCUMENTED IN MEDICAL RECORD: ICD-10-PCS | Mod: CPTII,,, | Performed by: NURSE PRACTITIONER

## 2023-02-07 PROCEDURE — 99215 OFFICE O/P EST HI 40 MIN: CPT | Performed by: NURSE PRACTITIONER

## 2023-02-07 PROCEDURE — 3080F DIAST BP >= 90 MM HG: CPT | Mod: CPTII,,, | Performed by: NURSE PRACTITIONER

## 2023-02-07 PROCEDURE — 4010F ACE/ARB THERAPY RXD/TAKEN: CPT | Mod: CPTII,,, | Performed by: NURSE PRACTITIONER

## 2023-02-07 PROCEDURE — 3008F PR BODY MASS INDEX (BMI) DOCUMENTED: ICD-10-PCS | Mod: CPTII,,, | Performed by: NURSE PRACTITIONER

## 2023-02-07 PROCEDURE — 1159F MED LIST DOCD IN RCRD: CPT | Mod: CPTII,,, | Performed by: NURSE PRACTITIONER

## 2023-02-07 PROCEDURE — 3008F BODY MASS INDEX DOCD: CPT | Mod: CPTII,,, | Performed by: NURSE PRACTITIONER

## 2023-02-07 PROCEDURE — 3075F PR MOST RECENT SYSTOLIC BLOOD PRESS GE 130-139MM HG: ICD-10-PCS | Mod: CPTII,,, | Performed by: NURSE PRACTITIONER

## 2023-02-07 RX ORDER — AMOXICILLIN AND CLAVULANATE POTASSIUM 875; 125 MG/1; MG/1
1 TABLET, FILM COATED ORAL 2 TIMES DAILY
Qty: 20 TABLET | Refills: 0 | Status: SHIPPED | OUTPATIENT
Start: 2023-02-07 | End: 2023-02-17

## 2023-02-07 RX ORDER — PREDNISONE 20 MG/1
40 TABLET ORAL DAILY
Qty: 10 TABLET | Refills: 0 | Status: SHIPPED | OUTPATIENT
Start: 2023-02-07 | End: 2023-02-12

## 2023-02-07 RX ORDER — PROMETHAZINE HYDROCHLORIDE AND DEXTROMETHORPHAN HYDROBROMIDE 6.25; 15 MG/5ML; MG/5ML
5 SYRUP ORAL NIGHTLY PRN
Qty: 118 ML | Refills: 0 | Status: SHIPPED | OUTPATIENT
Start: 2023-02-07 | End: 2023-05-15

## 2023-02-07 NOTE — PROGRESS NOTES
SUBJECTIVE:      Patient ID: Sonia GIL [facility] is a 62 y.o. female.    Chief Complaint: Cough and Nasal Congestion    Mrs. Scott presents to the clinic for coughing. Pt has a hx of asthma. She reports that she has been requiring the use of her Ventolin inhaler daily for 3 weeks. She reports that she was getting better, but over the past few days she has been getting worse with the inability to take a deep breath without going into a coughing spell, wheezing and chest tightness. Pt denies CP. Pt reports that she did have a productive cough at one point, but that has resolved. Pt continues to have a post nasal drip and reports in the morning she feels her ears draining and ear fullness during the day. She states she does have a sore throat and feels that it is due to her freq coughing. Pt has been continuing her singular and zyrtec. She is unable tolerate Flonase d/t causing her migraines.  Denies any recent fevers, sinus pressure or ear pain. Has been exposed to illness at work. 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 She is about to tolerate oral intake at this time.     Cough  This is a recurrent problem. The current episode started 1 to 4 weeks ago. The problem has been gradually worsening. The problem occurs constantly. The cough is Non-productive. Associated symptoms include ear congestion, postnasal drip, a sore throat (slight), shortness of breath and wheezing. Pertinent negatives include no chest pain, chills, ear pain, eye redness, fever, headaches, myalgias, nasal congestion, rash, rhinorrhea or sweats. She has tried a beta-agonist inhaler, leukotriene antagonists, rest, cool air and body position changes for the symptoms. The treatment provided no relief. Her past medical history is significant for asthma and environmental allergies. There is no history of COPD.     Family History   Problem Relation Age of Onset    Diabetes Mother     Hypertension Mother     Heart disease Mother     Stroke Mother      Glaucoma Mother     Diabetes Father     Hypertension Father     Heart disease Father     Hypertension Sister     Heart disease Sister     Graves' disease Daughter     Anxiety disorder Daughter       Social History     Socioeconomic History    Marital status:     Number of children: 3   Occupational History    Occupation: [employer]     Comment: primary sub for [employer]   Tobacco Use    Smoking status: Former     Packs/day: 1.00     Years: 11.00     Pack years: 11.00     Types: Cigarettes     Quit date:      Years since quittin.1    Smokeless tobacco: Never   Substance and Sexual Activity    Alcohol use: Yes     Comment: occasional 2x/y    Drug use: Never    Sexual activity: Yes     Partners: Male     Current Outpatient Medications   Medication Sig Dispense Refill    albuterol (VENTOLIN HFA) 90 mcg/actuation inhaler Inhale 2 puffs into the lungs every 6 (six) hours as needed for Wheezing or Shortness of Breath. Rescue 18 g 1    ALPRAZolam (XANAX) 1 MG tablet Take 1 tablet (1 mg total) by mouth once daily. 30 tablet 0    amLODIPine (NORVASC) 2.5 MG tablet Take 1 tablet (2.5 mg total) by mouth once daily. 90 tablet 1    cetirizine (ZYRTEC) 10 MG tablet Take 1 tablet (10 mg total) by mouth once daily. 90 tablet 1    DULoxetine (CYMBALTA) 60 MG capsule Take 1 capsule (60 mg total) by mouth once daily. 90 capsule 1    hydroCHLOROthiazide (HYDRODIURIL) 25 MG tablet TAKE 1 TABLET (25 MG TOTAL) BY MOUTH ONCE DAILY. 90 tablet 1    hydrocortisone 2.5 % cream Apply 2.5 mg topically 2 (two) times daily. 15 g 1    lidocaine-prilocaine (EMLA) cream 2.5 mg.      losartan (COZAAR) 100 MG tablet Take 1 tablet (100 mg total) by mouth once daily. 90 tablet 0    meloxicam (MOBIC) 15 MG tablet Take 1 tablet (15 mg total) by mouth once daily. 30 tablet 1    metoprolol succinate (TOPROL-XL) 25 MG 24 hr tablet Take 1 tablet (25 mg total) by mouth once daily. 90 tablet 1    metronidazole 0.75% (METROCREAM) 0.75 % Crea Apply 1  application topically 2 (two) times daily. 1 Tube 5    montelukast (SINGULAIR) 10 mg tablet Take 1 tablet (10 mg total) by mouth every evening. 90 tablet 1    mupirocin (BACTROBAN) 2 % ointment Apply topically 3 (three) times daily. To skin lesion near left eye, do no instill into the eye 15 g 0    amoxicillin-clavulanate 875-125mg (AUGMENTIN) 875-125 mg per tablet Take 1 tablet by mouth 2 (two) times daily. for 10 days 20 tablet 0    predniSONE (DELTASONE) 20 MG tablet Take 2 tablets (40 mg total) by mouth once daily. for 5 days 10 tablet 0    promethazine-dextromethorphan (PROMETHAZINE-DM) 6.25-15 mg/5 mL Syrp Take 5 mLs by mouth nightly as needed (cough). 118 mL 0     No current facility-administered medications for this visit.     Review of patient's allergies indicates:   Allergen Reactions    Erythromycin Swelling    Codeine Nausea And Vomiting     And migraine h/a      Past Medical History:   Diagnosis Date    Allergy     Anxiety     Asthma     DDD (degenerative disc disease), cervical     Depression     Fibromyalgia     Hypertension 2005    Neuromuscular disorder     PONV (postoperative nausea and vomiting)      Past Surgical History:   Procedure Laterality Date    ADENOIDECTOMY      carpel tunnel Right      SECTION      JOINT REPLACEMENT Right 2016    knee    KNEE ARTHROPLASTY Left 10/19/2020    Procedure: ARTHROPLASTY, KNEE;  Surgeon: Felipe Herring MD;  Location: [facility];  Service: Orthopedics;  Laterality: Left;  Louie Liz    knee replacement Right     ROTATOR CUFF REPAIR Right     TONSILLECTOMY         Review of Systems   Constitutional:  Negative for activity change, chills, fatigue, fever and unexpected weight change.   HENT:  Positive for congestion, postnasal drip and sore throat (slight). Negative for ear discharge, ear pain, hearing loss, rhinorrhea, sinus pressure and trouble swallowing.    Eyes:  Negative for pain, discharge, redness and visual disturbance.   Respiratory:  Positive  "for cough, shortness of breath and wheezing. Negative for chest tightness.    Cardiovascular:  Negative for chest pain and palpitations.   Gastrointestinal:  Negative for blood in stool, constipation, diarrhea and vomiting.   Endocrine: Negative for polydipsia and polyuria.   Genitourinary:  Negative for difficulty urinating, dysuria, hematuria and menstrual problem.   Musculoskeletal:  Negative for arthralgias, joint swelling, myalgias and neck pain.   Skin:  Negative for color change and rash.   Allergic/Immunologic: Positive for environmental allergies.   Neurological:  Negative for dizziness, weakness, numbness and headaches.   Hematological:  Does not bruise/bleed easily.   Psychiatric/Behavioral:  Negative for confusion, dysphoric mood and sleep disturbance. The patient is not hyperactive.     OBJECTIVE:      Vitals:    02/07/23 1443   BP: (!) 136/92   BP Location: Right arm   Patient Position: Sitting   BP Method: Large (Automatic)   Pulse: 96   Resp: 20   Temp: 97.9 °F (36.6 °C)   SpO2: 97%   Weight: 128.6 kg (283 lb 8 oz)   Height: 5' 4" (1.626 m)     Physical Exam  Constitutional:       General: She is not in acute distress.     Appearance: Normal appearance. She is well-developed. She is obese. She is ill-appearing. She is not toxic-appearing or diaphoretic.   HENT:      Head: Normocephalic and atraumatic.      Right Ear: Ear canal and external ear normal. No tenderness. A middle ear effusion (serous) is present. No mastoid tenderness. Tympanic membrane is bulging. Tympanic membrane is not perforated or erythematous.      Left Ear: Ear canal and external ear normal. No tenderness. A middle ear effusion (serous) is present. Tympanic membrane is bulging. Tympanic membrane is not perforated or erythematous.      Nose: No mucosal edema, congestion or rhinorrhea.      Right Turbinates: Not enlarged or swollen.      Right Sinus: No maxillary sinus tenderness or frontal sinus tenderness.      Left Sinus: No " maxillary sinus tenderness or frontal sinus tenderness.      Mouth/Throat:      Mouth: Mucous membranes are moist.      Pharynx: Oropharynx is clear. Uvula midline. Posterior oropharyngeal erythema (cobblestone) present. No oropharyngeal exudate.      Tonsils: 0 on the right. 0 on the left.   Eyes:      General:         Right eye: No discharge.         Left eye: No discharge.      Conjunctiva/sclera: Conjunctivae normal.      Pupils: Pupils are equal, round, and reactive to light.   Neck:      Thyroid: No thyromegaly.   Cardiovascular:      Rate and Rhythm: Normal rate and regular rhythm.      Heart sounds: Normal heart sounds.   Pulmonary:      Effort: Accessory muscle usage present. No respiratory distress.      Breath sounds: Decreased air movement present. No decreased breath sounds, wheezing, rhonchi or rales.   Musculoskeletal:      Cervical back: Normal range of motion and neck supple.   Lymphadenopathy:      Head:      Right side of head: Submandibular adenopathy present. No submental or posterior auricular adenopathy.      Left side of head: No submental or posterior auricular adenopathy.      Cervical: No cervical adenopathy.   Skin:     General: Skin is warm and dry.      Coloration: Skin is not pale.      Findings: No erythema.   Neurological:      Mental Status: She is alert and oriented to person, place, and time.   Psychiatric:         Behavior: Behavior normal.         Thought Content: Thought content normal.         Judgment: Judgment normal.      Assessment:       1. Mild intermittent asthma with acute exacerbation    2. Non-recurrent acute serous otitis media of both ears    3. Acute bronchitis, unspecified organism          Plan:       Mild intermittent asthma with acute exacerbation  -     amoxicillin-clavulanate 875-125mg (AUGMENTIN) 875-125 mg per tablet; Take 1 tablet by mouth 2 (two) times daily. for 10 days  Dispense: 20 tablet; Refill: 0  -     predniSONE (DELTASONE) 20 MG tablet; Take 2  tablets (40 mg total) by mouth once daily. for 5 days  Dispense: 10 tablet; Refill: 0    Non-recurrent acute serous otitis media of both ears  -     amoxicillin-clavulanate 875-125mg (AUGMENTIN) 875-125 mg per tablet; Take 1 tablet by mouth 2 (two) times daily. for 10 days  Dispense: 20 tablet; Refill: 0    Acute bronchitis, unspecified organism  -     predniSONE (DELTASONE) 20 MG tablet; Take 2 tablets (40 mg total) by mouth once daily. for 5 days  Dispense: 10 tablet; Refill: 0  -     promethazine-dextromethorphan (PROMETHAZINE-DM) 6.25-15 mg/5 mL Syrp; Take 5 mLs by mouth nightly as needed (cough).  Dispense: 118 mL; Refill: 0        Follow up if symptoms worsen or fail to improve.      2/7/2023 JODIE Ríos, FNP    This note was created using [email or web address] voice recognition software that occasionally misinterprets phrases or words.

## 2023-03-15 DIAGNOSIS — J45.909 ASTHMA, UNSPECIFIED ASTHMA SEVERITY, UNSPECIFIED WHETHER COMPLICATED, UNSPECIFIED WHETHER PERSISTENT: ICD-10-CM

## 2023-03-15 DIAGNOSIS — M25.50 ARTHRALGIA, UNSPECIFIED JOINT: ICD-10-CM

## 2023-03-16 RX ORDER — MELOXICAM 15 MG/1
15 TABLET ORAL DAILY
Qty: 30 TABLET | Refills: 1 | OUTPATIENT
Start: 2023-03-16

## 2023-03-16 RX ORDER — CETIRIZINE HYDROCHLORIDE 10 MG/1
10 TABLET ORAL DAILY
Qty: 90 TABLET | Refills: 1 | OUTPATIENT
Start: 2023-03-16

## 2023-03-18 ENCOUNTER — PATIENT MESSAGE (OUTPATIENT)
Dept: FAMILY MEDICINE | Facility: CLINIC | Age: 63
End: 2023-03-18

## 2023-03-18 DIAGNOSIS — M25.50 ARTHRALGIA, UNSPECIFIED JOINT: ICD-10-CM

## 2023-03-19 LAB
ALBUMIN SERPL-MCNC: 4.2 G/DL (ref 3.6–5.1)
ALBUMIN/GLOB SERPL: 1.5 (CALC) (ref 1–2.5)
ALP SERPL-CCNC: 83 U/L (ref 37–153)
ALT SERPL-CCNC: 17 U/L (ref 6–29)
APPEARANCE UR: CLEAR
AST SERPL-CCNC: 16 U/L (ref 10–35)
BASOPHILS # BLD AUTO: 60 CELLS/UL (ref 0–200)
BASOPHILS NFR BLD AUTO: 0.9 %
BILIRUB SERPL-MCNC: 0.8 MG/DL (ref 0.2–1.2)
BILIRUB UR QL STRIP: NEGATIVE
BUN SERPL-MCNC: 22 MG/DL (ref 7–25)
BUN/CREAT SERPL: ABNORMAL (CALC) (ref 6–22)
CALCIUM SERPL-MCNC: 9.3 MG/DL (ref 8.6–10.4)
CHLORIDE SERPL-SCNC: 99 MMOL/L (ref 98–110)
CHOLEST SERPL-MCNC: 173 MG/DL
CHOLEST/HDLC SERPL: 3.6 (CALC)
CO2 SERPL-SCNC: 31 MMOL/L (ref 20–32)
COLOR UR: YELLOW
CREAT SERPL-MCNC: 0.91 MG/DL (ref 0.5–1.05)
EGFR: 72 ML/MIN/1.73M2
EOSINOPHIL # BLD AUTO: 248 CELLS/UL (ref 15–500)
EOSINOPHIL NFR BLD AUTO: 3.7 %
ERYTHROCYTE [DISTWIDTH] IN BLOOD BY AUTOMATED COUNT: 13.1 % (ref 11–15)
GLOBULIN SER CALC-MCNC: 2.8 G/DL (CALC) (ref 1.9–3.7)
GLUCOSE SERPL-MCNC: 144 MG/DL (ref 65–99)
GLUCOSE UR QL STRIP: NEGATIVE
HBA1C MFR BLD: 5.9 % OF TOTAL HGB
HCT VFR BLD AUTO: 39.5 % (ref 35–45)
HDLC SERPL-MCNC: 48 MG/DL
HGB BLD-MCNC: 13.1 G/DL (ref 11.7–15.5)
HGB UR QL STRIP: NEGATIVE
KETONES UR QL STRIP: NEGATIVE
LDLC SERPL CALC-MCNC: 99 MG/DL (CALC)
LEUKOCYTE ESTERASE UR QL STRIP: NEGATIVE
LYMPHOCYTES # BLD AUTO: 1333 CELLS/UL (ref 850–3900)
LYMPHOCYTES NFR BLD AUTO: 19.9 %
MCH RBC QN AUTO: 29 PG (ref 27–33)
MCHC RBC AUTO-ENTMCNC: 33.2 G/DL (ref 32–36)
MCV RBC AUTO: 87.4 FL (ref 80–100)
MONOCYTES # BLD AUTO: 489 CELLS/UL (ref 200–950)
MONOCYTES NFR BLD AUTO: 7.3 %
NEUTROPHILS # BLD AUTO: 4569 CELLS/UL (ref 1500–7800)
NEUTROPHILS NFR BLD AUTO: 68.2 %
NITRITE UR QL STRIP: NEGATIVE
NONHDLC SERPL-MCNC: 125 MG/DL (CALC)
PH UR STRIP: 8 [PH] (ref 5–8)
PLATELET # BLD AUTO: 348 THOUSAND/UL (ref 140–400)
PMV BLD REES-ECKER: 9.7 FL (ref 7.5–12.5)
POTASSIUM SERPL-SCNC: 4.1 MMOL/L (ref 3.5–5.3)
PROT SERPL-MCNC: 7 G/DL (ref 6.1–8.1)
PROT UR QL STRIP: ABNORMAL
RBC # BLD AUTO: 4.52 MILLION/UL (ref 3.8–5.1)
SODIUM SERPL-SCNC: 141 MMOL/L (ref 135–146)
SP GR UR STRIP: 1.02 (ref 1–1.03)
T4 FREE SERPL-MCNC: 1 NG/DL (ref 0.8–1.8)
TRIGL SERPL-MCNC: 164 MG/DL
TSH SERPL-ACNC: 4.09 MIU/L (ref 0.4–4.5)
WBC # BLD AUTO: 6.7 THOUSAND/UL (ref 3.8–10.8)

## 2023-03-20 ENCOUNTER — TELEPHONE (OUTPATIENT)
Dept: FAMILY MEDICINE | Facility: CLINIC | Age: 63
End: 2023-03-20

## 2023-03-20 RX ORDER — MELOXICAM 15 MG/1
15 TABLET ORAL DAILY
Qty: 90 TABLET | Refills: 1 | Status: SHIPPED | OUTPATIENT
Start: 2023-03-20 | End: 2023-09-11

## 2023-03-20 NOTE — TELEPHONE ENCOUNTER
----- Message from RISHI Espinal sent at 3/20/2023  9:43 AM CDT -----  Labs show improving glucose, stable prediabetes but up to 5.9; thyroid function is normal and cholesterol is improved as well

## 2023-03-20 NOTE — PROGRESS NOTES
Labs show improving glucose, stable prediabetes but up to 5.9; thyroid function is normal and cholesterol is improved as well

## 2023-05-03 ENCOUNTER — OFFICE VISIT (OUTPATIENT)
Dept: FAMILY MEDICINE | Facility: CLINIC | Age: 63
End: 2023-05-03
Payer: MEDICAID

## 2023-05-03 VITALS
OXYGEN SATURATION: 98 % | SYSTOLIC BLOOD PRESSURE: 139 MMHG | WEIGHT: 285.19 LBS | BODY MASS INDEX: 48.69 KG/M2 | TEMPERATURE: 98 F | DIASTOLIC BLOOD PRESSURE: 86 MMHG | RESPIRATION RATE: 22 BRPM | HEART RATE: 94 BPM | HEIGHT: 64 IN

## 2023-05-03 DIAGNOSIS — R05.1 ACUTE COUGH: Primary | ICD-10-CM

## 2023-05-03 DIAGNOSIS — U07.1 COVID-19: ICD-10-CM

## 2023-05-03 LAB
CTP QC/QA: YES
CTP QC/QA: YES
POC MOLECULAR INFLUENZA A AGN: NEGATIVE
POC MOLECULAR INFLUENZA B AGN: NEGATIVE
SARS-COV-2 RDRP RESP QL NAA+PROBE: POSITIVE

## 2023-05-03 PROCEDURE — 4010F PR ACE/ARB THEARPY RXD/TAKEN: ICD-10-PCS | Mod: CPTII,,, | Performed by: NURSE PRACTITIONER

## 2023-05-03 PROCEDURE — 3075F SYST BP GE 130 - 139MM HG: CPT | Mod: CPTII,,, | Performed by: NURSE PRACTITIONER

## 2023-05-03 PROCEDURE — 1159F PR MEDICATION LIST DOCUMENTED IN MEDICAL RECORD: ICD-10-PCS | Mod: CPTII,,, | Performed by: NURSE PRACTITIONER

## 2023-05-03 PROCEDURE — 87635 SARS-COV-2 COVID-19 AMP PRB: CPT | Mod: PBBFAC | Performed by: NURSE PRACTITIONER

## 2023-05-03 PROCEDURE — 1160F PR REVIEW ALL MEDS BY PRESCRIBER/CLIN PHARMACIST DOCUMENTED: ICD-10-PCS | Mod: CPTII,,, | Performed by: NURSE PRACTITIONER

## 2023-05-03 PROCEDURE — 99214 PR OFFICE/OUTPT VISIT, EST, LEVL IV, 30-39 MIN: ICD-10-PCS | Mod: S$PBB,,, | Performed by: NURSE PRACTITIONER

## 2023-05-03 PROCEDURE — 3079F DIAST BP 80-89 MM HG: CPT | Mod: CPTII,,, | Performed by: NURSE PRACTITIONER

## 2023-05-03 PROCEDURE — 3044F PR MOST RECENT HEMOGLOBIN A1C LEVEL <7.0%: ICD-10-PCS | Mod: CPTII,,, | Performed by: NURSE PRACTITIONER

## 2023-05-03 PROCEDURE — 4010F ACE/ARB THERAPY RXD/TAKEN: CPT | Mod: CPTII,,, | Performed by: NURSE PRACTITIONER

## 2023-05-03 PROCEDURE — 1159F MED LIST DOCD IN RCRD: CPT | Mod: CPTII,,, | Performed by: NURSE PRACTITIONER

## 2023-05-03 PROCEDURE — 3079F PR MOST RECENT DIASTOLIC BLOOD PRESSURE 80-89 MM HG: ICD-10-PCS | Mod: CPTII,,, | Performed by: NURSE PRACTITIONER

## 2023-05-03 PROCEDURE — 87502 INFLUENZA DNA AMP PROBE: CPT | Mod: PBBFAC | Performed by: NURSE PRACTITIONER

## 2023-05-03 PROCEDURE — 99214 OFFICE O/P EST MOD 30 MIN: CPT | Mod: S$PBB,,, | Performed by: NURSE PRACTITIONER

## 2023-05-03 PROCEDURE — 3075F PR MOST RECENT SYSTOLIC BLOOD PRESS GE 130-139MM HG: ICD-10-PCS | Mod: CPTII,,, | Performed by: NURSE PRACTITIONER

## 2023-05-03 PROCEDURE — 99215 OFFICE O/P EST HI 40 MIN: CPT | Performed by: NURSE PRACTITIONER

## 2023-05-03 PROCEDURE — 3008F PR BODY MASS INDEX (BMI) DOCUMENTED: ICD-10-PCS | Mod: CPTII,,, | Performed by: NURSE PRACTITIONER

## 2023-05-03 PROCEDURE — 3008F BODY MASS INDEX DOCD: CPT | Mod: CPTII,,, | Performed by: NURSE PRACTITIONER

## 2023-05-03 PROCEDURE — 1160F RVW MEDS BY RX/DR IN RCRD: CPT | Mod: CPTII,,, | Performed by: NURSE PRACTITIONER

## 2023-05-03 PROCEDURE — 3044F HG A1C LEVEL LT 7.0%: CPT | Mod: CPTII,,, | Performed by: NURSE PRACTITIONER

## 2023-05-03 NOTE — PROGRESS NOTES
SUBJECTIVE:      Patient ID: Sonia GIL [facility] is a 63 y.o. female.    Chief Complaint: Nasal Congestion and Cough    Sonia is here for c/o cough, sinus congestion, fever and fatigue x 4 days. Started after visiting with family in [place]. Had steroid shot at  3 days ago- felt improvement x 12 hours, but sx returned. NO tests were run for COVID or flu.     Cough  This is a new problem. Episode onset: x 4 days. The problem has been gradually worsening. The cough is Non-productive. Associated symptoms include ear congestion, ear pain, a fever, headaches, myalgias, nasal congestion, postnasal drip, rhinorrhea and a sore throat. Pertinent negatives include no chest pain, chills, heartburn, hemoptysis, shortness of breath, sweats, weight loss or wheezing. The symptoms are aggravated by lying down. Risk factors for lung disease include travel. She has tried OTC cough suppressant for the symptoms. The treatment provided mild relief. Her past medical history is significant for asthma, bronchitis and environmental allergies. There is no history of COPD.   URI   This is a new problem. The current episode started in the past 7 days. The problem has been gradually worsening. The maximum temperature recorded prior to her arrival was 101 - 101.9 F. The fever has been present for 1 to 2 days. Associated symptoms include congestion, coughing, ear pain, headaches, a plugged ear sensation, rhinorrhea, sneezing, a sore throat and swollen glands. Pertinent negatives include no abdominal pain, chest pain, diarrhea, dysuria, nausea, neck pain, sinus pain, vomiting or wheezing. She has tried sleep and increased fluids for the symptoms. The treatment provided mild relief.     Family History   Problem Relation Age of Onset    Diabetes Mother     Hypertension Mother     Heart disease Mother     Stroke Mother     Glaucoma Mother     Diabetes Father     Hypertension Father     Heart disease Father     Hypertension Sister     Heart disease  Sister     Graves' disease Daughter     Anxiety disorder Daughter       Social History     Socioeconomic History    Marital status:     Number of children: 3   Occupational History    Occupation: [employer]     Comment: primary sub for [employer]   Tobacco Use    Smoking status: Former     Packs/day: 1.00     Years: 11.00     Pack years: 11.00     Types: Cigarettes     Quit date:      Years since quittin.3    Smokeless tobacco: Never   Substance and Sexual Activity    Alcohol use: Yes     Comment: occasional 2x/y    Drug use: Never    Sexual activity: Yes     Partners: Male     Current Outpatient Medications   Medication Sig Dispense Refill    albuterol (VENTOLIN HFA) 90 mcg/actuation inhaler Inhale 2 puffs into the lungs every 6 (six) hours as needed for Wheezing or Shortness of Breath. Rescue 18 g 1    ALPRAZolam (XANAX) 1 MG tablet Take 1 tablet (1 mg total) by mouth once daily. 30 tablet 0    amLODIPine (NORVASC) 2.5 MG tablet Take 1 tablet (2.5 mg total) by mouth once daily. 90 tablet 1    cetirizine (ZYRTEC) 10 MG tablet TAKE ONE TABLET BY MOUTH EVERY DAY 90 tablet 1    DULoxetine (CYMBALTA) 60 MG capsule Take 1 capsule (60 mg total) by mouth once daily. 90 capsule 1    hydroCHLOROthiazide (HYDRODIURIL) 25 MG tablet TAKE 1 TABLET (25 MG TOTAL) BY MOUTH ONCE DAILY. 90 tablet 1    hydrocortisone 2.5 % cream Apply 2.5 mg topically 2 (two) times daily. 15 g 1    lidocaine-prilocaine (EMLA) cream 2.5 mg.      losartan (COZAAR) 100 MG tablet Take 1 tablet (100 mg total) by mouth once daily. 90 tablet 1    meloxicam (MOBIC) 15 MG tablet Take 1 tablet (15 mg total) by mouth once daily. 90 tablet 1    metoprolol succinate (TOPROL-XL) 25 MG 24 hr tablet Take 1 tablet (25 mg total) by mouth once daily. 90 tablet 1    metronidazole 0.75% (METROCREAM) 0.75 % Crea Apply 1 application topically 2 (two) times daily. 1 Tube 5    montelukast (SINGULAIR) 10 mg tablet Take 1 tablet (10 mg total) by mouth every  evening. 90 tablet 1    mupirocin (BACTROBAN) 2 % ointment Apply topically 3 (three) times daily. To skin lesion near left eye, do no instill into the eye 15 g 0    promethazine-dextromethorphan (PROMETHAZINE-DM) 6.25-15 mg/5 mL Syrp Take 5 mLs by mouth nightly as needed (cough). 118 mL 0    nirmatrelvir-ritonavir 300 mg (150 mg x 2)-100 mg copackaged tablets (EUA) Take 3 tablets by mouth 2 (two) times daily for 5 days. Each dose contains 2 nirmatrelvir (pink tablets) and 1 ritonavir (white tablet). Take all 3 tablets together 30 tablet 0     No current facility-administered medications for this visit.     Review of patient's allergies indicates:   Allergen Reactions    Erythromycin Swelling    Codeine Nausea And Vomiting     And migraine h/a    [place] Blisters      Past Medical History:   Diagnosis Date    Allergy     Anxiety     Asthma     DDD (degenerative disc disease), cervical     Depression     Fibromyalgia     Hypertension     Neuromuscular disorder     PONV (postoperative nausea and vomiting)      Past Surgical History:   Procedure Laterality Date    ADENOIDECTOMY      carpel tunnel Right      SECTION      JOINT REPLACEMENT Right 2016    knee    KNEE ARTHROPLASTY Left 10/19/2020    Procedure: ARTHROPLASTY, KNEE;  Surgeon: Felipe Herring MD;  Location: [facility];  Service: Orthopedics;  Laterality: Left;  Louie Liz    knee replacement Right     ROTATOR CUFF REPAIR Right     TONSILLECTOMY         Review of Systems   Constitutional:  Positive for fatigue and fever. Negative for appetite change, chills, diaphoresis, unexpected weight change and weight loss.   HENT:  Positive for congestion, ear pain, postnasal drip, rhinorrhea, sinus pressure, sneezing and sore throat. Negative for ear discharge, sinus pain, trouble swallowing and voice change.    Eyes:  Negative for discharge.   Respiratory:  Positive for cough. Negative for hemoptysis, chest tightness, shortness of breath and wheezing.   "  Cardiovascular:  Negative for chest pain.   Gastrointestinal:  Negative for abdominal pain, diarrhea, heartburn, nausea and vomiting.   Genitourinary:  Negative for dysuria, flank pain, frequency and hematuria.   Musculoskeletal:  Positive for myalgias. Negative for neck pain.   Allergic/Immunologic: Positive for environmental allergies.   Neurological:  Positive for dizziness and headaches. Negative for weakness.   Hematological:  Negative for adenopathy.    OBJECTIVE:      Vitals:    05/03/23 1419   BP: 139/86   BP Location: Left arm   Patient Position: Sitting   BP Method: Medium (Automatic)   Pulse: 94   Resp: (!) 22   Temp: 98.4 °F (36.9 °C)   TempSrc: Oral   SpO2: 98%   Weight: 129.4 kg (285 lb 3.2 oz)   Height: 5' 4" (1.626 m)     Physical Exam  Vitals and nursing note reviewed.   Constitutional:       General: She is not in acute distress.     Appearance: Normal appearance. She is well-developed. She is obese. She is not ill-appearing.   HENT:      Head: Normocephalic.      Right Ear: Tympanic membrane, ear canal and external ear normal.      Left Ear: Tympanic membrane, ear canal and external ear normal.      Nose: Mucosal edema and congestion present. No rhinorrhea.      Right Sinus: No maxillary sinus tenderness or frontal sinus tenderness.      Left Sinus: No maxillary sinus tenderness or frontal sinus tenderness.      Mouth/Throat:      Mouth: Mucous membranes are moist.      Pharynx: Oropharynx is clear. No oropharyngeal exudate or posterior oropharyngeal erythema.   Eyes:      General:         Right eye: No discharge.         Left eye: No discharge.      Extraocular Movements: Extraocular movements intact.      Conjunctiva/sclera: Conjunctivae normal.      Pupils: Pupils are equal, round, and reactive to light.   Neck:      Thyroid: No thyroid mass, thyromegaly or thyroid tenderness.   Cardiovascular:      Rate and Rhythm: Normal rate and regular rhythm.      Heart sounds: Murmur heard.   Systolic " murmur is present with a grade of 1/6.   Pulmonary:      Effort: Pulmonary effort is normal. No respiratory distress.      Breath sounds: Normal breath sounds. No stridor. No wheezing, rhonchi or rales.   Abdominal:      General: Bowel sounds are normal.      Palpations: Abdomen is soft.      Tenderness: There is no abdominal tenderness.      Comments: protuberant   Musculoskeletal:         General: Normal range of motion.      Cervical back: Normal range of motion and neck supple.      Right lower leg: No edema.      Left lower leg: No edema.   Lymphadenopathy:      Cervical: No cervical adenopathy.   Skin:     General: Skin is warm and dry.      Capillary Refill: Capillary refill takes less than 2 seconds.      Coloration: Skin is not jaundiced or pale.   Neurological:      Mental Status: She is alert and oriented to person, place, and time.   Psychiatric:         Mood and Affect: Mood normal.         Behavior: Behavior normal.         Thought Content: Thought content normal.         Judgment: Judgment normal.      Assessment:       1. Acute cough    2. COVID-19        Plan:       Acute cough  -     POCT COVID-19 Rapid Screening (POS)   -     POCT Influenza A/B Molecular (neg)     COVID-19  -     nirmatrelvir-ritonavir 300 mg (150 mg x 2)-100 mg copackaged tablets (EUA); Take 3 tablets by mouth 2 (two) times daily for 5 days. Each dose contains 2 nirmatrelvir (pink tablets) and 1 ritonavir (white tablet). Take all 3 tablets together  Dispense: 30 tablet; Refill: 0  -written d/t hx of asthma, HTN and obesity; Side effects of new medication discussed with patient; understanding voiced.     -Self quarantine at home, avoiding contact with other people and pets in the home. Wear a mask over your nose and mouth if you must be around others in the home. Wash hands often and have family member clean high touch surfaces often in the home. Symptom control with OTC medications, like tylenol and motrin for fever, Mucinex DM  or similar for cough, antihistamine (like Zyrtec or Claritin) for nasal symptoms. Get plenty of rest and fluids to maintain hydration. Gargle with warm salt water if sore throat is present. If extreme lethargy, trouble breathing, uncontrolled fevers, or confusion, present to ER for further treatment. You can be around others after:  New CDC guidelines have indicated a return to work or being out of quarantine after 5 days IF ASYMPTOMATIC at that time and the advisement is to wear a mask for 5 days afterwards as well. If still symptomatic after 5 days, continue to quarantine until asymptomatic or for 10 days total, whichever comes first. Proceed to ER for any worsening sx.       Follow up if symptoms worsen or fail to improve.      5/3/2023 JODIE Grider, FNP    This note was created using [employer] voice recognition software that occasionally misinterprets phrases or words.

## 2023-05-03 NOTE — LETTER
May 3, 2023      [facility]  [address] [facility] [address]  Phone: [phone]  Fax: [phone]       Patient: Sonia Muse   YOB: 1960  Date of Visit: 05/03/2023    To Whom It May Concern:    [facility]amel Muse  [facility]. The patient may return to work/school on 5/8/23 with no restrictions, as long as she is afebrile. If you have any questions or concerns, or if I can be of further assistance, please do not hesitate to contact me.    Sincerely,        RISHI Smyth LPN

## 2023-05-04 ENCOUNTER — PATIENT MESSAGE (OUTPATIENT)
Dept: FAMILY MEDICINE | Facility: CLINIC | Age: 63
End: 2023-05-04

## 2023-05-15 ENCOUNTER — OFFICE VISIT (OUTPATIENT)
Dept: FAMILY MEDICINE | Facility: CLINIC | Age: 63
End: 2023-05-15
Payer: MEDICAID

## 2023-05-15 VITALS
OXYGEN SATURATION: 96 % | HEIGHT: 64 IN | WEIGHT: 288.13 LBS | SYSTOLIC BLOOD PRESSURE: 131 MMHG | DIASTOLIC BLOOD PRESSURE: 89 MMHG | RESPIRATION RATE: 20 BRPM | BODY MASS INDEX: 49.19 KG/M2 | HEART RATE: 96 BPM | TEMPERATURE: 98 F

## 2023-05-15 DIAGNOSIS — J02.9 SORE THROAT: Primary | ICD-10-CM

## 2023-05-15 DIAGNOSIS — J02.0 STREP PHARYNGITIS: ICD-10-CM

## 2023-05-15 LAB
CTP QC/QA: YES
MOLECULAR STREP A: POSITIVE

## 2023-05-15 PROCEDURE — 3079F PR MOST RECENT DIASTOLIC BLOOD PRESSURE 80-89 MM HG: ICD-10-PCS | Mod: CPTII,,, | Performed by: NURSE PRACTITIONER

## 2023-05-15 PROCEDURE — 3008F BODY MASS INDEX DOCD: CPT | Mod: CPTII,,, | Performed by: NURSE PRACTITIONER

## 2023-05-15 PROCEDURE — 87651 STREP A DNA AMP PROBE: CPT | Mod: PBBFAC | Performed by: NURSE PRACTITIONER

## 2023-05-15 PROCEDURE — 3075F PR MOST RECENT SYSTOLIC BLOOD PRESS GE 130-139MM HG: ICD-10-PCS | Mod: CPTII,,, | Performed by: NURSE PRACTITIONER

## 2023-05-15 PROCEDURE — 3044F HG A1C LEVEL LT 7.0%: CPT | Mod: CPTII,,, | Performed by: NURSE PRACTITIONER

## 2023-05-15 PROCEDURE — 4010F ACE/ARB THERAPY RXD/TAKEN: CPT | Mod: CPTII,,, | Performed by: NURSE PRACTITIONER

## 2023-05-15 PROCEDURE — 3044F PR MOST RECENT HEMOGLOBIN A1C LEVEL <7.0%: ICD-10-PCS | Mod: CPTII,,, | Performed by: NURSE PRACTITIONER

## 2023-05-15 PROCEDURE — 4010F PR ACE/ARB THEARPY RXD/TAKEN: ICD-10-PCS | Mod: CPTII,,, | Performed by: NURSE PRACTITIONER

## 2023-05-15 PROCEDURE — 1160F RVW MEDS BY RX/DR IN RCRD: CPT | Mod: CPTII,,, | Performed by: NURSE PRACTITIONER

## 2023-05-15 PROCEDURE — 96372 THER/PROPH/DIAG INJ SC/IM: CPT | Mod: PBBFAC,JG | Performed by: NURSE PRACTITIONER

## 2023-05-15 PROCEDURE — 99214 PR OFFICE/OUTPT VISIT, EST, LEVL IV, 30-39 MIN: ICD-10-PCS | Mod: S$PBB,,, | Performed by: NURSE PRACTITIONER

## 2023-05-15 PROCEDURE — 3075F SYST BP GE 130 - 139MM HG: CPT | Mod: CPTII,,, | Performed by: NURSE PRACTITIONER

## 2023-05-15 PROCEDURE — 1160F PR REVIEW ALL MEDS BY PRESCRIBER/CLIN PHARMACIST DOCUMENTED: ICD-10-PCS | Mod: CPTII,,, | Performed by: NURSE PRACTITIONER

## 2023-05-15 PROCEDURE — 3079F DIAST BP 80-89 MM HG: CPT | Mod: CPTII,,, | Performed by: NURSE PRACTITIONER

## 2023-05-15 PROCEDURE — 1159F PR MEDICATION LIST DOCUMENTED IN MEDICAL RECORD: ICD-10-PCS | Mod: CPTII,,, | Performed by: NURSE PRACTITIONER

## 2023-05-15 PROCEDURE — 1159F MED LIST DOCD IN RCRD: CPT | Mod: CPTII,,, | Performed by: NURSE PRACTITIONER

## 2023-05-15 PROCEDURE — 3008F PR BODY MASS INDEX (BMI) DOCUMENTED: ICD-10-PCS | Mod: CPTII,,, | Performed by: NURSE PRACTITIONER

## 2023-05-15 PROCEDURE — 99215 OFFICE O/P EST HI 40 MIN: CPT | Performed by: NURSE PRACTITIONER

## 2023-05-15 PROCEDURE — 99214 OFFICE O/P EST MOD 30 MIN: CPT | Mod: S$PBB,,, | Performed by: NURSE PRACTITIONER

## 2023-05-15 RX ADMIN — PENICILLIN G BENZATHINE 1.2 MILLION UNITS: 1200000 INJECTION, SUSPENSION INTRAMUSCULAR at 09:05

## 2023-06-27 ENCOUNTER — PATIENT MESSAGE (OUTPATIENT)
Dept: FAMILY MEDICINE | Facility: CLINIC | Age: 63
End: 2023-06-27

## 2023-06-27 DIAGNOSIS — J45.909 ASTHMA, UNSPECIFIED ASTHMA SEVERITY, UNSPECIFIED WHETHER COMPLICATED, UNSPECIFIED WHETHER PERSISTENT: ICD-10-CM

## 2023-06-27 RX ORDER — MONTELUKAST SODIUM 10 MG/1
10 TABLET ORAL NIGHTLY
Qty: 90 TABLET | Refills: 1 | Status: SHIPPED | OUTPATIENT
Start: 2023-06-27 | End: 2023-12-19 | Stop reason: SDUPTHER

## 2023-07-09 DIAGNOSIS — I10 ESSENTIAL HYPERTENSION: ICD-10-CM

## 2023-07-10 RX ORDER — METOPROLOL SUCCINATE 25 MG/1
TABLET, EXTENDED RELEASE ORAL
Qty: 90 TABLET | Refills: 1 | Status: SHIPPED | OUTPATIENT
Start: 2023-07-10 | End: 2024-01-17 | Stop reason: SDUPTHER

## 2023-07-18 ENCOUNTER — PATIENT MESSAGE (OUTPATIENT)
Dept: FAMILY MEDICINE | Facility: CLINIC | Age: 63
End: 2023-07-18

## 2023-07-18 DIAGNOSIS — M79.7 FIBROMYALGIA: ICD-10-CM

## 2023-07-18 DIAGNOSIS — F41.8 DEPRESSION WITH ANXIETY: ICD-10-CM

## 2023-07-18 DIAGNOSIS — M25.50 ARTHRALGIA, UNSPECIFIED JOINT: ICD-10-CM

## 2023-07-18 RX ORDER — DULOXETIN HYDROCHLORIDE 60 MG/1
60 CAPSULE, DELAYED RELEASE ORAL DAILY
Qty: 90 CAPSULE | Refills: 1 | Status: SHIPPED | OUTPATIENT
Start: 2023-07-18 | End: 2023-12-11

## 2023-08-11 DIAGNOSIS — I10 ESSENTIAL HYPERTENSION: ICD-10-CM

## 2023-08-11 RX ORDER — HYDROCHLOROTHIAZIDE 25 MG/1
25 TABLET ORAL
Qty: 90 TABLET | Refills: 1 | Status: SHIPPED | OUTPATIENT
Start: 2023-08-11 | End: 2024-02-09

## 2023-08-11 NOTE — TELEPHONE ENCOUNTER
Last seen 5/15/23  
I concur with the Admission Order and I certify that services are provided in accordance with Section 42 CFR § 412.3

## 2023-09-06 DIAGNOSIS — J45.909 ASTHMA, UNSPECIFIED ASTHMA SEVERITY, UNSPECIFIED WHETHER COMPLICATED, UNSPECIFIED WHETHER PERSISTENT: ICD-10-CM

## 2023-09-07 DIAGNOSIS — I10 ESSENTIAL HYPERTENSION: ICD-10-CM

## 2023-09-07 RX ORDER — CETIRIZINE HYDROCHLORIDE 10 MG/1
10 TABLET ORAL DAILY
Qty: 90 TABLET | Refills: 1 | Status: SHIPPED | OUTPATIENT
Start: 2023-09-07 | End: 2024-03-08

## 2023-09-07 RX ORDER — LOSARTAN POTASSIUM 100 MG/1
100 TABLET ORAL
Qty: 90 TABLET | Refills: 1 | Status: SHIPPED | OUTPATIENT
Start: 2023-09-07 | End: 2024-03-08

## 2023-09-09 DIAGNOSIS — M25.50 ARTHRALGIA, UNSPECIFIED JOINT: ICD-10-CM

## 2023-09-11 RX ORDER — MELOXICAM 15 MG/1
15 TABLET ORAL
Qty: 90 TABLET | Refills: 1 | Status: SHIPPED | OUTPATIENT
Start: 2023-09-11 | End: 2024-03-08

## 2023-09-13 DIAGNOSIS — I10 ESSENTIAL HYPERTENSION: ICD-10-CM

## 2023-09-13 RX ORDER — LOSARTAN POTASSIUM 100 MG/1
100 TABLET ORAL
Qty: 90 TABLET | Refills: 1 | Status: CANCELLED | OUTPATIENT
Start: 2023-09-13

## 2023-10-11 ENCOUNTER — OFFICE VISIT (OUTPATIENT)
Dept: FAMILY MEDICINE | Facility: CLINIC | Age: 63
End: 2023-10-11
Payer: MEDICAID

## 2023-10-11 VITALS
OXYGEN SATURATION: 95 % | SYSTOLIC BLOOD PRESSURE: 134 MMHG | DIASTOLIC BLOOD PRESSURE: 88 MMHG | WEIGHT: 292 LBS | HEART RATE: 101 BPM | HEIGHT: 64 IN | BODY MASS INDEX: 49.85 KG/M2 | TEMPERATURE: 98 F

## 2023-10-11 DIAGNOSIS — R05.1 ACUTE COUGH: ICD-10-CM

## 2023-10-11 DIAGNOSIS — J30.89 ENVIRONMENTAL AND SEASONAL ALLERGIES: ICD-10-CM

## 2023-10-11 DIAGNOSIS — I10 ESSENTIAL HYPERTENSION: ICD-10-CM

## 2023-10-11 DIAGNOSIS — J45.21 MILD INTERMITTENT ASTHMA WITH ACUTE EXACERBATION: Primary | ICD-10-CM

## 2023-10-11 PROCEDURE — 1159F PR MEDICATION LIST DOCUMENTED IN MEDICAL RECORD: ICD-10-PCS | Mod: CPTII,,, | Performed by: NURSE PRACTITIONER

## 2023-10-11 PROCEDURE — 3044F HG A1C LEVEL LT 7.0%: CPT | Mod: CPTII,,, | Performed by: NURSE PRACTITIONER

## 2023-10-11 PROCEDURE — 3075F SYST BP GE 130 - 139MM HG: CPT | Mod: CPTII,,, | Performed by: NURSE PRACTITIONER

## 2023-10-11 PROCEDURE — 4010F ACE/ARB THERAPY RXD/TAKEN: CPT | Mod: CPTII,,, | Performed by: NURSE PRACTITIONER

## 2023-10-11 PROCEDURE — 3079F DIAST BP 80-89 MM HG: CPT | Mod: CPTII,,, | Performed by: NURSE PRACTITIONER

## 2023-10-11 PROCEDURE — 3008F PR BODY MASS INDEX (BMI) DOCUMENTED: ICD-10-PCS | Mod: CPTII,,, | Performed by: NURSE PRACTITIONER

## 2023-10-11 PROCEDURE — 1159F MED LIST DOCD IN RCRD: CPT | Mod: CPTII,,, | Performed by: NURSE PRACTITIONER

## 2023-10-11 PROCEDURE — 3079F PR MOST RECENT DIASTOLIC BLOOD PRESSURE 80-89 MM HG: ICD-10-PCS | Mod: CPTII,,, | Performed by: NURSE PRACTITIONER

## 2023-10-11 PROCEDURE — 99214 PR OFFICE/OUTPT VISIT, EST, LEVL IV, 30-39 MIN: ICD-10-PCS | Mod: S$PBB,,, | Performed by: NURSE PRACTITIONER

## 2023-10-11 PROCEDURE — 99213 OFFICE O/P EST LOW 20 MIN: CPT | Performed by: NURSE PRACTITIONER

## 2023-10-11 PROCEDURE — 1160F PR REVIEW ALL MEDS BY PRESCRIBER/CLIN PHARMACIST DOCUMENTED: ICD-10-PCS | Mod: CPTII,,, | Performed by: NURSE PRACTITIONER

## 2023-10-11 PROCEDURE — 3008F BODY MASS INDEX DOCD: CPT | Mod: CPTII,,, | Performed by: NURSE PRACTITIONER

## 2023-10-11 PROCEDURE — 99214 OFFICE O/P EST MOD 30 MIN: CPT | Mod: S$PBB,,, | Performed by: NURSE PRACTITIONER

## 2023-10-11 PROCEDURE — 3044F PR MOST RECENT HEMOGLOBIN A1C LEVEL <7.0%: ICD-10-PCS | Mod: CPTII,,, | Performed by: NURSE PRACTITIONER

## 2023-10-11 PROCEDURE — 1160F RVW MEDS BY RX/DR IN RCRD: CPT | Mod: CPTII,,, | Performed by: NURSE PRACTITIONER

## 2023-10-11 PROCEDURE — 3075F PR MOST RECENT SYSTOLIC BLOOD PRESS GE 130-139MM HG: ICD-10-PCS | Mod: CPTII,,, | Performed by: NURSE PRACTITIONER

## 2023-10-11 PROCEDURE — 4010F PR ACE/ARB THEARPY RXD/TAKEN: ICD-10-PCS | Mod: CPTII,,, | Performed by: NURSE PRACTITIONER

## 2023-10-11 RX ORDER — AMLODIPINE BESYLATE 2.5 MG/1
2.5 TABLET ORAL DAILY
Qty: 90 TABLET | Refills: 1 | Status: SHIPPED | OUTPATIENT
Start: 2023-10-11

## 2023-10-11 RX ORDER — ALBUTEROL SULFATE 90 UG/1
2 AEROSOL, METERED RESPIRATORY (INHALATION) EVERY 6 HOURS PRN
Qty: 18 G | Refills: 1 | Status: SHIPPED | OUTPATIENT
Start: 2023-10-11 | End: 2024-10-10

## 2023-10-11 RX ORDER — METHYLPREDNISOLONE 4 MG/1
TABLET ORAL
Qty: 1 EACH | Refills: 0 | Status: SHIPPED | OUTPATIENT
Start: 2023-10-11 | End: 2023-11-01

## 2023-10-11 RX ORDER — DOXYCYCLINE HYCLATE 100 MG
100 TABLET ORAL 2 TIMES DAILY
Qty: 10 TABLET | Refills: 0 | Status: SHIPPED | OUTPATIENT
Start: 2023-10-11 | End: 2023-10-16

## 2023-10-11 NOTE — PROGRESS NOTES
SUBJECTIVE:      Patient ID: Sonia Muse is a 63 y.o. female.    Chief Complaint: Cough    63-year-old female with a history of allergies, anxiety, asthma, DDD, depression, fibromyalgia, hypertension, and neuromuscular disorder presents to the clinic with complaints of asthma, cough, and congestion. She is an established patient of RISHI Montaño. Symptoms have been present x3 days. Reports cough, congestion, SOB, and postnasal drip. Patient reports her asthma flares up due to environmental allergies relate to goldenrod and ragweed. She reports increased use of her rescue inhaler, using at least once daily for the past 3 days, which is excessive for her. Reports compliance with Singulair. 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  Denies recent travel. Declined COVID-19 and influenza testing. Denies body aches, chest pain, headaches, and fever.        Family History   Problem Relation Age of Onset    Diabetes Mother     Hypertension Mother     Heart disease Mother     Stroke Mother     Glaucoma Mother     Diabetes Father     Hypertension Father     Heart disease Father     Hypertension Sister     Heart disease Sister     Graves' disease Daughter     Anxiety disorder Daughter       Social History     Socioeconomic History    Marital status:     Number of children: 3   Occupational History    Occupation: [employer]     Comment: primary sub for [employer]   Tobacco Use    Smoking status: Former     Current packs/day: 0.00     Average packs/day: 1 pack/day for 11.0 years (11.0 ttl pk-yrs)     Types: Cigarettes     Start date:      Quit date:      Years since quittin.8    Smokeless tobacco: Never   Substance and Sexual Activity    Alcohol use: Yes     Comment: occasional 2x/y    Drug use: Never    Sexual activity: Yes     Partners: Male     Social Determinants of Health     Stress: Stress Concern Present (2020)    [employer] -  Occupational Stress Questionnaire     Feeling of Stress : Rather much     Current Outpatient Medications   Medication Sig Dispense Refill    ALPRAZolam (XANAX) 1 MG tablet Take 1 tablet (1 mg total) by mouth once daily. 30 tablet 0    amLODIPine (NORVASC) 2.5 MG tablet Take 1 tablet (2.5 mg total) by mouth once daily. 90 tablet 1    cetirizine (ZYRTEC) 10 MG tablet Take 1 tablet (10 mg total) by mouth once daily. 90 tablet 1    DULoxetine (CYMBALTA) 60 MG capsule Take 1 capsule (60 mg total) by mouth once daily. 90 capsule 1    hydroCHLOROthiazide (HYDRODIURIL) 25 MG tablet TAKE ONE TABLET BY MOUTH EVERY DAY 90 tablet 1    hydrocortisone 2.5 % cream Apply 2.5 mg topically 2 (two) times daily. 15 g 1    losartan (COZAAR) 100 MG tablet TAKE ONE TABLET BY MOUTH EVERY DAY 90 tablet 1    meloxicam (MOBIC) 15 MG tablet TAKE one TABLET BY MOUTH ONCE DAILY 90 tablet 1    metoprolol succinate (TOPROL-XL) 25 MG 24 hr tablet TAKE ONE TABLET BY MOUTH EVERY DAY 90 tablet 1    metronidazole 0.75% (METROCREAM) 0.75 % Crea Apply 1 application topically 2 (two) times daily. 1 Tube 5    montelukast (SINGULAIR) 10 mg tablet Take 1 tablet (10 mg total) by mouth every evening. 90 tablet 1    mupirocin (BACTROBAN) 2 % ointment Apply topically 3 (three) times daily. To skin lesion near left eye, do no instill into the eye 15 g 0    albuterol (VENTOLIN HFA) 90 mcg/actuation inhaler Inhale 2 puffs into the lungs every 6 (six) hours as needed for Wheezing or Shortness of Breath. Rescue 18 g 1    doxycycline (VIBRA-TABS) 100 MG tablet Take 1 tablet (100 mg total) by mouth 2 (two) times daily. for 5 days 10 tablet 0    methylPREDNISolone (MEDROL DOSEPACK) 4 mg tablet use as directed 1 each 0     No current facility-administered medications for this visit.     Review of patient's allergies indicates:   Allergen Reactions    Erythromycin Swelling    Codeine Nausea And Vomiting     And migraine h/a    [place] Blisters      Past Medical History:    Diagnosis Date    Allergy     Anxiety     Asthma     DDD (degenerative disc disease), cervical     Depression     Fibromyalgia     Hypertension 2005    Neuromuscular disorder     PONV (postoperative nausea and vomiting)      Past Surgical History:   Procedure Laterality Date    ADENOIDECTOMY      carpel tunnel Right      SECTION      JOINT REPLACEMENT Right 2016    knee    KNEE ARTHROPLASTY Left 10/19/2020    Procedure: ARTHROPLASTY, KNEE;  Surgeon: Felipe Herring MD;  Location: [facility];  Service: Orthopedics;  Laterality: Left;  Louie Liz    knee replacement Right     ROTATOR CUFF REPAIR Right     TONSILLECTOMY         Review of Systems   Constitutional:  Negative for activity change, appetite change, chills, diaphoresis, fatigue, fever and unexpected weight change.   HENT:  Positive for congestion, postnasal drip and sore throat. Negative for ear pain, sinus pressure, trouble swallowing and voice change.    Eyes:  Negative for pain, discharge and visual disturbance.   Respiratory:  Positive for cough and shortness of breath. Negative for chest tightness and wheezing.    Cardiovascular:  Negative for chest pain and palpitations.   Gastrointestinal:  Negative for abdominal pain, constipation, diarrhea, nausea and vomiting.   Genitourinary:  Negative for difficulty urinating, flank pain, frequency and urgency.   Musculoskeletal:  Negative for back pain and joint swelling.   Skin:  Negative for color change and rash.   Neurological:  Negative for dizziness, seizures, syncope, weakness, numbness and headaches.   Hematological:  Negative for adenopathy.   Psychiatric/Behavioral:  Negative for dysphoric mood and sleep disturbance. The patient is not nervous/anxious.       OBJECTIVE:      Vitals:    10/11/23 0848 10/11/23 0922   BP: (!) 144/100 134/88   BP Location: Left arm    Patient Position: Sitting    BP Method: Large (Manual)    Pulse: 101    Temp: 98.4 °F (36.9 °C)    TempSrc: Oral    SpO2: 95%   "  Weight: 132.5 kg (292 lb)    Height: 5' 4" (1.626 m)      Physical Exam  Vitals and nursing note reviewed.   Constitutional:       General: She is awake. She is not in acute distress.     Appearance: She is morbidly obese. She is ill-appearing. She is not toxic-appearing or diaphoretic.   HENT:      Head: Normocephalic and atraumatic.      Right Ear: Tympanic membrane, ear canal and external ear normal.      Left Ear: Tympanic membrane, ear canal and external ear normal.      Nose: Congestion present.      Mouth/Throat:      Comments: Wearing mask  Eyes:      General: Lids are normal. Gaze aligned appropriately.      Conjunctiva/sclera: Conjunctivae normal.      Right eye: Right conjunctiva is not injected.      Left eye: Left conjunctiva is not injected.      Pupils: Pupils are equal, round, and reactive to light.   Neck:      Thyroid: No thyroid tenderness.   Cardiovascular:      Rate and Rhythm: Normal rate and regular rhythm.      Pulses: Normal pulses.      Heart sounds: Normal heart sounds, S1 normal and S2 normal. No murmur heard.     No friction rub. No gallop.   Pulmonary:      Effort: Pulmonary effort is normal. No respiratory distress.      Breath sounds: No stridor. Decreased breath sounds present. No wheezing, rhonchi or rales.   Chest:      Chest wall: No tenderness.   Musculoskeletal:      Cervical back: Neck supple.      Right lower leg: No edema.      Left lower leg: No edema.   Lymphadenopathy:      Cervical: No cervical adenopathy.   Skin:     General: Skin is warm and dry.      Capillary Refill: Capillary refill takes less than 2 seconds.      Findings: No erythema or rash.   Neurological:      Mental Status: She is alert and oriented to person, place, and time. Mental status is at baseline.   Psychiatric:         Attention and Perception: Attention normal.         Mood and Affect: Mood normal.         Speech: Speech normal.         Behavior: Behavior normal. Behavior is cooperative.         " Thought Content: Thought content normal.         Judgment: Judgment normal.        Assessment:       1. Mild intermittent asthma with acute exacerbation    2. Acute cough    3. Environmental and seasonal allergies        Plan:       Mild intermittent asthma with acute exacerbation  Slightly diminished breath sounds without wheezing. Start medrol dose pack.  If no improvement in symptoms after 3 days, start doxycycline.  Patient informed to limit sun exposure while taking the antibiotics. Continue albuterol rescue inhaler prn and Singulair. Notify clinic if symptoms fail to resolve.    -     albuterol (VENTOLIN HFA) 90 mcg/actuation inhaler; Inhale 2 puffs into the lungs every 6 (six) hours as needed for Wheezing or Shortness of Breath. Rescue  Dispense: 18 g; Refill: 1  -     methylPREDNISolone (MEDROL DOSEPACK) 4 mg tablet; use as directed  Dispense: 1 each; Refill: 0  -     doxycycline (VIBRA-TABS) 100 MG tablet; Take 1 tablet (100 mg total) by mouth 2 (two) times daily. for 5 days  Dispense: 10 tablet; Refill: 0    Acute cough  Start medrol dose pack.  Recommend Mucinex DM.  Start doxycycline if no improvement.   -     methylPREDNISolone (MEDROL DOSEPACK) 4 mg tablet; use as directed  Dispense: 1 each; Refill: 0  -     doxycycline (VIBRA-TABS) 100 MG tablet; Take 1 tablet (100 mg total) by mouth 2 (two) times daily. for 5 days  Dispense: 10 tablet; Refill: 0    Environmental and seasonal allergies  Continue Singulair.  Avoid allergens if possible.     This note was created using [employer] voice recognition software that occasionally misinterprets phrases or words.     I spent a total of 34 minutes on the day of the visit.This includes face to face time and non-face to face time preparing to see the patient (eg, review of tests), obtaining and/or reviewing separately obtained history, documenting clinical information in the electronic or other health record, independently interpreting results and communicating  results to the patient/family/caregiver, or care coordinator.    Follow up if symptoms worsen or fail to improve.      10/11/2023 JODIE Varela, FNP

## 2023-10-17 DIAGNOSIS — I10 ESSENTIAL HYPERTENSION: ICD-10-CM

## 2023-10-17 RX ORDER — AMLODIPINE BESYLATE 2.5 MG/1
2.5 TABLET ORAL DAILY
Qty: 90 TABLET | Refills: 1 | Status: CANCELLED | OUTPATIENT
Start: 2023-10-17

## 2023-11-18 ENCOUNTER — PATIENT MESSAGE (OUTPATIENT)
Dept: ADMINISTRATIVE | Facility: HOSPITAL | Age: 63
End: 2023-11-18
Payer: MEDICAID

## 2023-11-19 DIAGNOSIS — Z12.11 SCREENING FOR COLON CANCER: ICD-10-CM

## 2023-12-11 DIAGNOSIS — M25.50 ARTHRALGIA, UNSPECIFIED JOINT: ICD-10-CM

## 2023-12-11 DIAGNOSIS — M79.7 FIBROMYALGIA: ICD-10-CM

## 2023-12-11 DIAGNOSIS — F41.8 DEPRESSION WITH ANXIETY: ICD-10-CM

## 2023-12-11 RX ORDER — DULOXETIN HYDROCHLORIDE 60 MG/1
60 CAPSULE, DELAYED RELEASE ORAL
Qty: 90 CAPSULE | Refills: 1 | Status: SHIPPED | OUTPATIENT
Start: 2023-12-11

## 2023-12-19 DIAGNOSIS — J45.909 ASTHMA, UNSPECIFIED ASTHMA SEVERITY, UNSPECIFIED WHETHER COMPLICATED, UNSPECIFIED WHETHER PERSISTENT: ICD-10-CM

## 2023-12-20 RX ORDER — MONTELUKAST SODIUM 10 MG/1
10 TABLET ORAL NIGHTLY
Qty: 90 TABLET | Refills: 1 | Status: SHIPPED | OUTPATIENT
Start: 2023-12-20

## 2024-01-17 DIAGNOSIS — I10 ESSENTIAL HYPERTENSION: ICD-10-CM

## 2024-01-17 RX ORDER — METOPROLOL SUCCINATE 25 MG/1
25 TABLET, EXTENDED RELEASE ORAL DAILY
Qty: 90 TABLET | Refills: 0 | Status: SHIPPED | OUTPATIENT
Start: 2024-01-17 | End: 2024-04-10 | Stop reason: SDUPTHER

## 2024-01-17 NOTE — TELEPHONE ENCOUNTER
Pt has been seen for sick visits- due for BP follow up; will send 1 Rx; please have her schedule asap

## 2024-01-17 NOTE — TELEPHONE ENCOUNTER
Called patient to advise one month of medication was sent and appointment is needed. Received voicemail-message and call back number was left for patient.

## 2024-01-31 DIAGNOSIS — Z12.31 OTHER SCREENING MAMMOGRAM: ICD-10-CM

## 2024-02-05 ENCOUNTER — PATIENT MESSAGE (OUTPATIENT)
Dept: ADMINISTRATIVE | Facility: HOSPITAL | Age: 64
End: 2024-02-05
Payer: MEDICAID

## 2024-02-09 DIAGNOSIS — I10 ESSENTIAL HYPERTENSION: ICD-10-CM

## 2024-02-09 RX ORDER — HYDROCHLOROTHIAZIDE 25 MG/1
25 TABLET ORAL
Qty: 90 TABLET | Refills: 1 | Status: SHIPPED | OUTPATIENT
Start: 2024-02-09

## 2024-02-26 ENCOUNTER — HOSPITAL ENCOUNTER (OUTPATIENT)
Dept: RADIOLOGY | Facility: HOSPITAL | Age: 64
Discharge: HOME | End: 2024-02-26
Attending: NURSE PRACTITIONER
Payer: MEDICAID

## 2024-02-26 DIAGNOSIS — Z12.31 OTHER SCREENING MAMMOGRAM: ICD-10-CM

## 2024-02-26 PROCEDURE — 77067 SCR MAMMO BI INCL CAD: CPT | Mod: TC,PO

## 2024-02-28 DIAGNOSIS — R92.8 ABNORMAL MAMMOGRAM: Primary | ICD-10-CM

## 2024-03-08 DIAGNOSIS — M25.50 ARTHRALGIA, UNSPECIFIED JOINT: ICD-10-CM

## 2024-03-08 DIAGNOSIS — I10 ESSENTIAL HYPERTENSION: ICD-10-CM

## 2024-03-08 DIAGNOSIS — J45.909 ASTHMA, UNSPECIFIED ASTHMA SEVERITY, UNSPECIFIED WHETHER COMPLICATED, UNSPECIFIED WHETHER PERSISTENT: ICD-10-CM

## 2024-03-08 RX ORDER — CETIRIZINE HYDROCHLORIDE 10 MG/1
10 TABLET ORAL
Qty: 90 TABLET | Refills: 0 | Status: SHIPPED | OUTPATIENT
Start: 2024-03-08 | End: 2024-06-06 | Stop reason: SDUPTHER

## 2024-03-08 RX ORDER — MELOXICAM 15 MG/1
15 TABLET ORAL
Qty: 90 TABLET | Refills: 0 | Status: SHIPPED | OUTPATIENT
Start: 2024-03-08 | End: 2024-06-06 | Stop reason: SDUPTHER

## 2024-03-08 RX ORDER — LOSARTAN POTASSIUM 100 MG/1
100 TABLET ORAL
Qty: 90 TABLET | Refills: 0 | Status: SHIPPED | OUTPATIENT
Start: 2024-03-08 | End: 2024-05-28 | Stop reason: SDUPTHER

## 2024-03-12 ENCOUNTER — PATIENT MESSAGE (OUTPATIENT)
Dept: ADMINISTRATIVE | Facility: HOSPITAL | Age: 64
End: 2024-03-12
Payer: MEDICAID

## 2024-04-01 ENCOUNTER — HOSPITAL ENCOUNTER (OUTPATIENT)
Dept: RADIOLOGY | Facility: HOSPITAL | Age: 64
Discharge: HOME | End: 2024-04-01
Attending: NURSE PRACTITIONER
Payer: MEDICAID

## 2024-04-01 DIAGNOSIS — R92.8 ABNORMAL MAMMOGRAM: ICD-10-CM

## 2024-04-01 PROCEDURE — 76642 ULTRASOUND BREAST LIMITED: CPT | Mod: TC,PO,RT

## 2024-04-10 DIAGNOSIS — I10 ESSENTIAL HYPERTENSION: ICD-10-CM

## 2024-04-11 RX ORDER — METOPROLOL SUCCINATE 25 MG/1
25 TABLET, EXTENDED RELEASE ORAL DAILY
Qty: 30 TABLET | Refills: 0 | Status: SHIPPED | OUTPATIENT
Start: 2024-04-11 | End: 2024-04-24 | Stop reason: SDUPTHER

## 2024-04-11 RX ORDER — AMLODIPINE BESYLATE 2.5 MG/1
2.5 TABLET ORAL DAILY
Qty: 30 TABLET | Refills: 0 | Status: SHIPPED | OUTPATIENT
Start: 2024-04-11 | End: 2024-04-24 | Stop reason: SDUPTHER

## 2024-04-24 ENCOUNTER — OFFICE VISIT (OUTPATIENT)
Dept: FAMILY MEDICINE | Facility: CLINIC | Age: 64
End: 2024-04-24
Payer: MEDICAID

## 2024-04-24 VITALS
BODY MASS INDEX: 48.74 KG/M2 | OXYGEN SATURATION: 96 % | WEIGHT: 285.5 LBS | DIASTOLIC BLOOD PRESSURE: 95 MMHG | SYSTOLIC BLOOD PRESSURE: 147 MMHG | HEIGHT: 64 IN | HEART RATE: 82 BPM | RESPIRATION RATE: 20 BRPM

## 2024-04-24 DIAGNOSIS — I10 ESSENTIAL HYPERTENSION: Primary | ICD-10-CM

## 2024-04-24 DIAGNOSIS — R73.03 PREDIABETES: ICD-10-CM

## 2024-04-24 DIAGNOSIS — F41.8 DEPRESSION WITH ANXIETY: ICD-10-CM

## 2024-04-24 DIAGNOSIS — Z12.11 COLON CANCER SCREENING: ICD-10-CM

## 2024-04-24 DIAGNOSIS — L98.9 SKIN LESION: ICD-10-CM

## 2024-04-24 PROCEDURE — 3008F BODY MASS INDEX DOCD: CPT | Mod: CPTII,,, | Performed by: NURSE PRACTITIONER

## 2024-04-24 PROCEDURE — 1159F MED LIST DOCD IN RCRD: CPT | Mod: CPTII,,, | Performed by: NURSE PRACTITIONER

## 2024-04-24 PROCEDURE — 1160F RVW MEDS BY RX/DR IN RCRD: CPT | Mod: CPTII,,, | Performed by: NURSE PRACTITIONER

## 2024-04-24 PROCEDURE — 4010F ACE/ARB THERAPY RXD/TAKEN: CPT | Mod: CPTII,,, | Performed by: NURSE PRACTITIONER

## 2024-04-24 PROCEDURE — 3080F DIAST BP >= 90 MM HG: CPT | Mod: CPTII,,, | Performed by: NURSE PRACTITIONER

## 2024-04-24 PROCEDURE — 3077F SYST BP >= 140 MM HG: CPT | Mod: CPTII,,, | Performed by: NURSE PRACTITIONER

## 2024-04-24 PROCEDURE — 99999 PR PBB SHADOW E&M-EST. PATIENT-LVL V: CPT | Mod: PBBFAC,,, | Performed by: NURSE PRACTITIONER

## 2024-04-24 PROCEDURE — 99214 OFFICE O/P EST MOD 30 MIN: CPT | Mod: S$PBB,,, | Performed by: NURSE PRACTITIONER

## 2024-04-24 PROCEDURE — 99215 OFFICE O/P EST HI 40 MIN: CPT | Mod: PBBFAC,PN | Performed by: NURSE PRACTITIONER

## 2024-04-24 RX ORDER — ALPRAZOLAM 1 MG/1
1 TABLET ORAL DAILY
Qty: 30 TABLET | Refills: 0 | Status: SHIPPED | OUTPATIENT
Start: 2024-04-24

## 2024-04-24 RX ORDER — METOPROLOL SUCCINATE 25 MG/1
25 TABLET, EXTENDED RELEASE ORAL DAILY
Qty: 30 TABLET | Refills: 0 | Status: SHIPPED | OUTPATIENT
Start: 2024-04-24 | End: 2024-05-28 | Stop reason: SDUPTHER

## 2024-04-24 RX ORDER — AMLODIPINE BESYLATE 2.5 MG/1
2.5 TABLET ORAL DAILY
Qty: 30 TABLET | Refills: 0 | Status: SHIPPED | OUTPATIENT
Start: 2024-04-24 | End: 2024-05-28 | Stop reason: SDUPTHER

## 2024-04-24 NOTE — PROGRESS NOTES
SUBJECTIVE:      Patient ID: Sonia Heckippo is a 63 y.o. female.    Chief Complaint: Medication Refill and Hypertension    Sonia is here to for f/u on HTN and medication refills today. BP is above goal- just took meds prior to arrival today. She is feeling well overall besides some stress and anxiousness. She is taking her medications as prescribed daily- denies SE or complaints.  Takes Cymbalta daily for depression and anxiety- no SE or complaints. She does take as needed Xanax for very stressful and anxious days. Yearly labs due.     We discussed her outstanding health maintenance- mammogram UTD; [facility] colon cancer screening and pap smear; will schedule when she is able- understands the importance of these screenings     Hypertension  This is a chronic problem. The current episode started more than 1 year ago. The problem has been waxing and waning since onset. The problem is uncontrolled. Associated symptoms include anxiety and peripheral edema. Pertinent negatives include no blurred vision, chest pain, headaches, malaise/fatigue, palpitations, shortness of breath or sweats. Agents associated with hypertension include NSAIDs. Risk factors for coronary artery disease include obesity, post-menopausal state, sedentary lifestyle, stress and family history. Past treatments include beta blockers, calcium channel blockers and diuretics. The current treatment provides moderate improvement. Compliance problems include diet, exercise and psychosocial issues.  There is no history of a thyroid problem.   Anxiety  Presents for follow-up visit. Symptoms include excessive worry and nervous/anxious behavior. Patient reports no chest pain, confusion, depressed mood, dizziness, insomnia, irritability, nausea, palpitations, shortness of breath or suicidal ideas. Symptoms occur occasionally. The severity of symptoms is mild. The quality of sleep is fair. Nighttime awakenings: occasional.     Compliance with medications is  %.       Family History   Problem Relation Name Age of Onset    Diabetes Mother      Hypertension Mother      Heart disease Mother      Stroke Mother      Glaucoma Mother      Diabetes Father      Hypertension Father      Heart disease Father      Hypertension Sister 1     Heart disease Sister 1     Graves' disease Daughter      Anxiety disorder Daughter        Social History     Socioeconomic History    Marital status:     Number of children: 3   Occupational History    Occupation: [employer]     Comment: primary sub for [employer]   Tobacco Use    Smoking status: Former     Current packs/day: 0.00     Average packs/day: 1 pack/day for 11.0 years (11.0 ttl pk-yrs)     Types: Cigarettes     Start date:      Quit date:      Years since quittin.3    Smokeless tobacco: Never   Substance and Sexual Activity    Alcohol use: Yes     Comment: occasional 2x/y    Drug use: Never    Sexual activity: Yes     Partners: Male     Social Determinants of Health     Stress: Stress Concern Present (2020)    [facility] [place] [facility] - Occupational Stress Questionnaire     Feeling of Stress : Rather much     Current Outpatient Medications   Medication Sig Dispense Refill    albuterol (VENTOLIN HFA) 90 mcg/actuation inhaler Inhale 2 puffs into the lungs every 6 (six) hours as needed for Wheezing or Shortness of Breath. Rescue 18 g 1    cetirizine (ZYRTEC) 10 MG tablet TAKE one TABLET BY MOUTH ONCE DAILY 90 tablet 0    DULoxetine (CYMBALTA) 60 MG capsule TAKE ONE CAPSULE BY MOUTH EVERY DAY 90 capsule 1    hydroCHLOROthiazide (HYDRODIURIL) 25 MG tablet TAKE ONE TABLET BY MOUTH EVERY DAY 90 tablet 1    losartan (COZAAR) 100 MG tablet TAKE ONE TABLET BY MOUTH EVERY DAY 90 tablet 0    meloxicam (MOBIC) 15 MG tablet TAKE one TABLET BY MOUTH ONCE DAILY 90 tablet 0    montelukast (SINGULAIR) 10 mg tablet Take 1 tablet (10 mg total) by mouth every evening. 90 tablet 1    mupirocin (BACTROBAN) 2 %  ointment Apply topically 3 (three) times daily. To skin lesion near left eye, do no instill into the eye 15 g 0    ALPRAZolam (XANAX) 1 MG tablet Take 1 tablet (1 mg total) by mouth once daily. 30 tablet 0    amLODIPine (NORVASC) 2.5 MG tablet Take 1 tablet (2.5 mg total) by mouth once daily. 30 tablet 0    metoprolol succinate (TOPROL-XL) 25 MG 24 hr tablet Take 1 tablet (25 mg total) by mouth once daily. 30 tablet 0     No current facility-administered medications for this visit.     Review of patient's allergies indicates:   Allergen Reactions    Erythromycin Swelling    Codeine Nausea And Vomiting     And migraine h/a    [place] Blisters      Past Medical History:   Diagnosis Date    Allergy     Anxiety     Asthma     DDD (degenerative disc disease), cervical     Depression     Fibromyalgia     Hypertension     Neuromuscular disorder     PONV (postoperative nausea and vomiting)      Past Surgical History:   Procedure Laterality Date    ADENOIDECTOMY      carpel tunnel Right      SECTION      JOINT REPLACEMENT Right 2016    knee    KNEE ARTHROPLASTY Left 10/19/2020    Procedure: ARTHROPLASTY, KNEE;  Surgeon: Felipe Herring MD;  Location: [facility];  Service: Orthopedics;  Laterality: Left;  Louie Liz    knee replacement Right     ROTATOR CUFF REPAIR Right     TONSILLECTOMY         Review of Systems   Constitutional:  Negative for activity change, appetite change, chills, fatigue, fever, irritability, malaise/fatigue and unexpected weight change.   HENT:  Negative for congestion, ear pain, rhinorrhea and sore throat.    Eyes:  Negative for blurred vision, redness and itching.   Respiratory:  Negative for cough and shortness of breath.    Cardiovascular:  Negative for chest pain, palpitations and leg swelling.   Gastrointestinal:  Negative for abdominal pain, diarrhea, nausea and vomiting.   Endocrine: Negative for cold intolerance, heat intolerance, polydipsia and polyuria.   Genitourinary:  Negative  "for dysuria, frequency and vaginal discharge.   Musculoskeletal:  Negative for back pain and myalgias.   Skin:  Negative for pallor and rash.   Neurological:  Negative for dizziness, weakness and headaches.   Hematological:  Negative for adenopathy. Does not bruise/bleed easily.   Psychiatric/Behavioral:  Negative for agitation, behavioral problems, confusion, dysphoric mood, sleep disturbance and suicidal ideas. The patient is nervous/anxious. The patient does not have insomnia.       OBJECTIVE:      Vitals:    04/24/24 0744 04/24/24 0750   BP: (!) 162/102 (!) 147/95   BP Location: Right forearm Left forearm   Patient Position: Sitting Sitting   BP Method: Large (Automatic) Large (Automatic)   Pulse: 82    Resp: 20    SpO2: 96%    Weight: 129.5 kg (285 lb 8 oz)    Height: 5' 4" (1.626 m)      Physical Exam  Vitals and nursing note reviewed.   Constitutional:       General: She is not in acute distress.     Appearance: Normal appearance. She is well-developed. She is obese. She is not ill-appearing.   HENT:      Head: Normocephalic.      Mouth/Throat:      Mouth: Mucous membranes are moist.   Eyes:      Extraocular Movements: Extraocular movements intact.      Conjunctiva/sclera: Conjunctivae normal.      Pupils: Pupils are equal, round, and reactive to light.   Neck:      Thyroid: No thyroid mass, thyromegaly or thyroid tenderness.      Vascular: No carotid bruit.   Cardiovascular:      Rate and Rhythm: Normal rate and regular rhythm.      Pulses: Normal pulses.      Heart sounds: Murmur heard.      Systolic murmur is present with a grade of 1/6.   Pulmonary:      Effort: Pulmonary effort is normal. No respiratory distress.      Breath sounds: Normal breath sounds. No wheezing, rhonchi or rales.   Abdominal:      General: Bowel sounds are normal.      Palpations: Abdomen is soft.      Tenderness: There is no abdominal tenderness.      Comments: protuberant   Musculoskeletal:         General: Normal range of motion. "      Cervical back: Normal range of motion and neck supple.      Right lower leg: No edema.      Left lower leg: No edema.   Lymphadenopathy:      Cervical: No cervical adenopathy.   Skin:     General: Skin is warm and dry.      Capillary Refill: Capillary refill takes less than 2 seconds.      Coloration: Skin is not jaundiced or pale.   Neurological:      Mental Status: She is alert and oriented to person, place, and time.   Psychiatric:         Mood and Affect: Mood normal.         Behavior: Behavior normal.         Thought Content: Thought content normal.         Judgment: Judgment normal.        Assessment:       1. Essential hypertension    2. Prediabetes    3. Depression with anxiety    4. Colon cancer screening    5. Skin lesion        Plan:       Essential hypertension  -     CBC Auto Differential; Future; Expected date: 04/24/2024  -     Comprehensive Metabolic Panel; Future; Expected date: 04/24/2024  -     Lipid Panel; Future; Expected date: 04/24/2024  -     TSH; Future; Expected date: 04/24/2024  -     Urinalysis; Future; Expected date: 04/24/2024  -     amLODIPine (NORVASC) 2.5 MG tablet; Take 1 tablet (2.5 mg total) by mouth once daily.  Dispense: 30 tablet; Refill: 0  -     metoprolol succinate (TOPROL-XL) 25 MG 24 hr tablet; Take 1 tablet (25 mg total) by mouth once daily.  Dispense: 30 tablet; Refill: 0  -above goal; just took meds pta; cont medications; check at next visit; cut down on salt and stress; increase water intake;     Prediabetes  -     Comprehensive Metabolic Panel; Future; Expected date: 04/24/2024  -     Hemoglobin A1C; Future; Expected date: 04/24/2024  -check labs     Depression with anxiety  -     ALPRAZolam (XANAX) 1 MG tablet; Take 1 tablet (1 mg total) by mouth once daily.  Dispense: 30 tablet; Refill: 0   -cont cymbalta, use prn Xanax prn only    Colon cancer screening  -     Ambulatory referral/consult to Gastroenterology; Future; Expected date: 05/01/2024    Skin lesion  -      Ambulatory referral/consult to Dermatology; Future; Expected date: 05/01/2024        Follow up in about 1 month (around 5/24/2024) for annual .      4/24/2024 JODIE Grider, DERICKP    This note was created using [employer] voice recognition software that occasionally misinterprets phrases or words.

## 2024-05-22 ENCOUNTER — TELEPHONE (OUTPATIENT)
Dept: FAMILY MEDICINE | Facility: CLINIC | Age: 64
End: 2024-05-22
Payer: MEDICAID

## 2024-05-26 LAB
ALBUMIN SERPL-MCNC: 4.1 G/DL (ref 3.6–5.1)
ALBUMIN/GLOB SERPL: 1.4 (CALC) (ref 1–2.5)
ALP SERPL-CCNC: 92 U/L (ref 37–153)
ALT SERPL-CCNC: 17 U/L (ref 6–29)
APPEARANCE UR: CLEAR
AST SERPL-CCNC: 15 U/L (ref 10–35)
BASOPHILS # BLD AUTO: 92 CELLS/UL (ref 0–200)
BASOPHILS NFR BLD AUTO: 1 %
BILIRUB SERPL-MCNC: 0.9 MG/DL (ref 0.2–1.2)
BILIRUB UR QL STRIP: NEGATIVE
BUN SERPL-MCNC: 15 MG/DL (ref 7–25)
BUN/CREAT SERPL: ABNORMAL (CALC) (ref 6–22)
CALCIUM SERPL-MCNC: 9.4 MG/DL (ref 8.6–10.4)
CHLORIDE SERPL-SCNC: 100 MMOL/L (ref 98–110)
CHOLEST SERPL-MCNC: 155 MG/DL
CHOLEST/HDLC SERPL: 3 (CALC)
CO2 SERPL-SCNC: 32 MMOL/L (ref 20–32)
COLOR UR: ABNORMAL
CREAT SERPL-MCNC: 0.85 MG/DL (ref 0.5–1.05)
EGFR: 77 ML/MIN/1.73M2
EOSINOPHIL # BLD AUTO: 267 CELLS/UL (ref 15–500)
EOSINOPHIL NFR BLD AUTO: 2.9 %
ERYTHROCYTE [DISTWIDTH] IN BLOOD BY AUTOMATED COUNT: 13 % (ref 11–15)
GLOBULIN SER CALC-MCNC: 3 G/DL (CALC) (ref 1.9–3.7)
GLUCOSE SERPL-MCNC: 140 MG/DL (ref 65–99)
GLUCOSE UR QL STRIP: NEGATIVE
HBA1C MFR BLD: 6.4 % OF TOTAL HGB
HCT VFR BLD AUTO: 39.9 % (ref 35–45)
HDLC SERPL-MCNC: 52 MG/DL
HGB BLD-MCNC: 12.9 G/DL (ref 11.7–15.5)
HGB UR QL STRIP: NEGATIVE
KETONES UR QL STRIP: NEGATIVE
LDLC SERPL CALC-MCNC: 75 MG/DL (CALC)
LEUKOCYTE ESTERASE UR QL STRIP: NEGATIVE
LYMPHOCYTES # BLD AUTO: 1693 CELLS/UL (ref 850–3900)
LYMPHOCYTES NFR BLD AUTO: 18.4 %
MCH RBC QN AUTO: 27.7 PG (ref 27–33)
MCHC RBC AUTO-ENTMCNC: 32.3 G/DL (ref 32–36)
MCV RBC AUTO: 85.8 FL (ref 80–100)
MONOCYTES # BLD AUTO: 607 CELLS/UL (ref 200–950)
MONOCYTES NFR BLD AUTO: 6.6 %
NEUTROPHILS # BLD AUTO: 6541 CELLS/UL (ref 1500–7800)
NEUTROPHILS NFR BLD AUTO: 71.1 %
NITRITE UR QL STRIP: NEGATIVE
NONHDLC SERPL-MCNC: 103 MG/DL (CALC)
PH UR STRIP: 6 [PH] (ref 5–8)
PLATELET # BLD AUTO: 351 THOUSAND/UL (ref 140–400)
PMV BLD REES-ECKER: 9.7 FL (ref 7.5–12.5)
POTASSIUM SERPL-SCNC: 4.1 MMOL/L (ref 3.5–5.3)
PROT SERPL-MCNC: 7.1 G/DL (ref 6.1–8.1)
PROT UR QL STRIP: ABNORMAL
RBC # BLD AUTO: 4.65 MILLION/UL (ref 3.8–5.1)
SODIUM SERPL-SCNC: 141 MMOL/L (ref 135–146)
SP GR UR STRIP: 1.02 (ref 1–1.03)
TRIGL SERPL-MCNC: 188 MG/DL
TSH SERPL-ACNC: 7.65 MIU/L (ref 0.4–4.5)
WBC # BLD AUTO: 9.2 THOUSAND/UL (ref 3.8–10.8)

## 2024-05-28 ENCOUNTER — LAB VISIT (OUTPATIENT)
Dept: LAB | Facility: HOSPITAL | Age: 64
End: 2024-05-28
Attending: NURSE PRACTITIONER
Payer: MEDICAID

## 2024-05-28 ENCOUNTER — OFFICE VISIT (OUTPATIENT)
Dept: FAMILY MEDICINE | Facility: CLINIC | Age: 64
End: 2024-05-28
Payer: MEDICAID

## 2024-05-28 VITALS
WEIGHT: 283 LBS | OXYGEN SATURATION: 98 % | DIASTOLIC BLOOD PRESSURE: 102 MMHG | HEIGHT: 64 IN | HEART RATE: 81 BPM | SYSTOLIC BLOOD PRESSURE: 146 MMHG | RESPIRATION RATE: 20 BRPM | TEMPERATURE: 98 F | BODY MASS INDEX: 48.32 KG/M2

## 2024-05-28 DIAGNOSIS — M25.50 ARTHRALGIA, UNSPECIFIED JOINT: ICD-10-CM

## 2024-05-28 DIAGNOSIS — R92.8 ABNORMAL FINDING ON BREAST IMAGING: ICD-10-CM

## 2024-05-28 DIAGNOSIS — Z12.4 SCREENING FOR CERVICAL CANCER: ICD-10-CM

## 2024-05-28 DIAGNOSIS — Z00.01 ENCOUNTER FOR ROUTINE ADULT HEALTH EXAMINATION WITH ABNORMAL FINDINGS: Primary | ICD-10-CM

## 2024-05-28 DIAGNOSIS — M79.7 FIBROMYALGIA: ICD-10-CM

## 2024-05-28 DIAGNOSIS — R79.89 ABNORMAL TSH: ICD-10-CM

## 2024-05-28 DIAGNOSIS — J45.909 ASTHMA, UNSPECIFIED ASTHMA SEVERITY, UNSPECIFIED WHETHER COMPLICATED, UNSPECIFIED WHETHER PERSISTENT: ICD-10-CM

## 2024-05-28 DIAGNOSIS — Z12.11 COLON CANCER SCREENING: ICD-10-CM

## 2024-05-28 DIAGNOSIS — F41.8 DEPRESSION WITH ANXIETY: ICD-10-CM

## 2024-05-28 DIAGNOSIS — I10 ESSENTIAL HYPERTENSION: ICD-10-CM

## 2024-05-28 DIAGNOSIS — R73.03 PREDIABETES: ICD-10-CM

## 2024-05-28 LAB — THYROPEROXIDASE IGG SERPL-ACNC: <6 IU/ML

## 2024-05-28 PROCEDURE — 99215 OFFICE O/P EST HI 40 MIN: CPT | Mod: PBBFAC,PN | Performed by: NURSE PRACTITIONER

## 2024-05-28 PROCEDURE — 1159F MED LIST DOCD IN RCRD: CPT | Mod: CPTII,,, | Performed by: NURSE PRACTITIONER

## 2024-05-28 PROCEDURE — 3044F HG A1C LEVEL LT 7.0%: CPT | Mod: CPTII,,, | Performed by: NURSE PRACTITIONER

## 2024-05-28 PROCEDURE — 4010F ACE/ARB THERAPY RXD/TAKEN: CPT | Mod: CPTII,,, | Performed by: NURSE PRACTITIONER

## 2024-05-28 PROCEDURE — 3077F SYST BP >= 140 MM HG: CPT | Mod: CPTII,,, | Performed by: NURSE PRACTITIONER

## 2024-05-28 PROCEDURE — 86376 MICROSOMAL ANTIBODY EACH: CPT | Performed by: NURSE PRACTITIONER

## 2024-05-28 PROCEDURE — 99396 PREV VISIT EST AGE 40-64: CPT | Mod: 25,S$PBB,, | Performed by: NURSE PRACTITIONER

## 2024-05-28 PROCEDURE — 3008F BODY MASS INDEX DOCD: CPT | Mod: CPTII,,, | Performed by: NURSE PRACTITIONER

## 2024-05-28 PROCEDURE — 99999 PR PBB SHADOW E&M-EST. PATIENT-LVL V: CPT | Mod: PBBFAC,,, | Performed by: NURSE PRACTITIONER

## 2024-05-28 PROCEDURE — 84445 ASSAY OF TSI GLOBULIN: CPT | Performed by: NURSE PRACTITIONER

## 2024-05-28 PROCEDURE — 3080F DIAST BP >= 90 MM HG: CPT | Mod: CPTII,,, | Performed by: NURSE PRACTITIONER

## 2024-05-28 PROCEDURE — 1160F RVW MEDS BY RX/DR IN RCRD: CPT | Mod: CPTII,,, | Performed by: NURSE PRACTITIONER

## 2024-05-28 PROCEDURE — 36415 COLL VENOUS BLD VENIPUNCTURE: CPT | Performed by: NURSE PRACTITIONER

## 2024-05-28 RX ORDER — AMLODIPINE BESYLATE 5 MG/1
5 TABLET ORAL DAILY
Qty: 90 TABLET | Refills: 0 | Status: SHIPPED | OUTPATIENT
Start: 2024-05-28

## 2024-05-28 RX ORDER — LOSARTAN POTASSIUM 100 MG/1
100 TABLET ORAL DAILY
Qty: 90 TABLET | Refills: 0 | Status: SHIPPED | OUTPATIENT
Start: 2024-05-28

## 2024-05-28 RX ORDER — DULOXETIN HYDROCHLORIDE 60 MG/1
60 CAPSULE, DELAYED RELEASE ORAL DAILY
Qty: 90 CAPSULE | Refills: 1 | Status: SHIPPED | OUTPATIENT
Start: 2024-05-28

## 2024-05-28 RX ORDER — MONTELUKAST SODIUM 10 MG/1
10 TABLET ORAL NIGHTLY
Qty: 90 TABLET | Refills: 1 | Status: SHIPPED | OUTPATIENT
Start: 2024-05-28

## 2024-05-28 RX ORDER — METOPROLOL SUCCINATE 25 MG/1
25 TABLET, EXTENDED RELEASE ORAL DAILY
Qty: 90 TABLET | Refills: 0 | Status: SHIPPED | OUTPATIENT
Start: 2024-05-28

## 2024-05-28 NOTE — PROGRESS NOTES
SUBJECTIVE:   HPI: Sonia Muse  is a 64 y.o. female who presents for annual physical .   Annual Exam    Sonia is here to for annual exam and physical today. Taking medications as prescribed for asthma/allergies, anxiety/depression, and HTN as prescribed without SE or complaints. BP is above goal- under a lot of stress lately, but not missing doses. She is feeling well overall besides some stress and anxiousness.     Recent labs reviewed with pt: TSH elevated- no hx of hypothyroidism but has a daughter with Graves disease; pre-DM has worsened to 6.4- has not been eating the best but weight is down since last visit    We discussed her outstanding health maintenance- mammogram UTD- needs R breast US in 10/24; need colon cancer screening and pap smear; will complete when she is able- understands the importance of these screenings      No visits with results within 1 Month(s) from this visit.   Latest known visit with results is:   Office Visit on 04/24/2024   Component Date Value Ref Range Status    WBC 05/25/2024 9.2  3.8 - 10.8 Thousand/uL Final    RBC 05/25/2024 4.65  3.80 - 5.10 Million/uL Final    Hemoglobin 05/25/2024 12.9  11.7 - 15.5 g/dL Final    Hematocrit 05/25/2024 39.9  35.0 - 45.0 % Final    MCV 05/25/2024 85.8  80.0 - 100.0 fL Final    MCH 05/25/2024 27.7  27.0 - 33.0 pg Final    MCHC 05/25/2024 32.3  32.0 - 36.0 g/dL Final    RDW 05/25/2024 13.0  11.0 - 15.0 % Final    Platelets 05/25/2024 351  140 - 400 Thousand/uL Final    MPV 05/25/2024 9.7  7.5 - 12.5 fL Final    Neutrophils, Abs 05/25/2024 6,541  1,500 - 7,800 cells/uL Final    Lymph # 05/25/2024 1,693  850 - 3,900 cells/uL Final    Mono # 05/25/2024 607  200 - 950 cells/uL Final    Eos # 05/25/2024 267  15 - 500 cells/uL Final    Baso # 05/25/2024 92  0 - 200 cells/uL Final    Neutrophils Relative 05/25/2024 71.1  % Final    Lymph % 05/25/2024 18.4  % Final    Mono % 05/25/2024 6.6  % Final    Eosinophil % 05/25/2024 2.9  % Final     Basophil % 05/25/2024 1.0  % Final    Glucose 05/25/2024 140 (H)  65 - 99 mg/dL Final    Comment:               Fasting reference interval     For someone without known diabetes, a glucose  value >125 mg/dL indicates that they may have  diabetes and this should be confirmed with a  follow-up test.         BUN 05/25/2024 15  7 - 25 mg/dL Final    Creatinine 05/25/2024 0.85  0.50 - 1.05 mg/dL Final    eGFR 05/25/2024 77  > OR = 60 mL/min/1.73m2 Final    BUN/Creatinine Ratio 05/25/2024 SEE NOTE:  6 - 22 (calc) Final    Comment:    Not Reported: BUN and Creatinine are within     reference range.            Sodium 05/25/2024 141  135 - 146 mmol/L Final    Potassium 05/25/2024 4.1  3.5 - 5.3 mmol/L Final    Chloride 05/25/2024 100  98 - 110 mmol/L Final    CO2 05/25/2024 32  20 - 32 mmol/L Final    Calcium 05/25/2024 9.4  8.6 - 10.4 mg/dL Final    Total Protein 05/25/2024 7.1  6.1 - 8.1 g/dL Final    Albumin 05/25/2024 4.1  3.6 - 5.1 g/dL Final    Globulin, Total 05/25/2024 3.0  1.9 - 3.7 g/dL (calc) Final    Albumin/Globulin Ratio 05/25/2024 1.4  1.0 - 2.5 (calc) Final    Total Bilirubin 05/25/2024 0.9  0.2 - 1.2 mg/dL Final    Alkaline Phosphatase 05/25/2024 92  37 - 153 U/L Final    AST 05/25/2024 15  10 - 35 U/L Final    ALT 05/25/2024 17  6 - 29 U/L Final    Cholesterol 05/25/2024 155  <200 mg/dL Final    HDL 05/25/2024 52  > OR = 50 mg/dL Final    Triglycerides 05/25/2024 188 (H)  <150 mg/dL Final    LDL Cholesterol 05/25/2024 75  mg/dL (calc) Final    Comment: Reference range: <100     Desirable range <100 mg/dL for primary prevention;    <70 mg/dL for patients with CHD or diabetic patients   with > or = 2 CHD risk factors.     LDL-C is now calculated using the Kishan-Mirna   calculation, which is a validated novel method providing   better accuracy than the Friedewald equation in the   estimation of LDL-C.   Kishan BARRERA et al. ЕЛЕНА. 2013;310(42): 5785-2002   (http://education.[employer]/faq/MOZ184)       HDL/Cholesterol Ratio 05/25/2024 3.0  <5.0 (calc) Final    Non HDL Chol. (LDL+VLDL) 05/25/2024 103  <130 mg/dL (calc) Final    Comment: For patients with diabetes plus 1 major ASCVD risk   factor, treating to a non-HDL-C goal of <100 mg/dL   (LDL-C of <70 mg/dL) is considered a therapeutic   option.      TSH 05/25/2024 7.65 (H)  0.40 - 4.50 mIU/L Final    Color, UA 05/25/2024 DARK YELLOW  YELLOW Final    Appearance, UA 05/25/2024 CLEAR  CLEAR Final    Specific Gravity, UA 05/25/2024 1.020  1.001 - 1.035 Final    pH, UA 05/25/2024 6.0  5.0 - 8.0 Final    Glucose, UA 05/25/2024 NEGATIVE  NEGATIVE Final    Bilirubin, UA 05/25/2024 NEGATIVE  NEGATIVE Final    Ketones, UA 05/25/2024 NEGATIVE  NEGATIVE Final    Occult Blood UA 05/25/2024 NEGATIVE  NEGATIVE Final    Protein, UA 05/25/2024 1+ (A)  NEGATIVE Final    Nitrite, UA 05/25/2024 NEGATIVE  NEGATIVE Final    Leukocytes, UA 05/25/2024 NEGATIVE  NEGATIVE Final    Hemoglobin A1C 05/25/2024 6.4 (H)  <5.7 % of total Hgb Final    Comment: For someone without known diabetes, a hemoglobin   A1c value between 5.7% and 6.4% is consistent with  prediabetes and should be confirmed with a   follow-up test.     For someone with known diabetes, a value <7%  indicates that their diabetes is well controlled. A1c  targets should be individualized based on duration of  diabetes, age, comorbid conditions, and other  considerations.     This assay result is consistent with an increased risk  of diabetes.     Currently, no consensus exists regarding use of  hemoglobin A1c for diagnosis of diabetes for children.        This test was performed on the Roche muna c503 platform.  Effective 11/13/23, a change in test platforms from the  [employer]  to the Roche muna c503 may have shifted  HbA1c results compared to historical results.  Based on laboratory validation testing conducted at  [facility], the Roche platform relative to the Abbott  platform had an average increase in HbA1c value  of  < or = 0.3%. This difference is within accepted                              variability established by the National Glycohemoglobin  Standardization Program. Note that not all individuals  will have had a shift in their results and direct  comparisons between historical and current results for  testing conducted on different platforms is not  recommended.         ]  (Not in a hospital admission)    Review of patient's allergies indicates:   Allergen Reactions    Erythromycin Swelling    Codeine Nausea And Vomiting     And migraine h/a    Grant Blisters     Current Outpatient Medications on File Prior to Visit   Medication Sig Dispense Refill    albuterol (VENTOLIN HFA) 90 mcg/actuation inhaler Inhale 2 puffs into the lungs every 6 (six) hours as needed for Wheezing or Shortness of Breath. Rescue 18 g 1    ALPRAZolam (XANAX) 1 MG tablet Take 1 tablet (1 mg total) by mouth once daily. 30 tablet 0    cetirizine (ZYRTEC) 10 MG tablet TAKE one TABLET BY MOUTH ONCE DAILY 90 tablet 0    hydroCHLOROthiazide (HYDRODIURIL) 25 MG tablet TAKE ONE TABLET BY MOUTH EVERY DAY 90 tablet 1    meloxicam (MOBIC) 15 MG tablet TAKE one TABLET BY MOUTH ONCE DAILY 90 tablet 0    mupirocin (BACTROBAN) 2 % ointment Apply topically 3 (three) times daily. To skin lesion near left eye, do no instill into the eye 15 g 0    [DISCONTINUED] amLODIPine (NORVASC) 2.5 MG tablet Take 1 tablet (2.5 mg total) by mouth once daily. 30 tablet 0    [DISCONTINUED] DULoxetine (CYMBALTA) 60 MG capsule TAKE ONE CAPSULE BY MOUTH EVERY DAY 90 capsule 1    [DISCONTINUED] losartan (COZAAR) 100 MG tablet TAKE ONE TABLET BY MOUTH EVERY DAY 90 tablet 0    [DISCONTINUED] metoprolol succinate (TOPROL-XL) 25 MG 24 hr tablet Take 1 tablet (25 mg total) by mouth once daily. 30 tablet 0    [DISCONTINUED] montelukast (SINGULAIR) 10 mg tablet Take 1 tablet (10 mg total) by mouth every evening. 90 tablet 1     No current facility-administered medications on file prior to  visit.     Past Medical History:   Diagnosis Date    Allergy     Anxiety     Asthma     DDD (degenerative disc disease), cervical     Depression     Fibromyalgia     Hypertension 2005    Neuromuscular disorder     PONV (postoperative nausea and vomiting)      Past Surgical History:   Procedure Laterality Date    ADENOIDECTOMY      carpel tunnel Right      SECTION      JOINT REPLACEMENT Right 2016    knee    KNEE ARTHROPLASTY Left 10/19/2020    Procedure: ARTHROPLASTY, KNEE;  Surgeon: Felipe Herring MD;  Location: [facility];  Service: Orthopedics;  Laterality: Left;  Louie Liz    knee replacement Right     ROTATOR CUFF REPAIR Right     TONSILLECTOMY       Family History   Problem Relation Name Age of Onset    Diabetes Mother      Hypertension Mother      Heart disease Mother      Stroke Mother      Glaucoma Mother      Diabetes Father      Hypertension Father      Heart disease Father      Hypertension Sister 1     Heart disease Sister 1     Graves' disease Daughter      Anxiety disorder Daughter       Social History     Tobacco Use    Smoking status: Former     Current packs/day: 0.00     Average packs/day: 1 pack/day for 11.0 years (11.0 ttl pk-yrs)     Types: Cigarettes     Start date:      Quit date:      Years since quittin.4    Smokeless tobacco: Never   Substance Use Topics    Alcohol use: Yes     Comment: occasional 2x/y    Drug use: Never      Health Maintenance Topics with due status: Not Due       Topic Last Completion Date    TETANUS VACCINE 2020    Influenza Vaccine 2022    Mammogram 2024    Hemoglobin A1c (Prediabetes) 2024    Lipid Panel 2024     Immunization History   Administered Date(s) Administered    COVID-19, vector-nr, rS-Ad26, PF ([facility]upa) 2021    Tdap 2020       Review of Systems   Constitutional:  Positive for fatigue. Negative for activity change, appetite change, chills, fever and unexpected weight change.   HENT:  Negative for  "congestion, ear pain, rhinorrhea, sore throat, trouble swallowing and voice change.    Eyes:  Negative for redness, itching and visual disturbance.   Respiratory:  Negative for cough and shortness of breath.    Cardiovascular:  Negative for chest pain, palpitations and leg swelling.   Gastrointestinal:  Negative for abdominal pain, blood in stool, constipation, diarrhea, nausea and vomiting.        No fam hx of colon cancer    Endocrine: Negative for cold intolerance, heat intolerance, polydipsia and polyuria.   Genitourinary:  Negative for dysuria, frequency, pelvic pain, vaginal bleeding and vaginal discharge.   Musculoskeletal:  Negative for back pain and myalgias.   Skin:  Negative for pallor and rash.   Neurological:  Negative for dizziness, weakness and headaches.   Hematological:  Negative for adenopathy. Does not bruise/bleed easily.   Psychiatric/Behavioral:  Negative for agitation, behavioral problems, confusion, dysphoric mood, sleep disturbance and suicidal ideas. The patient is nervous/anxious.       OBJECTIVE:      Vitals:    05/28/24 1129 05/28/24 1132   BP: (!) 152/104 (!) 146/102   BP Location: Left forearm Left forearm   Patient Position: Sitting Sitting   BP Method: Medium (Automatic) Medium (Automatic)   Pulse: 81    Resp: 20    Temp: 98 °F (36.7 °C)    TempSrc: Oral    SpO2: 98%    Weight: 128.4 kg (283 lb)    Height: 5' 4" (1.626 m)      Physical Exam  Vitals and nursing note reviewed.   Constitutional:       General: She is not in acute distress.     Appearance: Normal appearance. She is well-developed. She is obese. She is not ill-appearing.   HENT:      Head: Normocephalic.      Right Ear: Tympanic membrane, ear canal and external ear normal.      Left Ear: Tympanic membrane, ear canal and external ear normal.      Nose: Nose normal.      Mouth/Throat:      Mouth: Mucous membranes are moist.      Pharynx: Oropharynx is clear. No oropharyngeal exudate or posterior oropharyngeal erythema. "   Eyes:      General: No scleral icterus.     Extraocular Movements: Extraocular movements intact.      Conjunctiva/sclera: Conjunctivae normal.      Pupils: Pupils are equal, round, and reactive to light.   Neck:      Thyroid: No thyroid mass, thyromegaly or thyroid tenderness.      Vascular: No carotid bruit.   Cardiovascular:      Rate and Rhythm: Normal rate and regular rhythm.      Pulses: Normal pulses.      Heart sounds: Normal heart sounds. No murmur heard.  Pulmonary:      Effort: Pulmonary effort is normal. No respiratory distress.      Breath sounds: Normal breath sounds. No wheezing, rhonchi or rales.   Abdominal:      General: Bowel sounds are normal.      Palpations: Abdomen is soft.      Tenderness: There is no abdominal tenderness.      Comments: protuberant   Genitourinary:     Comments: Deferred to gyn   Musculoskeletal:         General: Normal range of motion.      Cervical back: Normal range of motion and neck supple.      Right lower leg: No edema.      Left lower leg: No edema.   Lymphadenopathy:      Cervical: No cervical adenopathy.   Skin:     General: Skin is warm and dry.      Capillary Refill: Capillary refill takes less than 2 seconds.      Coloration: Skin is not jaundiced or pale.   Neurological:      Mental Status: She is alert and oriented to person, place, and time.   Psychiatric:         Mood and Affect: Mood normal.         Behavior: Behavior normal.         Thought Content: Thought content normal.         Judgment: Judgment normal.        Assessment:       1. Encounter for routine adult health examination with abnormal findings    2. Essential hypertension    3. Prediabetes    4. Abnormal TSH    5. Asthma, unspecified asthma severity, unspecified whether complicated, unspecified whether persistent    6. Colon cancer screening    7. Screening for cervical cancer    8. Depression with anxiety    9. Arthralgia, unspecified joint    10. Fibromyalgia        Plan:       Encounter for  routine adult health examination with abnormal findings   -reviewed recent labs, HM recommendations    Essential hypertension  -     amLODIPine (NORVASC) 5 MG tablet; Take 1 tablet (5 mg total) by mouth once daily.  Dispense: 90 tablet; Refill: 0  -     metoprolol succinate (TOPROL-XL) 25 MG 24 hr tablet; Take 1 tablet (25 mg total) by mouth once daily.  Dispense: 90 tablet; Refill: 0  -     losartan (COZAAR) 100 MG tablet; Take 1 tablet (100 mg total) by mouth once daily.  Dispense: 90 tablet; Refill: 0  -above goal; increase Norvasc to 5 mg daily, cont other meds; send home BP reading to me in 1 mo; limit stress, caffeine; cont diet and wt loss efforts    Prediabetes   -up to 6.4; discussed diet, wt loss and exercise; high fiber intake; recheck in 3 mo    Abnormal TSH  -     THYROID PEROXIDASE ANTIBODY; Future; Expected date: 05/28/2024  -     THYROID STIMULATING IMMUNOGLOBULIN; Future; Expected date: 05/28/2024  -no hx of hypothyroidism, but +fam hx; check labs today for autoimmune causes; may need US thyroid as well; discussed possible tx with medication     Asthma, unspecified asthma severity, unspecified whether complicated, unspecified whether persistent  -     montelukast (SINGULAIR) 10 mg tablet; Take 1 tablet (10 mg total) by mouth every evening.  Dispense: 90 tablet; Refill: 1    Colon cancer screening  -     Cologuard Screening (Multitarget Stool DNA); Future; Expected date: 05/28/2024  -declined colonoscopy; no sx or risk factors- ordered Colguard    Screening for cervical cancer  -     Ambulatory referral/consult to Obstetrics / Gynecology; Future; Expected date: 06/04/2024  -need pap with gyn    Depression with anxiety  -     DULoxetine (CYMBALTA) 60 MG capsule; Take 1 capsule (60 mg total) by mouth once daily.  Dispense: 90 capsule; Refill: 1  -stable, cont meds    Arthralgia, unspecified joint  -     DULoxetine (CYMBALTA) 60 MG capsule; Take 1 capsule (60 mg total) by mouth once daily.  Dispense: 90  capsule; Refill: 1    Fibromyalgia  -     DULoxetine (CYMBALTA) 60 MG capsule; Take 1 capsule (60 mg total) by mouth once daily.  Dispense: 90 capsule; Refill: 1        Counseled on age and gender appropriate medical preventative services, including cancer screenings, immunizations, overall nutritional health, need for a consistent exercise regimen and an overall push towards maintaining a vigorous and active lifestyle.      Follow up in about 6 weeks (around 7/9/2024) for f/u HTN, pre-DM.        This note was created using [employer] voice recognition software that occasionally misinterprets phrases or words.

## 2024-05-31 LAB — TSI SER-ACNC: <0.1 IU/L

## 2024-06-03 DIAGNOSIS — E03.9 HYPOTHYROIDISM, UNSPECIFIED TYPE: ICD-10-CM

## 2024-06-03 DIAGNOSIS — R79.89 ABNORMAL TSH: Primary | ICD-10-CM

## 2024-06-03 RX ORDER — LEVOTHYROXINE SODIUM 25 UG/1
25 TABLET ORAL
Qty: 30 TABLET | Refills: 2 | Status: SHIPPED | OUTPATIENT
Start: 2024-06-03

## 2024-06-03 NOTE — PROGRESS NOTES
Results show no abnormalities of thyroid autoimmune labs- will order US and send in thyroid medicine to start in the mornings on an empty stomach as we discussed; follow up as planned next month

## 2024-06-05 ENCOUNTER — PATIENT MESSAGE (OUTPATIENT)
Dept: FAMILY MEDICINE | Facility: CLINIC | Age: 64
End: 2024-06-05
Payer: MEDICAID

## 2024-06-05 NOTE — TELEPHONE ENCOUNTER
Patient thought the purpose of the thyroid stimulating immunoglubin test and the thyroid  peroxidase antibody test was to see if the patient would need to get on medication. Her TSH came back elevated that's why the other 2 test where taken. The patient is confused and just wants clarification because she don't want to start a medication she will be on for life if she does not need to be on it.   Please advise.

## 2024-06-06 ENCOUNTER — PATIENT MESSAGE (OUTPATIENT)
Dept: FAMILY MEDICINE | Facility: CLINIC | Age: 64
End: 2024-06-06
Payer: MEDICAID

## 2024-06-06 DIAGNOSIS — M25.50 ARTHRALGIA, UNSPECIFIED JOINT: ICD-10-CM

## 2024-06-06 DIAGNOSIS — J45.909 ASTHMA, UNSPECIFIED ASTHMA SEVERITY, UNSPECIFIED WHETHER COMPLICATED, UNSPECIFIED WHETHER PERSISTENT: ICD-10-CM

## 2024-06-06 RX ORDER — CETIRIZINE HYDROCHLORIDE 10 MG/1
10 TABLET ORAL DAILY
Qty: 90 TABLET | Refills: 0 | Status: SHIPPED | OUTPATIENT
Start: 2024-06-06

## 2024-06-06 RX ORDER — MELOXICAM 15 MG/1
15 TABLET ORAL DAILY
Qty: 90 TABLET | Refills: 0 | Status: SHIPPED | OUTPATIENT
Start: 2024-06-06

## 2024-08-01 ENCOUNTER — OFFICE VISIT (OUTPATIENT)
Dept: FAMILY MEDICINE | Facility: CLINIC | Age: 64
End: 2024-08-01
Payer: MEDICAID

## 2024-08-01 VITALS
HEIGHT: 64 IN | HEART RATE: 82 BPM | SYSTOLIC BLOOD PRESSURE: 122 MMHG | OXYGEN SATURATION: 95 % | DIASTOLIC BLOOD PRESSURE: 82 MMHG | WEIGHT: 281.69 LBS | BODY MASS INDEX: 48.09 KG/M2 | TEMPERATURE: 98 F | RESPIRATION RATE: 18 BRPM

## 2024-08-01 DIAGNOSIS — I10 ESSENTIAL HYPERTENSION: Primary | ICD-10-CM

## 2024-08-01 DIAGNOSIS — R73.03 PREDIABETES: ICD-10-CM

## 2024-08-01 DIAGNOSIS — E03.9 HYPOTHYROIDISM, UNSPECIFIED TYPE: ICD-10-CM

## 2024-08-01 PROCEDURE — 1160F RVW MEDS BY RX/DR IN RCRD: CPT | Mod: CPTII,,, | Performed by: NURSE PRACTITIONER

## 2024-08-01 PROCEDURE — 99214 OFFICE O/P EST MOD 30 MIN: CPT | Mod: PBBFAC,PN | Performed by: NURSE PRACTITIONER

## 2024-08-01 PROCEDURE — 3044F HG A1C LEVEL LT 7.0%: CPT | Mod: CPTII,,, | Performed by: NURSE PRACTITIONER

## 2024-08-01 PROCEDURE — 4010F ACE/ARB THERAPY RXD/TAKEN: CPT | Mod: CPTII,,, | Performed by: NURSE PRACTITIONER

## 2024-08-01 PROCEDURE — 3079F DIAST BP 80-89 MM HG: CPT | Mod: CPTII,,, | Performed by: NURSE PRACTITIONER

## 2024-08-01 PROCEDURE — 1159F MED LIST DOCD IN RCRD: CPT | Mod: CPTII,,, | Performed by: NURSE PRACTITIONER

## 2024-08-01 PROCEDURE — 99214 OFFICE O/P EST MOD 30 MIN: CPT | Mod: S$PBB,,, | Performed by: NURSE PRACTITIONER

## 2024-08-01 PROCEDURE — 3074F SYST BP LT 130 MM HG: CPT | Mod: CPTII,,, | Performed by: NURSE PRACTITIONER

## 2024-08-01 PROCEDURE — 99999 PR PBB SHADOW E&M-EST. PATIENT-LVL IV: CPT | Mod: PBBFAC,,, | Performed by: NURSE PRACTITIONER

## 2024-08-01 PROCEDURE — 3008F BODY MASS INDEX DOCD: CPT | Mod: CPTII,,, | Performed by: NURSE PRACTITIONER

## 2024-08-01 RX ORDER — METOPROLOL SUCCINATE 25 MG/1
25 TABLET, EXTENDED RELEASE ORAL DAILY
Qty: 90 TABLET | Refills: 0 | Status: SHIPPED | OUTPATIENT
Start: 2024-08-01

## 2024-08-01 RX ORDER — LOSARTAN POTASSIUM 100 MG/1
100 TABLET ORAL DAILY
Qty: 90 TABLET | Refills: 0 | Status: SHIPPED | OUTPATIENT
Start: 2024-08-01

## 2024-08-01 RX ORDER — HYDROCHLOROTHIAZIDE 25 MG/1
25 TABLET ORAL DAILY
Qty: 90 TABLET | Refills: 1 | Status: SHIPPED | OUTPATIENT
Start: 2024-08-01

## 2024-08-01 RX ORDER — AMLODIPINE BESYLATE 5 MG/1
5 TABLET ORAL DAILY
Qty: 90 TABLET | Refills: 1 | Status: SHIPPED | OUTPATIENT
Start: 2024-08-01

## 2024-08-01 NOTE — PROGRESS NOTES
SUBJECTIVE:      Patient ID: Sonia Muse is a 64 y.o. female.    Chief Complaint: Hypertension    Established patient here for follow-up on hypertension.  She has been taking her medications as prescribed since last visit without side effects or complaints.  Blood pressure is under goal and is running normal at home as well.  She has made some dietary changes, eating low carb and low sugar and only lost 2 lb. She has not completed thyroid ultrasound, but autoimmune antibody levels for her thyroid were negative.  Has not started the levothyroxine yet as she wanted to discuss further.  Denies any complaints today and is feeling well at this time.    Overdue an upcoming health maintenance reviewed again with patient        Family History   Problem Relation Name Age of Onset    Diabetes Mother      Hypertension Mother      Heart disease Mother      Stroke Mother      Glaucoma Mother      Diabetes Father      Hypertension Father      Heart disease Father      Hypertension Sister 1     Heart disease Sister 1     Graves' disease Daughter      Anxiety disorder Daughter        Social History     Socioeconomic History    Marital status:     Number of children: 3   Occupational History    Occupation: [employer]     Comment: primary sub for [employer]   Tobacco Use    Smoking status: Former     Current packs/day: 0.00     Average packs/day: 1 pack/day for 11.0 years (11.0 ttl pk-yrs)     Types: Cigarettes     Start date:      Quit date:      Years since quittin.6    Smokeless tobacco: Never   Substance and Sexual Activity    Alcohol use: Yes     Comment: occasional 2x/y    Drug use: Never    Sexual activity: Yes     Partners: Male     Social Determinants of Health     Stress: Stress Concern Present (2020)    [employer] [place] [employer] - Occupational Stress Questionnaire     Feeling of Stress : Rather much     Current Outpatient Medications   Medication Sig Dispense Refill     albuterol (VENTOLIN HFA) 90 mcg/actuation inhaler Inhale 2 puffs into the lungs every 6 (six) hours as needed for Wheezing or Shortness of Breath. Rescue 18 g 1    ALPRAZolam (XANAX) 1 MG tablet Take 1 tablet (1 mg total) by mouth once daily. 30 tablet 0    cetirizine (ZYRTEC) 10 MG tablet Take 1 tablet (10 mg total) by mouth once daily. 90 tablet 0    DULoxetine (CYMBALTA) 60 MG capsule Take 1 capsule (60 mg total) by mouth once daily. 90 capsule 1    levothyroxine (SYNTHROID) 25 MCG tablet Take 1 tablet (25 mcg total) by mouth before breakfast. 30 tablet 2    meloxicam (MOBIC) 15 MG tablet Take 1 tablet (15 mg total) by mouth once daily. 90 tablet 0    montelukast (SINGULAIR) 10 mg tablet Take 1 tablet (10 mg total) by mouth every evening. 90 tablet 1    amLODIPine (NORVASC) 5 MG tablet Take 1 tablet (5 mg total) by mouth once daily. 90 tablet 1    hydroCHLOROthiazide (HYDRODIURIL) 25 MG tablet Take 1 tablet (25 mg total) by mouth once daily. 90 tablet 1    losartan (COZAAR) 100 MG tablet Take 1 tablet (100 mg total) by mouth once daily. 90 tablet 0    metoprolol succinate (TOPROL-XL) 25 MG 24 hr tablet Take 1 tablet (25 mg total) by mouth once daily. 90 tablet 0     No current facility-administered medications for this visit.     Review of patient's allergies indicates:   Allergen Reactions    Erythromycin Swelling    Codeine Nausea And Vomiting     And migraine h/a    [place] Blisters      Past Medical History:   Diagnosis Date    Allergy     Anxiety     Asthma     DDD (degenerative disc disease), cervical     Depression     Fibromyalgia     Hypertension 2005    Neuromuscular disorder     PONV (postoperative nausea and vomiting)      Past Surgical History:   Procedure Laterality Date    ADENOIDECTOMY      carpel tunnel Right      SECTION      JOINT REPLACEMENT Right 2016    knee    KNEE ARTHROPLASTY Left 10/19/2020    Procedure: ARTHROPLASTY, KNEE;  Surgeon: Felipe Herring MD;  Location: [facility];  Service:  "Orthopedics;  Laterality: Left;  Louie Liz    knee replacement Right     ROTATOR CUFF REPAIR Right     TONSILLECTOMY         Review of Systems   Constitutional:  Positive for fatigue. Negative for activity change, appetite change, chills, fever and unexpected weight change.   Eyes:  Negative for redness, itching and visual disturbance.   Respiratory:  Negative for cough and shortness of breath.    Cardiovascular:  Negative for chest pain, palpitations and leg swelling.   Gastrointestinal:  Negative for abdominal pain, blood in stool, constipation, diarrhea, nausea and vomiting.        No fam hx of colon cancer    Endocrine: Negative for cold intolerance, heat intolerance, polydipsia and polyuria.   Genitourinary:  Negative for dysuria, frequency, pelvic pain, vaginal bleeding and vaginal discharge.   Skin:  Negative for pallor and rash.   Neurological:  Negative for dizziness, weakness and headaches.   Hematological:  Negative for adenopathy. Does not bruise/bleed easily.   Psychiatric/Behavioral:  Negative for agitation, behavioral problems, confusion, dysphoric mood, sleep disturbance and suicidal ideas. The patient is not nervous/anxious.       OBJECTIVE:      Vitals:    08/01/24 1057 08/01/24 1059   BP: (!) 145/97 122/82   BP Location: Right arm Left arm   Patient Position: Sitting Sitting   BP Method: Large (Automatic) Large (Automatic)   Pulse: 82    Resp: 18    Temp: 98.3 °F (36.8 °C)    TempSrc: Oral    SpO2: 95%    Weight: 127.8 kg (281 lb 11.2 oz)    Height: 5' 4" (1.626 m)      Physical Exam  Vitals and nursing note reviewed.   Constitutional:       General: She is not in acute distress.     Appearance: Normal appearance. She is well-developed. She is obese. She is not ill-appearing.   HENT:      Head: Normocephalic.      Nose: Nose normal.      Mouth/Throat:      Mouth: Mucous membranes are moist.   Eyes:      General: No scleral icterus.     Pupils: Pupils are equal, round, and reactive to light. "   Neck:      Thyroid: No thyroid mass, thyromegaly or thyroid tenderness.   Cardiovascular:      Rate and Rhythm: Normal rate and regular rhythm.      Heart sounds: Normal heart sounds. No murmur heard.  Pulmonary:      Effort: Pulmonary effort is normal. No respiratory distress.      Breath sounds: Normal breath sounds. No wheezing.   Abdominal:      Comments: protuberant   Genitourinary:     Comments: Deferred to gyn   Musculoskeletal:         General: Normal range of motion.      Cervical back: Normal range of motion and neck supple.      Right lower leg: No edema.      Left lower leg: No edema.   Lymphadenopathy:      Cervical: No cervical adenopathy.   Skin:     General: Skin is warm and dry.      Capillary Refill: Capillary refill takes less than 2 seconds.      Coloration: Skin is not jaundiced or pale.   Neurological:      Mental Status: She is alert and oriented to person, place, and time.   Psychiatric:         Mood and Affect: Mood normal.         Behavior: Behavior normal.         Thought Content: Thought content normal.         Judgment: Judgment normal.        Assessment:       1. Essential hypertension    2. Hypothyroidism, unspecified type    3. Prediabetes        Plan:       Essential hypertension  -     losartan (COZAAR) 100 MG tablet; Take 1 tablet (100 mg total) by mouth once daily.  Dispense: 90 tablet; Refill: 0  -     metoprolol succinate (TOPROL-XL) 25 MG 24 hr tablet; Take 1 tablet (25 mg total) by mouth once daily.  Dispense: 90 tablet; Refill: 0  -     hydroCHLOROthiazide (HYDRODIURIL) 25 MG tablet; Take 1 tablet (25 mg total) by mouth once daily.  Dispense: 90 tablet; Refill: 1  -     amLODIPine (NORVASC) 5 MG tablet; Take 1 tablet (5 mg total) by mouth once daily.  Dispense: 90 tablet; Refill: 1   -stable, cont meds    Hypothyroidism, unspecified type  -     TSH; Future; Expected date: 09/13/2024  -     T4, FREE; Future; Expected date: 09/13/2024  -start medicine daily before breakfast;  check labs in 6 weeks     Prediabetes  -     Hemoglobin A1C; Future; Expected date: 09/13/2024  -     COMPREHENSIVE METABOLIC PANEL; Future; Expected date: 09/13/2024   -A1C recheck at 6 week lab      Follow up in about 6 months (around 2/1/2025) for f/u HTN .      8/1/2024 Nasra Galvez, JODIE, FNP    This note was created using [employer] voice recognition software that occasionally misinterprets phrases or words.

## 2024-08-02 ENCOUNTER — PATIENT MESSAGE (OUTPATIENT)
Dept: ADMINISTRATIVE | Facility: HOSPITAL | Age: 64
End: 2024-08-02
Payer: MEDICAID

## 2024-08-11 ENCOUNTER — PATIENT MESSAGE (OUTPATIENT)
Dept: FAMILY MEDICINE | Facility: CLINIC | Age: 64
End: 2024-08-11
Payer: MEDICAID

## 2024-08-13 ENCOUNTER — OFFICE VISIT (OUTPATIENT)
Dept: FAMILY MEDICINE | Facility: CLINIC | Age: 64
End: 2024-08-13
Payer: MEDICAID

## 2024-08-13 VITALS
SYSTOLIC BLOOD PRESSURE: 117 MMHG | TEMPERATURE: 99 F | HEART RATE: 92 BPM | RESPIRATION RATE: 18 BRPM | DIASTOLIC BLOOD PRESSURE: 83 MMHG | HEIGHT: 64 IN | BODY MASS INDEX: 47.12 KG/M2 | OXYGEN SATURATION: 97 % | WEIGHT: 276 LBS

## 2024-08-13 DIAGNOSIS — R30.0 DYSURIA: Primary | ICD-10-CM

## 2024-08-13 DIAGNOSIS — Z20.822 EXPOSURE TO CONFIRMED CASE OF COVID-19: ICD-10-CM

## 2024-08-13 DIAGNOSIS — J06.9 UPPER RESPIRATORY TRACT INFECTION, UNSPECIFIED TYPE: ICD-10-CM

## 2024-08-13 DIAGNOSIS — N30.00 ACUTE CYSTITIS WITHOUT HEMATURIA: ICD-10-CM

## 2024-08-13 LAB
BILIRUB UR QL STRIP: NEGATIVE
CTP QC/QA: YES
GLUCOSE UR QL STRIP: NEGATIVE
KETONES UR QL STRIP: NEGATIVE
LEUKOCYTE ESTERASE UR QL STRIP: POSITIVE
PH, POC UA: 7 (ref 5–8)
POC BLOOD, URINE: POSITIVE
POC NITRATES, URINE: NEGATIVE
PROT UR QL STRIP: POSITIVE
SARS-COV-2 RDRP RESP QL NAA+PROBE: NEGATIVE
SP GR UR STRIP: 1.01 (ref 1–1.03)
UROBILINOGEN UR STRIP-ACNC: NORMAL (ref 0.1–1.1)

## 2024-08-13 PROCEDURE — 3044F HG A1C LEVEL LT 7.0%: CPT | Mod: CPTII,,,

## 2024-08-13 PROCEDURE — 87088 URINE BACTERIA CULTURE: CPT

## 2024-08-13 PROCEDURE — 4010F ACE/ARB THERAPY RXD/TAKEN: CPT | Mod: CPTII,,,

## 2024-08-13 PROCEDURE — 99213 OFFICE O/P EST LOW 20 MIN: CPT | Mod: S$PBB,,,

## 2024-08-13 PROCEDURE — 87186 SC STD MICRODIL/AGAR DIL: CPT

## 2024-08-13 PROCEDURE — 99999 PR PBB SHADOW E&M-EST. PATIENT-LVL IV: CPT | Mod: PBBFAC,,,

## 2024-08-13 PROCEDURE — 99999PBSHW POCT URINALYSIS, DIPSTICK, AUTOMATED, W/O SCOPE: Mod: PBBFAC,,,

## 2024-08-13 PROCEDURE — 87635 SARS-COV-2 COVID-19 AMP PRB: CPT | Mod: PBBFAC,PN

## 2024-08-13 PROCEDURE — 3008F BODY MASS INDEX DOCD: CPT | Mod: CPTII,,,

## 2024-08-13 PROCEDURE — 99999PBSHW: Mod: PBBFAC,,,

## 2024-08-13 PROCEDURE — 99214 OFFICE O/P EST MOD 30 MIN: CPT | Mod: PBBFAC,PN

## 2024-08-13 PROCEDURE — 87086 URINE CULTURE/COLONY COUNT: CPT

## 2024-08-13 PROCEDURE — 1159F MED LIST DOCD IN RCRD: CPT | Mod: CPTII,,,

## 2024-08-13 PROCEDURE — 3079F DIAST BP 80-89 MM HG: CPT | Mod: CPTII,,,

## 2024-08-13 PROCEDURE — 3074F SYST BP LT 130 MM HG: CPT | Mod: CPTII,,,

## 2024-08-13 PROCEDURE — 81003 URINALYSIS AUTO W/O SCOPE: CPT | Mod: PBBFAC,PN

## 2024-08-13 RX ORDER — CIPROFLOXACIN 500 MG/1
500 TABLET ORAL EVERY 12 HOURS
Qty: 10 TABLET | Refills: 0 | Status: SHIPPED | OUTPATIENT
Start: 2024-08-13

## 2024-08-13 RX ORDER — PHENAZOPYRIDINE HYDROCHLORIDE 100 MG/1
100 TABLET, FILM COATED ORAL 3 TIMES DAILY PRN
Qty: 9 TABLET | Refills: 0 | Status: SHIPPED | OUTPATIENT
Start: 2024-08-13

## 2024-08-16 ENCOUNTER — PATIENT MESSAGE (OUTPATIENT)
Dept: FAMILY MEDICINE | Facility: CLINIC | Age: 64
End: 2024-08-16
Payer: MEDICAID

## 2024-08-16 LAB — BACTERIA UR CULT: ABNORMAL

## 2024-08-16 NOTE — TELEPHONE ENCOUNTER
I tried calling the patient in regards to her message no answer left voicemail. Sent the patient a portal message.

## 2024-09-09 DIAGNOSIS — M25.50 ARTHRALGIA, UNSPECIFIED JOINT: ICD-10-CM

## 2024-09-09 DIAGNOSIS — J45.909 ASTHMA, UNSPECIFIED ASTHMA SEVERITY, UNSPECIFIED WHETHER COMPLICATED, UNSPECIFIED WHETHER PERSISTENT: ICD-10-CM

## 2024-09-09 RX ORDER — CETIRIZINE HYDROCHLORIDE 10 MG/1
10 TABLET ORAL
Qty: 90 TABLET | Refills: 1 | Status: SHIPPED | OUTPATIENT
Start: 2024-09-09

## 2024-09-10 RX ORDER — MELOXICAM 15 MG/1
15 TABLET ORAL DAILY
Qty: 90 TABLET | Refills: 0 | Status: SHIPPED | OUTPATIENT
Start: 2024-09-10

## 2024-10-12 DIAGNOSIS — J45.21 MILD INTERMITTENT ASTHMA WITH ACUTE EXACERBATION: ICD-10-CM

## 2024-10-12 DIAGNOSIS — F41.8 DEPRESSION WITH ANXIETY: ICD-10-CM

## 2024-10-14 RX ORDER — ALPRAZOLAM 1 MG/1
1 TABLET ORAL DAILY
Qty: 30 TABLET | Refills: 0 | Status: SHIPPED | OUTPATIENT
Start: 2024-10-14

## 2024-10-14 RX ORDER — ALBUTEROL SULFATE 90 UG/1
2 INHALANT RESPIRATORY (INHALATION) EVERY 6 HOURS PRN
Qty: 18 G | Refills: 1 | Status: SHIPPED | OUTPATIENT
Start: 2024-10-14 | End: 2025-10-14

## 2024-10-15 ENCOUNTER — PATIENT MESSAGE (OUTPATIENT)
Dept: FAMILY MEDICINE | Facility: CLINIC | Age: 64
End: 2024-10-15
Payer: MEDICAID

## 2024-10-29 DIAGNOSIS — E03.9 HYPOTHYROIDISM, UNSPECIFIED TYPE: ICD-10-CM

## 2024-10-30 RX ORDER — LEVOTHYROXINE SODIUM 25 UG/1
25 TABLET ORAL
Qty: 30 TABLET | Refills: 0 | Status: SHIPPED | OUTPATIENT
Start: 2024-10-30

## 2024-11-20 DIAGNOSIS — E03.9 HYPOTHYROIDISM, UNSPECIFIED TYPE: ICD-10-CM

## 2024-11-21 RX ORDER — LEVOTHYROXINE SODIUM 25 UG/1
25 TABLET ORAL
Qty: 14 TABLET | Refills: 0 | Status: SHIPPED | OUTPATIENT
Start: 2024-11-21

## 2024-11-22 ENCOUNTER — PATIENT MESSAGE (OUTPATIENT)
Dept: FAMILY MEDICINE | Facility: CLINIC | Age: 64
End: 2024-11-22
Payer: MEDICAID

## 2024-12-06 DIAGNOSIS — J45.909 ASTHMA, UNSPECIFIED ASTHMA SEVERITY, UNSPECIFIED WHETHER COMPLICATED, UNSPECIFIED WHETHER PERSISTENT: ICD-10-CM

## 2024-12-06 RX ORDER — MONTELUKAST SODIUM 10 MG/1
10 TABLET ORAL NIGHTLY
Qty: 90 TABLET | Refills: 1 | Status: SHIPPED | OUTPATIENT
Start: 2024-12-06

## 2024-12-10 DIAGNOSIS — J45.909 ASTHMA, UNSPECIFIED ASTHMA SEVERITY, UNSPECIFIED WHETHER COMPLICATED, UNSPECIFIED WHETHER PERSISTENT: ICD-10-CM

## 2024-12-10 DIAGNOSIS — M25.50 ARTHRALGIA, UNSPECIFIED JOINT: ICD-10-CM

## 2024-12-10 DIAGNOSIS — E03.9 HYPOTHYROIDISM, UNSPECIFIED TYPE: ICD-10-CM

## 2024-12-10 RX ORDER — MONTELUKAST SODIUM 10 MG/1
10 TABLET ORAL NIGHTLY
Qty: 90 TABLET | Refills: 1 | Status: CANCELLED | OUTPATIENT
Start: 2024-12-10

## 2024-12-11 RX ORDER — MELOXICAM 15 MG/1
15 TABLET ORAL DAILY
Qty: 90 TABLET | Refills: 0 | Status: SHIPPED | OUTPATIENT
Start: 2024-12-11

## 2024-12-11 RX ORDER — LEVOTHYROXINE SODIUM 25 UG/1
25 TABLET ORAL
Qty: 90 TABLET | Refills: 1 | Status: SHIPPED | OUTPATIENT
Start: 2024-12-11

## 2024-12-17 DIAGNOSIS — M79.7 FIBROMYALGIA: ICD-10-CM

## 2024-12-17 DIAGNOSIS — M25.50 ARTHRALGIA, UNSPECIFIED JOINT: ICD-10-CM

## 2024-12-17 DIAGNOSIS — I10 ESSENTIAL HYPERTENSION: ICD-10-CM

## 2024-12-17 DIAGNOSIS — F41.8 DEPRESSION WITH ANXIETY: ICD-10-CM

## 2024-12-18 RX ORDER — LOSARTAN POTASSIUM 100 MG/1
100 TABLET ORAL DAILY
Qty: 90 TABLET | Refills: 1 | Status: SHIPPED | OUTPATIENT
Start: 2024-12-18

## 2024-12-18 RX ORDER — METOPROLOL SUCCINATE 25 MG/1
25 TABLET, EXTENDED RELEASE ORAL DAILY
Qty: 90 TABLET | Refills: 1 | Status: SHIPPED | OUTPATIENT
Start: 2024-12-18

## 2024-12-18 RX ORDER — DULOXETIN HYDROCHLORIDE 60 MG/1
60 CAPSULE, DELAYED RELEASE ORAL DAILY
Qty: 90 CAPSULE | Refills: 1 | Status: SHIPPED | OUTPATIENT
Start: 2024-12-18

## 2025-01-02 ENCOUNTER — PATIENT MESSAGE (OUTPATIENT)
Dept: ADMINISTRATIVE | Facility: HOSPITAL | Age: 65
End: 2025-01-02
Payer: MEDICAID

## 2025-01-14 ENCOUNTER — PATIENT MESSAGE (OUTPATIENT)
Dept: ADMINISTRATIVE | Facility: HOSPITAL | Age: 65
End: 2025-01-14
Payer: MEDICAID

## 2025-01-30 DIAGNOSIS — I10 ESSENTIAL HYPERTENSION: ICD-10-CM

## 2025-01-30 RX ORDER — METOPROLOL SUCCINATE 25 MG/1
25 TABLET, EXTENDED RELEASE ORAL DAILY
Qty: 90 TABLET | Refills: 0 | Status: SHIPPED | OUTPATIENT
Start: 2025-01-30

## 2025-01-30 RX ORDER — AMLODIPINE BESYLATE 5 MG/1
5 TABLET ORAL DAILY
Qty: 90 TABLET | Refills: 0 | Status: SHIPPED | OUTPATIENT
Start: 2025-01-30

## 2025-01-30 NOTE — TELEPHONE ENCOUNTER
Left voicemail asking for a call back about medication refills.       ----- Message from Francine sent at 1/29/2025  2:37 PM CST -----  Refill for amlodipine, [facility]. Pt threw them out by mistake. The metoprolol does have a refill on it that she will be paying cash for but wanted to let you know that so it is going to be due a month early

## 2025-01-30 NOTE — TELEPHONE ENCOUNTER
----- Message from Francine sent at 1/29/2025  2:37 PM CST -----  Refill for amlodipine, needs to be [facility]. Pt threw them out by mistake. The metoprolol does have a refill on it that she will be paying cash for but wanted to let you know that so it is going to be due a month early . Pt #[phone]

## 2025-01-31 DIAGNOSIS — I10 ESSENTIAL HYPERTENSION: ICD-10-CM

## 2025-01-31 RX ORDER — HYDROCHLOROTHIAZIDE 25 MG/1
25 TABLET ORAL
Qty: 90 TABLET | Refills: 1 | Status: SHIPPED | OUTPATIENT
Start: 2025-01-31

## 2025-02-24 ENCOUNTER — OFFICE VISIT (OUTPATIENT)
Dept: FAMILY MEDICINE | Facility: CLINIC | Age: 65
End: 2025-02-24
Payer: MEDICAID

## 2025-02-24 VITALS
SYSTOLIC BLOOD PRESSURE: 130 MMHG | OXYGEN SATURATION: 100 % | HEIGHT: 64 IN | BODY MASS INDEX: 47.8 KG/M2 | WEIGHT: 280 LBS | RESPIRATION RATE: 20 BRPM | DIASTOLIC BLOOD PRESSURE: 82 MMHG | HEART RATE: 104 BPM | TEMPERATURE: 98 F

## 2025-02-24 DIAGNOSIS — I10 ESSENTIAL HYPERTENSION: Primary | ICD-10-CM

## 2025-02-24 DIAGNOSIS — E03.9 HYPOTHYROIDISM, UNSPECIFIED TYPE: ICD-10-CM

## 2025-02-24 DIAGNOSIS — R73.03 PREDIABETES: ICD-10-CM

## 2025-02-24 DIAGNOSIS — M25.562 ACUTE PAIN OF LEFT KNEE: ICD-10-CM

## 2025-02-24 DIAGNOSIS — F41.8 DEPRESSION WITH ANXIETY: ICD-10-CM

## 2025-02-24 DIAGNOSIS — R92.8 ABNORMAL MAMMOGRAM: Primary | ICD-10-CM

## 2025-02-24 DIAGNOSIS — Z12.31 SCREENING MAMMOGRAM FOR BREAST CANCER: ICD-10-CM

## 2025-02-24 PROCEDURE — 3075F SYST BP GE 130 - 139MM HG: CPT | Mod: CPTII,,, | Performed by: NURSE PRACTITIONER

## 2025-02-24 PROCEDURE — 99999 PR PBB SHADOW E&M-EST. PATIENT-LVL IV: CPT | Mod: PBBFAC,,, | Performed by: NURSE PRACTITIONER

## 2025-02-24 PROCEDURE — 3079F DIAST BP 80-89 MM HG: CPT | Mod: CPTII,,, | Performed by: NURSE PRACTITIONER

## 2025-02-24 PROCEDURE — 99214 OFFICE O/P EST MOD 30 MIN: CPT | Mod: S$PBB,,, | Performed by: NURSE PRACTITIONER

## 2025-02-24 PROCEDURE — 3008F BODY MASS INDEX DOCD: CPT | Mod: CPTII,,, | Performed by: NURSE PRACTITIONER

## 2025-02-24 PROCEDURE — 4010F ACE/ARB THERAPY RXD/TAKEN: CPT | Mod: CPTII,,, | Performed by: NURSE PRACTITIONER

## 2025-02-24 PROCEDURE — G2211 COMPLEX E/M VISIT ADD ON: HCPCS | Mod: S$PBB,,, | Performed by: NURSE PRACTITIONER

## 2025-02-24 PROCEDURE — 1159F MED LIST DOCD IN RCRD: CPT | Mod: CPTII,,, | Performed by: NURSE PRACTITIONER

## 2025-02-24 PROCEDURE — 99214 OFFICE O/P EST MOD 30 MIN: CPT | Mod: PBBFAC,PN | Performed by: NURSE PRACTITIONER

## 2025-02-24 PROCEDURE — 1160F RVW MEDS BY RX/DR IN RCRD: CPT | Mod: CPTII,,, | Performed by: NURSE PRACTITIONER

## 2025-02-24 RX ORDER — LIDOCAINE AND PRILOCAINE 25; 25 MG/G; MG/G
CREAM TOPICAL
COMMUNITY
Start: 2025-01-29

## 2025-02-24 RX ORDER — AMLODIPINE BESYLATE 5 MG/1
5 TABLET ORAL DAILY
Qty: 90 TABLET | Refills: 1 | Status: SHIPPED | OUTPATIENT
Start: 2025-02-24

## 2025-02-24 RX ORDER — METOPROLOL SUCCINATE 25 MG/1
25 TABLET, EXTENDED RELEASE ORAL DAILY
Qty: 90 TABLET | Refills: 1 | Status: SHIPPED | OUTPATIENT
Start: 2025-02-24

## 2025-02-24 RX ORDER — METRONIDAZOLE 7.5 MG/G
CREAM TOPICAL 2 TIMES DAILY
COMMUNITY
Start: 2025-01-29

## 2025-02-24 NOTE — PROGRESS NOTES
SUBJECTIVE:      Patient ID: Sonia Muse is a 64 y.o. female.    Chief Complaint: Hypertension    Established patient here for follow-up on hypertension, hypothyroidism and prediabetes.  She has been taking her medications as prescribed since last visit without side effects or complaints.  Blood pressure is under goal and is running normal at home as well.  She has made some dietary changes-A1c down from last check.  Thyroid recheck in November stable/normal-denies any side effects from thyroid medication.  Notes she is less tired and has less brain fog.      Complaining of left knee pain since the beginning of January.  She was getting up from the floor at work and felt a pain in her left knee.  Denies any trauma, fall or injury.    Overdue and upcoming health maintenance reviewed again with patient  Gyn-[facility], needs pap   Colon cancer screening-has Cologuard kit at home, plans to complete this week  Mammogram due this month        Family History   Problem Relation Name Age of Onset    Diabetes Mother      Hypertension Mother      Heart disease Mother      Stroke Mother      Glaucoma Mother      Diabetes Father      Hypertension Father      Heart disease Father      Hypertension Sister 1     Heart disease Sister 1     Graves' disease Daughter      Anxiety disorder Daughter        Social History     Socioeconomic History    Marital status:     Number of children: 3   Occupational History    Occupation: [employer]     Comment: primary sub for [employer]   Tobacco Use    Smoking status: Former     Current packs/day: 0.00     Average packs/day: 1 pack/day for 11.0 years (11.0 ttl pk-yrs)     Types: Cigarettes     Start date:      Quit date:      Years since quittin.1    Smokeless tobacco: Never   Substance and Sexual Activity    Alcohol use: Yes     Comment: occasional 2x/y    Drug use: Never    Sexual activity: Yes     Partners: Male     Social Drivers of Health     Stress: Stress Concern  Present (2020)    [employer] [place] [employer] - Occupational Stress Questionnaire     Feeling of Stress : Rather much     Current Medications[1]  Review of patient's allergies indicates:   Allergen Reactions    Erythromycin Swelling    Codeine Nausea And Vomiting     And migraine h/a    [place] Blisters      Past Medical History:   Diagnosis Date    Allergy     Anxiety     Asthma     DDD (degenerative disc disease), cervical     Depression     Fibromyalgia     Hypertension 2005    Neuromuscular disorder     PONV (postoperative nausea and vomiting)      Past Surgical History:   Procedure Laterality Date    ADENOIDECTOMY      carpel tunnel Right      SECTION      JOINT REPLACEMENT Right 2016    knee    KNEE ARTHROPLASTY Left 10/19/2020    Procedure: ARTHROPLASTY, KNEE;  Surgeon: Felipe Herring MD;  Location: [facility];  Service: Orthopedics;  Laterality: Left;  Louie Liz    knee replacement Right     ROTATOR CUFF REPAIR Right     TONSILLECTOMY         Review of Systems   Constitutional:  Positive for fatigue (Improving). Negative for activity change, appetite change, chills, fever and unexpected weight change.   Eyes:  Negative for redness, itching and visual disturbance.   Respiratory:  Negative for cough and shortness of breath.    Cardiovascular:  Negative for chest pain, palpitations and leg swelling.   Gastrointestinal:  Negative for abdominal pain, blood in stool, constipation, diarrhea, nausea and vomiting.        No fam hx of colon cancer    Endocrine: Negative for cold intolerance, heat intolerance, polydipsia and polyuria.   Genitourinary:  Negative for dysuria, frequency, pelvic pain and vaginal bleeding.   Skin:  Negative for pallor and rash.   Allergic/Immunologic: Positive for environmental allergies.   Neurological:  Negative for dizziness, weakness and headaches.   Hematological:  Negative for adenopathy. Does not bruise/bleed easily.   Psychiatric/Behavioral:  Negative for  "agitation, behavioral problems, confusion, dysphoric mood, sleep disturbance and suicidal ideas. The patient is not nervous/anxious.       OBJECTIVE:      Vitals:    02/24/25 1301   BP: 130/82   BP Location: Left arm   Patient Position: Sitting   Pulse: 104   Resp: 20   Temp: 98.1 °F (36.7 °C)   TempSrc: Oral   SpO2: 100%   Weight: 127 kg (280 lb)   Height: 5' 4" (1.626 m)     Physical Exam  Vitals and nursing note reviewed.   Constitutional:       General: She is not in acute distress.     Appearance: Normal appearance. She is well-developed. She is obese. She is not ill-appearing.   HENT:      Head: Normocephalic.      Nose: Nose normal.      Mouth/Throat:      Mouth: Mucous membranes are moist.   Eyes:      General: No scleral icterus.     Pupils: Pupils are equal, round, and reactive to light.   Neck:      Thyroid: No thyroid mass, thyromegaly or thyroid tenderness.   Cardiovascular:      Rate and Rhythm: Normal rate and regular rhythm.      Heart sounds: Normal heart sounds. No murmur heard.  Pulmonary:      Effort: Pulmonary effort is normal. No respiratory distress.      Breath sounds: Normal breath sounds. No wheezing.   Abdominal:      Comments: protuberant   Genitourinary:     Comments: Deferred to gyn   Musculoskeletal:         General: Normal range of motion.      Cervical back: Normal range of motion and neck supple.      Left knee: Swelling and crepitus present. No effusion or erythema. Normal range of motion. Tenderness present. Normal meniscus.      Right lower leg: No edema.      Left lower leg: No edema.        Legs:    Lymphadenopathy:      Cervical: No cervical adenopathy.   Skin:     General: Skin is warm and dry.      Capillary Refill: Capillary refill takes less than 2 seconds.      Coloration: Skin is not jaundiced or pale.   Neurological:      Mental Status: She is alert and oriented to person, place, and time.   Psychiatric:         Mood and Affect: Mood normal.         Behavior: Behavior " normal.         Thought Content: Thought content normal.         Judgment: Judgment normal.        Assessment:       1. Essential hypertension    2. Hypothyroidism, unspecified type    3. Prediabetes    4. Depression with anxiety    5. Screening mammogram for breast cancer    6. Acute pain of left knee        Plan:       Essential hypertension  -     metoprolol succinate (TOPROL-XL) 25 MG 24 hr tablet; Take 1 tablet (25 mg total) by mouth once daily.  Dispense: 90 tablet; Refill: 1  -     amLODIPine (NORVASC) 5 MG tablet; Take 1 tablet (5 mg total) by mouth once daily.  Dispense: 90 tablet; Refill: 1  -     CBC Auto Differential; Future; Expected date: 02/24/2025  -     Comprehensive Metabolic Panel; Future; Expected date: 02/24/2025  -     Lipid Panel; Future; Expected date: 02/24/2025  -     TSH; Future; Expected date: 02/24/2025  -     Urinalysis; Future; Expected date: 02/24/2025  -hypertension controlled, continue current medications; labs due in May    Hypothyroidism, unspecified type  -     TSH; Future; Expected date: 02/24/2025  -     T4, FREE; Future; Expected date: 02/24/2025  -stable, continue current levothyroxine dose; check labs in May    Prediabetes  -     Comprehensive Metabolic Panel; Future; Expected date: 02/24/2025  -     Hemoglobin A1C; Future; Expected date: 02/24/2025  -prediabetes improved, continue low sugar and low carb diet and weight loss efforts    Depression with anxiety   -stable/controlled, continue current medication    Screening mammogram for breast cancer  -     Mammo Digital Screening Bilat w/ Danial (XPD); Future; Expected date: 02/24/2025    Acute pain of left knee  -     X-Ray Knee 3 View Left; Future; Expected date: 02/24/2025   -will check x-ray, discussed likely ortho referral or PT      Follow up in about 6 months (around 8/24/2025).      2/24/2025 JODIE Grider, FNP    This note was created using [employer] voice recognition software that occasionally misinterprets  phrases or words.            [1]   Current Outpatient Medications   Medication Sig Dispense Refill    albuterol (VENTOLIN HFA) 90 mcg/actuation inhaler Inhale 2 puffs into the lungs every 6 (six) hours as needed for Wheezing or Shortness of Breath. Rescue 18 g 1    ALPRAZolam (XANAX) 1 MG tablet Take 1 tablet (1 mg total) by mouth once daily. 30 tablet 0    cetirizine (ZYRTEC) 10 MG tablet TAKE 1 TABLET BY MOUTH ONCE DAILY 90 tablet 1    DULoxetine (CYMBALTA) 60 MG capsule Take 1 capsule (60 mg total) by mouth once daily. 90 capsule 1    hydroCHLOROthiazide (HYDRODIURIL) 25 MG tablet TAKE one TABLET BY MOUTH ONCE DAILY 90 tablet 1    levothyroxine (SYNTHROID) 25 MCG tablet Take 1 tablet (25 mcg total) by mouth before breakfast. 90 tablet 1    LIDOcaine-prilocaine (EMLA) cream Apply topically twice a week.      losartan (COZAAR) 100 MG tablet Take 1 tablet (100 mg total) by mouth once daily. 90 tablet 1    meloxicam (MOBIC) 15 MG tablet Take 1 tablet (15 mg total) by mouth once daily. 90 tablet 0    metronidazole 0.75% (METROCREAM) 0.75 % Crea Apply topically 2 (two) times daily.      montelukast (SINGULAIR) 10 mg tablet TAKE ONE TABLET BY MOUTH EVERY EVENING 90 tablet 1    amLODIPine (NORVASC) 5 MG tablet Take 1 tablet (5 mg total) by mouth once daily. 90 tablet 1    metoprolol succinate (TOPROL-XL) 25 MG 24 hr tablet Take 1 tablet (25 mg total) by mouth once daily. 90 tablet 1     No current facility-administered medications for this visit.

## 2025-03-01 DIAGNOSIS — J45.909 ASTHMA, UNSPECIFIED ASTHMA SEVERITY, UNSPECIFIED WHETHER COMPLICATED, UNSPECIFIED WHETHER PERSISTENT: ICD-10-CM

## 2025-03-01 DIAGNOSIS — J45.21 MILD INTERMITTENT ASTHMA WITH ACUTE EXACERBATION: ICD-10-CM

## 2025-03-03 DIAGNOSIS — J45.21 MILD INTERMITTENT ASTHMA WITH ACUTE EXACERBATION: ICD-10-CM

## 2025-03-03 RX ORDER — ALBUTEROL SULFATE 90 UG/1
INHALANT RESPIRATORY (INHALATION)
Qty: 18 G | Refills: 1 | OUTPATIENT
Start: 2025-03-03

## 2025-03-03 RX ORDER — CETIRIZINE HYDROCHLORIDE 10 MG/1
10 TABLET ORAL
Qty: 90 TABLET | Refills: 1 | Status: SHIPPED | OUTPATIENT
Start: 2025-03-03

## 2025-03-03 RX ORDER — ALBUTEROL SULFATE 90 UG/1
2 INHALANT RESPIRATORY (INHALATION) EVERY 6 HOURS PRN
Qty: 18 G | Refills: 1 | Status: SHIPPED | OUTPATIENT
Start: 2025-03-03 | End: 2026-03-03

## 2025-03-05 ENCOUNTER — HOSPITAL ENCOUNTER (OUTPATIENT)
Dept: RADIOLOGY | Facility: HOSPITAL | Age: 65
Discharge: HOME | End: 2025-03-05
Attending: NURSE PRACTITIONER
Payer: MEDICAID

## 2025-03-05 ENCOUNTER — RESULTS FOLLOW-UP (OUTPATIENT)
Dept: FAMILY MEDICINE | Facility: CLINIC | Age: 65
End: 2025-03-05
Payer: MEDICAID

## 2025-03-05 DIAGNOSIS — M85.80 OSTEOPENIA, UNSPECIFIED LOCATION: ICD-10-CM

## 2025-03-05 DIAGNOSIS — M25.562 ACUTE PAIN OF LEFT KNEE: Primary | ICD-10-CM

## 2025-03-05 DIAGNOSIS — M25.562 ACUTE PAIN OF LEFT KNEE: ICD-10-CM

## 2025-03-05 PROCEDURE — 73562 X-RAY EXAM OF KNEE 3: CPT | Mod: TC,PO,LT

## 2025-03-05 PROCEDURE — 73562 X-RAY EXAM OF KNEE 3: CPT | Mod: 26,LT,, | Performed by: RADIOLOGY

## 2025-03-05 NOTE — TELEPHONE ENCOUNTER
----- Message from RISHI Smyth sent at 3/5/2025  1:49 PM CST -----  Please notify patient that results of x-rays show no loosening of hardware, no acute findings; incidental finding osteopenia noted on x-ray, make sure she is taking calcium and vitamin-D daily;   follow-up with orthopedics for continued pain or discomfort  ----- Message -----  From: Interface, Rad Results In  Sent: 3/5/2025  11:38 AM CST  To: RISHI Espinal

## 2025-03-05 NOTE — PROGRESS NOTES
Please notify patient that results of x-rays show no loosening of hardware, no acute findings; incidental finding osteopenia noted on x-ray, make sure she is taking calcium and vitamin-D daily; follow-up with orthopedics for continued pain or discomfort

## 2025-03-05 NOTE — TELEPHONE ENCOUNTER
Attempted to contact patient with lab results. Received voicemail. Left message and call back number.

## 2025-03-06 NOTE — TELEPHONE ENCOUNTER
Called patient with x-ray results. Patient saw here results and stated she needed a referral to ortho. Patient was also advised that she needed to take calcium and vitamin D daily. She asked if she could see  since he did her last knew. Verbal understanding was expressed.

## 2025-03-07 NOTE — TELEPHONE ENCOUNTER
Yes I will send in the referral, I will also sign her to get a bone density test to evaluate her lumbar spine and hip for osteoporosis; will contact her after the results

## 2025-03-07 NOTE — TELEPHONE ENCOUNTER
----- Message from Melody Lucas LPN sent at 3/6/2025  1:58 PM CST -----      ----- Message -----  From: Nasra Galvez FNP  Sent: 3/5/2025   1:49 PM CST  To: Lenny Hammonds Staff    Please notify patient that results of x-rays show no loosening of hardware, no acute findings; incidental finding osteopenia noted on x-ray, make sure she is taking calcium and vitamin-D daily;   follow-up with orthopedics for continued pain or discomfort  ----- Message -----  From: Interface, Rad Results In  Sent: 3/5/2025  11:38 AM CST  To: RISHI Espinal

## 2025-03-07 NOTE — TELEPHONE ENCOUNTER
Called patient to advise the referral was sent to . Patient stated she had just hung up with them. Patient was also advised that a bone density test was being ordered and she would be notified once the results were in. Verbal understanding was expressed.

## 2025-03-07 NOTE — TELEPHONE ENCOUNTER
----- Message from RISHI Espinal sent at 3/7/2025  7:45 AM CST -----      ----- Message -----  From: Melody Lucas LPN  Sent: 3/6/2025   1:58 PM CST  To: RISHI Espinal    ----- Message from Melody Lucas LPN sent at 3/6/2025  1:58 PM CST -----      ----- Message -----  From: Nasra Galvez FNP  Sent: 3/5/2025   1:49 PM CST  To: Lenny Hammonds Staff    Please notify patient that results of x-rays show no loosening of hardware, no acute findings; incidental finding osteopenia noted on x-ray, make sure she is taking calcium and vitamin-D daily;   follow-up with orthopedics for continued pain or discomfort  ----- Message -----  From: Interface, Rad Results In  Sent: 3/5/2025  11:38 AM CST  To: RISHI Espinal

## 2025-03-11 ENCOUNTER — HOSPITAL ENCOUNTER (OUTPATIENT)
Dept: RADIOLOGY | Facility: HOSPITAL | Age: 65
Discharge: HOME | End: 2025-03-11
Attending: NURSE PRACTITIONER
Payer: MEDICAID

## 2025-03-11 ENCOUNTER — RESULTS FOLLOW-UP (OUTPATIENT)
Dept: FAMILY MEDICINE | Facility: CLINIC | Age: 65
End: 2025-03-11

## 2025-03-11 DIAGNOSIS — R92.8 ABNORMAL MAMMOGRAM: ICD-10-CM

## 2025-03-11 PROCEDURE — 77062 BREAST TOMOSYNTHESIS BI: CPT | Mod: 26,,, | Performed by: RADIOLOGY

## 2025-03-11 PROCEDURE — 76642 ULTRASOUND BREAST LIMITED: CPT | Mod: 26,RT,, | Performed by: RADIOLOGY

## 2025-03-11 PROCEDURE — 77062 BREAST TOMOSYNTHESIS BI: CPT | Mod: TC

## 2025-03-11 PROCEDURE — 76642 ULTRASOUND BREAST LIMITED: CPT | Mod: TC,RT

## 2025-03-11 PROCEDURE — 77066 DX MAMMO INCL CAD BI: CPT | Mod: 26,,, | Performed by: RADIOLOGY

## 2025-03-12 DIAGNOSIS — M25.50 ARTHRALGIA, UNSPECIFIED JOINT: ICD-10-CM

## 2025-03-13 RX ORDER — MELOXICAM 15 MG/1
15 TABLET ORAL DAILY
Qty: 90 TABLET | Refills: 0 | Status: SHIPPED | OUTPATIENT
Start: 2025-03-13

## 2025-03-24 ENCOUNTER — PATIENT MESSAGE (OUTPATIENT)
Dept: FAMILY MEDICINE | Facility: CLINIC | Age: 65
End: 2025-03-24
Payer: MEDICAID

## 2025-04-03 ENCOUNTER — HOSPITAL ENCOUNTER (OUTPATIENT)
Dept: RADIOLOGY | Facility: HOSPITAL | Age: 65
Discharge: HOME | End: 2025-04-03
Attending: ORTHOPAEDIC SURGERY
Payer: MEDICAID

## 2025-04-03 ENCOUNTER — OFFICE VISIT (OUTPATIENT)
Dept: ORTHOPEDICS | Facility: CLINIC | Age: 65
End: 2025-04-03
Payer: MEDICAID

## 2025-04-03 ENCOUNTER — PATIENT MESSAGE (OUTPATIENT)
Dept: ADMINISTRATIVE | Facility: HOSPITAL | Age: 65
End: 2025-04-03
Payer: MEDICAID

## 2025-04-03 VITALS — BODY MASS INDEX: 47.8 KG/M2 | HEIGHT: 64 IN | WEIGHT: 280 LBS

## 2025-04-03 DIAGNOSIS — S80.02XA CONTUSION OF LEFT KNEE, INITIAL ENCOUNTER: ICD-10-CM
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
## 2025-04-03 DIAGNOSIS — Z96.652 HX OF TOTAL KNEE ARTHROPLASTY, LEFT: ICD-10-CM

## 2025-04-03 PROCEDURE — 73565 X-RAY EXAM OF KNEES: CPT | Mod: TC,PN

## 2025-04-03 PROCEDURE — 73565 X-RAY EXAM OF KNEES: CPT | Mod: 26,,, | Performed by: RADIOLOGY

## 2025-04-03 PROCEDURE — 99999 PR PBB SHADOW E&M-EST. PATIENT-LVL III: CPT | Mod: PBBFAC,,, | Performed by: ORTHOPAEDIC SURGERY

## 2025-04-03 PROCEDURE — 99213 OFFICE O/P EST LOW 20 MIN: CPT | Mod: PBBFAC,25,PN | Performed by: ORTHOPAEDIC SURGERY

## 2025-04-03 RX ORDER — MUPIROCIN 20 MG/G
OINTMENT TOPICAL 2 TIMES DAILY
COMMUNITY
Start: 2025-03-19

## 2025-04-03 NOTE — PROGRESS NOTES
[facility]    Subjective:     Chief Complaint:   Chief Complaint   Patient presents with    Left Knee - Pain     Left TKA 10/19/20. L knee pain x 4 months after a fall in January at work and landed on the knee. Pain present across anterior portion of knee, describes pain as burning or shredding. Pain wakes from sleep. Pain waxes and wanes. Pain has gotten better since fall, however still slightly swollen. Pt states good ROM.        Past Medical History:   Diagnosis Date    Allergy     Anxiety     Asthma     DDD (degenerative disc disease), cervical     Depression     Fibromyalgia     Hypertension     Neuromuscular disorder     PONV (postoperative nausea and vomiting)        Past Surgical History:   Procedure Laterality Date    ADENOIDECTOMY      carpel tunnel Right      SECTION      JOINT REPLACEMENT Right 2016    knee    KNEE ARTHROPLASTY Left 10/19/2020    Procedure: ARTHROPLASTY, KNEE;  Surgeon: Felipe Herring MD;  Location: [facility];  Service: Orthopedics;  Laterality: Left;  Louie Liz    knee replacement Right     ROTATOR CUFF REPAIR Right     TONSILLECTOMY         Current Outpatient Medications   Medication Sig    albuterol (VENTOLIN HFA) 90 mcg/actuation inhaler Inhale 2 puffs into the lungs every 6 (six) hours as needed for Wheezing or Shortness of Breath. Rescue    ALPRAZolam (XANAX) 1 MG tablet Take 1 tablet (1 mg total) by mouth once daily.    amLODIPine (NORVASC) 5 MG tablet Take 1 tablet (5 mg total) by mouth once daily.    cetirizine (ZYRTEC) 10 MG tablet TAKE 1 TABLET BY MOUTH ONCE DAILY    DULoxetine (CYMBALTA) 60 MG capsule Take 1 capsule (60 mg total) by mouth once daily.    hydroCHLOROthiazide (HYDRODIURIL) 25 MG tablet TAKE one TABLET BY MOUTH ONCE DAILY    levothyroxine (SYNTHROID) 25 MCG tablet Take 1 tablet (25 mcg total) by mouth before breakfast.    LIDOcaine-prilocaine (EMLA) cream Apply topically twice a week.    losartan (COZAAR) 100 MG tablet Take 1 tablet (100  mg total) by mouth once daily.    meloxicam (MOBIC) 15 MG tablet Take 1 tablet (15 mg total) by mouth once daily.    metoprolol succinate (TOPROL-XL) 25 MG 24 hr tablet Take 1 tablet (25 mg total) by mouth once daily.    metronidazole 0.75% (METROCREAM) 0.75 % Crea Apply topically 2 (two) times daily.    montelukast (SINGULAIR) 10 mg tablet TAKE ONE TABLET BY MOUTH EVERY EVENING    mupirocin (BACTROBAN) 2 % ointment Apply topically 2 (two) times daily.     No current facility-administered medications for this visit.       Review of patient's allergies indicates:   Allergen Reactions    Erythromycin Swelling    Codeine Nausea And Vomiting     And migraine h/a    [place] Blisters       Family History   Problem Relation Name Age of Onset    Diabetes Mother      Hypertension Mother      Heart disease Mother      Stroke Mother      Glaucoma Mother      Diabetes Father      Hypertension Father      Heart disease Father      Hypertension Sister 1     Heart disease Sister 1     Graves' disease Daughter      Anxiety disorder Daughter         Social History[1]    History of present illness:  64-year-old female with a history of bilateral total knee arthroplasties is complaining of left knee pain after a fall.  She had the injury back in January she was doing well up until that point.  The left total knee arthroplasty was back in 2020.  She did land down onto the knees.  She has got pain over the anterior aspect of the left knee.  And over the course of the last 3 months her symptoms are improving.       Review of Systems:    Constitution: Negative for chills, fever, and sweats.  Negative for unexplained weight loss.    HENT:  Negative for headaches and blurry vision.    Cardiovascular:Negative for chest pain or irregular heart beat. Negative for hypertension.    Respiratory:  Negative for cough and shortness of breath.    Gastrointestinal: Negative for abdominal pain, heartburn, melena, nausea, and vomitting.    Genitourinary:  " Negative bladder incontinence and dysuria.    Musculoskeletal:  See HPI for details.     Neurological: Negative for numbness.    Psychiatric/Behavioral: Negative for depression.  The patient is not nervous/anxious.      Endocrine: Negative for polyuria    Hematologic/Lymphatic: Negative for bleeding problem.  Does not bruise/bleed easily.    Skin: Negative for poor would healing and rash    Objective:      Physical Examination:    Vital Signs:  There were no vitals filed for this visit.    Body mass index is 48.06 kg/m².    This a well-developed, well nourished patient in no acute distress.  They are alert and oriented and cooperative to examination.        Examination of the left knee, no effusion, skin is dry and intact, no erythema or ecchymosis, no signs symptoms of infection.  Range of motion 0-120 degrees.  Stable in flexion and extension.  Homans signs negative, straight leg raise is negative.  Distally neurovascularly intact.  Pertinent New Results:    XRAY Report / Interpretation:   Standing AP done today in the office does demonstrate bilateral total knee arthroplasties to be in appropriate position without evidence of hardware failure or loosening or subsidence    Assessment/Plan:      Fall, left knee contusion, history of bilateral total knee arthroplasties.  Pain over the anterior knee and over the patella, I think she just has a bony contusion of the patella and with clinical improvement in her symptoms the persistent pain should continue to resolve.  X-rays do not show any evidence of hardware failure or loosening or subsidence.  We will continue to treat this conservatively she will follow up with us on an as-needed basis.    Phani Gutierrez, Physician Assistant, served in the capacity as a "scribe" for this patient encounter.  A "face-to-face" encounter occurred with Dr. Felipe Herring on this date.  The treatment plan and medical decision-making is outlined above. Patient was seen and examined with " a chaperone.       This note was created using Dragon voice recognition software that occasionally misinterpreted phrases or words.             [1]   Social History  Socioeconomic History    Marital status:     Number of children: 3   Occupational History    Occupation: [employer]     Comment: primary sub for [employer]   Tobacco Use    Smoking status: Former     Current packs/day: 0.00     Average packs/day: 1 pack/day for 11.0 years (11.0 ttl pk-yrs)     Types: Cigarettes     Start date:      Quit date:      Years since quittin.2    Smokeless tobacco: Never   Substance and Sexual Activity    Alcohol use: Yes     Comment: occasional 2x/y    Drug use: Never    Sexual activity: Yes     Partners: Male     Social Drivers of Health     Stress: Stress Concern Present (2020)    [employer] [place] [employer] - Occupational Stress Questionnaire     Feeling of Stress : Rather much

## 2025-04-24 ENCOUNTER — HOSPITAL ENCOUNTER (INPATIENT)
Facility: HOSPITAL | Age: 65
LOS: 3 days | Discharge: HOME | DRG: 435 | End: 2025-04-27
Attending: EMERGENCY MEDICINE | Admitting: STUDENT IN AN ORGANIZED HEALTH CARE EDUCATION/TRAINING PROGRAM
Payer: MEDICAID

## 2025-04-24 ENCOUNTER — TELEPHONE (OUTPATIENT)
Dept: FAMILY MEDICINE | Facility: CLINIC | Age: 65
End: 2025-04-24
Payer: MEDICAID

## 2025-04-24 ENCOUNTER — OFFICE VISIT (OUTPATIENT)
Dept: FAMILY MEDICINE | Facility: CLINIC | Age: 65
End: 2025-04-24
Payer: MEDICAID

## 2025-04-24 VITALS
TEMPERATURE: 98 F | HEIGHT: 64 IN | WEIGHT: 267 LBS | DIASTOLIC BLOOD PRESSURE: 76 MMHG | OXYGEN SATURATION: 96 % | SYSTOLIC BLOOD PRESSURE: 120 MMHG | HEART RATE: 93 BPM | BODY MASS INDEX: 45.58 KG/M2

## 2025-04-24 DIAGNOSIS — K83.1 BILIARY OBSTRUCTION: ICD-10-CM

## 2025-04-24 DIAGNOSIS — K86.89 PANCREATIC MASS: ICD-10-CM

## 2025-04-24 DIAGNOSIS — R53.82 CHRONIC FATIGUE: Primary | ICD-10-CM

## 2025-04-24 DIAGNOSIS — R81 GLUCOSURIA: ICD-10-CM

## 2025-04-24 DIAGNOSIS — R82.998 DARK URINE: ICD-10-CM

## 2025-04-24 DIAGNOSIS — R17 JAUNDICE: ICD-10-CM

## 2025-04-24 DIAGNOSIS — R17 SCLERAL ICTERUS: ICD-10-CM

## 2025-04-24 DIAGNOSIS — E80.6 HYPERBILIRUBINEMIA: ICD-10-CM

## 2025-04-24 DIAGNOSIS — R19.4 CHANGE IN BOWEL HABITS: ICD-10-CM

## 2025-04-24 DIAGNOSIS — R73.9 HYPERGLYCEMIA: Primary | ICD-10-CM

## 2025-04-24 DIAGNOSIS — R07.9 CHEST PAIN: ICD-10-CM

## 2025-04-24 PROBLEM — N17.9 AKI (ACUTE KIDNEY INJURY): Status: ACTIVE | Noted: 2025-04-24

## 2025-04-24 LAB
ABSOLUTE EOSINOPHIL (SMH): 0.17 K/UL
ABSOLUTE MONOCYTE (SMH): 0.59 K/UL (ref 0.3–1)
ABSOLUTE NEUTROPHIL COUNT (SMH): 5.3 K/UL (ref 1.8–7.7)
ALBUMIN SERPL-MCNC: 4 G/DL (ref 3.5–5.2)
ALP SERPL-CCNC: 962 UNIT/L (ref 55–135)
ALT SERPL-CCNC: 310 UNIT/L (ref 10–44)
AMMONIA PLAS-SCNC: 51 UMOL/L (ref 10–50)
AMPHET UR QL SCN: NEGATIVE
ANION GAP (SMH): 13 MMOL/L (ref 8–16)
AST SERPL-CCNC: 228 UNIT/L (ref 10–40)
B-OH-BUTYR BLD STRIP-SCNC: 0.1 MMOL/L
BACTERIA #/AREA URNS AUTO: NORMAL /HPF
BARBITURATE SCN PRESENT UR: NEGATIVE
BASOPHILS # BLD AUTO: 0.06 K/UL
BASOPHILS NFR BLD AUTO: 0.8 %
BENZODIAZ UR QL SCN: NEGATIVE
BILIRUB SERPL-MCNC: 9.2 MG/DL (ref 0.1–1)
BILIRUB UR QL STRIP.AUTO: ABNORMAL
BILIRUB UR QL STRIP: POSITIVE
BNP SERPL-MCNC: 19 PG/ML
BUN SERPL-MCNC: 25 MG/DL (ref 8–23)
CALCIUM SERPL-MCNC: 9.7 MG/DL (ref 8.7–10.5)
CANNABINOIDS UR QL SCN: NEGATIVE
CHLORIDE SERPL-SCNC: 88 MMOL/L (ref 95–110)
CK SERPL-CCNC: 56 U/L (ref 20–180)
CK SERPL-CCNC: 58 U/L (ref 20–180)
CLARITY UR: CLEAR
CO2 SERPL-SCNC: 25 MMOL/L (ref 23–29)
COCAINE UR QL SCN: NEGATIVE
COLOR UR AUTO: YELLOW
CREAT SERPL-MCNC: 1.6 MG/DL (ref 0.5–1.4)
CREAT UR-MCNC: 45.8 MG/DL (ref 15–325)
ERYTHROCYTE [DISTWIDTH] IN BLOOD BY AUTOMATED COUNT: 15.4 % (ref 11.5–14.5)
GFR SERPLBLD CREATININE-BSD FMLA CKD-EPI: 36 ML/MIN/1.73/M2
GLUCOSE SERPL-MCNC: 671 MG/DL (ref 70–110)
GLUCOSE UR QL STRIP: ABNORMAL
GLUCOSE UR QL STRIP: POSITIVE
HCT VFR BLD AUTO: 39.7 % (ref 37–48.5)
HCV AB SERPL QL IA: NORMAL
HGB BLD-MCNC: 12.7 GM/DL (ref 12–16)
HGB UR QL STRIP: NEGATIVE
HIV 1+2 AB+HIV1 P24 AG SERPL QL IA: NORMAL
IMM GRANULOCYTES # BLD AUTO: 0.06 K/UL (ref 0–0.04)
IMM GRANULOCYTES NFR BLD AUTO: 0.8 % (ref 0–0.5)
INFLUENZA A MOLECULAR (OHS): NEGATIVE
INFLUENZA B MOLECULAR (OHS): NEGATIVE
INR PPP: 0.9 (ref 0.8–1.2)
KETONES UR QL STRIP: NEGATIVE
KETONES UR QL STRIP: NEGATIVE
LDH SERPL L TO P-CCNC: 1.09 MMOL/L (ref 0.5–2.2)
LEUKOCYTE ESTERASE UR QL STRIP: NEGATIVE
LEUKOCYTE ESTERASE UR QL STRIP: NEGATIVE
LIPASE SERPL-CCNC: 175 U/L (ref 4–60)
LYMPHOCYTES # BLD AUTO: 1.02 K/UL (ref 1–4.8)
MAGNESIUM SERPL-MCNC: 2.1 MG/DL (ref 1.6–2.6)
MCH RBC QN AUTO: 27.1 PG (ref 27–31)
MCHC RBC AUTO-ENTMCNC: 32 G/DL (ref 32–36)
MCV RBC AUTO: 85 FL (ref 82–98)
MICROSCOPIC COMMENT: NORMAL
NITRITE UR QL STRIP: NEGATIVE
NUCLEATED RBC (/100WBC) (SMH): 0 /100 WBC
OPIATES UR QL SCN: NEGATIVE
PCP UR QL: NEGATIVE
PH UR STRIP: 6 [PH]
PH, POC UA: 5.5
PLATELET # BLD AUTO: 371 K/UL (ref 150–450)
PMV BLD AUTO: 10.6 FL (ref 9.2–12.9)
POC BLOOD, URINE: POSITIVE
POC NITRATES, URINE: NEGATIVE
POCT GLUCOSE: 422 MG/DL (ref 70–110)
POCT GLUCOSE: 441 MG/DL (ref 70–110)
POTASSIUM SERPL-SCNC: 4.9 MMOL/L (ref 3.5–5.1)
PROT SERPL-MCNC: 8.2 GM/DL (ref 6–8.4)
PROT UR QL STRIP: NEGATIVE
PROT UR QL STRIP: POSITIVE
PROTHROMBIN TIME: 10.5 SECONDS (ref 9–12.5)
RBC # BLD AUTO: 4.69 M/UL (ref 4–5.4)
RBC #/AREA URNS AUTO: 1 /HPF
RELATIVE EOSINOPHIL (SMH): 2.4 % (ref 0–8)
RELATIVE LYMPHOCYTE (SMH): 14.2 % (ref 18–48)
RELATIVE MONOCYTE (SMH): 8.2 % (ref 4–15)
RELATIVE NEUTROPHIL (SMH): 73.6 % (ref 38–73)
SAMPLE: NORMAL
SARS-COV-2 RDRP RESP QL NAA+PROBE: NEGATIVE
SODIUM SERPL-SCNC: 126 MMOL/L (ref 136–145)
SP GR UR STRIP: 1.02 (ref 1–1.03)
SP GR UR STRIP: >=1.03
SQUAMOUS #/AREA URNS AUTO: 1 /HPF
TROPONIN HIGH SENSITIVE (SMH): 11.5 PG/ML
TROPONIN HIGH SENSITIVE (SMH): 7.8 PG/ML
TSH SERPL-ACNC: 3.5 UIU/ML (ref 0.34–5.6)
UROBILINOGEN UR STRIP-ACNC: 1 (ref 0.1–1.1)
UROBILINOGEN UR STRIP-ACNC: NEGATIVE EU/DL
WBC # BLD AUTO: 7.2 K/UL (ref 3.9–12.7)
WBC #/AREA URNS AUTO: 1 /HPF

## 2025-04-24 PROCEDURE — 82550 ASSAY OF CK (CPK): CPT | Performed by: EMERGENCY MEDICINE

## 2025-04-24 PROCEDURE — 82962 GLUCOSE BLOOD TEST: CPT

## 2025-04-24 PROCEDURE — 99213 OFFICE O/P EST LOW 20 MIN: CPT | Mod: PBBFAC,PN | Performed by: STUDENT IN AN ORGANIZED HEALTH CARE EDUCATION/TRAINING PROGRAM

## 2025-04-24 PROCEDURE — 83690 ASSAY OF LIPASE: CPT | Performed by: EMERGENCY MEDICINE

## 2025-04-24 PROCEDURE — 63600175 PHARM REV CODE 636 W HCPCS: Performed by: EMERGENCY MEDICINE

## 2025-04-24 PROCEDURE — 82140 ASSAY OF AMMONIA: CPT | Performed by: NURSE PRACTITIONER

## 2025-04-24 PROCEDURE — 3046F HEMOGLOBIN A1C LEVEL >9.0%: CPT | Mod: CPTII,,, | Performed by: STUDENT IN AN ORGANIZED HEALTH CARE EDUCATION/TRAINING PROGRAM

## 2025-04-24 PROCEDURE — U0002 COVID-19 LAB TEST NON-CDC: HCPCS | Performed by: EMERGENCY MEDICINE

## 2025-04-24 PROCEDURE — 4010F ACE/ARB THERAPY RXD/TAKEN: CPT | Mod: CPTII,,, | Performed by: STUDENT IN AN ORGANIZED HEALTH CARE EDUCATION/TRAINING PROGRAM

## 2025-04-24 PROCEDURE — 93010 ELECTROCARDIOGRAM REPORT: CPT | Mod: ,,, | Performed by: INTERNAL MEDICINE

## 2025-04-24 PROCEDURE — 96375 TX/PRO/DX INJ NEW DRUG ADDON: CPT

## 2025-04-24 PROCEDURE — 93005 ELECTROCARDIOGRAM TRACING: CPT | Performed by: INTERNAL MEDICINE

## 2025-04-24 PROCEDURE — 87040 BLOOD CULTURE FOR BACTERIA: CPT | Performed by: EMERGENCY MEDICINE

## 2025-04-24 PROCEDURE — 83735 ASSAY OF MAGNESIUM: CPT | Performed by: EMERGENCY MEDICINE

## 2025-04-24 PROCEDURE — 83880 ASSAY OF NATRIURETIC PEPTIDE: CPT | Performed by: EMERGENCY MEDICINE

## 2025-04-24 PROCEDURE — 82010 KETONE BODYS QUAN: CPT | Performed by: EMERGENCY MEDICINE

## 2025-04-24 PROCEDURE — 96361 HYDRATE IV INFUSION ADD-ON: CPT

## 2025-04-24 PROCEDURE — 3074F SYST BP LT 130 MM HG: CPT | Mod: CPTII,,, | Performed by: STUDENT IN AN ORGANIZED HEALTH CARE EDUCATION/TRAINING PROGRAM

## 2025-04-24 PROCEDURE — 87389 HIV-1 AG W/HIV-1&-2 AB AG IA: CPT | Performed by: EMERGENCY MEDICINE

## 2025-04-24 PROCEDURE — 99999PBSHW POCT URINALYSIS, DIPSTICK, AUTOMATED, W/O SCOPE: Mod: PBBFAC,,,

## 2025-04-24 PROCEDURE — 85610 PROTHROMBIN TIME: CPT | Performed by: NURSE PRACTITIONER

## 2025-04-24 PROCEDURE — 85025 COMPLETE CBC W/AUTO DIFF WBC: CPT | Performed by: NURSE PRACTITIONER

## 2025-04-24 PROCEDURE — 82550 ASSAY OF CK (CPK): CPT | Performed by: NURSE PRACTITIONER

## 2025-04-24 PROCEDURE — 86803 HEPATITIS C AB TEST: CPT | Performed by: EMERGENCY MEDICINE

## 2025-04-24 PROCEDURE — 80053 COMPREHEN METABOLIC PANEL: CPT | Performed by: NURSE PRACTITIONER

## 2025-04-24 PROCEDURE — 81001 URINALYSIS AUTO W/SCOPE: CPT | Performed by: NURSE PRACTITIONER

## 2025-04-24 PROCEDURE — 99999 PR PBB SHADOW E&M-EST. PATIENT-LVL III: CPT | Mod: PBBFAC,,, | Performed by: STUDENT IN AN ORGANIZED HEALTH CARE EDUCATION/TRAINING PROGRAM

## 2025-04-24 PROCEDURE — 96365 THER/PROPH/DIAG IV INF INIT: CPT

## 2025-04-24 PROCEDURE — 3078F DIAST BP <80 MM HG: CPT | Mod: CPTII,,, | Performed by: STUDENT IN AN ORGANIZED HEALTH CARE EDUCATION/TRAINING PROGRAM

## 2025-04-24 PROCEDURE — 99285 EMERGENCY DEPT VISIT HI MDM: CPT | Mod: 25

## 2025-04-24 PROCEDURE — 25000003 PHARM REV CODE 250: Performed by: EMERGENCY MEDICINE

## 2025-04-24 PROCEDURE — 80074 ACUTE HEPATITIS PANEL: CPT | Performed by: EMERGENCY MEDICINE

## 2025-04-24 PROCEDURE — 3008F BODY MASS INDEX DOCD: CPT | Mod: CPTII,,, | Performed by: STUDENT IN AN ORGANIZED HEALTH CARE EDUCATION/TRAINING PROGRAM

## 2025-04-24 PROCEDURE — 12000002 HC ACUTE/MED SURGE SEMI-PRIVATE ROOM

## 2025-04-24 PROCEDURE — 99212 OFFICE O/P EST SF 10 MIN: CPT | Mod: S$PBB,,, | Performed by: STUDENT IN AN ORGANIZED HEALTH CARE EDUCATION/TRAINING PROGRAM

## 2025-04-24 PROCEDURE — 81003 URINALYSIS AUTO W/O SCOPE: CPT | Mod: PBBFAC,PN | Performed by: STUDENT IN AN ORGANIZED HEALTH CARE EDUCATION/TRAINING PROGRAM

## 2025-04-24 PROCEDURE — 84443 ASSAY THYROID STIM HORMONE: CPT | Performed by: EMERGENCY MEDICINE

## 2025-04-24 PROCEDURE — 84484 ASSAY OF TROPONIN QUANT: CPT | Performed by: EMERGENCY MEDICINE

## 2025-04-24 PROCEDURE — 36415 COLL VENOUS BLD VENIPUNCTURE: CPT | Performed by: EMERGENCY MEDICINE

## 2025-04-24 PROCEDURE — 87502 INFLUENZA DNA AMP PROBE: CPT | Performed by: EMERGENCY MEDICINE

## 2025-04-24 PROCEDURE — 80307 DRUG TEST PRSMV CHEM ANLYZR: CPT | Performed by: EMERGENCY MEDICINE

## 2025-04-24 RX ORDER — ENOXAPARIN SODIUM 100 MG/ML
40 INJECTION SUBCUTANEOUS EVERY 12 HOURS
Status: DISCONTINUED | OUTPATIENT
Start: 2025-04-24 | End: 2025-04-27 | Stop reason: HOSPADM

## 2025-04-24 RX ORDER — LANOLIN ALCOHOL/MO/W.PET/CERES
800 CREAM (GRAM) TOPICAL
Status: DISCONTINUED | OUTPATIENT
Start: 2025-04-24 | End: 2025-04-27 | Stop reason: HOSPADM

## 2025-04-24 RX ORDER — SODIUM,POTASSIUM PHOSPHATES 280-250MG
2 POWDER IN PACKET (EA) ORAL
Status: DISCONTINUED | OUTPATIENT
Start: 2025-04-24 | End: 2025-04-27 | Stop reason: HOSPADM

## 2025-04-24 RX ORDER — METOPROLOL SUCCINATE 25 MG/1
25 TABLET, EXTENDED RELEASE ORAL DAILY
Status: DISCONTINUED | OUTPATIENT
Start: 2025-04-25 | End: 2025-04-27 | Stop reason: HOSPADM

## 2025-04-24 RX ORDER — HYDROCHLOROTHIAZIDE 25 MG/1
25 TABLET ORAL DAILY
Status: DISCONTINUED | OUTPATIENT
Start: 2025-04-25 | End: 2025-04-25

## 2025-04-24 RX ORDER — GLUCAGON 1 MG
1 KIT INJECTION
Status: DISCONTINUED | OUTPATIENT
Start: 2025-04-24 | End: 2025-04-27 | Stop reason: HOSPADM

## 2025-04-24 RX ORDER — INSULIN ASPART 100 [IU]/ML
0-5 INJECTION, SOLUTION INTRAVENOUS; SUBCUTANEOUS
Status: DISCONTINUED | OUTPATIENT
Start: 2025-04-24 | End: 2025-04-25

## 2025-04-24 RX ORDER — LOSARTAN POTASSIUM 50 MG/1
100 TABLET ORAL DAILY
Status: DISCONTINUED | OUTPATIENT
Start: 2025-04-25 | End: 2025-04-25

## 2025-04-24 RX ORDER — IBUPROFEN 200 MG
400 TABLET ORAL EVERY 6 HOURS PRN
Status: DISCONTINUED | OUTPATIENT
Start: 2025-04-24 | End: 2025-04-27 | Stop reason: HOSPADM

## 2025-04-24 RX ORDER — LEVOTHYROXINE SODIUM 25 UG/1
25 TABLET ORAL
Status: DISCONTINUED | OUTPATIENT
Start: 2025-04-25 | End: 2025-04-27 | Stop reason: HOSPADM

## 2025-04-24 RX ORDER — ACETAMINOPHEN 325 MG/1
650 TABLET ORAL EVERY 4 HOURS PRN
Status: DISCONTINUED | OUTPATIENT
Start: 2025-04-24 | End: 2025-04-25

## 2025-04-24 RX ORDER — IBUPROFEN 200 MG
24 TABLET ORAL
Status: DISCONTINUED | OUTPATIENT
Start: 2025-04-24 | End: 2025-04-27 | Stop reason: HOSPADM

## 2025-04-24 RX ORDER — AMOXICILLIN 250 MG
1 CAPSULE ORAL 2 TIMES DAILY PRN
Status: DISCONTINUED | OUTPATIENT
Start: 2025-04-24 | End: 2025-04-27 | Stop reason: HOSPADM

## 2025-04-24 RX ORDER — MONTELUKAST SODIUM 10 MG/1
10 TABLET ORAL NIGHTLY
Status: DISCONTINUED | OUTPATIENT
Start: 2025-04-24 | End: 2025-04-27 | Stop reason: HOSPADM

## 2025-04-24 RX ORDER — NALOXONE HCL 0.4 MG/ML
0.02 VIAL (ML) INJECTION
Status: DISCONTINUED | OUTPATIENT
Start: 2025-04-24 | End: 2025-04-27 | Stop reason: HOSPADM

## 2025-04-24 RX ORDER — ONDANSETRON HYDROCHLORIDE 2 MG/ML
4 INJECTION, SOLUTION INTRAVENOUS EVERY 8 HOURS PRN
Status: DISCONTINUED | OUTPATIENT
Start: 2025-04-24 | End: 2025-04-27 | Stop reason: HOSPADM

## 2025-04-24 RX ORDER — IBUPROFEN 200 MG
16 TABLET ORAL
Status: DISCONTINUED | OUTPATIENT
Start: 2025-04-24 | End: 2025-04-27 | Stop reason: HOSPADM

## 2025-04-24 RX ORDER — DULOXETIN HYDROCHLORIDE 30 MG/1
60 CAPSULE, DELAYED RELEASE ORAL DAILY
Status: DISCONTINUED | OUTPATIENT
Start: 2025-04-25 | End: 2025-04-27 | Stop reason: HOSPADM

## 2025-04-24 RX ORDER — TALC
9 POWDER (GRAM) TOPICAL NIGHTLY PRN
Status: DISCONTINUED | OUTPATIENT
Start: 2025-04-24 | End: 2025-04-27 | Stop reason: HOSPADM

## 2025-04-24 RX ORDER — SODIUM CHLORIDE 9 MG/ML
INJECTION, SOLUTION INTRAVENOUS CONTINUOUS
Status: DISCONTINUED | OUTPATIENT
Start: 2025-04-24 | End: 2025-04-26

## 2025-04-24 RX ORDER — SODIUM CHLORIDE 0.9 % (FLUSH) 0.9 %
10 SYRINGE (ML) INJECTION
Status: DISCONTINUED | OUTPATIENT
Start: 2025-04-24 | End: 2025-04-27 | Stop reason: HOSPADM

## 2025-04-24 RX ORDER — AMLODIPINE BESYLATE 5 MG/1
5 TABLET ORAL DAILY
Status: DISCONTINUED | OUTPATIENT
Start: 2025-04-25 | End: 2025-04-25

## 2025-04-24 RX ORDER — ENOXAPARIN SODIUM 100 MG/ML
40 INJECTION SUBCUTANEOUS EVERY 24 HOURS
Status: DISCONTINUED | OUTPATIENT
Start: 2025-04-24 | End: 2025-04-24

## 2025-04-24 RX ADMIN — PIPERACILLIN AND TAZOBACTAM 4.5 G: 4; .5 INJECTION, POWDER, LYOPHILIZED, FOR SOLUTION INTRAVENOUS; PARENTERAL at 08:04

## 2025-04-24 RX ADMIN — INSULIN HUMAN 6 UNITS: 100 INJECTION, SOLUTION PARENTERAL at 08:04

## 2025-04-24 RX ADMIN — SODIUM CHLORIDE 1641 ML: 9 INJECTION, SOLUTION INTRAVENOUS at 07:04

## 2025-04-24 NOTE — ED PROVIDER NOTES
"Encounter Date: 4/24/2025       History     Chief Complaint   Patient presents with    Weakness     Pt c/o weakness, yellowing, and over 4000 sugar in her urine.      Sonia Muse is a 64 y.o. year-old patient with history of  has a past medical history of Allergy, Anxiety, Asthma, DDD (degenerative disc disease), cervical, Depression, Fibromyalgia, Hypertension, Neuromuscular disorder, and PONV (postoperative nausea and vomiting). who presented to the ED  with complaints of generalized weakness that has been progressively worsening over the past several days.  The patient had a 3 weeks ago, she had a prolonged illness from this and had a lot of generalized fatigue and generalized weakness after COVID.  She recently traveled to visit family members in [place] and noted very minimal exercise tolerance and fatigue which caused her to have to sit down with walking only short distances in order to rest.  She has had some shortness of breath with exertion which has been mild.  She denies any chest pain.  She has been having intermittent crampy abdominal pain but she attributed this to very frequent aggressive coughing.  Yesterday and today she noted that her eyes seemed to be appearing yellow.  She has also been noticing that her urine has been very dark and her stool has been light colored.  She does still have a gallbladder.  She denies any known fever or GI bleeding.  She denies dysuria frequency or urgency.  She has had generalized nonfocal fatigue and malaise without any focal weakness numbness tingling or paresthesias.  She denies any headache or visual changes.  She denies coughing.  She denies orthopnea PND or leg edema.  She has been having some generalized itching as well without rash.  Denies any other problems or complaints.  She went to see her physician in was noted to have large amounts of glucose in her urine.  She states that she is a "borderline" diabetic and is not on medications for " diabetes.        Review of patient's allergies indicates:   Allergen Reactions    Erythromycin Swelling    Codeine Nausea And Vomiting     And migraine h/a    [place] Blisters     Past Medical History:   Diagnosis Date    Allergy     Anxiety     Asthma     DDD (degenerative disc disease), cervical     Depression     Fibromyalgia     Hypertension 2005    Neuromuscular disorder     PONV (postoperative nausea and vomiting)      Past Surgical History:   Procedure Laterality Date    ADENOIDECTOMY      carpel tunnel Right      SECTION      JOINT REPLACEMENT Right 2016    knee    KNEE ARTHROPLASTY Left 10/19/2020    Procedure: ARTHROPLASTY, KNEE;  Surgeon: Felipe Herring MD;  Location: [facility];  Service: Orthopedics;  Laterality: Left;  Louie Liz    knee replacement Right     ROTATOR CUFF REPAIR Right     TONSILLECTOMY      TUBAL LIGATION       Family History   Problem Relation Name Age of Onset    Diabetes Mother Libertad     Hypertension Mother Libertad     Heart disease Mother Libertad     Stroke Mother Libertad     Glaucoma Mother Libertad     Arthritis Mother Libertad     Diabetes Father Fredis     Hypertension Father Fredis     Heart disease Father Fredis     Hypertension Sister 1     Heart disease Sister 1     Graves' disease Daughter      Anxiety disorder Daughter       Social History[1]  Review of Systems   Constitutional:  Positive for activity change, appetite change, chills and fatigue. Negative for fever.   HENT:  Positive for dental problem. Negative for congestion, ear discharge, ear pain, mouth sores, rhinorrhea, sore throat and trouble swallowing.    Eyes: Negative.  Negative for photophobia, pain and visual disturbance.   Respiratory:  Positive for shortness of breath. Negative for cough (had a cough which has resolved) and wheezing.    Cardiovascular: Negative.  Negative for chest pain, palpitations and leg swelling.   Gastrointestinal: Negative.  Negative for abdominal distention, abdominal pain  (Reports her abdomen has been sore but she attributed this to frequent coughing), blood in stool, constipation, diarrhea, nausea and vomiting.   Endocrine: Negative.    Genitourinary:  Positive for frequency. Negative for decreased urine volume, difficulty urinating, dysuria, flank pain, hematuria and urgency.   Musculoskeletal: Negative.  Negative for arthralgias, back pain, myalgias, neck pain and neck stiffness (noticed that her eyes and skin appeared yellow yesterday and today.).   Skin:  Positive for color change. Negative for rash (has had diffuse itching without any rash).   Neurological:  Positive for light-headedness. Negative for dizziness, syncope, facial asymmetry, speech difficulty, weakness, numbness and headaches.   Hematological: Negative.    Psychiatric/Behavioral: Negative.  Negative for confusion.    All other systems reviewed and are negative.      Physical Exam     Initial Vitals [04/24/25 1433]   BP Pulse Resp Temp SpO2   127/86 89 18 98 °F (36.7 °C) 95 %      MAP       --         Physical Exam    Nursing note and vitals reviewed.  Constitutional: She is cooperative. She does not appear ill. No distress.   HENT:   Head: Normocephalic and atraumatic.   Nose: Nose normal. Mouth/Throat: Uvula is midline, oropharynx is clear and moist and mucous membranes are normal. No oropharyngeal exudate, posterior oropharyngeal edema or posterior oropharyngeal erythema.   Eyes: Conjunctivae, EOM and lids are normal. Pupils are equal, round, and reactive to light. Right conjunctiva is not injected. Right conjunctiva has no hemorrhage. Left conjunctiva is not injected. Left conjunctiva has no hemorrhage. Scleral icterus is present.   Neck: Trachea normal and phonation normal. Neck supple. No stridor present. No JVD present.   Normal range of motion.   Full passive range of motion without pain.     Cardiovascular:  Normal rate, regular rhythm, normal heart sounds, intact distal pulses and normal pulses.      Exam reveals no gallop, no distant heart sounds, no friction rub and no decreased pulses.       No murmur heard.  Pulmonary/Chest: Effort normal and breath sounds normal. No respiratory distress. She has no wheezes. She has no rhonchi. She has no rales.   Abdominal: Abdomen is soft. Bowel sounds are normal. She exhibits no distension and no mass. There is abdominal tenderness in the right upper quadrant and epigastric area.   No right CVA tenderness.  No left CVA tenderness. There is no rigidity, no rebound, no guarding, no tenderness at McBurney's point and negative Moy's sign.   Musculoskeletal:         General: No tenderness or edema. Normal range of motion.      Right hand: Normal. Normal capillary refill. Normal pulse.      Left hand: Normal. Normal sensation. Normal capillary refill. Normal pulse.      Cervical back: Normal, full passive range of motion without pain, normal range of motion and neck supple. No bony tenderness. No pain with movement or spinous process tenderness. Normal range of motion.      Thoracic back: Normal. No tenderness. Normal range of motion.      Lumbar back: Normal. No tenderness. Normal range of motion.      Right lower leg: Normal. No swelling or tenderness.      Left lower leg: Normal. No swelling or tenderness.      Right foot: Normal. Normal capillary refill. Normal pulse.      Left foot: Normal. Normal capillary refill. Normal pulse.      Comments: Pulses are 2+ throughout, cap refill is less than 2 sec throughout, extremities are nontender throughout with full range of motion. There is no spinal tenderness to palpation.     Neurological: She is alert and oriented to person, place, and time. She has normal strength. No cranial nerve deficit or sensory deficit. Coordination and gait normal.   No focal deficits.   Skin: Skin is warm, dry and intact. Capillary refill takes less than 2 seconds. No ecchymosis, no petechiae and no rash noted. No erythema. There is pallor.    Appears jaundiced   Psychiatric: She has a normal mood and affect. Her speech is normal and behavior is normal.         ED Course   Procedures  Labs Reviewed   COMPREHENSIVE METABOLIC PANEL - Abnormal       Result Value    Sodium 126 (*)     Potassium 4.9      Chloride 88 (*)     CO2 25      Glucose 671 (*)     BUN 25 (*)     Creatinine 1.6 (*)     Calcium 9.7      Protein Total 8.2      Albumin 4.0      Bilirubin Total 9.2 (*)      (*)      (*)      (*)     Anion Gap 13      eGFR 36 (*)    URINALYSIS, REFLEX TO URINE CULTURE - Abnormal    Color, UA Yellow      Appearance, UA Clear      Spec Grav UA >=1.030 (*)     pH, UA 6.0      Protein, UA Negative      Glucose, UA 4+ (*)     Ketones, UA Negative      Blood, UA Negative      Bilirubin, UA 1+ (*)     Urobilinogen, UA Negative      Nitrites, UA Negative      Leukocyte Esterase, UA Negative     AMMONIA - Abnormal    Ammonia 51 (*)    CBC WITH DIFFERENTIAL - Abnormal    WBC 7.20      RBC 4.69      Hgb 12.7      Hct 39.7      MCV 85      MCH 27.1      MCHC 32.0      RDW 15.4 (*)     Platelet Count 371      MPV 10.6      Nucleated RBC 0      Neut % 73.6 (*)     Lymph % 14.2 (*)     Mono % 8.2      Eos % 2.4      Basophil % 0.8      Imm Grans % 0.8 (*)     Neut # 5.3      Lymph # 1.02      Mono # 0.59      Eos # 0.17      Baso # 0.06      Imm Grans # 0.06 (*)    LIPASE - Abnormal    Lipase Level 175 (*)    POCT GLUCOSE - Abnormal    POCT Glucose 441 (*)    POCT GLUCOSE - Abnormal    POCT Glucose 422 (*)    INFLUENZA A & B BY MOLECULAR - Normal    INFLUENZA A MOLECULAR Negative      INFLUENZA B MOLECULAR  Negative     HEPATITIS C ANTIBODY - Normal    Hep C Ab Interp Non-Reactive     HIV 1 / 2 ANTIBODY - Normal    HIV 1/2 Ag/Ab Non-Reactive     PROTIME-INR - Normal    PT 10.5      INR 0.9     CK - Normal    CPK 56     CK - Normal    CPK 58     MAGNESIUM - Normal    Magnesium 2.1     TSH - Normal    TSH 3.499     TROPONIN I HIGH SENSITIVITY - Normal     Troponin High Sensitive 7.8     TROPONIN I HIGH SENSITIVITY - Normal    Troponin High Sensitive 11.5     B-TYPE NATRIURETIC PEPTIDE - Normal    BNP 19     SARS-COV-2 RNA AMPLIFICATION, QUAL - Normal    SARS COV-2 Molecular Negative     BETA - HYDROXYBUTYRATE, SERUM - Normal    Beta-Hydroxybutyrate 0.1     DRUG SCREEN PANEL, URINE EMERGENCY - Normal    Benzodiazepine, Urine Negative      Cocaine, Urine Negative      Opiates, Urine Negative      Barbituates, Urine Negative      Amphetamines, Urine Negative      THC Negative      Phencyclidine, Urine Negative      Urine Creatinine 45.8      Narrative:     This screen includes the following classes of drugs at the   listed cut-off:    Benzodiazepines                  200 ng/ml  Cocaine metabolite               300 ng/ml  Opiates                          300 ng/ml  Barbiturates                     200 ng/ml  Amphetamines                    1000 ng/ml  Marijuana metabs (THC)            50 ng/ml  Phencyclidine (PCP)               25 ng/ml    High concentrations of Methylenedioxymethamphetamine (MDMA aka  Ectasy) and other structurally similar compounds may cross-   react with the Amphetamine/Methamphetamine screening   immunoassay giving a false positive result.    Note: This exception list includes only more common   interferants in toxicology screen testing.  Because of many cross-reactantspositive results on toxicology drug screens   should be confirmed whenever results do not correlate with   clinical presentation.    This report is intended for use in clinical monitoring and  management of patients. It is not intended for use in   employment related drug testing.    Because of any cross-reactants, positive results on toxicology  drug screens should be confirmed whenever results do not  correlate with clinical presentation.    Presumptive positive results are unconfirmed and may be used   only for medical purposes.   CULTURE, BLOOD   CULTURE, BLOOD   CBC W/ AUTO  DIFFERENTIAL    Narrative:     The following orders were created for panel order CBC auto differential.  Procedure                               Abnormality         Status                     ---------                               -----------         ------                     CBC with Differential[[id number]]       Abnormal            Final result                 Please view results for these tests on the individual orders.   URINALYSIS MICROSCOPIC    RBC, UA 1      WBC, UA 1      Bacteria, UA Rare      Squamous Epithelial Cells, UA 1      Microscopic Comment       LACTIC ACID, PLASMA   HEPATITIS PANEL, ACUTE   ISTAT LACTATE    POC Lactate 1.09      Sample VENOUS     POCT GLUCOSE MONITORING CONTINUOUS   POCT LACTATE   POCT GLUCOSE MONITORING CONTINUOUS        ECG Results              EKG 12-lead (In process)        Collection Time Result Time QRS Duration OHS QTC Calculation    04/24/25 14:21:10 04/24/25 15:18:22 100 470                     In process by Interface, Lab In [facility] (04/24/25 15:18:33)                   Narrative:    Test Reason : R42,    Vent. Rate :  84 BPM     Atrial Rate :  84 BPM     P-R Int : 166 ms          QRS Dur : 100 ms      QT Int : 398 ms       P-R-T Axes :  68 -70  73 degrees    QTcB Int : 470 ms    Normal sinus rhythm  Left anterior fascicular block  Minimal voltage criteria for LVH, may be normal variant ( [place] product )  Abnormal ECG  No previous ECGs available    Referred By: AAAREFERRAL SELF           Confirmed By:                                   Imaging Results              US Abdomen Limited (Final result)  Result time 04/24/25 21:10:08      Final result by Michael Arias MD (04/24/25 21:10:08)                   Impression:      Pancreatic neoplasm until proven otherwise.  Recommend dedicated MRI pancreas mass protocol      Electronically signed by: Michael Arias  Date:    04/24/2025  Time:    21:10               Narrative:    EXAMINATION:  US ABDOMEN  LIMITED    CLINICAL HISTORY:  jaundice;    TECHNIQUE:  Limited ultrasound of the right upper quadrant of the abdomen (including pancreas, liver, gallbladder, common bile duct, and spleen) was performed.    COMPARISON:  None.    FINDINGS:  Heterogeneous mass in the region of the pancreatic head measures 5.0 x 3.7 cm and representing neoplasm until proven otherwise.  See same day CT.    Intrahepatic biliary ductal dilatation and extrahepatic biliary ductal dilatation noted.  Pancreatic ductal dilatation measures up to 4 mm    Distended gallbladder.    Enlarged and heterogeneous.                                        CT Chest Abdomen Pelvis Without Contrast (XPD) (Edited Result - FINAL)  Result time 04/24/25 21:07:04   Procedure changed from CT Abdomen Pelvis  Without Contrast     Addendum (preliminary) 1 of 1 by Michael Arias MD (04/24/25 21:07:04)                     Final result by Michael Arias MD (04/24/25 21:00:23)                   Impression:      No acute findings      Electronically signed by: Michael Arias  Date:    04/24/2025  Time:    21:00               Narrative:    EXAMINATION:  CT CHEST ABDOMEN PELVIS WITHOUT CONTRAST(XPD)    CLINICAL HISTORY:  Sepsis;    TECHNIQUE:  Low dose axial images, sagittal and coronal reformations were obtained from the thoracic inlet to the pubic symphysis .  Oral contrast was not administered.    COMPARISON:  None    FINDINGS:  Clear lungs.  No cardiomegaly or pericardial effusion.  Intact bones.    Soft tissues: Unremarkable.    Bones: No acute osseous abnormality.    Lower chest: Normal heart size. No pericardial effusion.    Lung Bases: Well aerated, without consolidation or pleural fluid.    Liver: Normal in size and attenuation, with no focal hepatic lesions.    Gallbladder: Mildly distended.  No calcified gallstones.    Bile Ducts: No evidence of dilated ducts.    Pancreas: No mass or peripancreatic fat stranding.    Spleen:  Unremarkable.    Adrenals: Unremarkable.    Kidneys/ Ureters: Unremarkable.    Bladder: No evidence of wall thickening.    Reproductive organs: Unremarkable.    GI Tract/Mesentery: No evidence of bowel obstruction or inflammation.    Peritoneal Space: No ascites. No free air.    Lymphadenopathy: No significant adenopathy.    Vasculature: No significant atherosclerosis or aneurysm.                                       X-Ray Chest PA And Lateral (Final result)  Result time 04/24/25 18:07:39      Final result by Michael Arias MD (04/24/25 18:07:39)                   Impression:      As above      Electronically signed by: Michael Arias  Date:    04/24/2025  Time:    18:07               Narrative:    EXAMINATION:  XR CHEST PA AND LATERAL    CLINICAL HISTORY:  Generalized weakness;    TECHNIQUE:  PA and lateral views of the chest were performed.    COMPARISON:  10/05/2020    FINDINGS:  Left mid lung scarring or atelectasis.  No focal consolidation.  Normal heart size.                                       Medications   sodium chloride 0.9% bolus 1,641 mL 1,641 mL (1,641 mLs Intravenous New Bag 4/24/25 1949)   piperacillin-tazobactam (ZOSYN) 4.5 g in D5W 100 mL IVPB (MB+) (0 g Intravenous Stopped 4/24/25 2114)   insulin regular injection 6 Units 0.06 mL (6 Units Intravenous Given 4/24/25 2042)     Medical Decision Making  Patient presents with nausea, upper abdominal pain malaise and fatigue that has been going on since she recently had COVID.  Labs and diagnostic imaging reviewed.  Hyperbilirubinemia and elevated liver enzymes noted as well as elevated alkaline phosphatase.  Patient is visibly jaundiced.  Epigastric and right upper quadrant tenderness with no rebound or guarding noted.  She is afebrile.  MIGUEL ANGEL noted.  Hyperglycemia noted without without an elevated anion gap, serum ketones negative.  Hyperglycemia is responding to IV insulin as well as hydration.  The patient reports she has been feel she  was a borderline diabetic but is not on diabetic medications.  Hepatitis panel has been ordered, lipase is elevated as well.  CT scan findings and ultrasound findings noted, differential diagnosis includes but is not limited to obstructive pattern due to a pancreatic mass with malignancy in differential diagnosis as well as choledocholithiasis.  Surgery and GI consulted.  Will discuss with hospitalist for admission.    Amount and/or Complexity of Data Reviewed  Labs: ordered. Decision-making details documented in ED Course.  Radiology: ordered.    Risk  OTC drugs.  Decision regarding hospitalization.               ED Course as of 04/24/25 2207   Thu Apr 24, 2025   1754 BUN(!): 25 [AR]   1754 Creatinine(!): 1.6 [AR]   1754 Glucose(!!): 671 [AR]   1754 CO2: 25 [AR]   1754 Chloride(!): 88 [AR]   1754 Potassium: 4.9 [AR]   1754 Sodium(!): 126 [AR]   1754 PROTEIN TOTAL: 8.2 [AR]   1754 Albumin: 4.0 [AR]   1754 BILIRUBIN TOTAL(!): 9.2 [AR]   1754 AST(!): 228 [AR]   1754 ALT(!): 310 [AR]   1754 eGFR(!): 36 [AR]   1754 WBC: 7.20 [AR]   1754 Hemoglobin: 12.7 [AR]   1754 Hematocrit: 39.7 [AR]   1754 Platelet Count: 371 [AR]   1754 Leukocyte Esterase, UA: Negative [AR]   1754 NITRITE UA: Negative [AR]   1754 Urobilinogen, UA: Negative [AR]   1754 Ketones, UA: Negative [AR]   1754 Glucose, UA(!): 4+ [AR]   1754 pH, UA: 6.0 [AR]   1754 Appearance, UA: Clear [AR]   1754 INR: 0.9 [AR]   1754 PT: 10.5 [AR]   1754 CPK: 56 [AR]   1754 Ammonia(!): 51 [AR]   2123 POCT Glucose(!): 441 [AR]   2123 Influenza A, Molecular: Negative [AR]   2123 Influenza B, Molecular: Negative [AR]   2134 WBC: 7.20 [AR]   2135 Hemoglobin: 12.7 [AR]   2135 Hematocrit: 39.7 [AR]   2135 Platelet Count: 371 [AR]   2135 Potassium: 4.9 [AR]   2135 Chloride(!): 88 [AR]   2135 CO2: 25 [AR]      ED Course User Index  [AR] Virginia Carrion MD                           Clinical Impression:  Final diagnoses:  [R73.9] Hyperglycemia (Primary)  [E80.6]  Hyperbilirubinemia  [R17] Jaundice  [K83.1] Biliary obstruction  [K86.89] Pancreatic mass          ED Disposition Condition    Admit                     [1]   Social History  Tobacco Use    Smoking status: Former     Current packs/day: 0.00     Average packs/day: 1 pack/day for 11.0 years (11.0 ttl pk-yrs)     Types: Cigarettes     Start date:      Quit date:      Years since quittin.3    Smokeless tobacco: Never   Substance Use Topics    Alcohol use: Yes     Comment: occasional 2x/y    Drug use: Never        Virginia Carrion MD  [phone]

## 2025-04-24 NOTE — FIRST PROVIDER EVALUATION
Emergency Department TeleTriage Encounter Note      CHIEF COMPLAINT    Chief Complaint   Patient presents with    Weakness     Pt c/o weakness, yellowing, and over 4000 sugar in her urine.        VITAL SIGNS   Initial Vitals [04/24/25 1433]   BP Pulse Resp Temp SpO2   127/86 89 18 98 °F (36.7 °C) 95 %      MAP       --            ALLERGIES    Review of patient's allergies indicates:   Allergen Reactions    Erythromycin Swelling    Codeine Nausea And Vomiting     And migraine h/a    [place] Blisters       PROVIDER TRIAGE NOTE  Verbal consent for the teletriage evaluation was given by the patient at the start of the evaluation.  All efforts will be made to maintain patient's privacy during the evaluation.      This is a teletriage evaluation of a 64 y.o. female presenting to the ED with c/o generalized weakness, fatigue, dizziness, and jaundice that started after COVID 2 months ago; worsened on 4/15/2025.  Also reports decreased appetite and dark urine. Limited physical exam via telehealth: The patient is awake, alert, answering questions appropriately and is not in respiratory distress.  As the Teletriage provider, I performed an initial assessment and ordered appropriate labs and imaging studies, if any, to facilitate the patient's care once placed in the ED. Once a room is available, care and a full evaluation will be completed by an alternate ED provider.  Any additional orders and the final disposition will be determined by that provider.  All imaging and labs will not be followed-up by the Teletriage Team, including myself.      ORDERS  Labs Reviewed   HEPATITIS C ANTIBODY   HIV 1 / 2 ANTIBODY   CBC W/ AUTO DIFFERENTIAL    Narrative:     The following orders were created for panel order CBC auto differential.  Procedure                               Abnormality         Status                     ---------                               -----------         ------                     CBC with  Differential[[id number]]                                                        Please view results for these tests on the individual orders.   COMPREHENSIVE METABOLIC PANEL   URINALYSIS, REFLEX TO URINE CULTURE   PROTIME-INR   AMMONIA   CBC WITH DIFFERENTIAL   CK   POCT GLUCOSE MONITORING CONTINUOUS       ED Orders (720h ago, onward)      Start Ordered     Status Ordering Provider    04/24/25 1507 04/24/25 1506  CBC auto differential  STAT         Ordered LEONARDO NICK    04/24/25 1507 04/24/25 1506  Comprehensive metabolic panel  STAT         Ordered SANDOVAL, LEONARDO SHELLEY    04/24/25 1507 04/24/25 1506  POCT glucose  Once         Ordered SANDOVAL, LEONARDO SHELLEY    04/24/25 1507 04/24/25 1506  Urinalysis, Reflex to Urine Culture  STAT         Ordered SANDOVAL, LEONARDO SHELLEY    04/24/25 1507 04/24/25 1506  Protime-INR  STAT         Ordered SANDOVAL, LEONARDO SHELLEY    04/24/25 1507 04/24/25 1506  Ammonia  STAT         Ordered SANDOVAL, LEONARDO SHELLEY    04/24/25 1507 04/24/25 1506  CK  STAT         Ordered SANDOVAL, LEONARDO SHELLEY    04/24/25 1507 04/24/25 1507  CBC with Differential  PROCEDURE ONCE         Ordered SANDOVAL, LEONARDO SHELLEY    04/24/25 1506 04/24/25 1506  Saline lock IV  Once         Ordered SANDOVAL, LEONARDO SHELLEY    04/24/25 1506 04/24/25 1506  Pulse Oximetry Continuous  Continuous         Ordered SANDOVAL, LEONARDO SHELLEY    04/24/25 1506 04/24/25 1506  Cardiac Monitoring - Adult  Continuous        Comments: Notify Physician If:    Ordered LEONARDO NICK    04/24/25 1506 04/24/25 1506  EKG 12-lead  Once         Ordered LEONARDO NICK    04/24/25 1436 04/24/25 1435  Hepatitis C Antibody  STAT         Ordered GILBERT GAUTAM    04/24/25 1436 04/24/25 1435  HIV 1/2 Ag/Ab (4th Gen)  STAT         Ordered GILBERT GAUTAM              Virtual Visit Note: The provider triage portion of this emergency department evaluation and documentation was performed via [employer], a HIPAA-compliant telemedicine application, in concert with a tele-presenter in the room. A face to face patient evaluation with  one of my colleagues will occur once the patient is placed in an emergency department room.      DISCLAIMER: This note was prepared with [employer] voice recognition transcription software. Garbled syntax, mangled pronouns, and other bizarre constructions may be attributed to that software system.

## 2025-04-24 NOTE — TELEPHONE ENCOUNTER
----- Message from Adilia sent at 4/24/2025 12:33 PM CDT -----  A month 1/2 ago she had covid. She just got back from [place] and the fatigue is really bad. Her urine is dark but not in pain. Would like to know what to do. [phone]

## 2025-04-25 ENCOUNTER — ANESTHESIA (OUTPATIENT)
Dept: SURGERY | Facility: HOSPITAL | Age: 65
End: 2025-04-25
Payer: MEDICAID

## 2025-04-25 ENCOUNTER — ANESTHESIA EVENT (OUTPATIENT)
Dept: SURGERY | Facility: HOSPITAL | Age: 65
End: 2025-04-25
Payer: MEDICAID

## 2025-04-25 PROBLEM — K86.89 PANCREATIC MASS: Status: ACTIVE | Noted: 2025-04-24

## 2025-04-25 LAB
ABSOLUTE EOSINOPHIL (SMH): 0.16 K/UL
ABSOLUTE MONOCYTE (SMH): 0.9 K/UL (ref 0.3–1)
ABSOLUTE NEUTROPHIL COUNT (SMH): 5.2 K/UL (ref 1.8–7.7)
ALBUMIN SERPL-MCNC: 3.5 G/DL (ref 3.5–5.2)
ALP SERPL-CCNC: 900 UNIT/L (ref 55–135)
ALT SERPL-CCNC: 275 UNIT/L (ref 10–44)
ANION GAP (SMH): 9 MMOL/L (ref 8–16)
ANION GAP (SMH): 9 MMOL/L (ref 8–16)
AST SERPL-CCNC: 228 UNIT/L (ref 10–40)
BASOPHILS # BLD AUTO: 0.06 K/UL
BASOPHILS NFR BLD AUTO: 0.8 %
BILIRUB SERPL-MCNC: 10.9 MG/DL (ref 0.1–1)
BUN SERPL-MCNC: 23 MG/DL (ref 8–23)
BUN SERPL-MCNC: 23 MG/DL (ref 8–23)
CALCIUM SERPL-MCNC: 9.2 MG/DL (ref 8.7–10.5)
CALCIUM SERPL-MCNC: 9.2 MG/DL (ref 8.7–10.5)
CHLORIDE SERPL-SCNC: 97 MMOL/L (ref 95–110)
CHLORIDE SERPL-SCNC: 97 MMOL/L (ref 95–110)
CO2 SERPL-SCNC: 25 MMOL/L (ref 23–29)
CO2 SERPL-SCNC: 25 MMOL/L (ref 23–29)
CREAT SERPL-MCNC: 1.3 MG/DL (ref 0.5–1.4)
CREAT SERPL-MCNC: 1.3 MG/DL (ref 0.5–1.4)
ERYTHROCYTE [DISTWIDTH] IN BLOOD BY AUTOMATED COUNT: 15.6 % (ref 11.5–14.5)
GFR SERPLBLD CREATININE-BSD FMLA CKD-EPI: 47 ML/MIN/1.73/M2
GFR SERPLBLD CREATININE-BSD FMLA CKD-EPI: 47 ML/MIN/1.73/M2
GLUCOSE SERPL-MCNC: 222 MG/DL (ref 70–110)
GLUCOSE SERPL-MCNC: 222 MG/DL (ref 70–110)
HCT VFR BLD AUTO: 36.8 % (ref 37–48.5)
HGB BLD-MCNC: 11.8 GM/DL (ref 12–16)
IMM GRANULOCYTES # BLD AUTO: 0.08 K/UL (ref 0–0.04)
IMM GRANULOCYTES NFR BLD AUTO: 1.1 % (ref 0–0.5)
LYMPHOCYTES # BLD AUTO: 0.96 K/UL (ref 1–4.8)
MAGNESIUM SERPL-MCNC: 1.9 MG/DL (ref 1.6–2.6)
MCH RBC QN AUTO: 27.3 PG (ref 27–31)
MCHC RBC AUTO-ENTMCNC: 32.1 G/DL (ref 32–36)
MCV RBC AUTO: 85 FL (ref 82–98)
NUCLEATED RBC (/100WBC) (SMH): 0 /100 WBC
PHOSPHATE SERPL-MCNC: 2.4 MG/DL (ref 2.7–4.5)
PLATELET # BLD AUTO: 361 K/UL (ref 150–450)
PMV BLD AUTO: 10.4 FL (ref 9.2–12.9)
POCT GLUCOSE: 264 MG/DL (ref 70–110)
POCT GLUCOSE: 304 MG/DL (ref 70–110)
POCT GLUCOSE: 329 MG/DL (ref 70–110)
POCT GLUCOSE: 333 MG/DL (ref 70–110)
POCT GLUCOSE: 349 MG/DL (ref 70–110)
POCT GLUCOSE: 475 MG/DL (ref 70–110)
POCT GLUCOSE: >500 MG/DL (ref 70–110)
POTASSIUM SERPL-SCNC: 3.5 MMOL/L (ref 3.5–5.1)
POTASSIUM SERPL-SCNC: 3.5 MMOL/L (ref 3.5–5.1)
PROT SERPL-MCNC: 7.1 GM/DL (ref 6–8.4)
RBC # BLD AUTO: 4.33 M/UL (ref 4–5.4)
RELATIVE EOSINOPHIL (SMH): 2.2 % (ref 0–8)
RELATIVE LYMPHOCYTE (SMH): 13 % (ref 18–48)
RELATIVE MONOCYTE (SMH): 12.2 % (ref 4–15)
RELATIVE NEUTROPHIL (SMH): 70.7 % (ref 38–73)
SODIUM SERPL-SCNC: 131 MMOL/L (ref 136–145)
SODIUM SERPL-SCNC: 131 MMOL/L (ref 136–145)
WBC # BLD AUTO: 7.36 K/UL (ref 3.9–12.7)

## 2025-04-25 PROCEDURE — 63600175 PHARM REV CODE 636 W HCPCS: Performed by: FAMILY MEDICINE

## 2025-04-25 PROCEDURE — 83036 HEMOGLOBIN GLYCOSYLATED A1C: CPT | Performed by: FAMILY MEDICINE

## 2025-04-25 PROCEDURE — 99900031 HC PATIENT EDUCATION (STAT)

## 2025-04-25 PROCEDURE — 63600175 PHARM REV CODE 636 W HCPCS

## 2025-04-25 PROCEDURE — BF101ZZ FLUOROSCOPY OF BILE DUCTS USING LOW OSMOLAR CONTRAST: ICD-10-PCS | Performed by: INTERNAL MEDICINE

## 2025-04-25 PROCEDURE — 27201107 HC STYLET, STANDARD: Performed by: ANESTHESIOLOGY

## 2025-04-25 PROCEDURE — 25000003 PHARM REV CODE 250: Performed by: STUDENT IN AN ORGANIZED HEALTH CARE EDUCATION/TRAINING PROGRAM

## 2025-04-25 PROCEDURE — 99222 1ST HOSP IP/OBS MODERATE 55: CPT | Mod: ,,, | Performed by: SURGERY

## 2025-04-25 PROCEDURE — 0F798DZ DILATION OF COMMON BILE DUCT WITH INTRALUMINAL DEVICE, VIA NATURAL OR ARTIFICIAL OPENING ENDOSCOPIC: ICD-10-PCS | Performed by: INTERNAL MEDICINE

## 2025-04-25 PROCEDURE — 27000673 HC TUBING BLOOD Y: Performed by: ANESTHESIOLOGY

## 2025-04-25 PROCEDURE — 43264 ERCP REMOVE DUCT CALCULI: CPT | Performed by: INTERNAL MEDICINE

## 2025-04-25 PROCEDURE — 27202125 HC BALLOON, EXTRACTION (ANY): Performed by: INTERNAL MEDICINE

## 2025-04-25 PROCEDURE — 12000002 HC ACUTE/MED SURGE SEMI-PRIVATE ROOM

## 2025-04-25 PROCEDURE — 63600175 PHARM REV CODE 636 W HCPCS: Mod: JZ,TB | Performed by: INTERNAL MEDICINE

## 2025-04-25 PROCEDURE — 94799 UNLISTED PULMONARY SVC/PX: CPT

## 2025-04-25 PROCEDURE — 25500020 PHARM REV CODE 255: Performed by: INTERNAL MEDICINE

## 2025-04-25 PROCEDURE — 36415 COLL VENOUS BLD VENIPUNCTURE: CPT | Performed by: INTERNAL MEDICINE

## 2025-04-25 PROCEDURE — 63600175 PHARM REV CODE 636 W HCPCS: Performed by: ANESTHESIOLOGY

## 2025-04-25 PROCEDURE — 36415 COLL VENOUS BLD VENIPUNCTURE: CPT | Performed by: STUDENT IN AN ORGANIZED HEALTH CARE EDUCATION/TRAINING PROGRAM

## 2025-04-25 PROCEDURE — 99406 BEHAV CHNG SMOKING 3-10 MIN: CPT

## 2025-04-25 PROCEDURE — 82962 GLUCOSE BLOOD TEST: CPT | Performed by: INTERNAL MEDICINE

## 2025-04-25 PROCEDURE — C2625 STENT, NON-COR, TEM W/DEL SY: HCPCS | Performed by: INTERNAL MEDICINE

## 2025-04-25 PROCEDURE — 63600175 PHARM REV CODE 636 W HCPCS: Performed by: STUDENT IN AN ORGANIZED HEALTH CARE EDUCATION/TRAINING PROGRAM

## 2025-04-25 PROCEDURE — 37000009 HC ANESTHESIA EA ADD 15 MINS: Performed by: INTERNAL MEDICINE

## 2025-04-25 PROCEDURE — 27202095 HC BRUSH, CYTOLOGY ERCP: Performed by: INTERNAL MEDICINE

## 2025-04-25 PROCEDURE — 84075 ASSAY ALKALINE PHOSPHATASE: CPT | Performed by: FAMILY MEDICINE

## 2025-04-25 PROCEDURE — 94760 N-INVAS EAR/PLS OXIMETRY 1: CPT

## 2025-04-25 PROCEDURE — 94761 N-INVAS EAR/PLS OXIMETRY MLT: CPT

## 2025-04-25 PROCEDURE — 80069 RENAL FUNCTION PANEL: CPT | Performed by: STUDENT IN AN ORGANIZED HEALTH CARE EDUCATION/TRAINING PROGRAM

## 2025-04-25 PROCEDURE — 83735 ASSAY OF MAGNESIUM: CPT | Performed by: STUDENT IN AN ORGANIZED HEALTH CARE EDUCATION/TRAINING PROGRAM

## 2025-04-25 PROCEDURE — 36415 COLL VENOUS BLD VENIPUNCTURE: CPT | Performed by: FAMILY MEDICINE

## 2025-04-25 PROCEDURE — 43274 ERCP DUCT STENT PLACEMENT: CPT | Mod: XS | Performed by: INTERNAL MEDICINE

## 2025-04-25 PROCEDURE — 99900035 HC TECH TIME PER 15 MIN (STAT)

## 2025-04-25 PROCEDURE — 85025 COMPLETE CBC W/AUTO DIFF WBC: CPT | Performed by: STUDENT IN AN ORGANIZED HEALTH CARE EDUCATION/TRAINING PROGRAM

## 2025-04-25 PROCEDURE — 80048 BASIC METABOLIC PNL TOTAL CA: CPT | Performed by: STUDENT IN AN ORGANIZED HEALTH CARE EDUCATION/TRAINING PROGRAM

## 2025-04-25 PROCEDURE — C1877 STENT, NON-COAT/COV W/O DEL: HCPCS | Performed by: INTERNAL MEDICINE

## 2025-04-25 PROCEDURE — 27202107 HC XP QUATRO SENSOR: Performed by: ANESTHESIOLOGY

## 2025-04-25 PROCEDURE — 25000003 PHARM REV CODE 250: Performed by: INTERNAL MEDICINE

## 2025-04-25 PROCEDURE — C1769 GUIDE WIRE: HCPCS | Performed by: INTERNAL MEDICINE

## 2025-04-25 PROCEDURE — 25000003 PHARM REV CODE 250

## 2025-04-25 PROCEDURE — 37000008 HC ANESTHESIA 1ST 15 MINUTES: Performed by: INTERNAL MEDICINE

## 2025-04-25 RX ORDER — SUCCINYLCHOLINE CHLORIDE 20 MG/ML
INJECTION INTRAMUSCULAR; INTRAVENOUS
Status: DISCONTINUED | OUTPATIENT
Start: 2025-04-25 | End: 2025-04-25

## 2025-04-25 RX ORDER — IBUPROFEN 200 MG
16 TABLET ORAL
Status: DISCONTINUED | OUTPATIENT
Start: 2025-04-25 | End: 2025-04-27 | Stop reason: HOSPADM

## 2025-04-25 RX ORDER — PROPOFOL 10 MG/ML
VIAL (ML) INTRAVENOUS
Status: DISCONTINUED | OUTPATIENT
Start: 2025-04-25 | End: 2025-04-25

## 2025-04-25 RX ORDER — ONDANSETRON HYDROCHLORIDE 2 MG/ML
4 INJECTION, SOLUTION INTRAVENOUS DAILY PRN
Status: DISCONTINUED | OUTPATIENT
Start: 2025-04-25 | End: 2025-04-25 | Stop reason: HOSPADM

## 2025-04-25 RX ORDER — FENTANYL CITRATE 50 UG/ML
INJECTION, SOLUTION INTRAMUSCULAR; INTRAVENOUS
Status: DISCONTINUED | OUTPATIENT
Start: 2025-04-25 | End: 2025-04-25

## 2025-04-25 RX ORDER — DIPHENHYDRAMINE HYDROCHLORIDE 50 MG/ML
INJECTION, SOLUTION INTRAMUSCULAR; INTRAVENOUS
Status: DISCONTINUED | OUTPATIENT
Start: 2025-04-25 | End: 2025-04-25

## 2025-04-25 RX ORDER — LIDOCAINE HYDROCHLORIDE 20 MG/ML
INJECTION, SOLUTION EPIDURAL; INFILTRATION; INTRACAUDAL; PERINEURAL
Status: DISCONTINUED | OUTPATIENT
Start: 2025-04-25 | End: 2025-04-25

## 2025-04-25 RX ORDER — INSULIN ASPART 100 [IU]/ML
0-10 INJECTION, SOLUTION INTRAVENOUS; SUBCUTANEOUS EVERY 6 HOURS PRN
Status: DISCONTINUED | OUTPATIENT
Start: 2025-04-25 | End: 2025-04-25

## 2025-04-25 RX ORDER — GLUCAGON 1 MG
1 KIT INJECTION
Status: DISCONTINUED | OUTPATIENT
Start: 2025-04-25 | End: 2025-04-25 | Stop reason: HOSPADM

## 2025-04-25 RX ORDER — DIPHENHYDRAMINE HYDROCHLORIDE 50 MG/ML
12.5 INJECTION, SOLUTION INTRAMUSCULAR; INTRAVENOUS
Status: DISCONTINUED | OUTPATIENT
Start: 2025-04-25 | End: 2025-04-25 | Stop reason: HOSPADM

## 2025-04-25 RX ORDER — MORPHINE SULFATE 2 MG/ML
1 INJECTION, SOLUTION INTRAMUSCULAR; INTRAVENOUS EVERY 6 HOURS PRN
Status: DISCONTINUED | OUTPATIENT
Start: 2025-04-25 | End: 2025-04-27 | Stop reason: HOSPADM

## 2025-04-25 RX ORDER — GLUCAGON 1 MG
1 KIT INJECTION
Status: DISCONTINUED | OUTPATIENT
Start: 2025-04-25 | End: 2025-04-26

## 2025-04-25 RX ORDER — MORPHINE SULFATE 2 MG/ML
2 INJECTION, SOLUTION INTRAMUSCULAR; INTRAVENOUS EVERY 6 HOURS PRN
Status: DISCONTINUED | OUTPATIENT
Start: 2025-04-25 | End: 2025-04-25

## 2025-04-25 RX ORDER — ONDANSETRON HYDROCHLORIDE 2 MG/ML
INJECTION, SOLUTION INTRAMUSCULAR; INTRAVENOUS
Status: DISCONTINUED | OUTPATIENT
Start: 2025-04-25 | End: 2025-04-25

## 2025-04-25 RX ORDER — PHENYLEPHRINE HYDROCHLORIDE 10 MG/ML
INJECTION INTRAVENOUS
Status: DISCONTINUED | OUTPATIENT
Start: 2025-04-25 | End: 2025-04-25

## 2025-04-25 RX ORDER — GLUCAGON 1 MG
KIT INJECTION
Status: DISCONTINUED | OUTPATIENT
Start: 2025-04-25 | End: 2025-04-25

## 2025-04-25 RX ORDER — ROCURONIUM BROMIDE 10 MG/ML
INJECTION, SOLUTION INTRAVENOUS
Status: DISCONTINUED | OUTPATIENT
Start: 2025-04-25 | End: 2025-04-25

## 2025-04-25 RX ORDER — FAMOTIDINE 10 MG/ML
INJECTION, SOLUTION INTRAVENOUS
Status: DISCONTINUED | OUTPATIENT
Start: 2025-04-25 | End: 2025-04-25

## 2025-04-25 RX ORDER — HYDROMORPHONE HYDROCHLORIDE 1 MG/ML
0.2 INJECTION, SOLUTION INTRAMUSCULAR; INTRAVENOUS; SUBCUTANEOUS EVERY 5 MIN PRN
Status: DISCONTINUED | OUTPATIENT
Start: 2025-04-25 | End: 2025-04-25 | Stop reason: HOSPADM

## 2025-04-25 RX ORDER — INDOMETHACIN 50 MG/1
SUPPOSITORY RECTAL
Status: DISCONTINUED | OUTPATIENT
Start: 2025-04-25 | End: 2025-04-25 | Stop reason: HOSPADM

## 2025-04-25 RX ORDER — INSULIN ASPART 100 [IU]/ML
8 INJECTION, SOLUTION INTRAVENOUS; SUBCUTANEOUS ONCE
Status: COMPLETED | OUTPATIENT
Start: 2025-04-25 | End: 2025-04-25

## 2025-04-25 RX ORDER — OXYCODONE HYDROCHLORIDE 5 MG/1
5 TABLET ORAL
Status: DISCONTINUED | OUTPATIENT
Start: 2025-04-25 | End: 2025-04-25 | Stop reason: HOSPADM

## 2025-04-25 RX ORDER — INSULIN ASPART 100 [IU]/ML
0-10 INJECTION, SOLUTION INTRAVENOUS; SUBCUTANEOUS
Status: DISCONTINUED | OUTPATIENT
Start: 2025-04-25 | End: 2025-04-27 | Stop reason: HOSPADM

## 2025-04-25 RX ORDER — INSULIN ASPART 100 [IU]/ML
6 INJECTION, SOLUTION INTRAVENOUS; SUBCUTANEOUS ONCE
Status: COMPLETED | OUTPATIENT
Start: 2025-04-25 | End: 2025-04-25

## 2025-04-25 RX ORDER — IBUPROFEN 200 MG
24 TABLET ORAL
Status: DISCONTINUED | OUTPATIENT
Start: 2025-04-25 | End: 2025-04-27 | Stop reason: HOSPADM

## 2025-04-25 RX ORDER — GLUCAGON 1 MG
KIT INJECTION
Status: DISCONTINUED | OUTPATIENT
Start: 2025-04-25 | End: 2025-04-25 | Stop reason: HOSPADM

## 2025-04-25 RX ORDER — GLUCAGON 1 MG
1 KIT INJECTION
Status: DISCONTINUED | OUTPATIENT
Start: 2025-04-25 | End: 2025-04-27 | Stop reason: HOSPADM

## 2025-04-25 RX ADMIN — ENOXAPARIN SODIUM 40 MG: 40 INJECTION SUBCUTANEOUS at 12:04

## 2025-04-25 RX ADMIN — FENTANYL CITRATE 50 MCG: 0.05 INJECTION, SOLUTION INTRAMUSCULAR; INTRAVENOUS at 02:04

## 2025-04-25 RX ADMIN — PHENYLEPHRINE HYDROCHLORIDE 200 MCG: 10 INJECTION INTRAVENOUS at 03:04

## 2025-04-25 RX ADMIN — MONTELUKAST 10 MG: 10 TABLET, FILM COATED ORAL at 12:04

## 2025-04-25 RX ADMIN — SODIUM CHLORIDE: 0.9 INJECTION, SOLUTION INTRAVENOUS at 02:04

## 2025-04-25 RX ADMIN — LOSARTAN POTASSIUM 100 MG: 25 TABLET, FILM COATED ORAL at 09:04

## 2025-04-25 RX ADMIN — INSULIN ASPART 6 UNITS: 100 INJECTION, SOLUTION INTRAVENOUS; SUBCUTANEOUS at 05:04

## 2025-04-25 RX ADMIN — PIPERACILLIN AND TAZOBACTAM 4.5 G: 4; .5 INJECTION, POWDER, LYOPHILIZED, FOR SOLUTION INTRAVENOUS at 04:04

## 2025-04-25 RX ADMIN — MONTELUKAST 10 MG: 10 TABLET, FILM COATED ORAL at 08:04

## 2025-04-25 RX ADMIN — INSULIN ASPART 8 UNITS: 100 INJECTION, SOLUTION INTRAVENOUS; SUBCUTANEOUS at 03:04

## 2025-04-25 RX ADMIN — PHENYLEPHRINE HYDROCHLORIDE 200 MCG: 10 INJECTION INTRAVENOUS at 02:04

## 2025-04-25 RX ADMIN — HYDROCHLOROTHIAZIDE 25 MG: 25 TABLET ORAL at 09:04

## 2025-04-25 RX ADMIN — SODIUM CHLORIDE: 9 INJECTION, SOLUTION INTRAVENOUS at 12:04

## 2025-04-25 RX ADMIN — DULOXETINE HYDROCHLORIDE 60 MG: 30 CAPSULE, DELAYED RELEASE ORAL at 09:04

## 2025-04-25 RX ADMIN — ROCURONIUM BROMIDE 5 MG: 10 INJECTION, SOLUTION INTRAVENOUS at 02:04

## 2025-04-25 RX ADMIN — METOPROLOL SUCCINATE 25 MG: 25 TABLET, EXTENDED RELEASE ORAL at 09:04

## 2025-04-25 RX ADMIN — ENOXAPARIN SODIUM 40 MG: 40 INJECTION SUBCUTANEOUS at 09:04

## 2025-04-25 RX ADMIN — INSULIN ASPART 8 UNITS: 100 INJECTION, SOLUTION INTRAVENOUS; SUBCUTANEOUS at 01:04

## 2025-04-25 RX ADMIN — MORPHINE SULFATE 1 MG: 2 INJECTION, SOLUTION INTRAMUSCULAR; INTRAVENOUS at 06:04

## 2025-04-25 RX ADMIN — AMLODIPINE BESYLATE 5 MG: 5 TABLET ORAL at 09:04

## 2025-04-25 RX ADMIN — DIPHENHYDRAMINE HYDROCHLORIDE 6.25 MG: 50 INJECTION INTRAMUSCULAR; INTRAVENOUS at 02:04

## 2025-04-25 RX ADMIN — Medication 160 MG: at 02:04

## 2025-04-25 RX ADMIN — ENOXAPARIN SODIUM 40 MG: 40 INJECTION SUBCUTANEOUS at 08:04

## 2025-04-25 RX ADMIN — INSULIN ASPART 5 UNITS: 100 INJECTION, SOLUTION INTRAVENOUS; SUBCUTANEOUS at 03:04

## 2025-04-25 RX ADMIN — Medication 1 MG: at 02:04

## 2025-04-25 RX ADMIN — INSULIN ASPART 4 UNITS: 100 INJECTION, SOLUTION INTRAVENOUS; SUBCUTANEOUS at 09:04

## 2025-04-25 RX ADMIN — ONDANSETRON 4 MG: 2 INJECTION INTRAMUSCULAR; INTRAVENOUS at 02:04

## 2025-04-25 RX ADMIN — LEVOTHYROXINE SODIUM 25 MCG: 0.03 TABLET ORAL at 04:04

## 2025-04-25 RX ADMIN — PROPOFOL 150 MG: 10 INJECTION, EMULSION INTRAVENOUS at 02:04

## 2025-04-25 RX ADMIN — LIDOCAINE HYDROCHLORIDE 50 MG: 20 INJECTION, SOLUTION INTRAVENOUS at 02:04

## 2025-04-25 RX ADMIN — FAMOTIDINE 20 MG: 10 INJECTION, SOLUTION INTRAVENOUS at 02:04

## 2025-04-25 RX ADMIN — PIPERACILLIN AND TAZOBACTAM 4.5 G: 4; .5 INJECTION, POWDER, LYOPHILIZED, FOR SOLUTION INTRAVENOUS at 01:04

## 2025-04-25 RX ADMIN — ONDANSETRON 4 MG: 2 INJECTION INTRAMUSCULAR; INTRAVENOUS at 12:04

## 2025-04-25 RX ADMIN — SODIUM CHLORIDE: 9 INJECTION, SOLUTION INTRAVENOUS at 09:04

## 2025-04-25 RX ADMIN — MORPHINE SULFATE 2 MG: 2 INJECTION, SOLUTION INTRAMUSCULAR; INTRAVENOUS at 12:04

## 2025-04-25 RX ADMIN — INSULIN HUMAN 8 UNITS: 100 INJECTION, SOLUTION PARENTERAL at 04:04

## 2025-04-25 NOTE — CONSULTS
Consult received. Chart reviewed. Pt not present in room and undergoing ERCP today. We will plan to see pt when we return on Monday 4/28. If pt discharged prior to Monday we are happy to see her in our oupt clinic. Please reach out to our team so we can schedule her appt.     Full consult to follow after meeting with pt.

## 2025-04-25 NOTE — CONSULTS
GENERAL SURGERY  INPATIENT CONSULT    REASON FOR CONSULT: Jaundice, elevated LFTs    HPI: Sonia Muse is a 64 y.o. female with a history of hypertension, asthma, depression who presented to the emergency room with yellowing of the skin and eyes, dark urine, malaise. Patient reports 3-4 weeks ago she developed prolonged illness which she attributes to COVID associated with coughing and fatigue. Then developed abdominal cramping pain yellowing of the eyes and dark urine. This has associated with pruritus.  She denies fevers, chills, nausea, vomiting.  Went to the emergency room where she was found to have jaundice. White blood cell count was normal.  Was found to have significant elevation in LFTs with total bilirubin of 9.2, alkaline phosphatase of 962, , , lipase 175.  Also found to have hyperglycemia with blood sugar of 671. Underwent CT imaging which was read as unremarkable.  Subsequently underwent abdominal ultrasound which failed to show stones in the gallbladder however did show intra and extrahepatic ductal dilatation and a pancreatic head lesion concerning for neoplasm. GI and general surgery consulted.  MRCP obtained overnight. Patient denies any previous abdominal surgeries other than . No family or personal history of cancers.    I have reviewed the patient's chart including prior progress notes, procedures and testing.     ROS:   Review of Systems    PROBLEM LIST:  Problem List[1]      HISTORY  Past Medical History:   Diagnosis Date    Allergy     Anxiety     Asthma     DDD (degenerative disc disease), cervical     Depression     Fibromyalgia     Hypertension 2005    Neuromuscular disorder     PONV (postoperative nausea and vomiting)        Past Surgical History:   Procedure Laterality Date    ADENOIDECTOMY      carpel tunnel Right      SECTION      JOINT REPLACEMENT Right 2016    knee    KNEE ARTHROPLASTY Left 10/19/2020    Procedure: ARTHROPLASTY, KNEE;  Surgeon:  Felipe Herring MD;  Location: [facility];  Service: Orthopedics;  Laterality: Left;  Louie Liz    knee replacement Right     ROTATOR CUFF REPAIR Right     TONSILLECTOMY      TUBAL LIGATION         Social History[2]    Family History   Problem Relation Name Age of Onset    Diabetes Mother Libertad     Hypertension Mother Libertad     Heart disease Mother Libertad     Stroke Mother Libertad     Glaucoma Mother Libertad     Arthritis Mother Libertad     Diabetes Father Fredis     Hypertension Father Fredis     Heart disease Father Fredis     Hypertension Sister 1     Heart disease Sister 1     Graves' disease Daughter      Anxiety disorder Daughter           MEDS:  Medications Ordered Prior to Encounter[3]    ALLERGIES:  Review of patient's allergies indicates:   Allergen Reactions    Erythromycin Swelling    Codeine Nausea And Vomiting     And migraine h/a    [place] Blisters         VITALS:  Temp:  [97.7 °F (36.5 °C)-98 °F (36.7 °C)] 98 °F (36.7 °C)  Pulse:  [] 95  Resp:  [16-21] 16  SpO2:  [90 %-97 %] 93 %  BP: ()/(53-86) 105/67    I/O last 3 completed shifts:  In: 1741 [IV Piggyback:1741]  Out: -       PHYSICAL EXAM:  Physical Exam  Vitals reviewed.   Constitutional:       General: She is not in acute distress.     Appearance: Normal appearance. She is well-developed. She is obese.   HENT:      Head: Normocephalic and atraumatic.      Nose: Nose normal.   Eyes:      General: Scleral icterus present.      Conjunctiva/sclera: Conjunctivae normal.   Neck:      Trachea: No tracheal tenderness or tracheal deviation.   Cardiovascular:      Rate and Rhythm: Normal rate and regular rhythm.      Pulses: Normal pulses.   Pulmonary:      Effort: Pulmonary effort is normal. No accessory muscle usage or respiratory distress.      Breath sounds: Normal breath sounds.   Abdominal:      General: There is no distension.      Palpations: Abdomen is soft.      Tenderness: There is no abdominal tenderness (No pain or tenderness  on my exam).   Musculoskeletal:         General: No swelling or tenderness. Normal range of motion.      Cervical back: Normal range of motion and neck supple. No rigidity.   Skin:     General: Skin is warm and dry.      Coloration: Skin is jaundiced.      Findings: No erythema.   Neurological:      General: No focal deficit present.      Mental Status: She is alert and oriented to person, place, and time.      Motor: No weakness or abnormal muscle tone.   Psychiatric:         Mood and Affect: Mood normal.         Behavior: Behavior normal.         Thought Content: Thought content normal.         Judgment: Judgment normal.           LABS:  Lab Results   Component Value Date    WBC 7.20 04/24/2025    RBC 4.69 04/24/2025    HGB 12.7 04/24/2025    HCT 39.7 04/24/2025     04/24/2025     Lab Results   Component Value Date     (H) 11/21/2024     (L) 04/24/2025    K 4.9 04/24/2025     11/21/2024    CO2 25 04/24/2025    BUN 25 (H) 04/24/2025    CREATININE 1.6 (H) 04/24/2025    CALCIUM 9.7 04/24/2025     Lab Results   Component Value Date     (H) 04/24/2025     (H) 04/24/2025    ALKPHOS 962 (H) 04/24/2025    BILITOT 9.2 (H) 04/24/2025     Lab Results   Component Value Date    MG 2.1 04/24/2025       STUDIES:  Images and reports were personally reviewed.    Abdominal ultrasound  FINDINGS:  Heterogeneous mass in the region of the pancreatic head measures 5.0 x 3.7 cm and representing neoplasm until proven otherwise.  See same day CT.     Intrahepatic biliary ductal dilatation and extrahepatic biliary ductal dilatation noted.  Pancreatic ductal dilatation measures up to 4 mm     Distended gallbladder.     Enlarged and heterogeneous.     Impression:     Pancreatic neoplasm until proven otherwise.  Recommend dedicated MRI pancreas mass protocol          MRCP  FINDINGS:  Corresponding to findings on reference examinations, there is a large mass in the region of the pancreatic head/uncinate  process which measures 4.3 x 4.2 cm (image 22 series 7).  Mass measures 3.7 cm craniocaudal (image 11 series 6) and is highly suspicious for pancreatic malignancy.  Mass causes obstruction or compresses the distal common bile duct and there is associated intrahepatic and extrahepatic bile duct dilation.  Extrahepatic bile duct measures up to 14 mm in diameter.  Gallbladder appears distended.  No focal hepatic lesion is evident, noting hepatic protocol CT or MRI would provide a more sensitive assessment.  Pancreatic duct is dilated, also likely obstructed by the aforementioned mass.  No peripancreatic inflammatory stranding.     9.4 x 7.3 cm heterogeneous mass within the superior aspect of the spleen.     Limited imaging through the lower thorax is unremarkable.  No aggressive osseous lesion is evident.  Degenerative changes of the thoracolumbar spine.  No renal abnormality is visualized.     Enlarged peripancreatic lymph nodes, for reference measuring 21 x 17 mm (image 16 series 7) - suspicious for possible metastasis.     Impression:     Large (4.3 cm) pancreatic head mass is highly suspicious for primary pancreatic malignancy, and causes obstruction of the common bile duct and pancreatic duct.  Consider ERCP for further evaluation.     Large mass within the superior aspect of the spleen measuring greater than 9 cm.  This is incompletely assessed without contrast, however splenic metastatic disease is possible.  Further evaluation with contrast enhanced CT/MRI or PET-CT is recommended.     Enlarged peripancreatic lymph nodes concerning for metastasis      ASSESSMENT & PLAN:  64 y.o. female with pancreatic mass highly concerning for malignancy with biliary obstruction   -etiology of biliary obstruction likely secondary to pancreatic malignancy  -MRCP reports now back with concerns for splenic metastasis and peripancreatic lymph node involvement  -GI has been consulted, will likely need EUS/ERCP with biopsy and stent  placement  -no immediate general surgical needs, will need to follow up with the hepatobiliary/surgical oncologist  -also recommend PET-CT scan  -keep NPO until evaluated by GI  -general surgery to sign off, please consult with any other questions or concerns                   [1]   Patient Active Problem List  Diagnosis    Essential hypertension    Chronic pain of left knee    Depression with anxiety    Fibromyalgia    Vitamin D deficiency    Vitamin B 12 deficiency    Primary osteoarthritis of left knee    Osteoarthritis of left knee    Tachycardia    Asthma    Dependent edema    Hyperglycemia    Family history of elevated blood lipids    Skin lesion of face    Eczema    Jaundice    MIGUEL ANGEL (acute kidney injury)   [2]   Social History  Tobacco Use    Smoking status: Former     Current packs/day: 0.00     Average packs/day: 1 pack/day for 11.0 years (11.0 ttl pk-yrs)     Types: Cigarettes     Start date:      Quit date:      Years since quittin.3    Smokeless tobacco: Never   Substance Use Topics    Alcohol use: Yes     Comment: occasional 2x/y    Drug use: Never   [3]   No current facility-administered medications on file prior to encounter.     Current Outpatient Medications on File Prior to Encounter   Medication Sig Dispense Refill    albuterol (VENTOLIN HFA) 90 mcg/actuation inhaler Inhale 2 puffs into the lungs every 6 (six) hours as needed for Wheezing or Shortness of Breath. Rescue 18 g 1    ALPRAZolam (XANAX) 1 MG tablet Take 1 tablet (1 mg total) by mouth once daily. (Patient taking differently: Take 1 mg by mouth daily as needed for Anxiety.) 30 tablet 0    amLODIPine (NORVASC) 5 MG tablet Take 1 tablet (5 mg total) by mouth once daily. 90 tablet 1    cetirizine (ZYRTEC) 10 MG tablet TAKE 1 TABLET BY MOUTH ONCE DAILY (Patient taking differently: Take 10 mg by mouth every evening.) 90 tablet 1    DULoxetine (CYMBALTA) 60 MG capsule Take 1 capsule (60 mg total) by mouth once daily. 90 capsule 1     hydroCHLOROthiazide (HYDRODIURIL) 25 MG tablet TAKE one TABLET BY MOUTH ONCE DAILY (Patient taking differently: Take 25 mg by mouth once daily.) 90 tablet 1    levothyroxine (SYNTHROID) 25 MCG tablet Take 1 tablet (25 mcg total) by mouth before breakfast. 90 tablet 1    LIDOcaine-prilocaine (EMLA) cream Apply 1 g topically twice a week.      losartan (COZAAR) 100 MG tablet Take 1 tablet (100 mg total) by mouth once daily. 90 tablet 1    meloxicam (MOBIC) 15 MG tablet Take 1 tablet (15 mg total) by mouth once daily. 90 tablet 0    metoprolol succinate (TOPROL-XL) 25 MG 24 hr tablet Take 1 tablet (25 mg total) by mouth once daily. 90 tablet 1    metronidazole 0.75% (METROCREAM) 0.75 % Crea Apply 1 application  topically 2 (two) times daily.      montelukast (SINGULAIR) 10 mg tablet TAKE ONE TABLET BY MOUTH EVERY EVENING 90 tablet 1    mupirocin (BACTROBAN) 2 % ointment Apply 1 g topically 2 (two) times daily.

## 2025-04-25 NOTE — SUBJECTIVE & OBJECTIVE
Past Medical History:   Diagnosis Date    Allergy     Anxiety     Asthma     DDD (degenerative disc disease), cervical     Depression     Fibromyalgia     Hypertension 2005    Neuromuscular disorder     PONV (postoperative nausea and vomiting)        Past Surgical History:   Procedure Laterality Date    ADENOIDECTOMY      carpel tunnel Right      SECTION      JOINT REPLACEMENT Right 2016    knee    KNEE ARTHROPLASTY Left 10/19/2020    Procedure: ARTHROPLASTY, KNEE;  Surgeon: Felipe Herring MD;  Location: [facility];  Service: Orthopedics;  Laterality: Left;  Louie Liz    knee replacement Right     ROTATOR CUFF REPAIR Right     TONSILLECTOMY      TUBAL LIGATION         Review of patient's allergies indicates:   Allergen Reactions    Erythromycin Swelling    Codeine Nausea And Vomiting     And migraine h/a    [place] Blisters       No current facility-administered medications on file prior to encounter.     Current Outpatient Medications on File Prior to Encounter   Medication Sig    albuterol (VENTOLIN HFA) 90 mcg/actuation inhaler Inhale 2 puffs into the lungs every 6 (six) hours as needed for Wheezing or Shortness of Breath. Rescue    ALPRAZolam (XANAX) 1 MG tablet Take 1 tablet (1 mg total) by mouth once daily. (Patient taking differently: Take 1 mg by mouth daily as needed for Anxiety.)    amLODIPine (NORVASC) 5 MG tablet Take 1 tablet (5 mg total) by mouth once daily.    cetirizine (ZYRTEC) 10 MG tablet TAKE 1 TABLET BY MOUTH ONCE DAILY (Patient taking differently: Take 10 mg by mouth every evening.)    DULoxetine (CYMBALTA) 60 MG capsule Take 1 capsule (60 mg total) by mouth once daily.    hydroCHLOROthiazide (HYDRODIURIL) 25 MG tablet TAKE one TABLET BY MOUTH ONCE DAILY (Patient taking differently: Take 25 mg by mouth once daily.)    levothyroxine (SYNTHROID) 25 MCG tablet Take 1 tablet (25 mcg total) by mouth before breakfast.    LIDOcaine-prilocaine (EMLA) cream Apply 1 g topically twice a week.     losartan (COZAAR) 100 MG tablet Take 1 tablet (100 mg total) by mouth once daily.    meloxicam (MOBIC) 15 MG tablet Take 1 tablet (15 mg total) by mouth once daily.    metoprolol succinate (TOPROL-XL) 25 MG 24 hr tablet Take 1 tablet (25 mg total) by mouth once daily.    metronidazole 0.75% (METROCREAM) 0.75 % Crea Apply 1 application  topically 2 (two) times daily.    montelukast (SINGULAIR) 10 mg tablet TAKE ONE TABLET BY MOUTH EVERY EVENING    mupirocin (BACTROBAN) 2 % ointment Apply 1 g topically 2 (two) times daily.     Family History       Problem Relation (Age of Onset)    Anxiety disorder Daughter    Arthritis Mother    Diabetes Mother, Father    Glaucoma Mother    Graves' disease Daughter    Heart disease Mother, Father, Sister    Hypertension Mother, Father, Sister    Stroke Mother          Tobacco Use    Smoking status: Former     Current packs/day: 0.00     Average packs/day: 1 pack/day for 11.0 years (11.0 ttl pk-yrs)     Types: Cigarettes     Start date:      Quit date:      Years since quittin.3    Smokeless tobacco: Never   Substance and Sexual Activity    Alcohol use: Yes     Comment: occasional 2x/y    Drug use: Never    Sexual activity: Yes     Partners: Male     Review of Systems   Constitutional:  Positive for fatigue. Negative for chills and fever.   HENT:  Negative for ear pain and sore throat.    Eyes:  Negative for visual disturbance.   Respiratory:  Negative for cough, shortness of breath and wheezing.    Cardiovascular:  Negative for chest pain, palpitations and leg swelling.   Gastrointestinal:  Positive for abdominal pain. Negative for blood in stool, constipation, diarrhea, nausea and vomiting.   Endocrine: Negative for polyuria.   Genitourinary:  Negative for dysuria and hematuria.   Musculoskeletal:  Negative for back pain and myalgias.   Skin:  Positive for color change. Negative for rash.   Neurological:  Negative for dizziness, seizures, speech difficulty, weakness,  numbness and headaches.   Psychiatric/Behavioral:  Negative for confusion.      Objective:     Vital Signs (Most Recent):  Temp: 98 °F (36.7 °C) (04/24/25 1433)  Pulse: 89 (04/24/25 1433)  Resp: 18 (04/24/25 1433)  BP: 127/86 (04/24/25 1433)  SpO2: 95 % (04/24/25 1433) Vital Signs (24h Range):  Temp:  [97.7 °F (36.5 °C)-98 °F (36.7 °C)] 98 °F (36.7 °C)  Pulse:  [89-93] 89  Resp:  [18] 18  SpO2:  [95 %-96 %] 95 %  BP: (120-127)/(76-86) 127/86     Weight: 120.7 kg (266 lb)  Body mass index is 45.66 kg/m².     Physical Exam  Constitutional:       General: She is not in acute distress.     Appearance: She is obese.   HENT:      Head: Normocephalic and atraumatic.      Nose: No congestion or rhinorrhea.      Mouth/Throat:      Mouth: Mucous membranes are moist.      Pharynx: No oropharyngeal exudate or posterior oropharyngeal erythema.   Eyes:      General: Scleral icterus present.      Extraocular Movements: Extraocular movements intact.      Pupils: Pupils are equal, round, and reactive to light.   Cardiovascular:      Rate and Rhythm: Normal rate and regular rhythm.      Pulses: Normal pulses.      Heart sounds: Normal heart sounds. No murmur heard.  Pulmonary:      Effort: Pulmonary effort is normal. No respiratory distress.      Breath sounds: Normal breath sounds. No wheezing.   Abdominal:      General: There is no distension.      Palpations: Abdomen is soft.      Tenderness: There is abdominal tenderness.      Comments: Tenderness to deep palpation in RUQ   Musculoskeletal:      Right lower leg: No edema.      Left lower leg: No edema.   Skin:     General: Skin is warm.      Coloration: Skin is jaundiced.      Findings: No erythema or rash.   Neurological:      General: No focal deficit present.      Mental Status: She is alert and oriented to person, place, and time.   Psychiatric:         Mood and Affect: Mood normal.         Behavior: Behavior normal.              CRANIAL NERVES     CN III, IV, VI   Pupils are  "equal, round, and reactive to light.       Significant Labs: All pertinent labs within the past 24 hours have been reviewed.  A1C:   Recent Labs   Lab 11/21/24  1049   HGBA1C 6.1*     ABGs: No results for input(s): "PH", "PCO2", "HCO3", "POCSATURATED", "BE", "TOTALHB", "COHB", "METHB", "O2HB", "POCFIO2", "PO2" in the last 48 hours.  Bilirubin:   Recent Labs   Lab 04/24/25  1636   BILITOT 9.2*     Blood Culture: No results for input(s): "LABBLOO" in the last 48 hours.  BMP:   Recent Labs   Lab 04/24/25  1636   *   K 4.9   CO2 25   BUN 25*   CREATININE 1.6*   CALCIUM 9.7   MG 2.1     CBC:   Recent Labs   Lab 04/24/25  1636   WBC 7.20   HGB 12.7   HCT 39.7        CMP:   Recent Labs   Lab 04/24/25  1636   *   K 4.9   CO2 25   BUN 25*   CREATININE 1.6*   CALCIUM 9.7   ALBUMIN 4.0   BILITOT 9.2*   ALKPHOS 962*   *   *     Cardiac Markers:   Recent Labs   Lab 04/24/25  1636   BNP 19     Coagulation:   Recent Labs   Lab 04/24/25  1636   INR 0.9     Lactic Acid: No results for input(s): "LACTATE" in the last 48 hours.  Lipase:   Recent Labs   Lab 04/24/25  1636   LIPASE 175*     Lipid Panel: No results for input(s): "CHOL", "HDL", "LDLCALC", "TRIG", "CHOLHDL" in the last 48 hours.  Magnesium:   Recent Labs   Lab 04/24/25  1636   MG 2.1     Troponin: No results for input(s): "TROPONINI", "TROPONINIHS" in the last 48 hours.  TSH:   Recent Labs   Lab 04/24/25  1636   TSH 3.499     Urine Studies:   Recent Labs   Lab 04/24/25  1404 04/24/25  1636   APPEARANCEUA  --  Clear   PHUR 5.5  --    SPECGRAV  --  >=1.030*   PROTEINUA  --  Negative   BILIRUBINUA  --  1+*   UROBILINOGEN  --  Negative   LEUKOCYTESUR  --  Negative   RBCUA  --  1   WBCUA  --  1   BACTERIA  --  Rare       Significant Imaging: I have reviewed all pertinent imaging results/findings within the past 24 hours.  "

## 2025-04-25 NOTE — HPI
64 year old female with comorbid conditions of hypertension, asthma, degenerative disc disease, anxiety/depression, fibromyalgia, neuromuscular disorder presented to the ED due to progressive generalized weakness for a few days.    Reports prolonged illness 3 weeks ago (COVID?) and had coughing bouts and fatigue since.  Reports intermittent crampy abdominal pain which she attributed to the cough.  Noticed yesterday that her eyes were yellow colored and stool was light.  Associated with generalized itching without a rash.  Denies fevers, blood in stool, dysuria, frequency, numbness/weakness, orthopnea, headache, visual changes, pedal edema.    In ED patient has icterus, RUQ tenderness, appeared jaundiced.  Labs revealed T Bili of 9.2, transaminitis, MIGUEL ANGEL with Cr 1.6. US abdomen revealed 5.0x3.7cm pancreatic head mass, biliary ductal dilation to 4mm however CT abdomen was negative.  GI was notified (Dr. Escobar) - recommended MRI pancreatic protocol and possible ERCP tomorrow.  Surgery team notified (Dr. Nunn)

## 2025-04-25 NOTE — SUBJECTIVE & OBJECTIVE
Interval History:  Patient said she did not realize that she could have cancer.  She thought this is all from COVID.  For daily updates refer to hospital course    Review of Systems   All other systems reviewed and are negative.    Objective:     Vital Signs (Most Recent):  Temp: 97.6 °F (36.4 °C) (04/25/25 1538)  Pulse: 78 (04/25/25 1550)  Resp: 17 (04/25/25 1550)  BP: (!) 104/57 (04/25/25 1550)  SpO2: (!) 93 % (04/25/25 1550) Vital Signs (24h Range):  Temp:  [97.6 °F (36.4 °C)-98.6 °F (37 °C)] 97.6 °F (36.4 °C)  Pulse:  [] 78  Resp:  [16-26] 17  SpO2:  [90 %-97 %] 93 %  BP: ()/(51-84) 104/57     Weight: 120.7 kg (266 lb)  Body mass index is 45.66 kg/m².    Intake/Output Summary (Last 24 hours) at 4/25/2025 1618  Last data filed at 4/25/2025 1522  Gross per 24 hour   Intake 2541 ml   Output --   Net 2541 ml         Physical Exam  Constitutional:       Appearance: She is obese. She is ill-appearing.   Cardiovascular:      Rate and Rhythm: Normal rate.      Pulses: Normal pulses.   Pulmonary:      Breath sounds: No rales.   Abdominal:      General: There is distension.      Tenderness: There is no abdominal tenderness.   Skin:     Coloration: Skin is jaundiced.   Neurological:      Mental Status: She is oriented to person, place, and time.               Significant Labs: All pertinent labs within the past 24 hours have been reviewed.    Significant Imaging: I have reviewed all pertinent imaging results/findings within the past 24 hours.

## 2025-04-25 NOTE — ASSESSMENT & PLAN NOTE
MIGUEL ANGEL is likely due to dehydration. Baseline creatinine is 0.9. Most recent creatinine and eGFR are listed below.  Recent Labs     04/24/25  1636 04/25/25  0751   CREATININE 1.6* 1.3  1.3   EGFRNORACEVR 36* 47*  47*      Plan  - MIGUEL ANGEL is being monitored  - Avoid nephrotoxins and renally dose meds for GFR listed above  - Monitor urine output, serial BMP, and adjust therapy as needed  - IV hydration - NS @ 75

## 2025-04-25 NOTE — ASSESSMENT & PLAN NOTE
Patient's blood pressure range in the last 24 hours was: BP  Min: 120/76  Max: 127/86.The patient's inpatient anti-hypertensive regimen is listed below:  Current Antihypertensives  amLODIPine tablet 5 mg, Daily, Oral  hydroCHLOROthiazide tablet 25 mg, Daily, Oral  losartan tablet 100 mg, Daily, Oral  metoprolol succinate (TOPROL-XL) 24 hr tablet 25 mg, Daily, Oral    Plan  - BP is controlled, no changes needed to their regimen

## 2025-04-25 NOTE — ASSESSMENT & PLAN NOTE
MRCP showed pancreatic mass highly concerning for malignancy with biliary obstruction with concerns for splenic metastasis and peripancreatic lymph node involvement.   Surgery and GI were consulted & plan for ERCP with biopsy and stent placement on 4/25.   PET-CT recommended outpatient.    Palliative and heme oncology were consulted.

## 2025-04-25 NOTE — PROGRESS NOTES
Pharmacist Renal Dose Adjustment Note    Sonia Muse is a 64 y.o. female being treated with the medication Enoxaparin.    Patient Data:    Vital Signs (Most Recent):  Temp: 98 °F (36.7 °C) (04/24/25 1433)  Pulse: 89 (04/24/25 1433)  Resp: 18 (04/24/25 1433)  BP: 127/86 (04/24/25 1433)  SpO2: 95 % (04/24/25 1433) Vital Signs (72h Range):  Temp:  [97.7 °F (36.5 °C)-98 °F (36.7 °C)]   Pulse:  [89-93]   Resp:  [18]   BP: (120-127)/(76-86)   SpO2:  [95 %-96 %]      Recent Labs   Lab 04/24/25  1636   CREATININE 1.6*     Serum creatinine: 1.6 mg/dL (H) 04/24/25 1636  Estimated creatinine clearance: 46.7 mL/min (A)    Enoxaparin 40 mg subq every 24 hour will be changed to Enoxaparin 40 mg subq twice daily due to BMI > 40.0 kg/m2.    Pharmacist's Name: Pricilla Fay  Pharmacist's Extension: [phone]

## 2025-04-25 NOTE — TRANSFER OF CARE
"Anesthesia Transfer of Care Note    Patient: Sonia Muse    Procedure(s) Performed: Procedure(s) (LRB):  ERCP (ENDOSCOPIC RETROGRADE CHOLANGIOPANCREATOGRAPHY) (N/A)    Patient location: PACU    Anesthesia Type: general    Transport from OR: Transported from OR on 2-3 L/min O2 by NC with adequate spontaneous ventilation    Post pain: adequate analgesia    Post assessment: no apparent anesthetic complications and tolerated procedure well    Post vital signs: stable    Level of consciousness: awake and responds to stimulation    Nausea/Vomiting: no nausea/vomiting    Complications: none    Transfer of care protocol was followed      Last vitals: Visit Vitals  /77   Pulse 88   Temp 36.6 °C (97.9 °F)   Resp 18   Ht 5' 4" (1.626 m)   Wt 120.7 kg (266 lb)   SpO2 95%   Breastfeeding No   BMI 45.66 kg/m²     "

## 2025-04-25 NOTE — ANESTHESIA PROCEDURE NOTES
Intubation    Date/Time: 4/25/2025 2:39 PM    Performed by: Dyllan Donahue CRNA  Authorized by: Aleks Harper MD    Intubation:     Induction:  Intravenous    Intubated:  Postinduction    Mask Ventilation:  Easy mask    Attempts:  1    Attempted By:  CRNA    Method of Intubation:  Video laryngoscopy    Blade:  Haskins 3    Laryngeal View Grade: Grade I - full view of cords      Difficult Airway Encountered?: No      Complications:  None    Airway Device:  Oral endotracheal tube    Airway Device Size:  7.5    Style/Cuff Inflation:  Cuffed    Inflation Amount (mL):  8    Tube secured:  21    Secured at:  The teeth    Placement Verified By:  Capnometry and Revisualization with laryngoscopy    Complicating Factors:  Obesity    Findings Post-Intubation:  BS equal bilateral and atraumatic/condition of teeth unchanged

## 2025-04-25 NOTE — PLAN OF CARE
Nursing Transfer Note      Reason patient is being transferred: PACU 03    Transfer To: 3126    Transfer via stretcher    Transfer with IV Antibiotics    Transported by DAVEY Johnston and DAVEY Martinez     Medicines sent: Zosyn Infusing    Any special needs or follow-up needed: Routine Care    Chart send with patient: Yes    Notified: spouse, friend - via secure message and at the bedside    Patient reassessed at: 4/25/2025 1633 (date, time)     DAVEY Grimes came to the bedside to assess patient.

## 2025-04-25 NOTE — CARE UPDATE
04/25/25 1700   Patient Assessment/Suction   Level of Consciousness (AVPU) alert   Respiratory Effort Normal;Unlabored   Expansion/Accessory Muscles/Retractions no use of accessory muscles;no retractions;expansion symmetric   All Lung Fields Breath Sounds clear   Rhythm/Pattern, Respiratory unlabored   Cough Frequency no cough   PRE-TX-O2   Device (Oxygen Therapy) nasal cannula   Flow (L/min) (Oxygen Therapy) 2   SpO2 97 %   Pulse Oximetry Type Intermittent   $ Pulse Oximetry - Single Charge Pulse Oximetry - Single   Pulse 80   Resp 16   Tobacco Cessation Intervention   Do you use any type of tobacco product? No   $ Smoking Cessation Charges Smoking Cessation - Intermediate (Non-CTTS)   Respiratory Evaluation   $ Care Plan Tech Time 15 min   $ Respiratory Evaluation Complete   Evaluation For New Orders   Admitting Diagnosis pancratic mass   Cardiac Diagnosis hypertension   Pulmonary Diagnosis asthma   Home Oxygen   Has Home Oxygen? No   Home Aerosol, MDI, DPI, and Other Treatments/Therapies   Home Respiratory Therapy Per Patient/Review of Chart Yes   MDI Home Meds/Freq albuterol   Other Home Respiratory Therapies singulair   Oxygen Care Plan   Oxygen Care Plan Per Protocol   SPO2 Goal (%) 92% non-cardiac   Rationale SpO2 is <92% (Non-cardiac Pt.)   Bronchodilator Care Plan   Bronchodilator Care Plan N/A   Rationale Maintain home respiratory medicine   Atelectasis Care Plan   Rationale No Rational Found   Airway Clearance Care Plan   Rationale No rationale found

## 2025-04-25 NOTE — ANESTHESIA POSTPROCEDURE EVALUATION
Anesthesia Post Evaluation    Patient: Sonia Muse    Procedure(s) Performed: Procedure(s) (LRB):  ERCP (ENDOSCOPIC RETROGRADE CHOLANGIOPANCREATOGRAPHY) (N/A)    Final Anesthesia Type: general      Patient location during evaluation: PACU  Patient participation: Yes- Able to Participate  Level of consciousness: awake and alert  Post-procedure vital signs: reviewed and stable  Pain management: adequate  Airway patency: patent  NICOLAS mitigation strategies: Extubation while patient is awake, Multimodal analgesia and Extubation and recovery carried out in lateral, semiupright, or other nonsupine position  PONV status at discharge: No PONV  Anesthetic complications: no      Cardiovascular status: stable  Respiratory status: unassisted and spontaneous ventilation  Hydration status: euvolemic  Follow-up not needed.              Vitals Value Taken Time   /56 04/25/25 16:30   Temp 36.4 °C (97.6 °F) 04/25/25 15:38   Pulse 80 04/25/25 16:31   Resp 15 04/25/25 16:31   SpO2 94 % 04/25/25 16:31   Vitals shown include unfiled device data.      No case tracking events are documented in the log.      Pain/Lori Score: Pain Rating Prior to Med Admin: 7 (4/25/2025 12:51 AM)  Pain Rating Post Med Admin: 0 (4/25/2025  1:21 AM)  Lori Score: 9 (4/25/2025  4:30 PM)

## 2025-04-25 NOTE — [employer] PATIENT INSTRUCTIONS
Discharge Summary/Instructions after an Endoscopic Procedure  Patient Name: Sonia Muse  Patient MRN: [id number]  Patient YOB: 1960 Friday, April 25, 2025  Thuy Escobar MD  RESTRICTIONS:  During your procedure today, you received medications for sedation.  These   medications may affect your judgment, balance and coordination.  Therefore,   for 24 hours, you have the following restrictions:   - DO NOT drive a car, operate machinery, make legal/financial decisions,   sign important papers or drink alcohol.    ACTIVITY:  Today: no heavy lifting, straining or running due to procedural   sedation/anesthesia.  The following day: return to full activity including work.  DIET:  Eat and drink normally unless instructed otherwise.     TREATMENT FOR COMMON SIDE EFFECTS:  - Mild abdominal pain, nausea, belching, bloating or excessive gas:  rest,   eat lightly and use a heating pad.  - Sore Throat: treat with throat lozenges and/or gargle with warm salt   water.  - Because air was used during the procedure, expelling large amounts of air   from your rectum or belching is normal.  - If a bowel prep was taken, you may not have a bowel movement for 1-3 days.    This is normal.  SYMPTOMS TO WATCH FOR AND REPORT TO YOUR PHYSICIAN:  1. Abdominal pain or bloating, other than gas cramps.  2. Chest pain.  3. Back pain.  4. Signs of infection such as: chills or fever occurring within 24 hours   after the procedure.  5. Rectal bleeding, which would show as bright red, maroon, or black stools.   (A tablespoon of blood from the rectum is not serious, especially if   hemorrhoids are present.)  6. Vomiting.  7. Weakness or dizziness.  GO DIRECTLY TO THE NEAREST EMERGENCY ROOM IF YOU HAVE ANY OF THE FOLLOWING:      Difficulty breathing              Chills and/or fever over 101 F   Persistent vomiting and/or vomiting blood   Severe abdominal pain   Severe chest pain   Black, tarry stools   Bleeding- more than one  tablespoon   Any other symptom or condition that you feel may need urgent attention  Your doctor recommends these additional instructions:  If any biopsies were taken, your doctors clinic will contact you in 1 to 2   weeks with any results.  - Avoid aspirin and nonsteroidal anti-inflammatory medicines for 2 weeks.   - Perform an upper endoscopic ultrasound (UEUS) in 4 days.   - [facility].   - Resume regular diet today.  For questions, problems or results please call your physician - Thuy Escobar MD at Work:  [phone].  [facility], EMERGENCY ROOM PHONE NUMBER: [phone]  IF A COMPLICATION OR EMERGENCY SITUATION ARISES AND YOU ARE UNABLE TO REACH   YOUR PHYSICIAN - GO DIRECTLY TO THE EMERGENCY ROOM.  Thuy Escobar MD  4/25/2025 3:41:14 PM  This report has been verified and signed electronically.  Dear patient,  As a result of recent federal legislation (The Federal Cures Act), you may   receive lab or pathology results from your procedure in your [facility]   account before your physician is able to contact you. Your physician or   their representative will relay the results to you with their   recommendations at their soonest availability.  Thank you,  [employer]

## 2025-04-25 NOTE — CONSULTS
GASTROENTEROLOGY INPATIENT CONSULT NOTE  Patient Name: Sonia Muse  Patient MRN: [id number]  Patient : 1960    Admit Date: 2025  Service date: 2025    Reason for Consult: obstructive jaundice    PCP: Nasra Galvez FNP    Chief Complaint   Patient presents with    Weakness     Pt c/o weakness, yellowing, and over 4000 sugar in her urine.        HPI: Patient is 64 year old female with comorbid conditions of hypertension, asthma, degenerative disc disease, anxiety/depression, fibromyalgia, neuromuscular disorder presented to the ED due to progressive generalized weakness for a few days.    Reports prolonged illness 3 weeks ago (COVID?) and had coughing bouts and fatigue since.  Reports intermittent crampy abdominal pain which she attributed to the cough.  Noticed yesterday that her eyes were yellow colored and stool was light.  Associated with generalized itching without a rash.  Denies fevers, blood in stool, dysuria, frequency, numbness/weakness, orthopnea, headache, visual changes, pedal edema.    In ED patient has icterus, RUQ tenderness, appeared jaundiced.  Labs revealed T Bili of 9.2, transaminitis, MIGUEL ANGEL with Cr 1.6. US abdomen revealed 5.0x3.7cm pancreatic head mass, biliary ductal dilation to 4mm however CT abdomen was negative.  GI was notified (Dr. Escobar) - recommended MRI pancreatic protocol and possible ERCP tomorrow.  Surgery team notified (Dr. Nunn)       CHART REVIEW:  MRCP 2025 Large (4.3 cm) pancreatic head mass is highly suspicious for primary pancreatic malignancy, and causes obstruction of the common bile duct and pancreatic duct.  Consider ERCP for further evaluation.     Large mass within the superior aspect of the spleen measuring greater than 9 cm.  This is incompletely assessed without contrast, however splenic metastatic disease is possible.  Further evaluation with contrast enhanced CT/MRI or PET-CT is recommended.     Enlarged peripancreatic lymph  nodes concerning for metastasis.   US 2025 Pancreatic neoplasm until proven otherwise.  Recommend dedicated MRI pancreas mass protocol   CTAP 2025   No prior colonoscopy    Past Medical History:  Past Medical History:   Diagnosis Date    Allergy     Anxiety     Asthma     DDD (degenerative disc disease), cervical     Depression     Fibromyalgia     Hypertension     Neuromuscular disorder     PONV (postoperative nausea and vomiting)         Past Surgical History:  Past Surgical History:   Procedure Laterality Date    ADENOIDECTOMY      carpel tunnel Right      SECTION      JOINT REPLACEMENT Right 2016    knee    KNEE ARTHROPLASTY Left 10/19/2020    Procedure: ARTHROPLASTY, KNEE;  Surgeon: Felipe Herring MD;  Location: [facility];  Service: Orthopedics;  Laterality: Left;  Louie Liz    knee replacement Right     ROTATOR CUFF REPAIR Right     TONSILLECTOMY      TUBAL LIGATION          Home Medications:  Prescriptions Prior to Admission[1]    Inpatient Medications:  Review of patient's allergies indicates:   Allergen Reactions    Erythromycin Swelling    Codeine Nausea And Vomiting     And migraine h/a    [place] Blisters       Social History:   Social History     Occupational History    Occupation: [employer]     Comment: primary sub for [employer]   Tobacco Use    Smoking status: Former     Current packs/day: 0.00     Average packs/day: 1 pack/day for 11.0 years (11.0 ttl pk-yrs)     Types: Cigarettes     Start date:      Quit date:      Years since quittin.3    Smokeless tobacco: Never   Substance and Sexual Activity    Alcohol use: Yes     Comment: occasional 2x/y    Drug use: Never    Sexual activity: Yes     Partners: Male       Family History:   Family History   Problem Relation Name Age of Onset    Diabetes Mother Libertad     Hypertension Mother Libertad     Heart disease Mother Libertad     Stroke Mother Libertad     Glaucoma Mother Libertad     Arthritis Mother Libertad     Diabetes  "Father Fredis     Hypertension Father Fredis     Heart disease Father Fredis     Hypertension Sister 1     Heart disease Sister 1     Graves' disease Daughter      Anxiety disorder Daughter         Review of Systems:  GENERAL: No fever, chills, weight loss  SKIN: No rashes, jaundice, pruritus  HEENT: No rhinorrhea, epistaxis, vision changes.  No trauma, tinnitus, lymphadenopathy or pharyngitis  CV: No chest pain, palpitations, edima or SULTANA  PULM: No cough or sputum production.  No wheezing.  GI: AS IN HPI  URINARY:  No hematuria, dysuria  MS: No change in muscle or joint pain, weakness, or ROM  Neuro: No focal neurologic changes  PSYCH: No change in mood or personality.  No suicidal ideation.  ENDOCRINE: No fatigue      OBJECTIVE:    Vitals:    04/25/25 0300 04/25/25 0450 04/25/25 0905 04/25/25 0911   BP: (!) 101/58 105/67 (!) 150/74    Pulse: 95 95 100 104   Resp: 18 16  (!) 26   Temp:    98.6 °F (37 °C)   TempSrc:    Oral   SpO2: (!) 90% (!) 93%  95%   Weight:       Height:         Physical Exam:    GEN: well-developed, well-nourished, awake and alert, non-toxic appearing adult  HEENT: PERRL, sclera anicteric, oral mucosa pink and moist without lesion  NECK: trachea midline; Good ROM  CV: regular rate and rhythm, no murmurs or gallops  RESP: clear to auscultation bilaterally, no wheezes, rhonci or rales  ABD: soft, non-tender, non-distended, normal bowel sounds  EXT: no swelling or edema, 2+ pulses distally  SKIN: no rashes or jaundice  PSYCH: normal affect    Labs:   Recent Labs   Lab 04/24/25  1636 04/25/25  0751   WBC 7.20 7.36   HGB 12.7 11.8*    361   MCV 85 85     No results for input(s): "IRON", "FERRITIN" in the last 168 hours.    Invalid input(s): "IRONSAT"  Recent Labs   Lab 04/24/25  1636 04/25/25  0751   * 131*  131*   K 4.9 3.5  3.5   BUN 25* 23  23   CREATININE 1.6* 1.3  1.3   ALBUMIN 4.0 3.5   * 228*   * 275*   ALKPHOS 962* 900*   INR 0.9  --             Radiology " Review:  Imaging Results               MRI MRCP Abdomen WO Contrast 3D WO Independent WS XPD (Final result)  Result time 04/25/25 07:33:59   Procedure changed from MRI Abdomen Pelvis Without Contrast (XPD)     Final result by Joshua Calle MD (04/25/25 07:33:59)                   Impression:      Large (4.3 cm) pancreatic head mass is highly suspicious for primary pancreatic malignancy, and causes obstruction of the common bile duct and pancreatic duct.  Consider ERCP for further evaluation.    Large mass within the superior aspect of the spleen measuring greater than 9 cm.  This is incompletely assessed without contrast, however splenic metastatic disease is possible.  Further evaluation with contrast enhanced CT/MRI or PET-CT is recommended.    Enlarged peripancreatic lymph nodes concerning for metastasis.    This report was flagged in Epic as abnormal.      Electronically signed by: Joshua Calle  Date:    04/25/2025  Time:    07:33               Narrative:    EXAMINATION:  MRI MRCP ABDOMEN WO CONTRAST 3D WO INDEPENDENT WS XPD    CLINICAL HISTORY:  Abdominal pain, acute, nonlocalized;    COMPARISON:  Abdominal sonogram and CT abdomen and pelvis 04/24/2025    FINDINGS:  Corresponding to findings on reference examinations, there is a large mass in the region of the pancreatic head/uncinate process which measures 4.3 x 4.2 cm (image 22 series 7).  Mass measures 3.7 cm craniocaudal (image 11 series 6) and is highly suspicious for pancreatic malignancy.  Mass causes obstruction or compresses the distal common bile duct and there is associated intrahepatic and extrahepatic bile duct dilation.  Extrahepatic bile duct measures up to 14 mm in diameter.  Gallbladder appears distended.  No focal hepatic lesion is evident, noting hepatic protocol CT or MRI would provide a more sensitive assessment.  Pancreatic duct is dilated, also likely obstructed by the aforementioned mass.  No peripancreatic inflammatory  stranding.    9.4 x 7.3 cm heterogeneous mass within the superior aspect of the spleen.    Limited imaging through the lower thorax is unremarkable.  No aggressive osseous lesion is evident.  Degenerative changes of the thoracolumbar spine.  No renal abnormality is visualized.    Enlarged peripancreatic lymph nodes, for reference measuring 21 x 17 mm (image 16 series 7) - suspicious for possible metastasis.                                       US Abdomen Limited (Final result)  Result time 04/24/25 21:10:08      Final result by Michael Arias MD (04/24/25 21:10:08)                   Impression:      Pancreatic neoplasm until proven otherwise.  Recommend dedicated MRI pancreas mass protocol      Electronically signed by: Michael Arias  Date:    04/24/2025  Time:    21:10               Narrative:    EXAMINATION:  US ABDOMEN LIMITED    CLINICAL HISTORY:  jaundice;    TECHNIQUE:  Limited ultrasound of the right upper quadrant of the abdomen (including pancreas, liver, gallbladder, common bile duct, and spleen) was performed.    COMPARISON:  None.    FINDINGS:  Heterogeneous mass in the region of the pancreatic head measures 5.0 x 3.7 cm and representing neoplasm until proven otherwise.  See same day CT.    Intrahepatic biliary ductal dilatation and extrahepatic biliary ductal dilatation noted.  Pancreatic ductal dilatation measures up to 4 mm    Distended gallbladder.    Enlarged and heterogeneous.                                        CT Chest Abdomen Pelvis Without Contrast (XPD) (Edited Result - FINAL)  Result time 04/24/25 21:07:04   Procedure changed from CT Abdomen Pelvis  Without Contrast     Addendum (preliminary) 1 of 1 by Michael Arias MD (04/24/25 21:07:04)                     Final result by Michael Arias MD (04/24/25 21:00:23)                   Impression:      No acute findings      Electronically signed by: Michael Arias  Date:    04/24/2025  Time:    21:00                Narrative:    EXAMINATION:  CT CHEST ABDOMEN PELVIS WITHOUT CONTRAST(XPD)    CLINICAL HISTORY:  Sepsis;    TECHNIQUE:  Low dose axial images, sagittal and coronal reformations were obtained from the thoracic inlet to the pubic symphysis .  Oral contrast was not administered.    COMPARISON:  None    FINDINGS:  Clear lungs.  No cardiomegaly or pericardial effusion.  Intact bones.    Soft tissues: Unremarkable.    Bones: No acute osseous abnormality.    Lower chest: Normal heart size. No pericardial effusion.    Lung Bases: Well aerated, without consolidation or pleural fluid.    Liver: Normal in size and attenuation, with no focal hepatic lesions.    Gallbladder: Mildly distended.  No calcified gallstones.    Bile Ducts: No evidence of dilated ducts.    Pancreas: No mass or peripancreatic fat stranding.    Spleen: Unremarkable.    Adrenals: Unremarkable.    Kidneys/ Ureters: Unremarkable.    Bladder: No evidence of wall thickening.    Reproductive organs: Unremarkable.    GI Tract/Mesentery: No evidence of bowel obstruction or inflammation.    Peritoneal Space: No ascites. No free air.    Lymphadenopathy: No significant adenopathy.    Vasculature: No significant atherosclerosis or aneurysm.                                       X-Ray Chest PA And Lateral (Final result)  Result time 04/24/25 18:07:39      Final result by Michael Arias MD (04/24/25 18:07:39)                   Impression:      As above      Electronically signed by: Michael Arias  Date:    04/24/2025  Time:    18:07               Narrative:    EXAMINATION:  XR CHEST PA AND LATERAL    CLINICAL HISTORY:  Generalized weakness;    TECHNIQUE:  PA and lateral views of the chest were performed.    COMPARISON:  10/05/2020    FINDINGS:  Left mid lung scarring or atelectasis.  No focal consolidation.  Normal heart size.                                          IMPRESSION / RECOMMENDATIONS:   Active Problem List with Overview Notes    Diagnosis  Date Noted    Jaundice 04/24/2025    MIGUEL ANGEL (acute kidney injury) 04/24/2025    Eczema 07/26/2021    Dependent edema 06/29/2021    Hyperglycemia 06/29/2021    Family history of elevated blood lipids 06/29/2021    Skin lesion of face 06/29/2021    Tachycardia 03/23/2021    Asthma 03/23/2021    Osteoarthritis of left knee 09/17/2020    Primary osteoarthritis of left knee 08/27/2020    Essential hypertension 08/19/2020    Chronic pain of left knee 08/19/2020    Depression with anxiety 08/19/2020    Fibromyalgia 08/19/2020    Vitamin D deficiency 08/19/2020    Vitamin B 12 deficiency 08/19/2020      64 year old female with comorbid conditions of hypertension, asthma, degenerative disc disease, anxiety/depression, fibromyalgia, neuromuscular disorder with acute onset obstructive jaundice with pancreatic head mass  Ca19-9, cea ordered  Ercp with interventions as warranted  Risks, benefits, alternatives discussed regarding upcoming procedure. Procedure with increased risks relative to standard endoscopy including but not limited to bleeding, pancreatitis, or bowel / bile duct injuries.      Thank you for this consult.    Thuy Escobar  4/25/2025  12:11 PM              [1] (Not in a hospital admission)

## 2025-04-25 NOTE — ASSESSMENT & PLAN NOTE
Denies history of diabetes, not on medications.  Patient has been hyperglycemic    POCT  Insulin low dose sliding scale

## 2025-04-25 NOTE — ED NOTES
CM AT BS. POC UPDATE BY PHONE WITH SPOUSE.  DAILY HYGIENE AND GOWN CHANGE.  NO WCALM AND COMFORTABLE.

## 2025-04-25 NOTE — ANESTHESIA PREPROCEDURE EVALUATION
2025  Sonia Muse is a 64 y.o., female.      Problem List[1]    Past Surgical History:   Procedure Laterality Date    ADENOIDECTOMY      carpel tunnel Right      SECTION      JOINT REPLACEMENT Right     knee    KNEE ARTHROPLASTY Left 10/19/2020    Procedure: ARTHROPLASTY, KNEE;  Surgeon: Felipe Herring MD;  Location: [facility];  Service: Orthopedics;  Laterality: Left;  Louie Liz    knee replacement Right     ROTATOR CUFF REPAIR Right     TONSILLECTOMY      TUBAL LIGATION          Tobacco Use:  The patient  reports that she quit smoking about 41 years ago. She started smoking about 52 years ago. She has a 11 pack-year smoking history. She has never used smokeless tobacco.     Results for orders placed or performed during the hospital encounter of 25   EKG 12-lead    Collection Time: 25  5:03 PM   Result Value Ref Range    QRS Duration 96 ms    [facility] QTC Calculation 445 ms    Narrative    Test Reason : Z13.6,    Vent. Rate :  86 BPM     Atrial Rate :  86 BPM     P-R Int : 158 ms          QRS Dur :  96 ms      QT Int : 372 ms       P-R-T Axes :  49 -61  53 degrees    QTcB Int : 445 ms    Normal sinus rhythm  Possible Left atrial enlargement  Left anterior fascicular block  Minimal voltage criteria for LVH, may be normal variant ( [employer] product )  Nonspecific T wave abnormality  Abnormal ECG  When compared with ECG of 2025 14:21,  T wave inversion more evident in Anterior leads    Referred By: AAAREFERRAL SELF           Confirmed By:         Imaging Results               MRI MRCP Abdomen WO Contrast 3D WO Independent WS XPD (Final result)  Result time 25 07:33:59   Procedure changed from MRI Abdomen Pelvis Without Contrast (XPD)     Final result by Joshua Calle MD (25 07:33:59)                   Impression:      Large (4.3 cm) pancreatic head mass  is highly suspicious for primary pancreatic malignancy, and causes obstruction of the common bile duct and pancreatic duct.  Consider ERCP for further evaluation.    Large mass within the superior aspect of the spleen measuring greater than 9 cm.  This is incompletely assessed without contrast, however splenic metastatic disease is possible.  Further evaluation with contrast enhanced CT/MRI or PET-CT is recommended.    Enlarged peripancreatic lymph nodes concerning for metastasis.    This report was flagged in Epic as abnormal.      Electronically signed by: Joshua Calle  Date:    04/25/2025  Time:    07:33               Narrative:    EXAMINATION:  MRI MRCP ABDOMEN WO CONTRAST 3D WO INDEPENDENT WS XPD    CLINICAL HISTORY:  Abdominal pain, acute, nonlocalized;    COMPARISON:  Abdominal sonogram and CT abdomen and pelvis 04/24/2025    FINDINGS:  Corresponding to findings on reference examinations, there is a large mass in the region of the pancreatic head/uncinate process which measures 4.3 x 4.2 cm (image 22 series 7).  Mass measures 3.7 cm craniocaudal (image 11 series 6) and is highly suspicious for pancreatic malignancy.  Mass causes obstruction or compresses the distal common bile duct and there is associated intrahepatic and extrahepatic bile duct dilation.  Extrahepatic bile duct measures up to 14 mm in diameter.  Gallbladder appears distended.  No focal hepatic lesion is evident, noting hepatic protocol CT or MRI would provide a more sensitive assessment.  Pancreatic duct is dilated, also likely obstructed by the aforementioned mass.  No peripancreatic inflammatory stranding.    9.4 x 7.3 cm heterogeneous mass within the superior aspect of the spleen.    Limited imaging through the lower thorax is unremarkable.  No aggressive osseous lesion is evident.  Degenerative changes of the thoracolumbar spine.  No renal abnormality is visualized.    Enlarged peripancreatic lymph nodes, for reference measuring 21 x 17  mm (image 16 series 7) - suspicious for possible metastasis.                                       US Abdomen Limited (Final result)  Result time 04/24/25 21:10:08      Final result by Michael Airas MD (04/24/25 21:10:08)                   Impression:      Pancreatic neoplasm until proven otherwise.  Recommend dedicated MRI pancreas mass protocol      Electronically signed by: Michael Arias  Date:    04/24/2025  Time:    21:10               Narrative:    EXAMINATION:  US ABDOMEN LIMITED    CLINICAL HISTORY:  jaundice;    TECHNIQUE:  Limited ultrasound of the right upper quadrant of the abdomen (including pancreas, liver, gallbladder, common bile duct, and spleen) was performed.    COMPARISON:  None.    FINDINGS:  Heterogeneous mass in the region of the pancreatic head measures 5.0 x 3.7 cm and representing neoplasm until proven otherwise.  See same day CT.    Intrahepatic biliary ductal dilatation and extrahepatic biliary ductal dilatation noted.  Pancreatic ductal dilatation measures up to 4 mm    Distended gallbladder.    Enlarged and heterogeneous.                                        CT Chest Abdomen Pelvis Without Contrast (XPD) (Edited Result - FINAL)  Result time 04/24/25 21:07:04   Procedure changed from CT Abdomen Pelvis  Without Contrast     Addendum (preliminary) 1 of 1 by Michael Arias MD (04/24/25 21:07:04)                     Final result by Michael Arias MD (04/24/25 21:00:23)                   Impression:      No acute findings      Electronically signed by: Michael Arias  Date:    04/24/2025  Time:    21:00               Narrative:    EXAMINATION:  CT CHEST ABDOMEN PELVIS WITHOUT CONTRAST(XPD)    CLINICAL HISTORY:  Sepsis;    TECHNIQUE:  Low dose axial images, sagittal and coronal reformations were obtained from the thoracic inlet to the pubic symphysis .  Oral contrast was not administered.    COMPARISON:  None    FINDINGS:  Clear lungs.  No cardiomegaly or  pericardial effusion.  Intact bones.    Soft tissues: Unremarkable.    Bones: No acute osseous abnormality.    Lower chest: Normal heart size. No pericardial effusion.    Lung Bases: Well aerated, without consolidation or pleural fluid.    Liver: Normal in size and attenuation, with no focal hepatic lesions.    Gallbladder: Mildly distended.  No calcified gallstones.    Bile Ducts: No evidence of dilated ducts.    Pancreas: No mass or peripancreatic fat stranding.    Spleen: Unremarkable.    Adrenals: Unremarkable.    Kidneys/ Ureters: Unremarkable.    Bladder: No evidence of wall thickening.    Reproductive organs: Unremarkable.    GI Tract/Mesentery: No evidence of bowel obstruction or inflammation.    Peritoneal Space: No ascites. No free air.    Lymphadenopathy: No significant adenopathy.    Vasculature: No significant atherosclerosis or aneurysm.                                       X-Ray Chest PA And Lateral (Final result)  Result time 04/24/25 18:07:39      Final result by Michael Arias MD (04/24/25 18:07:39)                   Impression:      As above      Electronically signed by: Michael Arias  Date:    04/24/2025  Time:    18:07               Narrative:    EXAMINATION:  XR CHEST PA AND LATERAL    CLINICAL HISTORY:  Generalized weakness;    TECHNIQUE:  PA and lateral views of the chest were performed.    COMPARISON:  10/05/2020    FINDINGS:  Left mid lung scarring or atelectasis.  No focal consolidation.  Normal heart size.                                       Lab Results   Component Value Date    WBC 7.36 04/25/2025    HGB 11.8 (L) 04/25/2025    HCT 36.8 (L) 04/25/2025    MCV 85 04/25/2025     04/25/2025     BMP  Lab Results   Component Value Date     (L) 04/25/2025     (L) 04/25/2025    K 3.5 04/25/2025    K 3.5 04/25/2025     11/21/2024    CO2 25 04/25/2025    CO2 25 04/25/2025    BUN 23 04/25/2025    BUN 23 04/25/2025    CREATININE 1.3 04/25/2025    CREATININE  1.3 04/25/2025    CALCIUM 9.2 04/25/2025    CALCIUM 9.2 04/25/2025    ANIONGAP 11 01/24/2022     (H) 11/21/2024     (H) 05/25/2024     (H) 03/18/2023       Results for orders placed in visit on 12/03/20    Echo Color Flow Doppler? Yes    Interpretation Summary  · There is left ventricular concentric remodeling.  · The left ventricle is normal in size with normal systolic function. The estimated ejection fraction is 65%.  · Normal left ventricular diastolic function.  · Normal right ventricular size with normal right ventricular systolic function.  · Mild mitral regurgitation.  · Mild tricuspid regurgitation.  · Normal central venous pressure (3 mmHg).  · The estimated PA systolic pressure is 28 mmHg.          Pre-op Assessment    I have reviewed the Patient Summary Reports.     I have reviewed the Nursing Notes. I have reviewed the NPO Status.   I have reviewed the Medications.     Review of Systems  Anesthesia Hx:  No problems with previous Anesthesia             Denies Family Hx of Anesthesia complications.   Personal Hx of Anesthesia complications, Post-Operative Nausea/Vomiting, in the past, but not with recent anesthetics / prophylaxis                    Social:  Former Smoker       Hematology/Oncology:  Hematology Normal   Oncology Normal                                   EENT/Dental:  EENT/Dental Normal           Cardiovascular:     Hypertension              ECG has been reviewed.                      Hypertension         Pulmonary:    Asthma (albuterol as needed only) mild  Recent URI (COVID 2 months ago.  Now just occasional dry cough.), resolved  Patient strongly suspects sleep apnea although she has not been tested.  She reports that her snoring wakes her up at night.               Renal/:  Chronic Renal Disease                Hepatic/GI:        Pancreatic mass and biliary obstruction.  No recent nausea or vomiting.             Musculoskeletal:  Arthritis        Arthritis     Spine  Disorders: cervical Disc disease and Degenerative disease           Neurological:    Neuromuscular Disease,             Chronic Pain Syndrome Arthritis                         Neuromuscular Disease   Endocrine:  Endocrine Normal          Morbid Obesity / BMI > 40  Psych:  Psychiatric History anxiety                 Physical Exam  General: Cooperative, Alert and Oriented    Airway:  Mallampati: II / I  Mouth Opening: Normal  TM Distance: Normal  Tongue: Normal  Neck ROM: Normal ROM    Dental:  Intact    Chest/Lungs:  Clear to auscultation, Normal Respiratory Rate    Heart:  Rate: Normal  Rhythm: Regular Rhythm  Sounds: Normal        Anesthesia Plan  Type of Anesthesia, risks & benefits discussed:    Anesthesia Type: Gen ETT  Intra-op Monitoring Plan: Standard ASA Monitors  Post Op Pain Control Plan: multimodal analgesia  Induction:  IV  Airway Plan: Video  Informed Consent: Informed consent signed with the Patient and all parties understand the risks and agree with anesthesia plan.  All questions answered.   ASA Score: 3    Ready For Surgery From Anesthesia Perspective.     .           [1]   Patient Active Problem List  Diagnosis    Essential hypertension    Chronic pain of left knee    Depression with anxiety    Fibromyalgia    Vitamin D deficiency    Vitamin B 12 deficiency    Primary osteoarthritis of left knee    Osteoarthritis of left knee    Tachycardia    Asthma    Dependent edema    Hyperglycemia    Family history of elevated blood lipids    Skin lesion of face    Eczema    Jaundice    MIGUEL ANGEL (acute kidney injury)

## 2025-04-25 NOTE — ASSESSMENT & PLAN NOTE
Patient's blood pressure range in the last 24 hours was: BP  Min: 88/55  Max: 150/74.The patient's inpatient anti-hypertensive regimen is listed below:  Current Antihypertensives  metoprolol succinate (TOPROL-XL) 24 hr tablet 25 mg, Daily, Oral    Plan  -for MIGUEL ANGEL, held home nephrotoxic blood pressure meds

## 2025-04-25 NOTE — ASSESSMENT & PLAN NOTE
Patient is jaundiced with hyperbilirubinemia.  Ultrasound showed possible pancreatic mass and obstruction however CT imaging was negative.      Obtain MRI pancreatic protocol  GI consult  Surgery consult  Keep NPO at midnight for possible ERCP tomorrow  Pain control PRN   IV zosyn   Follow blood cultures  Trend LFTs, bilirubin

## 2025-04-25 NOTE — HOSPITAL COURSE
64 y.o. female with a history of hypertension, asthma, depression came in with jaundice of skin and eyes, dark urine, malaise which she associated with recent COVID. MRCP showed pancreatic mass highly concerning for malignancy with biliary obstruction with concerns for splenic metastasis and peripancreatic lymph node involvement. Surgery and GI were consulted & plan for ERCP with biopsy and stent placement on 4/25. PET-CT recommended outpatient.  Palliative and heme oncology were consulted.  For MIGUEL ANGEL likely prerenal from dehydration , given fluid hydration and monitored closely and held nephrotoxic home blood pressure meds.  MIGUEL ANGEL resolved. Her symptoms significantly improved as well has jaundice following ERCP. Bilirubin down trended. Lipase was slight up, but patient is tolerating diet.     Discussed with oncology and GI. GI planning for EUS and biopsy 4/29 and rpt ERCP for stent exchange 5/1. Oncology will follow her up with preliminary biopsy report end of the week. All of the above was communicated with family. For her hyperglycemia with newly diagnosed DM, patient was started on Lantus and will DC with 15 units. Glucometer and supplies prescribed. Patient seen and examined and cleared for discharge. Multiple family members updated.

## 2025-04-25 NOTE — ED NOTES
Tearful.  Comforted.  Alert and oriented. No RD. MAEW. Non distended abdomen. Call light in reach. Skin WDI. MAEW.

## 2025-04-25 NOTE — ASSESSMENT & PLAN NOTE
MIGUEL ANGEL is likely due to dehydration. Baseline creatinine is 0.9. Most recent creatinine and eGFR are listed below.  Recent Labs     04/24/25  1636   CREATININE 1.6*   EGFRNORACEVR 36*      Plan  - MIGUEL ANGEL is being monitored  - Avoid nephrotoxins and renally dose meds for GFR listed above  - Monitor urine output, serial BMP, and adjust therapy as needed  - IV hydration - NS @ 75

## 2025-04-25 NOTE — PLAN OF CARE
[facility]  Initial Discharge Assessment       Primary Care Provider: Nasra Galvez FNP    Admission Diagnosis: Hyperglycemia [R73.9]    Admission Date: 4/24/2025  Expected Discharge Date: 4/27/2025    CM met with patient bedside. CM verified demographics, insurance, supports, and PCP.  Patient reported that she lives with spouse. CM assessed patient's needs. Patient is able to complete ADLs independently. Patient verified at home DME: none.  Patient verified No HH, No Dialysis, No Blood Thinners, and No Oxygen.     Patient verified pharmacy of choice: [facility] on [address]  Patient confirmed Josué Guadarrama [phone]  will be source of transportation at the time of discharge.     Transition of Care Barriers: None    Payor: MEDICAID / Plan: HEALTHY BLUE ([employer]) / Product Type: Managed Medicaid /     Extended Emergency Contact Information  Primary Emergency Contact: Josué Guadarrama   [place]  Mobile Phone: [phone]  Relation: Spouse  Secondary Emergency Contact: Cat Vasquez   [facility]  Home Phone: [phone]  Mobile Phone: [phone]  Relation: Friend    Discharge Plan A: Home with family  Discharge Plan B: Home      [facility] - [place] - [address] [facility]  [address] [facility]  [place] [address]  Phone: [phone] Fax: [phone]      Initial Assessment (most recent)       Adult Discharge Assessment - 04/25/25 1442          Discharge Assessment    Assessment Type Discharge Planning Assessment     Confirmed/corrected address, phone number and insurance Yes     Confirmed Demographics Correct on Facesheet     Source of Information patient     When was your last doctors appointment? --   February    Communicated DENZEL with patient/caregiver Yes     Reason For Admission Jaundice     People in Home spouse     Facility Arrived From: home     Do you expect to return to your current living situation? Yes     Do you have help at home  or someone to help you manage your care at home? Yes     Who are your caregiver(s) and their phone number(s)? Josué Muse [phone]     Prior to hospitilization cognitive status: Alert/Oriented;No Deficits     Current cognitive status: Alert/Oriented;No Deficits     Walking or Climbing Stairs Difficulty no     Dressing/Bathing Difficulty no     Equipment Currently Used at Home none     Readmission within 30 days? No     Patient currently being followed by outpatient case management? No     Do you currently have service(s) that help you manage your care at home? No     Do you take prescription medications? Yes     Do you have prescription coverage? Yes     Coverage Medicaid     Do you have any problems affording any of your prescribed medications? No     Is the patient taking medications as prescribed? yes     Who is going to help you get home at discharge? Josué Muse [phone]     How do you get to doctors appointments? car, drives self     Are you on dialysis? No     Do you take coumadin? No     Discharge Plan A Home with family     Discharge Plan B Home     DME Needed Upon Discharge  none     Discharge Plan discussed with: Patient     Transition of Care Barriers None

## 2025-04-25 NOTE — H&P
[facility] - Emergency Dept  Hospital Medicine  History & Physical    Patient Name: Sonia Muse  MRN: [id number]  Patient Class: IP- Inpatient  Admission Date: 2025  Attending Physician: Virginia Carrion MD   Primary Care Provider: Nasra Galvez FNP         Patient information was obtained from patient and ER records.     Subjective:     Principal Problem:Jaundice    Chief Complaint:   Chief Complaint   Patient presents with    Weakness     Pt c/o weakness, yellowing, and over 4000 sugar in her urine.         HPI: 64 year old female with comorbid conditions of hypertension, asthma, degenerative disc disease, anxiety/depression, fibromyalgia, neuromuscular disorder presented to the ED due to progressive generalized weakness for a few days.    Reports prolonged illness 3 weeks ago (COVID?) and had coughing bouts and fatigue since.  Reports intermittent crampy abdominal pain which she attributed to the cough.  Noticed yesterday that her eyes were yellow colored and stool was light.  Associated with generalized itching without a rash.  Denies fevers, blood in stool, dysuria, frequency, numbness/weakness, orthopnea, headache, visual changes, pedal edema.    In ED patient has icterus, RUQ tenderness, appeared jaundiced.  Labs revealed T Bili of 9.2, transaminitis, MIGUEL ANGEL with Cr 1.6. US abdomen revealed 5.0x3.7cm pancreatic head mass, biliary ductal dilation to 4mm however CT abdomen was negative.  GI was notified (Dr. Escobar) - recommended MRI pancreatic protocol and possible ERCP tomorrow.  Surgery team notified (Dr. Nunn)    Past Medical History:   Diagnosis Date    Allergy     Anxiety     Asthma     DDD (degenerative disc disease), cervical     Depression     Fibromyalgia     Hypertension 2005    Neuromuscular disorder     PONV (postoperative nausea and vomiting)        Past Surgical History:   Procedure Laterality Date    ADENOIDECTOMY      carpel tunnel Right      SECTION       JOINT REPLACEMENT Right 2016    knee    KNEE ARTHROPLASTY Left 10/19/2020    Procedure: ARTHROPLASTY, KNEE;  Surgeon: Felipe Herring MD;  Location: [facility];  Service: Orthopedics;  Laterality: Left;  Louie Liz    knee replacement Right     ROTATOR CUFF REPAIR Right     TONSILLECTOMY      TUBAL LIGATION         Review of patient's allergies indicates:   Allergen Reactions    Erythromycin Swelling    Codeine Nausea And Vomiting     And migraine h/a    Roberts Blisters       No current facility-administered medications on file prior to encounter.     Current Outpatient Medications on File Prior to Encounter   Medication Sig    albuterol (VENTOLIN HFA) 90 mcg/actuation inhaler Inhale 2 puffs into the lungs every 6 (six) hours as needed for Wheezing or Shortness of Breath. Rescue    ALPRAZolam (XANAX) 1 MG tablet Take 1 tablet (1 mg total) by mouth once daily. (Patient taking differently: Take 1 mg by mouth daily as needed for Anxiety.)    amLODIPine (NORVASC) 5 MG tablet Take 1 tablet (5 mg total) by mouth once daily.    cetirizine (ZYRTEC) 10 MG tablet TAKE 1 TABLET BY MOUTH ONCE DAILY (Patient taking differently: Take 10 mg by mouth every evening.)    DULoxetine (CYMBALTA) 60 MG capsule Take 1 capsule (60 mg total) by mouth once daily.    hydroCHLOROthiazide (HYDRODIURIL) 25 MG tablet TAKE one TABLET BY MOUTH ONCE DAILY (Patient taking differently: Take 25 mg by mouth once daily.)    levothyroxine (SYNTHROID) 25 MCG tablet Take 1 tablet (25 mcg total) by mouth before breakfast.    LIDOcaine-prilocaine (EMLA) cream Apply 1 g topically twice a week.    losartan (COZAAR) 100 MG tablet Take 1 tablet (100 mg total) by mouth once daily.    meloxicam (MOBIC) 15 MG tablet Take 1 tablet (15 mg total) by mouth once daily.    metoprolol succinate (TOPROL-XL) 25 MG 24 hr tablet Take 1 tablet (25 mg total) by mouth once daily.    metronidazole 0.75% (METROCREAM) 0.75 % Crea Apply 1 application  topically 2 (two) times daily.     montelukast (SINGULAIR) 10 mg tablet TAKE ONE TABLET BY MOUTH EVERY EVENING    mupirocin (BACTROBAN) 2 % ointment Apply 1 g topically 2 (two) times daily.     Family History       Problem Relation (Age of Onset)    Anxiety disorder Daughter    Arthritis Mother    Diabetes Mother, Father    Glaucoma Mother    Graves' disease Daughter    Heart disease Mother, Father, Sister    Hypertension Mother, Father, Sister    Stroke Mother          Tobacco Use    Smoking status: Former     Current packs/day: 0.00     Average packs/day: 1 pack/day for 11.0 years (11.0 ttl pk-yrs)     Types: Cigarettes     Start date:      Quit date:      Years since quittin.3    Smokeless tobacco: Never   Substance and Sexual Activity    Alcohol use: Yes     Comment: occasional 2x/y    Drug use: Never    Sexual activity: Yes     Partners: Male     Review of Systems   Constitutional:  Positive for fatigue. Negative for chills and fever.   HENT:  Negative for ear pain and sore throat.    Eyes:  Negative for visual disturbance.   Respiratory:  Negative for cough, shortness of breath and wheezing.    Cardiovascular:  Negative for chest pain, palpitations and leg swelling.   Gastrointestinal:  Positive for abdominal pain. Negative for blood in stool, constipation, diarrhea, nausea and vomiting.   Endocrine: Negative for polyuria.   Genitourinary:  Negative for dysuria and hematuria.   Musculoskeletal:  Negative for back pain and myalgias.   Skin:  Positive for color change. Negative for rash.   Neurological:  Negative for dizziness, seizures, speech difficulty, weakness, numbness and headaches.   Psychiatric/Behavioral:  Negative for confusion.      Objective:     Vital Signs (Most Recent):  Temp: 98 °F (36.7 °C) (25 1433)  Pulse: 89 (25 1433)  Resp: 18 (25 1433)  BP: 127/86 (25 1433)  SpO2: 95 % (25 1433) Vital Signs (24h Range):  Temp:  [97.7 °F (36.5 °C)-98 °F (36.7 °C)] 98 °F (36.7 °C)  Pulse:  [89-93]  "89  Resp:  [18] 18  SpO2:  [95 %-96 %] 95 %  BP: (120-127)/(76-86) 127/86     Weight: 120.7 kg (266 lb)  Body mass index is 45.66 kg/m².     Physical Exam  Constitutional:       General: She is not in acute distress.     Appearance: She is obese.   HENT:      Head: Normocephalic and atraumatic.      Nose: No congestion or rhinorrhea.      Mouth/Throat:      Mouth: Mucous membranes are moist.      Pharynx: No oropharyngeal exudate or posterior oropharyngeal erythema.   Eyes:      General: Scleral icterus present.      Extraocular Movements: Extraocular movements intact.      Pupils: Pupils are equal, round, and reactive to light.   Cardiovascular:      Rate and Rhythm: Normal rate and regular rhythm.      Pulses: Normal pulses.      Heart sounds: Normal heart sounds. No murmur heard.  Pulmonary:      Effort: Pulmonary effort is normal. No respiratory distress.      Breath sounds: Normal breath sounds. No wheezing.   Abdominal:      General: There is no distension.      Palpations: Abdomen is soft.      Tenderness: There is abdominal tenderness.      Comments: Tenderness to deep palpation in RUQ   Musculoskeletal:      Right lower leg: No edema.      Left lower leg: No edema.   Skin:     General: Skin is warm.      Coloration: Skin is jaundiced.      Findings: No erythema or rash.   Neurological:      General: No focal deficit present.      Mental Status: She is alert and oriented to person, place, and time.   Psychiatric:         Mood and Affect: Mood normal.         Behavior: Behavior normal.              CRANIAL NERVES     CN III, IV, VI   Pupils are equal, round, and reactive to light.       Significant Labs: All pertinent labs within the past 24 hours have been reviewed.  A1C:   Recent Labs   Lab 11/21/24  1049   HGBA1C 6.1*     ABGs: No results for input(s): "PH", "PCO2", "HCO3", "POCSATURATED", "BE", "TOTALHB", "COHB", "METHB", "O2HB", "POCFIO2", "PO2" in the last 48 hours.  Bilirubin:   Recent Labs   Lab " "04/24/25  1636   BILITOT 9.2*     Blood Culture: No results for input(s): "LABBLOO" in the last 48 hours.  BMP:   Recent Labs   Lab 04/24/25  1636   *   K 4.9   CO2 25   BUN 25*   CREATININE 1.6*   CALCIUM 9.7   MG 2.1     CBC:   Recent Labs   Lab 04/24/25  1636   WBC 7.20   HGB 12.7   HCT 39.7        CMP:   Recent Labs   Lab 04/24/25  1636   *   K 4.9   CO2 25   BUN 25*   CREATININE 1.6*   CALCIUM 9.7   ALBUMIN 4.0   BILITOT 9.2*   ALKPHOS 962*   *   *     Cardiac Markers:   Recent Labs   Lab 04/24/25  1636   BNP 19     Coagulation:   Recent Labs   Lab 04/24/25  1636   INR 0.9     Lactic Acid: No results for input(s): "LACTATE" in the last 48 hours.  Lipase:   Recent Labs   Lab 04/24/25  1636   LIPASE 175*     Lipid Panel: No results for input(s): "CHOL", "HDL", "LDLCALC", "TRIG", "CHOLHDL" in the last 48 hours.  Magnesium:   Recent Labs   Lab 04/24/25  1636   MG 2.1     Troponin: No results for input(s): "TROPONINI", "TROPONINIHS" in the last 48 hours.  TSH:   Recent Labs   Lab 04/24/25  1636   TSH 3.499     Urine Studies:   Recent Labs   Lab 04/24/25  1404 04/24/25  1636   APPEARANCEUA  --  Clear   PHUR 5.5  --    SPECGRAV  --  >=1.030*   PROTEINUA  --  Negative   BILIRUBINUA  --  1+*   UROBILINOGEN  --  Negative   LEUKOCYTESUR  --  Negative   RBCUA  --  1   WBCUA  --  1   BACTERIA  --  Rare       Significant Imaging: I have reviewed all pertinent imaging results/findings within the past 24 hours.  Assessment/Plan:     Assessment & Plan  Jaundice  Patient is jaundiced with hyperbilirubinemia.  Ultrasound showed possible pancreatic mass and obstruction however CT imaging was negative.      Obtain MRI pancreatic protocol  GI consult  Surgery consult  Keep NPO at midnight for possible ERCP tomorrow  Pain control PRN   IV zosyn   Follow blood cultures  Trend LFTs, bilirubin     MIGUEL ANGEL (acute kidney injury)  MIGUEL ANGEL is likely due to  dehydration . Baseline creatinine is  0.9 . Most recent " creatinine and eGFR are listed below.  Recent Labs     04/24/25  1636   CREATININE 1.6*   EGFRNORACEVR 36*      Plan  - MIGUEL ANGEL is  being monitored  - Avoid nephrotoxins and renally dose meds for GFR listed above  - Monitor urine output, serial BMP, and adjust therapy as needed  - IV hydration - NS @ 75  Essential hypertension  Patient's blood pressure range in the last 24 hours was: BP  Min: 120/76  Max: 127/86.The patient's inpatient anti-hypertensive regimen is listed below:  Current Antihypertensives  amLODIPine tablet 5 mg, Daily, Oral  hydroCHLOROthiazide tablet 25 mg, Daily, Oral  losartan tablet 100 mg, Daily, Oral  metoprolol succinate (TOPROL-XL) 24 hr tablet 25 mg, Daily, Oral    Plan  - BP is controlled, no changes needed to their regimen    Depression with anxiety  Patient has persistent depression which is unknown and is currently controlled. Will Continue anti-depressant medications. We will not consult psychiatry at this time. Patient does not display psychosis at this time. Continue to monitor closely and adjust plan of care as needed.      Fibromyalgia  Pain control PRN     Hyperglycemia  Denies history of diabetes, not on medications.  Patient has been hyperglycemic    POCT  Insulin low dose sliding scale      VTE Risk Mitigation (From admission, onward)           Ordered     enoxaparin injection 40 mg  Every 12 hours         04/24/25 2228     IP VTE HIGH RISK PATIENT  Once         04/24/25 2222     Place sequential compression device  Until discontinued         04/24/25 2222                               Pharmacist Renal Dose Adjustment Note    Sonia Muse is a 64 y.o. female being treated with the medication Enoxaparin.    Patient Data:    Vital Signs (Most Recent):  Temp: 98 °F (36.7 °C) (04/24/25 1433)  Pulse: 89 (04/24/25 1433)  Resp: 18 (04/24/25 1433)  BP: 127/86 (04/24/25 1433)  SpO2: 95 % (04/24/25 1433) Vital Signs (72h Range):  Temp:  [97.7 °F (36.5 °C)-98 °F (36.7 °C)]   Pulse:   [89-93]   Resp:  [18]   BP: (120-127)/(76-86)   SpO2:  [95 %-96 %]      Recent Labs   Lab 04/24/25  1636   CREATININE 1.6*     Serum creatinine: 1.6 mg/dL (H) 04/24/25 1636  Estimated creatinine clearance: 46.7 mL/min (A)    Enoxaparin 40 mg subq every 24 hour will be changed to Enoxaparin 40 mg subq twice daily due to BMI > 40.0 kg/m2.    Pharmacist's Name: Pricilla Fay  Pharmacist's Extension: [phone]      Mitch Mercedes MD  Department of Hospital Medicine  [facility] - Emergency Dept

## 2025-04-25 NOTE — PROGRESS NOTES
[facility] Medicine  Progress Note    Patient Name: Sonia Muse  MRN: [id number]  Patient Class: IP- Inpatient   Admission Date: 4/24/2025  Length of Stay: 1 days  Attending Physician: Concepción Gordon DO  Primary Care Provider: Nasra Galvez FNP        Subjective     Principal Problem:Pancreatic mass        HPI:  64 year old female with comorbid conditions of hypertension, asthma, degenerative disc disease, anxiety/depression, fibromyalgia, neuromuscular disorder presented to the ED due to progressive generalized weakness for a few days.    Reports prolonged illness 3 weeks ago (COVID?) and had coughing bouts and fatigue since.  Reports intermittent crampy abdominal pain which she attributed to the cough.  Noticed yesterday that her eyes were yellow colored and stool was light.  Associated with generalized itching without a rash.  Denies fevers, blood in stool, dysuria, frequency, numbness/weakness, orthopnea, headache, visual changes, pedal edema.    In ED patient has icterus, RUQ tenderness, appeared jaundiced.  Labs revealed T Bili of 9.2, transaminitis, MIGUEL ANGEL with Cr 1.6. US abdomen revealed 5.0x3.7cm pancreatic head mass, biliary ductal dilation to 4mm however CT abdomen was negative.  GI was notified (Dr. Escobar) - recommended MRI pancreatic protocol and possible ERCP tomorrow.  Surgery team notified (Dr. Nunn)    Overview/Hospital Course:   64 y.o. female with a history of hypertension, asthma, depression came in with jaundice of skin and eyes, dark urine, malaise which she associated with recent COVID. MRCP showed pancreatic mass highly concerning for malignancy with biliary obstruction with concerns for splenic metastasis and peripancreatic lymph node involvement. Surgery and GI were consulted & plan for ERCP with biopsy and stent placement on 4/25. PET-CT recommended outpatient.  Palliative and heme oncology were consulted.  For MIGUEL ANGEL likely prerenal from dehydration ,  given fluid hydration and monitored closely and held nephrotoxic home blood pressure meds.      Interval History:  Patient said she did not realize that she could have cancer.  She thought this is all from COVID.  For daily updates refer to hospital course    Review of Systems   All other systems reviewed and are negative.    Objective:     Vital Signs (Most Recent):  Temp: 97.6 °F (36.4 °C) (04/25/25 1538)  Pulse: 78 (04/25/25 1550)  Resp: 17 (04/25/25 1550)  BP: (!) 104/57 (04/25/25 1550)  SpO2: (!) 93 % (04/25/25 1550) Vital Signs (24h Range):  Temp:  [97.6 °F (36.4 °C)-98.6 °F (37 °C)] 97.6 °F (36.4 °C)  Pulse:  [] 78  Resp:  [16-26] 17  SpO2:  [90 %-97 %] 93 %  BP: ()/(51-84) 104/57     Weight: 120.7 kg (266 lb)  Body mass index is 45.66 kg/m².    Intake/Output Summary (Last 24 hours) at 4/25/2025 1618  Last data filed at 4/25/2025 1522  Gross per 24 hour   Intake 2541 ml   Output --   Net 2541 ml         Physical Exam  Constitutional:       Appearance: She is obese. She is ill-appearing.   Cardiovascular:      Rate and Rhythm: Normal rate.      Pulses: Normal pulses.   Pulmonary:      Breath sounds: No rales.   Abdominal:      General: There is distension.      Tenderness: There is no abdominal tenderness.   Skin:     Coloration: Skin is jaundiced.   Neurological:      Mental Status: She is oriented to person, place, and time.               Significant Labs: All pertinent labs within the past 24 hours have been reviewed.    Significant Imaging: I have reviewed all pertinent imaging results/findings within the past 24 hours.      Assessment & Plan  Pancreatic mass  MRCP showed pancreatic mass highly concerning for malignancy with biliary obstruction with concerns for splenic metastasis and peripancreatic lymph node involvement.   Surgery and GI were consulted & plan for ERCP with biopsy and stent placement on 4/25.   PET-CT recommended outpatient.    Palliative and heme oncology were consulted.  MIGUEL ANGEL  (acute kidney injury)  MIGUEL ANGEL is likely due to  dehydration . Baseline creatinine is  0.9 . Most recent creatinine and eGFR are listed below.  Recent Labs     04/24/25  1636 04/25/25  0751   CREATININE 1.6* 1.3  1.3   EGFRNORACEVR 36* 47*  47*      Plan  - MIGUEL ANGEL is  being monitored  - Avoid nephrotoxins and renally dose meds for GFR listed above  - Monitor urine output, serial BMP, and adjust therapy as needed  - IV hydration - NS @ 75  Essential hypertension  Patient's blood pressure range in the last 24 hours was: BP  Min: 88/55  Max: 150/74.The patient's inpatient anti-hypertensive regimen is listed below:  Current Antihypertensives  metoprolol succinate (TOPROL-XL) 24 hr tablet 25 mg, Daily, Oral    Plan  -for MIGUEL ANGEL, held home nephrotoxic blood pressure meds    Depression with anxiety  Patient has persistent depression which is unknown and is currently controlled. Will Continue anti-depressant medications. We will not consult psychiatry at this time. Patient does not display psychosis at this time. Continue to monitor closely and adjust plan of care as needed.      Fibromyalgia  Pain control PRN     Hyperglycemia  Likely from pancreatic cancer  Placed on SSI        VTE Risk Mitigation (From admission, onward)           Ordered     enoxaparin injection 40 mg  Every 12 hours         04/24/25 2228     IP VTE HIGH RISK PATIENT  Once         04/24/25 2222     Place sequential compression device  Until discontinued         04/24/25 2222                    Discharge Planning   DENZEL: 4/27/2025     Code Status: Full Code   Medical Readiness for Discharge Date:   Discharge Plan A: Home with family                        Concepción Gordon DO  Department of Hospital Medicine   [facility]

## 2025-04-26 LAB
ABSOLUTE EOSINOPHIL (SMH): 0.22 K/UL
ABSOLUTE MONOCYTE (SMH): 0.62 K/UL (ref 0.3–1)
ABSOLUTE NEUTROPHIL COUNT (SMH): 4.1 K/UL (ref 1.8–7.7)
ALBUMIN SERPL-MCNC: 3.3 G/DL (ref 3.5–5.2)
ALP SERPL-CCNC: 800 UNIT/L (ref 55–135)
ALT SERPL-CCNC: 197 UNIT/L (ref 10–44)
ANION GAP (SMH): 8 MMOL/L (ref 8–16)
AST SERPL-CCNC: 79 UNIT/L (ref 10–40)
BASOPHILS # BLD AUTO: 0.06 K/UL
BASOPHILS NFR BLD AUTO: 1 %
BILIRUB DIRECT SERPL-MCNC: 1.8 MG/DL (ref 0.1–0.3)
BILIRUB SERPL-MCNC: 3.6 MG/DL (ref 0.1–1)
BUN SERPL-MCNC: 25 MG/DL (ref 8–23)
C-REACTIVE PROTEIN (SMH): 2.4 MG/DL
CALCIUM SERPL-MCNC: 8.6 MG/DL (ref 8.7–10.5)
CHLORIDE SERPL-SCNC: 102 MMOL/L (ref 95–110)
CO2 SERPL-SCNC: 24 MMOL/L (ref 23–29)
CREAT SERPL-MCNC: 1.2 MG/DL (ref 0.5–1.4)
EAG (SMH): 243 MG/DL (ref 68–131)
ERYTHROCYTE [DISTWIDTH] IN BLOOD BY AUTOMATED COUNT: 15.9 % (ref 11.5–14.5)
GFR SERPLBLD CREATININE-BSD FMLA CKD-EPI: 51 ML/MIN/1.73/M2
GLUCOSE SERPL-MCNC: 304 MG/DL (ref 70–110)
HAV IGM SERPL QL IA: NEGATIVE
HBA1C MFR BLD: 10.1 % (ref 4.5–6.2)
HBV CORE IGM SERPL QL IA: NEGATIVE
HBV SURFACE AG SERPL QL IA: NEGATIVE
HCT VFR BLD AUTO: 36.4 % (ref 37–48.5)
HCV AB SERPL QL IA: NON REACTIVE
HCV AB SERPL QL IA: NORMAL
HGB BLD-MCNC: 11.2 GM/DL (ref 12–16)
IMM GRANULOCYTES # BLD AUTO: 0.09 K/UL (ref 0–0.04)
IMM GRANULOCYTES NFR BLD AUTO: 1.5 % (ref 0–0.5)
LIPASE SERPL-CCNC: 505 U/L (ref 4–60)
LYMPHOCYTES # BLD AUTO: 1.06 K/UL (ref 1–4.8)
MAGNESIUM SERPL-MCNC: 1.8 MG/DL (ref 1.6–2.6)
MCH RBC QN AUTO: 27.2 PG (ref 27–31)
MCHC RBC AUTO-ENTMCNC: 30.8 G/DL (ref 32–36)
MCV RBC AUTO: 88 FL (ref 82–98)
NUCLEATED RBC (/100WBC) (SMH): 0 /100 WBC
PHOSPHATE SERPL-MCNC: 3.1 MG/DL (ref 2.7–4.5)
PLATELET # BLD AUTO: 314 K/UL (ref 150–450)
PMV BLD AUTO: 10.5 FL (ref 9.2–12.9)
POCT GLUCOSE: 257 MG/DL (ref 70–110)
POCT GLUCOSE: 266 MG/DL (ref 70–110)
POCT GLUCOSE: 310 MG/DL (ref 70–110)
POCT GLUCOSE: 313 MG/DL (ref 70–110)
POCT GLUCOSE: 336 MG/DL (ref 70–110)
POTASSIUM SERPL-SCNC: 4 MMOL/L (ref 3.5–5.1)
PROT SERPL-MCNC: 6.7 GM/DL (ref 6–8.4)
RBC # BLD AUTO: 4.12 M/UL (ref 4–5.4)
RELATIVE EOSINOPHIL (SMH): 3.6 % (ref 0–8)
RELATIVE LYMPHOCYTE (SMH): 17.3 % (ref 18–48)
RELATIVE MONOCYTE (SMH): 10.1 % (ref 4–15)
RELATIVE NEUTROPHIL (SMH): 66.5 % (ref 38–73)
SODIUM SERPL-SCNC: 134 MMOL/L (ref 136–145)
WBC # BLD AUTO: 6.14 K/UL (ref 3.9–12.7)

## 2025-04-26 PROCEDURE — 25000003 PHARM REV CODE 250: Performed by: FAMILY MEDICINE

## 2025-04-26 PROCEDURE — 84100 ASSAY OF PHOSPHORUS: CPT | Performed by: STUDENT IN AN ORGANIZED HEALTH CARE EDUCATION/TRAINING PROGRAM

## 2025-04-26 PROCEDURE — 94761 N-INVAS EAR/PLS OXIMETRY MLT: CPT

## 2025-04-26 PROCEDURE — 99900031 HC PATIENT EDUCATION (STAT)

## 2025-04-26 PROCEDURE — 63600175 PHARM REV CODE 636 W HCPCS: Performed by: INTERNAL MEDICINE

## 2025-04-26 PROCEDURE — 36415 COLL VENOUS BLD VENIPUNCTURE: CPT | Performed by: STUDENT IN AN ORGANIZED HEALTH CARE EDUCATION/TRAINING PROGRAM

## 2025-04-26 PROCEDURE — 82248 BILIRUBIN DIRECT: CPT | Performed by: INTERNAL MEDICINE

## 2025-04-26 PROCEDURE — 63600175 PHARM REV CODE 636 W HCPCS: Performed by: STUDENT IN AN ORGANIZED HEALTH CARE EDUCATION/TRAINING PROGRAM

## 2025-04-26 PROCEDURE — 12000002 HC ACUTE/MED SURGE SEMI-PRIVATE ROOM

## 2025-04-26 PROCEDURE — 25000003 PHARM REV CODE 250: Performed by: STUDENT IN AN ORGANIZED HEALTH CARE EDUCATION/TRAINING PROGRAM

## 2025-04-26 PROCEDURE — 27100171 HC OXYGEN HIGH FLOW UP TO 24 HOURS

## 2025-04-26 PROCEDURE — 85025 COMPLETE CBC W/AUTO DIFF WBC: CPT | Performed by: STUDENT IN AN ORGANIZED HEALTH CARE EDUCATION/TRAINING PROGRAM

## 2025-04-26 PROCEDURE — 83735 ASSAY OF MAGNESIUM: CPT | Performed by: STUDENT IN AN ORGANIZED HEALTH CARE EDUCATION/TRAINING PROGRAM

## 2025-04-26 PROCEDURE — 99222 1ST HOSP IP/OBS MODERATE 55: CPT | Mod: ,,, | Performed by: INTERNAL MEDICINE

## 2025-04-26 PROCEDURE — 25000003 PHARM REV CODE 250: Performed by: INTERNAL MEDICINE

## 2025-04-26 PROCEDURE — 80048 BASIC METABOLIC PNL TOTAL CA: CPT | Performed by: STUDENT IN AN ORGANIZED HEALTH CARE EDUCATION/TRAINING PROGRAM

## 2025-04-26 PROCEDURE — 86140 C-REACTIVE PROTEIN: CPT | Performed by: INTERNAL MEDICINE

## 2025-04-26 PROCEDURE — 83690 ASSAY OF LIPASE: CPT | Performed by: INTERNAL MEDICINE

## 2025-04-26 RX ORDER — INSULIN GLARGINE 100 [IU]/ML
10 INJECTION, SOLUTION SUBCUTANEOUS NIGHTLY
Status: DISCONTINUED | OUTPATIENT
Start: 2025-04-26 | End: 2025-04-27 | Stop reason: HOSPADM

## 2025-04-26 RX ORDER — AMLODIPINE BESYLATE 5 MG/1
5 TABLET ORAL DAILY
Status: DISCONTINUED | OUTPATIENT
Start: 2025-04-26 | End: 2025-04-27 | Stop reason: HOSPADM

## 2025-04-26 RX ORDER — ALPRAZOLAM 0.5 MG/1
0.5 TABLET ORAL ONCE
Status: DISCONTINUED | OUTPATIENT
Start: 2025-04-27 | End: 2025-04-27 | Stop reason: HOSPADM

## 2025-04-26 RX ADMIN — DULOXETINE HYDROCHLORIDE 60 MG: 30 CAPSULE, DELAYED RELEASE ORAL at 08:04

## 2025-04-26 RX ADMIN — ENOXAPARIN SODIUM 40 MG: 40 INJECTION SUBCUTANEOUS at 08:04

## 2025-04-26 RX ADMIN — INSULIN ASPART 6 UNITS: 100 INJECTION, SOLUTION INTRAVENOUS; SUBCUTANEOUS at 05:04

## 2025-04-26 RX ADMIN — INSULIN ASPART 8 UNITS: 100 INJECTION, SOLUTION INTRAVENOUS; SUBCUTANEOUS at 01:04

## 2025-04-26 RX ADMIN — INSULIN ASPART 8 UNITS: 100 INJECTION, SOLUTION INTRAVENOUS; SUBCUTANEOUS at 08:04

## 2025-04-26 RX ADMIN — INSULIN GLARGINE 10 UNITS: 100 INJECTION, SOLUTION SUBCUTANEOUS at 09:04

## 2025-04-26 RX ADMIN — ENOXAPARIN SODIUM 40 MG: 40 INJECTION SUBCUTANEOUS at 09:04

## 2025-04-26 RX ADMIN — METOPROLOL SUCCINATE 25 MG: 25 TABLET, EXTENDED RELEASE ORAL at 08:04

## 2025-04-26 RX ADMIN — AMLODIPINE BESYLATE 5 MG: 5 TABLET ORAL at 09:04

## 2025-04-26 RX ADMIN — LEVOTHYROXINE SODIUM 25 MCG: 0.03 TABLET ORAL at 05:04

## 2025-04-26 RX ADMIN — INSULIN ASPART 4 UNITS: 100 INJECTION, SOLUTION INTRAVENOUS; SUBCUTANEOUS at 05:04

## 2025-04-26 RX ADMIN — MONTELUKAST 10 MG: 10 TABLET, FILM COATED ORAL at 09:04

## 2025-04-26 NOTE — SUBJECTIVE & OBJECTIVE
Interval History: Patient is feeling lot better. Lot of family member and friends at the bedside. Patient states she feels like she can go back to work. Abdominal pain today. No nausea or vomiting. Has pasty stools. Lipase went up slightly today. Discussed with oncology and GI.     Review of Systems  Objective:     Vital Signs (Most Recent):  Temp: 98.1 °F (36.7 °C) (04/26/25 1222)  Pulse: 87 (04/26/25 1222)  Resp: 18 (04/26/25 1222)  BP: 126/84 (04/26/25 1222)  SpO2: (!) 94 % (04/26/25 1222) Vital Signs (24h Range):  Temp:  [97.6 °F (36.4 °C)-98.8 °F (37.1 °C)] 98.1 °F (36.7 °C)  Pulse:  [74-89] 87  Resp:  [13-20] 18  SpO2:  [91 %-99 %] 94 %  BP: ()/(51-88) 126/84     Weight: 120.7 kg (266 lb)  Body mass index is 45.66 kg/m².    Intake/Output Summary (Last 24 hours) at 4/26/2025 1428  Last data filed at 4/26/2025 0923  Gross per 24 hour   Intake 1180 ml   Output 3 ml   Net 1177 ml         Physical Exam  Vitals and nursing note reviewed.   Constitutional:       General: She is not in acute distress.     Appearance: She is obese. She is not toxic-appearing.   HENT:      Head: Atraumatic.      Mouth/Throat:      Mouth: Mucous membranes are moist.      Pharynx: Oropharynx is clear.   Eyes:      General: No scleral icterus.     Conjunctiva/sclera: Conjunctivae normal.      Pupils: Pupils are equal, round, and reactive to light.   Cardiovascular:      Rate and Rhythm: Normal rate and regular rhythm.      Heart sounds: No murmur heard.  Pulmonary:      Effort: No respiratory distress.      Breath sounds: No wheezing, rhonchi or rales.   Abdominal:      General: Abdomen is flat. Bowel sounds are normal.      Palpations: Abdomen is soft.   Musculoskeletal:         General: No swelling or deformity.      Cervical back: No rigidity or tenderness.   Skin:     Coloration: Skin is jaundiced. Skin is not pale.      Findings: No bruising, erythema or rash.   Neurological:      General: No focal deficit present.      Mental  Status: She is alert and oriented to person, place, and time.      Cranial Nerves: No cranial nerve deficit.      Sensory: No sensory deficit.      Motor: No weakness.   Psychiatric:         Mood and Affect: Mood normal.         Behavior: Behavior normal.               Significant Labs: All pertinent labs within the past 24 hours have been reviewed.  CBC:   Recent Labs   Lab 04/24/25  1636 04/25/25  0751 04/26/25  0547   WBC 7.20 7.36 6.14   HGB 12.7 11.8* 11.2*   HCT 39.7 36.8* 36.4*    361 314     CMP:   Recent Labs   Lab 04/24/25  1636 04/25/25  0751 04/26/25  0547   * 131*  131* 134*   K 4.9 3.5  3.5 4.0   CO2 25 25  25 24   BUN 25* 23  23 25*   CREATININE 1.6* 1.3  1.3 1.2   CALCIUM 9.7 9.2  9.2 8.6*   ALBUMIN 4.0 3.5 3.3*   BILITOT 9.2* 10.9* 3.6*   ALKPHOS 962* 900* 800*   * 228* 79*   * 275* 197*       Significant Imaging: I have reviewed all pertinent imaging results/findings within the past 24 hours.

## 2025-04-26 NOTE — ASSESSMENT & PLAN NOTE
Patient's blood pressure range in the last 24 hours was: BP  Min: 88/55  Max: 166/68.The patient's inpatient anti-hypertensive regimen is listed below:  Current Antihypertensives  metoprolol succinate (TOPROL-XL) 24 hr tablet 25 mg, Daily, Oral  amLODIPine tablet 5 mg, Daily, Oral    Plan  -cont metoprolol, resume Norvasc. Cont to hold losartan

## 2025-04-26 NOTE — ASSESSMENT & PLAN NOTE
Newly diagnosed DM in a setting of pancreatic mass   Started Lantus 10 units today   Cont sliding scale

## 2025-04-26 NOTE — PLAN OF CARE
04/25/25 1955   Patient Assessment/Suction   Level of Consciousness (AVPU) alert   Respiratory Effort Normal;Unlabored   Expansion/Accessory Muscles/Retractions no use of accessory muscles   All Lung Fields Breath Sounds Anterior:;Lateral:;diminished   Rhythm/Pattern, Respiratory pattern regular   Cough Frequency no cough   PRE-TX-O2   Device (Oxygen Therapy) room air   SpO2 (!) 94 %   Pulse Oximetry Type Intermittent   $ Pulse Oximetry - Multiple Charge Pulse Oximetry - Multiple   Pulse 76   Resp 17   General Safety Checklist   Safety Promotion/Fall Prevention side rails raised   Education   $ Education Oxygen;15 min

## 2025-04-26 NOTE — PLAN OF CARE
04/26/25 0758   Patient Assessment/Suction   Level of Consciousness (AVPU) alert   Respiratory Effort Normal;Unlabored   Expansion/Accessory Muscles/Retractions no retractions;no use of accessory muscles   All Lung Fields Breath Sounds diminished   PRE-TX-O2   Device (Oxygen Therapy) nasal cannula   $ Is the patient on High Flow Oxygen? Yes   Flow (L/min) (Oxygen Therapy) 2   SpO2 97 %   Pulse Oximetry Type Intermittent   $ Pulse Oximetry - Multiple Charge Pulse Oximetry - Multiple   Pulse 74   Resp 18   Education   $ Education Oxygen;15 min

## 2025-04-26 NOTE — ASSESSMENT & PLAN NOTE
MIGUEL ANGEL is likely due to dehydration. Baseline creatinine is 0.9. Most recent creatinine and eGFR are listed below.  Recent Labs     04/24/25  1636 04/25/25  0751 04/26/25  0547   CREATININE 1.6* 1.3  1.3 1.2   EGFRNORACEVR 36* 47*  47* 51*      Plan  - Avoid nephrotoxins and renally dose meds for GFR listed above  - Monitor urine output, serial BMP, and adjust therapy as needed  - DC IVF. Encourage PO intake   - Cont to hold losartan

## 2025-04-26 NOTE — ASSESSMENT & PLAN NOTE
MRCP showed pancreatic mass highly concerning for malignancy with biliary obstruction with concerns for splenic metastasis and peripancreatic lymph node involvement.  Surgery and GI were consulted & plan for ERCP with biopsy and stent placement on 4/25.   PET-CT recommended outpatient.    Discussed with GI and oncology  Oncology will schedule outpatient appt with preliminary biopsy results end of the week   GI planning for outpatient EUS and biopsy 4/29 and ERCP 5/1 outpatient   Patient can be discharged home tomorrow once lipase down trending

## 2025-04-27 ENCOUNTER — NURSE TRIAGE (OUTPATIENT)
Dept: ADMINISTRATIVE | Facility: CLINIC | Age: 65
End: 2025-04-27
Payer: MEDICAID

## 2025-04-27 VITALS
BODY MASS INDEX: 45.41 KG/M2 | HEIGHT: 64 IN | OXYGEN SATURATION: 99 % | WEIGHT: 266 LBS | RESPIRATION RATE: 17 BRPM | HEART RATE: 90 BPM | DIASTOLIC BLOOD PRESSURE: 99 MMHG | TEMPERATURE: 99 F | SYSTOLIC BLOOD PRESSURE: 152 MMHG

## 2025-04-27 LAB
ABSOLUTE EOSINOPHIL (SMH): 0.26 K/UL
ABSOLUTE MONOCYTE (SMH): 0.49 K/UL (ref 0.3–1)
ABSOLUTE NEUTROPHIL COUNT (SMH): 3.8 K/UL (ref 1.8–7.7)
ALBUMIN SERPL-MCNC: 3.4 G/DL (ref 3.5–5.2)
ALP SERPL-CCNC: 591 UNIT/L (ref 55–135)
ALT SERPL-CCNC: 128 UNIT/L (ref 10–44)
ANION GAP (SMH): 8 MMOL/L (ref 8–16)
AST SERPL-CCNC: 32 UNIT/L (ref 10–40)
BASOPHILS # BLD AUTO: 0.04 K/UL
BASOPHILS NFR BLD AUTO: 0.7 %
BILIRUB DIRECT SERPL-MCNC: 1.3 MG/DL (ref 0.1–0.3)
BILIRUB SERPL-MCNC: 2.9 MG/DL (ref 0.1–1)
BUN SERPL-MCNC: 20 MG/DL (ref 8–23)
CALCIUM SERPL-MCNC: 8.8 MG/DL (ref 8.7–10.5)
CHLORIDE SERPL-SCNC: 100 MMOL/L (ref 95–110)
CO2 SERPL-SCNC: 26 MMOL/L (ref 23–29)
CREAT SERPL-MCNC: 0.9 MG/DL (ref 0.5–1.4)
ERYTHROCYTE [DISTWIDTH] IN BLOOD BY AUTOMATED COUNT: 15.5 % (ref 11.5–14.5)
GFR SERPLBLD CREATININE-BSD FMLA CKD-EPI: >60 ML/MIN/1.73/M2
GLUCOSE SERPL-MCNC: 267 MG/DL (ref 70–110)
HCT VFR BLD AUTO: 36.6 % (ref 37–48.5)
HGB BLD-MCNC: 11.4 GM/DL (ref 12–16)
IMM GRANULOCYTES # BLD AUTO: 0.06 K/UL (ref 0–0.04)
IMM GRANULOCYTES NFR BLD AUTO: 1 % (ref 0–0.5)
LYMPHOCYTES # BLD AUTO: 1.07 K/UL (ref 1–4.8)
MAGNESIUM SERPL-MCNC: 1.6 MG/DL (ref 1.6–2.6)
MCH RBC QN AUTO: 27.5 PG (ref 27–31)
MCHC RBC AUTO-ENTMCNC: 31.1 G/DL (ref 32–36)
MCV RBC AUTO: 88 FL (ref 82–98)
NUCLEATED RBC (/100WBC) (SMH): 0 /100 WBC
PHOSPHATE SERPL-MCNC: 2.8 MG/DL (ref 2.7–4.5)
PLATELET # BLD AUTO: 336 K/UL (ref 150–450)
PMV BLD AUTO: 10.4 FL (ref 9.2–12.9)
POCT GLUCOSE: 231 MG/DL (ref 70–110)
POCT GLUCOSE: 253 MG/DL (ref 70–110)
POCT GLUCOSE: 264 MG/DL (ref 70–110)
POCT GLUCOSE: 287 MG/DL (ref 70–110)
POTASSIUM SERPL-SCNC: 3.4 MMOL/L (ref 3.5–5.1)
PROT SERPL-MCNC: 6.9 GM/DL (ref 6–8.4)
RBC # BLD AUTO: 4.14 M/UL (ref 4–5.4)
RELATIVE EOSINOPHIL (SMH): 4.5 % (ref 0–8)
RELATIVE LYMPHOCYTE (SMH): 18.6 % (ref 18–48)
RELATIVE MONOCYTE (SMH): 8.5 % (ref 4–15)
RELATIVE NEUTROPHIL (SMH): 66.7 % (ref 38–73)
SODIUM SERPL-SCNC: 134 MMOL/L (ref 136–145)
WBC # BLD AUTO: 5.76 K/UL (ref 3.9–12.7)

## 2025-04-27 PROCEDURE — 63600175 PHARM REV CODE 636 W HCPCS: Performed by: STUDENT IN AN ORGANIZED HEALTH CARE EDUCATION/TRAINING PROGRAM

## 2025-04-27 PROCEDURE — 84100 ASSAY OF PHOSPHORUS: CPT | Performed by: STUDENT IN AN ORGANIZED HEALTH CARE EDUCATION/TRAINING PROGRAM

## 2025-04-27 PROCEDURE — 94760 N-INVAS EAR/PLS OXIMETRY 1: CPT

## 2025-04-27 PROCEDURE — 25000003 PHARM REV CODE 250: Performed by: STUDENT IN AN ORGANIZED HEALTH CARE EDUCATION/TRAINING PROGRAM

## 2025-04-27 PROCEDURE — 80048 BASIC METABOLIC PNL TOTAL CA: CPT | Performed by: STUDENT IN AN ORGANIZED HEALTH CARE EDUCATION/TRAINING PROGRAM

## 2025-04-27 PROCEDURE — 85025 COMPLETE CBC W/AUTO DIFF WBC: CPT | Performed by: STUDENT IN AN ORGANIZED HEALTH CARE EDUCATION/TRAINING PROGRAM

## 2025-04-27 PROCEDURE — 27000221 HC OXYGEN, UP TO 24 HOURS

## 2025-04-27 PROCEDURE — 36415 COLL VENOUS BLD VENIPUNCTURE: CPT | Performed by: STUDENT IN AN ORGANIZED HEALTH CARE EDUCATION/TRAINING PROGRAM

## 2025-04-27 PROCEDURE — 82248 BILIRUBIN DIRECT: CPT | Performed by: INTERNAL MEDICINE

## 2025-04-27 PROCEDURE — 83735 ASSAY OF MAGNESIUM: CPT | Performed by: STUDENT IN AN ORGANIZED HEALTH CARE EDUCATION/TRAINING PROGRAM

## 2025-04-27 PROCEDURE — 99233 SBSQ HOSP IP/OBS HIGH 50: CPT | Mod: ,,, | Performed by: INTERNAL MEDICINE

## 2025-04-27 PROCEDURE — 25000003 PHARM REV CODE 250: Performed by: INTERNAL MEDICINE

## 2025-04-27 PROCEDURE — 94761 N-INVAS EAR/PLS OXIMETRY MLT: CPT

## 2025-04-27 PROCEDURE — 25000003 PHARM REV CODE 250: Performed by: FAMILY MEDICINE

## 2025-04-27 RX ORDER — INSULIN GLARGINE 100 [IU]/ML
15 INJECTION, SOLUTION SUBCUTANEOUS NIGHTLY
Qty: 4.5 ML | Refills: 0 | Status: SHIPPED | OUTPATIENT
Start: 2025-04-27 | End: 2025-04-27

## 2025-04-27 RX ORDER — LANCETS
1 EACH MISCELLANEOUS 3 TIMES DAILY
Qty: 90 EACH | Refills: 0 | Status: SHIPPED | OUTPATIENT
Start: 2025-04-27

## 2025-04-27 RX ORDER — INSULIN GLARGINE 100 [IU]/ML
15 INJECTION, SOLUTION SUBCUTANEOUS NIGHTLY
Qty: 4.5 ML | Refills: 0 | Status: SHIPPED | OUTPATIENT
Start: 2025-04-27 | End: 2025-04-29 | Stop reason: SDUPTHER

## 2025-04-27 RX ORDER — INSULIN PUMP SYRINGE, 3 ML
EACH MISCELLANEOUS
Qty: 1 EACH | Refills: 0 | Status: SHIPPED | OUTPATIENT
Start: 2025-04-27 | End: 2026-04-27

## 2025-04-27 RX ADMIN — ENOXAPARIN SODIUM 40 MG: 40 INJECTION SUBCUTANEOUS at 09:04

## 2025-04-27 RX ADMIN — INSULIN ASPART 6 UNITS: 100 INJECTION, SOLUTION INTRAVENOUS; SUBCUTANEOUS at 09:04

## 2025-04-27 RX ADMIN — INSULIN ASPART 6 UNITS: 100 INJECTION, SOLUTION INTRAVENOUS; SUBCUTANEOUS at 01:04

## 2025-04-27 RX ADMIN — METOPROLOL SUCCINATE 25 MG: 25 TABLET, EXTENDED RELEASE ORAL at 09:04

## 2025-04-27 RX ADMIN — LEVOTHYROXINE SODIUM 25 MCG: 0.03 TABLET ORAL at 05:04

## 2025-04-27 RX ADMIN — AMLODIPINE BESYLATE 5 MG: 5 TABLET ORAL at 05:04

## 2025-04-27 RX ADMIN — DULOXETINE HYDROCHLORIDE 60 MG: 30 CAPSULE, DELAYED RELEASE ORAL at 09:04

## 2025-04-27 RX ADMIN — INSULIN ASPART 3 UNITS: 100 INJECTION, SOLUTION INTRAVENOUS; SUBCUTANEOUS at 05:04

## 2025-04-27 RX ADMIN — INSULIN ASPART 4 UNITS: 100 INJECTION, SOLUTION INTRAVENOUS; SUBCUTANEOUS at 05:04

## 2025-04-27 NOTE — DISCHARGE INSTRUCTIONS
Our goal at [facility] is to always give you outstanding care and exceptional service. You may receive a survey from [employer] by mail, text or e-mail in the next 24-48 hours asking about the care you received with us. The survey should only take 5-10 minutes to complete and is very important to us.     Your feedback provides us with a way to recognize our staff who work tirelessly to provide the best care! Also, your responses help us learn how to improve when your experience was below our aspiration of excellence. We are always looking for ways to improve your stay. We WILL use your feedback to continue making improvements to help us provide the highest quality care. We keep your personal information and feedback confidential. We appreciate your time completing this survey and can't wait to hear from you!!!    We look forward to your continued care with us! Thanks so much for choosing [facility] for your healthcare needs!

## 2025-04-27 NOTE — ASSESSMENT & PLAN NOTE
MRCP showed pancreatic mass highly concerning for malignancy with biliary obstruction with concerns for splenic metastasis and peripancreatic lymph node involvement.  Surgery and GI were consulted & plan for ERCP with biopsy and stent placement on 4/25.   PET-CT recommended outpatient.    Discussed with GI and oncology  Oncology will schedule outpatient appt with preliminary biopsy results end of the week   GI planning for outpatient EUS and biopsy 4/29 and ERCP 5/1 outpatient

## 2025-04-27 NOTE — PROGRESS NOTES
04/27/25 1146   Final Note   Assessment Type Final Discharge Note   Anticipated Discharge Disposition Home   What phone number can be called within the next 1-3 days to see how you are doing after discharge? [phone]   Hospital Resources/Appts/Education Provided Post-Acute resouces added to AVS;Appointments scheduled and added to AVS   Post-Acute Status   Discharge Delays None known at this time       ** Patient cleared for DC from CM standpoint**    Patient is to discharge to home/self care. Patient is to follow up with oncology, staff message sent to [facility] oncology for scheduling. Also, patient is to follow up with gastroenterology, per MD, GI staff will schedule patient FU and EUS biopsy for this Tuesday. Patient is to follow up with her PCP as well, patient educated to contact her PCP office to schedule an appointment, as CM unable to schedule appointment due to office closure on weekends. Patient/family will provide transportation home. No further DC needs.

## 2025-04-27 NOTE — PROGRESS NOTES
[facility]  Hematology/Oncology  Progress Note    Patient Name: Sonia Muse  Admission Date: 4/24/2025  Hospital Length of Stay: 3 days  Code Status: Full Code     Subjective:     Interval History: feeling better  HPI: 64 year old wf admitted with discolration of eyes past two days, abd pain, coughAssociated with generalized itching and weakness + fatigue  without a rash. Denies fevers, blood in stool, dysuria, frequency, numbness/weakness, orthopnea, headache, visual changes, pedal edema   comorbid conditions of hypertension, asthma, degenerative disc disease, anxiety/depression, fibromyalgia, neuromuscular disorder   In ED patient has icterus, RUQ tenderness, appeared jaundiced.  Labs revealed T Bili of 9.2, transaminitis, MIGUEL ANGEL with Cr 1.6. US abdomen revealed 5.0x3.7cm pancreatic head mass, biliary ductal dilation to 4mm however CT abdomen was negative   MRCP showed pancreatic mass highly concerning for malignancy with biliary obstruction with concerns for splenic metastasis and peripancreatic lymph node involvement. Surgery and GI were consulted & plan for ERCP with biopsy and stent placement on . PET-CT recommended outpatient.  Palliative and heme oncology were consulted.  Found to have MIGUEL ANGEL likely prerenal from dehydration which has resolved since admission Her symptoms significantly improved as well has jaundice following ERCP. Bilirubin down trended. Lipase was slightly up, but patient is tolerating diet.            Medications:  Continuous Infusions:  Scheduled Meds:   ALPRAZolam  0.5 mg Oral Once    amLODIPine  5 mg Oral Daily    DULoxetine  60 mg Oral Daily    enoxparin  40 mg Subcutaneous Q12H (prophylaxis, 0900/2100)    insulin glargine U-100  10 Units Subcutaneous QHS    levothyroxine  25 mcg Oral Before breakfast    metoprolol succinate  25 mg Oral Daily    montelukast  10 mg Oral QHS     PRN Meds:  Current Facility-Administered Medications:     dextrose 50%, 12.5 g, Intravenous,  PRN    dextrose 50%, 12.5 g, Intravenous, PRN    dextrose 50%, 25 g, Intravenous, PRN    dextrose 50%, 25 g, Intravenous, PRN    glucagon (human recombinant), 1 mg, Intramuscular, PRN    glucagon (human recombinant), 1 mg, Intramuscular, PRN    glucose, 16 g, Oral, PRN    glucose, 16 g, Oral, PRN    glucose, 24 g, Oral, PRN    glucose, 24 g, Oral, PRN    ibuprofen, 400 mg, Oral, Q6H PRN    insulin aspart U-100, 0-10 Units, Subcutaneous, QID (AC + HS) PRN    magnesium oxide, 800 mg, Oral, PRN    magnesium oxide, 800 mg, Oral, PRN    melatonin, 9 mg, Oral, Nightly PRN    morphine, 1 mg, Intravenous, Q6H PRN    naloxone, 0.02 mg, Intravenous, PRN    ondansetron, 4 mg, Intravenous, Q8H PRN    potassium bicarbonate, 35 mEq, Oral, PRN    potassium bicarbonate, 50 mEq, Oral, PRN    potassium bicarbonate, 60 mEq, Oral, PRN    potassium, sodium phosphates, 2 packet, Oral, PRN    potassium, sodium phosphates, 2 packet, Oral, PRN    potassium, sodium phosphates, 2 packet, Oral, PRN    senna-docusate, 1 tablet, Oral, BID PRN    sodium chloride 0.9%, 10 mL, Intravenous, PRN     Review of Systems  As above  Objective:     Vital Signs (Most Recent):  Temp: 98.7 °F (37.1 °C) (04/27/25 1620)  Pulse: 90 (04/27/25 1620)  Resp: 17 (04/27/25 1620)  BP: (!) 152/99 (04/27/25 1620)  SpO2: 99 % (04/27/25 1620) Vital Signs (24h Range):  Temp:  [97.5 °F (36.4 °C)-99 °F (37.2 °C)] 98.7 °F (37.1 °C)  Pulse:  [] 90  Resp:  [16-19] 17  SpO2:  [94 %-99 %] 99 %  BP: (109-153)/(70-99) 152/99     Weight: 120.7 kg (266 lb)  Body mass index is 45.66 kg/m².  Body surface area is 2.33 meters squared.      Intake/Output Summary (Last 24 hours) at 4/27/2025 1649  Last data filed at 4/27/2025 1257  Gross per 24 hour   Intake 480 ml   Output --   Net 480 ml       Physical Exam  GENERAL: appears well-built, well-nourished.  No anxiety, no agitation, and in no distress.  Patient is awake, alert, oriented and cooperative.  HEENT:  Showed no congestion.  Trachea is central +icterus no pallor noted no hoarseness. no obvious JVD   NECK:  Supple.  No JVD. No obvious cervical submental or supraclavicular adenopathy.  RS:the visualized portion of  Chest expands well. chest appears symmetric, no audible wheezes.  No dyspnea recognized  ABDOMEN:  abdomen appears undistended.  EXTREMITIES:  Without edema.  NEUROLOGICAL:  The patient is appropriate, higher functions are normal.  No  obvious neurological deficits.  normal judgement normal thought content  No confusion, no speech impediment. Cranial nerves are intact and show no deficit. No gross motor deficits noted   SKIN MUSCULOSKELETAL: no joint or skeletal deformity, no clubbing of nails.  No visible rash ecchymosis or petechiae   Significant Labs:   Lab Results   Component Value Date    WBC 5.76 04/27/2025    HGB 11.4 (L) 04/27/2025    HCT 36.6 (L) 04/27/2025    MCV 88 04/27/2025     04/27/2025      CMP  Sodium   Date Value Ref Range Status   04/27/2025 134 (L) 136 - 145 mmol/L Final     Potassium   Date Value Ref Range Status   04/27/2025 3.4 (L) 3.5 - 5.1 mmol/L Final     Chloride   Date Value Ref Range Status   11/21/2024 100 98 - 110 mmol/L Final     CO2   Date Value Ref Range Status   04/27/2025 26 23 - 29 mmol/L Final     Glucose   Date Value Ref Range Status   11/21/2024 161 (H) 65 - 99 mg/dL Final     Comment:                   Fasting reference interval     For someone without known diabetes, a glucose  value >125 mg/dL indicates that they may have  diabetes and this should be confirmed with a  follow-up test.          BUN   Date Value Ref Range Status   04/27/2025 20 8 - 23 mg/dL Final     Creatinine   Date Value Ref Range Status   04/27/2025 0.9 0.5 - 1.4 mg/dL Final     Calcium   Date Value Ref Range Status   04/27/2025 8.8 8.7 - 10.5 mg/dL Final     Total Protein   Date Value Ref Range Status   11/21/2024 6.9 6.1 - 8.1 g/dL Final     Albumin   Date Value Ref Range Status   04/27/2025 3.4 (L) 3.5 - 5.2  g/dL Final     Bilirubin Total   Date Value Ref Range Status   04/27/2025 2.9 (H) 0.1 - 1.0 mg/dL Final     Comment:     For infants and newborns, interpretation of results should be based   on gestational age, weight and in agreement with clinical   observations.    Premature Infant recommended reference ranges:   0-24 hours:  <8.0 mg/dL   24-48 hours: <12.0 mg/dL   3-5 days:    <15.0 mg/dL   6-29 days:   <15.0 mg/dL     ALP   Date Value Ref Range Status   04/27/2025 591 (H) 55 - 135 unit/L Final     AST   Date Value Ref Range Status   04/27/2025 32 10 - 40 unit/L Final     ALT   Date Value Ref Range Status   04/27/2025 128 (H) 10 - 44 unit/L Final     Anion Gap   Date Value Ref Range Status   01/24/2022 11 8 - 16 mmol/L Final     eGFR   Date Value Ref Range Status   04/27/2025 >60 >60 mL/min/1.73/m2 Final   11/21/2024 70 > OR = 60 mL/min/1.73m2 Final      Diagnostic Results:  Impression:     Large (4.3 cm) pancreatic head mass is highly suspicious for primary pancreatic malignancy, and causes obstruction of the common bile duct and pancreatic duct.  Consider ERCP for further evaluation.     Large mass within the superior aspect of the spleen measuring greater than 9 cm.  This is incompletely assessed without contrast, however splenic metastatic disease is possible.  Further evaluation with contrast enhanced CT/MRI or PET-CT is recommended.     Enlarged peripancreatic lymph nodes concerning for metastasis.     This report was flagged in Epic as abnormal.      Assessment/Plan:     Active Diagnoses:    Diagnosis Date Noted POA    PRINCIPAL PROBLEM:  Pancreatic mass [K86.89] 04/24/2025 Yes    MIGUEL ANGEL (acute kidney injury) [N17.9] 04/24/2025 Yes    Hyperglycemia [R73.9] 06/29/2021 Yes    Fibromyalgia [M79.7] 08/19/2020 Yes    Essential hypertension [I10] 08/19/2020 Yes    Depression with anxiety [F41.8] 08/19/2020 Yes      Problems Resolved During this Admission:     Pancreatic and splenic masses highly suspicious for  malignancy   Temp stent placed to relieve obstructin. Permanent and bx planned for by Dr Trinidad  This week   Ok for discharge per onc standpoint and I will follow her in clinic with path results  Good discussion today with pt and family members   Will need OP PET will arrange   Need port  Thank you for your consult.     Paty Calle MD  Hematology/Oncology  [facility]

## 2025-04-27 NOTE — ASSESSMENT & PLAN NOTE
Patient's blood pressure range in the last 24 hours was: BP  Min: 109/70  Max: 153/90.The patient's inpatient anti-hypertensive regimen is listed below:  Current Antihypertensives  metoprolol succinate (TOPROL-XL) 24 hr tablet 25 mg, Daily, Oral  amLODIPine tablet 5 mg, Daily, Oral    Plan  -cont metoprolol, resume Norvasc and losartan

## 2025-04-27 NOTE — CONSULTS
[facility]  Hematology/Oncology  Consult Note    Patient Name: Sonia Muse  MRN: [id number]  Admission Date: 4/24/2025  Hospital Length of Stay: 2 days  Code Status: Full Code   Attending Provider: Peter Garcia MD  Consulting Provider: Paty Calle MD  Primary Care Physician: Nasra Galvez FNP  Principal Problem:Pancreatic mass    Inpatient consult to Hematology/Oncology  Consult performed by: Paty Calle MD  Consult ordered by: Concepción Gordon DO        Subjective:     HPI: 64 year old wf admitted with discolration of eyes past two days, abd pain, coughAssociated with generalized itching and weakness + fatigue  without a rash. Denies fevers, blood in stool, dysuria, frequency, numbness/weakness, orthopnea, headache, visual changes, pedal edema   comorbid conditions of hypertension, asthma, degenerative disc disease, anxiety/depression, fibromyalgia, neuromuscular disorder   In ED patient has icterus, RUQ tenderness, appeared jaundiced.  Labs revealed T Bili of 9.2, transaminitis, MIGUEL ANGEL with Cr 1.6. US abdomen revealed 5.0x3.7cm pancreatic head mass, biliary ductal dilation to 4mm however CT abdomen was negative   MRCP showed pancreatic mass highly concerning for malignancy with biliary obstruction with concerns for splenic metastasis and peripancreatic lymph node involvement. Surgery and GI were consulted & plan for ERCP with biopsy and stent placement on . PET-CT recommended outpatient.  Palliative and heme oncology were consulted.  Found to have MIGUEL ANGEL likely prerenal from dehydration which has resolved since admission Her symptoms significantly improved as well has jaundice following ERCP. Bilirubin down trended. Lipase was slightly up, but patient is tolerating diet.        Medications:  Continuous Infusions:  Scheduled Meds:   amLODIPine  5 mg Oral Daily    DULoxetine  60 mg Oral Daily    enoxparin  40 mg Subcutaneous Q12H (prophylaxis, 0900/2100)    insulin glargine  U-100  10 Units Subcutaneous QHS    levothyroxine  25 mcg Oral Before breakfast    metoprolol succinate  25 mg Oral Daily    montelukast  10 mg Oral QHS     PRN Meds:  Current Facility-Administered Medications:     dextrose 50%, 12.5 g, Intravenous, PRN    dextrose 50%, 12.5 g, Intravenous, PRN    dextrose 50%, 25 g, Intravenous, PRN    dextrose 50%, 25 g, Intravenous, PRN    glucagon (human recombinant), 1 mg, Intramuscular, PRN    glucagon (human recombinant), 1 mg, Intramuscular, PRN    glucose, 16 g, Oral, PRN    glucose, 16 g, Oral, PRN    glucose, 24 g, Oral, PRN    glucose, 24 g, Oral, PRN    ibuprofen, 400 mg, Oral, Q6H PRN    insulin aspart U-100, 0-10 Units, Subcutaneous, QID (AC + HS) PRN    magnesium oxide, 800 mg, Oral, PRN    magnesium oxide, 800 mg, Oral, PRN    melatonin, 9 mg, Oral, Nightly PRN    morphine, 1 mg, Intravenous, Q6H PRN    naloxone, 0.02 mg, Intravenous, PRN    ondansetron, 4 mg, Intravenous, Q8H PRN    potassium bicarbonate, 35 mEq, Oral, PRN    potassium bicarbonate, 50 mEq, Oral, PRN    potassium bicarbonate, 60 mEq, Oral, PRN    potassium, sodium phosphates, 2 packet, Oral, PRN    potassium, sodium phosphates, 2 packet, Oral, PRN    potassium, sodium phosphates, 2 packet, Oral, PRN    senna-docusate, 1 tablet, Oral, BID PRN    sodium chloride 0.9%, 10 mL, Intravenous, PRN     Review of patient's allergies indicates:   Allergen Reactions    Erythromycin Swelling    Codeine Nausea And Vomiting     And migraine h/a    Marshall Blisters        Past Medical History:   Diagnosis Date    Allergy     Anxiety     Asthma     DDD (degenerative disc disease), cervical     Depression     Fibromyalgia     Hypertension     Neuromuscular disorder     PONV (postoperative nausea and vomiting)      Past Surgical History:   Procedure Laterality Date    ADENOIDECTOMY      carpel tunnel Right      SECTION      ERCP N/A 2025    Procedure: ERCP (ENDOSCOPIC RETROGRADE  CHOLANGIOPANCREATOGRAPHY);  Surgeon: Thuy Escobar MD;  Location: [facility] ENDO;  Service: Endoscopy;  Laterality: N/A;    JOINT REPLACEMENT Right 2016    knee    KNEE ARTHROPLASTY Left 10/19/2020    Procedure: ARTHROPLASTY, KNEE;  Surgeon: Felipe Herring MD;  Location: [facility] OR;  Service: Orthopedics;  Laterality: Left;  Louie Liz    knee replacement Right     ROTATOR CUFF REPAIR Right     TONSILLECTOMY      TUBAL LIGATION       Family History       Problem Relation (Age of Onset)    Anxiety disorder Daughter    Arthritis Mother    Diabetes Mother, Father    Glaucoma Mother    Graves' disease Daughter    Heart disease Mother, Father, Sister    Hypertension Mother, Father, Sister    Stroke Mother          Tobacco Use    Smoking status: Former     Current packs/day: 0.00     Average packs/day: 1 pack/day for 11.0 years (11.0 ttl pk-yrs)     Types: Cigarettes     Start date:      Quit date:      Years since quittin.3    Smokeless tobacco: Never   Substance and Sexual Activity    Alcohol use: Yes     Comment: occasional 2x/y    Drug use: Never    Sexual activity: Yes     Partners: Male       Review of Systems   As above  Objective:     Vital Signs (Most Recent):  Temp: 97.5 °F (36.4 °C) (25)  Pulse: 86 (25)  Resp: 19 (25)  BP: (!) 153/90 (25)  SpO2: 98 % (25) Vital Signs (24h Range):  Temp:  [97.5 °F (36.4 °C)-99 °F (37.2 °C)] 97.5 °F (36.4 °C)  Pulse:  [] 86  Resp:  [17-19] 19  SpO2:  [93 %-99 %] 98 %  BP: (105-153)/(68-90) 153/90     Weight: 120.7 kg (266 lb)  Body mass index is 45.66 kg/m².  Body surface area is 2.33 meters squared.      Intake/Output Summary (Last 24 hours) at 2025  Last data filed at 2025 0923  Gross per 24 hour   Intake 240 ml   Output 2 ml   Net 238 ml       Physical Exam  VITAL SIGNS:  as above   GENERAL: appears well-built, well-nourished.  No anxiety, no agitation, and in no distress.  Patient is  awake, alert, oriented and cooperative.  HEENT:  Showed no congestion. Trachea is central +icterus no pallor noted no hoarseness. no obvious JVD   NECK:  Supple.  No JVD. No obvious cervical submental or supraclavicular adenopathy.  RS:the visualized portion of  Chest expands well. chest appears symmetric, no audible wheezes.  No dyspnea recognized  ABDOMEN:  abdomen appears undistended.  EXTREMITIES:  Without edema.  NEUROLOGICAL:  The patient is appropriate, higher functions are normal.  No  obvious neurological deficits.  normal judgement normal thought content  No confusion, no speech impediment. Cranial nerves are intact and show no deficit. No gross motor deficits noted   SKIN MUSCULOSKELETAL: no joint or skeletal deformity, no clubbing of nails.  No visible rash ecchymosis or petechiae   Significant Labs:   Lab Results   Component Value Date    WBC 6.14 04/26/2025    HGB 11.2 (L) 04/26/2025    HCT 36.4 (L) 04/26/2025    MCV 88 04/26/2025     04/26/2025      CMP  Sodium   Date Value Ref Range Status   04/26/2025 134 (L) 136 - 145 mmol/L Final     Potassium   Date Value Ref Range Status   04/26/2025 4.0 3.5 - 5.1 mmol/L Final     Chloride   Date Value Ref Range Status   11/21/2024 100 98 - 110 mmol/L Final     CO2   Date Value Ref Range Status   04/26/2025 24 23 - 29 mmol/L Final     Glucose   Date Value Ref Range Status   11/21/2024 161 (H) 65 - 99 mg/dL Final     Comment:                   Fasting reference interval     For someone without known diabetes, a glucose  value >125 mg/dL indicates that they may have  diabetes and this should be confirmed with a  follow-up test.          BUN   Date Value Ref Range Status   04/26/2025 25 (H) 8 - 23 mg/dL Final     Creatinine   Date Value Ref Range Status   04/26/2025 1.2 0.5 - 1.4 mg/dL Final     Calcium   Date Value Ref Range Status   04/26/2025 8.6 (L) 8.7 - 10.5 mg/dL Final     Total Protein   Date Value Ref Range Status   11/21/2024 6.9 6.1 - 8.1 g/dL  Final     Albumin   Date Value Ref Range Status   04/26/2025 3.3 (L) 3.5 - 5.2 g/dL Final     Bilirubin Total   Date Value Ref Range Status   04/26/2025 3.6 (H) 0.1 - 1.0 mg/dL Final     Comment:     For infants and newborns, interpretation of results should be based   on gestational age, weight and in agreement with clinical   observations.    Premature Infant recommended reference ranges:   0-24 hours:  <8.0 mg/dL   24-48 hours: <12.0 mg/dL   3-5 days:    <15.0 mg/dL   6-29 days:   <15.0 mg/dL     ALP   Date Value Ref Range Status   04/26/2025 800 (H) 55 - 135 unit/L Final     AST   Date Value Ref Range Status   04/26/2025 79 (H) 10 - 40 unit/L Final     ALT   Date Value Ref Range Status   04/26/2025 197 (H) 10 - 44 unit/L Final     Anion Gap   Date Value Ref Range Status   01/24/2022 11 8 - 16 mmol/L Final     eGFR   Date Value Ref Range Status   04/26/2025 51 (L) >60 mL/min/1.73/m2 Final   11/21/2024 70 > OR = 60 mL/min/1.73m2 Final        Diagnostic Results:  Impression:     Large (4.3 cm) pancreatic head mass is highly suspicious for primary pancreatic malignancy, and causes obstruction of the common bile duct and pancreatic duct.  Consider ERCP for further evaluation.     Large mass within the superior aspect of the spleen measuring greater than 9 cm.  This is incompletely assessed without contrast, however splenic metastatic disease is possible.  Further evaluation with contrast enhanced CT/MRI or PET-CT is recommended.     Enlarged peripancreatic lymph nodes concerning for metastasis.     This report was flagged in Epic as abnormal.    Assessment/Plan:     Active Diagnoses:    Diagnosis Date Noted POA    PRINCIPAL PROBLEM:  Pancreatic mass [K86.89] 04/24/2025 Yes    MIGUEL ANGEL (acute kidney injury) [N17.9] 04/24/2025 Yes    Hyperglycemia [R73.9] 06/29/2021 Yes    Fibromyalgia [M79.7] 08/19/2020 Yes    Essential hypertension [I10] 08/19/2020 Yes    Depression with anxiety [F41.8] 08/19/2020 Yes      Problems  Resolved During this Admission:   Pancreatic and splenic masses highly suspicious for malignancy   Temp stent placed to relieve obstructin. Permanent and bx planned for by Dr Trinidad  This week   Ok for discharge per onc standpoint and I will follow her in clinic with path results  Good discussion today with pt and family members   Will need OP PET will arrange   Need port        Thank you for your consult.     Paty Calle MD  Hematology/Oncology  [facility]

## 2025-04-27 NOTE — ASSESSMENT & PLAN NOTE
Newly diagnosed DM in a setting of pancreatic mass   Started Lantus 10 units, increased to 15 units on discharge   Glucometer and supplies prescribed

## 2025-04-27 NOTE — DISCHARGE SUMMARY
[facility] Medicine  Discharge Summary      Patient Name: Sonia Muse  MRN: [id number]  JEREMIAS: [id number]  Patient Class: IP- Inpatient  Admission Date: 4/24/2025  Hospital Length of Stay: 3 days  Discharge Date and Time: 04/27/2025   Attending Physician: Peter Garcia MD   Discharging Provider: Peter Garcia MD  Primary Care Provider: Nasra Galvez FNP    Primary Care Team: Networked reference to record PCT     HPI:   64 year old female with comorbid conditions of hypertension, asthma, degenerative disc disease, anxiety/depression, fibromyalgia, neuromuscular disorder presented to the ED due to progressive generalized weakness for a few days.    Reports prolonged illness 3 weeks ago (COVID?) and had coughing bouts and fatigue since.  Reports intermittent crampy abdominal pain which she attributed to the cough.  Noticed yesterday that her eyes were yellow colored and stool was light.  Associated with generalized itching without a rash.  Denies fevers, blood in stool, dysuria, frequency, numbness/weakness, orthopnea, headache, visual changes, pedal edema.    In ED patient has icterus, RUQ tenderness, appeared jaundiced.  Labs revealed T Bili of 9.2, transaminitis, MIGUEL ANGEL with Cr 1.6. US abdomen revealed 5.0x3.7cm pancreatic head mass, biliary ductal dilation to 4mm however CT abdomen was negative.  GI was notified (Dr. Escobar) - recommended MRI pancreatic protocol and possible ERCP tomorrow.  Surgery team notified (Dr. Nunn)    Procedure(s) (LRB):  ERCP (ENDOSCOPIC RETROGRADE CHOLANGIOPANCREATOGRAPHY) (N/A)      Hospital Course:   64 y.o. female with a history of hypertension, asthma, depression came in with jaundice of skin and eyes, dark urine, malaise which she associated with recent COVID. MRCP showed pancreatic mass highly concerning for malignancy with biliary obstruction with concerns for splenic metastasis and peripancreatic lymph node involvement. Surgery  and GI were consulted & plan for ERCP with biopsy and stent placement on 4/25. PET-CT recommended outpatient.  Palliative and heme oncology were consulted.  For MIGUEL ANGEL likely prerenal from dehydration , given fluid hydration and monitored closely and held nephrotoxic home blood pressure meds.  MIGUEL ANGEL resolved. Her symptoms significantly improved as well has jaundice following ERCP. Bilirubin down trended. Lipase was slight up, but patient is tolerating diet.     Discussed with oncology and GI. GI planning for EUS and biopsy 4/29 and rpt ERCP for stent exchange 5/1. Oncology will follow her up with preliminary biopsy report end of the week. All of the above was communicated with family. For her hyperglycemia with newly diagnosed DM, patient was started on Lantus and will DC with 15 units. Glucometer and supplies prescribed. Patient seen and examined and cleared for discharge. Multiple family members updated.      Goals of Care Treatment Preferences:  Code Status: Full Code         Consults:   Consults (From admission, onward)          Status Ordering Provider     Inpatient consult to Hematology/Oncology  Once        Provider:  Paty Calle MD    Completed YULISA LIM     Inpatient consult to Palliative Care  Once        Provider:  Antonio Hines MD    Completed YULISA LIM     Inpatient consult to General Surgery  Once        Provider:  Jack Nunn Jr., MD    Completed MARIE TRINIDAD     Inpatient consult to Gastroenterology  Once        Provider:  Reji Burns MD    Completed MARIE TRINIDAD            Assessment & Plan  Pancreatic mass  MRCP showed pancreatic mass highly concerning for malignancy with biliary obstruction with concerns for splenic metastasis and peripancreatic lymph node involvement.  Surgery and GI were consulted & plan for ERCP with biopsy and stent placement on 4/25.   PET-CT recommended outpatient.    Discussed with GI and oncology  Oncology will schedule outpatient appt  with preliminary biopsy results end of the week   GI planning for outpatient EUS and biopsy 4/29 and ERCP 5/1 outpatient     MIGUEL ANGEL (acute kidney injury)  Resolved   MIGUEL ANGEL is likely due to  dehydration . Baseline creatinine is  0.9 . Most recent creatinine and eGFR are listed below.  Recent Labs     04/25/25  0751 04/26/25  0547 04/27/25  0608   CREATININE 1.3  1.3 1.2 0.9   EGFRNORACEVR 47*  47* 51* >60     Resume losartan   Essential hypertension  Patient's blood pressure range in the last 24 hours was: BP  Min: 109/70  Max: 153/90.The patient's inpatient anti-hypertensive regimen is listed below:  Current Antihypertensives  metoprolol succinate (TOPROL-XL) 24 hr tablet 25 mg, Daily, Oral  amLODIPine tablet 5 mg, Daily, Oral    Plan  -cont metoprolol, resume Norvasc and losartan     Depression with anxiety  Patient has persistent depression which is unknown and is currently controlled. Will Continue anti-depressant medications. We will not consult psychiatry at this time. Patient does not display psychosis at this time. Continue to monitor closely and adjust plan of care as needed.      Fibromyalgia  Pain control PRN     Hyperglycemia  Newly diagnosed DM in a setting of pancreatic mass   Started Lantus 10 units, increased to 15 units on discharge   Glucometer and supplies prescribed         Final Active Diagnoses:    Diagnosis Date Noted POA    PRINCIPAL PROBLEM:  Pancreatic mass [K86.89] 04/24/2025 Yes    MIGUEL ANGEL (acute kidney injury) [N17.9] 04/24/2025 Yes    Hyperglycemia [R73.9] 06/29/2021 Yes    Fibromyalgia [M79.7] 08/19/2020 Yes    Essential hypertension [I10] 08/19/2020 Yes    Depression with anxiety [F41.8] 08/19/2020 Yes      Problems Resolved During this Admission:       Discharged Condition: good    Disposition: Home or Self Care    Follow Up:   Follow-up Information       Nasra Galvez FNP Follow up.    Specialty: Family Medicine  Contact information:  [address]  [address]  [place]  [address]  [phone]               [facility] Follow up.    Specialty: Gastroenterology  Contact information:  [address] [facility] [address]             Paty Calle MD .    Specialties: Hematology and Oncology, Hematology, Oncology  Contact information:  [address]  [address]  [facility] [address]  [phone]                           Patient Instructions:   No discharge procedures on file.    Significant Diagnostic Studies: Labs: CMP   Recent Labs   Lab 04/26/25  0547 04/27/25  0608   * 134*   K 4.0 3.4*   CO2 24 26   BUN 25* 20   CREATININE 1.2 0.9   CALCIUM 8.6* 8.8   ALBUMIN 3.3*  --    BILITOT 3.6*  --    ALKPHOS 800*  --    AST 79*  --    *  --     and CBC   Recent Labs   Lab 04/26/25  0547 04/27/25  0608   WBC 6.14 5.76   HGB 11.2* 11.4*   HCT 36.4* 36.6*    336       Pending Diagnostic Studies:       Procedure Component Value Units Date/Time    Cytology Specimen-Medical Cytology (Fluid/Wash/Brush) [[id number]] Collected: 04/25/25 1548    Order Status: Sent Lab Status: No result     Specimen: Bile duct brush     Hepatic function panel [[id number]] Collected: 04/27/25 0608    Order Status: Sent Lab Status: In process Updated: 04/27/25 1207    Specimen: Blood            Medications:  Reconciled Home Medications:      Medication List        START taking these medications      blood sugar diagnostic Strp  1 each by Misc.(Non-Drug; Combo Route) route 3 (three) times daily.     blood-glucose meter kit  Use as instructed     insulin glargine U-100 (Lantus) 100 unit/mL (3 mL) Inpn pen  Inject 15 Units into the skin every evening.     lancets Misc  Commonly known as: LANCETS,THIN  1 each by Misc.(Non-Drug; Combo Route) route 3 (three) times daily.            CHANGE how you take these medications      ALPRAZolam 1 MG tablet  Commonly known as: XANAX  Take 1 tablet (1 mg total) by mouth once daily.  What changed:   when to take this  reasons to take this      cetirizine 10 MG tablet  Commonly known as: ZYRTEC  TAKE 1 TABLET BY MOUTH ONCE DAILY  What changed: when to take this     hydroCHLOROthiazide 25 MG tablet  Commonly known as: HYDRODIURIL  TAKE one TABLET BY MOUTH ONCE DAILY  What changed: when to take this            CONTINUE taking these medications      albuterol 90 mcg/actuation inhaler  Commonly known as: VENTOLIN HFA  Inhale 2 puffs into the lungs every 6 (six) hours as needed for Wheezing or Shortness of Breath. Rescue     amLODIPine 5 MG tablet  Commonly known as: NORVASC  Take 1 tablet (5 mg total) by mouth once daily.     DULoxetine 60 MG capsule  Commonly known as: CYMBALTA  Take 1 capsule (60 mg total) by mouth once daily.     levothyroxine 25 MCG tablet  Commonly known as: SYNTHROID  Take 1 tablet (25 mcg total) by mouth before breakfast.     LIDOcaine-prilocaine cream  Commonly known as: EMLA  Apply 1 g topically twice a week.     losartan 100 MG tablet  Commonly known as: COZAAR  Take 1 tablet (100 mg total) by mouth once daily.     meloxicam 15 MG tablet  Commonly known as: MOBIC  Take 1 tablet (15 mg total) by mouth once daily.     metoprolol succinate 25 MG 24 hr tablet  Commonly known as: TOPROL-XL  Take 1 tablet (25 mg total) by mouth once daily.     metronidazole 0.75% 0.75 % Crea  Commonly known as: METROCREAM  Apply 1 application  topically 2 (two) times daily.     montelukast 10 mg tablet  Commonly known as: SINGULAIR  TAKE ONE TABLET BY MOUTH EVERY EVENING     mupirocin 2 % ointment  Commonly known as: BACTROBAN  Apply 1 g topically 2 (two) times daily.              Indwelling Lines/Drains at time of discharge:   Lines/Drains/Airways       None                   Time spent on the discharge of patient: 40 minutes         Peter Garcia MD  Department of Hospital Medicine  [facility]

## 2025-04-27 NOTE — ASSESSMENT & PLAN NOTE
Resolved   MIGUEL ANGEL is likely due to dehydration. Baseline creatinine is 0.9. Most recent creatinine and eGFR are listed below.  Recent Labs     04/25/25  0751 04/26/25  0547 04/27/25  0608   CREATININE 1.3  1.3 1.2 0.9   EGFRNORACEVR 47*  47* 51* >60     Resume losartan

## 2025-04-27 NOTE — PLAN OF CARE
Problem: Adult Inpatient Plan of Care  Goal: Plan of Care Review  Outcome: Adequate for Care Transition  Goal: Patient-Specific Goal (Individualized)  Outcome: Adequate for Care Transition  Goal: Absence of Hospital-Acquired Illness or Injury  Outcome: Adequate for Care Transition  Goal: Optimal Comfort and Wellbeing  Outcome: Adequate for Care Transition  Goal: Readiness for Transition of Care  Outcome: Adequate for Care Transition     Problem: Bariatric Environmental Safety  Goal: Safety Maintained with Care  Outcome: Adequate for Care Transition     Problem: Coping Ineffective  Goal: Effective Coping  Outcome: Adequate for Care Transition     Problem: Acute Kidney Injury/Impairment  Goal: Fluid and Electrolyte Balance  Outcome: Adequate for Care Transition  Goal: Improved Oral Intake  Outcome: Adequate for Care Transition  Goal: Effective Renal Function  Outcome: Adequate for Care Transition

## 2025-04-28 ENCOUNTER — PATIENT OUTREACH (OUTPATIENT)
Dept: FAMILY MEDICINE | Facility: CLINIC | Age: 65
End: 2025-04-28
Payer: MEDICAID

## 2025-04-28 ENCOUNTER — TELEPHONE (OUTPATIENT)
Dept: FAMILY MEDICINE | Facility: CLINIC | Age: 65
End: 2025-04-28
Payer: MEDICAID

## 2025-04-28 NOTE — TELEPHONE ENCOUNTER
----- Message from Francine sent at 4/28/2025  3:45 PM CDT -----  Pt is very upset and crying. Pt wants to speak with Nasra. Pt still needs a refill for the recovery insulin. [facility] Pt #[phone]  ----- Message -----  From: Francine Monte  Sent: 4/28/2025   3:57 PM CDT  To: Lenny Hammonds Staff    Pt is very upset and crying. Pt still needs a refill for the recovery insulin. [facility]. Pt #[phone]

## 2025-04-28 NOTE — PROGRESS NOTES
Subjective      Sonia Muse is a 65 y.o. female        Chief Complaint   Patient presents with    Fatigue    dark urine    Constipation        HPI     Patient presents today for acute care visit for several concerns including fatigue, change in bowel habits, dark urine, and yellowing of her eyes.      Patient reports that she has been having fatigue for the last 2 months.  She states that she has acutely worsened following a bout of COVID which was approximately 2 months ago.      Patient reports that her bowel movements have changed since [place]  It has alternated between constipation and diarrhea.  She has not noticed any bloody bowel movements but does report occasional fatty greasy bowel movements.    Patient states that her dark urine has been over the last couple of days.  She denies any dysuria, suprapubic tenderness, or increasing urinary frequency.    Patient states that her yellowing of her eyes have been under last several days.  She does report some right upper quadrant abdominal pain.            ALLERGIES  Erythromycin, Codeine, and [place]     MEDICATIONS  Encounter Medications[1]     PAST MEDICAL HISTORY  Past Medical History:   Diagnosis Date    Allergy     Anxiety     Asthma     DDD (degenerative disc disease), cervical     Depression     Fibromyalgia     Hypertension     Neuromuscular disorder     PONV (postoperative nausea and vomiting)         PAST SURGIAL HISTORY  Past Surgical History:   Procedure Laterality Date    ADENOIDECTOMY      carpel tunnel Right      SECTION      ERCP N/A 2025    Procedure: ERCP (ENDOSCOPIC RETROGRADE CHOLANGIOPANCREATOGRAPHY);  Surgeon: Thuy Escobar MD;  Location: [facility];  Service: Endoscopy;  Laterality: N/A;    JOINT REPLACEMENT Right     knee    KNEE ARTHROPLASTY Left 10/19/2020    Procedure: ARTHROPLASTY, KNEE;  Surgeon: Felipe Herring MD;  [facility];  Service:  Orthopedics;  Laterality: Left;  Louie Liz    knee replacement Right     ROTATOR CUFF REPAIR Right     TONSILLECTOMY      TUBAL LIGATION          FAMILY HISTORY  Family History   Problem Relation Name Age of Onset    Diabetes Mother Libertad     Hypertension Mother Libertad     Heart disease Mother Libertad     Stroke Mother Libertad     Glaucoma Mother Libertad     Arthritis Mother Libertad     Diabetes Father Fredis     Hypertension Father Fredis     Heart disease Father Fredis     Hypertension Sister 1     Heart disease Sister 1     Graves' disease Daughter      Anxiety disorder Daughter          PAST SOCIAL HISTORY  Social History     Socioeconomic History    Marital status:     Number of children: 3   Occupational History    Occupation: [employer]     Comment: primary sub for [employer]   Tobacco Use    Smoking status: Former     Current packs/day: 0.00     Average packs/day: 1 pack/day for 11.0 years (11.0 ttl pk-yrs)     Types: Cigarettes     Start date:      Quit date:      Years since quittin.3    Smokeless tobacco: Never   Substance and Sexual Activity    Alcohol use: Yes     Comment: occasional 2x/y    Drug use: Never    Sexual activity: Yes     Partners: Male     Social Drivers of Health     Stress: Stress Concern Present (2020)    [place] [employer] - Occupational Stress Questionnaire     Feeling of Stress : Rather much        Health Maintenance   Topic Date Due    Colorectal Cancer Screening  Never done    Cervical Cancer Screening  Never done    Pneumococcal Vaccines (Age 50+) (1 of 2 - PCV) Never done    Shingles Vaccine (1 of 2) Never done    RSV Vaccine (Age 60+ and Pregnant patients) (1 - Risk 60-74 years 1-dose series) Never done    Influenza Vaccine (1) 2024    COVID-19 Vaccine (2 -  season) 2024    Mammogram  2026    Hemoglobin A1c (Prediabetes)  2026    Lipid Panel  2029    TETANUS VACCINE  2030    Hepatitis C  "Screening  Completed    HIV Screening  Completed           ROS        Objective   Vitals:    04/24/25 1342   BP: 120/76   BP Location: Right arm   Patient Position: Sitting   Pulse: 93   Temp: 97.7 °F (36.5 °C)   TempSrc: Oral   SpO2: 96%   Weight: 121.1 kg (266 lb 15.6 oz)   Height: 5' 4" (1.626 m)       Body mass index is 45.83 kg/m².       Physical Exam  Constitutional:       General: She is in acute distress.      Appearance: She is obese. She is ill-appearing.   HENT:      Head: Normocephalic and atraumatic.   Eyes:      General: Scleral icterus present.      Extraocular Movements: Extraocular movements intact.      Pupils: Pupils are equal, round, and reactive to light.   Neck:      Comments: Jaundiced face and neck.  Cardiovascular:      Rate and Rhythm: Normal rate and regular rhythm.   Pulmonary:      Effort: Pulmonary effort is normal.      Breath sounds: Normal breath sounds. No wheezing, rhonchi or rales.   Abdominal:      Tenderness: There is abdominal tenderness in the right upper quadrant.   Musculoskeletal:         General: No swelling or tenderness. Normal range of motion.      Cervical back: Normal range of motion.   Skin:     General: Skin is warm and dry.   Neurological:      General: No focal deficit present.      Mental Status: She is alert and oriented to person, place, and time.   Psychiatric:         Mood and Affect: Mood is anxious.         Thought Content: Thought content normal.                 Chronic fatigue  Comments:  Chronic problem with progression.  Not well controlled per patient.  Initially offered blood work however I think she would benefit from ER evaluation  Orders:  -     Cancel: CBC Auto Differential; Future; Expected date: 04/24/2025  -     Cancel: Comprehensive Metabolic Panel; Future; Expected date: 04/24/2025  -     Cancel: TSH; Future; Expected date: 04/24/2025    Dark urine  Comments:  Acute problem. Not well controlled per patient. POC U/A obtained and reviewed with " patient.    Offered blood work however cancelled given need of ER evaluation  Orders:  -     POCT Urinalysis, Dipstick, Automated, W/O Scope  -     Cancel: Comprehensive Metabolic Panel; Future; Expected date: 04/24/2025    Scleral icterus  Comments:  Acute problem. Not well controlled per patient. Initially ordered blood work however this was canceled as I think she would benefit from ER evaluation  Orders:  -     Cancel: Comprehensive Metabolic Panel; Future; Expected date: 04/24/2025  -     Cancel: Hepatitis Panel, Acute; Future; Expected date: 04/24/2025    Glucosuria  Comments:  Acute problem. Not well controlled per patient. POC U/A obtained and reviewed with patient.   Offered blood work however cancelled given need of ER evaluation  Orders:  -     Cancel: Comprehensive Metabolic Panel; Future; Expected date: 04/24/2025  -     Cancel: Hemoglobin A1C; Future; Expected date: 04/24/2025    Change in bowel habits  Comments:  acute problem. not well controlled per patient. Recommend ER evaluation.           Montana Baltazar             Portions of this note were created using voice recognition software. There may be voice recognition errors found in the text, and attempts were made to correct these errors prior to signature.          [1]   Outpatient Encounter Medications as of 4/24/2025   Medication Sig Dispense Refill    albuterol (VENTOLIN HFA) 90 mcg/actuation inhaler Inhale 2 puffs into the lungs every 6 (six) hours as needed for Wheezing or Shortness of Breath. Rescue 18 g 1    ALPRAZolam (XANAX) 1 MG tablet Take 1 tablet (1 mg total) by mouth once daily. 30 tablet 0    amLODIPine (NORVASC) 5 MG tablet Take 1 tablet (5 mg total) by mouth once daily. 90 tablet 1    cetirizine (ZYRTEC) 10 MG tablet TAKE 1 TABLET BY MOUTH ONCE DAILY 90 tablet 1    DULoxetine (CYMBALTA) 60 MG capsule Take 1 capsule (60 mg total) by mouth once daily. 90 capsule 1    hydroCHLOROthiazide (HYDRODIURIL) 25 MG tablet TAKE one TABLET BY  MOUTH ONCE DAILY 90 tablet 1    levothyroxine (SYNTHROID) 25 MCG tablet Take 1 tablet (25 mcg total) by mouth before breakfast. 90 tablet 1    LIDOcaine-prilocaine (EMLA) cream Apply 1 g topically twice a week.      losartan (COZAAR) 100 MG tablet Take 1 tablet (100 mg total) by mouth once daily. 90 tablet 1    meloxicam (MOBIC) 15 MG tablet Take 1 tablet (15 mg total) by mouth once daily. 90 tablet 0    metoprolol succinate (TOPROL-XL) 25 MG 24 hr tablet Take 1 tablet (25 mg total) by mouth once daily. 90 tablet 1    metronidazole 0.75% (METROCREAM) 0.75 % Crea Apply 1 application  topically 2 (two) times daily.      montelukast (SINGULAIR) 10 mg tablet TAKE ONE TABLET BY MOUTH EVERY EVENING 90 tablet 1    mupirocin (BACTROBAN) 2 % ointment Apply 1 g topically 2 (two) times daily.       No facility-administered encounter medications on file as of 4/24/2025.

## 2025-04-28 NOTE — TELEPHONE ENCOUNTER
Returned call to [facility] regarding the code needed. The patient was diagnosed as hyperglycemic R73.9. He ran the prescription and it went through for the patient. Verbal understanding was expressed.

## 2025-04-28 NOTE — TELEPHONE ENCOUNTER
Discharge Information     Discharge Date:   4/27/25    Primary Discharge Diagnosis:  Pancreatic mass    Discharge Summary:  Reviewed      Medication & Order Review     Were medication changes made or new medications added?   Yes    If so, has the patient filled the prescriptions?  No     Was Home Health ordered? No    If so, has Home Health contacted patient and/or initiated services?  Yes    Name of Home Health Agency? N/A    Durable Medical Equipment ordered?  Yes     If so, has the DME provider contacted patient and delivered equipment?  N/A    Follow Up               Any problems since discharge? Pains in her stomach that was not there before he left.     How is the patient feeling since returning home?  Very overwhelmed.     Have you set up recommended follow up appointments?  (cardiology, surgery, etc.)  [facility] Lenny 4-29-25 10:20    Notes:  Patient             Melody Lucas

## 2025-04-28 NOTE — TELEPHONE ENCOUNTER
Pt calling regarding prescriptions that were recently provided from the hospital. She reports [facility] pharmacy is in need of a diagnostic code in order to process. I have offered to call [facility] in this regard but they are currently closed. She has a dose of long acting insulin for tonight and plans to check her blood glucose as ordered. She will call back if there are concerns of elevated blood glucose. Otherwise, I have informed her I would route a message to PCP for follow up in this regard. She can be reached at [phone].     Reason for Disposition   [1] Caller has URGENT medicine question about med that PCP or specialist prescribed AND [2] triager unable to answer question    Protocols used: Medication Question Call-A-

## 2025-04-29 ENCOUNTER — OFFICE VISIT (OUTPATIENT)
Dept: FAMILY MEDICINE | Facility: CLINIC | Age: 65
End: 2025-04-29
Payer: MEDICAID

## 2025-04-29 VITALS
HEART RATE: 111 BPM | HEIGHT: 64 IN | OXYGEN SATURATION: 98 % | DIASTOLIC BLOOD PRESSURE: 82 MMHG | BODY MASS INDEX: 45.32 KG/M2 | WEIGHT: 265.44 LBS | SYSTOLIC BLOOD PRESSURE: 110 MMHG | TEMPERATURE: 98 F

## 2025-04-29 DIAGNOSIS — Z79.4 TYPE 2 DIABETES MELLITUS WITH HYPERGLYCEMIA, WITH LONG-TERM CURRENT USE OF INSULIN: ICD-10-CM

## 2025-04-29 DIAGNOSIS — I10 ESSENTIAL HYPERTENSION: ICD-10-CM

## 2025-04-29 DIAGNOSIS — Z09 HOSPITAL DISCHARGE FOLLOW-UP: Primary | ICD-10-CM

## 2025-04-29 DIAGNOSIS — E11.65 TYPE 2 DIABETES MELLITUS WITH HYPERGLYCEMIA, WITH LONG-TERM CURRENT USE OF INSULIN: ICD-10-CM

## 2025-04-29 DIAGNOSIS — K86.89 PANCREATIC MASS: ICD-10-CM

## 2025-04-29 LAB
BACTERIA BLD CULT: NORMAL
BACTERIA BLD CULT: NORMAL

## 2025-04-29 PROCEDURE — 99215 OFFICE O/P EST HI 40 MIN: CPT | Mod: PBBFAC,PN | Performed by: NURSE PRACTITIONER

## 2025-04-29 PROCEDURE — 99999 PR PBB SHADOW E&M-EST. PATIENT-LVL V: CPT | Mod: PBBFAC,,, | Performed by: NURSE PRACTITIONER

## 2025-04-29 RX ORDER — INSULIN GLARGINE 100 [IU]/ML
18 INJECTION, SOLUTION SUBCUTANEOUS NIGHTLY
Qty: 6 ML | Refills: 1 | Status: SHIPPED | OUTPATIENT
Start: 2025-04-29 | End: 2025-05-29

## 2025-04-29 NOTE — LETTER
April 30, 2025      [facility]  [address]  [address]  [place] [address]  Phone: [phone]  Fax: [phone]       Patient: Sonia Muse   YOB: 1960  Date of Visit: 04/30/2025    To Whom It May Concern:    Jamel Muse  was at [facility] on 04/29/2025. The patient is currently undergoing workup for a pancreatic mass. She is scheduled for several tests/procedures and upcoming appointments with myself as well as other specialities Thank you for the time and consideration in regards to her health needs.    Sincerely,    RISHI Espinal

## 2025-04-29 NOTE — PROGRESS NOTES
SUBJECTIVE:    Patient ID: Sonia Heckippo is a 65 y.o. female.    Chief Complaint: Hospital Follow Up    History of Present Illness    CHIEF COMPLAINT:  Sonia presents today for follow up after recent hospitalization for elevated blood sugar and jaundice    RECENT HOSPITALIZATION:  She recently underwent ERCP with placement of two stents and brush sample collection. Needle aspirate could not be completed during the procedure. She is scheduled for EUS with needle aspirate on Friday, followed by ERCP with stent replacement the following Tuesday.    DIABETES:  She reports blood sugars in 300s while hospitalized, with morning readings in 260s. Blood sugar reached 340 after consuming chocolate on birthday. She is currently checking blood sugar with glucometer at home. She reports blurry vision, likely related to elevated blood sugar.    RESPIRATORY:  She continues to experience some shortness of breath despite taking Singulair. During a recent visit to her daughter in [place], she was notably unwell, mostly confined to bed rest. Her daughter observed blue lips and severe periorbital edema, raising concerns about oxygen levels at that time. She denies current allergy symptoms.    DIET:  She reports eating three meals daily with occasional snacks such as popcorn or veggie sticks. Her appetite has somewhat improved compared to the previous week, now experiencing some hunger sensations.    CURRENT MEDICATIONS:  She reports taking blood pressure medications, which have been effective in maintaining optimal blood pressure levels, and Singulair for breathing issues.       Review of Systems   Constitutional:  Positive for fatigue (Improving). Negative for activity change, appetite change, chills, fever and unexpected weight change.   Eyes:  Negative for redness, itching and visual disturbance.   Respiratory:  Negative for cough and shortness of breath.    Cardiovascular:  Negative for chest pain, palpitations and  leg swelling.   Gastrointestinal:  Positive for nausea (occasional). Negative for abdominal pain, blood in stool, constipation, diarrhea and vomiting.   Endocrine: Negative for cold intolerance, heat intolerance, polydipsia and polyuria.   Genitourinary:  Negative for dysuria and frequency.   Skin:  Negative for pallor and rash.   Allergic/Immunologic: Positive for environmental allergies.   Neurological:  Negative for dizziness, weakness and headaches.   Hematological:  Negative for adenopathy. Does not bruise/bleed easily.   Psychiatric/Behavioral:  Negative for agitation, behavioral problems, confusion, dysphoric mood, sleep disturbance and suicidal ideas. The patient is not nervous/anxious.        Admit Date: 4/24/25  Discharge Date: 4/27/25  Discharge Facility: Hospital      Family and/or Caretaker present at visit? Yes  Medication Reconciliation:  Medications changed/added/deleted. added  New Prescriptions filled after discharge: yes  Discharge summary reviewed:  yes  Diagnostic tests reviewed/disposition: No diagnosic tests pending after this hospitalization  Disease/illness education: n/a  Follow up appointments scheduled:  yes              yes with oncology   Follow up labs/tests ordered:   no  Home Health ordered on discharge: Patient does not have home health established from hospital visit.  They do not need home health.  If needed, we will set up home health for the patient  Home Health company name: n/a  Establishment or re-establishment of referral orders for community resources: No other necessary community resources  DME ordered at discharge:   no  How patient is feeling since discharge from the hospital?  Better, eating and drinking ok; no abd pain or any new sx     Discussion with other health care providers: No discussion with other health care providers necessary  Patient follow up phone call documented on separate encounter.      [facility]  Discharge Summary         Patient Name: Sonia Muse  MRN: [id number]  JEREMIAS: [id number]  Patient Class: IP- Inpatient  Admission Date: 4/24/2025  Hospital Length of Stay: 3 days  Discharge Date and Time: 04/27/2025   Attending Physician: Peter Garcia MD   Discharging Provider: Peter Garcia MD  Primary Care Provider: Nasra Galvez FNP     Primary Care Team: Networked reference to record PCT      HPI:   64 year old female with comorbid conditions of hypertension, asthma, degenerative disc disease, anxiety/depression, fibromyalgia, neuromuscular disorder presented to the ED due to progressive generalized weakness for a few days.    Reports prolonged illness 3 weeks ago (COVID?) and had coughing bouts and fatigue since.  Reports intermittent crampy abdominal pain which she attributed to the cough.  Noticed yesterday that her eyes were yellow colored and stool was light.  Associated with generalized itching without a rash.  Denies fevers, blood in stool, dysuria, frequency, numbness/weakness, orthopnea, headache, visual changes, pedal edema.    In ED patient has icterus, RUQ tenderness, appeared jaundiced.  Labs revealed T Bili of 9.2, transaminitis, MIGUEL ANGEL with Cr 1.6. US abdomen revealed 5.0x3.7cm pancreatic head mass, biliary ductal dilation to 4mm however CT abdomen was negative.  GI was notified (Dr. Escobar) - recommended MRI pancreatic protocol and possible ERCP tomorrow.  Surgery team notified (Dr. Nunn)     Procedure(s) (LRB):  ERCP (ENDOSCOPIC RETROGRADE CHOLANGIOPANCREATOGRAPHY) (N/A)       Hospital Course:   64 y.o. female with a history of hypertension, asthma, depression came in with jaundice of skin and eyes, dark urine, malaise which she associated with recent COVID. MRCP showed pancreatic mass highly concerning for malignancy with biliary obstruction with concerns for splenic metastasis and peripancreatic lymph node involvement. Surgery and GI were consulted & plan for ERCP with biopsy and stent  placement on . PET-CT recommended outpatient.  Palliative and heme oncology were consulted.  For MIGUEL ANGEL likely prerenal from dehydration , given fluid hydration and monitored closely and held nephrotoxic home blood pressure meds.  MIGUEL ANGEL resolved. Her symptoms significantly improved as well has jaundice following ERCP. Bilirubin down trended. Lipase was slight up, but patient is tolerating diet.      Discussed with oncology and GI. GI planning for EUS and biopsy  and rpt ERCP for stent exchange . Oncology will follow her up with preliminary biopsy report end of the week. All of the above was communicated with family. For her hyperglycemia with newly diagnosed DM, patient was started on Lantus and will DC with 15 units. Glucometer and supplies prescribed. Patient seen and examined and cleared for discharge. Multiple family members updated.      No results displayed because visit has over 200 results.      Office Visit on 2025   Component Date Value Ref Range Status    POC Blood, Urine 2025 Positive (A)  Negative Final    POC Bilirubin, Urine 2025 Positive (A)  Negative Final    POC Urobilinogen, Urine 2025 1.0  0.1 - 1.1 Final    POC Ketones, Urine 2025 Negative  Negative Final    POC Protein, Urine 2025 Positive (A)  Negative Final    POC Nitrates, Urine 2025 Negative  Negative Final    POC Glucose, Urine 2025 Positive (A)  Negative Final    pH, UA 2025 5.5   Final    POC Specific Gravity, Urine 2025 1.020  1.003 - 1.029 Final    POC Leukocytes, Urine 2025 Negative  Negative Final       Past Medical History:   Diagnosis Date    Allergy     Anxiety     Asthma     DDD (degenerative disc disease), cervical     Depression     Fibromyalgia     Hypertension 2005    Neuromuscular disorder     PONV (postoperative nausea and vomiting)      Past Surgical History:   Procedure Laterality Date    ADENOIDECTOMY      carpel tunnel Right      SECTION    "   ERCP N/A 4/25/2025    Procedure: ERCP (ENDOSCOPIC RETROGRADE CHOLANGIOPANCREATOGRAPHY);  Surgeon: Thuy Escobar MD;  Location: [facility];  Service: Endoscopy;  Laterality: N/A;    JOINT REPLACEMENT Right 2016    knee    KNEE ARTHROPLASTY Left 10/19/2020    Procedure: ARTHROPLASTY, KNEE;  Surgeon: Felipe Herring MD;  Location: [facility];  Service: Orthopedics;  Laterality: Left;  Louie Liz    knee replacement Right     ROTATOR CUFF REPAIR Right     TONSILLECTOMY      TUBAL LIGATION       Family History   Problem Relation Name Age of Onset    Diabetes Mother Libertad     Hypertension Mother Libertad     Heart disease Mother Libertad     Stroke Mother Libertad     Glaucoma Mother Libertad     Arthritis Mother Libertad     Diabetes Father Fredis     Hypertension Father Fredis     Heart disease Father Fredis     Hypertension Sister 1     Heart disease Sister 1     Graves' disease Daughter      Anxiety disorder Daughter         Marital Status:   Alcohol History:  reports current alcohol use.  Tobacco History:  reports that she quit smoking about 41 years ago. She started smoking about 52 years ago. She has a 11 pack-year smoking history. She has never used smokeless tobacco.  Drug History:  reports no history of drug use.    Review of patient's allergies indicates:   Allergen Reactions    Erythromycin Swelling    Codeine Nausea And Vomiting     And migraine h/a    Thurman Blisters     Current Medications[1]  Objective:      Vitals:    04/29/25 1021   BP: 110/82   Pulse: (!) 111   Temp: 98.3 °F (36.8 °C)   TempSrc: Oral   SpO2: 98%   Weight: 120.4 kg (265 lb 6.9 oz)   Height: 5' 4" (1.626 m)     Physical Exam  Vitals and nursing note reviewed.   Constitutional:       General: She is not in acute distress.     Appearance: Normal appearance. She is well-developed. She is obese. She is not ill-appearing.   HENT:      Head: Normocephalic.      Nose: Nose normal.      Mouth/Throat:      Mouth: Mucous membranes are " moist.   Eyes:      General: No scleral icterus.     Pupils: Pupils are equal, round, and reactive to light.   Neck:      Thyroid: No thyroid mass, thyromegaly or thyroid tenderness.   Cardiovascular:      Rate and Rhythm: Normal rate and regular rhythm.      Heart sounds: Normal heart sounds. No murmur heard.  Pulmonary:      Effort: Pulmonary effort is normal. No respiratory distress.      Breath sounds: Normal breath sounds. No wheezing.   Abdominal:      General: Bowel sounds are normal. There is no distension.      Palpations: Abdomen is soft. There is no mass.      Tenderness: There is abdominal tenderness (mild epigastric). There is no guarding.      Comments: protuberant   Musculoskeletal:         General: Normal range of motion.      Cervical back: Normal range of motion and neck supple.      Right lower leg: No edema.      Left lower leg: No edema.   Lymphadenopathy:      Cervical: No cervical adenopathy.   Skin:     General: Skin is warm and dry.      Capillary Refill: Capillary refill takes less than 2 seconds.      Coloration: Skin is not jaundiced or pale.   Neurological:      Mental Status: She is alert and oriented to person, place, and time.   Psychiatric:         Mood and Affect: Mood normal.         Behavior: Behavior normal.         Thought Content: Thought content normal.         Judgment: Judgment normal.       Physical Exam    Vitals: Heart rate: 111 BPM. SpO2: 98%.  General: No acute distress. Well-developed. Well-nourished.  Eyes: EOMI. Sclerae anicteric.  HENT: Normocephalic. Atraumatic. Nares patent. Moist oral mucosa.  Ears: Bilateral TMs clear. Bilateral EACs clear.  Cardiovascular: Regular rate. Regular rhythm. No murmurs. No rubs. No gallops. Normal S1, S2.  Respiratory: Normal respiratory effort. Clear to auscultation bilaterally. No rales. No rhonchi. No wheezing.  Abdomen: Soft. Non-tender. Non-distended. Normoactive bowel sounds.  Musculoskeletal: No  obvious  deformity.  Extremities: No lower extremity edema.  Neurological: Alert & oriented x3. No slurred speech. Normal gait.  Psychiatric: Normal mood. Normal affect. Good insight. Good judgment.  Skin: Warm. Dry. No rash.       Assessment:           Hospital discharge follow-up   -Discharge and current medications have been reviewed and reconciled with the chart.     Type 2 diabetes mellitus with hyperglycemia, with long-term current use of insulin  -     insulin glargine U-100, Lantus, 100 unit/mL (3 mL) SubQ InPn pen; Inject 18 Units into the skin every evening.  Dispense: 6 mL; Refill: 1  -     Comprehensive Metabolic Panel; Future; Expected date: 04/29/2025  -increase Lantus to 18 units QHS; monitor BS closely, if FBS > 200 in 3 days, may increase to 21 units; will call pt next week about readings; better diet and low carbs stressed; will reach out to DM education to try and get pt assistance; pt has meter/supplies     Pancreatic mass  -     Comprehensive Metabolic Panel; Future; Expected date: 04/29/2025  -check labs in 1-2 days; cont care with GI and hem/onc    Essential hypertension   -split hctz in half for dose 12.5 mg daily and monitor BP; cont amlodipine and Norvasc; contact clinic for any issues/problems     Follow up in about 1 month (around 5/29/2025) for f/u DM .    This note was generated with the assistance of ambient listening technology. Verbal consent was obtained by the patient and accompanying visitor(s) for the recording of patient appointment to facilitate this note. I attest to having reviewed and edited the generated note for accuracy, though some syntax or spelling errors may persist. Please contact the author of this note for any clarification.               [1]   Current Outpatient Medications:     ALPRAZolam (XANAX) 1 MG tablet, Take 1 tablet (1 mg total) by mouth once daily., Disp: 30 tablet, Rfl: 0    amLODIPine (NORVASC) 5 MG tablet, Take 1 tablet (5 mg total) by mouth once daily., Disp:  90 tablet, Rfl: 1    DULoxetine (CYMBALTA) 60 MG capsule, Take 1 capsule (60 mg total) by mouth once daily., Disp: 90 capsule, Rfl: 1    levothyroxine (SYNTHROID) 25 MCG tablet, Take 1 tablet (25 mcg total) by mouth before breakfast., Disp: 90 tablet, Rfl: 1    losartan (COZAAR) 100 MG tablet, Take 1 tablet (100 mg total) by mouth once daily., Disp: 90 tablet, Rfl: 1    metoprolol succinate (TOPROL-XL) 25 MG 24 hr tablet, Take 1 tablet (25 mg total) by mouth once daily., Disp: 90 tablet, Rfl: 1    albuterol (VENTOLIN HFA) 90 mcg/actuation inhaler, Inhale 2 puffs into the lungs every 6 (six) hours as needed for Wheezing or Shortness of Breath. Rescue, Disp: 18 g, Rfl: 1    blood sugar diagnostic Strp, 1 each by Misc.(Non-Drug; Combo Route) route 3 (three) times daily., Disp: 90 each, Rfl: 0    blood-glucose meter kit, Use as instructed, Disp: 1 each, Rfl: 0    cetirizine (ZYRTEC) 10 MG tablet, TAKE 1 TABLET BY MOUTH ONCE DAILY, Disp: 90 tablet, Rfl: 1    hydroCHLOROthiazide (HYDRODIURIL) 25 MG tablet, TAKE one TABLET BY MOUTH ONCE DAILY, Disp: 90 tablet, Rfl: 1    insulin glargine U-100, Lantus, 100 unit/mL (3 mL) SubQ InPn pen, Inject 18 Units into the skin every evening., Disp: 6 mL, Rfl: 1    lancets (LANCETS,THIN) Misc, 1 each by Misc.(Non-Drug; Combo Route) route 3 (three) times daily., Disp: 90 each, Rfl: 0    LIDOcaine-prilocaine (EMLA) cream, Apply 1 g topically twice a week., Disp: , Rfl:     meloxicam (MOBIC) 15 MG tablet, Take 1 tablet (15 mg total) by mouth once daily., Disp: 90 tablet, Rfl: 0    metronidazole 0.75% (METROCREAM) 0.75 % Crea, Apply 1 application  topically 2 (two) times daily., Disp: , Rfl:     montelukast (SINGULAIR) 10 mg tablet, TAKE ONE TABLET BY MOUTH EVERY EVENING, Disp: 90 tablet, Rfl: 1

## 2025-04-30 ENCOUNTER — RESULTS FOLLOW-UP (OUTPATIENT)
Dept: FAMILY MEDICINE | Facility: CLINIC | Age: 65
End: 2025-04-30

## 2025-04-30 ENCOUNTER — TELEPHONE (OUTPATIENT)
Dept: FAMILY MEDICINE | Facility: CLINIC | Age: 65
End: 2025-04-30
Payer: MEDICAID

## 2025-04-30 ENCOUNTER — HOSPITAL ENCOUNTER (OUTPATIENT)
Dept: PREADMISSION TESTING | Facility: HOSPITAL | Age: 65
Discharge: HOME | End: 2025-04-30
Payer: MEDICAID

## 2025-04-30 VITALS — BODY MASS INDEX: 45.24 KG/M2 | HEIGHT: 64 IN | WEIGHT: 265 LBS

## 2025-04-30 DIAGNOSIS — Z01.818 PREOP TESTING: Primary | ICD-10-CM

## 2025-04-30 LAB
OHS QRS DURATION: 100 MS
OHS QRS DURATION: 96 MS
OHS QTC CALCULATION: 445 MS
OHS QTC CALCULATION: 470 MS

## 2025-04-30 NOTE — DISCHARGE INSTRUCTIONS
To confirm, Your doctor has instructed you that surgery is scheduled for:     Endoscopy  will call the afternoon prior to surgery with the final arrival time.      Please report to Outpatient Registration the morning of surgery.     Do not eat anything after midnight the night before your surgery - You may have clear liquids up to 2 hours before coming in for the procedure. This includes water, Gatorade, clear juice     Follow the preop given by the Doctor    YOU MAY BRUSH YOUR TEETH BUT DO NOT SWALLOW     TAKE ONLY THESE MEDICATIONS WITH A SMALL SIP OF WATER THE MORNING OF YOUR PROCEDURE: AMLODIPINE, DULOXETINE, LEVOTHYROXINE, METOPROLOL    ONLY if you are diabetic, check your sugar in the morning before your procedure.       Do not take any diabetic medicines or insulin the morning of surgery .     PLEASE NOTE:  The surgery schedule has many variables which may affect the time of your surgery case.  Family members should be available if your surgery time changes.  Plan to be here the day of your procedure between 2-3 hours.    DO NOT TAKE THESE MEDICATIONS 5-7 DAYS PRIOR to your procedure or per your surgeon's request: ASPIRIN, ALEVE, ADVIL, IBUPROFEN,  SAUNDRA SELTZER, BC , FISH OIL , VITAMIN E, HERBALS  (May take Tylenol)    ONLY if you are prescribed any types of blood thinners such as:  Aspirin, Coumadin, Plavix, Pradaxa, Xarelto, Aggrenox, Effient, Eliquis, Savasya, Brilinta, or any other, ask your surgeon whether you should stop taking them and how long before surgery you should stop.  You may also need to verify with the prescribing physician if it is ok to stop your medication.                                                       IMPORTANT INSTRUCTIONS    Do not smoke, vape or drink alcoholic beverages 24 hours prior to your procedure.  Shower the night before with  Dial antibacterial soap from the neck down.   You may use your own shampoo and face wash. This helps your skin to be as bacteria free as  possible.    If you wear contact lenses, dentures, hearing aids or glasses, bring a container to put them in during surgery and give to a family member for safe keeping.    Please leave all jewelry, piercing's and valuables at home.   Make arrangements in advance for transportation home by a responsible adult.  You must make arrangements for transportation, TAXI'S, UBER'S OR LYFTS ARE NOT ALLOWED.        If you have any questions about these instructions, call the Endoscopy Department at [phone]

## 2025-04-30 NOTE — PRE ADMISSION SCREENING
Preadmit assessment complete. Review of patient health history and home medications. Questions answered. Patient voiced understanding.  Preop education per mychart message and given verbally

## 2025-04-30 NOTE — TELEPHONE ENCOUNTER
----- Message from Francine sent at 4/30/2025 10:43 AM CDT -----  Contact: Samara Heath is checking on the status of a letter they were suppose to get for work . Please advise. Samara #[phone]

## 2025-05-01 ENCOUNTER — NURSE TRIAGE (OUTPATIENT)
Dept: ADMINISTRATIVE | Facility: CLINIC | Age: 65
End: 2025-05-01
Payer: MEDICAID

## 2025-05-01 ENCOUNTER — ANESTHESIA EVENT (OUTPATIENT)
Dept: SURGERY | Facility: HOSPITAL | Age: 65
End: 2025-05-01
Payer: MEDICAID

## 2025-05-01 ENCOUNTER — TELEPHONE (OUTPATIENT)
Dept: HEMATOLOGY/ONCOLOGY | Facility: CLINIC | Age: 65
End: 2025-05-01
Payer: MEDICAID

## 2025-05-01 ENCOUNTER — TELEPHONE (OUTPATIENT)
Dept: FAMILY MEDICINE | Facility: CLINIC | Age: 65
End: 2025-05-01
Payer: MEDICAID

## 2025-05-01 NOTE — TELEPHONE ENCOUNTER
Spoke with daughter and clarified she has to follow up with [facility] about time and place of biopsy. Contact info is given     ----- Message from Frances sent at 5/1/2025 12:00 PM CDT -----  Mary Grace the Pt's Daughter asking what time the above Pt should be at the outpatient center for her scheduled biopsy. # [phone]

## 2025-05-01 NOTE — TELEPHONE ENCOUNTER
Pt is sched for a procedure tomorrow with Dr. Grimes. Pt recently discharged from the hospital. Pre-op visit was over the phone. Can't find instructions in her mychart and asking which meds she can take before the procedure tomorrow. Advised pt of where to find instructions in mychart and reviewed specific instructions regarding meds. Pt VU. No further assistance needed.  Reason for Disposition   Question about upcoming scheduled surgery, procedure or test, no triage required, and triager able to answer question    Protocols used: Information Only Call - No Triage-A-

## 2025-05-02 ENCOUNTER — ANESTHESIA (OUTPATIENT)
Dept: SURGERY | Facility: HOSPITAL | Age: 65
End: 2025-05-02
Payer: MEDICAID

## 2025-05-02 ENCOUNTER — HOSPITAL ENCOUNTER (OUTPATIENT)
Facility: HOSPITAL | Age: 65
Discharge: HOME | End: 2025-05-02
Attending: INTERNAL MEDICINE | Admitting: INTERNAL MEDICINE
Payer: MEDICAID

## 2025-05-02 VITALS
HEART RATE: 75 BPM | OXYGEN SATURATION: 94 % | RESPIRATION RATE: 16 BRPM | OXYGEN SATURATION: 98 % | RESPIRATION RATE: 15 BRPM | TEMPERATURE: 98 F | DIASTOLIC BLOOD PRESSURE: 69 MMHG | SYSTOLIC BLOOD PRESSURE: 104 MMHG | HEART RATE: 74 BPM | SYSTOLIC BLOOD PRESSURE: 91 MMHG | DIASTOLIC BLOOD PRESSURE: 54 MMHG

## 2025-05-02 DIAGNOSIS — K86.89 PANCREATIC MASS: ICD-10-CM

## 2025-05-02 DIAGNOSIS — K86.89 PANCREATIC MASS: Primary | ICD-10-CM

## 2025-05-02 PROCEDURE — 63600175 PHARM REV CODE 636 W HCPCS: Performed by: NURSE ANESTHETIST, CERTIFIED REGISTERED

## 2025-05-02 PROCEDURE — 27202053 HC NEEDLE BIOPSY EUS: Performed by: INTERNAL MEDICINE

## 2025-05-02 PROCEDURE — 25000003 PHARM REV CODE 250: Performed by: NURSE ANESTHETIST, CERTIFIED REGISTERED

## 2025-05-02 PROCEDURE — 37000008 HC ANESTHESIA 1ST 15 MINUTES: Performed by: INTERNAL MEDICINE

## 2025-05-02 PROCEDURE — 43242 EGD US FINE NEEDLE BX/ASPIR: CPT | Performed by: INTERNAL MEDICINE

## 2025-05-02 PROCEDURE — 37000009 HC ANESTHESIA EA ADD 15 MINS: Performed by: INTERNAL MEDICINE

## 2025-05-02 RX ORDER — LIDOCAINE HYDROCHLORIDE 20 MG/ML
INJECTION, SOLUTION EPIDURAL; INFILTRATION; INTRACAUDAL; PERINEURAL
Status: DISCONTINUED | OUTPATIENT
Start: 2025-05-02 | End: 2025-05-02

## 2025-05-02 RX ORDER — PROPOFOL 10 MG/ML
VIAL (ML) INTRAVENOUS
Status: DISCONTINUED | OUTPATIENT
Start: 2025-05-02 | End: 2025-05-02

## 2025-05-02 RX ADMIN — SODIUM CHLORIDE: 0.9 INJECTION, SOLUTION INTRAVENOUS at 07:05

## 2025-05-02 RX ADMIN — PROPOFOL 50 MG: 10 INJECTION, EMULSION INTRAVENOUS at 07:05

## 2025-05-02 RX ADMIN — LIDOCAINE HYDROCHLORIDE 50 MG: 20 INJECTION, SOLUTION INTRAVENOUS at 07:05

## 2025-05-02 NOTE — TRANSFER OF CARE
Anesthesia Transfer of Care Note    Patient: Sonia Muse    Procedure(s) Performed: Procedure(s) (LRB):  ULTRASOUND, UPPER GI TRACT, ENDOSCOPIC (N/A)    Patient location: PACU    Anesthesia Type: general    Transport from OR: Transported from OR on room air with adequate spontaneous ventilation    Post pain: adequate analgesia    Post assessment: no apparent anesthetic complications    Post vital signs: stable    Level of consciousness: awake, alert and oriented    Nausea/Vomiting: no nausea/vomiting    Complications: none    Transfer of care protocol was followed    Last vitals: Visit Vitals  /65 (BP Location: Left arm, Patient Position: Lying)   Pulse 84   Temp 37.1 °C (98.7 °F) (Oral)   Resp 16   SpO2 96%   Breastfeeding No

## 2025-05-02 NOTE — ANESTHESIA POSTPROCEDURE EVALUATION
Anesthesia Post Evaluation    Patient: Sonia Muse    Procedure(s) Performed: Procedure(s) (LRB):  ULTRASOUND, UPPER GI TRACT, ENDOSCOPIC (N/A)    Final Anesthesia Type: general      Patient location during evaluation: GI PACU  Patient participation: Yes- Able to Participate  Level of consciousness: awake and alert and oriented  Post-procedure vital signs: reviewed and stable  Pain management: adequate  Airway patency: patent    PONV status at discharge: No PONV  Anesthetic complications: no      Cardiovascular status: blood pressure returned to baseline, hemodynamically stable and stable  Respiratory status: unassisted, spontaneous ventilation and room air  Hydration status: euvolemic  Follow-up not needed.              Vitals Value Taken Time   /67 05/02/25 07:55   Temp 36.7 °C (98 °F) 05/02/25 07:38   Pulse 75 05/02/25 07:56   Resp 16 05/02/25 07:45   SpO2 96 % 05/02/25 07:56   Vitals shown include unfiled device data.      Event Time   Out of Recovery 08:05:03         Pain/Lori Score: No data recorded

## 2025-05-02 NOTE — PROVATION PATIENT INSTRUCTIONS
Discharge Summary/Instructions after an Endoscopic Procedure  Patient Name: Sonia Muse  Patient MRN: [id number]  Patient YOB: 1960  Friday, May 2, 2025  Austen Grimes III, MD  RESTRICTIONS:  During your procedure today, you received medications for sedation.  These   medications may affect your judgment, balance and coordination.  Therefore,   for 24 hours, you have the following restrictions:   - DO NOT drive a car, operate machinery, make legal/financial decisions,   sign important papers or drink alcohol.    ACTIVITY:  Today: no heavy lifting, straining or running due to procedural   sedation/anesthesia.  The following day: return to full activity including work.  DIET:  Eat and drink normally unless instructed otherwise.     TREATMENT FOR COMMON SIDE EFFECTS:  - Mild abdominal pain, nausea, belching, bloating or excessive gas:  rest,   eat lightly and use a heating pad.  - Sore Throat: treat with throat lozenges and/or gargle with warm salt   water.  - Because air was used during the procedure, expelling large amounts of air   from your rectum or belching is normal.  - If a bowel prep was taken, you may not have a bowel movement for 1-3 days.    This is normal.  SYMPTOMS TO WATCH FOR AND REPORT TO YOUR PHYSICIAN:  1. Abdominal pain or bloating, other than gas cramps.  2. Chest pain.  3. Back pain.  4. Signs of infection such as: chills or fever occurring within 24 hours   after the procedure.  5. Rectal bleeding, which would show as bright red, maroon, or black stools.   (A tablespoon of blood from the rectum is not serious, especially if   hemorrhoids are present.)  6. Vomiting.  7. Weakness or dizziness.  GO DIRECTLY TO THE NEAREST EMERGENCY ROOM IF YOU HAVE ANY OF THE FOLLOWING:      Difficulty breathing              Chills and/or fever over 101 F   Persistent vomiting and/or vomiting blood   Severe abdominal pain   Severe chest pain   Black, tarry stools   Bleeding- more than one  tablespoon   Any other symptom or condition that you feel may need urgent attention  Your doctor recommends these additional instructions:  If any biopsies were taken, your doctors clinic will contact you in 1 to 2   weeks with any results.  - Discharge patient to home.   - Patient has a contact number available for emergencies.  The signs and   symptoms of potential delayed complications were discussed with the   patient.  Return to normal activities tomorrow.  Written discharge   instructions were provided to the patient.   - Resume regular diet.   - Continue present medications.  For questions, problems or results please call your physician - Austen Grimes III, MD at Work:  [phone].  [facility], EMERGENCY ROOM PHONE NUMBER: [phone]  IF A COMPLICATION OR EMERGENCY SITUATION ARISES AND YOU ARE UNABLE TO REACH   YOUR PHYSICIAN - GO DIRECTLY TO THE EMERGENCY ROOM.  Austen Grimes III, MD  5/2/2025 7:34:18 AM  This report has been verified and signed electronically.  Dear patient,  As a result of recent federal legislation (The Federal Cures Act), you may   receive lab or pathology results from your procedure in your [facility]   account before your physician is able to contact you. Your physician or   their representative will relay the results to you with their   recommendations at their soonest availability.  Thank you,  PROVATION

## 2025-05-02 NOTE — ANESTHESIA PREPROCEDURE EVALUATION
2025  Sonia Muse is a 65 y.o., female.      Problem List[1]    Past Surgical History:   Procedure Laterality Date    ADENOIDECTOMY      carpel tunnel Right      SECTION      ERCP N/A 2025    Procedure: ERCP (ENDOSCOPIC RETROGRADE CHOLANGIOPANCREATOGRAPHY);  Surgeon: Thuy Escobar MD;  Location: [facility] ENDO;  Service: Endoscopy;  Laterality: N/A;    JOINT REPLACEMENT Right 2016    knee    KNEE ARTHROPLASTY Left 10/19/2020    Procedure: ARTHROPLASTY, KNEE;  Surgeon: Felipe Herring MD;  Location: [facility] OR;  Service: Orthopedics;  Laterality: Left;  Louie Liz    knee replacement Right     ROTATOR CUFF REPAIR Right     TONSILLECTOMY      TUBAL LIGATION          Tobacco Use:  The patient  reports that she quit smoking about 41 years ago. Her smoking use included cigarettes. She started smoking about 52 years ago. She has a 11 pack-year smoking history. She has never used smokeless tobacco.     Results for orders placed or performed during the hospital encounter of 25   EKG 12-lead    Collection Time: 25  5:03 PM   Result Value Ref Range    QRS Duration 96 ms    OHS QTC Calculation 445 ms    Narrative    Test Reason : Z13.6,    Vent. Rate :  86 BPM     Atrial Rate :  86 BPM     P-R Int : 158 ms          QRS Dur :  96 ms      QT Int : 372 ms       P-R-T Axes :  49 -61  53 degrees    QTcB Int : 445 ms    Normal sinus rhythm  Possible Left atrial enlargement  Left anterior fascicular block  Minimal voltage criteria for LVH, may be normal variant ( Shahbaz product )  Nonspecific T wave abnormality  Abnormal ECG  When compared with ECG of 2025 14:21,  T wave inversion more evident in Anterior leads  Confirmed by Kuldip Charlton ([id number]) on 2025 7:32:10 PM    Referred By: AAAREFERRAL SELF           Confirmed By: Kuldip Charlton             Lab Results   Component  Value Date    WBC 5.76 04/27/2025    HGB 11.4 (L) 04/27/2025    HCT 36.6 (L) 04/27/2025    MCV 88 04/27/2025     04/27/2025     BMP  Lab Results   Component Value Date     04/30/2025    K 3.7 04/30/2025     04/30/2025    CO2 25 04/30/2025    BUN 18 04/30/2025    CREATININE 0.9 04/30/2025    CALCIUM 8.8 04/30/2025    ANIONGAP 11 04/30/2025     (H) 04/30/2025     (H) 04/27/2025     (H) 04/26/2025       Results for orders placed in visit on 12/03/20    Echo Color Flow Doppler? Yes    Interpretation Summary  · There is left ventricular concentric remodeling.  · The left ventricle is normal in size with normal systolic function. The estimated ejection fraction is 65%.  · Normal left ventricular diastolic function.  · Normal right ventricular size with normal right ventricular systolic function.  · Mild mitral regurgitation.  · Mild tricuspid regurgitation.  · Normal central venous pressure (3 mmHg).  · The estimated PA systolic pressure is 28 mmHg.         Pre-op Assessment    I have reviewed the Patient Summary Reports.     I have reviewed the Nursing Notes. I have reviewed the NPO Status.   I have reviewed the Medications.     Review of Systems  Anesthesia Hx:  No problems with previous Anesthesia   Neg history of prior surgery.          Denies Family Hx of Anesthesia complications.    Denies Personal Hx of Anesthesia complications.                    Hematology/Oncology:  Hematology Normal   Oncology Normal                                   EENT/Dental:  EENT/Dental Normal           Cardiovascular:     Hypertension                                          Pulmonary:    Asthma                    Renal/:  Chronic Renal Disease                Hepatic/GI:  Hepatic/GI Normal       Pancreatic mass             Musculoskeletal:  Arthritis               Neurological:    Neuromuscular Disease, (Fibromyalgia)                                   Endocrine:  Diabetes, poorly controlled, type  2           Dermatological:  Skin Normal    Psych:   anxiety depression                Physical Exam  General: Well nourished and Alert    Airway:  Mallampati: II   Mouth Opening: Normal  TM Distance: Normal  Tongue: Normal  Neck ROM: Normal ROM    Chest/Lungs:  Clear to auscultation, Normal Respiratory Rate    Heart:  Rate: Normal  Rhythm: Regular Rhythm  Sounds: Normal        Anesthesia Plan  Type of Anesthesia, risks & benefits discussed:    Anesthesia Type: Gen Natural Airway  Intra-op Monitoring Plan: Standard ASA Monitors  Post Op Pain Control Plan:   (medical reason for not using multimodal pain management)  Induction:  IV  Informed Consent: Informed consent signed with the Patient and all parties understand the risks and agree with anesthesia plan.  All questions answered. Patient consented to blood products? Yes  ASA Score: 3    Ready For Surgery From Anesthesia Perspective.     .           [1]   Patient Active Problem List  Diagnosis    Essential hypertension    Chronic pain of left knee    Depression with anxiety    Fibromyalgia    Vitamin D deficiency    Vitamin B 12 deficiency    Primary osteoarthritis of left knee    Osteoarthritis of left knee    Tachycardia    Asthma    Dependent edema    Hyperglycemia    Family history of elevated blood lipids    Skin lesion of face    Eczema    Pancreatic mass    MIGUEL ANGEL (acute kidney injury)

## 2025-05-02 NOTE — H&P
GASTROENTEROLOGY PRE-PROCEDURE H&P NOTE  Patient Name: Sonia Dickeyo  Patient MRN: [id number]  Patient : 1960    Service date: 2025    PCP: Nasra Galvez FNP    No chief complaint on file.      HPI: Patient is a 65 y.o. female with PMHx as below here for evaluation of obstructive jaundice s/p stenting now  here for EUS.     Past Medical History:  Past Medical History:   Diagnosis Date    Allergy     Anxiety     Asthma     DDD (degenerative disc disease), cervical     Depression     Diabetes mellitus     Fibromyalgia     Hypertension     Neuromuscular disorder     Pancreatic mass     PONV (postoperative nausea and vomiting)     Thyroid disease     hypo        Past Surgical History:  Past Surgical History:   Procedure Laterality Date    ADENOIDECTOMY      carpel tunnel Right      SECTION      ERCP N/A 2025    Procedure: ERCP (ENDOSCOPIC RETROGRADE CHOLANGIOPANCREATOGRAPHY);  Surgeon: Thuy Escobar MD;  Location: [facility] ENDO;  Service: Endoscopy;  Laterality: N/A;    JOINT REPLACEMENT Right     knee    KNEE ARTHROPLASTY Left 10/19/2020    Procedure: ARTHROPLASTY, KNEE;  Surgeon: Felipe Herring MD;  Location: [facility] OR;  Service: Orthopedics;  Laterality: Left;  Louie Liz    knee replacement Right     ROTATOR CUFF REPAIR Right     TONSILLECTOMY      TUBAL LIGATION          Home Medications:  Prescriptions Prior to Admission[1]            Review of patient's allergies indicates:   Allergen Reactions    Erythromycin Swelling    Codeine Nausea And Vomiting     And migraine h/a    [place] Blisters       Social History:   Social History     Occupational History    Occupation: [employer]     Comment: primary sub for [employer]   Tobacco Use    Smoking status: Former     Current packs/day: 0.00     Average packs/day: 1 pack/day for 11.0 years (11.0 ttl pk-yrs)     Types: Cigarettes     Start date:      Quit date:      Years since quittin.3    Smokeless tobacco: Never  "  Substance and Sexual Activity    Alcohol use: Not Currently     Comment: occasional 2x/y    Drug use: Never    Sexual activity: Yes     Partners: Male       Family History:   Family History   Problem Relation Name Age of Onset    Diabetes Mother Libertad     Hypertension Mother Libertad     Heart disease Mother Libertad     Stroke Mother Libertad     Glaucoma Mother Libertad     Arthritis Mother Libertad     Diabetes Father Fredis     Hypertension Father Fredis     Heart disease Father Fredis     Hypertension Sister 1     Heart disease Sister 1     Graves' disease Daughter      Anxiety disorder Daughter         Review of Systems:  A 10 point review of systems was performed and was normal, except as mentioned in the HPI, including constitutional, HEENT, heme, lymph, cardiovascular, respiratory, gastrointestinal, genitourinary, neurologic, endocrine, psychiatric and musculoskeletal.      OBJECTIVE:    Physical Exam:  24 Hour Vital Sign Ranges: Temp:  [98.7 °F (37.1 °C)] 98.7 °F (37.1 °C)  Pulse:  [84] 84  Resp:  [16] 16  SpO2:  [96 %] 96 %  BP: (110)/(65) 110/65  Most recent vitals: /65 (BP Location: Left arm, Patient Position: Lying)   Pulse 84   Temp 98.7 °F (37.1 °C) (Oral)   Resp 16   SpO2 96% Comment: ra  Breastfeeding No    GEN: well-developed, well-nourished, awake and alert, non-toxic appearing adult  HEENT: PERRL, sclera anicteric, oral mucosa pink and moist without lesion  NECK: trachea midline; Good ROM  CV: regular rate and rhythm, no murmurs or gallops  RESP: clear to auscultation bilaterally, no wheezes, rhonci or rales  ABD: soft, non-tender, non-distended, normal bowel sounds  EXT: no swelling or edema, 2+ pulses distally  SKIN: no rashes or jaundice  PSYCH: normal affect    Labs:   No results for input(s): "WBC", "MCV", "PLT" in the last 72 hours.    Invalid input(s): "HGBAU"  Recent Labs     04/30/25  1047      K 3.7      CO2 25   BUN 18   *     No results for input(s): " ""ALB" in the last 72 hours.    Invalid input(s): "ALKP", "SGOT", "SGPT", "TBIL", "DBIL", "TPRO"  No results for input(s): "PT", "INR", "PTT" in the last 72 hours.      IMPRESSION / RECOMMENDATIONS:  EGD/EUS  with interventions as warranted.   RIsks, benefits, alternatives discussed in detail regarding upcoming procedures and sedation. Some of the more common endoscopic complications include but not limited to immediate or delayed perforation, bleeding, infections, pain, inadvertent injury to surrounding tissue / organs and possible need for surgical evaluation. Patient expressed understanding, all questions answered and will proceed with procedure as planned.     Austen Grimes III  5/2/2025  7:10 AM           [1]   Medications Prior to Admission   Medication Sig Dispense Refill Last Dose/Taking    amLODIPine (NORVASC) 5 MG tablet Take 1 tablet (5 mg total) by mouth once daily. 90 tablet 1 5/2/2025 Morning    DULoxetine (CYMBALTA) 60 MG capsule Take 1 capsule (60 mg total) by mouth once daily. 90 capsule 1 5/2/2025 Morning    levothyroxine (SYNTHROID) 25 MCG tablet Take 1 tablet (25 mcg total) by mouth before breakfast. 90 tablet 1 5/2/2025 Morning    albuterol (VENTOLIN HFA) 90 mcg/actuation inhaler Inhale 2 puffs into the lungs every 6 (six) hours as needed for Wheezing or Shortness of Breath. Rescue 18 g 1     ALPRAZolam (XANAX) 1 MG tablet Take 1 tablet (1 mg total) by mouth once daily. (Patient taking differently: Take 1 mg by mouth as needed.) 30 tablet 0     blood sugar diagnostic Strp 1 each by Misc.(Non-Drug; Combo Route) route 3 (three) times daily. 90 each 0     blood-glucose meter kit Use as instructed 1 each 0     cetirizine (ZYRTEC) 10 MG tablet TAKE 1 TABLET BY MOUTH ONCE DAILY 90 tablet 1     hydroCHLOROthiazide (HYDRODIURIL) 25 MG tablet TAKE one TABLET BY MOUTH ONCE DAILY (Patient taking differently: Take 12.5 mg by mouth once daily.) 90 tablet 1     insulin glargine U-100, Lantus, 100 unit/mL " (3 mL) SubQ InPn pen Inject 18 Units into the skin every evening. 6 mL 1     lancets (LANCETS,THIN) Misc 1 each by Misc.(Non-Drug; Combo Route) route 3 (three) times daily. 90 each 0     LIDOcaine-prilocaine (EMLA) cream Apply 1 g topically twice a week.       losartan (COZAAR) 100 MG tablet Take 1 tablet (100 mg total) by mouth once daily. 90 tablet 1     meloxicam (MOBIC) 15 MG tablet Take 1 tablet (15 mg total) by mouth once daily. (Patient taking differently: Take 15 mg by mouth once daily. On hold 4/30/25) 90 tablet 0     metoprolol succinate (TOPROL-XL) 25 MG 24 hr tablet Take 1 tablet (25 mg total) by mouth once daily. 90 tablet 1     metronidazole 0.75% (METROCREAM) 0.75 % Crea Apply 1 application  topically 2 (two) times daily.       montelukast (SINGULAIR) 10 mg tablet TAKE ONE TABLET BY MOUTH EVERY EVENING (Patient taking differently: Take 10 mg by mouth daily as needed.) 90 tablet 1     multivit-mins no.63/iron/folic (M-VIT ORAL) Take 1 tablet by mouth once daily.

## 2025-05-04 ENCOUNTER — PATIENT MESSAGE (OUTPATIENT)
Dept: FAMILY MEDICINE | Facility: CLINIC | Age: 65
End: 2025-05-04
Payer: MEDICAID

## 2025-05-06 ENCOUNTER — HOSPITAL ENCOUNTER (OUTPATIENT)
Facility: HOSPITAL | Age: 65
Discharge: HOME | End: 2025-05-06
Attending: INTERNAL MEDICINE | Admitting: INTERNAL MEDICINE
Payer: MEDICAID

## 2025-05-06 ENCOUNTER — ANESTHESIA EVENT (OUTPATIENT)
Dept: SURGERY | Facility: HOSPITAL | Age: 65
End: 2025-05-06
Payer: MEDICAID

## 2025-05-06 ENCOUNTER — PATIENT MESSAGE (OUTPATIENT)
Dept: FAMILY MEDICINE | Facility: CLINIC | Age: 65
End: 2025-05-06
Payer: MEDICAID

## 2025-05-06 ENCOUNTER — ANESTHESIA (OUTPATIENT)
Dept: SURGERY | Facility: HOSPITAL | Age: 65
End: 2025-05-06
Payer: MEDICAID

## 2025-05-06 ENCOUNTER — HOSPITAL ENCOUNTER (OUTPATIENT)
Dept: RADIOLOGY | Facility: HOSPITAL | Age: 65
Discharge: HOME | End: 2025-05-06
Attending: INTERNAL MEDICINE | Admitting: INTERNAL MEDICINE
Payer: MEDICAID

## 2025-05-06 ENCOUNTER — TELEPHONE (OUTPATIENT)
Dept: FAMILY MEDICINE | Facility: CLINIC | Age: 65
End: 2025-05-06
Payer: MEDICAID

## 2025-05-06 VITALS
OXYGEN SATURATION: 97 % | HEART RATE: 88 BPM | TEMPERATURE: 98 F | HEIGHT: 64 IN | SYSTOLIC BLOOD PRESSURE: 110 MMHG | RESPIRATION RATE: 16 BRPM | BODY MASS INDEX: 45.24 KG/M2 | DIASTOLIC BLOOD PRESSURE: 68 MMHG | WEIGHT: 265 LBS

## 2025-05-06 DIAGNOSIS — K86.89 PANCREATIC MASS: ICD-10-CM

## 2025-05-06 DIAGNOSIS — R10.9 ABDOMINAL PAIN: ICD-10-CM

## 2025-05-06 PROCEDURE — 27202125 HC BALLOON, EXTRACTION (ANY): Performed by: INTERNAL MEDICINE

## 2025-05-06 PROCEDURE — 82962 GLUCOSE BLOOD TEST: CPT | Performed by: INTERNAL MEDICINE

## 2025-05-06 PROCEDURE — 43264 ERCP REMOVE DUCT CALCULI: CPT | Performed by: INTERNAL MEDICINE

## 2025-05-06 PROCEDURE — 37000009 HC ANESTHESIA EA ADD 15 MINS: Performed by: INTERNAL MEDICINE

## 2025-05-06 PROCEDURE — 25000003 PHARM REV CODE 250: Performed by: NURSE ANESTHETIST, CERTIFIED REGISTERED

## 2025-05-06 PROCEDURE — 27201089 HC SNARE, DISP (ANY): Performed by: INTERNAL MEDICINE

## 2025-05-06 PROCEDURE — 43276 ERCP STENT EXCHANGE W/DILATE: CPT | Performed by: INTERNAL MEDICINE

## 2025-05-06 PROCEDURE — 37000008 HC ANESTHESIA 1ST 15 MINUTES: Performed by: INTERNAL MEDICINE

## 2025-05-06 PROCEDURE — C1876 STENT, NON-COA/NON-COV W/DEL: HCPCS | Performed by: INTERNAL MEDICINE

## 2025-05-06 PROCEDURE — 25500020 PHARM REV CODE 255: Performed by: INTERNAL MEDICINE

## 2025-05-06 PROCEDURE — 63600175 PHARM REV CODE 636 W HCPCS: Performed by: NURSE ANESTHETIST, CERTIFIED REGISTERED

## 2025-05-06 PROCEDURE — 74330 X-RAY BILE/PANC ENDOSCOPY: CPT | Mod: 26,,, | Performed by: RADIOLOGY

## 2025-05-06 PROCEDURE — C1769 GUIDE WIRE: HCPCS | Performed by: INTERNAL MEDICINE

## 2025-05-06 PROCEDURE — 74330 X-RAY BILE/PANC ENDOSCOPY: CPT | Mod: TC

## 2025-05-06 RX ORDER — ONDANSETRON HYDROCHLORIDE 2 MG/ML
INJECTION, SOLUTION INTRAVENOUS
Status: DISCONTINUED | OUTPATIENT
Start: 2025-05-06 | End: 2025-05-06

## 2025-05-06 RX ORDER — SUCCINYLCHOLINE CHLORIDE 20 MG/ML
INJECTION INTRAMUSCULAR; INTRAVENOUS
Status: DISCONTINUED | OUTPATIENT
Start: 2025-05-06 | End: 2025-05-06

## 2025-05-06 RX ORDER — DIPHENHYDRAMINE HYDROCHLORIDE 50 MG/ML
INJECTION, SOLUTION INTRAMUSCULAR; INTRAVENOUS
Status: DISCONTINUED | OUTPATIENT
Start: 2025-05-06 | End: 2025-05-06

## 2025-05-06 RX ORDER — GLUCAGON 1 MG
1 KIT INJECTION
Status: DISCONTINUED | OUTPATIENT
Start: 2025-05-06 | End: 2025-05-06 | Stop reason: HOSPADM

## 2025-05-06 RX ORDER — DEXAMETHASONE SODIUM PHOSPHATE 4 MG/ML
INJECTION, SOLUTION INTRA-ARTICULAR; INTRALESIONAL; INTRAMUSCULAR; INTRAVENOUS; SOFT TISSUE
Status: DISCONTINUED | OUTPATIENT
Start: 2025-05-06 | End: 2025-05-06

## 2025-05-06 RX ORDER — LIDOCAINE HYDROCHLORIDE 20 MG/ML
INJECTION INTRAVENOUS
Status: DISCONTINUED | OUTPATIENT
Start: 2025-05-06 | End: 2025-05-06

## 2025-05-06 RX ORDER — FENTANYL CITRATE 50 UG/ML
INJECTION, SOLUTION INTRAMUSCULAR; INTRAVENOUS
Status: DISCONTINUED | OUTPATIENT
Start: 2025-05-06 | End: 2025-05-06

## 2025-05-06 RX ORDER — SODIUM CHLORIDE, SODIUM LACTATE, POTASSIUM CHLORIDE, CALCIUM CHLORIDE 600; 310; 30; 20 MG/100ML; MG/100ML; MG/100ML; MG/100ML
INJECTION, SOLUTION INTRAVENOUS CONTINUOUS PRN
Status: DISCONTINUED | OUTPATIENT
Start: 2025-05-06 | End: 2025-05-06

## 2025-05-06 RX ORDER — DIPHENHYDRAMINE HYDROCHLORIDE 50 MG/ML
12.5 INJECTION, SOLUTION INTRAMUSCULAR; INTRAVENOUS
Status: DISCONTINUED | OUTPATIENT
Start: 2025-05-06 | End: 2025-05-06 | Stop reason: HOSPADM

## 2025-05-06 RX ORDER — LIDOCAINE HYDROCHLORIDE 20 MG/ML
JELLY TOPICAL
Status: DISCONTINUED | OUTPATIENT
Start: 2025-05-06 | End: 2025-05-06

## 2025-05-06 RX ORDER — ROCURONIUM BROMIDE 10 MG/ML
INJECTION, SOLUTION INTRAVENOUS
Status: DISCONTINUED | OUTPATIENT
Start: 2025-05-06 | End: 2025-05-06

## 2025-05-06 RX ORDER — FAMOTIDINE 10 MG/ML
INJECTION, SOLUTION INTRAVENOUS
Status: DISCONTINUED | OUTPATIENT
Start: 2025-05-06 | End: 2025-05-06

## 2025-05-06 RX ORDER — ONDANSETRON HYDROCHLORIDE 2 MG/ML
4 INJECTION, SOLUTION INTRAVENOUS DAILY PRN
Status: DISCONTINUED | OUTPATIENT
Start: 2025-05-06 | End: 2025-05-06 | Stop reason: HOSPADM

## 2025-05-06 RX ORDER — PROPOFOL 10 MG/ML
VIAL (ML) INTRAVENOUS
Status: DISCONTINUED | OUTPATIENT
Start: 2025-05-06 | End: 2025-05-06

## 2025-05-06 RX ORDER — FENTANYL CITRATE 50 UG/ML
25 INJECTION, SOLUTION INTRAMUSCULAR; INTRAVENOUS EVERY 5 MIN PRN
Status: DISCONTINUED | OUTPATIENT
Start: 2025-05-06 | End: 2025-05-06 | Stop reason: HOSPADM

## 2025-05-06 RX ORDER — OXYCODONE HYDROCHLORIDE 5 MG/1
5 TABLET ORAL
Status: DISCONTINUED | OUTPATIENT
Start: 2025-05-06 | End: 2025-05-06 | Stop reason: HOSPADM

## 2025-05-06 RX ADMIN — DIPHENHYDRAMINE HYDROCHLORIDE 6.25 MG: 50 INJECTION INTRAMUSCULAR; INTRAVENOUS at 01:05

## 2025-05-06 RX ADMIN — LIDOCAINE HYDROCHLORIDE 3 ML: 20 JELLY TOPICAL at 01:05

## 2025-05-06 RX ADMIN — SODIUM CHLORIDE, SODIUM LACTATE, POTASSIUM CHLORIDE, AND CALCIUM CHLORIDE: .6; .31; .03; .02 INJECTION, SOLUTION INTRAVENOUS at 01:05

## 2025-05-06 RX ADMIN — ROCURONIUM BROMIDE 10 MG: 10 INJECTION, SOLUTION INTRAVENOUS at 01:05

## 2025-05-06 RX ADMIN — DEXAMETHASONE SODIUM PHOSPHATE 8 MG: 4 INJECTION, SOLUTION INTRAMUSCULAR; INTRAVENOUS at 02:05

## 2025-05-06 RX ADMIN — FAMOTIDINE 20 MG: 10 INJECTION, SOLUTION INTRAVENOUS at 01:05

## 2025-05-06 RX ADMIN — ONDANSETRON 4 MG: 2 INJECTION INTRAMUSCULAR; INTRAVENOUS at 01:05

## 2025-05-06 RX ADMIN — Medication 140 MG: at 01:05

## 2025-05-06 RX ADMIN — FENTANYL CITRATE 100 MCG: 50 INJECTION, SOLUTION INTRAMUSCULAR; INTRAVENOUS at 01:05

## 2025-05-06 RX ADMIN — PROPOFOL 120 MG: 10 INJECTION, EMULSION INTRAVENOUS at 01:05

## 2025-05-06 RX ADMIN — LIDOCAINE HYDROCHLORIDE 75 MG: 20 INJECTION, SOLUTION INTRAVENOUS at 01:05

## 2025-05-06 NOTE — ANESTHESIA PREPROCEDURE EVALUATION
05/06/2025  Sonia Muse is a 65 y.o., female.        Results for orders placed or performed during the hospital encounter of 04/24/25   EKG 12-lead    Collection Time: 04/24/25  5:03 PM   Result Value Ref Range    QRS Duration 96 ms    OHS QTC Calculation 445 ms    Narrative    Test Reason : Z13.6,    Vent. Rate :  86 BPM     Atrial Rate :  86 BPM     P-R Int : 158 ms          QRS Dur :  96 ms      QT Int : 372 ms       P-R-T Axes :  49 -61  53 degrees    QTcB Int : 445 ms    Normal sinus rhythm  Possible Left atrial enlargement  Left anterior fascicular block  Minimal voltage criteria for LVH, may be normal variant ( Shahbaz product )  Nonspecific T wave abnormality  Abnormal ECG  When compared with ECG of 24-Apr-2025 14:21,  T wave inversion more evident in Anterior leads  Confirmed by Kuldip Charlton ([id number]) on 4/30/2025 7:32:10 PM    Referred By: AAAREFERRAL SELF           Confirmed By: Kuldip Charlton               Lab Results   Component Value Date    WBC 5.76 04/27/2025    HGB 11.4 (L) 04/27/2025    HCT 36.6 (L) 04/27/2025    MCV 88 04/27/2025     04/27/2025     BMP  Lab Results   Component Value Date     04/30/2025    K 3.7 04/30/2025     04/30/2025    CO2 25 04/30/2025    BUN 18 04/30/2025    CREATININE 0.9 04/30/2025    CALCIUM 8.8 04/30/2025    ANIONGAP 11 04/30/2025     (H) 04/30/2025     (H) 04/27/2025     (H) 04/26/2025       Results for orders placed in visit on 12/03/20    Echo Color Flow Doppler? Yes    Interpretation Summary  · There is left ventricular concentric remodeling.  · The left ventricle is normal in size with normal systolic function. The estimated ejection fraction is 65%.  · Normal left ventricular diastolic function.  · Normal right ventricular size with normal right ventricular systolic function.  · Mild mitral regurgitation.  ·  Mild tricuspid regurgitation.  · Normal central venous pressure (3 mmHg).  · The estimated PA systolic pressure is 28 mmHg.          Pre-op Assessment    I have reviewed the Patient Summary Reports.     I have reviewed the Nursing Notes. I have reviewed the NPO Status.   I have reviewed the Medications.     Review of Systems  Anesthesia Hx:             Denies Family Hx of Anesthesia complications.   Personal Hx of Anesthesia complications, Post-Operative Nausea/Vomiting, in the past, but not with recent anesthetics / prophylaxis                    Social:  Former Smoker, No Alcohol Use       Hematology/Oncology:  Hematology Normal   Oncology Normal                                   EENT/Dental:  EENT/Dental Normal           Cardiovascular:     Hypertension, well controlled              ECG has been reviewed. History of tachycardia Patient on beta blockers                    Hypertension         Pulmonary:    Asthma (albuterol as needed only) mild  Recent URI: COVID 2 months ago.  Now just occasional dry cough..  Patient strongly suspects sleep apnea although she has not been tested.  She reports that her snoring wakes her up at night.  Ventolin inhaler use  Singulair use  Oxygen saturation 94% prior to procedure               Renal/:  Chronic Renal Disease   History of acute kidney injury     Kidney Function/Disease             Hepatic/GI:        Pancreatic mass and biliary obstruction.  No recent nausea or vomiting.           Pancreatic Disease Pancreatic Tumor  Musculoskeletal:  Arthritis     Muscle Disorders: Fibromyalgia  Joint Disease:  Arthritis, Osteoarthritis     Spine Disorders: cervical Disc disease and Degenerative disease           Neurological:    Neuromuscular Disease,             Chronic Pain Syndrome Osteoarthritis                         Neuromuscular Disease   Endocrine:  Diabetes, poorly controlled, type 2, using insulin         Morbid Obesity / BMI > 40  Dermatological:  Multiple bruises on her  arms from recent IVs.   Psych:  Psychiatric History anxiety depression                Physical Exam  General: Cooperative, Alert and Oriented    Airway:  Mallampati: II / I  Mouth Opening: Normal  TM Distance: Normal  Tongue: Normal  Neck ROM: Normal ROM    Dental:  Intact    Chest/Lungs:  Clear to auscultation, Normal Respiratory Rate    Heart:  Rate: Normal  Rhythm: Regular Rhythm  Sounds: Normal        Anesthesia Plan  Type of Anesthesia, risks & benefits discussed:    Anesthesia Type: Gen ETT  Intra-op Monitoring Plan: Standard ASA Monitors  Post Op Pain Control Plan:   (medical reason for not using multimodal pain management)  Induction:  IV  Airway Plan: Video  Informed Consent: Informed consent signed with the Patient and all parties understand the risks and agree with anesthesia plan.  All questions answered.   ASA Score: 3  Anesthesia Plan Notes: GETA  TIVA  No Decadron   No Ofirmev  Zofran 4mg iv, Pepcid 20mg iv   Sugammadex        Ready For Surgery From Anesthesia Perspective.     .

## 2025-05-06 NOTE — H&P
GASTROENTEROLOGY PRE-PROCEDURE H&P NOTE  Patient Name: Sonia Dickeyo  Patient MRN: [id number]  Patient : 1960    Service date: 2025    PCP: Nasra Galvez FNP    No chief complaint on file.      HPI: Patient is a 65 y.o. female with PMHx as below here for evaluation of biliary obstruction from pancreatic cancer. .     Past Medical History:  Past Medical History:   Diagnosis Date    Allergy     Anxiety     Asthma     DDD (degenerative disc disease), cervical     Depression     Diabetes mellitus     Fibromyalgia     Hypertension     Neuromuscular disorder     Pancreatic mass     PONV (postoperative nausea and vomiting)     Thyroid disease     hypo        Past Surgical History:  Past Surgical History:   Procedure Laterality Date    ADENOIDECTOMY      carpel tunnel Right      SECTION      ENDOSCOPIC ULTRASOUND OF UPPER GASTROINTESTINAL TRACT N/A 2025    Procedure: ULTRASOUND, UPPER GI TRACT, ENDOSCOPIC;  Surgeon: Austen Grimes III, MD;  Location: [facility] ENDO;  Service: Endoscopy;  Laterality: N/A;    ERCP N/A 2025    Procedure: ERCP (ENDOSCOPIC RETROGRADE CHOLANGIOPANCREATOGRAPHY);  Surgeon: Thuy Escobar MD;  Location: [facility] ENDO;  Service: Endoscopy;  Laterality: N/A;    JOINT REPLACEMENT Right     knee    KNEE ARTHROPLASTY Left 10/19/2020    Procedure: ARTHROPLASTY, KNEE;  Surgeon: Felipe Herring MD;  Location: [facility] beds at the [facility];  Service: Orthopedics;  Laterality: Left;  Louie Liz    knee replacement Right     ROTATOR CUFF REPAIR Right     TONSILLECTOMY      TUBAL LIGATION          Home Medications:  Prescriptions Prior to Admission[1]            Review of patient's allergies indicates:   Allergen Reactions    Erythromycin Swelling    Codeine Nausea And Vomiting     And migraine h/a    [place] Blisters       Social History:   Social History     Occupational History    Occupation: [employer]     Comment: primary sub for [employer]   Tobacco Use    Smoking status: Former      "Current packs/day: 0.00     Average packs/day: 1 pack/day for 11.0 years (11.0 ttl pk-yrs)     Types: Cigarettes     Start date:      Quit date:      Years since quittin.3    Smokeless tobacco: Never   Substance and Sexual Activity    Alcohol use: Not Currently     Comment: occasional 2x/y    Drug use: Never    Sexual activity: Yes     Partners: Male       Family History:   Family History   Problem Relation Name Age of Onset    Diabetes Mother Libertad     Hypertension Mother Libertad     Heart disease Mother Libertad     Stroke Mother Libertad     Glaucoma Mother Libertad     Arthritis Mother Libertad     Diabetes Father Fredis     Hypertension Father Fredis     Heart disease Father Fredis     Hypertension Sister 1     Heart disease Sister 1     Graves' disease Daughter      Anxiety disorder Daughter         Review of Systems:  A 10 point review of systems was performed and was normal, except as mentioned in the HPI, including constitutional, HEENT, heme, lymph, cardiovascular, respiratory, gastrointestinal, genitourinary, neurologic, endocrine, psychiatric and musculoskeletal.      OBJECTIVE:    Physical Exam:  24 Hour Vital Sign Ranges:    Most recent vitals: There were no vitals taken for this visit.   GEN: well-developed, well-nourished, awake and alert, non-toxic appearing adult  HEENT: PERRL, sclera anicteric, oral mucosa pink and moist without lesion  NECK: trachea midline; Good ROM  CV: regular rate and rhythm, no murmurs or gallops  RESP: clear to auscultation bilaterally, no wheezes, rhonci or rales  ABD: soft, non-tender, non-distended, normal bowel sounds  EXT: no swelling or edema, 2+ pulses distally  SKIN: no rashes or jaundice  PSYCH: normal affect    Labs:   No results for input(s): "WBC", "MCV", "PLT" in the last 72 hours.    Invalid input(s): "HGBAU"  No results for input(s): "NA", "K", "CL", "CO2", "BUN", "GLU" in the last 72 hours.    Invalid input(s): "CREA"  No results for input(s): " ""ALB" in the last 72 hours.    Invalid input(s): "ALKP", "SGOT", "SGPT", "TBIL", "DBIL", "TPRO"  No results for input(s): "PT", "INR", "PTT" in the last 72 hours.      IMPRESSION / RECOMMENDATIONS:  ERCp w/ metal stent  with interventions as warranted.   RIsks, benefits, alternatives discussed in detail regarding upcoming procedures and sedation. Some of the more common endoscopic complications include but not limited to immediate or delayed perforation, bleeding, infections, pain, inadvertent injury to surrounding tissue / organs and possible need for surgical evaluation. Patient expressed understanding, all questions answered and will proceed with procedure as planned.     Austen LAZARO Dauterive III  5/6/2025  1:07 PM           [1]   Medications Prior to Admission   Medication Sig Dispense Refill Last Dose/Taking    albuterol (VENTOLIN HFA) 90 mcg/actuation inhaler Inhale 2 puffs into the lungs every 6 (six) hours as needed for Wheezing or Shortness of Breath. Rescue 18 g 1     ALPRAZolam (XANAX) 1 MG tablet Take 1 tablet (1 mg total) by mouth once daily. (Patient taking differently: Take 1 mg by mouth as needed.) 30 tablet 0     amLODIPine (NORVASC) 5 MG tablet Take 1 tablet (5 mg total) by mouth once daily. 90 tablet 1     blood sugar diagnostic Strp 1 each by Misc.(Non-Drug; Combo Route) route 3 (three) times daily. 90 each 0     blood-glucose meter kit Use as instructed 1 each 0     cetirizine (ZYRTEC) 10 MG tablet TAKE 1 TABLET BY MOUTH ONCE DAILY 90 tablet 1     DULoxetine (CYMBALTA) 60 MG capsule Take 1 capsule (60 mg total) by mouth once daily. 90 capsule 1     hydroCHLOROthiazide (HYDRODIURIL) 25 MG tablet TAKE one TABLET BY MOUTH ONCE DAILY (Patient taking differently: Take 12.5 mg by mouth once daily.) 90 tablet 1     insulin glargine U-100, Lantus, 100 unit/mL (3 mL) SubQ InPn pen Inject 18 Units into the skin every evening. 6 mL 1     lancets (LANCETS,THIN) Misc 1 each by Misc.(Non-Drug; Combo Route) " route 3 (three) times daily. 90 each 0     levothyroxine (SYNTHROID) 25 MCG tablet Take 1 tablet (25 mcg total) by mouth before breakfast. 90 tablet 1     LIDOcaine-prilocaine (EMLA) cream Apply 1 g topically twice a week.       losartan (COZAAR) 100 MG tablet Take 1 tablet (100 mg total) by mouth once daily. 90 tablet 1     meloxicam (MOBIC) 15 MG tablet Take 1 tablet (15 mg total) by mouth once daily. (Patient taking differently: Take 15 mg by mouth once daily. On hold 4/30/25) 90 tablet 0     metoprolol succinate (TOPROL-XL) 25 MG 24 hr tablet Take 1 tablet (25 mg total) by mouth once daily. 90 tablet 1     metronidazole 0.75% (METROCREAM) 0.75 % Crea Apply 1 application  topically 2 (two) times daily.       montelukast (SINGULAIR) 10 mg tablet TAKE ONE TABLET BY MOUTH EVERY EVENING (Patient taking differently: Take 10 mg by mouth daily as needed.) 90 tablet 1     multivit-mins no.63/iron/folic (M-VIT ORAL) Take 1 tablet by mouth once daily.

## 2025-05-06 NOTE — TELEPHONE ENCOUNTER
----- Message from Med Assistant Simms sent at 5/6/2025  8:03 AM CDT -----  Patient states she has surgery this afternoon and she sent an urgent portal message - she would like a call back asap about it. [phone]

## 2025-05-06 NOTE — TRANSFER OF CARE
"Anesthesia Transfer of Care Note    Patient: Sonia Muse    Procedure(s) Performed: Procedure(s) (LRB):  ERCP (ENDOSCOPIC RETROGRADE CHOLANGIOPANCREATOGRAPHY) (N/A)    Patient location: PACU    Anesthesia Type: general    Transport from OR: Transported from OR on room air with adequate spontaneous ventilation    Post pain: adequate analgesia    Post assessment: no apparent anesthetic complications    Post vital signs: stable    Level of consciousness: awake and alert    Nausea/Vomiting: no nausea/vomiting    Complications: none    Transfer of care protocol was followed      Last vitals: Visit Vitals  BP (!) 108/56 (BP Location: Left arm, Patient Position: Lying)   Pulse 94   Temp 36.8 °C (98.3 °F) (Temporal)   Resp 20   Ht 5' 4" (1.626 m)   Wt 120.2 kg (265 lb)   SpO2 (!) 94%   Breastfeeding No   BMI 45.49 kg/m²     "

## 2025-05-06 NOTE — ANESTHESIA POSTPROCEDURE EVALUATION
Anesthesia Post Evaluation    Patient: Sonia Muse    Procedure(s) Performed: Procedure(s) (LRB):  ERCP (ENDOSCOPIC RETROGRADE CHOLANGIOPANCREATOGRAPHY) (N/A)    Final Anesthesia Type: general      Patient location during evaluation: PACU  Patient participation: Yes- Able to Participate  Level of consciousness: awake and alert and oriented  Post-procedure vital signs: reviewed and stable  Pain management: adequate  Airway patency: patent    PONV status at discharge: No PONV  Anesthetic complications: no      Cardiovascular status: blood pressure returned to baseline, hemodynamically stable and stable  Respiratory status: unassisted, spontaneous ventilation and room air  Hydration status: euvolemic  Follow-up not needed.              Vitals Value Taken Time   /57 05/06/25 15:00   Temp 36.8 °C (98.3 °F) 05/06/25 14:28   Pulse 88 05/06/25 15:01   Resp 16 05/06/25 15:01   SpO2 97 % 05/06/25 15:01   Vitals shown include unfiled device data.      Event Time   Out of Recovery 15:00:47         Pain/Lori Score: Lori Score: 10 (5/6/2025  3:00 PM)

## 2025-05-06 NOTE — OP NOTE
ENDOSCOPIC RETROGRADE CHOLANGIOPANCREATOGRAPHY PROCEDURE NOTE  Patient Name: Sonia Dickeyo  Patient MRN: [id number]  Patient : 1960    Service date: 2025    Reason for Procedure: obstructive jaundice from pancreatic cancer    PCP: Nasra Galvez FNP    No chief complaint on file.    PROCEDURE(S):  -Endoscopic retrograde cholangiopancreatography with stent exchange, PD stent removal, sweep.     MEDICATIONS:  -Anesthesia care, please see anesthesiology documentation    PATIENT MONITORING:  -Continuous telemetry, pulse oximetry, and blood pressure were performed.    INSTRUMENT:  -Olympus duodenoscope    PROCEDURE NOTE:  The procedure and its accompanying risks were explained to the patient and informed consent was obtained.  Cardiopulmonary assessment was adequate.  Time out was performed using the patient's name, birth date and procedure.  The patient was placed in the semiprone position.  Lead shielding was applied appropriate.  A  fluoroscopy film was obtained.  After adequate positioning was achieved, the duodenoscope was then inserted into the posterior oropharynx and advanced to the duodenum under direct visualization.  The ampulla was then located and the scope positioned in short position. Biliary and pancreatic stents in place removed w/ snare.    A triple lumen sphincterotome preloaded with a 0.025 guidewire was used for cannulation.  The bile duct was selectively cannulated with ease.  The guidewire was advanced to the intrahepatic ducts.   A cholangiogram was performed. The papillotome was withdrawn to the level of the ampulla.      The catheter was exchanged for a  balloon tipped catheter.  Multiple balloon sweeps were performed with the balloon inflated to match the size of the bile duct.  Sludge was extracted from the bile duct.     [employer] WallFlex 10 mm x 4 cm uncovered metal stent.  The biliary stent was placed successfully in a transpapillary position.    The  guidewire and catheter were withdrawn.  The ampulla was inspected with no evidence of bleeding or perforation.  The duodenoscope was withdrawn into the stomach.  The stomach was decompressed.  The duodenoscope was completely removed and the procedure complete.  The patient tolerated the procedure well with no immediate complications.  Post procedurefluoroscopy showed no free air around the liver or in the retroperitoneum.    CONDITION: Stable  COMPLICATIONS: None  ESTIMATED BLOOD LOSS: Minimal    FINDINGS:  1. Esophagus, Stomach, Duodenum:  -Limited endoscopy was unremarkable.    2. Ampulla:  -Dedicated views of the ampulla were obtained  -The ampulla was with previous sphincterotomy  -During today's procedure the following was performed: biliary stent exchange and PD stent removal    3. Cholangiogram:  -Limited views of the intrahepatic ducts were unremarkable  -The gallbladder was not evaluated  -Extrahepatic duct measured approximately 9 mm  -Extrahepatic duct stricture; intrapancreatic portion of the bile duct; measuring 2 cm; the upstream ducts were dilated   -Plastic stent removed. Metal bile duct stent placed as described above    4. Pancreatogram:  -PD stent removed w/ snare. Not cannulated at any point during this case.     IMPRESSION:  -Plastic CBD / PD stents removed w/ snare  -Distal CBD stricture s/p uncovered metal stent  -See above for details    RECOMMENDATIONS:  -Monitor in recovery  -D/c home  -Findings of adenoCa d/w'd patient / family; surgery and oncology notified    Austen Grimes III  5/6/2025  2:24 PM

## 2025-05-06 NOTE — TRANSFER OF CARE
"Anesthesia Transfer of Care Note    Patient: Sonia Muse    Procedure(s) Performed: Procedure(s) (LRB):  ERCP (ENDOSCOPIC RETROGRADE CHOLANGIOPANCREATOGRAPHY) (N/A)    Patient location: PACU    Anesthesia Type: general    Transport from OR: Transported from OR on room air with adequate spontaneous ventilation    Post pain: adequate analgesia    Post assessment: no apparent anesthetic complications    Post vital signs: stable    Level of consciousness: awake and alert    Nausea/Vomiting: no nausea/vomiting    Complications: none    Transfer of care protocol was followed      Last vitals: Visit Vitals  /68 (BP Location: Left arm, Patient Position: Lying)   Pulse 97   Temp 36.8 °C (98.2 °F) (Temporal)   Resp 16   Ht 5' 4" (1.626 m)   Wt 120.2 kg (265 lb)   SpO2 97%   Breastfeeding No   BMI 45.49 kg/m²     "

## 2025-05-06 NOTE — PLAN OF CARE
Nursing Transfer Note      Reason patient is being transferred: PACU 05    Transfer To: Endo 03    Transfer via stretcher    Transported by DAVEY Johnston     Any special needs or follow-up needed: Routine Care    Chart send with patient: Yes    Notified: spouse, friend - via secure message and at the bedside    Patient reassessed at: 5/6/2025 1507 (date, time)     DAVEY Raza came to the bedside to assess patient.

## 2025-05-06 NOTE — ANESTHESIA PROCEDURE NOTES
Intubation    Date/Time: 5/6/2025 1:57 PM    Performed by: Torsten Champion CRNA  Authorized by: Aleks Harper MD    Intubation:     Induction:  Intravenous    Intubated:  Postinduction    Mask Ventilation:  Easy mask    Attempts:  1    Attempted By:  CRNA    Method of Intubation:  Video laryngoscopy    Blade:  Haskins 3    Laryngeal View Grade: Grade I - full view of cords      Difficult Airway Encountered?: No      Complications:  None    Airway Device:  Oral endotracheal tube    Airway Device Size:  7.0    Style/Cuff Inflation:  Cuffed (inflated to minimal occlusive pressure)    Tube secured:  20    Secured at:  The lips    Placement Verified By:  Capnometry    Complicating Factors:  None    Findings Post-Intubation:  BS equal bilateral and atraumatic/condition of teeth unchanged

## 2025-05-07 ENCOUNTER — TELEPHONE (OUTPATIENT)
Facility: CLINIC | Age: 65
End: 2025-05-07
Payer: MEDICAID

## 2025-05-07 DIAGNOSIS — K86.89 PANCREATIC MASS: Primary | ICD-10-CM

## 2025-05-07 LAB
POCT GLUCOSE: 166 MG/DL (ref 70–110)
POCT GLUCOSE: 184 MG/DL (ref 70–110)

## 2025-05-07 NOTE — NURSING
Oncology Navigation   Intake  Date of Diagnosis: 05/02/25  Cancer Type: GI  Type of Referral: Internal  Date of Referral: 05/06/25     Treatment  Current Status: Staging work-up    Surgical Oncologist: Dr. Noah Clark  Consult Date: 05/19/25    Medical Oncologist: Dr. Paty Calle       Procedures: Biopsy; CT; EUS / EGD  Biopsy Schedule Date: 05/02/25  CT Schedule Date: 05/14/25  EUS / EGD Date: 05/02/25                   Referral placed by Dr. Grimes for patient to see surgical oncology. Patient scheduled with Dr. Clark on 5/19/25. CT abdomen pelvis and CA 19-9 scheduled. Patient is established with Dr. Calle for medical oncology. Follow up on 4/16/25. Time, date and location of appointments reviewed. Contact information given. Encouraged to call with any questions or concerns.

## 2025-05-08 ENCOUNTER — TELEPHONE (OUTPATIENT)
Dept: HEMATOLOGY/ONCOLOGY | Facility: CLINIC | Age: 65
End: 2025-05-08
Payer: MEDICAID

## 2025-05-08 ENCOUNTER — CLINICAL SUPPORT (OUTPATIENT)
Dept: DIABETES | Facility: CLINIC | Age: 65
End: 2025-05-08
Payer: MEDICAID

## 2025-05-08 VITALS — WEIGHT: 262.44 LBS | BODY MASS INDEX: 45.05 KG/M2

## 2025-05-08 DIAGNOSIS — E11.65 TYPE 2 DIABETES MELLITUS WITH HYPERGLYCEMIA, WITH LONG-TERM CURRENT USE OF INSULIN: ICD-10-CM

## 2025-05-08 DIAGNOSIS — Z09 HOSPITAL DISCHARGE FOLLOW-UP: ICD-10-CM

## 2025-05-08 DIAGNOSIS — Z79.4 TYPE 2 DIABETES MELLITUS WITH HYPERGLYCEMIA, WITH LONG-TERM CURRENT USE OF INSULIN: ICD-10-CM

## 2025-05-08 PROCEDURE — 99999PBSHW PR PBB SHADOW TECHNICAL ONLY FILED TO HB: Mod: PBBFAC,,,

## 2025-05-08 PROCEDURE — 99213 OFFICE O/P EST LOW 20 MIN: CPT | Mod: PBBFAC,PO | Performed by: NUTRITIONIST

## 2025-05-08 PROCEDURE — 99999 PR PBB SHADOW E&M-EST. PATIENT-LVL III: CPT | Mod: PBBFAC,,, | Performed by: NUTRITIONIST

## 2025-05-08 PROCEDURE — G0108 DIAB MANAGE TRN  PER INDIV: HCPCS | Mod: PBBFAC,PO | Performed by: NUTRITIONIST

## 2025-05-08 NOTE — TELEPHONE ENCOUNTER
Msg complete.     ----- Message from Maggie sent at 5/8/2025 12:47 PM CDT -----  Type:  Pharmacy Calling to Clarify an RXName of Caller:CindyPharmacy [facility]:[id number] Auth #What do they need to clarify?:Best Call Back Number:[phone] Information:   Nuclear med study, skull base to mid thigh, scheduled at [facility] has been approved 5/7/25 - 7/5/25           Please call back to advise, thank you!

## 2025-05-08 NOTE — PROGRESS NOTES
Diabetes Care Specialist Progress Note  Author: Ashleigh Lobo RD, [facility]  Date: 5/8/2025    Referral  4/29/25    Intake    Program Intake  Reason for Diabetes Program Visit:: Initial Diabetes Assessment  Current diabetes risk level:: high (HA1C=10.1)  In the last month, have you used the ER or been admitted to the hospital: Yes (4/24-4/27/25 ERCP, stents and high BG)  Permission to speak with others about care:: yes (friend, Cat)    Current Diabetes Treatment: Insulin  Method of insulin delivery?: Injections  Injection Type: Pens  Pen Type/Dose:  (Lantus 21 units HS)    Continuous Glucose Monitoring  Patient has CGM: No    Lab Results   Component Value Date    HGBA1C 10.1 (H) 04/25/2025       Sg=806.05 lt=911#    BMI= 45.05    Lifestyle Coping Support & Clinical    Lifestyle/Coping/Support  Compared to other people your age, how would you rate your health?: Good  Does anyone in your family have diabetes or does anyone in your family support you in your diabetes care?:  (family hx DM: support from family)  Learning Barriers:: None  Culture or [employer] beliefs that may impact ability to access healthcare: No  Psychosocial/Coping Skills Assessment Completed: : Yes  Assessment indicates:: Adequate understanding  Area of need?: No    Problem Review  Active Comorbidities: Hypertension    Diabetes Self-Management Skills Assessment    Medication Skills Assessment  Patient is able to identify current diabetes medications, dosages, and appropriate timing of medications.: yes  Patient reports problems or concerns with current medication regimen.: yes  Medication regimen problems/concerns:: other (see comments) (needs pen needles)  Patient is  aware that some diabetes medications can cause low blood sugar?: Yes  Medication Skills Assessment Completed:: Yes  Assessment indicates:: Adequate understanding  Area of need?: No (Provided pen needles for pt to assist until she can get more)    Diabetes Disease Process/Treatment  Options  Diabetes Type?: Type II  Is patient aware of what causes diabetes?: Yes  Does patient understand the pathophysiology of diabetes?: Yes  Diabetes Disease Process/Treatment Options: Skills Assessment Completed: Yes  Assessment indicates:: Adequate understanding  Area of need?: No    Nutrition/Healthy Eating  Meal Plan 24 Hour Recall - Breakfast:  (egg, sausage trip)  Meal Plan 24 Hour Recall - Lunch:  (salad with chicken/shrimp)  Meal Plan 24 Hour Recall - Dinner:  (chicken, spaghetti squash)  Meal Plan 24 Hour Recall - Snack:  (broccoli and cauliflower OR berries and cheese OR mixed nuts)  Meal Plan 24 Hour Recall - Beverage:  (water)  Who shops/cooks?:  (pt or friend does)  Patient can identify foods that impact blood sugar.: yes  Challenges to healthy eating:: other (see comments) (too restrictive with carb intake)  Nutrition/Healthy Eating Skills Assessment Completed:: Yes  Assessment indicates:: Instruction Needed  Area of need?: Yes    Physical Activity/Exercise  Patient's daily activity level:: sedentary  Patient formally exercises outside of work.: no  Reasons for not exercising:: physically unable to exercise currently (still sore/uncomfortable from stents; low stamina)  Patient can identify forms of physical activity.: yes  Physical Activity/Exercise Skills Assessment Completed: : Yes  Assessment indicates:: Adequate understanding  Area of need?: Yes (Pt understands importance of regular activity and will try to increase as she feels better)    Home Blood Glucose Monitoring  Patient states that blood sugar is checked at home daily.: yes  Monitoring Method:: home glucometer  Premeal BG range history::  (161-171)  What are your blood glucose targets?:  (FBG )  How often do you check your blood sugar?:  (once daily)  Home Blood Glucose Monitoring Skills Assessment Completed: : Yes  Assessment indicates:: Adequate understanding  Area of need?: No (reviewed target HA1c, and BG levels; provided  handouts and BG log)    Acute Complications  Acute Complications Skills Assessment Completed: : No  Deffered due to:: Time  Area of need?: Deferred (to review at future appt as needed.)    Chronic Complications  Reviewed health maintenance: yes  Have you completed your annual diabetes maintenance labwork? : yes  Has your doctor examined your feet?: yes  Do you see a Dentist?: yes  Do you see an eye doctor?: yes  Chronic Complications Skills Assessment Completed: : Yes  Assessment indicates:: Adequate understanding  Area of need?: No      Assessment Summary and Plan    Based on today's diabetes care assessment, the following areas of need were identified:      Identified Areas of Need      Medication/Current Diabetes Treatment: No (Provided pen needles for pt to assist until she can get more)   Lifestyle Coping/Support: No   Diabetes Disease Process/Treatment Options: No   Nutrition/Healthy Eating: Yes --see Care Plan   Physical Activity/Exercise: Yes (Pt understands importance of regular activity and will try to increase as she feels better)    Home Blood Glucose Monitoring: No (reviewed target HA1c, and BG levels; provided handouts and BG log)    Acute Complications: Deferred (to review at future appt as needed.)    Chronic Complications: No       Today's interventions were provided through individual discussion, instruction, and written materials were provided.      Patient verbalized understanding of instruction and written materials.  Pt was able to return back demonstration of instructions today. Patient understood key points, needs reinforcement and further instruction.     Diabetes Self-Management Care Plan:    Today's Diabetes Self-Management Care Plan was developed with Sonia's input. Sonia has agreed to work toward the following goal(s) to improve his/her overall diabetes control.      Care Plan: Diabetes Management   Updates made since 5/8/2024 12:00 AM        Problem: Healthy Eating         Goal: Eat 3  meals daily with 30 g/2 servings of Carbohydrate per meal.    Start Date: 5/8/2025   Expected End Date: 8/8/2025   Priority: High   Barriers: No Barriers Identified   Note:    Pt has been trying to follow a Keto like diet since having very high BG levels.  She has reduced or eliminated sugars and carbs.  She is not certain what she should be consuming and has many questions and concerns.  Thoroughly reviewed all aspects of healthy eating with emphasis on having consistent meals (pt doing this and having small snacks also); having a consistent amount of carbs per meal; AND combining carbs with PRO.  Increasing non-starchy veggies also important.  Pt drinking only water.  Discussed how to determine appropriate amount of carbs per meal--fist, list,label.  Provided suggestions on how to combine carbs w/PRO.  Snacks can be non-starchy veggies or PRO.      FOOD ALLERGY:  Walnuts       Task: Reviewed the sources and role of Carbohydrate, Protein, and Fat and how each nutrient impacts blood sugar. Completed 5/8/2025        Task: Provided visual examples using dry measuring cups, food models, and other familiar objects such as computer mouse, deck or cards, tennis ball etc. to help with visualization of portions. Completed 5/8/2025        Task: Explained how to count carbohydrates using the food label and the use of dry measuring cups for accurate carb counting. Completed 5/8/2025        Task: Discussed strategies for choosing healthier menu options when dining out. Completed 5/8/2025        Task: Recommended replacing beverages containing high sugar content with noncaloric/sugar free options and/or water. Completed 5/8/2025        Task: Review the importance of balancing carbohydrates with each meal using portion control techniques to count servings of carbohydrate and label reading to identify serving size and amount of total carbs per serving. Completed 5/8/2025        Task: Provided Sample plate method and reviewed the  use of the plate to estimate amounts of carbohydrate per meal. Completed 5/8/2025          Follow Up Plan     Follow up in about 1 month (around 6/8/2025) for Pt to see Yudy Galvez NP on 5/20/25; then JAMAR on 6/24/25 for diet, meds, BG.    Today's care plan and follow up schedule was discussed with patient.  Sonia verbalized understanding of the care plan, goals, and agrees to follow up plan.        The patient was encouraged to communicate with his/her health care provider/physician and care team regarding his/her condition(s) and treatment.  I provided the patient with my contact information today and encouraged to contact me via phone or [facility] Patient Portal as needed.     Length of Visit   Total Time: 60 Minutes

## 2025-05-09 ENCOUNTER — TELEPHONE (OUTPATIENT)
Dept: HEMATOLOGY/ONCOLOGY | Facility: CLINIC | Age: 65
End: 2025-05-09
Payer: MEDICAID

## 2025-05-13 ENCOUNTER — OFFICE VISIT (OUTPATIENT)
Facility: CLINIC | Age: 65
End: 2025-05-13
Payer: MEDICAID

## 2025-05-13 ENCOUNTER — LAB VISIT (OUTPATIENT)
Dept: LAB | Facility: HOSPITAL | Age: 65
End: 2025-05-13
Attending: SURGERY
Payer: MEDICAID

## 2025-05-13 VITALS
HEART RATE: 83 BPM | HEIGHT: 64 IN | SYSTOLIC BLOOD PRESSURE: 114 MMHG | WEIGHT: 261.44 LBS | BODY MASS INDEX: 44.63 KG/M2 | DIASTOLIC BLOOD PRESSURE: 68 MMHG | OXYGEN SATURATION: 99 %

## 2025-05-13 DIAGNOSIS — C25.9 MALIGNANT NEOPLASM OF PANCREAS, UNSPECIFIED LOCATION OF MALIGNANCY: Primary | ICD-10-CM

## 2025-05-13 DIAGNOSIS — Z51.5 PALLIATIVE CARE BY SPECIALIST: ICD-10-CM

## 2025-05-13 DIAGNOSIS — K86.89 PANCREATIC MASS: ICD-10-CM

## 2025-05-13 DIAGNOSIS — Z71.89 GOALS OF CARE, COUNSELING/DISCUSSION: ICD-10-CM

## 2025-05-13 PROCEDURE — 3074F SYST BP LT 130 MM HG: CPT | Mod: CPTII,,, | Performed by: NURSE PRACTITIONER

## 2025-05-13 PROCEDURE — 1111F DSCHRG MED/CURRENT MED MERGE: CPT | Mod: CPTII,,, | Performed by: NURSE PRACTITIONER

## 2025-05-13 PROCEDURE — 3046F HEMOGLOBIN A1C LEVEL >9.0%: CPT | Mod: CPTII,,, | Performed by: NURSE PRACTITIONER

## 2025-05-13 PROCEDURE — 3008F BODY MASS INDEX DOCD: CPT | Mod: CPTII,,, | Performed by: NURSE PRACTITIONER

## 2025-05-13 PROCEDURE — 99205 OFFICE O/P NEW HI 60 MIN: CPT | Mod: S$PBB,,, | Performed by: NURSE PRACTITIONER

## 2025-05-13 PROCEDURE — 99999 PR PBB SHADOW E&M-EST. PATIENT-LVL IV: CPT | Mod: PBBFAC,,, | Performed by: NURSE PRACTITIONER

## 2025-05-13 PROCEDURE — 3078F DIAST BP <80 MM HG: CPT | Mod: CPTII,,, | Performed by: NURSE PRACTITIONER

## 2025-05-13 PROCEDURE — 4010F ACE/ARB THERAPY RXD/TAKEN: CPT | Mod: CPTII,,, | Performed by: NURSE PRACTITIONER

## 2025-05-13 PROCEDURE — 36415 COLL VENOUS BLD VENIPUNCTURE: CPT

## 2025-05-13 PROCEDURE — 86301 IMMUNOASSAY TUMOR CA 19-9: CPT

## 2025-05-13 PROCEDURE — 99214 OFFICE O/P EST MOD 30 MIN: CPT | Mod: PBBFAC,PN | Performed by: NURSE PRACTITIONER

## 2025-05-14 ENCOUNTER — HOSPITAL ENCOUNTER (OUTPATIENT)
Dept: RADIOLOGY | Facility: HOSPITAL | Age: 65
Discharge: HOME | End: 2025-05-14
Attending: SURGERY
Payer: MEDICAID

## 2025-05-14 ENCOUNTER — INFUSION (OUTPATIENT)
Dept: INFUSION THERAPY | Facility: HOSPITAL | Age: 65
End: 2025-05-14
Attending: SURGERY
Payer: MEDICAID

## 2025-05-14 DIAGNOSIS — K86.89 PANCREATIC MASS: ICD-10-CM

## 2025-05-14 LAB — CANCER AG19-9 SERPL-ACNC: 57 U/ML (ref 0–35)

## 2025-05-14 PROCEDURE — 74177 CT ABD & PELVIS W/CONTRAST: CPT | Mod: TC,PO

## 2025-05-14 PROCEDURE — 74177 CT ABD & PELVIS W/CONTRAST: CPT | Mod: 26,,, | Performed by: RADIOLOGY

## 2025-05-14 PROCEDURE — 25500020 PHARM REV CODE 255: Mod: PO | Performed by: SURGERY

## 2025-05-14 RX ADMIN — IOHEXOL 100 ML: 350 INJECTION, SOLUTION INTRAVENOUS at 11:05

## 2025-05-15 NOTE — PROGRESS NOTES
Surgical Oncology History and Physical    Encounter Date:  2025    Patient ID: Sonia Muse  Age:  65 y.o. :  1960    Chief Complaint:  Pancreatic Cancer      History:    Ms. Muse is a 65 y.o. female who presents for newly diagnosed pancreatic cancer of the pancreatic head. She was very fatigued over the past couple of months and was diagnosed with new onset diabetes. She was found to have elevated LFTs. MRI/MRCP showed a pancreatic head mass and a 9 cm splenic mass. EUS confirmed a pancreatic head mass with FNA demonstrating poorly differentiated adenocarcinoma. She underwent metal stent placement. She has lost 25 pounds. She denies steatorrhea.     Past Medical History:   Diagnosis Date    Allergy     Anxiety     Asthma     DDD (degenerative disc disease), cervical     Depression     Diabetes mellitus     Fibromyalgia     Hypertension     Neuromuscular disorder     Pancreatic mass     PONV (postoperative nausea and vomiting)     Thyroid disease     hypo     Past Surgical History:   Procedure Laterality Date    ADENOIDECTOMY      carpel tunnel Right      SECTION      ENDOSCOPIC ULTRASOUND OF UPPER GASTROINTESTINAL TRACT N/A 2025    Procedure: ULTRASOUND, UPPER GI TRACT, ENDOSCOPIC;  Surgeon: Austen Grimes III, MD;  Location: [facility];  Service: Endoscopy;  Laterality: N/A;    ERCP N/A 2025    Procedure: ERCP (ENDOSCOPIC RETROGRADE CHOLANGIOPANCREATOGRAPHY);  Surgeon: Thuy Escobar MD;  Location: [facility];  Service: Endoscopy;  Laterality: N/A;    ERCP N/A 2025    Procedure: ERCP (ENDOSCOPIC RETROGRADE CHOLANGIOPANCREATOGRAPHY);  Surgeon: Austen Grimes III, MD;  Location: [facility];  Service: Endoscopy;  Laterality: N/A;    JOINT REPLACEMENT Right 2016    knee    KNEE ARTHROPLASTY Left 10/19/2020    Procedure: ARTHROPLASTY, KNEE;  Surgeon: Felipe Herring MD;  Location: [facility];  Service: Orthopedics;  Laterality: Left;  Louie Liz    knee  replacement Right     ROTATOR CUFF REPAIR Right     TONSILLECTOMY      TUBAL LIGATION       Medications Ordered Prior to Encounter[1]  Review of patient's allergies indicates:   Allergen Reactions    Erythromycin Swelling    Codeine Nausea And Vomiting     And migraine h/a    [place] Blisters       Family History:  Her family history includes Anxiety disorder in her daughter; Arthritis in her mother; Diabetes in her father and mother; Glaucoma in her mother; Graves' disease in her daughter; Heart disease in her father, mother, and sister; Hypertension in her father, mother, and sister; Stroke in her mother.     Social History:  She reports that she quit smoking about 41 years ago. Her smoking use included cigarettes. She started smoking about 52 years ago. She has a 11 pack-year smoking history. She has never used smokeless tobacco. She reports that she does not currently use alcohol. She reports that she does not use drugs.     ROS:     Review of Systems  Pertinent positive/negatives detailed in HPI, all other systems negative.     Physical Exam:  Blood pressure 122/75, pulse 79, temperature 98.1 °F (36.7 °C), resp. rate 18, weight 118.1 kg (260 lb 4.8 oz), SpO2 97%.     Physical Exam    Constitutional:  Non-toxic, no acute distress.  Performance status: ECOG 0  Eyes:  Sclerae anicteric, gaze symmetrical  Neck:  Trachea midline, thyroid, non enlarged without palpable nodules,  FROM  Resp:  Easy work of breathing, no wheezes  CV:  Regular pulse, no JVD  Abd:  Soft, non-tender, no masses, no hepatosplenomegaly, no ascites, no superficial varices  Lymphatics:  No cervical, supraclavicular, axillary, or inguinal lymphadenopathy  Musculoskeletal:  Ambulatory, normal gait, no muscle wasting  Neuro:  No gross deficits  Psych:  Awake, alert, oriented.  Answers and asks questions appropriately    Data:     Radiology:  I personally reviewed these images: There is a resectable pancreatic head mass with regional lymphadenopathy  and a large rim enhancing mass in the spleen suspicious for metastasis.     Endoscopy:       ENDOSCOPIC FINDING (limited views): :        The examined esophagus was endoscopically normal.        The entire examined stomach was endoscopically normal.        The examined duodenum was endoscopically normal.        ENDOSONOGRAPHIC FINDING: :        Limited mediastinal exam unremarkable        Limited exam of liver unremarkable        There was stent seen in the common bile duct. The maximum diameter        of the duct was 12 mm. A mild distended gallbladder with sludge was        identified.        There was no sign of significant endosonographic abnormality in the        ampulla.        A round mass was identified in the pancreatic head. The mass was        hypoechoic. The mass measured 41 mm by 37 mm in maximal        cross-sectional diameter. The endosonographic borders were        well-defined. There was sonographic evidence suggesting invasion        into the superior mesenteric vein (manifested by abutment). The        remainder of the pancreas was examined. The endosonographic        appearance of parenchyma and the upstream pancreatic duct indicated        duct dilation w/ stent in head and parenchymal atrophy. Fine needle        aspiration for cytology was performed. Color Doppler imaging was        utilized prior to needle puncture to confirm a lack of significant        vascular structures within the needle path. Two passes were made        with the 22 gauge needle using a transduodenal approach. A stylet        was used. A preliminary cytologic examination was not performed.        Final cytology results are pending.     Labs:  Ca [phone]    Pathology:  PANCREATIC MASS, BIOPSY     - POSITIVE FOR POORLY DIFFERENTIATED ADENOCARCINOMA     Comment:     Immunohistochemistry was performed with appropriate controls on block 1A    based on the histopathologic findings and the results are summarized as    follows:      AE1/3: Positive     CK8/18: Positive     S100: Negative       Assessment: This is a 65 year old woman with a resectable poorly differentiated carcinoma of the pancreatic head and possible splenic metastasis. The PET may help determine whether this is a splenic metastasis vs some other benign mass. I don't think we can safely biopsy the spleen, and the next step would be systemic therapy regardless. I explained to the patient that we would not operate if this was convincingly metastatic disease in the spleen.     Plan:  - Start systemic therapy.   - Follow up PET CT  - Return to clinic after 2-3 months of chemotherapy for restaging.   - I communicated the plan to Dr. Calle.     Noah Clark MD  Surgical Oncology  [facility] 1960               [1]   Current Outpatient Medications on File Prior to Visit   Medication Sig Dispense Refill    albuterol (VENTOLIN HFA) 90 mcg/actuation inhaler Inhale 2 puffs into the lungs every 6 (six) hours as needed for Wheezing or Shortness of Breath. Rescue 18 g 1    ALPRAZolam (XANAX) 1 MG tablet Take 1 tablet (1 mg total) by mouth once daily. (Patient taking differently: Take 1 mg by mouth as needed.) 30 tablet 0    amLODIPine (NORVASC) 5 MG tablet Take 1 tablet (5 mg total) by mouth once daily. 90 tablet 1    blood sugar diagnostic Strp 1 each by Misc.(Non-Drug; Combo Route) route 3 (three) times daily. 90 each 0    blood-glucose meter kit Use as instructed 1 each 0    cetirizine (ZYRTEC) 10 MG tablet TAKE 1 TABLET BY MOUTH ONCE DAILY 90 tablet 1    DULoxetine (CYMBALTA) 60 MG capsule Take 1 capsule (60 mg total) by mouth once daily. 90 capsule 1    hydroCHLOROthiazide (HYDRODIURIL) 25 MG tablet TAKE one TABLET BY MOUTH ONCE DAILY (Patient taking differently: Take 12.5 mg by mouth once daily.) 90 tablet 1    insulin glargine U-100, Lantus, 100 unit/mL (3 mL) SubQ InPn pen Inject 18 Units into the skin every evening.  6 mL 1    lancets (LANCETS,THIN) Misc 1 each by Misc.(Non-Drug; Combo Route) route 3 (three) times daily. 90 each 0    levothyroxine (SYNTHROID) 25 MCG tablet Take 1 tablet (25 mcg total) by mouth before breakfast. 90 tablet 1    LIDOcaine-prilocaine (EMLA) cream Apply 1 g topically twice a week.      losartan (COZAAR) 100 MG tablet Take 1 tablet (100 mg total) by mouth once daily. 90 tablet 1    meloxicam (MOBIC) 15 MG tablet Take 1 tablet (15 mg total) by mouth once daily. (Patient taking differently: Take 15 mg by mouth once daily. On hold 4/30/25) 90 tablet 0    metoprolol succinate (TOPROL-XL) 25 MG 24 hr tablet Take 1 tablet (25 mg total) by mouth once daily. 90 tablet 1    metronidazole 0.75% (METROCREAM) 0.75 % Crea Apply 1 application  topically 2 (two) times daily.      montelukast (SINGULAIR) 10 mg tablet TAKE ONE TABLET BY MOUTH EVERY EVENING (Patient taking differently: Take 10 mg by mouth daily as needed.) 90 tablet 1    multivit-mins no.63/iron/folic (M-VIT ORAL) Take 1 tablet by mouth once daily.       Current Facility-Administered Medications on File Prior to Visit   Medication Dose Route Frequency Provider Last Rate Last Admin    [COMPLETED] iohexoL (OMNIPAQUE 350) injection 100 mL  100 mL Intravenous ONCE PRN Noah Clark MD   100 mL at 05/14/25 2693

## 2025-05-16 ENCOUNTER — DOCUMENTATION ONLY (OUTPATIENT)
Facility: CLINIC | Age: 65
End: 2025-05-16
Payer: MEDICAID

## 2025-05-16 ENCOUNTER — OFFICE VISIT (OUTPATIENT)
Dept: HEMATOLOGY/ONCOLOGY | Facility: CLINIC | Age: 65
End: 2025-05-16
Payer: MEDICAID

## 2025-05-16 ENCOUNTER — TELEPHONE (OUTPATIENT)
Dept: SURGERY | Facility: CLINIC | Age: 65
End: 2025-05-16
Payer: MEDICAID

## 2025-05-16 VITALS
OXYGEN SATURATION: 98 % | DIASTOLIC BLOOD PRESSURE: 71 MMHG | SYSTOLIC BLOOD PRESSURE: 116 MMHG | HEIGHT: 64 IN | TEMPERATURE: 98 F | BODY MASS INDEX: 44.56 KG/M2 | WEIGHT: 261 LBS | RESPIRATION RATE: 18 BRPM | HEART RATE: 91 BPM

## 2025-05-16 DIAGNOSIS — K86.89 PANCREATIC MASS: Primary | ICD-10-CM

## 2025-05-16 PROCEDURE — 99999 PR PBB SHADOW E&M-EST. PATIENT-LVL IV: CPT | Mod: PBBFAC,,, | Performed by: INTERNAL MEDICINE

## 2025-05-16 PROCEDURE — 99214 OFFICE O/P EST MOD 30 MIN: CPT | Mod: PBBFAC,PN | Performed by: INTERNAL MEDICINE

## 2025-05-16 NOTE — TELEPHONE ENCOUNTER
Called patient to schedule a np appt for port placement - requested by Angelica GOMEZ At [facility].

## 2025-05-16 NOTE — NURSING
Oncology Navigation   Intake  Date of Diagnosis: 05/02/25  Cancer Type: GI  Type of Referral: Internal  Date of Referral: 05/06/25     Treatment  Current Status: Staging work-up    Surgical Oncologist: Dr. Noah Clark  Consult Date: 05/19/25    Medical Oncologist: Dr. Paty Calle       Procedures: PET scan  Biopsy Schedule Date: 05/02/25  CT Schedule Date: 05/14/25  EUS / EGD Date: 05/02/25  PET Scan Schedule Date: 05/29/25 (Message sent to reschedule to earlier appointment)             Support Systems: Family members; Friends / neighbors       Met with patient,  and friends during appointment with Dr. Calle. Plan of care reviewed. Will coordinate port placement, chemo school, ordered labs, NGS (ALANNA) and genetics (Cristiane). Will follow up with Mrs. Scott during appointment with Dr. Clark on Monday.

## 2025-05-16 NOTE — PROGRESS NOTES
Subjective:       Patient ID: Sonia Muse is a 65 y.o. female.    Chief Complaint:  Treatment planning discussion  HPI 65-year-old  woman who I met with the hospital with pancreatic mass 5 x 3 point 7 cm MIGUEL ANGEL and transaminitis with elevated bilirubin patient had metal stent placed with good response  MRCP showed pancreatic mass highly concerning for malignancy with biliary obstruction with concerns for splenic metastasis and peripancreatic lymph node involvement   AK I has resolved  Pathology showing poorly differentiated adenocarcinoma of pancreas    ROS:  Patient had COVID areas discoloration has subsided completely  Patient denies issues related to appetite or recent weight change.  Feels well overall.  Denies issues with generalized weakness .  Denies fatigue over above what is normally experienced with day-to-day activities  Denies fever, chills, rigors  Denies issues with ambulation  Denies generalized swelling or new lumps and bumps felt in any part  of body  Denies visual or hearing loss  Denies issues with congestion, sinus issues, cough, sputum production runny nose or itching eyes  Denies chest pain or palpitations, or passing out  Denies abdominal pain, reflux symptoms, nausea vomiting loose stools or constipation  Denies seizure activity or focal weaknesses or symptoms related to TIA, no head aches or blurred vision reported  Denies issues with skin rash or bruising  Denies issues with swelling of feet, tingling or numbness   No issues with sleep,   No recent foreign travel   Good family support reported           Past Medical History:   Diagnosis Date    Allergy     Anxiety     Asthma     DDD (degenerative disc disease), cervical     Depression     Diabetes mellitus     Fibromyalgia     Hypertension 2005    Neuromuscular disorder     Pancreatic mass     PONV (postoperative nausea and vomiting)     Thyroid disease     hypo     Past Surgical History:   Procedure Laterality Date     ADENOIDECTOMY      carpel tunnel Right      SECTION      ENDOSCOPIC ULTRASOUND OF UPPER GASTROINTESTINAL TRACT N/A 2025    Procedure: ULTRASOUND, UPPER GI TRACT, ENDOSCOPIC;  Surgeon: Austen Grimes III, MD;  Location: [facility] ENDO;  Service: Endoscopy;  Laterality: N/A;    ERCP N/A 2025    Procedure: ERCP (ENDOSCOPIC RETROGRADE CHOLANGIOPANCREATOGRAPHY);  Surgeon: Thuy Escobar MD;  Location: [facility] ENDO;  Service: Endoscopy;  Laterality: N/A;    ERCP N/A 2025    Procedure: ERCP (ENDOSCOPIC RETROGRADE CHOLANGIOPANCREATOGRAPHY);  Surgeon: Austen Grimes III, MD;  Location: [facility] ENDO;  Service: Endoscopy;  Laterality: N/A;    JOINT REPLACEMENT Right     knee    KNEE ARTHROPLASTY Left 10/19/2020    Procedure: ARTHROPLASTY, KNEE;  Surgeon: Felipe Herring MD;  Location: [facility] OR;  Service: Orthopedics;  Laterality: Left;  Louie Liz    knee replacement Right     ROTATOR CUFF REPAIR Right     TONSILLECTOMY      TUBAL LIGATION       Family History   Problem Relation Name Age of Onset    Diabetes Mother Libertad     Hypertension Mother Libertad     Heart disease Mother Libertad     Stroke Mother Libertad     Glaucoma Mother Libertad     Arthritis Mother Libertad     Diabetes Father Fredis     Hypertension Father Fredis     Heart disease Father Fredis     Hypertension Sister 1     Heart disease Sister 1     Graves' disease Daughter      Anxiety disorder Daughter        Social History     Socioeconomic History    Marital status:     Number of children: 3   Occupational History    Occupation: [employer]     Comment: primary sub for [employer]   Tobacco Use    Smoking status: Former     Current packs/day: 0.00     Average packs/day: 1 pack/day for 11.0 years (11.0 ttl pk-yrs)     Types: Cigarettes     Start date:      Quit date:      Years since quittin.4    Smokeless tobacco: Never   Substance and Sexual Activity    Alcohol use: Not Currently     Comment: occasional 2x/y     Drug use: Never    Sexual activity: Yes     Partners: Male     Social Drivers of Health     Financial Resource Strain: High Risk (4/28/2025)    Overall Financial Resource Strain (CARDIA)     Difficulty of Paying Living Expenses: Hard   Food Insecurity: Food Insecurity Present (4/28/2025)    Hunger Vital Sign     Worried About Running Out of Food in the Last Year: Sometimes true     Ran Out of Food in the Last Year: Sometimes true   Transportation Needs: Unmet Transportation Needs (4/28/2025)    PRAPARE - Transportation     Lack of Transportation (Medical): Yes     Lack of Transportation (Non-Medical): Yes   Physical Activity: Unknown (4/28/2025)    Exercise Vital Sign     Days of Exercise per Week: Patient declined     Minutes of Exercise per Session: 30 min   Stress: Stress Concern Present (4/28/2025)    [employer] - Occupational Stress Questionnaire     Feeling of Stress : Very much   Housing Stability: Low Risk  (4/28/2025)    Housing Stability Vital Sign     Unable to Pay for Housing in the Last Year: No     Number of Times Moved in the Last Year: 0     Homeless in the Last Year: No     Review of patient's allergies indicates:   Allergen Reactions    Erythromycin Swelling    Codeine Nausea And Vomiting     And migraine h/a    [place] Blisters     Current Medications[1]    Physical Exam    VITAL SIGNS:  as above   GENERAL: appears well-built, well-nourished.  No anxiety, no agitation, and in no distress.  Patient is awake, alert, oriented and cooperative.  HEENT:  Showed no congestion. Trachea is central no obvious icterus or pallor noted no hoarseness. no obvious JVD   NECK:  Supple.  No JVD. No obvious cervical submental or supraclavicular adenopathy.  RS:the visualized portion of  Chest expands well. chest appears symmetric, no audible wheezes.  No dyspnea recognized  ABDOMEN:  abdomen appears undistended.  EXTREMITIES:  Without edema.  NEUROLOGICAL:  The patient is appropriate,  higher functions are normal.  No  obvious neurological deficits.  normal judgement normal thought content  No confusion, no speech impediment. Cranial nerves are intact and show no deficit. No gross motor deficits noted   SKIN MUSCULOSKELETAL: no joint or skeletal deformity, no clubbing of nails.  No visible rash ecchymosis or petechiae   Lab Results   Component Value Date    WBC 5.76 04/27/2025    HGB 11.4 (L) 04/27/2025    HCT 36.6 (L) 04/27/2025    MCV 88 04/27/2025     04/27/2025       BMP  Lab Results   Component Value Date     04/30/2025    K 3.7 04/30/2025     04/30/2025    CO2 25 04/30/2025    BUN 18 04/30/2025    CREATININE 0.9 04/30/2025    CALCIUM 8.8 04/30/2025    ANIONGAP 11 04/30/2025    ESTGFRAFRICA >60.0 01/24/2022    EGFRNONAA >60.0 01/24/2022     Impression:     4.3 x 4.3 cm pancreatic head mass compatible with patient's known malignancy there is atrophy of the body and tail the pancreas with mild pancreatic ductal dilatation     Common bile duct stent in place with pneumobilia     Mildly prominent gastrohepatic and portacaval lymph node suspicious for metastatic disease     Small hiatal hernia     9.8 cm rim enhancing mass within the dome of the spleen which is nonspecific in suspicious for metastatic disease           Assessment and Plan     Locally advanced pancreatic cancer poorly-differentiated with splenic mass which may be nonspecific  Patient having PET scan done  Will be seeing surgeon this week  Arrange for port  Chemo class once again is made regarding neoadjuvant chemo versus chemoradiation therapy versus palliative chemo based on surgeons opinion of splenic mass  See me for start date  MDM includes  :    - Acute or chronic illness or injury that poses a threat to life or bodily function  - Independent review and explanation of 3+ results from unique tests  - Discussion of management and ordering 3+ unique tests  - Extensive discussion of treatment and management  -  Prescription drug management  - Drug therapy requiring intensive monitoring for toxicity       Answers submitted by the patient for this visit:  Review of Systems Questionnaire (Submitted on 5/15/2025)  appetite change : No  unexpected weight change: No  mouth sores: No  visual disturbance: No  cough: No  shortness of breath: No  chest pain: No  abdominal pain: Yes  diarrhea: No  frequency: No  back pain: No  rash: No  headaches: No  adenopathy: No  nervous/ anxious: Yes         [1]   Current Outpatient Medications:     albuterol (VENTOLIN HFA) 90 mcg/actuation inhaler, Inhale 2 puffs into the lungs every 6 (six) hours as needed for Wheezing or Shortness of Breath. Rescue, Disp: 18 g, Rfl: 1    ALPRAZolam (XANAX) 1 MG tablet, Take 1 tablet (1 mg total) by mouth once daily., Disp: 30 tablet, Rfl: 0    amLODIPine (NORVASC) 5 MG tablet, Take 1 tablet (5 mg total) by mouth once daily., Disp: 90 tablet, Rfl: 1    blood sugar diagnostic Strp, 1 each by Misc.(Non-Drug; Combo Route) route 3 (three) times daily., Disp: 90 each, Rfl: 0    blood-glucose meter kit, Use as instructed, Disp: 1 each, Rfl: 0    cetirizine (ZYRTEC) 10 MG tablet, TAKE 1 TABLET BY MOUTH ONCE DAILY, Disp: 90 tablet, Rfl: 1    DULoxetine (CYMBALTA) 60 MG capsule, Take 1 capsule (60 mg total) by mouth once daily., Disp: 90 capsule, Rfl: 1    hydroCHLOROthiazide (HYDRODIURIL) 25 MG tablet, TAKE one TABLET BY MOUTH ONCE DAILY, Disp: 90 tablet, Rfl: 1    insulin glargine U-100, Lantus, 100 unit/mL (3 mL) SubQ InPn pen, Inject 18 Units into the skin every evening., Disp: 6 mL, Rfl: 1    lancets (LANCETS,THIN) Misc, 1 each by Misc.(Non-Drug; Combo Route) route 3 (three) times daily., Disp: 90 each, Rfl: 0    levothyroxine (SYNTHROID) 25 MCG tablet, Take 1 tablet (25 mcg total) by mouth before breakfast., Disp: 90 tablet, Rfl: 1    losartan (COZAAR) 100 MG tablet, Take 1 tablet (100 mg total) by mouth once daily., Disp: 90 tablet, Rfl: 1    metoprolol  succinate (TOPROL-XL) 25 MG 24 hr tablet, Take 1 tablet (25 mg total) by mouth once daily., Disp: 90 tablet, Rfl: 1    montelukast (SINGULAIR) 10 mg tablet, TAKE ONE TABLET BY MOUTH EVERY EVENING, Disp: 90 tablet, Rfl: 1    multivit-mins no.63/iron/folic (M-VIT ORAL), Take 1 tablet by mouth once daily., Disp: , Rfl:     LIDOcaine-prilocaine (EMLA) cream, Apply 1 g topically twice a week. (Patient not taking: Reported on 5/16/2025), Disp: , Rfl:     meloxicam (MOBIC) 15 MG tablet, Take 1 tablet (15 mg total) by mouth once daily. (Patient not taking: Reported on 5/16/2025), Disp: 90 tablet, Rfl: 0    metronidazole 0.75% (METROCREAM) 0.75 % Crea, Apply 1 application  topically 2 (two) times daily. (Patient not taking: Reported on 5/16/2025), Disp: , Rfl:

## 2025-05-16 NOTE — Clinical Note
Needs port Needs caris and genetic testing, after pet and DR Clark let me know where we are headed. Labs from port each time iron stuides b12 . Folate feritn

## 2025-05-19 ENCOUNTER — PATIENT MESSAGE (OUTPATIENT)
Dept: FAMILY MEDICINE | Facility: CLINIC | Age: 65
End: 2025-05-19
Payer: MEDICAID

## 2025-05-19 ENCOUNTER — OFFICE VISIT (OUTPATIENT)
Facility: CLINIC | Age: 65
End: 2025-05-19
Payer: MEDICAID

## 2025-05-19 ENCOUNTER — PATIENT MESSAGE (OUTPATIENT)
Facility: CLINIC | Age: 65
End: 2025-05-19

## 2025-05-19 ENCOUNTER — LAB VISIT (OUTPATIENT)
Dept: LAB | Facility: HOSPITAL | Age: 65
End: 2025-05-19
Attending: INTERNAL MEDICINE
Payer: MEDICAID

## 2025-05-19 VITALS
BODY MASS INDEX: 44.68 KG/M2 | SYSTOLIC BLOOD PRESSURE: 122 MMHG | DIASTOLIC BLOOD PRESSURE: 75 MMHG | HEART RATE: 79 BPM | OXYGEN SATURATION: 97 % | RESPIRATION RATE: 18 BRPM | TEMPERATURE: 98 F | WEIGHT: 260.31 LBS

## 2025-05-19 DIAGNOSIS — K86.89 PANCREATIC MASS: ICD-10-CM

## 2025-05-19 DIAGNOSIS — C25.0 MALIGNANT NEOPLASM OF HEAD OF PANCREAS: Primary | ICD-10-CM

## 2025-05-19 DIAGNOSIS — Z79.4 TYPE 2 DIABETES MELLITUS WITH HYPERGLYCEMIA, WITH LONG-TERM CURRENT USE OF INSULIN: Primary | ICD-10-CM

## 2025-05-19 DIAGNOSIS — E11.65 TYPE 2 DIABETES MELLITUS WITH HYPERGLYCEMIA, WITH LONG-TERM CURRENT USE OF INSULIN: Primary | ICD-10-CM

## 2025-05-19 PROBLEM — C25.9 PANCREATIC CANCER: Status: ACTIVE | Noted: 2025-05-19

## 2025-05-19 LAB
FERRITIN SERPL-MCNC: 115.7 NG/ML (ref 20–300)
FOLATE SERPL-MCNC: 15.6 NG/ML (ref 4–24)
IRON SATN MFR SERPL: 13 % (ref 20–50)
IRON SERPL-MCNC: 45 UG/DL (ref 30–160)
TIBC SERPL-MCNC: 346 UG/DL (ref 250–450)
TRANSFERRIN SERPL-MCNC: 247 MG/DL (ref 200–375)
VIT B12 SERPL-MCNC: 531 PG/ML (ref 210–950)

## 2025-05-19 PROCEDURE — 99999 PR PBB SHADOW E&M-EST. PATIENT-LVL III: CPT | Mod: PBBFAC,,, | Performed by: SURGERY

## 2025-05-19 PROCEDURE — 3078F DIAST BP <80 MM HG: CPT | Mod: CPTII,,, | Performed by: SURGERY

## 2025-05-19 PROCEDURE — 36415 COLL VENOUS BLD VENIPUNCTURE: CPT

## 2025-05-19 PROCEDURE — 82607 VITAMIN B-12: CPT

## 2025-05-19 PROCEDURE — 82746 ASSAY OF FOLIC ACID SERUM: CPT

## 2025-05-19 PROCEDURE — 1111F DSCHRG MED/CURRENT MED MERGE: CPT | Mod: CPTII,,, | Performed by: SURGERY

## 2025-05-19 PROCEDURE — 82728 ASSAY OF FERRITIN: CPT

## 2025-05-19 PROCEDURE — 3046F HEMOGLOBIN A1C LEVEL >9.0%: CPT | Mod: CPTII,,, | Performed by: SURGERY

## 2025-05-19 PROCEDURE — 3008F BODY MASS INDEX DOCD: CPT | Mod: CPTII,,, | Performed by: SURGERY

## 2025-05-19 PROCEDURE — 3074F SYST BP LT 130 MM HG: CPT | Mod: CPTII,,, | Performed by: SURGERY

## 2025-05-19 PROCEDURE — 99205 OFFICE O/P NEW HI 60 MIN: CPT | Mod: S$PBB,,, | Performed by: SURGERY

## 2025-05-19 PROCEDURE — 4010F ACE/ARB THERAPY RXD/TAKEN: CPT | Mod: CPTII,,, | Performed by: SURGERY

## 2025-05-19 PROCEDURE — 99213 OFFICE O/P EST LOW 20 MIN: CPT | Mod: PBBFAC,PN | Performed by: SURGERY

## 2025-05-19 PROCEDURE — 83540 ASSAY OF IRON: CPT

## 2025-05-19 RX ORDER — PROCHLORPERAZINE EDISYLATE 5 MG/ML
10 INJECTION INTRAMUSCULAR; INTRAVENOUS ONCE AS NEEDED
OUTPATIENT
Start: 2025-05-21

## 2025-05-19 RX ORDER — DIPHENHYDRAMINE HYDROCHLORIDE 50 MG/ML
50 INJECTION, SOLUTION INTRAMUSCULAR; INTRAVENOUS ONCE AS NEEDED
OUTPATIENT
Start: 2025-05-19

## 2025-05-19 RX ORDER — EPINEPHRINE 0.3 MG/.3ML
0.3 INJECTION SUBCUTANEOUS ONCE AS NEEDED
OUTPATIENT
Start: 2025-05-19

## 2025-05-19 RX ORDER — HEPARIN 100 UNIT/ML
500 SYRINGE INTRAVENOUS
OUTPATIENT
Start: 2025-05-21

## 2025-05-19 RX ORDER — PROCHLORPERAZINE EDISYLATE 5 MG/ML
10 INJECTION INTRAMUSCULAR; INTRAVENOUS ONCE AS NEEDED
OUTPATIENT
Start: 2025-05-19

## 2025-05-19 RX ORDER — HEPARIN 100 UNIT/ML
500 SYRINGE INTRAVENOUS
OUTPATIENT
Start: 2025-05-19

## 2025-05-19 RX ORDER — SODIUM CHLORIDE 0.9 % (FLUSH) 0.9 %
10 SYRINGE (ML) INJECTION
OUTPATIENT
Start: 2025-05-21

## 2025-05-19 RX ORDER — SODIUM CHLORIDE 0.9 % (FLUSH) 0.9 %
10 SYRINGE (ML) INJECTION
OUTPATIENT
Start: 2025-05-19

## 2025-05-19 RX ORDER — ATROPINE SULFATE 0.4 MG/ML
0.4 INJECTION, SOLUTION ENDOTRACHEAL; INTRAMEDULLARY; INTRAMUSCULAR; INTRAVENOUS; SUBCUTANEOUS ONCE AS NEEDED
OUTPATIENT
Start: 2025-05-19

## 2025-05-19 NOTE — PLAN OF CARE
START ON PATHWAY REGIMEN - Pancreatic Adenocarcinoma    [id number]        Irinotecan (Camptosar)       Oxaliplatin       Leucovorin       Fluorouracil           Additional Orders: Regimen is based on the PRODIGE 24 trial by Jose et   al., 2018. The use of growth factor was mandated in some, but not all   mFOLFIRINOX studies; please follow institutional protocol/physician discretion   regarding use of growth factor.    **Always confirm dose/schedule in your pharmacy ordering system**    Patient Characteristics:  Locally Advanced, Anatomically Unresectable, M0, First Line, PS = 0,1, BRCA1/2   and PALB2 Mutation Absent/Unknown, Chemotherapy  Therapeutic Status: Locally Advanced, Anatomically Unresectable, M0  Line of Therapy: First Line  ECOG Performance Status: 0  BRCA1/2 Mutation Status: Awaiting Test Results  PALB2 Mutation Status: Awaiting Test Results  Intent of Therapy:  Curative Intent, Not Discussed with Patient

## 2025-05-20 ENCOUNTER — DOCUMENTATION ONLY (OUTPATIENT)
Facility: CLINIC | Age: 65
End: 2025-05-20
Payer: MEDICAID

## 2025-05-20 ENCOUNTER — LAB VISIT (OUTPATIENT)
Dept: LAB | Facility: HOSPITAL | Age: 65
End: 2025-05-20
Attending: INTERNAL MEDICINE
Payer: MEDICAID

## 2025-05-20 DIAGNOSIS — K86.89 PANCREATIC MASS: ICD-10-CM

## 2025-05-20 DIAGNOSIS — C25.0 MALIGNANT NEOPLASM OF HEAD OF PANCREAS: Primary | ICD-10-CM

## 2025-05-20 DIAGNOSIS — C25.0 MALIGNANT NEOPLASM OF HEAD OF PANCREAS: ICD-10-CM

## 2025-05-20 LAB
ABSOLUTE EOSINOPHIL (SMH): 0.21 K/UL
ABSOLUTE MONOCYTE (SMH): 0.64 K/UL (ref 0.3–1)
ABSOLUTE NEUTROPHIL COUNT (SMH): 4.3 K/UL (ref 1.8–7.7)
ALBUMIN SERPL-MCNC: 3.8 G/DL (ref 3.5–5.2)
ALP SERPL-CCNC: 109 UNIT/L (ref 55–135)
ALT SERPL-CCNC: 9 UNIT/L (ref 10–44)
ANION GAP (SMH): 12 MMOL/L (ref 8–16)
AST SERPL-CCNC: 12 UNIT/L (ref 10–40)
BASOPHILS # BLD AUTO: 0.06 K/UL
BASOPHILS NFR BLD AUTO: 0.9 %
BILIRUB SERPL-MCNC: 1 MG/DL (ref 0.1–1)
BUN SERPL-MCNC: 21 MG/DL (ref 8–23)
CALCIUM SERPL-MCNC: 9.3 MG/DL (ref 8.7–10.5)
CHLORIDE SERPL-SCNC: 102 MMOL/L (ref 95–110)
CO2 SERPL-SCNC: 24 MMOL/L (ref 23–29)
CREAT SERPL-MCNC: 1.1 MG/DL (ref 0.5–1.4)
ERYTHROCYTE [DISTWIDTH] IN BLOOD BY AUTOMATED COUNT: 14.6 % (ref 11.5–14.5)
GFR SERPLBLD CREATININE-BSD FMLA CKD-EPI: 57 ML/MIN/1.73/M2
GLUCOSE SERPL-MCNC: 146 MG/DL (ref 70–110)
HCT VFR BLD AUTO: 35.8 % (ref 37–48.5)
HGB BLD-MCNC: 11.6 GM/DL (ref 12–16)
IMM GRANULOCYTES # BLD AUTO: 0.04 K/UL (ref 0–0.04)
IMM GRANULOCYTES NFR BLD AUTO: 0.6 % (ref 0–0.5)
LYMPHOCYTES # BLD AUTO: 1.59 K/UL (ref 1–4.8)
MCH RBC QN AUTO: 27.5 PG (ref 27–31)
MCHC RBC AUTO-ENTMCNC: 32.4 G/DL (ref 32–36)
MCV RBC AUTO: 85 FL (ref 82–98)
NUCLEATED RBC (/100WBC) (SMH): 0 /100 WBC
PLATELET # BLD AUTO: 408 K/UL (ref 150–450)
PMV BLD AUTO: 9.5 FL (ref 9.2–12.9)
POTASSIUM SERPL-SCNC: 3.9 MMOL/L (ref 3.5–5.1)
PROT SERPL-MCNC: 7.4 GM/DL (ref 6–8.4)
RBC # BLD AUTO: 4.22 M/UL (ref 4–5.4)
RELATIVE EOSINOPHIL (SMH): 3.1 % (ref 0–8)
RELATIVE LYMPHOCYTE (SMH): 23.3 % (ref 18–48)
RELATIVE MONOCYTE (SMH): 9.4 % (ref 4–15)
RELATIVE NEUTROPHIL (SMH): 62.7 % (ref 38–73)
SODIUM SERPL-SCNC: 138 MMOL/L (ref 136–145)
WBC # BLD AUTO: 6.81 K/UL (ref 3.9–12.7)

## 2025-05-20 PROCEDURE — 82040 ASSAY OF SERUM ALBUMIN: CPT

## 2025-05-20 PROCEDURE — 85025 COMPLETE CBC W/AUTO DIFF WBC: CPT

## 2025-05-20 PROCEDURE — 36415 COLL VENOUS BLD VENIPUNCTURE: CPT

## 2025-05-20 RX ORDER — PEN NEEDLE, DIABETIC 30 GX3/16"
1 NEEDLE, DISPOSABLE MISCELLANEOUS DAILY
Qty: 100 EACH | Refills: 2 | Status: SHIPPED | OUTPATIENT
Start: 2025-05-20

## 2025-05-20 NOTE — PROGRESS NOTES
Palliative Medicine Clinic Note        Consult Requested By: No ref. provider found      Reason for Consult: pancreatic cancer, goals of care discussion    Chief Complaint:   Chief Complaint   Patient presents with    Follow-up     Pancreatic Mass        ASSESSMENT/PLAN:      Plan/Recommendations:    Pt seen today for recently dx pancreatic cancer. She is in good spirits today. She is looking forward to upcoming appt with oncology to discuss her treatment options. She wants to proceed with cancer treatment. She states her pain is currently controlled. She is feeling good and appetite is fair. We discussed importance of nutrition and functional status to remain candidate for cancer treatment.     We reviewed living will and HCPOA. Pt wants to review paperwork with her family and will complete this at next appt.     I did not discuss prognosis today. Currently her prognosis is unknown given she is in staging process and workup for her cancer. I plan to discuss this further after she is seen by oncology.     We discussed her values. She is hoping that she can live well for many more years. She finds strength in her family. She is willing to cont with whatever it takes to live for as long as she can. She understands that pancreatic cancer is usually terminal but she wants to remain positive and focus on one day at a time.     I provided encouragement and emotional support to her.        Advance Care Planning   Advance Directives:   Living Will: No    LaPOST: No    Do Not Resuscitate Status: No    Medical Power of : No      Decision Making:  Patient answered questions  Goals of Care: The patient endorses that what is most important right now is to focus on curative/life-prolongation (regardless of treatment burdens)    Accordingly, we have decided that the best plan to meet the patient's goals includes continuing with treatment.       Follow up: 1 week     Plan discussed with: pt, spouse, friend    SUBJECTIVE:       History of Present Illness / Interval History:  Sonia Muse is 65 y.o. female with PMHx of fibromyalgia, thyroid disease, HTN, and DM.  Presents to Palliative Care Clinic for advance care planning and clarification of goals of care.   Pt recently dx with pancreatic cancer. She is currently undergoing workup process and has upcoming imaging scheduled. She has first appt with oncology Dr. Calle this week.   5/13/25  History obtained from: pt, past medical records    ROS:  Review of Systems   Constitutional:  Positive for fatigue. Negative for activity change and appetite change.   Respiratory:  Negative for cough and shortness of breath.    Cardiovascular:  Negative for chest pain.   Gastrointestinal:  Negative for abdominal pain, nausea and vomiting.   All other systems reviewed and are negative.    Review of Symptoms      Symptom Assessment (ESAS 0-10 Scale)  Pain:  0  Dyspnea:  0  Anxiety:  0  Nausea:  0  Depression:  0  Anorexia:  0  Fatigue:  7  Insomnia:  0  Restlessness:  0  Agitation:  0         Performance Status:  80    Living Arrangements:  Lives with family and Lives in home    Psychosocial/Cultural:   See Palliative Psychosocial Note: No  **Primary  to Follow**  Palliative Care  Consult: No    Medications:  Current Medications[1]    External  database queried on 05/19/2025  by Adilia LUIS :    Review of patient's allergies indicates:   Allergen Reactions    Erythromycin Swelling    Codeine Nausea And Vomiting     And migraine h/a    [place] Blisters        OBJECTIVE:      Physical Exam:  Vitals: Pulse: 83 (05/13/25 0901)  BP: 114/68 (05/13/25 0901)  SpO2: 99 % (05/13/25 0901)    Physical Exam  Vitals and nursing note reviewed.   Constitutional:       General: She is not in acute distress.     Appearance: She is not ill-appearing.   HENT:      Mouth/Throat:      Mouth: Mucous membranes are moist.      Pharynx: Oropharynx is clear.   Eyes:      General:  No scleral icterus.     Pupils: Pupils are equal, round, and reactive to light.   Cardiovascular:      Rate and Rhythm: Normal rate and regular rhythm.      Pulses: Normal pulses.   Pulmonary:      Effort: Pulmonary effort is normal. No respiratory distress.   Abdominal:      General: There is no distension.      Palpations: Abdomen is soft.   Musculoskeletal:         General: Normal range of motion.   Skin:     General: Skin is warm and dry.   Neurological:      Mental Status: She is alert and oriented to person, place, and time.   Psychiatric:         Mood and Affect: Mood normal.         Behavior: Behavior normal.         Thought Content: Thought content normal.         Judgment: Judgment normal.     Labs: recent labs reviewed    Imaging: recent imaging reviewed     I spent a total of 60 minutes on the day of the visit. This includes face to face time in discussion of goals of care, symptom assessment, coordination of care and emotional support.  This also includes non-face to face time preparing to see the patient (eg, review of tests/imaging), obtaining and/or reviewing separately obtained history, documenting clinical information in the electronic or other health record, independently interpreting results and communicating results to the patient/family/caregiver, or care coordinator.     Additional 18 min time spent on a voluntary advance care planning and /or goals of care discussion, providing emotional support, formulating and communicating prognosis and exploring burden/benefit of various approaches of treatment.     Adilia Stokes NP         [1]   Current Outpatient Medications:     albuterol (VENTOLIN HFA) 90 mcg/actuation inhaler, Inhale 2 puffs into the lungs every 6 (six) hours as needed for Wheezing or Shortness of Breath. Rescue, Disp: 18 g, Rfl: 1    ALPRAZolam (XANAX) 1 MG tablet, Take 1 tablet (1 mg total) by mouth once daily., Disp: 30 tablet, Rfl: 0    amLODIPine (NORVASC) 5 MG tablet,  Take 1 tablet (5 mg total) by mouth once daily., Disp: 90 tablet, Rfl: 1    blood sugar diagnostic Strp, 1 each by Misc.(Non-Drug; Combo Route) route 3 (three) times daily., Disp: 90 each, Rfl: 0    blood-glucose meter kit, Use as instructed, Disp: 1 each, Rfl: 0    cetirizine (ZYRTEC) 10 MG tablet, TAKE 1 TABLET BY MOUTH ONCE DAILY, Disp: 90 tablet, Rfl: 1    DULoxetine (CYMBALTA) 60 MG capsule, Take 1 capsule (60 mg total) by mouth once daily., Disp: 90 capsule, Rfl: 1    hydroCHLOROthiazide (HYDRODIURIL) 25 MG tablet, TAKE one TABLET BY MOUTH ONCE DAILY, Disp: 90 tablet, Rfl: 1    insulin glargine U-100, Lantus, 100 unit/mL (3 mL) SubQ InPn pen, Inject 18 Units into the skin every evening., Disp: 6 mL, Rfl: 1    lancets (LANCETS,THIN) Misc, 1 each by Misc.(Non-Drug; Combo Route) route 3 (three) times daily., Disp: 90 each, Rfl: 0    levothyroxine (SYNTHROID) 25 MCG tablet, Take 1 tablet (25 mcg total) by mouth before breakfast., Disp: 90 tablet, Rfl: 1    LIDOcaine-prilocaine (EMLA) cream, Apply 1 g topically twice a week. (Patient not taking: Reported on 5/16/2025), Disp: , Rfl:     losartan (COZAAR) 100 MG tablet, Take 1 tablet (100 mg total) by mouth once daily., Disp: 90 tablet, Rfl: 1    meloxicam (MOBIC) 15 MG tablet, Take 1 tablet (15 mg total) by mouth once daily. (Patient not taking: Reported on 5/16/2025), Disp: 90 tablet, Rfl: 0    metoprolol succinate (TOPROL-XL) 25 MG 24 hr tablet, Take 1 tablet (25 mg total) by mouth once daily., Disp: 90 tablet, Rfl: 1    metronidazole 0.75% (METROCREAM) 0.75 % Crea, Apply 1 application  topically 2 (two) times daily. (Patient not taking: Reported on 5/16/2025), Disp: , Rfl:     montelukast (SINGULAIR) 10 mg tablet, TAKE ONE TABLET BY MOUTH EVERY EVENING, Disp: 90 tablet, Rfl: 1    multivit-mins no.63/iron/folic (M-VIT ORAL), Take 1 tablet by mouth once daily., Disp: , Rfl:

## 2025-05-20 NOTE — NURSING
Scheduled patient to completed pharmacogenomics panel as well as standard lab work needed for chemotherapy clearance. Discussed with patient that these results can take up to a week and a half to get the results. Patient requested to change chemotherapy school to the afternoon of the 27th. Reviewed upcoming appointments. Encouraged patient to call with any questions or concerns.

## 2025-05-20 NOTE — PROGRESS NOTES
Noah Clark MD Smith, Sumathi S., MD Hi Sumathi,    I think this splenic mass is probably a metastasis. I don't think we can safely biopsy the spleen - the PET may help us sort it out. The next step is chemo regardless, so I don't think it changes management now anyway. Unless the spleen is a definite metastasis based on PET, I'll see her back after 2-3 months of chemo when she gets her restaging scans, but I think eventual surgery is unlikely here.    Thanks,  Deacon

## 2025-05-22 ENCOUNTER — HOSPITAL ENCOUNTER (OUTPATIENT)
Dept: RADIOLOGY | Facility: HOSPITAL | Age: 65
Discharge: HOME | End: 2025-05-22
Attending: NURSE PRACTITIONER
Payer: MEDICAID

## 2025-05-22 VITALS — BODY MASS INDEX: 45.89 KG/M2 | HEIGHT: 63 IN | WEIGHT: 259 LBS

## 2025-05-22 DIAGNOSIS — K86.89 PANCREATIC MASS: ICD-10-CM

## 2025-05-22 LAB — POCT GLUCOSE: 151 MG/DL (ref 70–110)

## 2025-05-22 PROCEDURE — A9552 F18 FDG: HCPCS | Mod: PO | Performed by: NURSE PRACTITIONER

## 2025-05-22 PROCEDURE — 78815 PET IMAGE W/CT SKULL-THIGH: CPT | Mod: 26,PI,, | Performed by: RADIOLOGY

## 2025-05-22 PROCEDURE — 82962 GLUCOSE BLOOD TEST: CPT | Mod: PO

## 2025-05-22 RX ORDER — FLUDEOXYGLUCOSE F18 500 MCI/ML
13.9 INJECTION INTRAVENOUS
Status: COMPLETED | OUTPATIENT
Start: 2025-05-22 | End: 2025-05-22

## 2025-05-22 RX ADMIN — FLUDEOXYGLUCOSE F-18 13.9 MILLICURIE: 500 INJECTION INTRAVENOUS at 08:05

## 2025-05-26 ENCOUNTER — PATIENT MESSAGE (OUTPATIENT)
Dept: FAMILY MEDICINE | Facility: CLINIC | Age: 65
End: 2025-05-26
Payer: MEDICAID

## 2025-05-27 ENCOUNTER — TELEPHONE (OUTPATIENT)
Dept: FAMILY MEDICINE | Facility: CLINIC | Age: 65
End: 2025-05-27
Payer: MEDICAID

## 2025-05-27 ENCOUNTER — CLINICAL SUPPORT (OUTPATIENT)
Dept: HEMATOLOGY/ONCOLOGY | Facility: CLINIC | Age: 65
End: 2025-05-27
Payer: MEDICAID

## 2025-05-27 ENCOUNTER — OFFICE VISIT (OUTPATIENT)
Dept: SURGERY | Facility: CLINIC | Age: 65
End: 2025-05-27
Payer: MEDICAID

## 2025-05-27 VITALS
RESPIRATION RATE: 17 BRPM | TEMPERATURE: 99 F | HEIGHT: 64 IN | SYSTOLIC BLOOD PRESSURE: 116 MMHG | OXYGEN SATURATION: 95 % | HEART RATE: 104 BPM | DIASTOLIC BLOOD PRESSURE: 56 MMHG | BODY MASS INDEX: 44.48 KG/M2 | WEIGHT: 260.56 LBS

## 2025-05-27 VITALS
HEIGHT: 63 IN | HEART RATE: 108 BPM | SYSTOLIC BLOOD PRESSURE: 102 MMHG | DIASTOLIC BLOOD PRESSURE: 72 MMHG | TEMPERATURE: 99 F | BODY MASS INDEX: 45.9 KG/M2 | WEIGHT: 259.06 LBS

## 2025-05-27 DIAGNOSIS — I87.2 VENOUS INSUFFICIENCY: Primary | ICD-10-CM

## 2025-05-27 DIAGNOSIS — C25.0 MALIGNANT NEOPLASM OF HEAD OF PANCREAS: ICD-10-CM

## 2025-05-27 DIAGNOSIS — C25.0 MALIGNANT NEOPLASM OF HEAD OF PANCREAS: Primary | ICD-10-CM

## 2025-05-27 PROCEDURE — 99215 OFFICE O/P EST HI 40 MIN: CPT | Mod: PBBFAC,27,PN | Performed by: SURGERY

## 2025-05-27 PROCEDURE — 99215 OFFICE O/P EST HI 40 MIN: CPT | Mod: PBBFAC,PN | Performed by: NURSE PRACTITIONER

## 2025-05-27 PROCEDURE — 99215 OFFICE O/P EST HI 40 MIN: CPT | Mod: S$PBB,,, | Performed by: NURSE PRACTITIONER

## 2025-05-27 PROCEDURE — 99999 PR PBB SHADOW E&M-EST. PATIENT-LVL V: CPT | Mod: PBBFAC,,, | Performed by: NURSE PRACTITIONER

## 2025-05-27 PROCEDURE — 99999 PR PBB SHADOW E&M-EST. PATIENT-LVL V: CPT | Mod: PBBFAC,,, | Performed by: SURGERY

## 2025-05-27 RX ORDER — ONDANSETRON 8 MG/1
8 TABLET, FILM COATED ORAL EVERY 8 HOURS PRN
Qty: 30 TABLET | Refills: 2 | Status: SHIPPED | OUTPATIENT
Start: 2025-05-27 | End: 2026-05-27

## 2025-05-27 RX ORDER — LOPERAMIDE HYDROCHLORIDE 2 MG/1
CAPSULE ORAL
Qty: 30 CAPSULE | Refills: 11 | Status: SHIPPED | OUTPATIENT
Start: 2025-05-27

## 2025-05-27 RX ORDER — OLANZAPINE 5 MG/1
TABLET, FILM COATED ORAL
Qty: 3 TABLET | Refills: 11 | Status: SHIPPED | OUTPATIENT
Start: 2025-05-27

## 2025-05-27 RX ORDER — LEVOFLOXACIN 500 MG/1
500 TABLET, FILM COATED ORAL DAILY
Qty: 7 TABLET | Refills: 0 | Status: SHIPPED | OUTPATIENT
Start: 2025-05-27

## 2025-05-27 RX ORDER — GABAPENTIN 100 MG/1
300 CAPSULE ORAL 3 TIMES DAILY
Qty: 270 CAPSULE | Refills: 2 | Status: SHIPPED | OUTPATIENT
Start: 2025-05-27 | End: 2025-06-26

## 2025-05-27 RX ORDER — CEFAZOLIN SODIUM 2 G/50ML
2 SOLUTION INTRAVENOUS
OUTPATIENT
Start: 2025-05-27

## 2025-05-27 RX ORDER — PROCHLORPERAZINE MALEATE 10 MG
10 TABLET ORAL EVERY 6 HOURS PRN
Qty: 30 TABLET | Refills: 1 | Status: SHIPPED | OUTPATIENT
Start: 2025-05-27 | End: 2026-05-27

## 2025-05-27 RX ORDER — LIDOCAINE AND PRILOCAINE 25; 25 MG/G; MG/G
CREAM TOPICAL
Qty: 30 G | Refills: 0 | Status: SHIPPED | OUTPATIENT
Start: 2025-05-27

## 2025-05-27 NOTE — PROGRESS NOTES
FOLLOW-UP APPOINTMENT    PATIENT:   Sonia Muse  :    1960  MR#:    [id number]  DATE OF VISIT:  2025      Chief Complaint:  Pancreatic cancer    HPI:   Ms. Sonia Muse presents today for chemotherapy education.  She will be starting treatment with FOLFIRINOX every 2 weeks for the above diagnosis.       Review of Systems   Constitutional:  Positive for fever. Negative for appetite change and unexpected weight change.   HENT:   Negative for mouth sores.    Eyes: Negative.    Respiratory:  Positive for cough. Negative for shortness of breath.    Cardiovascular:  Negative for chest pain.   Gastrointestinal: Negative.  Negative for abdominal pain and diarrhea.   Endocrine: Negative.    Genitourinary: Negative.  Negative for frequency.    Musculoskeletal: Negative.  Negative for back pain.   Skin:  Negative for rash.   Neurological: Negative.  Negative for headaches.   Hematological: Negative.  Negative for adenopathy.   Psychiatric/Behavioral:  The patient is nervous/anxious.      Oncology History   Pancreatic cancer   2025 Initial Diagnosis    Pancreatic cancer     2025 -  Chemotherapy    Treatment Summary   Plan Name: OP GI mFOLFIRINOX (oxaliplatin leucovorin irinotecan fluorouracil) Q2W  Treatment Goal: Curative  Status: Active  Start Date: 2025 (Planned)  End Date: 10/22/2025 (Planned)  Provider: Paty Calle MD  Chemotherapy: irinotecan (CAMPTOSAR) 150 mg/m2 = 346 mg in 0.9% NaCl 517.3 mL chemo infusion, 150 mg/m2 = 346 mg, Intravenous, Clinic/HOD 1 time, 0 of 12 cycles  oxaliplatin (ELOXATIN) 85 mg/m2 = 196 mg in D5W 539.2 mL chemo infusion, 85 mg/m2 = 196 mg, Intravenous, Clinic/HOD 1 time, 0 of 12 cycles  fluorouracil (Adrucil) 2,400 mg/m2 = 5,545 mg in 0.9% NaCl 240 mL chemo infusion, 2,400 mg/m2 = 5,545 mg, Intravenous, Over 46 hours, 0 of 12 cycles         Problem List[1]    Past Medical History:   Diagnosis Date    Allergy     Anxiety     Asthma     DDD  (degenerative disc disease), cervical     Depression     Diabetes mellitus     Fibromyalgia     Hypertension     Neuromuscular disorder     Pancreatic mass     PONV (postoperative nausea and vomiting)     Thyroid disease     hypo       Past Surgical History:   Procedure Laterality Date    ADENOIDECTOMY      carpel tunnel Right      SECTION      ENDOSCOPIC ULTRASOUND OF UPPER GASTROINTESTINAL TRACT N/A 2025    Procedure: ULTRASOUND, UPPER GI TRACT, ENDOSCOPIC;  Surgeon: Austen Grimes III, MD;  Location: [facility] ENDO;  Service: Endoscopy;  Laterality: N/A;    ERCP N/A 2025    Procedure: ERCP (ENDOSCOPIC RETROGRADE CHOLANGIOPANCREATOGRAPHY);  Surgeon: Thuy Escobar MD;  Location: [facility] ENDO;  Service: Endoscopy;  Laterality: N/A;    ERCP N/A 2025    Procedure: ERCP (ENDOSCOPIC RETROGRADE CHOLANGIOPANCREATOGRAPHY);  Surgeon: Austen Grimes III, MD;  Location: [facility] ENDO;  Service: Endoscopy;  Laterality: N/A;    JOINT REPLACEMENT Right     knee    KNEE ARTHROPLASTY Left 10/19/2020    Procedure: ARTHROPLASTY, KNEE;  Surgeon: Felipe Herring MD;  Location: [facility] OR;  Service: Orthopedics;  Laterality: Left;  Louie Liz    knee replacement Right     ROTATOR CUFF REPAIR Right     TONSILLECTOMY      TUBAL LIGATION         Social History[2]    Family History   Problem Relation Name Age of Onset    Diabetes Mother Libertad     Hypertension Mother Libertad     Heart disease Mother Libertad     Stroke Mother Libertad     Glaucoma Mother Libertad     Arthritis Mother Libertad     Diabetes Father Fredis     Hypertension Father Fredis     Heart disease Father Fredis     Hypertension Sister 1     Heart disease Sister 1     Graves' disease Daughter      Anxiety disorder Daughter         Current Medications[3]    Review of patient's allergies indicates:   Allergen Reactions    Erythromycin Swelling    Codeine Nausea And Vomiting     And migraine h/a    [place] Blisters       Physcial  Examination  VITAL SIGNS:    There is no height or weight on file to calculate BSA.   Pain Assessment:  No pain at today's visit  There were no vitals filed for this visit.       Wt Readings from Last 5 Encounters:   05/27/25 117.5 kg (259 lb 0.7 oz)   05/22/25 117.5 kg (259 lb)   05/19/25 118.1 kg (260 lb 4.8 oz)   05/16/25 118.4 kg (261 lb 0.4 oz)   05/13/25 118.6 kg (261 lb 7.5 oz)       GENERAL:  Sonia Muse is healthy-appearing 65 y.o. female, in no distress.   EYES:   Pupils equal, round, reactive.  Conjunctivae, sclera and lids normal.  HEENT: Head normocephalic and atraumatic, without alopecia.  Oropharynx is unremarkable.  No icterus, jaundice, stomatitis, mucositis, or ulceration is noted.  Ears are clear and unremarkable.  Nose, nares, and septum are unremarkable.    NECK:   No masses.  Thyroid and trachea are normal.    BREASTS:  Deferred.  RESPIRATORY: Clear to auscultation bilaterally.  Symmetrically effortless expansion.  No wheezing and no stridor.    CV: Heart reveals regular rate and rhythm without murmur, rub, or gallops.  ABDOMEN: Soft, non-tender.  No masses, no hernias, and no rebound or rigidity are noted.  /RECTAL:  Deferred.  LYMPHATICS: No preauricular, submandibular, cervical, supraclavicular, axillary, lymphadenopathy.  MUSCULOSKELETAL:Fair musculature, no atrophy.  No arthritic changes.  No edema or cyanosis. Back is without gross abnormal curvature.   NEUROLOGICAL: Cranial nerves II-XII grossly intact.  Motor and sensory exam intact.  SKIN:   No lesions, bruises, petechiae or rashes.  Good turgor.    PSYCHIATRIC: Patient is alert and oriented to time, place and person.  Mood and affect are appropriate.         Laboratory and Radiology   Lab Results   Component Value Date    WBC 6.81 05/20/2025    RBC 4.22 05/20/2025    HGB 11.6 (L) 05/20/2025    HCT 35.8 (L) 05/20/2025    MCV 85 05/20/2025    MCH 27.5 05/20/2025    MCHC 32.4 05/20/2025    RDW 14.6 (H) 05/20/2025      05/20/2025    MPV 9.5 05/20/2025    GRAN 4.8 01/24/2022    GRAN 64.2 01/24/2022    LYMPH 1,693 05/25/2024    LYMPH 18.4 05/25/2024    MONO 607 05/25/2024    MONO 6.6 05/25/2024     05/25/2024    BASO 92 05/25/2024    EOSINOPHIL 2.9 05/25/2024    BASOPHIL 1.0 05/25/2024     BMP  Lab Results   Component Value Date     05/20/2025    K 3.9 05/20/2025     05/20/2025    CO2 24 05/20/2025    BUN 21 05/20/2025    CREATININE 1.1 05/20/2025    CALCIUM 9.3 05/20/2025    ANIONGAP 12 05/20/2025    ESTGFRAFRICA >60.0 01/24/2022    EGFRNONAA >60.0 01/24/2022     Lab Results   Component Value Date    ALT 9 (L) 05/20/2025    AST 12 05/20/2025    ALKPHOS 109 05/20/2025    BILITOT 1.0 05/20/2025     Results for orders placed or performed during the hospital encounter of 05/14/25 (from the past 2160 hours)   CT Abdomen Pelvis With IV Contrast Routine Oral Contrast    Narrative    CMS MANDATED QUALITY DATA - CT RADIATION - 436    All CT scans at this facility utilize dose modulation, iterative reconstruction, and/or weight based dosing when appropriate to reduce radiation dose to as low as reasonably achievable.    CLINICAL HISTORY:  ([id number])64 y/o  (1960) F    Pancreatic cancer, staging; Other specified diseases of pancreas    TECHNIQUE:  (A#[id number], exam time 5/14/2025 11:45)    CT ABDOMEN PELVIS WITH IV CONTRAST [id number]    Axial CT images of the abdomen and pelvis were obtained from the dome of the diaphragm to the proximal thighs.    100cc omnipague 350 IV contrast was given multiphase imaging through the pancreas was performed.    COMPARISON:  04/24/2025    FINDINGS:  Lower Thorax:    The lung bases are clear. The heart is normal in size.  Small hiatal hernia    CT Abdomen:    Liver: The liver demonstrates normal enhancement.  There is no focal mass.  The portal veins are patent.    Gallbladder: Gallbladder is normal.    Biliary Tree: There is pneumobilia.  There is a common bile duct stent in  place.    Spleen: Heterogeneous 9.8 x 8.9 cm rim enhancing mass within the dome of the spleen.  Findings may be secondary to metastasis.  The splenic vein is patent.    Pancreas: 4.3 x 4.3 cm hypodense dense mass within the head of the pancreas findings are consistent with patient's known pancreatic malignancy.  There is atrophy of the body and tail of the pancreas with pancreatic ductal dilatation.    There are fat plane surrounding the superior mesenteric artery and vein.    Adrenal Glands: Normal    Kidneys: The kidneys are normal in imaging appearance without hydronephrosis or hydroureter.    Vasculature: Normal.    Lymph nodes: 15 x 10 mm portacaval node.    11 mm gastrohepatic node.  There is no additional mesenteric or retroperitoneal adenopathy.    Intraperitoneal structures: There is no ascites.    Bowel: There are no thick-walled or dilated bowel loops.    Abdominal wall: The abdominal wall and musculature are normal.    Musculoskeletal: There is degenerative disc disease and facet arthropathy in the lumbar spine with no aggressive appearing lytic or blastic lesions    CT Pelvis:    Bladder: Normal.    Reproductive Organs: The prostate and seminal vesicles are within normal limits.    Pelvic Lymph nodes: No pelvic lymphadenopathy or pelvic mass is identified.      Impression    4.3 x 4.3 cm pancreatic head mass compatible with patient's known malignancy there is atrophy of the body and tail the pancreas with mild pancreatic ductal dilatation    Common bile duct stent in place with pneumobilia    Mildly prominent gastrohepatic and portacaval lymph node suspicious for metastatic disease    Small hiatal hernia    9.8 cm rim enhancing mass within the dome of the spleen which is nonspecific in suspicious for metastatic disease      Electronically signed by: Ashlee Christensen  Date:    05/14/2025  Time:    12:00   Results for orders placed or performed during the hospital encounter of 04/24/25 (from the past 2160  hours)   CT Chest Abdomen Pelvis Without Contrast (XPD)    Narrative    EXAMINATION:  CT CHEST ABDOMEN PELVIS WITHOUT CONTRAST(XPD)    CLINICAL HISTORY:  Sepsis;    TECHNIQUE:  Low dose axial images, sagittal and coronal reformations were obtained from the thoracic inlet to the pubic symphysis .  Oral contrast was not administered.    COMPARISON:  None    FINDINGS:  Clear lungs.  No cardiomegaly or pericardial effusion.  Intact bones.    Soft tissues: Unremarkable.    Bones: No acute osseous abnormality.    Lower chest: Normal heart size. No pericardial effusion.    Lung Bases: Well aerated, without consolidation or pleural fluid.    Liver: Normal in size and attenuation, with no focal hepatic lesions.    Gallbladder: Mildly distended.  No calcified gallstones.    Bile Ducts: No evidence of dilated ducts.    Pancreas: No mass or peripancreatic fat stranding.    Spleen: Unremarkable.    Adrenals: Unremarkable.    Kidneys/ Ureters: Unremarkable.    Bladder: No evidence of wall thickening.    Reproductive organs: Unremarkable.    GI Tract/Mesentery: No evidence of bowel obstruction or inflammation.    Peritoneal Space: No ascites. No free air.    Lymphadenopathy: No significant adenopathy.    Vasculature: No significant atherosclerosis or aneurysm.      Impression    No acute findings      Electronically signed by: Michael Arias  Date:    04/24/2025  Time:    21:00     Results for orders placed or performed during the hospital encounter of 05/22/25 (from the past 2160 hours)   NM PET CT FDG Skull Base to Mid Thigh    Impression    1. FDG avid hypodense mass pancreatic head represents biopsy-proven malignancy.  2. Mildly FDG avid enlarged, 12 mm, contreras hepatis lymph node suspicious for lymph node metastasis.  3. Previously identified splenic mass, seen to better advantage on prior MRCP and contrast enhanced CT, shows no focal increased FDG activity in remains nonspecific.  Splenic lesions are statistically likely  of benign etiology and considerations include hemangioma or hamartoma.  Low-grade malignancy or metastatic disease are felt less likely.  Follow-up contrast enhanced CT is recommended in 3 months to evaluate for stability.      Electronically signed by: Vincenzo Perera  Date:    05/22/2025  Time:    09:53     Results for orders placed or performed during the hospital encounter of 04/24/25 (from the past 2160 hours)   MRI MRCP Abdomen WO Contrast 3D WO Independent WS XPD    Impression    Large (4.3 cm) pancreatic head mass is highly suspicious for primary pancreatic malignancy, and causes obstruction of the common bile duct and pancreatic duct.  Consider ERCP for further evaluation.    Large mass within the superior aspect of the spleen measuring greater than 9 cm.  This is incompletely assessed without contrast, however splenic metastatic disease is possible.  Further evaluation with contrast enhanced CT/MRI or PET-CT is recommended.    Enlarged peripancreatic lymph nodes concerning for metastasis.    This report was flagged in Epic as abnormal.      Electronically signed by: Joshua Calle  Date:    04/25/2025  Time:    07:33       Pathology  Pathology Results  (Last 10 years)      None            TITLE: PLAN OF CARE FOR THE CHEMOTHERAPY PATIENT / TEACHING PROTOCOL    PURPOSE: To involve the patient / significant other in the plan of care and to provide teaching to the significant other & patient receiving chemotherapy.    LEVEL: Independent.    CONTENT: The Plan of Care for the chemotherapy patient is individualized and appropriate to the patients needs, strengths, limitations, & goals.  Education includes information regarding chemotherapy side effects, the treatment itself, and self-care  Activities.    GOAL / OUTCOME STANDARDS    PHYSIOLOGIC: The client will remain free or experience minimal side effects or toxicities throughout the chemotherapy treatment period.     PSYCHOLOGIC: The client/significant others will  demonstrate positive coping mechanisms in relation to chemotherapy and its side effects.      COGINITIVE: The client/significant others will verbalize understanding of self-care measure to avoid/minimize side effects of the chemotherapy regime.    EVALUATION / COMMENT KEY:    V = Audiovisual/Video  S = Successfully meets outcome  N = Needs further instruction  NA = Not applicable to the patient  P = Previous knowledge  U = Unable to comprehend  * = See progress notes          PLAN OF CARE  INFORMATION TO BE DELIVERED / NURSING INTERVENTIONS DATE EVALUATION   Assessment of client/caregiver,         knowledge of cancer diagnosis,         and chemotherapy as a treatment. 1a. Evaluate patient/caregiver learning ability    b. Plan teaching sessions with patient/caregiver according to needs and present anxiety level/ability to learn.    c. Provide Chemotherapy Education Packet,        Mouth Care Protocol,         Specific Patient Education Sheets. 05/27/2025 S   Individual chemotherapy treatment         plan. 2a. Review of Chemotherapy Education handout from [employer]            05/27/2025   S   Knowledge Deficit & Self-Management of general side effects common to all chemotherapy:  Nausea/Vomiting  b.   Diarrhea  Mouth Care  Dental care  Constipation  Hair Loss  Potential for infection  Fatigue   3a. Reinforce that the majority of side effects from chemotherapy are reversible and are  controlled both in the hospital and at home        (blood counts recover, hair grows back).   b.  Refer to the following for reinforcement of         information post-treatment:  Mouth Care Protocol.  Bowel Protocol for constipation or diarrhea.  3.  Drug Specific Chemotherapy Information Sheets for each medication patient receiving.    05/27/2025     S     PLAN OF CARE  INFORMATION TO BE DELIVERED / NURSING INTERVENTIONS DATE EVALUATION   h. Potential for bleeding         i. Potential anemia/fatigue         j. Potential sunburn          k. Birth control measures  l. Safety measures post treatment 4.  Chemotherapy Home Care Instruction  and Safety Information Sheet.  A. patient/caregivers to thoroughly cook shellfish (shrimp, crab, etc) to decrease the chance of infection.    B.  Use sunscreen and protective clothing while in the sun.   05/27/2025      Knowledge deficit & Self Management of Drug Specific  Side Effects.    BLADDER EFFECTS        (Hemorrhagic Cystitis)                  Preventable with adequate hydration; occurs 2-3 days or more post treatment.   1.  Instruct patient to:  a.   Void at least every 2 hours; increase intake.  b.   DO NOT hold urine; go when urge is felt.  c.    Empty bladder at bedtime and on         awakening.  d.   Observe for color changes (red to tea           colored), amount and frequency changes.  e.   Notify oncologist of any abnormalities           in urine or voiding or if you cannot               drink adequate fluids.   05/27/2025   S   b.   CHANGES IN URINE        COLOR:      1.   Instruct patient:  a.   Most evident in first 2-3 voidings after           administration.  Lasts less than 24 hours.  If urine is discolored 2 or more days post- treatment, notify oncologist.      05/27/2025 S   c.    KIDNEY EFFECTS           (Nephrotoxicity)   1.  Instruct patient to:  a.   Drink 8-16 glasses of fluid/day the day   pre-treatment and 3-4 days post-treatment to maintain hydration; the best way to minimize kidney problems.  b.   Notify oncologist immediately if unable to drink fluids or if changes are noted in urinary elimination.     05/27/2025   S   PULMONARY TOXICITY    Instruct patient to report symptoms such as shortness of breath, chest pain, shallow breathing, or chest wall discomfort to physician.  Reinforce preventative measures used by the health care team.  Baseline and periodic PFT and chest x-ray.   05/27/2025   S     PLAN OF CARE INFORMATION TO BE DELIVERED / NURSING INTERVENTIONS DATE EVALUATION    NERVE & MUSCLE EFFECTS (neurotoxocity; neuropathy, possible visual/hearing changes)        Instruct patient to:    Report numbness or tingling of the hands/feet, loss of fine motor movement (buttoning shirt, tying shoelaces), or gait changes to your oncologist.  If numbness/tingling are present:  protect feet with shoes at all times.  Use gloves for washing dishes/gardening & potholders in kitchen.       05/27/2025   S   CARDIOTOXICITY  Decreased effectiveness of             cardiac function. Effective are                  cumulative and irreversible.                                    CARDIAC ARRYTHMIAS              4   Instruct:  Heart function may be tested before treatment and perdiocally during treatment.  Notify oncologist of irregular pulse, palpitations, shortness of breath, or swelling in lower extremities/feet.          Taxol and Taxotere can cause arrhythmias on infusion that resolve once infusion discontinued. Instruct nurse if any irregularity felt.    05/27/2025   S   EXTRAVASTION  Occurs when vesicants leak outside of vein and cause damage to the skin and underlying tissues.   Reinforce preventive measures used to avoid complications.  Fresh IV site or central line monitored continuously with vesicant IVP.  Continuous infusion via central line site and blood return monitored periodically around the clock.  Instruct to:  Notify nurse of any discomfort, burning, stinging, etc. at IV site during chemotherapy administration.  Notify oncologist of any redness, pain, or swelling at IV site after discharge from hospital.   05/27/2025   S   HYPERSENSITIVITY can happen with any medication.   Instruct patient:  Nurse is with them during the initial part of treatment and will be close by to monitor.  Pre-medication ordered by the oncologist must be taken on time. If doses are missed, treatment will need to be re-scheduled.  Skin redness, itching, or hives appearing after discharge should be reported to  oncologist. 05/27/2025   S       PLAN OF CARE INFORMATION TO BE DELIVERED / NURSING INTERVENTIONS DATE EVALUATION   FLU-LIKE SYNDROME      Instruct patient symptoms are hard to prevent and may include fever, shaking chills, muscle and body aches.  Taking prescribed medications from physician if needed.  Adequate fluids are important.    Reinforce the need to call if temperature is         elevated to 100.4 or more  05/27/2025   S   HAND-FOOT SYNDROME  causes painful, symmetric swelling and redness of palms and soles                  Instruct patient to report any numbness or tingling in the hands or feet.  Explain prevention techniques, such as     Use heavy moisturizers to lessen skin dryness and itching, but to avoid if skin is cracked or broken  Bathe in tepid water, use non-perfumed soap, and wash gently. Baths with oatmeal or diluted baking soda may be soothing.  Avoid tight fitting shoes and repetitive actions, such as rubbing hands or applying pressure to hands/feet.  Review measures to take should syndrome occur:  Cold compresses and elevation for          edema  Pain medications and other measures as ordered by oncologist.   4.   Syndrome resolves few weeks after therapy. 05/27/2025   S   5. DISCHARGE PLANNING /        EDUCATION 1.    Explain importance of compliance with follow- up  tests (CBC, CMP).  2.    Verify patient/caregiver know:  a.    Oncologists office phone number.  b.    Dates of follow-up appointments.  c.    Prescriptions given for nausea  3.   Review side effects to monitor and notify          oncologist about.  4.   Reinforce the need for patient and caregivers to:  a.    Review information given.  b.    Call oncologists office with questions          or symptoms  5.   Provide [facility] Brochure make referrals if needed for financial or .   05/27/2025   S     PROGRESS NOTES: I met with the patient  today for chemotherapy education. she will be starting  treatment with FOLFIRINOX every 2 weeks . We discussed the mechanism of action, potential side effects of this treatment as well as ways she can manage them at home. Some of these side effects include but or not limited to fever, nausea, vomiting, decreased appetite, fatigue, weakness, cytopenias, myalgia/arthralgia, constipation, diarrhea, bleeding, headache, shortness of breath, nail changes, taste change, hair thinning/loss, mood disturbances, or edema. We also discussed dietary modifications she should make although this will be discussed in more detail with the dietician. she was provided with anti-emetic medication, a copy of all of the information we discussed today as well as our contact information. she will be provided a schedule on his first day of treatment. We will obtain labs on a weekly basis and the patient will follow-up with the physician for toxicity monitoring throughout treatment. All questions were answered and an informed consent was obtained. she was reminded to certainly contact us sooner if needed.  Attached to the patients folder and discussed with the patient the 24 hour/ 7 days a week after hours telephone number for the physician.  Patient notified to call anytime 24/7 because their is a physician on call for any problems that may arise.  Patient also notified to report to [facility] if they can not get in touch with a physician after hours.  Discussed the five wishes booklet with the patient and their family.           Assessment/Plan   Pancreatic cancer:   -proceed with neoadjuvant FOLFIRINOX  -see MD prior to cycle 1 for clearance  -see Dr. Clark with CT scan after 2 months of treatment  -plan is to proceed with surgery after 4 months of treatment    Fibromyalgia:   -Neurontin 300 mg p.o. t.i.d.    Upper respiratory infection:   -Levaquin 500 mg p.o. daily x5 days      Medications Ordered:  Zofran 8mg 1 tab PO Q8h prn nausea  Compazine 10 mg PO Q4-6h prn nausea  Emla cream:  Apply to port site 30min prior to treatment and cover with saran wrap  Claritin 10mg PO QD day of chemotherapy and for 5 days after Neulasta to help prevent bone pain  OTC NSAIDS  Take 2 PO QD day of and for 5 days after Neulasta to help prevent bone pain  OTC Imodium as directed  OTC MiraLax 17 g daily for constipation     * please notify clinic or report to the emergency department for fever of 100.4 grade  * avoid tobacco and alcohol use  * maintain a healthy diet and good oral hydration  * good blood pressure and blood sugar control is important.  Please keep all followups with your primary care provider  *good hand hygiene  * please call clinic with any questions or concerns  * please take all medications as directed     Patient is in agreement with the proposed treatment plan. All questions were answered to the patient's satisfaction. Patient knows to call clinic for any new or worsening symptoms and if anything is needed before the next clinic visit.    Breanne Myers NP-C  Hematology & Medical Oncology     Collaborating physician, Dr. Calle.    50 minutes of total time spent on the encounter, which includes face-to-face time and non face-to-face time preparing to see the patient (e.g., review of tests), obtaining and/or reviewing separately obtain history, documenting clinical information in the electronic or other health record, independently interpreting results (not separately reported) and communicating results to the patient/family/caregiver or care coordination (not separately reported).  Electronically signed by: Breanne Myers NP-C                           [1]   Patient Active Problem List  Diagnosis    Essential hypertension    Chronic pain of left knee    Depression with anxiety    Fibromyalgia    Vitamin D deficiency    Vitamin B 12 deficiency    Primary osteoarthritis of left knee    Osteoarthritis of left knee    Tachycardia    Asthma    Dependent edema    Hyperglycemia    Family history of  elevated blood lipids    Skin lesion of face    Eczema    Pancreatic mass    MIGUEL ANGEL (acute kidney injury)    Pancreatic cancer   [2]   Social History  Socioeconomic History    Marital status:     Number of children: 3   Occupational History    Occupation: [employer]     Comment: primary sub for [employer]   Tobacco Use    Smoking status: Former     Current packs/day: 0.00     Average packs/day: 1 pack/day for 11.0 years (11.0 ttl pk-yrs)     Types: Cigarettes     Start date:      Quit date:      Years since quittin.4    Smokeless tobacco: Never   Substance and Sexual Activity    Alcohol use: Not Currently     Comment: occasional 2x/y    Drug use: Never    Sexual activity: Yes     Partners: Male     Social Drivers of Health     Financial Resource Strain: High Risk (2025)    Overall Financial Resource Strain (CARDIA)     Difficulty of Paying Living Expenses: Hard   Food Insecurity: Food Insecurity Present (2025)    Hunger Vital Sign     Worried About Running Out of Food in the Last Year: Sometimes true     Ran Out of Food in the Last Year: Sometimes true   Transportation Needs: Unmet Transportation Needs (2025)    PRAPARE - Transportation     Lack of Transportation (Medical): Yes     Lack of Transportation (Non-Medical): Yes   Physical Activity: Unknown (2025)    Exercise Vital Sign     Days of Exercise per Week: Patient declined     Minutes of Exercise per Session: 30 min   Stress: Stress Concern Present (2025)    [employer] - Occupational Stress Questionnaire     Feeling of Stress : Very much   Housing Stability: Low Risk  (2025)    Housing Stability Vital Sign     Unable to Pay for Housing in the Last Year: No     Number of Times Moved in the Last Year: 0     Homeless in the Last Year: No   [3]   Current Outpatient Medications:     albuterol (VENTOLIN HFA) 90 mcg/actuation inhaler, Inhale 2 puffs into the lungs every 6 (six) hours as needed  for Wheezing or Shortness of Breath. Rescue, Disp: 18 g, Rfl: 1    ALPRAZolam (XANAX) 1 MG tablet, Take 1 tablet (1 mg total) by mouth once daily., Disp: 30 tablet, Rfl: 0    amLODIPine (NORVASC) 5 MG tablet, Take 1 tablet (5 mg total) by mouth once daily., Disp: 90 tablet, Rfl: 1    blood sugar diagnostic Strp, 1 each by Misc.(Non-Drug; Combo Route) route 3 (three) times daily., Disp: 90 each, Rfl: 0    blood-glucose meter kit, Use as instructed, Disp: 1 each, Rfl: 0    cetirizine (ZYRTEC) 10 MG tablet, TAKE 1 TABLET BY MOUTH ONCE DAILY, Disp: 90 tablet, Rfl: 1    DULoxetine (CYMBALTA) 60 MG capsule, Take 1 capsule (60 mg total) by mouth once daily., Disp: 90 capsule, Rfl: 1    hydroCHLOROthiazide (HYDRODIURIL) 25 MG tablet, TAKE one TABLET BY MOUTH ONCE DAILY, Disp: 90 tablet, Rfl: 1    insulin glargine U-100, Lantus, 100 unit/mL (3 mL) SubQ InPn pen, Inject 18 Units into the skin every evening., Disp: 6 mL, Rfl: 1    lancets (LANCETS,THIN) Misc, 1 each by Misc.(Non-Drug; Combo Route) route 3 (three) times daily., Disp: 90 each, Rfl: 0    levothyroxine (SYNTHROID) 25 MCG tablet, Take 1 tablet (25 mcg total) by mouth before breakfast., Disp: 90 tablet, Rfl: 1    LIDOcaine-prilocaine (EMLA) cream, Apply 1 g topically twice a week. (Patient not taking: Reported on 5/16/2025), Disp: , Rfl:     losartan (COZAAR) 100 MG tablet, Take 1 tablet (100 mg total) by mouth once daily., Disp: 90 tablet, Rfl: 1    meloxicam (MOBIC) 15 MG tablet, Take 1 tablet (15 mg total) by mouth once daily. (Patient not taking: Reported on 5/16/2025), Disp: 90 tablet, Rfl: 0    metoprolol succinate (TOPROL-XL) 25 MG 24 hr tablet, Take 1 tablet (25 mg total) by mouth once daily., Disp: 90 tablet, Rfl: 1    metronidazole 0.75% (METROCREAM) 0.75 % Crea, Apply 1 application  topically 2 (two) times daily. (Patient not taking: Reported on 5/16/2025), Disp: , Rfl:     montelukast (SINGULAIR) 10 mg tablet, TAKE ONE TABLET BY MOUTH EVERY EVENING,  "Disp: 90 tablet, Rfl: 1    multivit-mins no.63/iron/folic (M-VIT ORAL), Take 1 tablet by mouth once daily., Disp: , Rfl:     pen needle, diabetic 32 gauge x 5/32" Ndle, 1 Needle by Misc.(Non-Drug; Combo Route) route once daily., Disp: 100 each, Rfl: 2    "

## 2025-05-27 NOTE — PROGRESS NOTES
Noah Clark MD to Me  Dameon Guadalupe, RN  Annette Izquierdo RN (Selected Message)        5/22/25 12:48 PM  This is encouraging that it is more likely benign. I would still start neoadjuvant chemo and shoot for about 4 months of preoperative chemo with restaging after 2. I'll see her back after 2 months of chemo with a CT chest, abdomen, pelvis pancreas protocol, and a Ca 19-9.   Me to Noah Clark MD        5/22/25 11:22 AM  Hey what do you think of the speen ,,,

## 2025-05-27 NOTE — PROGRESS NOTES
Subjective:       Patient ID: Sonia Muse is a 65 y.o. female.    Chief Complaint: Vascular Access Problem      HPI:  65 year old female referred to the office in consultation for port a cath placement. She has been diagnosed with pancreatic cancer. She has plan to start chemotherapy next week. No previous port a cath in the past. She presented with obstructive jaundice. She had EUS with biopsy for diagnosis. She had ERCP with metal cbd stent placement to relieve biliary obstruction.      Past Medical History:   Diagnosis Date    Allergy     Anxiety     Asthma     DDD (degenerative disc disease), cervical     Depression     Diabetes mellitus     Fibromyalgia     Hypertension     Neuromuscular disorder     Pancreatic mass     PONV (postoperative nausea and vomiting)     Thyroid disease     hypo     Past Surgical History:   Procedure Laterality Date    ADENOIDECTOMY      carpel tunnel Right      SECTION      ENDOSCOPIC ULTRASOUND OF UPPER GASTROINTESTINAL TRACT N/A 2025    Procedure: ULTRASOUND, UPPER GI TRACT, ENDOSCOPIC;  Surgeon: Austen Grimes III, MD;  Location: [facility];  Service: Endoscopy;  Laterality: N/A;    ERCP N/A 2025    Procedure: ERCP (ENDOSCOPIC RETROGRADE CHOLANGIOPANCREATOGRAPHY);  Surgeon: Thuy Escobar MD;  Location: [facility] ENDO;  Service: Endoscopy;  Laterality: N/A;    ERCP N/A 2025    Procedure: ERCP (ENDOSCOPIC RETROGRADE CHOLANGIOPANCREATOGRAPHY);  Surgeon: Austen Grimes III, MD;  Location: [facility];  Service: Endoscopy;  Laterality: N/A;    JOINT REPLACEMENT Right     knee    KNEE ARTHROPLASTY Left 10/19/2020    Procedure: ARTHROPLASTY, KNEE;  Surgeon: Felipe Herring MD;  Location: [facility];  Service: Orthopedics;  Laterality: Left;  Louie Liz    knee replacement Right     ROTATOR CUFF REPAIR Right     TONSILLECTOMY      TUBAL LIGATION       Review of patient's allergies indicates:   Allergen Reactions     "Erythromycin Swelling    Codeine Nausea And Vomiting     And migraine h/a    [place] Blisters     Medication List with Changes/Refills   Current Medications    ALBUTEROL (VENTOLIN HFA) 90 MCG/ACTUATION INHALER    Inhale 2 puffs into the lungs every 6 (six) hours as needed for Wheezing or Shortness of Breath. Rescue    ALPRAZOLAM (XANAX) 1 MG TABLET    Take 1 tablet (1 mg total) by mouth once daily.    AMLODIPINE (NORVASC) 5 MG TABLET    Take 1 tablet (5 mg total) by mouth once daily.    BLOOD SUGAR DIAGNOSTIC STRP    1 each by Misc.(Non-Drug; Combo Route) route 3 (three) times daily.    BLOOD-GLUCOSE METER KIT    Use as instructed    CETIRIZINE (ZYRTEC) 10 MG TABLET    TAKE 1 TABLET BY MOUTH ONCE DAILY    DULOXETINE (CYMBALTA) 60 MG CAPSULE    Take 1 capsule (60 mg total) by mouth once daily.    HYDROCHLOROTHIAZIDE (HYDRODIURIL) 25 MG TABLET    TAKE one TABLET BY MOUTH ONCE DAILY    INSULIN GLARGINE U-100, LANTUS, 100 UNIT/ML (3 ML) SUBQ INPN PEN    Inject 18 Units into the skin every evening.    LANCETS (LANCETS,THIN) MISC    1 each by Misc.(Non-Drug; Combo Route) route 3 (three) times daily.    LEVOTHYROXINE (SYNTHROID) 25 MCG TABLET    Take 1 tablet (25 mcg total) by mouth before breakfast.    LIDOCAINE-PRILOCAINE (EMLA) CREAM    Apply 1 g topically twice a week.    LOSARTAN (COZAAR) 100 MG TABLET    Take 1 tablet (100 mg total) by mouth once daily.    MELOXICAM (MOBIC) 15 MG TABLET    Take 1 tablet (15 mg total) by mouth once daily.    METOPROLOL SUCCINATE (TOPROL-XL) 25 MG 24 HR TABLET    Take 1 tablet (25 mg total) by mouth once daily.    METRONIDAZOLE 0.75% (METROCREAM) 0.75 % CREA    Apply 1 application  topically 2 (two) times daily.    MONTELUKAST (SINGULAIR) 10 MG TABLET    TAKE ONE TABLET BY MOUTH EVERY EVENING    MULTIVIT-MINS NO.63/IRON/FOLIC (M-VIT ORAL)    Take 1 tablet by mouth once daily.    PEN NEEDLE, DIABETIC 32 GAUGE X 5/32" NDLE    1 Needle by Misc.(Non-Drug; Combo Route) route once daily. "     Family History   Problem Relation Name Age of Onset    Diabetes Mother Libertad     Hypertension Mother Libertad     Heart disease Mother Libertad     Stroke Mother Libertad     Glaucoma Mother Libertad     Arthritis Mother Libertad     Diabetes Father Fredis     Hypertension Father Fredis     Heart disease Father Fredis     Hypertension Sister 1     Heart disease Sister 1     Graves' disease Daughter      Anxiety disorder Daughter       Social History     Socioeconomic History    Marital status:     Number of children: 3   Occupational History    Occupation: [employer]     Comment: primary sub for [employer]   Tobacco Use    Smoking status: Former     Current packs/day: 0.00     Average packs/day: 1 pack/day for 11.0 years (11.0 ttl pk-yrs)     Types: Cigarettes     Start date:      Quit date:      Years since quittin.4    Smokeless tobacco: Never   Substance and Sexual Activity    Alcohol use: Not Currently     Comment: occasional 2x/y    Drug use: Never    Sexual activity: Yes     Partners: Male     Social Drivers of Health     Financial Resource Strain: High Risk (2025)    Overall Financial Resource Strain (CARDIA)     Difficulty of Paying Living Expenses: Hard   Food Insecurity: Food Insecurity Present (2025)    Hunger Vital Sign     Worried About Running Out of Food in the Last Year: Sometimes true     Ran Out of Food in the Last Year: Sometimes true   Transportation Needs: Unmet Transportation Needs (2025)    PRAPARE - Transportation     Lack of Transportation (Medical): Yes     Lack of Transportation (Non-Medical): Yes   Physical Activity: Unknown (2025)    Exercise Vital Sign     Days of Exercise per Week: Patient declined     Minutes of Exercise per Session: 30 min   Stress: Stress Concern Present (2025)    [employer] [place] [employer] - Occupational Stress Questionnaire     Feeling of Stress : Very much   Housing Stability:  Low Risk  (4/28/2025)    Housing Stability Vital Sign     Unable to Pay for Housing in the Last Year: No     Number of Times Moved in the Last Year: 0     Homeless in the Last Year: No         Review of Systems   Constitutional:  Positive for fatigue. Negative for appetite change, chills, fever and unexpected weight change.   HENT:  Negative for hearing loss, rhinorrhea, sore throat and voice change.    Eyes:  Negative for photophobia and visual disturbance.   Respiratory:  Negative for cough, choking and shortness of breath.    Cardiovascular:  Negative for chest pain, palpitations and leg swelling.   Gastrointestinal:  Negative for abdominal pain, blood in stool, constipation, diarrhea, nausea and vomiting.   Endocrine: Negative for cold intolerance, heat intolerance, polydipsia and polyuria.   Musculoskeletal:  Negative for arthralgias, back pain, joint swelling and neck stiffness.   Skin:  Negative for color change, pallor and rash.   Neurological:  Negative for dizziness, seizures, syncope and headaches.   Hematological:  Negative for adenopathy. Does not bruise/bleed easily.   Psychiatric/Behavioral:  Negative for agitation, behavioral problems and confusion.      Objective:      Physical Exam  Constitutional:       General: She is not in acute distress.     Appearance: She is not toxic-appearing.   Pulmonary:      Effort: No tachypnea, bradypnea or respiratory distress.   Abdominal:      General: There is no distension.      Palpations: Abdomen is soft.   Neurological:      Mental Status: She is alert and oriented to person, place, and time.   Psychiatric:         Behavior: Behavior is cooperative.       Assessment/Plan:   Sonia was seen today for vascular access problem.    Diagnoses and all orders for this visit:    Venous insufficiency  -     Vital signs; Standing  -     Insert peripheral IV; Standing  -     Diet NPO; Standing  -     Pulse Oximetry Q4H; Standing  -     Case Request Operating Room:  [id number]  -     Full code; Standing  -     Place in Outpatient; Standing  -     Insert peripheral IV    Malignant neoplasm of head of pancreas    Other orders  -     IP VTE HIGH RISK PATIENT; Standing  -     cefazolin (ANCEF) 2 gram in dextrose 5% 50 mL IVPB (premix)          Plan on Port-A-Cath placement this Friday May 30.     I discussed the proposed procedures with the patient including risks, benefits, indications, alternatives and special concerns.  The patient appears to understand and agrees to go ahead with surgery.  I have made no promises, warranties or verbal agreements beyond what was discussed above.    No follow-ups on file.

## 2025-05-29 ENCOUNTER — OFFICE VISIT (OUTPATIENT)
Dept: FAMILY MEDICINE | Facility: CLINIC | Age: 65
End: 2025-05-29
Payer: MEDICAID

## 2025-05-29 ENCOUNTER — TELEPHONE (OUTPATIENT)
Dept: SURGERY | Facility: CLINIC | Age: 65
End: 2025-05-29
Payer: MEDICAID

## 2025-05-29 VITALS
SYSTOLIC BLOOD PRESSURE: 120 MMHG | BODY MASS INDEX: 44.11 KG/M2 | HEART RATE: 83 BPM | TEMPERATURE: 98 F | DIASTOLIC BLOOD PRESSURE: 78 MMHG | WEIGHT: 258.38 LBS | HEIGHT: 64 IN | RESPIRATION RATE: 20 BRPM | OXYGEN SATURATION: 97 %

## 2025-05-29 DIAGNOSIS — E03.9 HYPOTHYROIDISM, UNSPECIFIED TYPE: ICD-10-CM

## 2025-05-29 DIAGNOSIS — Z79.4 TYPE 2 DIABETES MELLITUS WITH HYPERGLYCEMIA, WITH LONG-TERM CURRENT USE OF INSULIN: Primary | ICD-10-CM

## 2025-05-29 DIAGNOSIS — I10 ESSENTIAL HYPERTENSION: ICD-10-CM

## 2025-05-29 DIAGNOSIS — E11.65 TYPE 2 DIABETES MELLITUS WITH HYPERGLYCEMIA, WITH LONG-TERM CURRENT USE OF INSULIN: Primary | ICD-10-CM

## 2025-05-29 PROCEDURE — 99215 OFFICE O/P EST HI 40 MIN: CPT | Mod: PBBFAC,PN | Performed by: NURSE PRACTITIONER

## 2025-05-29 PROCEDURE — 99999 PR PBB SHADOW E&M-EST. PATIENT-LVL V: CPT | Mod: PBBFAC,,, | Performed by: NURSE PRACTITIONER

## 2025-05-29 RX ORDER — INSULIN GLARGINE 100 [IU]/ML
21 INJECTION, SOLUTION SUBCUTANEOUS NIGHTLY
Qty: 9 ML | Refills: 3 | Status: SHIPPED | OUTPATIENT
Start: 2025-05-29 | End: 2025-06-28

## 2025-05-29 NOTE — TELEPHONE ENCOUNTER
She would like to move her surgery to next Friday, June 6.  Will notify Dr Fernandez and call her back after he confirms.

## 2025-05-29 NOTE — PROGRESS NOTES
SUBJECTIVE:      Patient ID: Sonia Heckippo is a 65 y.o. female.    Chief Complaint: Diabetes    History of Present Illness    CHIEF COMPLAINT:  Sonia presents today for follow up on DM and blood sugar readings    RECENT ILLNESS:  She reports recent illness with fever and significant weakness that impaired her ability to walk. She states improvement in symptoms today, noting she was able to walk normally from her car to the clinic.  Hematology/oncology recently prescribed Levaquin for her to take for upper respiratory infection-not tested for any COVID or flu infections.    CANCER:  PET scan demonstrated improvement with one lymph node showing partial involvement compared to previous imaging which showed two lymph nodes with complete involvement. She expresses uncertainty about proceeding with chemotherapy treatment- patient is scheduled for Port-A-Cath placement tomorrow.    DIABETES:  Her average blood sugar over the last seven days was 133, with one reading at 104. Blood work showed a blood sugar of 146. She is attempting to limit carbohydrates to 20 or below per meal, despite nutritionist recommending 30 carbohydrates per meal. She continues insulin therapy at 21 units nightly and reports being down to her last pen.        HYPERTENSION:  Controlled, doing well on medications including HCTZ 12.5 mg daily    Family History   Problem Relation Name Age of Onset    Diabetes Mother Libertad     Hypertension Mother Libertad     Heart disease Mother Libertad     Stroke Mother Libertad     Glaucoma Mother Libertad     Arthritis Mother Libertad     Diabetes Father Fredis     Hypertension Father Fredis     Heart disease Father Fredis     Hypertension Sister 1     Heart disease Sister 1     Graves' disease Daughter      Anxiety disorder Daughter        Social History     Socioeconomic History    Marital status:     Number of children: 3   Occupational History    Occupation: [employer]     Comment: primary sub for  [employer]   Tobacco Use    Smoking status: Former     Current packs/day: 0.00     Average packs/day: 1 pack/day for 11.0 years (11.0 ttl pk-yrs)     Types: Cigarettes     Start date:      Quit date:      Years since quittin.4    Smokeless tobacco: Never   Substance and Sexual Activity    Alcohol use: Not Currently     Comment: occasional 2x/y    Drug use: Never    Sexual activity: Yes     Partners: Male     Social Drivers of Health     Financial Resource Strain: High Risk (2025)    Overall Financial Resource Strain (CARDIA)     Difficulty of Paying Living Expenses: Hard   Food Insecurity: Food Insecurity Present (2025)    Hunger Vital Sign     Worried About Running Out of Food in the Last Year: Sometimes true     Ran Out of Food in the Last Year: Sometimes true   Transportation Needs: Unmet Transportation Needs (2025)    PRAPARE - Transportation     Lack of Transportation (Medical): Yes     Lack of Transportation (Non-Medical): Yes   Physical Activity: Unknown (2025)    Exercise Vital Sign     Days of Exercise per Week: Patient declined     Minutes of Exercise per Session: 30 min   Stress: Stress Concern Present (2025)    [employer] - Occupational Stress Questionnaire     Feeling of Stress : Very much   Housing Stability: Low Risk  (2025)    Housing Stability Vital Sign     Unable to Pay for Housing in the Last Year: No     Number of Times Moved in the Last Year: 0     Homeless in the Last Year: No     Current Medications[1]  Review of patient's allergies indicates:   Allergen Reactions    Erythromycin Swelling    Codeine Nausea And Vomiting     And migraine h/a    Cecil Blisters      Past Medical History:   Diagnosis Date    Allergy     Anxiety     Asthma     DDD (degenerative disc disease), cervical     Depression     Diabetes mellitus     Fibromyalgia     Hypertension 2005    Neuromuscular disorder     Pancreatic mass     PONV  (postoperative nausea and vomiting)     Thyroid disease     hypo     Past Surgical History:   Procedure Laterality Date    ADENOIDECTOMY      CARPAL TUNNEL RELEASE Right      SECTION      ENDOSCOPIC ULTRASOUND OF UPPER GASTROINTESTINAL TRACT N/A 2025    Procedure: ULTRASOUND, UPPER GI TRACT, ENDOSCOPIC;  Surgeon: Austen Grimes III, MD;  Location: [facility] ENDO;  Service: Endoscopy;  Laterality: N/A;    ERCP N/A 2025    Procedure: ERCP (ENDOSCOPIC RETROGRADE CHOLANGIOPANCREATOGRAPHY);  Surgeon: Thuy Escobar MD;  Location: [facility] ENDO;  Service: Endoscopy;  Laterality: N/A;    ERCP N/A 2025    Procedure: ERCP (ENDOSCOPIC RETROGRADE CHOLANGIOPANCREATOGRAPHY);  Surgeon: Austen Grimes III, MD;  Location: [facility] ENDO;  Service: Endoscopy;  Laterality: N/A;    JOINT REPLACEMENT Right 2016    knee    KNEE ARTHROPLASTY Left 10/19/2020    Procedure: ARTHROPLASTY, KNEE;  Surgeon: Felipe Herring MD;  Location: [facility];  Service: Orthopedics;  Laterality: Left;  Louie Liz    ROTATOR CUFF REPAIR Right     TONSILLECTOMY      TOTAL KNEE ARTHROPLASTY Right     TUBAL LIGATION         Review of Systems   Constitutional:  Positive for fatigue (Improving). Negative for activity change, appetite change, chills, fever and unexpected weight change.   Eyes:  Negative for redness, itching and visual disturbance.   Respiratory:  Negative for shortness of breath.    Cardiovascular:  Negative for chest pain.   Gastrointestinal:  Negative for abdominal pain, diarrhea, nausea and vomiting.   Endocrine: Negative for cold intolerance, heat intolerance, polydipsia and polyuria.   Genitourinary:  Negative for dysuria and frequency.   Skin:  Negative for pallor and rash.   Allergic/Immunologic: Positive for environmental allergies.   Neurological:  Negative for dizziness and headaches.   Hematological:  Negative for adenopathy. Does not bruise/bleed easily.   Psychiatric/Behavioral:  Negative for agitation,  "behavioral problems, confusion, dysphoric mood, sleep disturbance and suicidal ideas. The patient is not nervous/anxious.       OBJECTIVE:      Vitals:    05/29/25 1053   BP: 120/78   BP Location: Left arm   Patient Position: Sitting   Pulse: 83   Resp: 20   Temp: 98 °F (36.7 °C)   TempSrc: Oral   SpO2: 97%   Weight: 117.2 kg (258 lb 6.1 oz)   Height: 5' 4" (1.626 m)     Physical Exam  Vitals and nursing note reviewed.   Constitutional:       General: She is not in acute distress.     Appearance: Normal appearance. She is well-developed. She is obese. She is not ill-appearing.   HENT:      Head: Normocephalic.      Right Ear: Tympanic membrane, ear canal and external ear normal.      Left Ear: Tympanic membrane, ear canal and external ear normal.      Nose: Nose normal.      Mouth/Throat:      Mouth: Mucous membranes are moist.   Eyes:      General: No scleral icterus.     Extraocular Movements: Extraocular movements intact.      Pupils: Pupils are equal, round, and reactive to light.   Neck:      Thyroid: No thyroid mass, thyromegaly or thyroid tenderness.   Cardiovascular:      Rate and Rhythm: Normal rate and regular rhythm.   Pulmonary:      Effort: Pulmonary effort is normal. No respiratory distress.      Breath sounds: Normal breath sounds. No wheezing or rhonchi.   Musculoskeletal:         General: Normal range of motion.      Cervical back: Normal range of motion and neck supple.      Right lower leg: No edema.      Left lower leg: No edema.   Lymphadenopathy:      Cervical: No cervical adenopathy.   Skin:     General: Skin is warm and dry.      Capillary Refill: Capillary refill takes less than 2 seconds.      Coloration: Skin is not jaundiced or pale.   Neurological:      Mental Status: She is alert and oriented to person, place, and time.   Psychiatric:         Mood and Affect: Mood normal.         Behavior: Behavior normal.         Thought Content: Thought content normal.         Judgment: Judgment " normal.        Assessment:       1. Type 2 diabetes mellitus with hyperglycemia, with long-term current use of insulin    2. Essential hypertension    3. Hypothyroidism, unspecified type        Plan:      Type 2 diabetes mellitus with hyperglycemia, with long-term current use of insulin  -     insulin glargine U-100, Lantus, 100 unit/mL (3 mL) SubQ InPn pen; Inject 21 Units into the skin every evening.  Dispense: 9 mL; Refill: 3  -     blood sugar diagnostic Strp; Use 1 strip to check blood sugar 2 times daily  Dispense: 100 each; Refill: 5  -     Hemoglobin A1C; Future; Expected date: 07/31/2025  -     Comprehensive Metabolic Panel; Future; Expected date: 07/31/2025  -blood sugar readings improved, continue Lantus 21 units at bedtime, continue monitoring blood sugars and notify me for any hypoglycemia; continue low carb higher protein and fiber diet; recheck labs before next visit    Essential hypertension   -controlled, continue current medications and monitor blood pressure at home; stay hydrated with plenty of water    Hypothyroidism, unspecified type  -     TSH; Future; Expected date: 07/29/2025   -recheck in 3 months; continue current dose of levothyroxine      Follow up in about 3 months (around 8/29/2025) for f/u DM .      5/30/2025 JODIE Grider, DERICKP  This note was generated with the assistance of ambient listening technology. Verbal consent was obtained by the patient and accompanying visitor(s) for the recording of patient appointment to facilitate this note. I attest to having reviewed and edited the generated note for accuracy, though some syntax or spelling errors may persist. Please contact the author of this note for any clarification.     This note was created using [employer] voice recognition software that occasionally misinterprets phrases or words.            [1]   Current Outpatient Medications   Medication Sig Dispense Refill    albuterol (VENTOLIN HFA) 90 mcg/actuation inhaler Inhale 2  puffs into the lungs every 6 (six) hours as needed for Wheezing or Shortness of Breath. Rescue 18 g 1    ALPRAZolam (XANAX) 1 MG tablet Take 1 tablet (1 mg total) by mouth once daily. (Patient taking differently: Take 1 mg by mouth daily as needed.) 30 tablet 0    amLODIPine (NORVASC) 5 MG tablet Take 1 tablet (5 mg total) by mouth once daily. 90 tablet 1    blood-glucose meter kit Use as instructed 1 each 0    cetirizine (ZYRTEC) 10 MG tablet TAKE 1 TABLET BY MOUTH ONCE DAILY (Patient taking differently: Take 10 mg by mouth once daily.) 90 tablet 1    DULoxetine (CYMBALTA) 60 MG capsule Take 1 capsule (60 mg total) by mouth once daily. 90 capsule 1    gabapentin (NEURONTIN) 100 MG capsule Take 3 capsules (300 mg total) by mouth 3 (three) times daily. 270 capsule 2    hydroCHLOROthiazide (HYDRODIURIL) 25 MG tablet TAKE one TABLET BY MOUTH ONCE DAILY (Patient taking differently: Take 25 mg by mouth once daily. Currently taking 12.5 mg daily) 90 tablet 1    levoFLOXacin (LEVAQUIN) 500 MG tablet Take 1 tablet (500 mg total) by mouth once daily. 7 tablet 0    levothyroxine (SYNTHROID) 25 MCG tablet Take 1 tablet (25 mcg total) by mouth before breakfast. 90 tablet 1    LIDOcaine-prilocaine (EMLA) cream Apply topically as needed. 30 g 0    loperamide (IMODIUM) 2 mg capsule Take 2 tablets (4mg) by mouth after first loose stool, 1 tablet every 2 hours until diarrhea free for 12 hours. May take 2 tablets (4mg) by mouth every 4 hours at night. May require more than the package labeling maximum dose of 16mg/day. 30 capsule 11    losartan (COZAAR) 100 MG tablet Take 1 tablet (100 mg total) by mouth once daily. 90 tablet 1    metoprolol succinate (TOPROL-XL) 25 MG 24 hr tablet Take 1 tablet (25 mg total) by mouth once daily. (Patient taking differently: Take 25 mg by mouth every evening.) 90 tablet 1    metronidazole 0.75% (METROCREAM) 0.75 % Crea Apply 1 application  topically 2 (two) times daily.      montelukast (SINGULAIR)  "10 mg tablet TAKE ONE TABLET BY MOUTH EVERY EVENING 90 tablet 1    multivit-mins no.63/iron/folic (M-VIT ORAL) Take 1 tablet by mouth once daily.      OLANZapine (ZYPREXA) 5 MG tablet Take 1 tablet by mouth nightly on days 1-3 of each chemotherapy cycle. 3 tablet 11    ondansetron (ZOFRAN) 8 MG tablet Take 1 tablet (8 mg total) by mouth every 8 (eight) hours as needed. 30 tablet 2    prochlorperazine (COMPAZINE) 10 MG tablet Take 1 tablet (10 mg total) by mouth every 6 (six) hours as needed. 30 tablet 1    blood sugar diagnostic Strp Use 1 strip to check blood sugar 2 times daily 100 each 5    insulin glargine U-100, Lantus, 100 unit/mL (3 mL) SubQ InPn pen Inject 21 Units into the skin every evening. 9 mL 3    lancets (LANCETS,THIN) Misc 1 each by Misc.(Non-Drug; Combo Route) route 3 (three) times daily. 90 each 0    pen needle, diabetic 32 gauge x 5/32" Ndle 1 Needle by Misc.(Non-Drug; Combo Route) route once daily. 100 each 2     No current facility-administered medications for this visit.     "

## 2025-05-29 NOTE — TELEPHONE ENCOUNTER
Copied from CRM #[id number]. Topic: Appointments - Appointment Rescheduling  >> May 29, 2025 12:19 PM Suzie wrote:  Type:  Needs Medical Advice    Who Called: self  Symptoms (please be specific): needing gabi reschedule procedure that was on 5/30  Would the patient rather a call back or a response via [facility]? call  Best Call Back Number: [phone]  Additional Information: please advise and thank you.

## 2025-06-02 ENCOUNTER — DOCUMENTATION ONLY (OUTPATIENT)
Dept: HEMATOLOGY/ONCOLOGY | Facility: CLINIC | Age: 65
End: 2025-06-02
Payer: MEDICAID

## 2025-06-02 ENCOUNTER — TELEPHONE (OUTPATIENT)
Dept: SURGERY | Facility: CLINIC | Age: 65
End: 2025-06-02
Payer: MEDICAID

## 2025-06-02 DIAGNOSIS — C25.0 MALIGNANT NEOPLASM OF HEAD OF PANCREAS: Primary | ICD-10-CM

## 2025-06-03 ENCOUNTER — OFFICE VISIT (OUTPATIENT)
Facility: CLINIC | Age: 65
End: 2025-06-03
Payer: MEDICAID

## 2025-06-03 ENCOUNTER — HOSPITAL ENCOUNTER (OUTPATIENT)
Dept: PREADMISSION TESTING | Facility: HOSPITAL | Age: 65
Discharge: HOME | End: 2025-06-03

## 2025-06-03 ENCOUNTER — TELEPHONE (OUTPATIENT)
Dept: FAMILY MEDICINE | Facility: CLINIC | Age: 65
End: 2025-06-03
Payer: MEDICAID

## 2025-06-03 VITALS
DIASTOLIC BLOOD PRESSURE: 82 MMHG | HEIGHT: 64 IN | WEIGHT: 259.69 LBS | OXYGEN SATURATION: 99 % | HEART RATE: 72 BPM | SYSTOLIC BLOOD PRESSURE: 124 MMHG | BODY MASS INDEX: 44.33 KG/M2

## 2025-06-03 DIAGNOSIS — Z51.5: ICD-10-CM

## 2025-06-03 DIAGNOSIS — C25.9 MALIGNANT NEOPLASM OF PANCREAS, UNSPECIFIED LOCATION OF MALIGNANCY: Primary | ICD-10-CM

## 2025-06-03 PROCEDURE — 3046F HEMOGLOBIN A1C LEVEL >9.0%: CPT | Mod: CPTII,,, | Performed by: NURSE PRACTITIONER

## 2025-06-03 PROCEDURE — 99215 OFFICE O/P EST HI 40 MIN: CPT | Mod: S$PBB,,, | Performed by: NURSE PRACTITIONER

## 2025-06-03 PROCEDURE — 3074F SYST BP LT 130 MM HG: CPT | Mod: CPTII,,, | Performed by: NURSE PRACTITIONER

## 2025-06-03 PROCEDURE — 99214 OFFICE O/P EST MOD 30 MIN: CPT | Mod: PBBFAC,PN | Performed by: NURSE PRACTITIONER

## 2025-06-03 PROCEDURE — 4010F ACE/ARB THERAPY RXD/TAKEN: CPT | Mod: CPTII,,, | Performed by: NURSE PRACTITIONER

## 2025-06-03 PROCEDURE — 3008F BODY MASS INDEX DOCD: CPT | Mod: CPTII,,, | Performed by: NURSE PRACTITIONER

## 2025-06-03 PROCEDURE — G2211 COMPLEX E/M VISIT ADD ON: HCPCS | Mod: ,,, | Performed by: NURSE PRACTITIONER

## 2025-06-03 PROCEDURE — 99999 PR PBB SHADOW E&M-EST. PATIENT-LVL IV: CPT | Mod: PBBFAC,,, | Performed by: NURSE PRACTITIONER

## 2025-06-03 PROCEDURE — 3079F DIAST BP 80-89 MM HG: CPT | Mod: CPTII,,, | Performed by: NURSE PRACTITIONER

## 2025-06-04 ENCOUNTER — TELEPHONE (OUTPATIENT)
Facility: CLINIC | Age: 65
End: 2025-06-04
Payer: MEDICAID

## 2025-06-04 DIAGNOSIS — M79.7 FIBROMYALGIA: ICD-10-CM

## 2025-06-04 DIAGNOSIS — F41.8 DEPRESSION WITH ANXIETY: ICD-10-CM

## 2025-06-04 DIAGNOSIS — M25.50 ARTHRALGIA, UNSPECIFIED JOINT: ICD-10-CM

## 2025-06-04 RX ORDER — DULOXETIN HYDROCHLORIDE 60 MG/1
60 CAPSULE, DELAYED RELEASE ORAL
Qty: 90 CAPSULE | Refills: 1 | Status: SHIPPED | OUTPATIENT
Start: 2025-06-04

## 2025-06-07 DIAGNOSIS — E03.9 HYPOTHYROIDISM, UNSPECIFIED TYPE: ICD-10-CM

## 2025-06-07 DIAGNOSIS — J45.909 ASTHMA, UNSPECIFIED ASTHMA SEVERITY, UNSPECIFIED WHETHER COMPLICATED, UNSPECIFIED WHETHER PERSISTENT: ICD-10-CM

## 2025-06-09 ENCOUNTER — OFFICE VISIT (OUTPATIENT)
Dept: HEMATOLOGY/ONCOLOGY | Facility: CLINIC | Age: 65
End: 2025-06-09
Payer: MEDICAID

## 2025-06-09 ENCOUNTER — LAB VISIT (OUTPATIENT)
Dept: LAB | Facility: HOSPITAL | Age: 65
End: 2025-06-09
Attending: INTERNAL MEDICINE
Payer: MEDICAID

## 2025-06-09 VITALS
TEMPERATURE: 97 F | SYSTOLIC BLOOD PRESSURE: 88 MMHG | DIASTOLIC BLOOD PRESSURE: 58 MMHG | OXYGEN SATURATION: 98 % | BODY MASS INDEX: 44.11 KG/M2 | HEART RATE: 81 BPM | WEIGHT: 258.38 LBS | HEIGHT: 64 IN | RESPIRATION RATE: 18 BRPM

## 2025-06-09 DIAGNOSIS — C25.0 MALIGNANT NEOPLASM OF HEAD OF PANCREAS: ICD-10-CM

## 2025-06-09 DIAGNOSIS — C25.0 MALIGNANT NEOPLASM OF HEAD OF PANCREAS: Primary | ICD-10-CM

## 2025-06-09 DIAGNOSIS — E53.8 VITAMIN B 12 DEFICIENCY: ICD-10-CM

## 2025-06-09 LAB
ABSOLUTE EOSINOPHIL (SMH): 3.34 K/UL
ABSOLUTE MONOCYTE (SMH): 1.25 K/UL (ref 0.3–1)
ABSOLUTE NEUTROPHIL COUNT (SMH): 6 K/UL (ref 1.8–7.7)
ALBUMIN SERPL-MCNC: 3 G/DL (ref 3.5–5.2)
ALP SERPL-CCNC: 125 UNIT/L (ref 55–135)
ALT SERPL-CCNC: 140 UNIT/L (ref 10–44)
ANION GAP (SMH): 8 MMOL/L (ref 8–16)
AST SERPL-CCNC: 174 UNIT/L (ref 10–40)
BASOPHILS # BLD AUTO: 0.18 K/UL
BASOPHILS NFR BLD AUTO: 1.5 %
BILIRUB SERPL-MCNC: 1.3 MG/DL (ref 0.1–1)
BUN SERPL-MCNC: 28 MG/DL (ref 8–23)
CALCIUM SERPL-MCNC: 8.5 MG/DL (ref 8.7–10.5)
CHLORIDE SERPL-SCNC: 99 MMOL/L (ref 95–110)
CO2 SERPL-SCNC: 28 MMOL/L (ref 23–29)
CREAT SERPL-MCNC: 1.6 MG/DL (ref 0.5–1.4)
ERYTHROCYTE [DISTWIDTH] IN BLOOD BY AUTOMATED COUNT: 14.7 % (ref 11.5–14.5)
GFR SERPLBLD CREATININE-BSD FMLA CKD-EPI: 36 ML/MIN/1.73/M2
GLUCOSE SERPL-MCNC: 134 MG/DL (ref 70–110)
HCT VFR BLD AUTO: 34.5 % (ref 37–48.5)
HGB BLD-MCNC: 10.5 GM/DL (ref 12–16)
IMM GRANULOCYTES # BLD AUTO: 0.19 K/UL (ref 0–0.04)
IMM GRANULOCYTES NFR BLD AUTO: 1.5 % (ref 0–0.5)
LYMPHOCYTES # BLD AUTO: 1.38 K/UL (ref 1–4.8)
MCH RBC QN AUTO: 26.3 PG (ref 27–31)
MCHC RBC AUTO-ENTMCNC: 30.4 G/DL (ref 32–36)
MCV RBC AUTO: 87 FL (ref 82–98)
NUCLEATED RBC (/100WBC) (SMH): 0 /100 WBC
PLATELET # BLD AUTO: 475 K/UL (ref 150–450)
PMV BLD AUTO: 8.8 FL (ref 9.2–12.9)
POTASSIUM SERPL-SCNC: 4.3 MMOL/L (ref 3.5–5.1)
PROT SERPL-MCNC: 7.1 GM/DL (ref 6–8.4)
RBC # BLD AUTO: 3.99 M/UL (ref 4–5.4)
RELATIVE EOSINOPHIL (SMH): 27.1 % (ref 0–8)
RELATIVE LYMPHOCYTE (SMH): 11.2 % (ref 18–48)
RELATIVE MONOCYTE (SMH): 10.2 % (ref 4–15)
RELATIVE NEUTROPHIL (SMH): 48.5 % (ref 38–73)
SODIUM SERPL-SCNC: 135 MMOL/L (ref 136–145)
WBC # BLD AUTO: 12.31 K/UL (ref 3.9–12.7)

## 2025-06-09 PROCEDURE — G2211 COMPLEX E/M VISIT ADD ON: HCPCS | Mod: ,,, | Performed by: INTERNAL MEDICINE

## 2025-06-09 PROCEDURE — 82374 ASSAY BLOOD CARBON DIOXIDE: CPT

## 2025-06-09 PROCEDURE — 99215 OFFICE O/P EST HI 40 MIN: CPT | Mod: PBBFAC,PN | Performed by: INTERNAL MEDICINE

## 2025-06-09 PROCEDURE — 3074F SYST BP LT 130 MM HG: CPT | Mod: CPTII,,, | Performed by: INTERNAL MEDICINE

## 2025-06-09 PROCEDURE — 4010F ACE/ARB THERAPY RXD/TAKEN: CPT | Mod: CPTII,,, | Performed by: INTERNAL MEDICINE

## 2025-06-09 PROCEDURE — 3046F HEMOGLOBIN A1C LEVEL >9.0%: CPT | Mod: CPTII,,, | Performed by: INTERNAL MEDICINE

## 2025-06-09 PROCEDURE — 3008F BODY MASS INDEX DOCD: CPT | Mod: CPTII,,, | Performed by: INTERNAL MEDICINE

## 2025-06-09 PROCEDURE — 99215 OFFICE O/P EST HI 40 MIN: CPT | Mod: S$PBB,,, | Performed by: INTERNAL MEDICINE

## 2025-06-09 PROCEDURE — 1159F MED LIST DOCD IN RCRD: CPT | Mod: CPTII,,, | Performed by: INTERNAL MEDICINE

## 2025-06-09 PROCEDURE — 99999 PR PBB SHADOW E&M-EST. PATIENT-LVL V: CPT | Mod: PBBFAC,,, | Performed by: INTERNAL MEDICINE

## 2025-06-09 PROCEDURE — 85025 COMPLETE CBC W/AUTO DIFF WBC: CPT

## 2025-06-09 PROCEDURE — 36415 COLL VENOUS BLD VENIPUNCTURE: CPT

## 2025-06-09 PROCEDURE — 3078F DIAST BP <80 MM HG: CPT | Mod: CPTII,,, | Performed by: INTERNAL MEDICINE

## 2025-06-09 RX ORDER — MELOXICAM 15 MG/1
15 TABLET ORAL DAILY
COMMUNITY

## 2025-06-09 RX ORDER — LEVOTHYROXINE SODIUM 25 UG/1
25 TABLET ORAL
Qty: 90 TABLET | Refills: 1 | Status: SHIPPED | OUTPATIENT
Start: 2025-06-09

## 2025-06-09 RX ORDER — MONTELUKAST SODIUM 10 MG/1
10 TABLET ORAL NIGHTLY
Qty: 90 TABLET | Refills: 1 | Status: SHIPPED | OUTPATIENT
Start: 2025-06-09

## 2025-06-09 NOTE — PROGRESS NOTES
Noah Clark MD to Me  Dameon Guadalupe RN Bastoe, Erica C, RN (Selected Message)        5/22/25 12:48 PM  This is encouraging that it is more likely benign. I would still start neoadjuvant chemo and shoot for about 4 months of preoperative chemo with restaging after 2. I'll see her back after 2 months of chemo with a CT chest, abdomen, pelvis pancreas protocol, and a Ca 19-9.   Me to Noah Clark MD        5/22/25 11:22 AM  Hey what do you think of the speen ,,,  Noah Clark MD to Me  Dameon Guadalupe RN Bastoe, Erica C, RN (Selected Message)        5/22/25 12:48 PM  This is encouraging that it is more likely benign. I would still start neoadjuvant chemo and shoot for about 4 months of preoperative chemo with restaging after 2. I'll see her back after 2 months of chemo with a CT chest, abdomen, pelvis pancreas protocol, and a Ca 19-9.   Me to Noah Clark MD        5/22/25 11:22 AM  Hey what do you think of the speen ,,,  Subjective:       Patient ID: Sonia Muse is a 65 y.o. female.    Chief Complaint:  Treatment planning discussion  HPI 65-year-old  woman who I met with the hospital with pancreatic mass 5 x 3 point 7 cm MIGUEL ANGEL and transaminitis with elevated bilirubin patient had metal stent placed with good response  MRCP showed pancreatic mass highly concerning for malignancy with biliary obstruction with concerns for splenic metastasis and peripancreatic lymph node involvement   AK I has resolved  Pathology showing poorly differentiated adenocarcinoma of pancreas    ROS:  Patient had COVID areas discoloration has subsided completely  Patient denies issues related to appetite or recent weight change.  Feels well overall.  Denies issues with generalized weakness .  Denies fatigue over above what is normally experienced with day-to-day activities  Denies fever, chills, rigors  Denies issues with ambulation  Denies generalized swelling or new lumps and bumps felt in any part   of body  Denies visual or hearing loss  Denies issues with congestion, sinus issues, cough, sputum production runny nose or itching eyes  Denies chest pain or palpitations, or passing out  Denies abdominal pain, reflux symptoms, nausea vomiting loose stools or constipation  Denies seizure activity or focal weaknesses or symptoms related to TIA, no head aches or blurred vision reported  Denies issues with skin rash or bruising  Denies issues with swelling of feet, tingling or numbness   No issues with sleep,   No recent foreign travel   Good family support reported           Past Medical History:   Diagnosis Date    Allergy     Anxiety     Asthma     DDD (degenerative disc disease), cervical     Depression     Diabetes mellitus     Fibromyalgia     Hypertension     Neuromuscular disorder     Pancreatic mass     PONV (postoperative nausea and vomiting)     Thyroid disease     hypo     Past Surgical History:   Procedure Laterality Date    ADENOIDECTOMY      CARPAL TUNNEL RELEASE Right      SECTION      ENDOSCOPIC ULTRASOUND OF UPPER GASTROINTESTINAL TRACT N/A 2025    Procedure: ULTRASOUND, UPPER GI TRACT, ENDOSCOPIC;  Surgeon: Austen Grimes III, MD;  Location: [facility];  Service: Endoscopy;  Laterality: N/A;    ERCP N/A 2025    Procedure: ERCP (ENDOSCOPIC RETROGRADE CHOLANGIOPANCREATOGRAPHY);  Surgeon: Thuy Escobar MD;  Location: [facility];  Service: Endoscopy;  Laterality: N/A;    ERCP N/A 2025    Procedure: ERCP (ENDOSCOPIC RETROGRADE CHOLANGIOPANCREATOGRAPHY);  Surgeon: Austen Grimes III, MD;  Location: [facility];  Service: Endoscopy;  Laterality: N/A;    JOINT REPLACEMENT Right     knee    KNEE ARTHROPLASTY Left 10/19/2020    Procedure: ARTHROPLASTY, KNEE;  Surgeon: Felipe Herring MD;  Location: [facility];  Service: Orthopedics;  Laterality: Left;  Louie Liz    ROTATOR CUFF REPAIR Right     TONSILLECTOMY      TOTAL KNEE ARTHROPLASTY Right     TUBAL LIGATION        Family History   Problem Relation Name Age of Onset    Diabetes Mother Libertad     Hypertension Mother Libertad     Heart disease Mother Libertad     Stroke Mother Libertad     Glaucoma Mother Libertad     Arthritis Mother Libertad     Diabetes Father Fredis     Hypertension Father Fredis     Heart disease Father Fredis     Hypertension Sister 1     Heart disease Sister 1     Graves' disease Daughter      Anxiety disorder Daughter        Social History     Socioeconomic History    Marital status:     Number of children: 3   Occupational History    Occupation: [employer]     Comment: primary sub for [employer]   Tobacco Use    Smoking status: Former     Current packs/day: 0.00     Average packs/day: 1 pack/day for 11.0 years (11.0 ttl pk-yrs)     Types: Cigarettes     Start date:      Quit date:      Years since quittin.4    Smokeless tobacco: Never   Substance and Sexual Activity    Alcohol use: Not Currently     Comment: occasional 2x/y    Drug use: Never    Sexual activity: Yes     Partners: Male     Social Drivers of Health     Financial Resource Strain: High Risk (2025)    Overall Financial Resource Strain (CARDIA)     Difficulty of Paying Living Expenses: Hard   Food Insecurity: Food Insecurity Present (2025)    Hunger Vital Sign     Worried About Running Out of Food in the Last Year: Sometimes true     Ran Out of Food in the Last Year: Sometimes true   Transportation Needs: Unmet Transportation Needs (2025)    PRAPARE - Transportation     Lack of Transportation (Medical): Yes     Lack of Transportation (Non-Medical): Yes   Physical Activity: Unknown (2025)    Exercise Vital Sign     Days of Exercise per Week: Patient declined     Minutes of Exercise per Session: 30 min   Stress: Stress Concern Present (2025)    [employer] - Occupational Stress Questionnaire     Feeling of Stress : Very much   Housing Stability: Low Risk  (2025)     Housing Stability Vital Sign     Unable to Pay for Housing in the Last Year: No     Number of Times Moved in the Last Year: 0     Homeless in the Last Year: No     Review of patient's allergies indicates:   Allergen Reactions    Erythromycin Swelling    Codeine Nausea And Vomiting     And migraine h/a    [place] Blisters     Current Medications[1]    Physical Exam    VITAL SIGNS:  as above   GENERAL: appears well-built, well-nourished.  No anxiety, no agitation, and in no distress.  Patient is awake, alert, oriented and cooperative.  HEENT:  Showed no congestion. Trachea is central no obvious icterus or pallor noted no hoarseness. no obvious JVD   NECK:  Supple.  No JVD. No obvious cervical submental or supraclavicular adenopathy.  RS:the visualized portion of  Chest expands well. chest appears symmetric, no audible wheezes.  No dyspnea recognized  ABDOMEN:  abdomen appears undistended.  EXTREMITIES:  Without edema.  NEUROLOGICAL:  The patient is appropriate, higher functions are normal.  No  obvious neurological deficits.  normal judgement normal thought content  No confusion, no speech impediment. Cranial nerves are intact and show no deficit. No gross motor deficits noted   SKIN MUSCULOSKELETAL: no joint or skeletal deformity, no clubbing of nails.  No visible rash ecchymosis or petechiae   Lab Results   Component Value Date    WBC 12.31 06/09/2025    HGB 10.5 (L) 06/09/2025    HCT 34.5 (L) 06/09/2025    MCV 87 06/09/2025     (H) 06/09/2025       BMP  Lab Results   Component Value Date     (L) 06/09/2025    K 4.3 06/09/2025    CL 99 06/09/2025    CO2 28 06/09/2025    BUN 28 (H) 06/09/2025    CREATININE 1.6 (H) 06/09/2025    CALCIUM 8.5 (L) 06/09/2025    ANIONGAP 8 06/09/2025    ESTGFRAFRICA >60.0 01/24/2022    EGFRNONAA >60.0 01/24/2022     Impression:     4.3 x 4.3 cm pancreatic head mass compatible with patient's known malignancy there is atrophy of the body and tail the pancreas with mild  pancreatic ductal dilatation     Common bile duct stent in place with pneumobilia     Mildly prominent gastrohepatic and portacaval lymph node suspicious for metastatic disease     Small hiatal hernia     9.8 cm rim enhancing mass within the dome of the spleen which is nonspecific in suspicious for metastatic disease           Assessment and Plan     Locally advanced pancreatic cancer poorly-differentiated with splenic mass which may be nonspecific  Patient  had PET scan done spleen not lighting up which is encouraged  Saw Dr. Clark we will need neoadjuvant chemo 2-3 months then reassess for operability  port was scheduled but patient got hesitant after coming to chemo class and delayed she also started ivermectin which she has stopped because it caused issues  LFTs have gone up PET scan was very recent therefore we will recheck BMP next week and just prior to start  Need to reschedule port  And I will see her in time for start with CBC CMP  Chemo class once again is made regarding neoadjuvant chemo versus chemoradiation therapy versus palliative chemo based on surgeons opinion of splenic mass  See me for start date  MDM includes  :    - Acute or chronic illness or injury that poses a threat to life or bodily function  - Independent review and explanation of 3+ results from unique tests  - Discussion of management and ordering 3+ unique tests  - Extensive discussion of treatment and management  - Prescription drug management  - Drug therapy requiring intensive monitoring for toxicity            [1]   Current Outpatient Medications:     albuterol (VENTOLIN HFA) 90 mcg/actuation inhaler, Inhale 2 puffs into the lungs every 6 (six) hours as needed for Wheezing or Shortness of Breath. Rescue, Disp: 18 g, Rfl: 1    ALPRAZolam (XANAX) 1 MG tablet, Take 1 tablet (1 mg total) by mouth once daily. (Patient taking differently: Take 1 mg by mouth daily as needed.), Disp: 30 tablet, Rfl: 0    amLODIPine (NORVASC) 5 MG tablet,  Take 1 tablet (5 mg total) by mouth once daily., Disp: 90 tablet, Rfl: 1    blood sugar diagnostic Strp, Use 1 strip to check blood sugar 2 times daily, Disp: 100 each, Rfl: 5    blood-glucose meter kit, Use as instructed, Disp: 1 each, Rfl: 0    cetirizine (ZYRTEC) 10 MG tablet, TAKE 1 TABLET BY MOUTH ONCE DAILY, Disp: 90 tablet, Rfl: 1    DULoxetine (CYMBALTA) 60 MG capsule, TAKE one CAPSULE BY MOUTH EVERY DAY, Disp: 90 capsule, Rfl: 1    hydroCHLOROthiazide (HYDRODIURIL) 25 MG tablet, TAKE one TABLET BY MOUTH ONCE DAILY, Disp: 90 tablet, Rfl: 1    insulin glargine U-100, Lantus, 100 unit/mL (3 mL) SubQ InPn pen, Inject 21 Units into the skin every evening., Disp: 9 mL, Rfl: 3    lancets (LANCETS,THIN) Misc, 1 each by Misc.(Non-Drug; Combo Route) route 3 (three) times daily., Disp: 90 each, Rfl: 0    levothyroxine (SYNTHROID) 25 MCG tablet, Take 1 tablet (25 mcg total) by mouth before breakfast., Disp: 90 tablet, Rfl: 1    LIDOcaine-prilocaine (EMLA) cream, Apply topically as needed., Disp: 30 g, Rfl: 0    loperamide (IMODIUM) 2 mg capsule, Take 2 tablets (4mg) by mouth after first loose stool, 1 tablet every 2 hours until diarrhea free for 12 hours. May take 2 tablets (4mg) by mouth every 4 hours at night. May require more than the package labeling maximum dose of 16mg/day., Disp: 30 capsule, Rfl: 11    losartan (COZAAR) 100 MG tablet, Take 1 tablet (100 mg total) by mouth once daily., Disp: 90 tablet, Rfl: 1    meloxicam (MOBIC) 15 MG tablet, Take 15 mg by mouth once daily., Disp: , Rfl:     metoprolol succinate (TOPROL-XL) 25 MG 24 hr tablet, Take 1 tablet (25 mg total) by mouth once daily., Disp: 90 tablet, Rfl: 1    montelukast (SINGULAIR) 10 mg tablet, TAKE ONE TABLET BY MOUTH EVERY EVENING, Disp: 90 tablet, Rfl: 1    multivit-mins no.63/iron/folic (M-VIT ORAL), Take 1 tablet by mouth once daily., Disp: , Rfl:     ondansetron (ZOFRAN) 8 MG tablet, Take 1 tablet (8 mg total) by mouth every 8 (eight) hours as  "needed., Disp: 30 tablet, Rfl: 2    pen needle, diabetic 32 gauge x 5/32" Ndle, 1 Needle by Misc.(Non-Drug; Combo Route) route once daily., Disp: 100 each, Rfl: 2    prochlorperazine (COMPAZINE) 10 MG tablet, Take 1 tablet (10 mg total) by mouth every 6 (six) hours as needed., Disp: 30 tablet, Rfl: 1    gabapentin (NEURONTIN) 100 MG capsule, Take 3 capsules (300 mg total) by mouth 3 (three) times daily., Disp: 270 capsule, Rfl: 2    levoFLOXacin (LEVAQUIN) 500 MG tablet, Take 1 tablet (500 mg total) by mouth once daily., Disp: 7 tablet, Rfl: 0    metronidazole 0.75% (METROCREAM) 0.75 % Crea, Apply 1 application  topically 2 (two) times daily., Disp: , Rfl:     OLANZapine (ZYPREXA) 5 MG tablet, Take 1 tablet by mouth nightly on days 1-3 of each chemotherapy cycle. (Patient not taking: Reported on 6/9/2025), Disp: 3 tablet, Rfl: 11    "

## 2025-06-09 NOTE — Clinical Note
Need to repeat cmp next week- phrev  See me for start date with cbc cmp again unless port is kirk right away and start date is within a week. I have to see the lft coming to [facility] before start

## 2025-06-10 NOTE — PROGRESS NOTES
Palliative Medicine Clinic Note        Consult Requested By: No ref. provider found      Reason for Consult: Follow up for further GOC. Pt has newly dx pancreatic cancer.    Chief Complaint:   Chief Complaint   Patient presents with    Follow-up     Pancreatic Cancer        ASSESSMENT/PLAN:      Plan/Recommendations:    - pancreatic cancer  Pt with newly dx pancreatic cancer. She has followed up with oncology Dr. Calle and Dr. Clark for possible surgical resection in the future. She has also been scheduled for port placement later this week. Pt expressed that she is not sure she is willing to go through chemotherapy. She attended chemo school and now has significant anxiety over the possible side effects that were discussed. She admitted to using Ivermectin protocol that she researched via internet. She has been using medication for the past several weeks. She feels extremely fatigued and generally weak. We discussed possible side effect of ivermectin use. We reviewed her PET scan and it appears spleen is not affected. I d/w her this is good news and encouraging to see. I spent most of my time providing emotional support to pt and encouraging her to consider stopping ivermectin and proceed with chemo and further oncology recs. She is agreeable and will discuss use of ivermectin with oncologist at upcoming appt.     - GOC  Pt expressed she may not want to proceed with chemo. She is experiencing a lot of anxiety and emotions after attending chemo school. She wants to live and is fighting to live for her children. She was very tearful during appt. I spent time and attended to her emotions. We discussed her prognosis is currently good if she is able to tolerate chemo and she responds well to treatment. I worry if she does not proceed with chemo her cancer will cont to progress and the longer she waits this will have significant affect in regards to prognosis. After futher discussion she is agreeable to stop ivermectin  and allow some time to see how she is feeling off medication. She will follow up with Dr. Calle on Monday and discuss events during chemo school causing her anxiety. Her friends at [facility] were encouraging also to proceed with chemo.     - ACP  I did not have the opportunity to discuss ACP today. Most of visit was spent discussing her emotional state, effects of medication and providing emotional support. I will plan to discuss this at our next visit.          Follow up: pt will call when [facility] is [facility]     Plan discussed with: Pt and her 3 friends who were present [facility]    SUBJECTIVE:      History of Present Illness / Interval History:  Sonia Muse is 65 y.o. female with newly dx pancreatic cancer.  Presents to Palliative Care Clinic for physical symptoms, clarification of goals of care, and additional support.       6/3/25  History obtained from: Pt and friends at     ROS:  Review of Systems   Constitutional:  Positive for activity change, appetite change and fatigue.   Gastrointestinal:  Negative for abdominal pain.   Musculoskeletal:  Positive for myalgias.   Neurological:  Positive for weakness (generalized).   Psychiatric/Behavioral:  Positive for confusion and dysphoric mood.    All other systems reviewed and are negative.    Review of Symptoms      Symptom Assessment (ESAS 0-10 Scale)  Pain:  0  Dyspnea:  0  Anxiety:  0  Nausea:  0  Depression:  8  Anorexia:  5  Fatigue:  10  Insomnia:  0  Restlessness:  0  Agitation:  0         Performance Status:  50    Living Arrangements:  Lives with family and Lives in home    Psychosocial/Cultural:   See Palliative Psychosocial Note: No  **Primary  to Follow**  Palliative Care  Consult: No    Medications:  Current Medications[1]    External  database queried on 06/10/2025  by Adilia LUIS :    Review of patient's allergies indicates:   Allergen Reactions    Erythromycin Swelling    Codeine Nausea And  Vomiting     And migraine h/a    [place] Blisters        OBJECTIVE:      Physical Exam:  Vitals: Pulse: 72 (06/03/25 1107)  BP: 124/82 (06/03/25 1107)  SpO2: 99 % (06/03/25 1107)    Physical Exam  Vitals and nursing note reviewed.   Constitutional:       General: She is not in acute distress.     Appearance: She is not ill-appearing.   HENT:      Nose: Nose normal.      Mouth/Throat:      Mouth: Mucous membranes are dry.      Pharynx: Oropharynx is clear.   Eyes:      General: No scleral icterus.     Pupils: Pupils are equal, round, and reactive to light.   Cardiovascular:      Rate and Rhythm: Normal rate and regular rhythm.      Pulses: Normal pulses.   Pulmonary:      Effort: Pulmonary effort is normal. No respiratory distress.   Musculoskeletal:         General: Normal range of motion.   Skin:     General: Skin is warm and dry.   Neurological:      Mental Status: She is alert and oriented to person, place, and time.   Psychiatric:      Comments: Tearful, dysphoric mood     Labs: recent labs reviewed and d/w pt    Imaging: recent imaging reviewed and d/w pt     I spent a total of 45 minutes on the day of the visit. This includes face to face time in discussion of goals of care, symptom assessment, coordination of care and emotional support.  This also includes non-face to face time preparing to see the patient (eg, review of tests/imaging), obtaining and/or reviewing separately obtained history, documenting clinical information in the electronic or other health record, independently interpreting results and communicating results to the patient/family/caregiver, or care coordinator.     Additional 30 min time spent on a voluntary advance care planning and /or goals of care discussion, providing emotional support, formulating and communicating prognosis and exploring burden/benefit of various approaches of treatment.     Adilia Stokes NP         [1]   Current Outpatient Medications:     albuterol (VENTOLIN  HFA) 90 mcg/actuation inhaler, Inhale 2 puffs into the lungs every 6 (six) hours as needed for Wheezing or Shortness of Breath. Rescue, Disp: 18 g, Rfl: 1    ALPRAZolam (XANAX) 1 MG tablet, Take 1 tablet (1 mg total) by mouth once daily. (Patient taking differently: Take 1 mg by mouth daily as needed.), Disp: 30 tablet, Rfl: 0    amLODIPine (NORVASC) 5 MG tablet, Take 1 tablet (5 mg total) by mouth once daily., Disp: 90 tablet, Rfl: 1    blood sugar diagnostic Strp, Use 1 strip to check blood sugar 2 times daily, Disp: 100 each, Rfl: 5    blood-glucose meter kit, Use as instructed, Disp: 1 each, Rfl: 0    cetirizine (ZYRTEC) 10 MG tablet, TAKE 1 TABLET BY MOUTH ONCE DAILY, Disp: 90 tablet, Rfl: 1    gabapentin (NEURONTIN) 100 MG capsule, Take 3 capsules (300 mg total) by mouth 3 (three) times daily., Disp: 270 capsule, Rfl: 2    hydroCHLOROthiazide (HYDRODIURIL) 25 MG tablet, TAKE one TABLET BY MOUTH ONCE DAILY, Disp: 90 tablet, Rfl: 1    insulin glargine U-100, Lantus, 100 unit/mL (3 mL) SubQ InPn pen, Inject 21 Units into the skin every evening., Disp: 9 mL, Rfl: 3    lancets (LANCETS,THIN) Misc, 1 each by Misc.(Non-Drug; Combo Route) route 3 (three) times daily., Disp: 90 each, Rfl: 0    levoFLOXacin (LEVAQUIN) 500 MG tablet, Take 1 tablet (500 mg total) by mouth once daily., Disp: 7 tablet, Rfl: 0    LIDOcaine-prilocaine (EMLA) cream, Apply topically as needed., Disp: 30 g, Rfl: 0    loperamide (IMODIUM) 2 mg capsule, Take 2 tablets (4mg) by mouth after first loose stool, 1 tablet every 2 hours until diarrhea free for 12 hours. May take 2 tablets (4mg) by mouth every 4 hours at night. May require more than the package labeling maximum dose of 16mg/day., Disp: 30 capsule, Rfl: 11    losartan (COZAAR) 100 MG tablet, Take 1 tablet (100 mg total) by mouth once daily., Disp: 90 tablet, Rfl: 1    metoprolol succinate (TOPROL-XL) 25 MG 24 hr tablet, Take 1 tablet (25 mg total) by mouth once daily., Disp: 90 tablet,  "Rfl: 1    metronidazole 0.75% (METROCREAM) 0.75 % Crea, Apply 1 application  topically 2 (two) times daily., Disp: , Rfl:     multivit-mins no.63/iron/folic (M-VIT ORAL), Take 1 tablet by mouth once daily., Disp: , Rfl:     OLANZapine (ZYPREXA) 5 MG tablet, Take 1 tablet by mouth nightly on days 1-3 of each chemotherapy cycle. (Patient not taking: Reported on 6/9/2025), Disp: 3 tablet, Rfl: 11    ondansetron (ZOFRAN) 8 MG tablet, Take 1 tablet (8 mg total) by mouth every 8 (eight) hours as needed., Disp: 30 tablet, Rfl: 2    pen needle, diabetic 32 gauge x 5/32" Ndle, 1 Needle by Misc.(Non-Drug; Combo Route) route once daily., Disp: 100 each, Rfl: 2    prochlorperazine (COMPAZINE) 10 MG tablet, Take 1 tablet (10 mg total) by mouth every 6 (six) hours as needed., Disp: 30 tablet, Rfl: 1    DULoxetine (CYMBALTA) 60 MG capsule, TAKE one CAPSULE BY MOUTH EVERY DAY, Disp: 90 capsule, Rfl: 1    levothyroxine (SYNTHROID) 25 MCG tablet, TAKE one TABLET BY MOUTH BEFORE breakfast, Disp: 90 tablet, Rfl: 1    meloxicam (MOBIC) 15 MG tablet, Take 15 mg by mouth once daily., Disp: , Rfl:     montelukast (SINGULAIR) 10 mg tablet, TAKE ONE TABLET BY MOUTH EVERY EVENING, Disp: 90 tablet, Rfl: 1    "

## 2025-06-11 ENCOUNTER — PATIENT MESSAGE (OUTPATIENT)
Dept: FAMILY MEDICINE | Facility: CLINIC | Age: 65
End: 2025-06-11
Payer: MEDICAID

## 2025-06-11 DIAGNOSIS — F41.8 DEPRESSION WITH ANXIETY: ICD-10-CM

## 2025-06-12 RX ORDER — ALPRAZOLAM 0.25 MG/1
0.25 TABLET ORAL DAILY PRN
Qty: 30 TABLET | Refills: 0 | Status: SHIPPED | OUTPATIENT
Start: 2025-06-12

## 2025-06-14 DIAGNOSIS — I10 ESSENTIAL HYPERTENSION: ICD-10-CM

## 2025-06-16 ENCOUNTER — LAB VISIT (OUTPATIENT)
Dept: LAB | Facility: HOSPITAL | Age: 65
End: 2025-06-16
Attending: INTERNAL MEDICINE
Payer: MEDICAID

## 2025-06-16 ENCOUNTER — TELEPHONE (OUTPATIENT)
Facility: CLINIC | Age: 65
End: 2025-06-16
Payer: MEDICAID

## 2025-06-16 DIAGNOSIS — C25.0 MALIGNANT NEOPLASM OF HEAD OF PANCREAS: ICD-10-CM

## 2025-06-16 DIAGNOSIS — K86.89 PANCREATIC MASS: Primary | ICD-10-CM

## 2025-06-16 DIAGNOSIS — C25.0 MALIGNANT NEOPLASM OF HEAD OF PANCREAS: Primary | ICD-10-CM

## 2025-06-16 LAB
ALBUMIN SERPL-MCNC: 3.5 G/DL (ref 3.5–5.2)
ALP SERPL-CCNC: 98 UNIT/L (ref 55–135)
ALT SERPL-CCNC: 30 UNIT/L (ref 10–44)
ANION GAP (SMH): 11 MMOL/L (ref 8–16)
AST SERPL-CCNC: 27 UNIT/L (ref 10–40)
BILIRUB SERPL-MCNC: 1.3 MG/DL (ref 0.1–1)
BUN SERPL-MCNC: 22 MG/DL (ref 8–23)
CALCIUM SERPL-MCNC: 9.3 MG/DL (ref 8.7–10.5)
CHLORIDE SERPL-SCNC: 101 MMOL/L (ref 95–110)
CO2 SERPL-SCNC: 28 MMOL/L (ref 23–29)
CREAT SERPL-MCNC: 1.4 MG/DL (ref 0.5–1.4)
GFR SERPLBLD CREATININE-BSD FMLA CKD-EPI: 42 ML/MIN/1.73/M2
GLUCOSE SERPL-MCNC: 112 MG/DL (ref 70–110)
POTASSIUM SERPL-SCNC: 3.7 MMOL/L (ref 3.5–5.1)
PROT SERPL-MCNC: 7.6 GM/DL (ref 6–8.4)
SODIUM SERPL-SCNC: 140 MMOL/L (ref 136–145)

## 2025-06-16 PROCEDURE — 36415 COLL VENOUS BLD VENIPUNCTURE: CPT

## 2025-06-16 PROCEDURE — 82040 ASSAY OF SERUM ALBUMIN: CPT

## 2025-06-16 RX ORDER — LOSARTAN POTASSIUM 100 MG/1
100 TABLET ORAL
Qty: 90 TABLET | Refills: 0 | Status: SHIPPED | OUTPATIENT
Start: 2025-06-16

## 2025-06-16 NOTE — NURSING
Informed Mrs. Scott that message was sent to Dr. Cedeno about rescheduling port placement. CMP also scheduled.

## 2025-06-17 ENCOUNTER — TELEPHONE (OUTPATIENT)
Facility: CLINIC | Age: 65
End: 2025-06-17
Payer: MEDICAID

## 2025-06-17 DIAGNOSIS — K86.89 PANCREATIC MASS: Primary | ICD-10-CM

## 2025-06-17 NOTE — NURSING
Informed patient that message was sent to scheduling team to check on chemotherapy authorization as well as a start date. Patient is getting port placed on Thursday.

## 2025-06-18 ENCOUNTER — TELEPHONE (OUTPATIENT)
Dept: SURGERY | Facility: CLINIC | Age: 65
End: 2025-06-18
Payer: MEDICAID

## 2025-06-18 DIAGNOSIS — I87.2 VENOUS INSUFFICIENCY: Primary | ICD-10-CM

## 2025-06-18 RX ORDER — CEFAZOLIN SODIUM 2 G/50ML
2 SOLUTION INTRAVENOUS
Status: CANCELLED | OUTPATIENT
Start: 2025-06-18

## 2025-06-18 NOTE — TELEPHONE ENCOUNTER
Copied from CRM #[id number]. Topic: General Inquiry - Patient Advice  >> Jun 11, 2025  3:16 PM Melody wrote:  Type:  Needs Medical Advice    Who Called: pt  via [facility]? call  Best Call Back Number: [phone]    Additional Information:  pt state waiting on call to get schedule to get port put in.. asking for call back

## 2025-06-19 ENCOUNTER — HOSPITAL ENCOUNTER (OUTPATIENT)
Dept: RADIOLOGY | Facility: HOSPITAL | Age: 65
Discharge: HOME | End: 2025-06-19
Attending: SURGERY | Admitting: SURGERY
Payer: MEDICAID

## 2025-06-19 ENCOUNTER — ANESTHESIA (OUTPATIENT)
Dept: SURGERY | Facility: HOSPITAL | Age: 65
End: 2025-06-19
Payer: MEDICAID

## 2025-06-19 ENCOUNTER — ANESTHESIA EVENT (OUTPATIENT)
Dept: SURGERY | Facility: HOSPITAL | Age: 65
End: 2025-06-19
Payer: MEDICAID

## 2025-06-19 ENCOUNTER — HOSPITAL ENCOUNTER (OUTPATIENT)
Facility: HOSPITAL | Age: 65
Discharge: HOME | End: 2025-06-19
Attending: SURGERY | Admitting: SURGERY
Payer: MEDICAID

## 2025-06-19 VITALS
HEART RATE: 75 BPM | DIASTOLIC BLOOD PRESSURE: 62 MMHG | WEIGHT: 258 LBS | SYSTOLIC BLOOD PRESSURE: 97 MMHG | RESPIRATION RATE: 18 BRPM | TEMPERATURE: 99 F | OXYGEN SATURATION: 96 % | BODY MASS INDEX: 44.05 KG/M2 | HEIGHT: 64 IN

## 2025-06-19 DIAGNOSIS — Z01.818 PREOP TESTING: ICD-10-CM

## 2025-06-19 DIAGNOSIS — K86.89 PANCREATIC MASS: ICD-10-CM

## 2025-06-19 DIAGNOSIS — I87.2 VENOUS INSUFFICIENCY: ICD-10-CM

## 2025-06-19 LAB
POCT GLUCOSE: 147 MG/DL (ref 70–110)
POCT GLUCOSE: 159 MG/DL (ref 70–110)

## 2025-06-19 PROCEDURE — 71000015 HC POSTOP RECOV 1ST HR: Performed by: SURGERY

## 2025-06-19 PROCEDURE — 71000033 HC RECOVERY, INTIAL HOUR: Performed by: SURGERY

## 2025-06-19 PROCEDURE — 25000003 PHARM REV CODE 250: Performed by: SURGERY

## 2025-06-19 PROCEDURE — 82962 GLUCOSE BLOOD TEST: CPT | Performed by: SURGERY

## 2025-06-19 PROCEDURE — 36561 INSERT TUNNELED CV CATH: CPT | Mod: LT,,, | Performed by: SURGERY

## 2025-06-19 PROCEDURE — 63600175 PHARM REV CODE 636 W HCPCS: Performed by: STUDENT IN AN ORGANIZED HEALTH CARE EDUCATION/TRAINING PROGRAM

## 2025-06-19 PROCEDURE — 37000008 HC ANESTHESIA 1ST 15 MINUTES: Performed by: SURGERY

## 2025-06-19 PROCEDURE — 77001 FLUOROGUIDE FOR VEIN DEVICE: CPT | Mod: 26,,, | Performed by: SURGERY

## 2025-06-19 PROCEDURE — 36000707: Performed by: SURGERY

## 2025-06-19 PROCEDURE — C1788 PORT, INDWELLING, IMP: HCPCS | Performed by: SURGERY

## 2025-06-19 PROCEDURE — 37000009 HC ANESTHESIA EA ADD 15 MINS: Performed by: SURGERY

## 2025-06-19 PROCEDURE — 63600175 PHARM REV CODE 636 W HCPCS: Mod: JZ,TB | Performed by: SURGERY

## 2025-06-19 PROCEDURE — 36000706: Performed by: SURGERY

## 2025-06-19 DEVICE — POWERPORT M.R.I. IMPLANTABLE PORT WITH ATTACHABLE 8F CHRONOFLEX OPEN-ENDED SINGLE-LUMEN VENOUS CATHETER INTERMEDIATE KIT  (WITH SUTURE PLUGS)
Type: IMPLANTABLE DEVICE | Site: CHEST | Status: FUNCTIONAL
Brand: POWERPORT M.R.I., CHRONOFLEX

## 2025-06-19 RX ORDER — ACETAMINOPHEN 10 MG/ML
INJECTION, SOLUTION INTRAVENOUS
Status: DISCONTINUED | OUTPATIENT
Start: 2025-06-19 | End: 2025-06-19

## 2025-06-19 RX ORDER — BUPIVACAINE HYDROCHLORIDE AND EPINEPHRINE 2.5; 5 MG/ML; UG/ML
INJECTION, SOLUTION EPIDURAL; INFILTRATION; INTRACAUDAL; PERINEURAL
Status: DISCONTINUED | OUTPATIENT
Start: 2025-06-19 | End: 2025-06-19 | Stop reason: HOSPADM

## 2025-06-19 RX ORDER — ONDANSETRON HYDROCHLORIDE 2 MG/ML
4 INJECTION, SOLUTION INTRAVENOUS DAILY PRN
Status: DISCONTINUED | OUTPATIENT
Start: 2025-06-19 | End: 2025-06-19 | Stop reason: HOSPADM

## 2025-06-19 RX ORDER — FENTANYL CITRATE 50 UG/ML
INJECTION, SOLUTION INTRAMUSCULAR; INTRAVENOUS
Status: DISCONTINUED | OUTPATIENT
Start: 2025-06-19 | End: 2025-06-19

## 2025-06-19 RX ORDER — DEXAMETHASONE SODIUM PHOSPHATE 4 MG/ML
INJECTION, SOLUTION INTRA-ARTICULAR; INTRALESIONAL; INTRAMUSCULAR; INTRAVENOUS; SOFT TISSUE
Status: DISCONTINUED | OUTPATIENT
Start: 2025-06-19 | End: 2025-06-19

## 2025-06-19 RX ORDER — HEPARIN SODIUM 1000 [USP'U]/ML
INJECTION, SOLUTION INTRAVENOUS; SUBCUTANEOUS
Status: DISCONTINUED | OUTPATIENT
Start: 2025-06-19 | End: 2025-06-19 | Stop reason: HOSPADM

## 2025-06-19 RX ORDER — FAMOTIDINE 10 MG/ML
INJECTION, SOLUTION INTRAVENOUS
Status: DISCONTINUED | OUTPATIENT
Start: 2025-06-19 | End: 2025-06-19

## 2025-06-19 RX ORDER — GLUCAGON 1 MG
1 KIT INJECTION
Status: DISCONTINUED | OUTPATIENT
Start: 2025-06-19 | End: 2025-06-19 | Stop reason: HOSPADM

## 2025-06-19 RX ORDER — MIDAZOLAM HYDROCHLORIDE 1 MG/ML
INJECTION INTRAMUSCULAR; INTRAVENOUS
Status: DISCONTINUED | OUTPATIENT
Start: 2025-06-19 | End: 2025-06-19

## 2025-06-19 RX ORDER — CEFAZOLIN 2 G/1
2 INJECTION, POWDER, FOR SOLUTION INTRAMUSCULAR; INTRAVENOUS
Status: DISCONTINUED | OUTPATIENT
Start: 2025-06-19 | End: 2025-06-19 | Stop reason: HOSPADM

## 2025-06-19 RX ORDER — MEPERIDINE HYDROCHLORIDE 50 MG/ML
12.5 INJECTION INTRAMUSCULAR; INTRAVENOUS; SUBCUTANEOUS EVERY 10 MIN PRN
Status: DISCONTINUED | OUTPATIENT
Start: 2025-06-19 | End: 2025-06-19 | Stop reason: HOSPADM

## 2025-06-19 RX ORDER — PROPOFOL 10 MG/ML
VIAL (ML) INTRAVENOUS
Status: DISCONTINUED | OUTPATIENT
Start: 2025-06-19 | End: 2025-06-19

## 2025-06-19 RX ORDER — LIDOCAINE HYDROCHLORIDE 20 MG/ML
INJECTION, SOLUTION EPIDURAL; INFILTRATION; INTRACAUDAL; PERINEURAL
Status: DISCONTINUED | OUTPATIENT
Start: 2025-06-19 | End: 2025-06-19

## 2025-06-19 RX ORDER — DIPHENHYDRAMINE HYDROCHLORIDE 50 MG/ML
12.5 INJECTION, SOLUTION INTRAMUSCULAR; INTRAVENOUS
Status: DISCONTINUED | OUTPATIENT
Start: 2025-06-19 | End: 2025-06-19 | Stop reason: HOSPADM

## 2025-06-19 RX ORDER — ONDANSETRON HYDROCHLORIDE 2 MG/ML
INJECTION, SOLUTION INTRAVENOUS
Status: DISCONTINUED | OUTPATIENT
Start: 2025-06-19 | End: 2025-06-19

## 2025-06-19 RX ORDER — CEFAZOLIN SODIUM 1 G/3ML
INJECTION, POWDER, FOR SOLUTION INTRAMUSCULAR; INTRAVENOUS
Status: DISCONTINUED | OUTPATIENT
Start: 2025-06-19 | End: 2025-06-19

## 2025-06-19 RX ORDER — SODIUM CHLORIDE, SODIUM LACTATE, POTASSIUM CHLORIDE, CALCIUM CHLORIDE 600; 310; 30; 20 MG/100ML; MG/100ML; MG/100ML; MG/100ML
INJECTION, SOLUTION INTRAVENOUS CONTINUOUS PRN
Status: DISCONTINUED | OUTPATIENT
Start: 2025-06-19 | End: 2025-06-19

## 2025-06-19 RX ORDER — HYDROCODONE BITARTRATE AND ACETAMINOPHEN 5; 325 MG/1; MG/1
1 TABLET ORAL EVERY 6 HOURS PRN
Qty: 10 TABLET | Refills: 0 | Status: SHIPPED | OUTPATIENT
Start: 2025-06-19

## 2025-06-19 RX ORDER — FENTANYL CITRATE 50 UG/ML
25 INJECTION, SOLUTION INTRAMUSCULAR; INTRAVENOUS EVERY 5 MIN PRN
Status: DISCONTINUED | OUTPATIENT
Start: 2025-06-19 | End: 2025-06-19 | Stop reason: HOSPADM

## 2025-06-19 RX ORDER — OXYCODONE HYDROCHLORIDE 5 MG/1
5 TABLET ORAL
Status: DISCONTINUED | OUTPATIENT
Start: 2025-06-19 | End: 2025-06-19 | Stop reason: HOSPADM

## 2025-06-19 RX ORDER — BUPIVACAINE 13.3 MG/ML
INJECTION, SUSPENSION, LIPOSOMAL INFILTRATION
Status: DISCONTINUED | OUTPATIENT
Start: 2025-06-19 | End: 2025-06-19 | Stop reason: HOSPADM

## 2025-06-19 RX ADMIN — DEXAMETHASONE SODIUM PHOSPHATE 8 MG: 4 INJECTION, SOLUTION INTRAMUSCULAR; INTRAVENOUS at 09:06

## 2025-06-19 RX ADMIN — FAMOTIDINE 20 MG: 10 INJECTION, SOLUTION INTRAVENOUS at 09:06

## 2025-06-19 RX ADMIN — ONDANSETRON 4 MG: 2 INJECTION INTRAMUSCULAR; INTRAVENOUS at 09:06

## 2025-06-19 RX ADMIN — PROPOFOL 150 MG: 10 INJECTION, EMULSION INTRAVENOUS at 09:06

## 2025-06-19 RX ADMIN — FENTANYL CITRATE 100 MCG: 50 INJECTION, SOLUTION INTRAMUSCULAR; INTRAVENOUS at 09:06

## 2025-06-19 RX ADMIN — ACETAMINOPHEN 1000 MG: 10 INJECTION, SOLUTION INTRAVENOUS at 09:06

## 2025-06-19 RX ADMIN — MIDAZOLAM HYDROCHLORIDE 2 MG: 1 INJECTION, SOLUTION INTRAMUSCULAR; INTRAVENOUS at 09:06

## 2025-06-19 RX ADMIN — CEFAZOLIN 2 G: 330 INJECTION, POWDER, FOR SOLUTION INTRAMUSCULAR; INTRAVENOUS at 09:06

## 2025-06-19 RX ADMIN — SODIUM CHLORIDE, SODIUM LACTATE, POTASSIUM CHLORIDE, AND CALCIUM CHLORIDE: .6; .31; .03; .02 INJECTION, SOLUTION INTRAVENOUS at 09:06

## 2025-06-19 RX ADMIN — LIDOCAINE HYDROCHLORIDE 100 MG: 20 INJECTION, SOLUTION INTRAVENOUS at 09:06

## 2025-06-19 NOTE — ANESTHESIA PREPROCEDURE EVALUATION
06/19/2025  Sonia Muse is a 65 y.o., female.        Results for orders placed or performed during the hospital encounter of 04/24/25   EKG 12-lead    Collection Time: 04/24/25  5:03 PM   Result Value Ref Range    QRS Duration 96 ms    [facility] QTC Calculation 445 ms    Narrative    Test Reason : Z13.6,    Vent. Rate :  86 BPM     Atrial Rate :  86 BPM     P-R Int : 158 ms          QRS Dur :  96 ms      QT Int : 372 ms       P-R-T Axes :  49 -61  53 degrees    QTcB Int : 445 ms    Normal sinus rhythm  Possible Left atrial enlargement  Left anterior fascicular block  Minimal voltage criteria for LVH, may be normal variant ( Shahbaz product )  Nonspecific T wave abnormality  Abnormal ECG  When compared with ECG of 24-Apr-2025 14:21,  T wave inversion more evident in Anterior leads  Confirmed by Kuldip Charlton ([id number]) on 4/30/2025 7:32:10 PM    Referred By: AAAREFERRAL SELF           Confirmed By: Kuldip Charlton               Lab Results   Component Value Date    WBC 12.31 06/09/2025    HGB 10.5 (L) 06/09/2025    HCT 34.5 (L) 06/09/2025    MCV 87 06/09/2025     (H) 06/09/2025     BMP  Lab Results   Component Value Date     06/16/2025    K 3.7 06/16/2025     06/16/2025    CO2 28 06/16/2025    BUN 22 06/16/2025    CREATININE 1.4 06/16/2025    CALCIUM 9.3 06/16/2025    ANIONGAP 11 06/16/2025     (H) 06/16/2025     (H) 06/09/2025     (H) 05/20/2025       Results for orders placed in visit on 12/03/20    Echo Color Flow Doppler? Yes    Interpretation Summary  · There is left ventricular concentric remodeling.  · The left ventricle is normal in size with normal systolic function. The estimated ejection fraction is 65%.  · Normal left ventricular diastolic function.  · Normal right ventricular size with normal right ventricular systolic function.  · Mild mitral  regurgitation.  · Mild tricuspid regurgitation.  · Normal central venous pressure (3 mmHg).  · The estimated PA systolic pressure is 28 mmHg.          Pre-op Assessment    I have reviewed the Patient Summary Reports.     I have reviewed the Nursing Notes. I have reviewed the NPO Status.   I have reviewed the Medications.     Review of Systems  Anesthesia Hx:             Denies Family Hx of Anesthesia complications.   Personal Hx of Anesthesia complications, Post-Operative Nausea/Vomiting, in the past, but not with recent anesthetics / prophylaxis                    Social:  Former Smoker, No Alcohol Use       Hematology/Oncology:  Hematology Normal   Oncology Normal                                   EENT/Dental:  EENT/Dental Normal           Cardiovascular:     Hypertension, well controlled         Denies Orthopnea.      ECG has been reviewed. History of tachycardia Patient on beta blockers Beta blocker taken in last 24 hours                   Hypertension         Pulmonary:    Asthma (albuterol as needed only) mild    Patient strongly suspects sleep apnea although she has not been tested.  She reports that her snoring wakes her up at night.  Ventolin inhaler use  Singulair use  Oxygen saturation 94% prior to procedure               Renal/:  Chronic Renal Disease   History of acute kidney injury     Kidney Function/Disease             Hepatic/GI:        Pancreatic mass and biliary obstruction.  No recent nausea or vomiting.           Pancreatic Disease Pancreatic Tumor  Musculoskeletal:  Arthritis     Muscle Disorders: Fibromyalgia  Joint Disease:  Arthritis, Osteoarthritis     Spine Disorders: cervical Disc disease and Degenerative disease           Neurological:    Neuromuscular Disease,             Chronic Pain Syndrome Osteoarthritis                         Neuromuscular Disease   Endocrine:  Diabetes, poorly controlled, type 2, using insulin         Morbid Obesity / BMI > 40  Psych:  Psychiatric History  anxiety depression                Physical Exam  General: Cooperative, Alert and Oriented    Airway:  Mallampati: I   Mouth Opening: Normal  TM Distance: Normal  Tongue: Normal  Neck ROM: Normal ROM    Dental:  Intact    Chest/Lungs:  Clear to auscultation, Normal Respiratory Rate    Heart:  Rate: Normal  Rhythm: Regular Rhythm  Sounds: Normal        Anesthesia Plan  Type of Anesthesia, risks & benefits discussed:    Anesthesia Type: Gen Supraglottic Airway  Intra-op Monitoring Plan: Standard ASA Monitors  Post Op Pain Control Plan:   (medical reason for not using multimodal pain management)  Induction:  IV  Informed Consent: Informed consent signed with the Patient and all parties understand the risks and agree with anesthesia plan.  All questions answered.   ASA Score: 3  Anesthesia Plan Notes: IV acetaminophen, Zofran, Pepcid, Decadron 8 mg, Benadryl 6.25 mg       Ready For Surgery From Anesthesia Perspective.     .

## 2025-06-19 NOTE — DISCHARGE SUMMARY
Discharge Summary  General Surgery      Admit Date: 6/19/2025    Discharge Date :6/19/2025    Attending Physician: Ata Fernandez III     Discharge Physician: Ata Fernandez III    Discharged Condition: good    Discharge Diagnosis: Venous insufficiency [I87.2]    Treatments/Procedures: Procedure(s) (LRB):  [id number] (Left)    Hospital Course: Uneventful; Discharged home from Recovery    Significant Diagnostic Studies: none    Disposition: Home or Self Care    Diet: Regular    Follow up: Office 10-14 days    Activity: No heavy lifting till seen in office.    Patient Instructions:   Current Discharge Medication List        START taking these medications    Details   HYDROcodone-acetaminophen (NORCO) 5-325 mg per tablet Take 1 tablet by mouth every 6 (six) hours as needed.  Qty: 10 tablet, Refills: 0    Comments: Quantity prescribed more than 7 day supply? No  Associated Diagnoses: Venous insufficiency           CONTINUE these medications which have NOT CHANGED    Details   albuterol (VENTOLIN HFA) 90 mcg/actuation inhaler Inhale 2 puffs into the lungs every 6 (six) hours as needed for Wheezing or Shortness of Breath. Rescue  Qty: 18 g, Refills: 1    Associated Diagnoses: Mild intermittent asthma with acute exacerbation      ALPRAZolam (XANAX) 0.25 MG tablet Take 1 tablet (0.25 mg total) by mouth daily as needed for Anxiety.  Qty: 30 tablet, Refills: 0    Associated Diagnoses: Depression with anxiety      amLODIPine (NORVASC) 5 MG tablet Take 1 tablet (5 mg total) by mouth once daily.  Qty: 90 tablet, Refills: 1    Associated Diagnoses: Essential hypertension      cetirizine (ZYRTEC) 10 MG tablet TAKE 1 TABLET BY MOUTH ONCE DAILY  Qty: 90 tablet, Refills: 1    Comments: This prescription was filled on 2/6/2025. Any refills authorized will be placed on file.  Associated Diagnoses: Asthma, unspecified asthma severity, unspecified whether complicated, unspecified whether persistent      DULoxetine  (CYMBALTA) 60 MG capsule TAKE one CAPSULE BY MOUTH EVERY DAY  Qty: 90 capsule, Refills: 1    Comments: This prescription was filled on 3/13/2025. Any refills authorized will be placed on file.  Associated Diagnoses: Depression with anxiety; Arthralgia, unspecified joint; Fibromyalgia      hydroCHLOROthiazide (HYDRODIURIL) 25 MG tablet TAKE one TABLET BY MOUTH ONCE DAILY  Qty: 90 tablet, Refills: 1    Comments: .  Associated Diagnoses: Essential hypertension      insulin glargine U-100, Lantus, 100 unit/mL (3 mL) SubQ InPn pen Inject 21 Units into the skin every evening.  Qty: 9 mL, Refills: 3    Associated Diagnoses: Type 2 diabetes mellitus with hyperglycemia, with long-term current use of insulin      levothyroxine (SYNTHROID) 25 MCG tablet TAKE one TABLET BY MOUTH BEFORE breakfast  Qty: 90 tablet, Refills: 1    Comments: This prescription was filled on 5/15/2025. Any refills authorized will be placed on file.  Associated Diagnoses: Hypothyroidism, unspecified type      losartan (COZAAR) 100 MG tablet TAKE one TABLET BY MOUTH ONCE DAILY  Qty: 90 tablet, Refills: 0    Comments: This prescription was filled on 5/22/2025. Any refills authorized will be placed on file. - .  Associated Diagnoses: Essential hypertension      meloxicam (MOBIC) 15 MG tablet Take 15 mg by mouth once daily. 1/2 tablet am      metoprolol succinate (TOPROL-XL) 25 MG 24 hr tablet Take 1 tablet (25 mg total) by mouth once daily.  Qty: 90 tablet, Refills: 1    Associated Diagnoses: Essential hypertension      montelukast (SINGULAIR) 10 mg tablet TAKE ONE TABLET BY MOUTH EVERY EVENING  Qty: 90 tablet, Refills: 1    Comments: This prescription was filled on 5/15/2025. Any refills authorized will be placed on file.  Associated Diagnoses: Asthma, unspecified asthma severity, unspecified whether complicated, unspecified whether persistent      blood sugar diagnostic Strp Use 1 strip to check blood sugar 2 times daily  Qty: 100 each, Refills: 5     "Associated Diagnoses: Type 2 diabetes mellitus with hyperglycemia, with long-term current use of insulin      blood-glucose meter kit Use as instructed  Qty: 1 each, Refills: 0      lancets (LANCETS,THIN) Misc 1 each by Misc.(Non-Drug; Combo Route) route 3 (three) times daily.  Qty: 90 each, Refills: 0      LIDOcaine-prilocaine (EMLA) cream Apply topically as needed.  Qty: 30 g, Refills: 0    Associated Diagnoses: Malignant neoplasm of head of pancreas      loperamide (IMODIUM) 2 mg capsule Take 2 tablets (4mg) by mouth after first loose stool, 1 tablet every 2 hours until diarrhea free for 12 hours. May take 2 tablets (4mg) by mouth every 4 hours at night. May require more than the package labeling maximum dose of 16mg/day.  Qty: 30 capsule, Refills: 11    Associated Diagnoses: Malignant neoplasm of head of pancreas      metronidazole 0.75% (METROCREAM) 0.75 % Crea Apply 1 application  topically 2 (two) times daily.      OLANZapine (ZYPREXA) 5 MG tablet Take 1 tablet by mouth nightly on days 1-3 of each chemotherapy cycle.  Qty: 3 tablet, Refills: 11    Associated Diagnoses: Malignant neoplasm of head of pancreas      ondansetron (ZOFRAN) 8 MG tablet Take 1 tablet (8 mg total) by mouth every 8 (eight) hours as needed.  Qty: 30 tablet, Refills: 2    Associated Diagnoses: Malignant neoplasm of head of pancreas      pen needle, diabetic 32 gauge x 5/32" Ndle 1 Needle by Misc.(Non-Drug; Combo Route) route once daily.  Qty: 100 each, Refills: 2    Associated Diagnoses: Type 2 diabetes mellitus with hyperglycemia, with long-term current use of insulin      prochlorperazine (COMPAZINE) 10 MG tablet Take 1 tablet (10 mg total) by mouth every 6 (six) hours as needed.  Qty: 30 tablet, Refills: 1    Associated Diagnoses: Malignant neoplasm of head of pancreas             Discharge Procedure Orders   Diet Adult Regular     Remove dressing in 48 hours     "

## 2025-06-19 NOTE — H&P
Patient ID: Sonia Muse is a 65 y.o. female.     Chief Complaint: Vascular Access Problem        HPI:update to H and P. No change   65 year old female referred to the office in consultation for port a cath placement. She has been diagnosed with pancreatic cancer. She has plan to start chemotherapy next week. No previous port a cath in the past. She presented with obstructive jaundice. She had EUS with biopsy for diagnosis. She had ERCP with metal cbd stent placement to relieve biliary obstruction.            Past Medical History:   Diagnosis Date    Allergy      Anxiety      Asthma      DDD (degenerative disc disease), cervical      Depression      Diabetes mellitus      Fibromyalgia      Hypertension     Neuromuscular disorder      Pancreatic mass      PONV (postoperative nausea and vomiting)      Thyroid disease       hypo            Past Surgical History:   Procedure Laterality Date    ADENOIDECTOMY        carpel tunnel Right       SECTION        ENDOSCOPIC ULTRASOUND OF UPPER GASTROINTESTINAL TRACT N/A 2025     Procedure: ULTRASOUND, UPPER GI TRACT, ENDOSCOPIC;  Surgeon: Austen Grimes III, MD;  Location: [facility];  Service: Endoscopy;  Laterality: N/A;    ERCP N/A 2025     Procedure: ERCP (ENDOSCOPIC RETROGRADE CHOLANGIOPANCREATOGRAPHY);  Surgeon: Thuy Escobar MD;  Location: [facility] ENDO;  Service: Endoscopy;  Laterality: N/A;    ERCP N/A 2025     Procedure: ERCP (ENDOSCOPIC RETROGRADE CHOLANGIOPANCREATOGRAPHY);  Surgeon: Austen Grimes III, MD;  Location: [facility];  Service: Endoscopy;  Laterality: N/A;    JOINT REPLACEMENT Right      knee    KNEE ARTHROPLASTY Left 10/19/2020     Procedure: ARTHROPLASTY, KNEE;  Surgeon: Felipe Herring MD;  Location: [facility];  Service: Orthopedics;  Laterality: Left;  Louie Liz    knee replacement Right      ROTATOR CUFF REPAIR Right      TONSILLECTOMY        TUBAL LIGATION                Review of patient's allergies  "indicates:   Allergen Reactions    Erythromycin Swelling    Codeine Nausea And Vomiting       And migraine h/a    [place] Blisters           Medication List with Changes/Refills   Current Medications     ALBUTEROL (VENTOLIN HFA) 90 MCG/ACTUATION INHALER    Inhale 2 puffs into the lungs every 6 (six) hours as needed for Wheezing or Shortness of Breath. Rescue     ALPRAZOLAM (XANAX) 1 MG TABLET    Take 1 tablet (1 mg total) by mouth once daily.     AMLODIPINE (NORVASC) 5 MG TABLET    Take 1 tablet (5 mg total) by mouth once daily.     BLOOD SUGAR DIAGNOSTIC STRP    1 each by Misc.(Non-Drug; Combo Route) route 3 (three) times daily.     BLOOD-GLUCOSE METER KIT    Use as instructed     CETIRIZINE (ZYRTEC) 10 MG TABLET    TAKE 1 TABLET BY MOUTH ONCE DAILY     DULOXETINE (CYMBALTA) 60 MG CAPSULE    Take 1 capsule (60 mg total) by mouth once daily.     HYDROCHLOROTHIAZIDE (HYDRODIURIL) 25 MG TABLET    TAKE one TABLET BY MOUTH ONCE DAILY     INSULIN GLARGINE U-100, LANTUS, 100 UNIT/ML (3 ML) SUBQ INPN PEN    Inject 18 Units into the skin every evening.     LANCETS (LANCETS,THIN) MISC    1 each by Misc.(Non-Drug; Combo Route) route 3 (three) times daily.     LEVOTHYROXINE (SYNTHROID) 25 MCG TABLET    Take 1 tablet (25 mcg total) by mouth before breakfast.     LIDOCAINE-PRILOCAINE (EMLA) CREAM    Apply 1 g topically twice a week.     LOSARTAN (COZAAR) 100 MG TABLET    Take 1 tablet (100 mg total) by mouth once daily.     MELOXICAM (MOBIC) 15 MG TABLET    Take 1 tablet (15 mg total) by mouth once daily.     METOPROLOL SUCCINATE (TOPROL-XL) 25 MG 24 HR TABLET    Take 1 tablet (25 mg total) by mouth once daily.     METRONIDAZOLE 0.75% (METROCREAM) 0.75 % CREA    Apply 1 application  topically 2 (two) times daily.     MONTELUKAST (SINGULAIR) 10 MG TABLET    TAKE ONE TABLET BY MOUTH EVERY EVENING     MULTIVIT-MINS NO.63/IRON/FOLIC (M-VIT ORAL)    Take 1 tablet by mouth once daily.     PEN NEEDLE, DIABETIC 32 GAUGE X 5/32" NDLE    1 " Needle by Misc.(Non-Drug; Combo Route) route once daily.             Family History   Problem Relation Name Age of Onset    Diabetes Mother Libertad      Hypertension Mother Libertad      Heart disease Mother Libertad      Stroke Mother Libertad      Glaucoma Mother Libertad      Arthritis Mother Libertad      Diabetes Father Fredis      Hypertension Father Fredis      Heart disease Father Fredis      Hypertension Sister 1      Heart disease Sister 1      Graves' disease Daughter        Anxiety disorder Daughter          Social History            Socioeconomic History    Marital status:     Number of children: 3   Occupational History    Occupation: [employer]       Comment: primary sub for [employer]   Tobacco Use    Smoking status: Former       Current packs/day: 0.00       Average packs/day: 1 pack/day for 11.0 years (11.0 ttl pk-yrs)       Types: Cigarettes       Start date:        Quit date:        Years since quittin.4    Smokeless tobacco: Never   Substance and Sexual Activity    Alcohol use: Not Currently       Comment: occasional 2x/y    Drug use: Never    Sexual activity: Yes       Partners: Male      Social Drivers of Health           Financial Resource Strain: High Risk (2025)     Overall Financial Resource Strain (CARDIA)      Difficulty of Paying Living Expenses: Hard   Food Insecurity: Food Insecurity Present (2025)     Hunger Vital Sign      Worried About Running Out of Food in the Last Year: Sometimes true      Ran Out of Food in the Last Year: Sometimes true   Transportation Needs: Unmet Transportation Needs (2025)     PRAPARE - Transportation      Lack of Transportation (Medical): Yes      Lack of Transportation (Non-Medical): Yes   Physical Activity: Unknown (2025)     Exercise Vital Sign      Days of Exercise per Week: Patient declined      Minutes of Exercise per Session: 30 min   Stress: Stress Concern Present (2025)     [place] [employer] - Occupational Stress Questionnaire      Feeling of Stress : Very much   Housing Stability: Low Risk  (4/28/2025)     Housing Stability Vital Sign      Unable to Pay for Housing in the Last Year: No      Number of Times Moved in the Last Year: 0      Homeless in the Last Year: No            Review of Systems   Constitutional:  Positive for fatigue. Negative for appetite change, chills, fever and unexpected weight change.   HENT:  Negative for hearing loss, rhinorrhea, sore throat and voice change.    Eyes:  Negative for photophobia and visual disturbance.   Respiratory:  Negative for cough, choking and shortness of breath.    Cardiovascular:  Negative for chest pain, palpitations and leg swelling.   Gastrointestinal:  Negative for abdominal pain, blood in stool, constipation, diarrhea, nausea and vomiting.   Endocrine: Negative for cold intolerance, heat intolerance, polydipsia and polyuria.   Musculoskeletal:  Negative for arthralgias, back pain, joint swelling and neck stiffness.   Skin:  Negative for color change, pallor and rash.   Neurological:  Negative for dizziness, seizures, syncope and headaches.   Hematological:  Negative for adenopathy. Does not bruise/bleed easily.   Psychiatric/Behavioral:  Negative for agitation, behavioral problems and confusion.       Objective:      Objective  Physical Exam  Constitutional:       General: She is not in acute distress.     Appearance: She is not toxic-appearing.   Pulmonary:      Effort: No tachypnea, bradypnea or respiratory distress.   Abdominal:      General: There is no distension.      Palpations: Abdomen is soft.   Neurological:      Mental Status: She is alert and oriented to person, place, and time.   Psychiatric:         Behavior: Behavior is cooperative.         Assessment/Plan:   Sonia was seen today for vascular access problem.     Diagnoses and all orders for this visit:     Venous insufficiency  -     Vital signs; Standing  -     Insert  peripheral IV; Standing  -     Diet NPO; Standing  -     Pulse Oximetry Q4H; Standing  -     Case Request Operating Room: [id number]  -     Full code; Standing  -     Place in Outpatient; Standing  -     Insert peripheral IV     Malignant neoplasm of head of pancreas     Other orders  -     IP VTE HIGH RISK PATIENT; Standing  -     cefazolin (ANCEF) 2 gram in dextrose 5% 50 mL IVPB (premix)              Plan on Port-A-Cath placement.      I discussed the proposed procedures with the patient including risks, benefits, indications, alternatives and special concerns.  The patient appears to understand and agrees to go ahead with surgery.  I have made no promises, warranties or verbal agreements beyond what was discussed above.

## 2025-06-19 NOTE — TRANSFER OF CARE
"Anesthesia Transfer of Care Note    Patient: Sonia Muse    Procedure(s) Performed: Procedure(s) (LRB):  [id number] (Left)    Patient location: PACU    Anesthesia Type: general    Transport from OR: Transported from OR on room air with adequate spontaneous ventilation    Post pain: adequate analgesia    Post assessment: no apparent anesthetic complications and tolerated procedure well    Post vital signs: stable    Level of consciousness: awake and alert    Nausea/Vomiting: no nausea/vomiting    Complications: none    Transfer of care protocol was followed    Last vitals: Visit Vitals  /64 (BP Location: Right arm, Patient Position: Sitting)   Pulse 83   Temp 36.8 °C (98.2 °F) (Oral)   Resp 16   Ht 5' 4" (1.626 m)   Wt 117 kg (258 lb)   SpO2 95%   Breastfeeding No   BMI 44.29 kg/m²     "

## 2025-06-19 NOTE — ANESTHESIA POSTPROCEDURE EVALUATION
Anesthesia Post Evaluation    Patient: Sonia Muse    Procedure(s) Performed: Procedure(s) (LRB):  [id number] (Left)    Final Anesthesia Type: general      Patient location during evaluation: PACU  Patient participation: Yes- Able to Participate  Level of consciousness: awake and alert  Post-procedure vital signs: reviewed and stable  Pain management: adequate  Airway patency: patent  NICOLAS mitigation strategies: Extubation while patient is awake, Multimodal analgesia and Extubation and recovery carried out in lateral, semiupright, or other nonsupine position  PONV status at discharge: No PONV  Anesthetic complications: no      Cardiovascular status: stable  Respiratory status: unassisted and spontaneous ventilation  Hydration status: euvolemic  Follow-up not needed.              Vitals Value Taken Time   BP 97/62 06/19/25 11:20   Temp 37 °C (98.6 °F) 06/19/25 10:50   Pulse 75 06/19/25 11:20   Resp 18 06/19/25 11:20   SpO2 96 % 06/19/25 11:20         Event Time   Out of Recovery 10:42:00         Pain/Lori Score: Lori Score: 10 (6/19/2025 11:20 AM)

## 2025-06-19 NOTE — OP NOTE
Surgery Date: 6/19/2025     Surgeons and Role:     * Ata Fernandez III, MD - Primary    Assisting Surgeon: None    Pre-op Diagnosis:  Venous insufficiency [I87.2], pancreatic cancer    Post-op Diagnosis:  Post-Op Diagnosis Codes:     * Venous insufficiency [I87.2] pancreatic cancer    Procedure(s) (LRB):  [id number] (Left) left subclavian placement using Seldinger technique and intraoperative fluoroscopy to position catheter tip in the superior vena cava    Anesthesia: General    Implants:  Implant Name Type Inv. Item Serial No.  Lot No. LRB No. Used Action   KIT POWERPORT SINGLE 8FR - [id number]  KIT POWERPORT SINGLE 8FR  [employer] [id number] Left 1 Implanted       Operative Findings: The patient was taken to operating room and transferred to the operating room table in the supine position.  Patient was given general anesthesia and intubated.  Bilateral neck and chest were sterilely prepped and draped.  The patient was put in Trendelenburg position.  Large gauge needle was used to percutaneously access the left  subclavian vein.   A guide wire was placed through the needle and into the venous system without any issue.  Under fluoroscopy, the guide wire was seen in the venous system.  The patient was put back in the flat position.   An incision was made at the percutaneous stick site and a subcutaneous pocket was made inferiorly.  Under Seldinger technique a venous sheath was placed in into the venous system over the guide wire.  The catheter was then placed into the venous system through the sheath.   The sheath was then peeled away and discarded.   Under fluoroscopy, the catheter tip was positioned into the superior vena cava.  The catheter was cut to size.   The port was attached to the catheter.  The port was accessed and it flushed and aspirated freely.  The port was then placed into the subcutaneous pocket and sutured to the chest wall.  The incision was then closed in 2 layers,  first with a deep dermal layer of Vicryl followed by a subcuticular 4-0 Monocryl to close the skin.  After that, procedure was finished.  Patient was extubated and transferred to the recovery room in stable condition.  Chest x-ray will be performed in the recovery room.     Estimated Blood Loss: 10 m L  Complications none.  Instrument counts correct.  Estimated Blood Loss has been documented.         Specimens:   Specimen (24h ago, onward)      None          * No specimens in log *    [id number]

## 2025-06-19 NOTE — ANESTHESIA PROCEDURE NOTES
Intubation    Date/Time: 6/19/2025 9:18 AM    Performed by: Janice Pickens CRNA  Authorized by: Aleks Harper MD    Intubation:     Induction:  Intravenous    Intubated:  Postinduction    Mask Ventilation:  N/a    Attempts:  1    Attempted By:  CRNA    Difficult Airway Encountered?: No      Complications:  None    Airway Device:  Supraglottic airway/LMA    Airway Device Size:  4.0    Style/Cuff Inflation:  Uncuffed    Secured at:  The lips    Placement Verified By:  Capnometry    Complicating Factors:  None    Findings Post-Intubation:  BS equal bilateral and atraumatic/condition of teeth unchanged

## 2025-06-19 NOTE — BRIEF OP NOTE
[facility]  Brief Operative Note    SUMMARY     Surgery Date: 6/19/2025     Surgeons and Role:     * Ata Fernandez III, MD - Primary    Assisting Surgeon: None    Pre-op Diagnosis:  Venous insufficiency [I87.2], pancreatic cancer    Post-op Diagnosis:  Post-Op Diagnosis Codes:     * Venous insufficiency [I87.2] pancreatic cancer    Procedure(s) (LRB):  [id number] (Left) left subclavian placement using Seldinger technique and intraoperative fluoroscopy to position catheter tip in the superior vena cava    Anesthesia: General    Implants:  Implant Name Type Inv. Item Serial No.  Lot No. LRB No. Used Action   KIT POWERPORT SINGLE 8FR - [id number]  KIT POWERPORT SINGLE 8FR  [employer] [id number] Left 1 Implanted       Operative Findings: The patient was taken to operating room and transferred to the operating room table in the supine position.  Patient was given general anesthesia and intubated.  Bilateral neck and chest were sterilely prepped and draped.  The patient was put in Trendelenburg position.  Large gauge needle was used to percutaneously access the left  subclavian vein.   A guide wire was placed through the needle and into the venous system without any issue.  Under fluoroscopy, the guide wire was seen in the venous system.  The patient was put back in the flat position.   An incision was made at the percutaneous stick site and a subcutaneous pocket was made inferiorly.  Under Seldinger technique a venous sheath was placed in into the venous system over the guide wire.  The catheter was then placed into the venous system through the sheath.   The sheath was then peeled away and discarded.   Under fluoroscopy, the catheter tip was positioned into the superior vena cava.  The catheter was cut to size.   The port was attached to the catheter.  The port was accessed and it flushed and aspirated freely.  The port was then placed into the subcutaneous pocket and sutured to  the chest wall.  The incision was then closed in 2 layers, first with a deep dermal layer of Vicryl followed by a subcuticular 4-0 Monocryl to close the skin.  After that, procedure was finished.  Patient was extubated and transferred to the recovery room in stable condition.  Chest x-ray will be performed in the recovery room.     Estimated Blood Loss: 10 m L  Complications none.  Instrument counts correct.  Estimated Blood Loss has been documented.         Specimens:   Specimen (24h ago, onward)      None          * No specimens in log *    [id number]

## 2025-06-19 NOTE — DISCHARGE INSTRUCTIONS
DISCHARGE INSTRUCTIONS   Post-Operative care     A surgical glue has been placed over your incisions, please leave the glue in place and do not attempt to remove it.  It is ok to shower using mild soap and water over the incisions the day after your procedure. Pat dry your incisions.  Do not apply any lotions or creams to the dermabond.  Do not soak in a bathtub or other body of water for 2 weeks or until cleared by your surgeon.      If you noticed redness, swelling, fever, increasing pain or significant drainage from your wound please call/message the office or the 24 hr nurse hotline after hours.    No heavy Lifting- nothing over 10 lbs until instructed by MD  No driving, operating heavy machinery, or signing any legal documents for 24 hours.  Do not drive while taking pain medication  Eat with pain medication - it can cause nausea if taken on an empty stomach.  Keep or schedule follow up appt with your surgeon

## 2025-06-23 ENCOUNTER — OFFICE VISIT (OUTPATIENT)
Dept: HEMATOLOGY/ONCOLOGY | Facility: CLINIC | Age: 65
End: 2025-06-23
Payer: MEDICAID

## 2025-06-23 ENCOUNTER — LAB VISIT (OUTPATIENT)
Dept: LAB | Facility: HOSPITAL | Age: 65
End: 2025-06-23
Attending: INTERNAL MEDICINE
Payer: MEDICAID

## 2025-06-23 VITALS
HEIGHT: 64 IN | SYSTOLIC BLOOD PRESSURE: 114 MMHG | HEART RATE: 91 BPM | RESPIRATION RATE: 18 BRPM | TEMPERATURE: 98 F | OXYGEN SATURATION: 95 % | BODY MASS INDEX: 42.46 KG/M2 | WEIGHT: 248.69 LBS | DIASTOLIC BLOOD PRESSURE: 76 MMHG

## 2025-06-23 DIAGNOSIS — K86.89 PANCREATIC MASS: ICD-10-CM

## 2025-06-23 DIAGNOSIS — C25.0 MALIGNANT NEOPLASM OF HEAD OF PANCREAS: Primary | ICD-10-CM

## 2025-06-23 DIAGNOSIS — C25.9 MALIGNANT NEOPLASM OF PANCREAS, UNSPECIFIED LOCATION OF MALIGNANCY: Primary | ICD-10-CM

## 2025-06-23 DIAGNOSIS — I10 ESSENTIAL HYPERTENSION: ICD-10-CM

## 2025-06-23 LAB
ABSOLUTE EOSINOPHIL (SMH): 0.43 K/UL
ABSOLUTE MONOCYTE (SMH): 0.86 K/UL (ref 0.3–1)
ABSOLUTE NEUTROPHIL COUNT (SMH): 4.2 K/UL (ref 1.8–7.7)
ALBUMIN SERPL-MCNC: 3.4 G/DL (ref 3.5–5.2)
ALP SERPL-CCNC: 108 UNIT/L (ref 55–135)
ALT SERPL-CCNC: 7 UNIT/L (ref 10–44)
ANION GAP (SMH): 10 MMOL/L (ref 8–16)
AST SERPL-CCNC: 8 UNIT/L (ref 10–40)
BASOPHILS # BLD AUTO: 0.06 K/UL
BASOPHILS NFR BLD AUTO: 0.8 %
BILIRUB SERPL-MCNC: 1.2 MG/DL (ref 0.1–1)
BUN SERPL-MCNC: 22 MG/DL (ref 8–23)
CALCIUM SERPL-MCNC: 8.9 MG/DL (ref 8.7–10.5)
CHLORIDE SERPL-SCNC: 101 MMOL/L (ref 95–110)
CO2 SERPL-SCNC: 25 MMOL/L (ref 23–29)
CREAT SERPL-MCNC: 1 MG/DL (ref 0.5–1.4)
ERYTHROCYTE [DISTWIDTH] IN BLOOD BY AUTOMATED COUNT: 14.3 % (ref 11.5–14.5)
GFR SERPLBLD CREATININE-BSD FMLA CKD-EPI: >60 ML/MIN/1.73/M2
GLUCOSE SERPL-MCNC: 163 MG/DL (ref 70–110)
HCT VFR BLD AUTO: 33.5 % (ref 37–48.5)
HGB BLD-MCNC: 10.4 GM/DL (ref 12–16)
IMM GRANULOCYTES # BLD AUTO: 0.03 K/UL (ref 0–0.04)
IMM GRANULOCYTES NFR BLD AUTO: 0.4 % (ref 0–0.5)
LYMPHOCYTES # BLD AUTO: 1.72 K/UL (ref 1–4.8)
MAGNESIUM SERPL-MCNC: 1.4 MG/DL (ref 1.6–2.6)
MCH RBC QN AUTO: 25.4 PG (ref 27–31)
MCHC RBC AUTO-ENTMCNC: 31 G/DL (ref 32–36)
MCV RBC AUTO: 82 FL (ref 82–98)
NUCLEATED RBC (/100WBC) (SMH): 0 /100 WBC
PLATELET # BLD AUTO: 363 K/UL (ref 150–450)
PMV BLD AUTO: 8.9 FL (ref 9.2–12.9)
POTASSIUM SERPL-SCNC: 3.7 MMOL/L (ref 3.5–5.1)
PROT SERPL-MCNC: 7.3 GM/DL (ref 6–8.4)
RBC # BLD AUTO: 4.09 M/UL (ref 4–5.4)
RELATIVE EOSINOPHIL (SMH): 5.9 % (ref 0–8)
RELATIVE LYMPHOCYTE (SMH): 23.4 % (ref 18–48)
RELATIVE MONOCYTE (SMH): 11.7 % (ref 4–15)
RELATIVE NEUTROPHIL (SMH): 57.8 % (ref 38–73)
SODIUM SERPL-SCNC: 136 MMOL/L (ref 136–145)
WBC # BLD AUTO: 7.34 K/UL (ref 3.9–12.7)

## 2025-06-23 PROCEDURE — 3008F BODY MASS INDEX DOCD: CPT | Mod: CPTII,,, | Performed by: INTERNAL MEDICINE

## 2025-06-23 PROCEDURE — 99214 OFFICE O/P EST MOD 30 MIN: CPT | Mod: PBBFAC,PN | Performed by: INTERNAL MEDICINE

## 2025-06-23 PROCEDURE — 4010F ACE/ARB THERAPY RXD/TAKEN: CPT | Mod: CPTII,,, | Performed by: INTERNAL MEDICINE

## 2025-06-23 PROCEDURE — 3078F DIAST BP <80 MM HG: CPT | Mod: CPTII,,, | Performed by: INTERNAL MEDICINE

## 2025-06-23 PROCEDURE — 36415 COLL VENOUS BLD VENIPUNCTURE: CPT

## 2025-06-23 PROCEDURE — 85025 COMPLETE CBC W/AUTO DIFF WBC: CPT

## 2025-06-23 PROCEDURE — 99215 OFFICE O/P EST HI 40 MIN: CPT | Mod: S$PBB,,, | Performed by: INTERNAL MEDICINE

## 2025-06-23 PROCEDURE — 82247 BILIRUBIN TOTAL: CPT

## 2025-06-23 PROCEDURE — 3074F SYST BP LT 130 MM HG: CPT | Mod: CPTII,,, | Performed by: INTERNAL MEDICINE

## 2025-06-23 PROCEDURE — 99999 PR PBB SHADOW E&M-EST. PATIENT-LVL IV: CPT | Mod: PBBFAC,,, | Performed by: INTERNAL MEDICINE

## 2025-06-23 PROCEDURE — 1159F MED LIST DOCD IN RCRD: CPT | Mod: CPTII,,, | Performed by: INTERNAL MEDICINE

## 2025-06-23 PROCEDURE — 3046F HEMOGLOBIN A1C LEVEL >9.0%: CPT | Mod: CPTII,,, | Performed by: INTERNAL MEDICINE

## 2025-06-23 RX ORDER — MAGNESIUM SULFATE HEPTAHYDRATE 40 MG/ML
2 INJECTION, SOLUTION INTRAVENOUS ONCE
Status: CANCELLED
Start: 2025-06-24

## 2025-06-23 RX ORDER — LANOLIN ALCOHOL/MO/W.PET/CERES
400 CREAM (GRAM) TOPICAL DAILY
Qty: 200 TABLET | Refills: 1 | Status: SHIPPED | OUTPATIENT
Start: 2025-06-23 | End: 2026-06-23

## 2025-06-23 NOTE — PROGRESS NOTES
Noah Clark MD to Me  Dameon Guadalupe RN Bastoe, Erica C, RN (Selected Message)        5/22/25 12:48 PM  This is encouraging that it is more likely benign. I would still start neoadjuvant chemo and shoot for about 4 months of preoperative chemo with restaging after 2. I'll see her back after 2 months of chemo with a CT chest, abdomen, pelvis pancreas protocol, and a Ca 19-9.   Me to Noah Clark MD        5/22/25 11:22 AM  Hey what do you think of the speen ,,,  Noha Clark MD to Me  Dameon Guadalupe RN Bastoe, Erica C, RN (Selected Message)        5/22/25 12:48 PM  This is encouraging that it is more likely benign. I would still start neoadjuvant chemo and shoot for about 4 months of preoperative chemo with restaging after 2. I'll see her back after 2 months of chemo with a CT chest, abdomen, pelvis pancreas protocol, and a Ca 19-9.   Me to Noah Clark MD        5/22/25 11:22 AM  Hey what do you think of the speen ,,,  Subjective:       Patient ID: Sonia Muse is a 65 y.o. female.    Chief Complaint:  Treatment planning discussion  HPI 65-year-old  woman who I met with the hospital with pancreatic mass 5 x 3 point 7 cm MIGUEL ANGEL and transaminitis with elevated bilirubin patient had metal stent placed with good response  MRCP showed pancreatic mass highly concerning for malignancy with biliary obstruction with concerns for splenic metastasis and peripancreatic lymph node involvement   AK I has resolved  Pathology showing poorly differentiated adenocarcinoma of pancreas    ROS:  Patient had COVID areas discoloration has subsided completely  Patient denies issues related to appetite or recent weight change.  Feels well overall.  Denies issues with generalized weakness .  Denies fatigue over above what is normally experienced with day-to-day activities  Denies fever, chills, rigors  Denies issues with ambulation  Denies generalized swelling or new lumps and bumps felt in any part   of body  Denies visual or hearing loss  Denies issues with congestion, sinus issues, cough, sputum production runny nose or itching eyes  Denies chest pain or palpitations, or passing out  Denies abdominal pain, reflux symptoms, nausea vomiting loose stools or constipation  Denies seizure activity or focal weaknesses or symptoms related to TIA, no head aches or blurred vision reported  Denies issues with skin rash or bruising  Denies issues with swelling of feet, tingling or numbness   No issues with sleep,   No recent foreign travel   Good family support reported           Past Medical History:   Diagnosis Date    Allergy     Anxiety     Asthma     DDD (degenerative disc disease), cervical     Depression     Diabetes mellitus     Fibromyalgia     Hypertension     Neuromuscular disorder     Pancreatic mass     PONV (postoperative nausea and vomiting)     Thyroid disease     hypo     Past Surgical History:   Procedure Laterality Date    ADENOIDECTOMY      CARPAL TUNNEL RELEASE Right      SECTION      ENDOSCOPIC ULTRASOUND OF UPPER GASTROINTESTINAL TRACT N/A 2025    Procedure: ULTRASOUND, UPPER GI TRACT, ENDOSCOPIC;  Surgeon: Austen Grimes III, MD;  Location: [facility] ENDO;  Service: Endoscopy;  Laterality: N/A;    ERCP N/A 2025    Procedure: ERCP (ENDOSCOPIC RETROGRADE CHOLANGIOPANCREATOGRAPHY);  Surgeon: Thuy Escobar MD;  Location: [facility] ENDO;  Service: Endoscopy;  Laterality: N/A;    ERCP N/A 2025    Procedure: ERCP (ENDOSCOPIC RETROGRADE CHOLANGIOPANCREATOGRAPHY);  Surgeon: Austen Grimes III, MD;  Location: [facility] ENDO;  Service: Endoscopy;  Laterality: N/A;    INSERTION OF TUNNELED CENTRAL VENOUS CATHETER (CVC) WITH SUBCUTANEOUS PORT Left 2025    Procedure: [id number];  Surgeon: Ata Fernandez III, MD;  Location: [facility];  Service: General;  Laterality: Left;    JOINT REPLACEMENT Right 2016    knee    KNEE ARTHROPLASTY Left 10/19/2020    Procedure:  ARTHROPLASTY, KNEE;  Surgeon: Felipe Herring MD;  Location: [facility];  Service: Orthopedics;  Laterality: Left;  Louie Liz    ROTATOR CUFF REPAIR Right     TONSILLECTOMY      TOTAL KNEE ARTHROPLASTY Right     TUBAL LIGATION       Family History   Problem Relation Name Age of Onset    Diabetes Mother Libertad     Hypertension Mother Libertad     Heart disease Mother Libertad     Stroke Mother Libertad     Glaucoma Mother Libertad     Arthritis Mother Libertad     Diabetes Father Fredis     Hypertension Father Fredis     Heart disease Father Fredis     Hypertension Sister 1     Heart disease Sister 1     Graves' disease Daughter      Anxiety disorder Daughter        Social History     Socioeconomic History    Marital status:     Number of children: 3   Occupational History    Occupation: [employer]     Comment: primary sub for [employer]   Tobacco Use    Smoking status: Former     Current packs/day: 0.00     Average packs/day: 1 pack/day for 11.0 years (11.0 ttl pk-yrs)     Types: Cigarettes     Start date:      Quit date:      Years since quittin.5    Smokeless tobacco: Never   Substance and Sexual Activity    Alcohol use: Not Currently     Comment: occasional 2x/y    Drug use: Never    Sexual activity: Yes     Partners: Male     Social Drivers of Health     Financial Resource Strain: High Risk (2025)    Overall Financial Resource Strain (CARDIA)     Difficulty of Paying Living Expenses: Hard   Food Insecurity: Food Insecurity Present (2025)    Hunger Vital Sign     Worried About Running Out of Food in the Last Year: Sometimes true     Ran Out of Food in the Last Year: Sometimes true   Transportation Needs: Unmet Transportation Needs (2025)    PRAPARE - Transportation     Lack of Transportation (Medical): Yes     Lack of Transportation (Non-Medical): Yes   Physical Activity: Unknown (2025)    Exercise Vital Sign     Days of Exercise per Week: Patient declined     Minutes of Exercise  per Session: 30 min   Stress: Stress Concern Present (4/28/2025)    [place] [employer] - Occupational Stress Questionnaire     Feeling of Stress : Very much   Housing Stability: Low Risk  (4/28/2025)    Housing Stability Vital Sign     Unable to Pay for Housing in the Last Year: No     Number of Times Moved in the Last Year: 0     Homeless in the Last Year: No     Review of patient's allergies indicates:   Allergen Reactions    Erythromycin Swelling    Codeine Nausea And Vomiting     And migraine h/a    [place] Blisters     Current Medications[1]    Physical Exam    VITAL SIGNS:  as above   GENERAL: appears well-built, well-nourished.  No anxiety, no agitation, and in no distress.  Patient is awake, alert, oriented and cooperative.  HEENT:  Showed no congestion. Trachea is central no obvious icterus or pallor noted no hoarseness. no obvious JVD   NECK:  Supple.  No JVD. No obvious cervical submental or supraclavicular adenopathy.  RS:the visualized portion of  Chest expands well. chest appears symmetric, no audible wheezes.  No dyspnea recognized  ABDOMEN:  abdomen appears undistended.  EXTREMITIES:  Without edema.  NEUROLOGICAL:  The patient is appropriate, higher functions are normal.  No  obvious neurological deficits.  normal judgement normal thought content  No confusion, no speech impediment. Cranial nerves are intact and show no deficit. No gross motor deficits noted   SKIN MUSCULOSKELETAL: no joint or skeletal deformity, no clubbing of nails.  No visible rash ecchymosis or petechiae   Lab Results   Component Value Date    WBC 7.34 06/23/2025    HGB 10.4 (L) 06/23/2025    HCT 33.5 (L) 06/23/2025    MCV 82 06/23/2025     06/23/2025       BMP  Lab Results   Component Value Date     06/23/2025    K 3.7 06/23/2025     06/23/2025    CO2 25 06/23/2025    BUN 22 06/23/2025    CREATININE 1.0 06/23/2025    CALCIUM 8.9 06/23/2025    ANIONGAP 10 06/23/2025    ESTGFRAFRICA >60.0  01/24/2022    EGFRNONAA >60.0 01/24/2022     Impression:     4.3 x 4.3 cm pancreatic head mass compatible with patient's known malignancy there is atrophy of the body and tail the pancreas with mild pancreatic ductal dilatation     Common bile duct stent in place with pneumobilia     Mildly prominent gastrohepatic and portacaval lymph node suspicious for metastatic disease     Small hiatal hernia     9.8 cm rim enhancing mass within the dome of the spleen which is nonspecific in suspicious for metastatic disease           Assessment and Plan     Locally advanced pancreatic cancer poorly-differentiated with splenic mass which may be nonspecific  Patient  had PET scan done spleen not lighting up which is encouraging  Saw Dr. Clark we will need neoadjuvant chemo 2-3 months then reassess for operability  port was scheduled but patient got hesitant after coming to chemo class and delayed she also started ivermectin which she has stopped because it caused issues  LFTs have improved since stopping ivermectin    neoadjuvant chemo versus chemoradiation therapy versus palliative chemo based on surgeons opinion of splenic mass after several cycles of neoadjuvant treatment for now    MDM includes  :    - Acute or chronic illness or injury that poses a threat to life or bodily function  - Independent review and explanation of 3+ results from unique tests  - Discussion of management and ordering 3+ unique tests  - Extensive discussion of treatment and management  - Prescription drug management  - Drug therapy requiring intensive monitoring for toxicity              [1]   Current Outpatient Medications:     albuterol (VENTOLIN HFA) 90 mcg/actuation inhaler, Inhale 2 puffs into the lungs every 6 (six) hours as needed for Wheezing or Shortness of Breath. Rescue, Disp: 18 g, Rfl: 1    ALPRAZolam (XANAX) 0.25 MG tablet, Take 1 tablet (0.25 mg total) by mouth daily as needed for Anxiety., Disp: 30 tablet, Rfl: 0    amLODIPine  (NORVASC) 5 MG tablet, Take 1 tablet (5 mg total) by mouth once daily., Disp: 90 tablet, Rfl: 1    blood sugar diagnostic Strp, Use 1 strip to check blood sugar 2 times daily, Disp: 100 each, Rfl: 5    blood-glucose meter kit, Use as instructed, Disp: 1 each, Rfl: 0    cetirizine (ZYRTEC) 10 MG tablet, TAKE 1 TABLET BY MOUTH ONCE DAILY, Disp: 90 tablet, Rfl: 1    DULoxetine (CYMBALTA) 60 MG capsule, TAKE one CAPSULE BY MOUTH EVERY DAY, Disp: 90 capsule, Rfl: 1    hydroCHLOROthiazide (HYDRODIURIL) 25 MG tablet, TAKE one TABLET BY MOUTH ONCE DAILY, Disp: 90 tablet, Rfl: 1    HYDROcodone-acetaminophen (NORCO) 5-325 mg per tablet, Take 1 tablet by mouth every 6 (six) hours as needed., Disp: 10 tablet, Rfl: 0    insulin glargine U-100, Lantus, 100 unit/mL (3 mL) SubQ InPn pen, Inject 21 Units into the skin every evening., Disp: 9 mL, Rfl: 3    lancets (LANCETS,THIN) Misc, 1 each by Misc.(Non-Drug; Combo Route) route 3 (three) times daily., Disp: 90 each, Rfl: 0    levothyroxine (SYNTHROID) 25 MCG tablet, TAKE one TABLET BY MOUTH BEFORE breakfast, Disp: 90 tablet, Rfl: 1    LIDOcaine-prilocaine (EMLA) cream, Apply topically as needed., Disp: 30 g, Rfl: 0    loperamide (IMODIUM) 2 mg capsule, Take 2 tablets (4mg) by mouth after first loose stool, 1 tablet every 2 hours until diarrhea free for 12 hours. May take 2 tablets (4mg) by mouth every 4 hours at night. May require more than the package labeling maximum dose of 16mg/day., Disp: 30 capsule, Rfl: 11    losartan (COZAAR) 100 MG tablet, TAKE one TABLET BY MOUTH ONCE DAILY, Disp: 90 tablet, Rfl: 0    meloxicam (MOBIC) 15 MG tablet, Take 15 mg by mouth once daily. 1/2 tablet am, Disp: , Rfl:     metoprolol succinate (TOPROL-XL) 25 MG 24 hr tablet, Take 1 tablet (25 mg total) by mouth once daily., Disp: 90 tablet, Rfl: 1    metronidazole 0.75% (METROCREAM) 0.75 % Crea, Apply 1 application  topically 2 (two) times daily., Disp: , Rfl:     montelukast (SINGULAIR) 10 mg  "tablet, TAKE ONE TABLET BY MOUTH EVERY EVENING, Disp: 90 tablet, Rfl: 1    OLANZapine (ZYPREXA) 5 MG tablet, Take 1 tablet by mouth nightly on days 1-3 of each chemotherapy cycle. (Patient not taking: Reported on 6/9/2025), Disp: 3 tablet, Rfl: 11    ondansetron (ZOFRAN) 8 MG tablet, Take 1 tablet (8 mg total) by mouth every 8 (eight) hours as needed., Disp: 30 tablet, Rfl: 2    pen needle, diabetic 32 gauge x 5/32" Ndle, 1 Needle by Misc.(Non-Drug; Combo Route) route once daily., Disp: 100 each, Rfl: 2    prochlorperazine (COMPAZINE) 10 MG tablet, Take 1 tablet (10 mg total) by mouth every 6 (six) hours as needed., Disp: 30 tablet, Rfl: 1    "

## 2025-06-24 ENCOUNTER — SOCIAL WORK (OUTPATIENT)
Dept: HEMATOLOGY/ONCOLOGY | Facility: CLINIC | Age: 65
End: 2025-06-24
Payer: MEDICAID

## 2025-06-24 ENCOUNTER — DOCUMENTATION ONLY (OUTPATIENT)
Facility: CLINIC | Age: 65
End: 2025-06-24
Payer: MEDICAID

## 2025-06-24 ENCOUNTER — INFUSION (OUTPATIENT)
Dept: INFUSION THERAPY | Facility: HOSPITAL | Age: 65
End: 2025-06-24
Attending: INTERNAL MEDICINE
Payer: MEDICAID

## 2025-06-24 VITALS
HEART RATE: 77 BPM | WEIGHT: 245.13 LBS | TEMPERATURE: 97 F | BODY MASS INDEX: 41.85 KG/M2 | HEIGHT: 64 IN | DIASTOLIC BLOOD PRESSURE: 60 MMHG | RESPIRATION RATE: 18 BRPM | SYSTOLIC BLOOD PRESSURE: 94 MMHG

## 2025-06-24 DIAGNOSIS — E83.42 HYPOMAGNESEMIA: ICD-10-CM

## 2025-06-24 DIAGNOSIS — E83.42 HYPOMAGNESEMIA: Primary | ICD-10-CM

## 2025-06-24 DIAGNOSIS — C25.0 MALIGNANT NEOPLASM OF HEAD OF PANCREAS: Primary | ICD-10-CM

## 2025-06-24 PROCEDURE — 96367 TX/PROPH/DG ADDL SEQ IV INF: CPT

## 2025-06-24 PROCEDURE — 96417 CHEMO IV INFUS EACH ADDL SEQ: CPT

## 2025-06-24 PROCEDURE — 25000003 PHARM REV CODE 250: Performed by: INTERNAL MEDICINE

## 2025-06-24 PROCEDURE — 96375 TX/PRO/DX INJ NEW DRUG ADDON: CPT

## 2025-06-24 PROCEDURE — 96413 CHEMO IV INFUSION 1 HR: CPT

## 2025-06-24 PROCEDURE — 96416 CHEMO PROLONG INFUSE W/PUMP: CPT

## 2025-06-24 PROCEDURE — 63600175 PHARM REV CODE 636 W HCPCS: Performed by: INTERNAL MEDICINE

## 2025-06-24 PROCEDURE — 96368 THER/DIAG CONCURRENT INF: CPT

## 2025-06-24 PROCEDURE — 96415 CHEMO IV INFUSION ADDL HR: CPT

## 2025-06-24 RX ORDER — DIPHENHYDRAMINE HYDROCHLORIDE 50 MG/ML
50 INJECTION, SOLUTION INTRAMUSCULAR; INTRAVENOUS ONCE AS NEEDED
Status: DISCONTINUED | OUTPATIENT
Start: 2025-06-24 | End: 2025-06-24 | Stop reason: HOSPADM

## 2025-06-24 RX ORDER — SODIUM CHLORIDE 0.9 % (FLUSH) 0.9 %
10 SYRINGE (ML) INJECTION
Status: DISCONTINUED | OUTPATIENT
Start: 2025-06-24 | End: 2025-06-24 | Stop reason: HOSPADM

## 2025-06-24 RX ORDER — ATROPINE SULFATE 0.4 MG/ML
0.4 INJECTION, SOLUTION ENDOTRACHEAL; INTRAMEDULLARY; INTRAMUSCULAR; INTRAVENOUS; SUBCUTANEOUS ONCE AS NEEDED
Status: COMPLETED | OUTPATIENT
Start: 2025-06-24 | End: 2025-06-24

## 2025-06-24 RX ORDER — EPINEPHRINE 0.3 MG/.3ML
0.3 INJECTION SUBCUTANEOUS ONCE AS NEEDED
Status: DISCONTINUED | OUTPATIENT
Start: 2025-06-24 | End: 2025-06-24 | Stop reason: HOSPADM

## 2025-06-24 RX ORDER — PROCHLORPERAZINE EDISYLATE 5 MG/ML
10 INJECTION INTRAMUSCULAR; INTRAVENOUS ONCE AS NEEDED
Status: DISCONTINUED | OUTPATIENT
Start: 2025-06-24 | End: 2025-06-24 | Stop reason: HOSPADM

## 2025-06-24 RX ADMIN — DEXTROSE MONOHYDRATE: 50 INJECTION, SOLUTION INTRAVENOUS at 08:06

## 2025-06-24 RX ADMIN — PROCHLORPERAZINE EDISYLATE 10 MG: 5 INJECTION INTRAMUSCULAR; INTRAVENOUS at 12:06

## 2025-06-24 RX ADMIN — OXALIPLATIN 196 MG: 5 INJECTION, SOLUTION INTRAVENOUS at 09:06

## 2025-06-24 RX ADMIN — FLUOROURACIL 5500 MG: 50 INJECTION, SOLUTION INTRAVENOUS at 02:06

## 2025-06-24 RX ADMIN — LEUCOVORIN CALCIUM 925 MG: 350 INJECTION, POWDER, LYOPHILIZED, FOR SOLUTION INTRAMUSCULAR; INTRAVENOUS at 11:06

## 2025-06-24 RX ADMIN — IRINOTECAN HYDROCHLORIDE 340 MG: 20 INJECTION, SOLUTION INTRAVENOUS at 12:06

## 2025-06-24 RX ADMIN — ATROPINE SULFATE 0.4 MG: 0.4 INJECTION, SOLUTION INTRAVENOUS at 12:06

## 2025-06-24 RX ADMIN — MAGNESIUM SULFATE: 500 INJECTION, SOLUTION INTRAMUSCULAR; INTRAVENOUS at 09:06

## 2025-06-24 RX ADMIN — PALONOSETRON HYDROCHLORIDE 0.25 MG: 0.25 INJECTION, SOLUTION INTRAVENOUS at 08:06

## 2025-06-24 NOTE — PLAN OF CARE
Problem: Nausea and Vomiting  Goal: Nausea and Vomiting Relief  6/24/2025 0842 by Tomasa Goldman, RN  Outcome: Met  6/24/2025 0842 by Tomasa Goldman, RN  Outcome: Progressing

## 2025-06-24 NOTE — NURSING
1221: irinotecan and leucovorin paused due to pt feeling flushed and nauseated.  Vitals as recorded in flow sheet and stable.      1232:  Compazine 10mg given due to nausea per treatment plan.    1241:  Pt feeling better, vitals as on flow sheet at 1240.  Remains stable and pt states she is feeling tired but better.    1306: Pt asleep.  No distress noted.  Infusion restarted.    Infusion completed with no further issues.  Pt DC's with 5fu pump running.  Stated no additional nausea noted.

## 2025-06-24 NOTE — PROGRESS NOTES
CHEMO SCHOOL- NUTRITION     Sonia Muse, 65 y.o. female is here for chemotherapy infusion of: Folfirinox. Diagnosis: Pancreatic cancer    I met with patient for chemo school today. Patient reported that she has lost 47 pounds in 2 years. Patient reported that her appetite is lately low, consuming one large meal a day plus snacks. Daily BM noted; LBM: 6/23. Patient does adequately hydrate throughout the day. Patient denied nausea, vomiting, diarrhea. She has some occasional constipation; I went over tips to help reduce constipation and promote regularity. Educated patient on weight maintenance, food safety, hydration, and increased caloric and protein intake through small meals and snacks. Made goals with patient to add in 1-2 protein supplements (Fairlife, Ensure, or Core Power) and to add in snacks that are a good source of protein, such as greek yogurt, fish, boiled eggs, and chicken salad, to meet higher protein needs. Patient denies having any nutrition related questions or concerns at the current time. RD will continue to monitor any weight changes, appetite, PO intake, and labs.    Wt Readings from Last 5 Encounters:   06/24/25 111.2 kg (245 lb 2.4 oz)   06/23/25 112.8 kg (248 lb 10.9 oz)   06/19/25 117 kg (258 lb)   06/18/25 117 kg (258 lb)   06/09/25 117.2 kg (258 lb 6.1 oz)        Past Medical History:   Diagnosis Date    Allergy     Anxiety     Asthma     DDD (degenerative disc disease), cervical     Depression     Diabetes mellitus     Fibromyalgia     Hypertension 2005    Neuromuscular disorder     Pancreatic mass     PONV (postoperative nausea and vomiting)     Thyroid disease     hypo         Plan:   Reviewed chemo school packet & provided copy to pt.   Discussed importance of maintaining wt & staying hydrated.   Emphasized increased protein intake to support recovery.   Reviewed food safety guidelines recommended during treatment.   Discussed alternate sources of hydration  Emphasized  increased protein intake to support recovery.   Provided RD contact info & encouraged pt to call with any questions/concerns.   Will f/u as needed.         Electronically signed by: Yaquelin Quispe MS, RDN, LDN

## 2025-06-24 NOTE — NURSING
Oncology Navigation   Intake  Date of Diagnosis: 25  Cancer Type: GI  Type of Referral: Internal  Date of Referral: 25     Treatment  Current Status: Active    Surgical Oncologist: Dr. Noah Clark  Consult Date: 25    Medical Oncologist: Dr. Paty Calle  Chemotherapy: Initiated  Chemotherapy Regimen: FOLFIRINOX       Procedures: Port / PICC  Biopsy Schedule Date: 25  CT Schedule Date: 25  EUS / EGD Date: 25  PET Scan Schedule Date: 25 (Message sent to reschedule to earlier appointment)  Port / PICC Schedule Date: 25             Support Systems: Family members; Friends / neighbors     Acuity  Stage: 1  Systemic Treatment - predicted or initiated: Chemotherapy Regimen with Multiple drugs (+1)  Treatment Tolerability: Has not started treatment yet/treatment fully completed and side effects resolved  ECO  Comorbidities in Medical History: 1  Hospitalization Within the Past Month: 0   Needed: 0  Support: 0  Verbalizes Financial Concerns: 0  Transportation: 0  History of noncompliance/frequent no shows and cancellations: 0  Verbalizes the need for more education: 0  Other Factors (+1 for Each): 0  Navigation Acuity: 4       Met with patient and  during C1D1 treatment. Patient has concerns with blood sugar management. Her PCP is managing and patient is aware that adjustments may need to be made through the course of treatment. Per patient she has been having low blood pressures. Encouraged patient to keep daily blood pressure log. Reviewed plan of care to restage after two months of therapy. Patient has all PRN medications and understands how to take if needed. Encouraged patient to call with any questions or concerns.

## 2025-06-25 ENCOUNTER — TELEPHONE (OUTPATIENT)
Dept: FAMILY MEDICINE | Facility: CLINIC | Age: 65
End: 2025-06-25
Payer: MEDICAID

## 2025-06-25 NOTE — PROGRESS NOTES
Sonia Muse is a 65 year old diagnosed with pancreatic cancer.  I met with patient during and her granddaughter during her first chemo infusion to complete new patient orientation and to complete the NCCN Distress Screening; patient indicated a rating of 1.  Patient denied needing psychosocial support at this time.  I provided patient with the [facility] community events flyer and my contact information in the event supportive services arise in the future.

## 2025-06-25 NOTE — TELEPHONE ENCOUNTER
----- Message from Lulu sent at 6/25/2025 10:18 AM CDT -----  Patient calling in regarding to her having her first chemo yesterday. Bs was 224 today and only uses the night time insulin. Patient asking what should she do to keep her bs under 200  [phone]

## 2025-06-26 ENCOUNTER — TELEPHONE (OUTPATIENT)
Dept: HEMATOLOGY/ONCOLOGY | Facility: CLINIC | Age: 65
End: 2025-06-26
Payer: MEDICAID

## 2025-06-26 ENCOUNTER — INFUSION (OUTPATIENT)
Dept: INFUSION THERAPY | Facility: HOSPITAL | Age: 65
End: 2025-06-26
Attending: INTERNAL MEDICINE
Payer: MEDICAID

## 2025-06-26 VITALS
SYSTOLIC BLOOD PRESSURE: 109 MMHG | RESPIRATION RATE: 17 BRPM | BODY MASS INDEX: 41.57 KG/M2 | WEIGHT: 243.5 LBS | HEART RATE: 76 BPM | OXYGEN SATURATION: 97 % | DIASTOLIC BLOOD PRESSURE: 69 MMHG | TEMPERATURE: 97 F | HEIGHT: 64 IN

## 2025-06-26 DIAGNOSIS — C25.0 MALIGNANT NEOPLASM OF HEAD OF PANCREAS: Primary | ICD-10-CM

## 2025-06-26 DIAGNOSIS — E83.42 HYPOMAGNESEMIA: ICD-10-CM

## 2025-06-26 PROCEDURE — 63600175 PHARM REV CODE 636 W HCPCS: Performed by: INTERNAL MEDICINE

## 2025-06-26 PROCEDURE — 96523 IRRIG DRUG DELIVERY DEVICE: CPT

## 2025-06-26 RX ORDER — HEPARIN 100 UNIT/ML
500 SYRINGE INTRAVENOUS
Status: DISCONTINUED | OUTPATIENT
Start: 2025-06-26 | End: 2025-06-27 | Stop reason: HOSPADM

## 2025-06-26 RX ADMIN — HEPARIN 500 UNITS: 100 SYRINGE at 01:06

## 2025-06-27 ENCOUNTER — TELEPHONE (OUTPATIENT)
Facility: CLINIC | Age: 65
End: 2025-06-27
Payer: MEDICAID

## 2025-06-27 NOTE — NURSING
Called to check on patient. She stated that she tolerated treatment pretty well. She is experiencing cold sensitivity but no other complaints. Informed patient that Obar monitoring would not be covered by her insurance. However, patient's CBGs are trending down from treatment day and she has follow up with PCP in July. Encouraged patient to call with any questions or concerns.

## 2025-07-03 ENCOUNTER — OFFICE VISIT (OUTPATIENT)
Dept: HEMATOLOGY/ONCOLOGY | Facility: CLINIC | Age: 65
End: 2025-07-03
Payer: MEDICAID

## 2025-07-03 ENCOUNTER — DOCUMENTATION ONLY (OUTPATIENT)
Dept: HEMATOLOGY/ONCOLOGY | Facility: CLINIC | Age: 65
End: 2025-07-03
Payer: MEDICAID

## 2025-07-03 ENCOUNTER — LAB VISIT (OUTPATIENT)
Dept: LAB | Facility: HOSPITAL | Age: 65
End: 2025-07-03
Attending: INTERNAL MEDICINE
Payer: MEDICAID

## 2025-07-03 VITALS
TEMPERATURE: 98 F | BODY MASS INDEX: 40.42 KG/M2 | HEART RATE: 107 BPM | SYSTOLIC BLOOD PRESSURE: 108 MMHG | WEIGHT: 236.75 LBS | RESPIRATION RATE: 18 BRPM | HEIGHT: 64 IN | DIASTOLIC BLOOD PRESSURE: 55 MMHG | OXYGEN SATURATION: 97 %

## 2025-07-03 DIAGNOSIS — D70.1 CHEMOTHERAPY INDUCED NEUTROPENIA: ICD-10-CM

## 2025-07-03 DIAGNOSIS — C25.0 MALIGNANT NEOPLASM OF HEAD OF PANCREAS: Primary | ICD-10-CM

## 2025-07-03 DIAGNOSIS — R11.0 CHEMOTHERAPY-INDUCED NAUSEA: ICD-10-CM

## 2025-07-03 DIAGNOSIS — T45.1X5A CHEMOTHERAPY INDUCED NEUTROPENIA: ICD-10-CM

## 2025-07-03 DIAGNOSIS — T45.1X5A CHEMOTHERAPY-INDUCED NAUSEA: ICD-10-CM

## 2025-07-03 DIAGNOSIS — C25.0 MALIGNANT NEOPLASM OF HEAD OF PANCREAS: ICD-10-CM

## 2025-07-03 DIAGNOSIS — K52.1 CHEMOTHERAPY INDUCED DIARRHEA: ICD-10-CM

## 2025-07-03 DIAGNOSIS — R53.83 CHEMOTHERAPY-INDUCED FATIGUE: ICD-10-CM

## 2025-07-03 DIAGNOSIS — T45.1X5A CHEMOTHERAPY INDUCED DIARRHEA: ICD-10-CM

## 2025-07-03 DIAGNOSIS — T45.1X5A CHEMOTHERAPY-INDUCED FATIGUE: ICD-10-CM

## 2025-07-03 LAB
ALBUMIN SERPL-MCNC: 3.5 G/DL (ref 3.5–5.2)
ALP SERPL-CCNC: 116 UNIT/L (ref 55–135)
ALT SERPL-CCNC: 5 UNIT/L (ref 10–44)
ANION GAP (SMH): 12 MMOL/L (ref 8–16)
AST SERPL-CCNC: 6 UNIT/L (ref 10–40)
BASOPHILS NFR BLD MANUAL: 0 %
BILIRUB SERPL-MCNC: 1 MG/DL (ref 0.1–1)
BUN SERPL-MCNC: 31 MG/DL (ref 8–23)
CALCIUM SERPL-MCNC: 9.4 MG/DL (ref 8.7–10.5)
CHLORIDE SERPL-SCNC: 98 MMOL/L (ref 95–110)
CO2 SERPL-SCNC: 22 MMOL/L (ref 23–29)
CREAT SERPL-MCNC: 1.4 MG/DL (ref 0.5–1.4)
EOSINOPHIL NFR BLD MANUAL: 2 % (ref 0–8)
ERYTHROCYTE [DISTWIDTH] IN BLOOD BY AUTOMATED COUNT: 14 % (ref 11.5–14.5)
GFR SERPLBLD CREATININE-BSD FMLA CKD-EPI: 42 ML/MIN/1.73/M2
GLUCOSE SERPL-MCNC: 179 MG/DL (ref 70–110)
HCT VFR BLD AUTO: 28.9 % (ref 37–48.5)
HGB BLD-MCNC: 9.2 GM/DL (ref 12–16)
LYMPHOCYTES NFR BLD MANUAL: 80 % (ref 18–48)
MAGNESIUM SERPL-MCNC: 1.8 MG/DL (ref 1.6–2.6)
MCH RBC QN AUTO: 24.8 PG (ref 27–31)
MCHC RBC AUTO-ENTMCNC: 31.8 G/DL (ref 32–36)
MCV RBC AUTO: 78 FL (ref 82–98)
MONOCYTES NFR BLD MANUAL: 4 % (ref 4–15)
NEUTROPHILS NFR BLD MANUAL: 14 % (ref 38–73)
NUCLEATED RBC (/100WBC) (SMH): 0 /100 WBC
PLATELET # BLD AUTO: 166 K/UL (ref 150–450)
PLATELET BLD QL SMEAR: ABNORMAL
PMV BLD AUTO: 9.8 FL (ref 9.2–12.9)
POTASSIUM SERPL-SCNC: 4 MMOL/L (ref 3.5–5.1)
PROT SERPL-MCNC: 7.7 GM/DL (ref 6–8.4)
RBC # BLD AUTO: 3.71 M/UL (ref 4–5.4)
SODIUM SERPL-SCNC: 132 MMOL/L (ref 136–145)
WBC # BLD AUTO: 1.08 K/UL (ref 3.9–12.7)

## 2025-07-03 PROCEDURE — 82435 ASSAY OF BLOOD CHLORIDE: CPT

## 2025-07-03 PROCEDURE — 3074F SYST BP LT 130 MM HG: CPT | Mod: CPTII,,, | Performed by: NURSE PRACTITIONER

## 2025-07-03 PROCEDURE — G2211 COMPLEX E/M VISIT ADD ON: HCPCS | Mod: ,,, | Performed by: NURSE PRACTITIONER

## 2025-07-03 PROCEDURE — 85025 COMPLETE CBC W/AUTO DIFF WBC: CPT

## 2025-07-03 PROCEDURE — 99215 OFFICE O/P EST HI 40 MIN: CPT | Mod: S$PBB,,, | Performed by: NURSE PRACTITIONER

## 2025-07-03 PROCEDURE — 4010F ACE/ARB THERAPY RXD/TAKEN: CPT | Mod: CPTII,,, | Performed by: NURSE PRACTITIONER

## 2025-07-03 PROCEDURE — 99214 OFFICE O/P EST MOD 30 MIN: CPT | Mod: PBBFAC,PN | Performed by: NURSE PRACTITIONER

## 2025-07-03 PROCEDURE — 36415 COLL VENOUS BLD VENIPUNCTURE: CPT

## 2025-07-03 PROCEDURE — 3078F DIAST BP <80 MM HG: CPT | Mod: CPTII,,, | Performed by: NURSE PRACTITIONER

## 2025-07-03 PROCEDURE — 86301 IMMUNOASSAY TUMOR CA 19-9: CPT

## 2025-07-03 PROCEDURE — 3008F BODY MASS INDEX DOCD: CPT | Mod: CPTII,,, | Performed by: NURSE PRACTITIONER

## 2025-07-03 PROCEDURE — 99999 PR PBB SHADOW E&M-EST. PATIENT-LVL IV: CPT | Mod: PBBFAC,,, | Performed by: NURSE PRACTITIONER

## 2025-07-03 PROCEDURE — 83735 ASSAY OF MAGNESIUM: CPT

## 2025-07-03 PROCEDURE — 3046F HEMOGLOBIN A1C LEVEL >9.0%: CPT | Mod: CPTII,,, | Performed by: NURSE PRACTITIONER

## 2025-07-03 RX ORDER — LORAZEPAM 0.5 MG/1
0.5 TABLET ORAL 2 TIMES DAILY
Qty: 60 TABLET | Refills: 0 | Status: ON HOLD | OUTPATIENT
Start: 2025-07-03 | End: 2026-07-03

## 2025-07-03 NOTE — PROGRESS NOTES
Subjective:       Patient ID: Sonia Muse is a 65 y.o. female.    Chief Complaint:  Treatment planning discussion  HPI 65-year-old  woman who I met with the hospital with pancreatic mass 5 x 3 point 7 cm MIGUEL ANGEL and transaminitis with elevated bilirubin patient had metal stent placed with good response  MRCP showed pancreatic mass highly concerning for malignancy with biliary obstruction with concerns for splenic metastasis and peripancreatic lymph node involvement   AK I has resolved  Pathology showing poorly differentiated adenocarcinoma of pancreas    ROS:  Patient had COVID areas discoloration has subsided completely  Patient denies issues related to appetite or recent weight change.  Feels well overall.  Denies issues with generalized weakness .  Complains of fatigue  Denies fever, chills, rigors  Denies issues with ambulation  Denies generalized swelling or new lumps and bumps felt in any part  of body  Denies visual or hearing loss  Denies issues with congestion, sinus issues, cough, sputum production runny nose or itching eyes  Denies chest pain or palpitations, or passing out  Complains of diarrhea and nausea  Denies seizure activity or focal weaknesses or symptoms related to TIA, no head aches or blurred vision reported  Denies issues with skin rash or bruising  Denies issues with swelling of feet, tingling or numbness   No issues with sleep,   No recent foreign travel   Good family support reported           Past Medical History:   Diagnosis Date    Allergy     Anxiety     Asthma     DDD (degenerative disc disease), cervical     Depression     Diabetes mellitus     Fibromyalgia     Hypertension 2005    Neuromuscular disorder     Pancreatic mass     PONV (postoperative nausea and vomiting)     Thyroid disease     hypo     Past Surgical History:   Procedure Laterality Date    ADENOIDECTOMY      CARPAL TUNNEL RELEASE Right      SECTION      ENDOSCOPIC ULTRASOUND OF UPPER  GASTROINTESTINAL TRACT N/A 05/02/2025    Procedure: ULTRASOUND, UPPER GI TRACT, ENDOSCOPIC;  Surgeon: Austen Grimes III, MD;  Location: [facility] ENDO;  Service: Endoscopy;  Laterality: N/A;    ERCP N/A 04/25/2025    Procedure: ERCP (ENDOSCOPIC RETROGRADE CHOLANGIOPANCREATOGRAPHY);  Surgeon: Thuy Escobar MD;  Location: [facility] ENDO;  Service: Endoscopy;  Laterality: N/A;    ERCP N/A 05/06/2025    Procedure: ERCP (ENDOSCOPIC RETROGRADE CHOLANGIOPANCREATOGRAPHY);  Surgeon: Austen Grimes III, MD;  Location: [facility] ENDO;  Service: Endoscopy;  Laterality: N/A;    INSERTION OF TUNNELED CENTRAL VENOUS CATHETER (CVC) WITH SUBCUTANEOUS PORT Left 6/19/2025    Procedure: [id number];  Surgeon: Ata Fernandez III, MD;  Location: [facility] OR;  Service: General;  Laterality: Left;    JOINT REPLACEMENT Right 2016    knee    KNEE ARTHROPLASTY Left 10/19/2020    Procedure: ARTHROPLASTY, KNEE;  Surgeon: Felipe Herring MD;  Location: [facility] OR;  Service: Orthopedics;  Laterality: Left;  Louie Liz    ROTATOR CUFF REPAIR Right     TONSILLECTOMY      TOTAL KNEE ARTHROPLASTY Right     TUBAL LIGATION       Family History   Problem Relation Name Age of Onset    Diabetes Mother Libertad     Hypertension Mother Libertad     Heart disease Mother Libertad     Stroke Mother Libertad     Glaucoma Mother Libertad     Arthritis Mother Libertad     Diabetes Father Fredis     Hypertension Father Fredis     Heart disease Father Fredis     Hypertension Sister 1     Heart disease Sister 1     Graves' disease Daughter      Anxiety disorder Daughter        Social History     Socioeconomic History    Marital status:     Number of children: 3   Occupational History    Occupation: [employer]     Comment: primary sub for [employer]   Tobacco Use    Smoking status: Former     Current packs/day: 0.00     Average packs/day: 1 pack/day for 11.0 years (11.0 ttl pk-yrs)     Types: Cigarettes     Start date: 1973     Quit date: 1984     Years  since quittin.5    Smokeless tobacco: Never   Substance and Sexual Activity    Alcohol use: Not Currently     Comment: occasional 2x/y    Drug use: Never    Sexual activity: Yes     Partners: Male     Social Drivers of Health     Financial Resource Strain: High Risk (2025)    Overall Financial Resource Strain (CARDIA)     Difficulty of Paying Living Expenses: Hard   Food Insecurity: Food Insecurity Present (2025)    Hunger Vital Sign     Worried About Running Out of Food in the Last Year: Sometimes true     Ran Out of Food in the Last Year: Sometimes true   Transportation Needs: Unmet Transportation Needs (2025)    PRAPARE - Transportation     Lack of Transportation (Medical): Yes     Lack of Transportation (Non-Medical): Yes   Physical Activity: Unknown (2025)    Exercise Vital Sign     Days of Exercise per Week: Patient declined     Minutes of Exercise per Session: 30 min   Stress: Stress Concern Present (2025)    [employer] [place] [employer] - Occupational Stress Questionnaire     Feeling of Stress : Very much   Housing Stability: Low Risk  (2025)    Housing Stability Vital Sign     Unable to Pay for Housing in the Last Year: No     Number of Times Moved in the Last Year: 0     Homeless in the Last Year: No     Review of patient's allergies indicates:   Allergen Reactions    Erythromycin Swelling    Codeine Nausea And Vomiting     And migraine h/a    [place] Blisters     Current Medications[1]    Physical Exam    VITAL SIGNS:  as above   GENERAL: appears well-built, well-nourished.  No anxiety, no agitation, and in no distress.  Patient is awake, alert, oriented and cooperative.  HEENT:  Showed no congestion. Trachea is central no obvious icterus or pallor noted no hoarseness. no obvious JVD   NECK:  Supple.  No JVD. No obvious cervical submental or supraclavicular adenopathy.  RS:the visualized portion of  Chest expands well. chest appears symmetric, no audible  wheezes.  No dyspnea recognized  ABDOMEN:  abdomen appears undistended.  EXTREMITIES:  Without edema.  NEUROLOGICAL:  The patient is appropriate, higher functions are normal.  No  obvious neurological deficits.  normal judgement normal thought content  No confusion, no speech impediment. Cranial nerves are intact and show no deficit. No gross motor deficits noted   SKIN MUSCULOSKELETAL: no joint or skeletal deformity, no clubbing of nails.  No visible rash ecchymosis or petechiae   Lab Results   Component Value Date    WBC 1.08 (LL) 07/03/2025    HGB 9.2 (L) 07/03/2025    HCT 28.9 (L) 07/03/2025    MCV 78 (L) 07/03/2025     07/03/2025       BMP  Lab Results   Component Value Date     (L) 07/03/2025    K 4.0 07/03/2025    CL 98 07/03/2025    CO2 22 (L) 07/03/2025    BUN 31 (H) 07/03/2025    CREATININE 1.4 07/03/2025    CALCIUM 9.4 07/03/2025    ANIONGAP 12 07/03/2025    ESTGFRAFRICA >60.0 01/24/2022    EGFRNONAA >60.0 01/24/2022     Impression:     4.3 x 4.3 cm pancreatic head mass compatible with patient's known malignancy there is atrophy of the body and tail the pancreas with mild pancreatic ductal dilatation     Common bile duct stent in place with pneumobilia     Mildly prominent gastrohepatic and portacaval lymph node suspicious for metastatic disease     Small hiatal hernia     9.8 cm rim enhancing mass within the dome of the spleen which is nonspecific in suspicious for metastatic disease           Assessment and Plan     Locally advanced pancreatic cancer poorly-differentiated with splenic mass which may be nonspecific  Patient  had PET scan done spleen not lighting up which is encouraging  Saw Dr. Clark we will need neoadjuvant chemo 2-3 months then reassess for operability  Here for cycle 2 of chemotherapy  Treatment is on Tuesday  However she is neutropenic.  I will recheck CBC on Monday    Chemotherapy-induced fatigue:   -advise on light exercise    Chemo nausea:  -we will start on add than  0.5 mg p.o. b.i.d.    Chemo induced neutropenia:   -recheck CBC on Monday    Chemotherapy-related diarrhea:  -we will talk to MD about reducing the dose of irinotecan    neoadjuvant chemo versus chemoradiation therapy versus palliative chemo based on surgeons opinion of splenic mass after several cycles of neoadjuvant treatment for now    Visit today included increased complexity associated with the care of the episodic problem pancreatic cancer addressed and managing the longitudinal care of the patient due to the serious and/or complex managed problem(s) pancreatic cancer.            [1]   Current Outpatient Medications:     albuterol (VENTOLIN HFA) 90 mcg/actuation inhaler, Inhale 2 puffs into the lungs every 6 (six) hours as needed for Wheezing or Shortness of Breath. Rescue, Disp: 18 g, Rfl: 1    ALPRAZolam (XANAX) 0.25 MG tablet, Take 1 tablet (0.25 mg total) by mouth daily as needed for Anxiety., Disp: 30 tablet, Rfl: 0    amLODIPine (NORVASC) 5 MG tablet, Take 1 tablet (5 mg total) by mouth once daily., Disp: 90 tablet, Rfl: 1    blood sugar diagnostic Strp, Use 1 strip to check blood sugar 2 times daily, Disp: 100 each, Rfl: 5    blood-glucose meter kit, Use as instructed, Disp: 1 each, Rfl: 0    cetirizine (ZYRTEC) 10 MG tablet, TAKE 1 TABLET BY MOUTH ONCE DAILY, Disp: 90 tablet, Rfl: 1    DULoxetine (CYMBALTA) 60 MG capsule, TAKE one CAPSULE BY MOUTH EVERY DAY, Disp: 90 capsule, Rfl: 1    hydroCHLOROthiazide (HYDRODIURIL) 25 MG tablet, TAKE one TABLET BY MOUTH ONCE DAILY, Disp: 90 tablet, Rfl: 1    HYDROcodone-acetaminophen (NORCO) 5-325 mg per tablet, Take 1 tablet by mouth every 6 (six) hours as needed., Disp: 10 tablet, Rfl: 0    insulin glargine U-100, Lantus, 100 unit/mL (3 mL) SubQ InPn pen, Inject 21 Units into the skin every evening., Disp: 9 mL, Rfl: 3    lancets (LANCETS,THIN) Misc, 1 each by Misc.(Non-Drug; Combo Route) route 3 (three) times daily., Disp: 90 each, Rfl: 0    levothyroxine  "(SYNTHROID) 25 MCG tablet, TAKE one TABLET BY MOUTH BEFORE breakfast, Disp: 90 tablet, Rfl: 1    LIDOcaine-prilocaine (EMLA) cream, Apply topically as needed., Disp: 30 g, Rfl: 0    loperamide (IMODIUM) 2 mg capsule, Take 2 tablets (4mg) by mouth after first loose stool, 1 tablet every 2 hours until diarrhea free for 12 hours. May take 2 tablets (4mg) by mouth every 4 hours at night. May require more than the package labeling maximum dose of 16mg/day., Disp: 30 capsule, Rfl: 11    losartan (COZAAR) 100 MG tablet, TAKE one TABLET BY MOUTH ONCE DAILY, Disp: 90 tablet, Rfl: 0    magnesium oxide (MAGOX) 400 mg (241.3 mg magnesium) tablet, Take 1 tablet (400 mg total) by mouth once daily., Disp: 200 tablet, Rfl: 1    meloxicam (MOBIC) 15 MG tablet, Take 15 mg by mouth once daily. 1/2 tablet am, Disp: , Rfl:     metoprolol succinate (TOPROL-XL) 25 MG 24 hr tablet, Take 1 tablet (25 mg total) by mouth once daily., Disp: 90 tablet, Rfl: 1    metronidazole 0.75% (METROCREAM) 0.75 % Crea, Apply 1 application  topically 2 (two) times daily., Disp: , Rfl:     montelukast (SINGULAIR) 10 mg tablet, TAKE ONE TABLET BY MOUTH EVERY EVENING, Disp: 90 tablet, Rfl: 1    OLANZapine (ZYPREXA) 5 MG tablet, Take 1 tablet by mouth nightly on days 1-3 of each chemotherapy cycle., Disp: 3 tablet, Rfl: 11    ondansetron (ZOFRAN) 8 MG tablet, Take 1 tablet (8 mg total) by mouth every 8 (eight) hours as needed., Disp: 30 tablet, Rfl: 2    pen needle, diabetic 32 gauge x 5/32" Ndle, 1 Needle by Misc.(Non-Drug; Combo Route) route once daily., Disp: 100 each, Rfl: 2    prochlorperazine (COMPAZINE) 10 MG tablet, Take 1 tablet (10 mg total) by mouth every 6 (six) hours as needed., Disp: 30 tablet, Rfl: 1    LORazepam (ATIVAN) 0.5 MG tablet, Take 1 tablet (0.5 mg total) by mouth 2 (two) times daily., Disp: 60 tablet, Rfl: 0    "

## 2025-07-04 ENCOUNTER — ANESTHESIA (OUTPATIENT)
Facility: HOSPITAL | Age: 65
End: 2025-07-04
Payer: MEDICAID

## 2025-07-04 ENCOUNTER — ANESTHESIA EVENT (OUTPATIENT)
Facility: HOSPITAL | Age: 65
End: 2025-07-04
Payer: MEDICAID

## 2025-07-04 ENCOUNTER — HOSPITAL ENCOUNTER (INPATIENT)
Facility: HOSPITAL | Age: 65
LOS: 7 days | Discharge: NURSING, REHAB OR LONG-TERM CARE | DRG: 481 | End: 2025-07-11
Attending: EMERGENCY MEDICINE | Admitting: STUDENT IN AN ORGANIZED HEALTH CARE EDUCATION/TRAINING PROGRAM
Payer: MEDICAID

## 2025-07-04 DIAGNOSIS — S72.90XA: ICD-10-CM

## 2025-07-04 DIAGNOSIS — S72.421A CLOSED BICONDYLAR FRACTURE OF RIGHT FEMUR, INITIAL ENCOUNTER: Primary | ICD-10-CM

## 2025-07-04 DIAGNOSIS — R07.9 CHEST PAIN: ICD-10-CM

## 2025-07-04 DIAGNOSIS — T45.1X5A CHEMOTHERAPY INDUCED NEUTROPENIA: ICD-10-CM

## 2025-07-04 DIAGNOSIS — C25.9 MALIGNANT NEOPLASM OF PANCREAS, UNSPECIFIED LOCATION OF MALIGNANCY: ICD-10-CM

## 2025-07-04 DIAGNOSIS — S89.90XA KNEE INJURY: ICD-10-CM

## 2025-07-04 DIAGNOSIS — C25.9 PANCREATIC CANCER: ICD-10-CM

## 2025-07-04 DIAGNOSIS — S72.431A CLOSED BICONDYLAR FRACTURE OF RIGHT FEMUR, INITIAL ENCOUNTER: Primary | ICD-10-CM

## 2025-07-04 DIAGNOSIS — N17.9 AKI (ACUTE KIDNEY INJURY): ICD-10-CM

## 2025-07-04 DIAGNOSIS — Z01.810 PREOPERATIVE CARDIOVASCULAR EXAMINATION: ICD-10-CM

## 2025-07-04 DIAGNOSIS — D70.1 CHEMOTHERAPY INDUCED NEUTROPENIA: ICD-10-CM

## 2025-07-04 DIAGNOSIS — D64.9 ANEMIA, UNSPECIFIED TYPE: ICD-10-CM

## 2025-07-04 PROBLEM — I95.9 HYPOTENSION: Status: ACTIVE | Noted: 2025-07-04

## 2025-07-04 PROBLEM — R00.0 TACHYCARDIA: Status: RESOLVED | Noted: 2021-03-23 | Resolved: 2025-07-04

## 2025-07-04 PROBLEM — E03.9 HYPOTHYROID: Status: ACTIVE | Noted: 2025-07-04

## 2025-07-04 PROBLEM — D50.9 MICROCYTIC ANEMIA: Status: ACTIVE | Noted: 2025-07-04

## 2025-07-04 PROBLEM — E11.9 DM (DIABETES MELLITUS): Status: ACTIVE | Noted: 2025-07-04

## 2025-07-04 PROBLEM — E87.1 HYPONATREMIA: Status: ACTIVE | Noted: 2025-07-04

## 2025-07-04 PROBLEM — E66.01 SEVERE OBESITY (BMI >= 40): Status: ACTIVE | Noted: 2025-07-04

## 2025-07-04 PROBLEM — K86.89 PANCREATIC MASS: Status: RESOLVED | Noted: 2025-04-24 | Resolved: 2025-07-04

## 2025-07-04 LAB
ABO + RH BLD: NORMAL
ABO GROUP BLD: NORMAL
ABSOLUTE EOSINOPHIL (SMH): 0.01 K/UL
ABSOLUTE MONOCYTE (SMH): 0.1 K/UL (ref 0.3–1)
ABSOLUTE NEUTROPHIL COUNT (SMH): 0 K/UL (ref 1.8–7.7)
ALBUMIN SERPL-MCNC: 2.3 G/DL (ref 3.5–5.2)
ALP SERPL-CCNC: 129 UNIT/L (ref 40–150)
ALT SERPL-CCNC: 10 UNIT/L (ref 10–44)
ANION GAP (SMH): 11 MMOL/L (ref 8–16)
AST SERPL-CCNC: 11 UNIT/L (ref 11–45)
BASOPHILS # BLD AUTO: 0 K/UL
BASOPHILS NFR BLD AUTO: 0 %
BILIRUB SERPL-MCNC: 1.6 MG/DL (ref 0.1–1)
BILIRUB UR QL STRIP.AUTO: ABNORMAL
BLD GP AB SCN CELLS X3 SERPL QL: NORMAL
BLD PROD TYP BPU: NORMAL
BLOOD UNIT EXPIRATION DATE: NORMAL
BLOOD UNIT TYPE CODE: 6200
BUN SERPL-MCNC: 34 MG/DL (ref 8–23)
CALCIUM SERPL-MCNC: 9 MG/DL (ref 8.7–10.5)
CAOX CRY UR QL COMP ASSIST: ABNORMAL
CHLORIDE SERPL-SCNC: 98 MMOL/L (ref 95–110)
CLARITY UR: ABNORMAL
CO2 SERPL-SCNC: 20 MMOL/L (ref 23–29)
COLOR UR AUTO: YELLOW
CREAT SERPL-MCNC: 2.5 MG/DL (ref 0.5–1.4)
CREAT UR-MCNC: 255.6 MG/DL (ref 15–325)
CROSSMATCH INTERPRETATION: NORMAL
DISPENSE STATUS: NORMAL
EOSINOPHIL NFR BLD MANUAL: 3 % (ref 0–8)
ERYTHROCYTE [DISTWIDTH] IN BLOOD BY AUTOMATED COUNT: 13.6 % (ref 11.5–14.5)
ERYTHROCYTE [DISTWIDTH] IN BLOOD BY AUTOMATED COUNT: 15 % (ref 11.5–14.5)
GFR SERPLBLD CREATININE-BSD FMLA CKD-EPI: 21 ML/MIN/1.73/M2
GLUCOSE SERPL-MCNC: 192 MG/DL (ref 70–110)
GLUCOSE UR QL STRIP: ABNORMAL
HCT VFR BLD AUTO: 24.1 % (ref 37–48.5)
HCT VFR BLD AUTO: 24.9 % (ref 37–48.5)
HGB BLD-MCNC: 7 GM/DL (ref 12–16)
HGB BLD-MCNC: 7.8 GM/DL (ref 12–16)
HGB BLD-MCNC: 7.9 GM/DL (ref 12–16)
HGB UR QL STRIP: ABNORMAL
IMM GRANULOCYTES # BLD AUTO: 0 K/UL (ref 0–0.04)
IMM GRANULOCYTES NFR BLD AUTO: 0 % (ref 0–0.5)
INDIRECT COOMBS: NORMAL
IRON SATN MFR SERPL: 21 % (ref 20–50)
IRON SERPL-MCNC: 36 UG/DL (ref 30–160)
KETONES UR QL STRIP: NEGATIVE
LEUKOCYTE ESTERASE UR QL STRIP: NEGATIVE
LYMPHOCYTES # BLD AUTO: 0.41 K/UL (ref 1–4.8)
LYMPHOCYTES NFR BLD MANUAL: 71 % (ref 18–48)
MCH RBC QN AUTO: 25 PG (ref 27–31)
MCH RBC QN AUTO: 25.7 PG (ref 27–31)
MCHC RBC AUTO-ENTMCNC: 31.7 G/DL (ref 32–36)
MCHC RBC AUTO-ENTMCNC: 32.4 G/DL (ref 32–36)
MCV RBC AUTO: 79 FL (ref 82–98)
MCV RBC AUTO: 80 FL (ref 82–98)
MICROSCOPIC COMMENT: ABNORMAL
MONOCYTES NFR BLD MANUAL: 4 % (ref 4–15)
NEUTROPHILS NFR BLD MANUAL: 22 % (ref 38–73)
NITRITE UR QL STRIP: NEGATIVE
NON-SQ EPI CELLS #/AREA URNS AUTO: 1 /HPF
NUCLEATED RBC (/100WBC) (SMH): 0 /100 WBC
NUCLEATED RBC (/100WBC) (SMH): 0 /100 WBC
OHS QRS DURATION: 94 MS
OHS QTC CALCULATION: 469 MS
OSMOLALITY UR: 318 MOSM/KG (ref 50–1200)
PH UR STRIP: 5 [PH]
PLATELET # BLD AUTO: 169 K/UL (ref 150–450)
PLATELET # BLD AUTO: 177 K/UL (ref 150–450)
PMV BLD AUTO: 10.3 FL (ref 9.2–12.9)
PMV BLD AUTO: 9.8 FL (ref 9.2–12.9)
POCT GLUCOSE: 205 MG/DL (ref 70–110)
POCT GLUCOSE: 234 MG/DL (ref 70–110)
POCT GLUCOSE: 244 MG/DL (ref 70–110)
POCT GLUCOSE: 257 MG/DL (ref 70–110)
POTASSIUM SERPL-SCNC: 4.2 MMOL/L (ref 3.5–5.1)
PROT SERPL-MCNC: 6.7 GM/DL (ref 6–8.4)
PROT UR QL STRIP: ABNORMAL
RBC # BLD AUTO: 3.03 M/UL (ref 4–5.4)
RBC # BLD AUTO: 3.16 M/UL (ref 4–5.4)
RBC #/AREA URNS AUTO: 3 /HPF
RELATIVE EOSINOPHIL (SMH): 1.8 % (ref 0–8)
RELATIVE LYMPHOCYTE (SMH): 73.2 % (ref 18–48)
RELATIVE MONOCYTE (SMH): 17.9 % (ref 4–15)
RELATIVE NEUTROPHIL (SMH): 7.1 % (ref 38–73)
RH BLD: NORMAL
RH BLD: NORMAL
SODIUM SERPL-SCNC: 129 MMOL/L (ref 136–145)
SODIUM UR-SCNC: <20 MMOL/L (ref 20–250)
SP GR UR STRIP: 1.02
SPECIMEN OUTDATE: NORMAL
SQUAMOUS #/AREA URNS AUTO: 2 /HPF
TIBC SERPL-MCNC: 172 UG/DL (ref 250–450)
TRANSFERRIN SERPL-MCNC: 123 MG/DL (ref 200–375)
TSH SERPL-ACNC: 3.13 UIU/ML (ref 0.4–4)
UNIT NUMBER: NORMAL
UROBILINOGEN UR STRIP-ACNC: ABNORMAL EU/DL
WBC # BLD AUTO: 0.52 K/UL (ref 3.9–12.7)
WBC # BLD AUTO: 0.56 K/UL (ref 3.9–12.7)
WBC #/AREA URNS AUTO: 15 /HPF

## 2025-07-04 PROCEDURE — 63600175 PHARM REV CODE 636 W HCPCS: Performed by: STUDENT IN AN ORGANIZED HEALTH CARE EDUCATION/TRAINING PROGRAM

## 2025-07-04 PROCEDURE — 99223 1ST HOSP IP/OBS HIGH 75: CPT | Mod: ,,, | Performed by: ORTHOPAEDIC SURGERY

## 2025-07-04 PROCEDURE — 85027 COMPLETE CBC AUTOMATED: CPT | Performed by: EMERGENCY MEDICINE

## 2025-07-04 PROCEDURE — 51702 INSERT TEMP BLADDER CATH: CPT

## 2025-07-04 PROCEDURE — 93010 ELECTROCARDIOGRAM REPORT: CPT | Mod: ,,, | Performed by: GENERAL PRACTICE

## 2025-07-04 PROCEDURE — 63600175 PHARM REV CODE 636 W HCPCS: Mod: JZ,JB | Performed by: INTERNAL MEDICINE

## 2025-07-04 PROCEDURE — 36415 COLL VENOUS BLD VENIPUNCTURE: CPT | Performed by: STUDENT IN AN ORGANIZED HEALTH CARE EDUCATION/TRAINING PROGRAM

## 2025-07-04 PROCEDURE — 20000000 HC ICU ROOM

## 2025-07-04 PROCEDURE — 84466 ASSAY OF TRANSFERRIN: CPT | Performed by: INTERNAL MEDICINE

## 2025-07-04 PROCEDURE — 87040 BLOOD CULTURE FOR BACTERIA: CPT | Performed by: STUDENT IN AN ORGANIZED HEALTH CARE EDUCATION/TRAINING PROGRAM

## 2025-07-04 PROCEDURE — 27000221 HC OXYGEN, UP TO 24 HOURS

## 2025-07-04 PROCEDURE — 86920 COMPATIBILITY TEST SPIN: CPT | Performed by: HOSPITALIST

## 2025-07-04 PROCEDURE — 36620 INSERTION CATHETER ARTERY: CPT | Mod: ,,, | Performed by: ANESTHESIOLOGY

## 2025-07-04 PROCEDURE — 80053 COMPREHEN METABOLIC PANEL: CPT | Performed by: EMERGENCY MEDICINE

## 2025-07-04 PROCEDURE — 94799 UNLISTED PULMONARY SVC/PX: CPT

## 2025-07-04 PROCEDURE — 96375 TX/PRO/DX INJ NEW DRUG ADDON: CPT

## 2025-07-04 PROCEDURE — 36415 COLL VENOUS BLD VENIPUNCTURE: CPT | Performed by: EMERGENCY MEDICINE

## 2025-07-04 PROCEDURE — 99900031 HC PATIENT EDUCATION (STAT)

## 2025-07-04 PROCEDURE — 81003 URINALYSIS AUTO W/O SCOPE: CPT | Performed by: STUDENT IN AN ORGANIZED HEALTH CARE EDUCATION/TRAINING PROGRAM

## 2025-07-04 PROCEDURE — C1751 CATH, INF, PER/CENT/MIDLINE: HCPCS

## 2025-07-04 PROCEDURE — 30233N1 TRANSFUSION OF NONAUTOLOGOUS RED BLOOD CELLS INTO PERIPHERAL VEIN, PERCUTANEOUS APPROACH: ICD-10-PCS | Performed by: STUDENT IN AN ORGANIZED HEALTH CARE EDUCATION/TRAINING PROGRAM

## 2025-07-04 PROCEDURE — 25000003 PHARM REV CODE 250: Performed by: HOSPITALIST

## 2025-07-04 PROCEDURE — 94761 N-INVAS EAR/PLS OXIMETRY MLT: CPT

## 2025-07-04 PROCEDURE — 36430 TRANSFUSION BLD/BLD COMPNT: CPT

## 2025-07-04 PROCEDURE — 84443 ASSAY THYROID STIM HORMONE: CPT | Performed by: STUDENT IN AN ORGANIZED HEALTH CARE EDUCATION/TRAINING PROGRAM

## 2025-07-04 PROCEDURE — 96374 THER/PROPH/DIAG INJ IV PUSH: CPT

## 2025-07-04 PROCEDURE — 82728 ASSAY OF FERRITIN: CPT | Performed by: INTERNAL MEDICINE

## 2025-07-04 PROCEDURE — 86901 BLOOD TYPING SEROLOGIC RH(D): CPT | Performed by: EMERGENCY MEDICINE

## 2025-07-04 PROCEDURE — 84300 ASSAY OF URINE SODIUM: CPT | Performed by: STUDENT IN AN ORGANIZED HEALTH CARE EDUCATION/TRAINING PROGRAM

## 2025-07-04 PROCEDURE — 25000003 PHARM REV CODE 250: Performed by: STUDENT IN AN ORGANIZED HEALTH CARE EDUCATION/TRAINING PROGRAM

## 2025-07-04 PROCEDURE — 82570 ASSAY OF URINE CREATININE: CPT | Performed by: STUDENT IN AN ORGANIZED HEALTH CARE EDUCATION/TRAINING PROGRAM

## 2025-07-04 PROCEDURE — 85018 HEMOGLOBIN: CPT | Performed by: HOSPITALIST

## 2025-07-04 PROCEDURE — 85025 COMPLETE CBC W/AUTO DIFF WBC: CPT | Performed by: HOSPITALIST

## 2025-07-04 PROCEDURE — 86850 RBC ANTIBODY SCREEN: CPT | Mod: 91 | Performed by: HOSPITALIST

## 2025-07-04 PROCEDURE — 27000207 HC ISOLATION

## 2025-07-04 PROCEDURE — 25000003 PHARM REV CODE 250: Performed by: EMERGENCY MEDICINE

## 2025-07-04 PROCEDURE — P9016 RBC LEUKOCYTES REDUCED: HCPCS | Performed by: HOSPITALIST

## 2025-07-04 PROCEDURE — 96361 HYDRATE IV INFUSION ADD-ON: CPT

## 2025-07-04 PROCEDURE — 86850 RBC ANTIBODY SCREEN: CPT | Performed by: EMERGENCY MEDICINE

## 2025-07-04 PROCEDURE — 99285 EMERGENCY DEPT VISIT HI MDM: CPT | Mod: 25

## 2025-07-04 PROCEDURE — 83935 ASSAY OF URINE OSMOLALITY: CPT | Performed by: STUDENT IN AN ORGANIZED HEALTH CARE EDUCATION/TRAINING PROGRAM

## 2025-07-04 PROCEDURE — 87086 URINE CULTURE/COLONY COUNT: CPT | Performed by: STUDENT IN AN ORGANIZED HEALTH CARE EDUCATION/TRAINING PROGRAM

## 2025-07-04 PROCEDURE — 99223 1ST HOSP IP/OBS HIGH 75: CPT | Mod: ,,, | Performed by: INTERNAL MEDICINE

## 2025-07-04 PROCEDURE — 63600175 PHARM REV CODE 636 W HCPCS: Performed by: EMERGENCY MEDICINE

## 2025-07-04 PROCEDURE — 99900035 HC TECH TIME PER 15 MIN (STAT)

## 2025-07-04 PROCEDURE — 93005 ELECTROCARDIOGRAM TRACING: CPT

## 2025-07-04 PROCEDURE — 36410 VNPNXR 3YR/> PHY/QHP DX/THER: CPT

## 2025-07-04 RX ORDER — NALOXONE HCL 0.4 MG/ML
0.02 VIAL (ML) INJECTION
Status: DISCONTINUED | OUTPATIENT
Start: 2025-07-04 | End: 2025-07-11 | Stop reason: HOSPADM

## 2025-07-04 RX ORDER — LEVOTHYROXINE SODIUM 25 UG/1
25 TABLET ORAL
Status: DISCONTINUED | OUTPATIENT
Start: 2025-07-05 | End: 2025-07-11 | Stop reason: HOSPADM

## 2025-07-04 RX ORDER — MUPIROCIN 20 MG/G
OINTMENT TOPICAL 2 TIMES DAILY
Status: DISCONTINUED | OUTPATIENT
Start: 2025-07-04 | End: 2025-07-06

## 2025-07-04 RX ORDER — NOREPINEPHRINE BITARTRATE/D5W 4MG/250ML
0-3 PLASTIC BAG, INJECTION (ML) INTRAVENOUS CONTINUOUS
Status: DISCONTINUED | OUTPATIENT
Start: 2025-07-04 | End: 2025-07-05

## 2025-07-04 RX ORDER — SODIUM CHLORIDE 9 MG/ML
1000 INJECTION, SOLUTION INTRAVENOUS
Status: COMPLETED | OUTPATIENT
Start: 2025-07-04 | End: 2025-07-04

## 2025-07-04 RX ORDER — ONDANSETRON HYDROCHLORIDE 2 MG/ML
4 INJECTION, SOLUTION INTRAVENOUS
Status: COMPLETED | OUTPATIENT
Start: 2025-07-04 | End: 2025-07-04

## 2025-07-04 RX ORDER — DULOXETIN HYDROCHLORIDE 30 MG/1
60 CAPSULE, DELAYED RELEASE ORAL DAILY
Status: DISCONTINUED | OUTPATIENT
Start: 2025-07-04 | End: 2025-07-09

## 2025-07-04 RX ORDER — IBUPROFEN 200 MG
16 TABLET ORAL
Status: DISCONTINUED | OUTPATIENT
Start: 2025-07-04 | End: 2025-07-11 | Stop reason: HOSPADM

## 2025-07-04 RX ORDER — HEPARIN SODIUM 5000 [USP'U]/ML
5000 INJECTION, SOLUTION INTRAVENOUS; SUBCUTANEOUS EVERY 8 HOURS
Status: DISCONTINUED | OUTPATIENT
Start: 2025-07-04 | End: 2025-07-06

## 2025-07-04 RX ORDER — GLUCAGON 1 MG
1 KIT INJECTION
Status: DISCONTINUED | OUTPATIENT
Start: 2025-07-04 | End: 2025-07-11 | Stop reason: HOSPADM

## 2025-07-04 RX ORDER — PROCHLORPERAZINE EDISYLATE 5 MG/ML
5 INJECTION INTRAMUSCULAR; INTRAVENOUS EVERY 6 HOURS PRN
Status: DISCONTINUED | OUTPATIENT
Start: 2025-07-04 | End: 2025-07-11 | Stop reason: HOSPADM

## 2025-07-04 RX ORDER — CEFEPIME HYDROCHLORIDE 1 G/1
1 INJECTION, POWDER, FOR SOLUTION INTRAMUSCULAR; INTRAVENOUS EVERY 12 HOURS
Status: DISCONTINUED | OUTPATIENT
Start: 2025-07-04 | End: 2025-07-07

## 2025-07-04 RX ORDER — HYDROMORPHONE HYDROCHLORIDE 1 MG/ML
1 INJECTION, SOLUTION INTRAMUSCULAR; INTRAVENOUS; SUBCUTANEOUS
Refills: 0 | Status: COMPLETED | OUTPATIENT
Start: 2025-07-04 | End: 2025-07-04

## 2025-07-04 RX ORDER — HYDROMORPHONE HYDROCHLORIDE 1 MG/ML
1 INJECTION, SOLUTION INTRAMUSCULAR; INTRAVENOUS; SUBCUTANEOUS EVERY 4 HOURS PRN
Refills: 0 | Status: DISCONTINUED | OUTPATIENT
Start: 2025-07-04 | End: 2025-07-10

## 2025-07-04 RX ORDER — FENTANYL CITRATE 50 UG/ML
25 INJECTION, SOLUTION INTRAMUSCULAR; INTRAVENOUS
Refills: 0 | Status: COMPLETED | OUTPATIENT
Start: 2025-07-04 | End: 2025-07-04

## 2025-07-04 RX ORDER — HYDROCODONE BITARTRATE AND ACETAMINOPHEN 500; 5 MG/1; MG/1
TABLET ORAL
Status: DISCONTINUED | OUTPATIENT
Start: 2025-07-04 | End: 2025-07-08 | Stop reason: SDUPTHER

## 2025-07-04 RX ORDER — IBUPROFEN 200 MG
24 TABLET ORAL
Status: DISCONTINUED | OUTPATIENT
Start: 2025-07-04 | End: 2025-07-11 | Stop reason: HOSPADM

## 2025-07-04 RX ORDER — ACETAMINOPHEN 325 MG/1
650 TABLET ORAL EVERY 8 HOURS PRN
Status: DISCONTINUED | OUTPATIENT
Start: 2025-07-04 | End: 2025-07-11 | Stop reason: HOSPADM

## 2025-07-04 RX ORDER — INSULIN ASPART 100 [IU]/ML
0-10 INJECTION, SOLUTION INTRAVENOUS; SUBCUTANEOUS
Status: DISCONTINUED | OUTPATIENT
Start: 2025-07-04 | End: 2025-07-11 | Stop reason: HOSPADM

## 2025-07-04 RX ORDER — LORAZEPAM 0.5 MG/1
0.5 TABLET ORAL 2 TIMES DAILY PRN
Status: DISCONTINUED | OUTPATIENT
Start: 2025-07-04 | End: 2025-07-11 | Stop reason: HOSPADM

## 2025-07-04 RX ORDER — MIDODRINE HYDROCHLORIDE 10 MG/1
10 TABLET ORAL EVERY 8 HOURS
Status: DISCONTINUED | OUTPATIENT
Start: 2025-07-04 | End: 2025-07-11 | Stop reason: HOSPADM

## 2025-07-04 RX ORDER — OXYCODONE HYDROCHLORIDE 5 MG/1
5 TABLET ORAL EVERY 6 HOURS PRN
Refills: 0 | Status: DISCONTINUED | OUTPATIENT
Start: 2025-07-04 | End: 2025-07-10

## 2025-07-04 RX ORDER — SODIUM CHLORIDE 9 MG/ML
INJECTION, SOLUTION INTRAVENOUS CONTINUOUS
Status: DISCONTINUED | OUTPATIENT
Start: 2025-07-04 | End: 2025-07-06

## 2025-07-04 RX ORDER — SODIUM CHLORIDE 0.9 % (FLUSH) 0.9 %
10 SYRINGE (ML) INJECTION
Status: DISCONTINUED | OUTPATIENT
Start: 2025-07-04 | End: 2025-07-08 | Stop reason: SDUPTHER

## 2025-07-04 RX ADMIN — INSULIN ASPART 4 UNITS: 100 INJECTION, SOLUTION INTRAVENOUS; SUBCUTANEOUS at 06:07

## 2025-07-04 RX ADMIN — SODIUM CHLORIDE 1000 ML: 9 INJECTION, SOLUTION INTRAVENOUS at 10:07

## 2025-07-04 RX ADMIN — MUPIROCIN 1 G: 20 OINTMENT TOPICAL at 09:07

## 2025-07-04 RX ADMIN — DULOXETINE 60 MG: 30 CAPSULE, DELAYED RELEASE ORAL at 12:07

## 2025-07-04 RX ADMIN — OXYCODONE HYDROCHLORIDE 5 MG: 5 TABLET ORAL at 01:07

## 2025-07-04 RX ADMIN — INSULIN ASPART 4 UNITS: 100 INJECTION, SOLUTION INTRAVENOUS; SUBCUTANEOUS at 04:07

## 2025-07-04 RX ADMIN — SODIUM CHLORIDE: 9 INJECTION, SOLUTION INTRAVENOUS at 10:07

## 2025-07-04 RX ADMIN — HYDROMORPHONE HYDROCHLORIDE 1 MG: 1 INJECTION, SOLUTION INTRAMUSCULAR; INTRAVENOUS; SUBCUTANEOUS at 07:07

## 2025-07-04 RX ADMIN — NOREPINEPHRINE BITARTRATE 0.02 MCG/KG/MIN: 4 INJECTION, SOLUTION INTRAVENOUS at 03:07

## 2025-07-04 RX ADMIN — HYDROMORPHONE HYDROCHLORIDE 1 MG: 1 INJECTION, SOLUTION INTRAMUSCULAR; INTRAVENOUS; SUBCUTANEOUS at 02:07

## 2025-07-04 RX ADMIN — NOREPINEPHRINE BITARTRATE 0.17 MCG/KG/MIN: 4 INJECTION, SOLUTION INTRAVENOUS at 08:07

## 2025-07-04 RX ADMIN — VANCOMYCIN HYDROCHLORIDE 2000 MG: 500 INJECTION, POWDER, LYOPHILIZED, FOR SOLUTION INTRAVENOUS at 03:07

## 2025-07-04 RX ADMIN — ONDANSETRON 4 MG: 2 INJECTION INTRAMUSCULAR; INTRAVENOUS at 09:07

## 2025-07-04 RX ADMIN — FILGRASTIM-SNDZ 480 MCG: 480 INJECTION, SOLUTION INTRAVENOUS; SUBCUTANEOUS at 05:07

## 2025-07-04 RX ADMIN — HYDROMORPHONE HYDROCHLORIDE 1 MG: 1 INJECTION, SOLUTION INTRAMUSCULAR; INTRAVENOUS; SUBCUTANEOUS at 11:07

## 2025-07-04 RX ADMIN — INSULIN ASPART 3 UNITS: 100 INJECTION, SOLUTION INTRAVENOUS; SUBCUTANEOUS at 10:07

## 2025-07-04 RX ADMIN — FENTANYL CITRATE 25 MCG: 50 INJECTION INTRAMUSCULAR; INTRAVENOUS at 10:07

## 2025-07-04 RX ADMIN — CEFEPIME 1 G: 1 INJECTION, POWDER, FOR SOLUTION INTRAMUSCULAR; INTRAVENOUS at 01:07

## 2025-07-04 RX ADMIN — SODIUM CHLORIDE, POTASSIUM CHLORIDE, SODIUM LACTATE AND CALCIUM CHLORIDE 1000 ML: 600; 310; 30; 20 INJECTION, SOLUTION INTRAVENOUS at 08:07

## 2025-07-04 RX ADMIN — MIDODRINE HYDROCHLORIDE 10 MG: 5 TABLET ORAL at 12:07

## 2025-07-04 RX ADMIN — MIDODRINE HYDROCHLORIDE 10 MG: 5 TABLET ORAL at 09:07

## 2025-07-04 RX ADMIN — PROCHLORPERAZINE EDISYLATE 5 MG: 5 INJECTION INTRAMUSCULAR; INTRAVENOUS at 01:07

## 2025-07-04 RX ADMIN — SODIUM CHLORIDE 500 ML: 9 INJECTION, SOLUTION INTRAVENOUS at 09:07

## 2025-07-04 NOTE — ASSESSMENT & PLAN NOTE
Likely pre-renal given diarrhea and decreased appetite with low BP.  -IV fluids  -Nephrology consulted  -Avoid nephrotoxic agents  -Renally dose medications  -Monitor UOP and electrolytes  -Trend creatinine

## 2025-07-04 NOTE — CONSULTS
[facility]  Orthopedics  Consult Note    Patient Name: Sonia Muse  MRN: [id number]  Admission Date: 2025  Hospital Length of Stay: 0 days  Attending Provider: Tremaine Avendaño MD  Primary Care Provider: Nasra Galvez FNP    Patient information was obtained from patient, relative(s), and ER records.     Inpatient consult to Orthopedic Surgery  Consult performed by: Osmany Harding MD  Consult ordered by: Tremaine Avendaño MD        Subjective:     Principal Problem:Closed fracture of femur    Chief Complaint:   Chief Complaint   Patient presents with    Knee Injury     Fall  / rt. Knee pain         HPI:  65-year-old female with a past medical history fibromyalgia diabetes and pancreatic cancer on recent chemotherapy in the last week who slipped on a sheet while getting up this morning and landed on her right knee.  She has a history of bilateral TKA.  She reports right knee pain and inability to bear weight.  She had x-rays done in the emergency department which were revealed a comminuted displaced periprosthetic right distal femur fracture.  Currently in the emergency department and having low blood pressure.  Also being treated for an MIGUEL ANGEL.  Leukopenic as well. Plan is for transfer to [facility] for higher level of care. [id number] 10    Past Medical History:   Diagnosis Date    Allergy     Anxiety     Asthma     DDD (degenerative disc disease), cervical     Depression     Diabetes mellitus     Fibromyalgia     Hypertension 2005    Neuromuscular disorder     Pancreatic mass     PONV (postoperative nausea and vomiting)     Thyroid disease     hypo       Past Surgical History:   Procedure Laterality Date    ADENOIDECTOMY      CARPAL TUNNEL RELEASE Right      SECTION      ENDOSCOPIC ULTRASOUND OF UPPER GASTROINTESTINAL TRACT N/A 2025    Procedure: ULTRASOUND, UPPER GI TRACT, ENDOSCOPIC;  Surgeon: Austen Grimes III, MD;  Location: [facility];  Service: Endoscopy;   Laterality: N/A;    ERCP N/A 04/25/2025    Procedure: ERCP (ENDOSCOPIC RETROGRADE CHOLANGIOPANCREATOGRAPHY);  Surgeon: Thuy Escobar MD;  Location: [facility] ENDO;  Service: Endoscopy;  Laterality: N/A;    ERCP N/A 05/06/2025    Procedure: ERCP (ENDOSCOPIC RETROGRADE CHOLANGIOPANCREATOGRAPHY);  Surgeon: Austen Grimes III, MD;  Location: [facility] ENDO;  Service: Endoscopy;  Laterality: N/A;    INSERTION OF TUNNELED CENTRAL VENOUS CATHETER (CVC) WITH SUBCUTANEOUS PORT Left 6/19/2025    Procedure: [id number];  Surgeon: Ata Fernandez III, MD;  Location: [facility] OR;  Service: General;  Laterality: Left;    JOINT REPLACEMENT Right 2016    knee    KNEE ARTHROPLASTY Left 10/19/2020    Procedure: ARTHROPLASTY, KNEE;  Surgeon: Felipe Herring MD;  Location: [facility] OR;  Service: Orthopedics;  Laterality: Left;  Louie Liz    ROTATOR CUFF REPAIR Right     TONSILLECTOMY      TOTAL KNEE ARTHROPLASTY Right     TUBAL LIGATION         Review of patient's allergies indicates:   Allergen Reactions    Erythromycin Swelling    Codeine Nausea And Vomiting     And migraine h/a    [place] Blisters       Current Facility-Administered Medications   Medication    0.9% NaCl infusion    acetaminophen tablet 650 mg    dextrose 50% injection 12.5 g    dextrose 50% injection 25 g    DULoxetine DR capsule 60 mg    glucagon (human recombinant) injection 1 mg    glucose chewable tablet 16 g    glucose chewable tablet 24 g    heparin (porcine) injection 5,000 Units    HYDROmorphone injection 1 mg    insulin aspart U-100 pen 0-10 Units    [START ON 7/5/2025] levothyroxine tablet 25 mcg    LORazepam tablet 0.5 mg    midodrine tablet 10 mg    naloxone 0.4 mg/mL injection 0.02 mg    oxyCODONE immediate release tablet 5 mg    sodium chloride 0.9% flush 10 mL     Current Outpatient Medications   Medication Sig    ALPRAZolam (XANAX) 0.25 MG tablet Take 1 tablet (0.25 mg total) by mouth daily as needed for Anxiety.    amLODIPine (NORVASC) 5 MG  tablet Take 1 tablet (5 mg total) by mouth once daily.    cetirizine (ZYRTEC) 10 MG tablet TAKE 1 TABLET BY MOUTH ONCE DAILY    DULoxetine (CYMBALTA) 60 MG capsule TAKE one CAPSULE BY MOUTH EVERY DAY    hydroCHLOROthiazide (HYDRODIURIL) 25 MG tablet TAKE one TABLET BY MOUTH ONCE DAILY    HYDROcodone-acetaminophen (NORCO) 5-325 mg per tablet Take 1 tablet by mouth every 6 (six) hours as needed.    levothyroxine (SYNTHROID) 25 MCG tablet TAKE one TABLET BY MOUTH BEFORE breakfast    losartan (COZAAR) 100 MG tablet TAKE one TABLET BY MOUTH ONCE DAILY    magnesium oxide (MAGOX) 400 mg (241.3 mg magnesium) tablet Take 1 tablet (400 mg total) by mouth once daily.    metoprolol succinate (TOPROL-XL) 25 MG 24 hr tablet Take 1 tablet (25 mg total) by mouth once daily.    montelukast (SINGULAIR) 10 mg tablet TAKE ONE TABLET BY MOUTH EVERY EVENING    OLANZapine (ZYPREXA) 5 MG tablet Take 1 tablet by mouth nightly on days 1-3 of each chemotherapy cycle.    ondansetron (ZOFRAN) 8 MG tablet Take 1 tablet (8 mg total) by mouth every 8 (eight) hours as needed.    prochlorperazine (COMPAZINE) 10 MG tablet Take 1 tablet (10 mg total) by mouth every 6 (six) hours as needed.    albuterol (VENTOLIN HFA) 90 mcg/actuation inhaler Inhale 2 puffs into the lungs every 6 (six) hours as needed for Wheezing or Shortness of Breath. Rescue    blood sugar diagnostic Strp Use 1 strip to check blood sugar 2 times daily    blood-glucose meter kit Use as instructed    insulin glargine U-100, Lantus, 100 unit/mL (3 mL) SubQ InPn pen Inject 21 Units into the skin every evening.    lancets (LANCETS,THIN) Misc 1 each by Misc.(Non-Drug; Combo Route) route 3 (three) times daily.    LIDOcaine-prilocaine (EMLA) cream Apply topically as needed.    loperamide (IMODIUM) 2 mg capsule Take 2 tablets (4mg) by mouth after first loose stool, 1 tablet every 2 hours until diarrhea free for 12 hours. May take 2 tablets (4mg) by mouth every 4 hours at night. May require  "more than the package labeling maximum dose of 16mg/day.    LORazepam (ATIVAN) 0.5 MG tablet Take 1 tablet (0.5 mg total) by mouth 2 (two) times daily.    meloxicam (MOBIC) 15 MG tablet Take 15 mg by mouth once daily. 1/2 tablet am    metronidazole 0.75% (METROCREAM) 0.75 % Crea Apply 1 application  topically 2 (two) times daily.    pen needle, diabetic 32 gauge x 5/32" Ndle 1 Needle by Misc.(Non-Drug; Combo Route) route once daily.     Family History       Problem Relation (Age of Onset)    Anxiety disorder Daughter    Arthritis Mother    Diabetes Mother, Father    Glaucoma Mother    Graves' disease Daughter    Heart disease Mother, Father, Sister    Hypertension Mother, Father, Sister    Stroke Mother          Tobacco Use    Smoking status: Former     Current packs/day: 0.00     Average packs/day: 1 pack/day for 11.0 years (11.0 ttl pk-yrs)     Types: Cigarettes     Start date:      Quit date:      Years since quittin.5    Smokeless tobacco: Never   Substance and Sexual Activity    Alcohol use: Not Currently     Comment: occasional 2x/y    Drug use: Never    Sexual activity: Yes     Partners: Male     ROS    Review of Systems:  Constitutional: no fever or chills  Eyes: no visual changes  ENT: no nasal congestion or sore throat  Respiratory: no cough or shortness of breath  Cardiovascular: no chest pain  Gastrointestinal: no nausea or vomiting, tolerating diet  Musculoskeletal: pain and soreness  All others negative    Objective:     Vital Signs (Most Recent):  Temp: 98.8 °F (37.1 °C) (25 0715)  Pulse: 94 (25 1131)  Resp: 18 (25 1041)  BP: (!) 92/57 (25 1131)  SpO2: (!) 94 % (25 1131) Vital Signs (24h Range):  Temp:  [98 °F (36.7 °C)-98.8 °F (37.1 °C)] 98.8 °F (37.1 °C)  Pulse:  [] 94  Resp:  [18] 18  SpO2:  [93 %-100 %] 94 %  BP: ()/(40-57) 92/57     Weight: 107 kg (236 lb)  Height: 5' 4" (162.6 cm)  Body mass index is 40.51 kg/m².      Intake/Output Summary " "(Last 24 hours) at 7/4/2025 1148  Last data filed at 7/4/2025 1023  Gross per 24 hour   Intake 1500 ml   Output --   Net 1500 ml       Ortho/SPM Exam    On exam she has deformity of the right knee.  There is well-healed midline incision.  There is crepitus at the fracture site at the distal femur.  Compartments are soft and compressible.  Sensation intact.  No evidence of open injury.    Significant Labs: BMP:   Recent Labs   Lab 07/03/25  1058 07/04/25  0748   * 192*   * 129*   K 4.0 4.2   CL 98 98   CO2 22* 20*   BUN 31* 34*   CREATININE 1.4 2.5*   CALCIUM 9.4 9.0   MG 1.8  --      CBC:   Recent Labs   Lab 07/03/25  1058 07/04/25  0748   WBC 1.08* 0.52*   HGB 9.2* 7.9*   HCT 28.9* 24.9*    169     CRP: No results for input(s): "CRP" in the last 48 hours.  All pertinent labs within the past 24 hours have been reviewed.    Significant Imaging: X-Ray: I have reviewed all pertinent results/findings and my personal findings are:  Comminuted displaced periprosthetic right distal femur fracture    Assessment/Plan:     Active Diagnoses:    Diagnosis Date Noted POA    PRINCIPAL PROBLEM:  Closed fracture of femur [S72.90XA] 07/04/2025 Yes    Microcytic anemia [D50.9] 07/04/2025 Yes    Severe obesity (BMI >= 40) [E66.01] 07/04/2025 Yes    DM (diabetes mellitus) [E11.9] 07/04/2025 Yes    Chemotherapy-induced neutropenia [D70.1, T45.1X5A] 07/04/2025 Yes    Hypothyroid [E03.9] 07/04/2025 Yes    Hyponatremia [E87.1] 07/04/2025 Yes    Pancreatic cancer [C25.9] 05/19/2025 Yes    MIGUEL ANGEL (acute kidney injury) [N17.9] 04/24/2025 Yes    Essential hypertension [I10] 08/19/2020 Yes      Problems Resolved During this Admission:     Treatment options were discussed with the patient and family.  She has multiple comorbidities including pancreatic cancer in his currently being treated with chemotherapy.  She is currently leukopenic secondary to chemo.  This puts her at significantly increased risk of complications " specifically infection and wound healing complications.  She has a unstable complex distal femur fracture around a well-fixed TKA.  This will need operative stabilization with open reduction internal fixation of the right distal femur.  She will need to be medically optimized prior to surgical intervention.  We would also recommend a consult to Heme-Onc to see if she would be a candidate for G-CSF prior to surgery to improve outcome and decrease risk for infection.    We will tentatively plan for surgery on Saturday or Sunday pending medical clearance and input from other services.     Thank you for your consult. I will follow-up with patient. Please contact us if you have any additional questions.    Osmany Harding MD  Orthopedics  Critical access hospital

## 2025-07-04 NOTE — CONSULTS
INPATIENT NEPHROLOGY CONSULT   [facility]    Sonia Muse  07/04/2025    Reason for consultation:    Acute kidney injury    Chief Complaint:   Chief Complaint   Patient presents with    Knee Injury     Fall  / rt. Knee pain           History of Present Illness:    Perry Carranza  Sonia Muse is a 65 year old female with a past medical history of pancreatic cancer, anemia, obesity, DM, HTN, and hypothyroidism who presented with a R femur fracture after a fall, MIGUEL ANGEL, hyponatremia and neutropenia. She recently completed her first cycle of chemotherapy after roughly one week ago (patient of Dr. Calle). She endorses chronic diarrhea and decreased appetite as well as fatigue. She states her BP has fallen since being diagnosed with pancreatic cancer and starting chemotherapy. In the ED, the patient received a 1.5 L NS bolus and 1 L LR bolus as well as fentanyl and Zofran.         Plan of Care:       Assessment:    Acute kidney injury secondary to hemodynamically mediated renal injury.  Got her first round of chemo a week ago.  Oxaliplatin can cause  a rise in creatinine in 5-10% of cases.    --Avoid NSAIDS, Fields II inhibitors, and other non-essential nephrotoxic agents  --volume resuscitation  --Keep mean arterial pressure above 60 and systolic blood pressure above 100 as able to maintain renal perfusion  --hold losartan, hctz, mobic,   --urine sodium <20 implicates low tubular flow.  No casts on urine micro  --catheter in place    Hyponatremia  --no hypotonic iv piggy backs  --urine osm  --uric acid  --push nutrition    Anemia  --as per heme/onc    Hypotension  --hold antihypertensives  --volume resuscitation  --treat infections  --vasopressor support as needed         Thank you for allowing us to participate in this patient's care. We will continue to follow.    Vital Signs:  Temp Readings from Last 3 Encounters:   07/04/25 98.8 °F (37.1 °C) (Oral)   07/03/25 98 °F (36.7 °C) (Temporal)   06/26/25 97.2  °F (36.2 °C)       Pulse Readings from Last 3 Encounters:   25 101   25 107   25 76       BP Readings from Last 3 Encounters:   25 (!) 79/50   25 (!) 108/55   25 109/69       Weight:  Wt Readings from Last 3 Encounters:   25 107 kg (236 lb)   25 107.4 kg (236 lb 12.4 oz)   25 110.5 kg (243 lb 8 oz)       Past Medical & Surgical History:  Past Medical History:   Diagnosis Date    Allergy     Anxiety     Asthma     DDD (degenerative disc disease), cervical     Depression     Diabetes mellitus     Fibromyalgia     Hypertension     Neuromuscular disorder     Pancreatic mass     PONV (postoperative nausea and vomiting)     Thyroid disease     hypo       Past Surgical History:   Procedure Laterality Date    ADENOIDECTOMY      CARPAL TUNNEL RELEASE Right      SECTION      ENDOSCOPIC ULTRASOUND OF UPPER GASTROINTESTINAL TRACT N/A 2025    Procedure: ULTRASOUND, UPPER GI TRACT, ENDOSCOPIC;  Surgeon: Austen Grimes III, MD;  Location: [facility] ENDO;  Service: Endoscopy;  Laterality: N/A;    ERCP N/A 2025    Procedure: ERCP (ENDOSCOPIC RETROGRADE CHOLANGIOPANCREATOGRAPHY);  Surgeon: Thuy Escobar MD;  Location: [facility] ENDO;  Service: Endoscopy;  Laterality: N/A;    ERCP N/A 2025    Procedure: ERCP (ENDOSCOPIC RETROGRADE CHOLANGIOPANCREATOGRAPHY);  Surgeon: Austen Grimes III, MD;  Location: [facility] ENDO;  Service: Endoscopy;  Laterality: N/A;    INSERTION OF TUNNELED CENTRAL VENOUS CATHETER (CVC) WITH SUBCUTANEOUS PORT Left 2025    Procedure: [id number]-[id number];  Surgeon: Ata Fernandez III, MD;  Location: [facility] OR;  Service: General;  Laterality: Left;    JOINT REPLACEMENT Right 2016    knee    KNEE ARTHROPLASTY Left 10/19/2020    Procedure: ARTHROPLASTY, KNEE;  Surgeon: Felipe Herring MD;  Location: [facility] OR;  Service: Orthopedics;  Laterality: Left;  Louie Liz    ROTATOR CUFF REPAIR Right     TONSILLECTOMY      TOTAL  KNEE ARTHROPLASTY Right     TUBAL LIGATION         Past Social History:  Social History     Socioeconomic History    Marital status:     Number of children: 3   Occupational History    Occupation: [employer]     Comment: primary sub for [employer]   Tobacco Use    Smoking status: Former     Current packs/day: 0.00     Average packs/day: 1 pack/day for 11.0 years (11.0 ttl pk-yrs)     Types: Cigarettes     Start date:      Quit date:      Years since quittin.5    Smokeless tobacco: Never   Substance and Sexual Activity    Alcohol use: Not Currently     Comment: occasional 2x/y    Drug use: Never    Sexual activity: Yes     Partners: Male     Social Drivers of Health     Financial Resource Strain: High Risk (2025)    Overall Financial Resource Strain (CARDIA)     Difficulty of Paying Living Expenses: Hard   Food Insecurity: Food Insecurity Present (2025)    Hunger Vital Sign     Worried About Running Out of Food in the Last Year: Sometimes true     Ran Out of Food in the Last Year: Sometimes true   Transportation Needs: Unmet Transportation Needs (2025)    PRAPARE - Transportation     Lack of Transportation (Medical): Yes     Lack of Transportation (Non-Medical): Yes   Physical Activity: Unknown (2025)    Exercise Vital Sign     Days of Exercise per Week: Patient declined     Minutes of Exercise per Session: 30 min   Stress: Stress Concern Present (2025)    [employer] [place] [employer] - Occupational Stress Questionnaire     Feeling of Stress : Very much   Housing Stability: Low Risk  (2025)    Housing Stability Vital Sign     Unable to Pay for Housing in the Last Year: No     Number of Times Moved in the Last Year: 0     Homeless in the Last Year: No       Medications:  Medications Ordered Prior to Encounter[1]  Scheduled Meds:   ceFEPime IV (PEDS and ADULTS)  1 g Intravenous Q12H    DULoxetine  60 mg Oral Daily    heparin (porcine)  5,000 Units Subcutaneous  "Q8H    [START ON 7/5/2025] levothyroxine  25 mcg Oral Before breakfast    midodrine  10 mg Oral Q8H     Continuous Infusions:   0.9% NaCl   Intravenous Continuous         PRN Meds:.  Current Facility-Administered Medications:     acetaminophen, 650 mg, Oral, Q8H PRN    dextrose 50%, 12.5 g, Intravenous, PRN    dextrose 50%, 25 g, Intravenous, PRN    glucagon (human recombinant), 1 mg, Intramuscular, PRN    glucose, 16 g, Oral, PRN    glucose, 24 g, Oral, PRN    HYDROmorphone, 1 mg, Intravenous, Q4H PRN    insulin aspart U-100, 0-10 Units, Subcutaneous, QID (AC + HS) PRN    LORazepam, 0.5 mg, Oral, BID PRN    naloxone, 0.02 mg, Intravenous, PRN    oxyCODONE, 5 mg, Oral, Q6H PRN    sodium chloride 0.9%, 10 mL, Intravenous, PRN    Pharmacy to dose Vancomycin consult, , , Once **AND** vancomycin - pharmacy to dose, , Intravenous, pharmacy to manage frequency    Allergies:  Erythromycin, Codeine, and [place]    Past Family History:  Reviewed; refer to Hospitalist Admission Note    Review of Systems:  Review of Systems - All 14 systems reviewed and negative, except as noted in HPI    Physical Exam:    BP (!) 79/50   Pulse 101   Temp 98.8 °F (37.1 °C) (Oral)   Resp 18   Ht 5' 4" (1.626 m)   Wt 107 kg (236 lb)   SpO2 98%   Breastfeeding No   BMI 40.51 kg/m²     General Appearance:    Alert, cooperative, no distress, appears stated age   Head:    Normocephalic, without obvious abnormality, atraumatic   Eyes:    PER, conjunctiva/corneas clear, EOM's intact in both eyes        Throat:   Lips, mucosa, and tongue normal; teeth and gums normal   Back:     Symmetric, no curvature, ROM normal, no CVA tenderness   Lungs:     Clear to auscultation bilaterally, respirations unlabored   Chest wall:    No tenderness or deformity   Heart:    Regular rate and rhythm, S1 and S2 normal, no murmur, rub   or gallop   Abdomen:     Soft, non-tender, bowel sounds active all four quadrants,     no masses, no organomegaly   Extremities:   " Extremities normal, atraumatic, no cyanosis or edema   Pulses:   2+ and symmetric all extremities   MSK:   No joint or muscle swelling, tenderness or deformity   Skin:   Skin color, texture, turgor normal, no rashes or lesions   Neurologic:   CNII-XII intact, normal strength and sensation       Throughout.  No flap     Results:  Lab Results   Component Value Date     (L) 07/04/2025    K 4.2 07/04/2025    CL 98 07/04/2025    CO2 20 (L) 07/04/2025    BUN 34 (H) 07/04/2025    CREATININE 2.5 (H) 07/04/2025    CALCIUM 9.0 07/04/2025    ANIONGAP 11 07/04/2025    ESTGFRAFRICA >60.0 01/24/2022    EGFRNONAA >60.0 01/24/2022       Lab Results   Component Value Date    CALCIUM 9.0 07/04/2025    PHOS 2.8 04/27/2025       Recent Labs   Lab 07/04/25  0748   WBC 0.52*   RBC 3.16*   HGB 7.9*   HCT 24.9*      MCV 79*   MCH 25.0*   MCHC 31.7*          I have personally reviewed pertinent radiological imaging and reports.    Kenton Lundberg MD  [facility] [place]  [phone]          [1]   No current facility-administered medications on file prior to encounter.     Current Outpatient Medications on File Prior to Encounter   Medication Sig Dispense Refill    ALPRAZolam (XANAX) 0.25 MG tablet Take 1 tablet (0.25 mg total) by mouth daily as needed for Anxiety. 30 tablet 0    amLODIPine (NORVASC) 5 MG tablet Take 1 tablet (5 mg total) by mouth once daily. 90 tablet 1    cetirizine (ZYRTEC) 10 MG tablet TAKE 1 TABLET BY MOUTH ONCE DAILY 90 tablet 1    DULoxetine (CYMBALTA) 60 MG capsule TAKE one CAPSULE BY MOUTH EVERY DAY 90 capsule 1    hydroCHLOROthiazide (HYDRODIURIL) 25 MG tablet TAKE one TABLET BY MOUTH ONCE DAILY 90 tablet 1    HYDROcodone-acetaminophen (NORCO) 5-325 mg per tablet Take 1 tablet by mouth every 6 (six) hours as needed. 10 tablet 0    levothyroxine (SYNTHROID) 25 MCG tablet TAKE one TABLET BY MOUTH BEFORE breakfast 90 tablet 1    losartan (COZAAR) 100 MG tablet TAKE one TABLET BY MOUTH ONCE  "DAILY 90 tablet 0    magnesium oxide (MAGOX) 400 mg (241.3 mg magnesium) tablet Take 1 tablet (400 mg total) by mouth once daily. 200 tablet 1    metoprolol succinate (TOPROL-XL) 25 MG 24 hr tablet Take 1 tablet (25 mg total) by mouth once daily. 90 tablet 1    montelukast (SINGULAIR) 10 mg tablet TAKE ONE TABLET BY MOUTH EVERY EVENING 90 tablet 1    OLANZapine (ZYPREXA) 5 MG tablet Take 1 tablet by mouth nightly on days 1-3 of each chemotherapy cycle. 3 tablet 11    ondansetron (ZOFRAN) 8 MG tablet Take 1 tablet (8 mg total) by mouth every 8 (eight) hours as needed. 30 tablet 2    prochlorperazine (COMPAZINE) 10 MG tablet Take 1 tablet (10 mg total) by mouth every 6 (six) hours as needed. 30 tablet 1    albuterol (VENTOLIN HFA) 90 mcg/actuation inhaler Inhale 2 puffs into the lungs every 6 (six) hours as needed for Wheezing or Shortness of Breath. Rescue 18 g 1    blood sugar diagnostic Strp Use 1 strip to check blood sugar 2 times daily 100 each 5    blood-glucose meter kit Use as instructed 1 each 0    insulin glargine U-100, Lantus, 100 unit/mL (3 mL) SubQ InPn pen Inject 21 Units into the skin every evening. 9 mL 3    lancets (LANCETS,THIN) Misc 1 each by Misc.(Non-Drug; Combo Route) route 3 (three) times daily. 90 each 0    LIDOcaine-prilocaine (EMLA) cream Apply topically as needed. 30 g 0    loperamide (IMODIUM) 2 mg capsule Take 2 tablets (4mg) by mouth after first loose stool, 1 tablet every 2 hours until diarrhea free for 12 hours. May take 2 tablets (4mg) by mouth every 4 hours at night. May require more than the package labeling maximum dose of 16mg/day. 30 capsule 11    LORazepam (ATIVAN) 0.5 MG tablet Take 1 tablet (0.5 mg total) by mouth 2 (two) times daily. 60 tablet 0    meloxicam (MOBIC) 15 MG tablet Take 15 mg by mouth once daily. 1/2 tablet am      metronidazole 0.75% (METROCREAM) 0.75 % Crea Apply 1 application  topically 2 (two) times daily.      pen needle, diabetic 32 gauge x 5/32" Ndle 1 " Needle by Misc.(Non-Drug; Combo Route) route once daily. 100 each 2

## 2025-07-04 NOTE — PHARMACY MED REC
"Admission Medication History     The home medication history was taken by Leonel Mcdonald.    You may go to "Admission" then "Reconcile Home Medications" tabs to review and/or act upon these items.     The home medication list has been updated by the Pharmacy department.   Please read ALL comments highlighted in yellow.   Please address this information as you see fit.    Feel free to contact us if you have any questions or require assistance.        Medications listed below were obtained from: Patient/family and Analytic software- [employer]  Medications Ordered Prior to Encounter[1]    Potential issues to be addressed PRIOR TO DISCHARGE  Please discuss with the patient barriers to adherence with medication treatment plans  Patient requires education regarding drug therapies       Leonel Mcdonald  [id number]              .               [1]   No current facility-administered medications on file prior to encounter.     Current Outpatient Medications on File Prior to Encounter   Medication Sig Dispense Refill    ALPRAZolam (XANAX) 0.25 MG tablet Take 1 tablet (0.25 mg total) by mouth daily as needed for Anxiety. 30 tablet 0    amLODIPine (NORVASC) 5 MG tablet Take 1 tablet (5 mg total) by mouth once daily. 90 tablet 1    cetirizine (ZYRTEC) 10 MG tablet TAKE 1 TABLET BY MOUTH ONCE DAILY 90 tablet 1    DULoxetine (CYMBALTA) 60 MG capsule TAKE one CAPSULE BY MOUTH EVERY DAY 90 capsule 1    hydroCHLOROthiazide (HYDRODIURIL) 25 MG tablet TAKE one TABLET BY MOUTH ONCE DAILY 90 tablet 1    HYDROcodone-acetaminophen (NORCO) 5-325 mg per tablet Take 1 tablet by mouth every 6 (six) hours as needed. 10 tablet 0    levothyroxine (SYNTHROID) 25 MCG tablet TAKE one TABLET BY MOUTH BEFORE breakfast 90 tablet 1    losartan (COZAAR) 100 MG tablet TAKE one TABLET BY MOUTH ONCE DAILY 90 tablet 0    magnesium oxide (MAGOX) 400 mg (241.3 mg magnesium) tablet Take 1 tablet (400 mg total) by mouth once daily. 200 tablet 1    metoprolol succinate " "(TOPROL-XL) 25 MG 24 hr tablet Take 1 tablet (25 mg total) by mouth once daily. 90 tablet 1    montelukast (SINGULAIR) 10 mg tablet TAKE ONE TABLET BY MOUTH EVERY EVENING 90 tablet 1    OLANZapine (ZYPREXA) 5 MG tablet Take 1 tablet by mouth nightly on days 1-3 of each chemotherapy cycle. 3 tablet 11    ondansetron (ZOFRAN) 8 MG tablet Take 1 tablet (8 mg total) by mouth every 8 (eight) hours as needed. 30 tablet 2    prochlorperazine (COMPAZINE) 10 MG tablet Take 1 tablet (10 mg total) by mouth every 6 (six) hours as needed. 30 tablet 1    albuterol (VENTOLIN HFA) 90 mcg/actuation inhaler Inhale 2 puffs into the lungs every 6 (six) hours as needed for Wheezing or Shortness of Breath. Rescue 18 g 1    blood sugar diagnostic Strp Use 1 strip to check blood sugar 2 times daily 100 each 5    blood-glucose meter kit Use as instructed 1 each 0    insulin glargine U-100, Lantus, 100 unit/mL (3 mL) SubQ InPn pen Inject 21 Units into the skin every evening. 9 mL 3    lancets (LANCETS,THIN) Misc 1 each by Misc.(Non-Drug; Combo Route) route 3 (three) times daily. 90 each 0    LIDOcaine-prilocaine (EMLA) cream Apply topically as needed. 30 g 0    loperamide (IMODIUM) 2 mg capsule Take 2 tablets (4mg) by mouth after first loose stool, 1 tablet every 2 hours until diarrhea free for 12 hours. May take 2 tablets (4mg) by mouth every 4 hours at night. May require more than the package labeling maximum dose of 16mg/day. 30 capsule 11    LORazepam (ATIVAN) 0.5 MG tablet Take 1 tablet (0.5 mg total) by mouth 2 (two) times daily. 60 tablet 0    meloxicam (MOBIC) 15 MG tablet Take 15 mg by mouth once daily. 1/2 tablet am      metronidazole 0.75% (METROCREAM) 0.75 % Crea Apply 1 application  topically 2 (two) times daily.      pen needle, diabetic 32 gauge x 5/32" Ndle 1 Needle by Misc.(Non-Drug; Combo Route) route once daily. 100 each 2     "

## 2025-07-04 NOTE — ASSESSMENT & PLAN NOTE
"Patient's FSGs are controlled on current medication regimen.  Last A1c reviewed-   Lab Results   Component Value Date    HGBA1C 10.1 (H) 04/25/2025     Most recent fingerstick glucose reviewed- No results for input(s): "POCTGLUCOSE" in the last 24 hours.  Current correctional scale  Medium  Maintain anti-hyperglycemic dose as follows-   Antihyperglycemics (From admission, onward)      Start     Stop Route Frequency Ordered    07/04/25 1142  insulin aspart U-100 pen 0-10 Units         -- SubQ Before meals & nightly PRN 07/04/25 1046          Hold Oral hypoglycemics while patient is in the hospital.  "

## 2025-07-04 NOTE — ASSESSMENT & PLAN NOTE
Body mass index is 40.51 kg/m². Morbid obesity complicates all aspects of disease management from diagnostic modalities to treatment.        Problem: Falls - Risk of  Goal: *Absence of Falls  Description: Document Earma Angie Fall Risk and appropriate interventions in the flowsheet. Outcome: Progressing Towards Goal  Note: Fall Risk Interventions:  Mobility Interventions: Bed/chair exit alarm    Mentation Interventions: Bed/chair exit alarm    Medication Interventions: Bed/chair exit alarm    Elimination Interventions: Bed/chair exit alarm, Call light in reach    History of Falls Interventions: Bed/chair exit alarm         Problem: Patient Education: Go to Patient Education Activity  Goal: Patient/Family Education  Outcome: Progressing Towards Goal     Problem: Patient Education: Go to Patient Education Activity  Goal: Patient/Family Education  Outcome: Progressing Towards Goal     Problem: Discharge Planning  Goal: *Discharge to safe environment  Outcome: Progressing Towards Goal  Goal: *Knowledge of medication management  Outcome: Progressing Towards Goal  Goal: *Knowledge of discharge instructions  Outcome: Progressing Towards Goal     Problem: Patient Education: Go to Patient Education Activity  Goal: Patient/Family Education  Outcome: Progressing Towards Goal     Problem: Pressure Injury - Risk of  Goal: *Prevention of pressure injury  Description: Document Jose Alberto Scale and appropriate interventions in the flowsheet.   Outcome: Progressing Towards Goal  Note: Pressure Injury Interventions:  Sensory Interventions: Minimize linen layers, Float heels    Moisture Interventions: Minimize layers    Activity Interventions: Assess need for specialty bed    Mobility Interventions: HOB 30 degrees or less    Nutrition Interventions: Document food/fluid/supplement intake    Friction and Shear Interventions: HOB 30 degrees or less, Minimize layers                Problem: Patient Education: Go to Patient Education Activity  Goal: Patient/Family Education  Outcome: Progressing Towards Goal

## 2025-07-04 NOTE — ASSESSMENT & PLAN NOTE
Chemotherapy roughly one week ago.  -Trend CBC  -Oncology consulted regarding options to increase neutrophil count prior to surgery  -Empiric renally dosed cefepime and vancomycin given UA findings concerning for UTI  -Follow up urine and blood cultures

## 2025-07-04 NOTE — ASSESSMENT & PLAN NOTE
GCS 15. NSR on telemetry. Appears chronic in setting of dehydration and cancer.  -Continue IV fluids  -Midodrine 10 TID

## 2025-07-04 NOTE — HPI
Sonia Muse is a 65 year old female with a past medical history of pancreatic cancer, anemia, obesity, DM, HTN, and hypothyroidism who presented with a R femur fracture after a fall, MIGUEL ANGEL, hyponatremia and neutropenia. She recently completed her first cycle of chemotherapy after roughly one week ago (patient of Dr. Calle). She endorses chronic diarrhea and decreased appetite as well as fatigue. She states her BP has fallen since being diagnosed with pancreatic cancer and starting chemotherapy. In the ED, the patient received a 1.5 L NS bolus and 1 L LR bolus as well as fentanyl and Zofran. Hospital Medicine was consulted for admission.

## 2025-07-04 NOTE — PLAN OF CARE
07/04/25 1541   Patient Assessment/Suction   Level of Consciousness (AVPU) alert   Respiratory Effort Normal;Unlabored   Expansion/Accessory Muscles/Retractions no retractions;no use of accessory muscles   PRE-TX-O2   Device (Oxygen Therapy) nasal cannula   $ Is the patient on Low Flow Oxygen? Yes   Flow (L/min) (Oxygen Therapy) 2   SpO2 (!) 93 %   Pulse (!) 111   Resp (!) 21   Education   $ Education Oxygen;15 min   Tobacco Cessation Intervention   Do you use any type of tobacco product? No   Respiratory Evaluation   $ Care Plan Tech Time 15 min   $ Respiratory Evaluation Complete   Evaluation For New Orders   Admitting Diagnosis closed fracture of femur   Cardiac Diagnosis n/a   Pulmonary Diagnosis n/a   Current Surgeries n/a   Home Oxygen   Has Home Oxygen? No   Home Aerosol, MDI, DPI, and Other Treatments/Therapies   Home Respiratory Therapy Per Patient/Review of Chart Yes   MDI Home Meds/Freq Abluterol HFA 90 mcg   Oxygen Care Plan   Oxygen Care Plan Per Protocol   SPO2 Goal (%) 92% non-cardiac   Rationale Shortness of Breath   Bronchodilator Care Plan   Rationale No Rationale found   Atelectasis Care Plan   Rationale No Rational Found   Airway Clearance Care Plan   Rationale No rationale found

## 2025-07-04 NOTE — ED PROVIDER NOTES
Encounter Date: 2025       History     Chief Complaint   Patient presents with    Knee Injury     Fall  / rt. Knee pain      65-year-old female with a past medical history of fibromyalgia, diabetes mellitus, hypertension, and pancreatic cancer presents for evaluation of right knee pain.  The patient reports that she slipped on a sheet while getting up this morning and landed on both of her knees.  She denies any head injury, loss of consciousness, neck pain, back pain, chest pain, or abdominal pain.  She reports the pain in her right knee radiates up to her right hip and down to her right ankle.  Her pain is worse with movement and palpation.  There are no alleviating factors.  She has a past surgical history of bilateral knee replacements.      Review of patient's allergies indicates:   Allergen Reactions    Erythromycin Swelling    Codeine Nausea And Vomiting     And migraine h/a    [place] Blisters     Past Medical History:   Diagnosis Date    Allergy     Anxiety     Asthma     DDD (degenerative disc disease), cervical     Depression     Diabetes mellitus     Fibromyalgia     Hypertension 2005    Neuromuscular disorder     Pancreatic mass     PONV (postoperative nausea and vomiting)     Thyroid disease     hypo     Past Surgical History:   Procedure Laterality Date    ADENOIDECTOMY      CARPAL TUNNEL RELEASE Right      SECTION      ENDOSCOPIC ULTRASOUND OF UPPER GASTROINTESTINAL TRACT N/A 2025    Procedure: ULTRASOUND, UPPER GI TRACT, ENDOSCOPIC;  Surgeon: Austen Grimes III, MD;  Location: [facility];  Service: Endoscopy;  Laterality: N/A;    ERCP N/A 2025    Procedure: ERCP (ENDOSCOPIC RETROGRADE CHOLANGIOPANCREATOGRAPHY);  Surgeon: Thuy Escobar MD;  Location: [facility];  Service: Endoscopy;  Laterality: N/A;    ERCP N/A 2025    Procedure: ERCP (ENDOSCOPIC RETROGRADE CHOLANGIOPANCREATOGRAPHY);  Surgeon: Austen Grimes III, MD;  Location: [facility];  Service:  Endoscopy;  Laterality: N/A;    INSERTION OF TUNNELED CENTRAL VENOUS CATHETER (CVC) WITH SUBCUTANEOUS PORT Left 6/19/2025    Procedure: [id number];  Surgeon: Ata Fernandez III, MD;  Location: [facility] OR;  Service: General;  Laterality: Left;    JOINT REPLACEMENT Right 2016    knee    KNEE ARTHROPLASTY Left 10/19/2020    Procedure: ARTHROPLASTY, KNEE;  Surgeon: Felipe Herring MD;  Location: [facility] OR;  Service: Orthopedics;  Laterality: Left;  Louie Liz    ROTATOR CUFF REPAIR Right     TONSILLECTOMY      TOTAL KNEE ARTHROPLASTY Right     TUBAL LIGATION       Family History   Problem Relation Name Age of Onset    Diabetes Mother Libertad     Hypertension Mother Libertad     Heart disease Mother Libertad     Stroke Mother Libertad     Glaucoma Mother Libertad     Arthritis Mother Libertad     Diabetes Father Fredis     Hypertension Father Fredis     Heart disease Father Fredis     Hypertension Sister 1     Heart disease Sister 1     Graves' disease Daughter      Anxiety disorder Daughter       Social History[1]  Review of Systems   Constitutional:  Negative for chills and fever.   HENT:  Negative for congestion.    Respiratory:  Negative for cough and shortness of breath.    Cardiovascular:  Negative for chest pain.   Gastrointestinal:  Negative for abdominal pain, nausea and vomiting.   Genitourinary:  Negative for dysuria.   Musculoskeletal:  Positive for arthralgias. Negative for gait problem.   Skin:  Negative for color change.   Neurological:  Negative for dizziness and numbness.   Psychiatric/Behavioral:  Negative for agitation.        Physical Exam     Initial Vitals [07/04/25 0715]   BP Pulse Resp Temp SpO2   (!) 108/46 108 18 98.8 °F (37.1 °C) 96 %      MAP       --         Physical Exam    Nursing note and vitals reviewed.  Constitutional: She appears well-developed and well-nourished.   HENT:   Head: Atraumatic.   Eyes: EOM are normal. Pupils are equal, round, and reactive to light.   Neck:   Normal  range of motion.  Cardiovascular:  Normal rate and regular rhythm.           Pulmonary/Chest: Breath sounds normal. She exhibits no tenderness.   Abdominal: Abdomen is soft. Bowel sounds are normal. She exhibits no distension. There is no abdominal tenderness. There is no rebound and no guarding.   Musculoskeletal:         General: Tenderness and edema present. Normal range of motion.      Right shoulder: Normal.      Left shoulder: Normal.      Cervical back: Normal range of motion.      Comments: Tenderness to palpation of the right knee.  Crepitus noted with movement of the knee.  Right leg shortening noted.  2+ right dorsalis pedis pulse noted.     Neurological: She is alert and oriented to person, place, and time.   Skin: Skin is warm and dry.   Psychiatric: She has a normal mood and affect.         ED Course   Critical Care    Date/Time: 7/4/2025 10:05 AM    Performed by: Naman Panchal MD  Authorized by: Naman Panchal MD  Direct patient critical care time: 17 minutes  Additional history critical care time: 10 minutes  Ordering / reviewing critical care time: 15 minutes  Documentation critical care time: 11 minutes  Consulting other physicians critical care time: 10 minutes  Total critical care time (exclusive of procedural time) : 63 minutes  Critical care was time spent personally by me on the following activities: development of treatment plan with patient or surrogate, examination of patient, obtaining history from patient or surrogate, ordering and performing treatments and interventions, ordering and review of laboratory studies and ordering and review of radiographic studies.        Labs Reviewed   COMPREHENSIVE METABOLIC PANEL - Abnormal       Result Value    Sodium 129 (*)     Potassium 4.2      Chloride 98      CO2 20 (*)     Glucose 192 (*)     BUN 34 (*)     Creatinine 2.5 (*)     Calcium 9.0      Protein Total 6.7      Albumin 2.3 (*)     Bilirubin Total 1.6 (*)           AST 11       ALT 10      Anion Gap 11      eGFR 21 (*)    CBC WITH DIFFERENTIAL - Abnormal    WBC 0.52 (*)     RBC 3.16 (*)     Hgb 7.9 (*)     Hct 24.9 (*)     MCV 79 (*)     MCH 25.0 (*)     MCHC 31.7 (*)     RDW 13.6      Platelet Count 169      MPV 10.3      Nucleated RBC 0     MANUAL DIFFERENTIAL - Abnormal    Segmented Neutrophil % 22.0 (*)     Lymphocyte % 71.0 (*)     Monocyte % 4.0      Eosinophil % 3.0     CBC W/ AUTO DIFFERENTIAL    Narrative:     The following orders were created for panel order Complete Blood Count (CBC).  Procedure                               Abnormality         Status                     ---------                               -----------         ------                     CBC with Differential[[id number]]       Abnormal            Final result               Manual Differential[[id number]]         Abnormal            Final result                 Please view results for these tests on the individual orders.   GROUP & RH    ABO A      Rh Type POS     INDIRECT ANTIGLOBULIN TEST    Antibody Screen NEG       EKG Readings: (Independently Interpreted)   Initial Reading: No STEMI. Rhythm: Normal Sinus Rhythm. Heart Rate: 93. Ectopy: No Ectopy. Conduction: Normal. ST Segments: Normal ST Segments. T Waves: Normal. Axis: Left Axis Deviation. Clinical Impression: Normal Sinus Rhythm       Imaging Results              X-Ray Femur 2 AP/LAT Right (Final result)  Result time 07/04/25 09:22:49      Final result by Maycol Pablo Jr., MD (07/04/25 09:22:49)                   Impression:      Severely comminuted angulated and displaced fracture of the distal metaphysis of the right femur      Electronically signed by: Maycol Pablo MD  Date:    07/04/2025  Time:    09:22               Narrative:    EXAMINATION:  XR FEMUR 2 VIEW RIGHT    CLINICAL HISTORY:  Unspecified injury of unspecified lower leg, initial encounter    TECHNIQUE:  AP and lateral views of the right femur were  performed.    COMPARISON:  None    FINDINGS:  There is a severely comminuted displaced and angulated fracture of the distal metaphysis of the right femur above the right knee prosthesis.  The proximal shaft of the femur is intact and a fracture of the hip is not seen.                                       X-Ray Knee 1 or 2 View Right (Final result)  Result time 07/04/25 09:22:06      Final result by Maycol Pablo Jr., MD (07/04/25 09:22:06)                   Impression:      Comminuted angulated and displaced fracture of the distal metaphysis of the right femur above the knee prosthesis.      Electronically signed by: Maycol Pablo MD  Date:    07/04/2025  Time:    09:22               Narrative:    EXAMINATION:  XR KNEE 1 OR 2 VIEW RIGHT    CLINICAL HISTORY:  knee injury;    TECHNIQUE:  AP and lateral views of the right knee were performed.    COMPARISON:  Knee x-rays of April 3, 2025    FINDINGS:  There is a comminuted angulated displaced fracture of the distal metaphysis of the right femur above the TKA prosthesis.                                       X-Ray Tibia Fibula 2 View Right (Final result)  Result time 07/04/25 09:08:10      Final result by Maycol Pablo Jr., MD (07/04/25 09:08:10)                   Impression:      Prior right total knee arthroplasty.  Otherwise negative x-rays of the right tibia and fibula      Electronically signed by: Maycol Pablo MD  Date:    07/04/2025  Time:    09:08               Narrative:    EXAMINATION:  XR TIBIA FIBULA 2 VIEW RIGHT    CLINICAL HISTORY:  Unspecified injury of unspecified lower leg, initial encounter    TECHNIQUE:  AP and lateral views of the right tibia and fibula were performed.    COMPARISON:  None.    FINDINGS:  The patient has had a prior right total knee arthroplasty with metallic femoral and tibial components in good position.  Lucency around the prosthesis consistent with osteomyelitis or loosening is not seen.  The rest of the tibia  and fibula are intact without fracture or other osseous abnormalities.  Soft tissue abnormalities are not seen.                                       Medications   sodium chloride 0.9% bolus 500 mL 500 mL (0 mLs Intravenous Stopped 7/4/25 1053)   HYDROmorphone injection 1 mg (1 mg Intravenous Given 7/4/25 0750)   lactated ringers bolus 1,000 mL (0 mLs Intravenous Stopped 7/4/25 0932)   0.9% NaCl infusion (1,000 mLs Intravenous New Bag 7/4/25 1027)   ondansetron injection 4 mg (4 mg Intravenous Given 7/4/25 0954)   fentaNYL 50 mcg/mL injection 25 mcg (25 mcg Intravenous Given 7/4/25 1041)     Medical Decision Making  65-year-old female presents for knee pain status post fall.    Initial differential diagnosis included but not limited to fracture, dislocation, and contusion.    Amount and/or Complexity of Data Reviewed  Labs: ordered.  Radiology: ordered.    Risk  Prescription drug management.  Decision regarding hospitalization.  Risk Details: The patient was emergently evaluated in the emergency department, her evaluation was significant for an older female with an injury to the right knee region.  The patient has a distal femur fracture noted on review of her x-rays per my independent interpretation.  The patient's labs were significant for worsening leukopenia, worsening anemia, dehydration, and acute kidney injury.  The patient was treated here with IV fluids and IV pain medication.  The case was discussed with the orthopedic physician on-call, Dr. Harding.  He will see the patient in consultation.  The patient will be admitted to Hospital Medicine.  The case was discussed with the hospitalist on-call, Dr. Avendaño.  He has accepted the patient for admission.                                      Clinical Impression:  Final diagnoses:  [S89.90XA] Knee injury  [Z01.810] Preoperative cardiovascular examination  [S72.421A, S72.431A] Closed bicondylar fracture of right femur, initial encounter (Primary)  [N17.9] MIGUEL ANGEL  (acute kidney injury)  [D64.9] Anemia, unspecified type          ED Disposition Condition    Admit                     Naman Panchal MD  25 1040         [1]   Social History  Tobacco Use    Smoking status: Former     Current packs/day: 0.00     Average packs/day: 1 pack/day for 11.0 years (11.0 ttl pk-yrs)     Types: Cigarettes     Start date:      Quit date:      Years since quittin.5    Smokeless tobacco: Never   Substance Use Topics    Alcohol use: Not Currently     Comment: occasional 2x/y    Drug use: Never        Naman Panchal MD  25 104

## 2025-07-04 NOTE — PROGRESS NOTES
Pharmacokinetic Initial Assessment: IV Vancomycin    Assessment/Plan:    Initiate intravenous vancomycin with loading dose of 2000 mg once   Desired empiric serum trough concentration is 10 to 15 mcg/mL  Draw vancomycin random level on 7/5 at 1300.  Pharmacy will continue to follow and monitor vancomycin.      Please contact pharmacy at extension [phone] with any questions regarding this assessment.     Thank you for the consult,   Yuliana Augustine       Patient brief summary:  Sonia Muse is a 65 y.o. female initiated on antimicrobial therapy with IV Vancomycin for treatment of suspected urinary tract infection    Drug Allergies:   Review of patient's allergies indicates:   Allergen Reactions    Erythromycin Swelling    Codeine Nausea And Vomiting     And migraine h/a    [place] Blisters       Actual Body Weight:   107 kg    Renal Function:   Estimated Creatinine Clearance: 27.5 mL/min (A) (based on SCr of 2.5 mg/dL (H)).,     Dialysis Method (if applicable):  N/A    CBC (last 72 hours):  Recent Labs   Lab Result Units 07/03/25  1058 07/04/25  0748   WBC K/uL 1.08* 0.52*   Hgb gm/dL 9.2* 7.9*   Hct % 28.9* 24.9*   Platelet Count K/uL 166 169   Monocyte % % 4.0 4.0   Eosinophil % % 2.0 3.0   Basophil % % 0.0  --        Metabolic Panel (last 72 hours):  Recent Labs   Lab Result Units 07/03/25  1058 07/04/25  0748 07/04/25  1057 07/04/25  1156   Sodium mmol/L 132* 129*  --   --    Urine Sodium mmol/L  --   --   --  <20*   Potassium mmol/L 4.0 4.2  --   --    Chloride mmol/L 98 98  --   --    CO2 mmol/L 22* 20*  --   --    Glucose mg/dL 179* 192*  --   --    Glucose, UA   --   --  Trace*  --    BUN mg/dL 31* 34*  --   --    Creatinine mg/dL 1.4 2.5*  --   --    Urine Creatinine mg/dL  --   --   --  255.6   Albumin g/dL 3.5 2.3*  --   --    Bilirubin Total mg/dL 1.0 1.6*  --   --    ALP unit/L 116 129  --   --    AST unit/L 6* 11  --   --    ALT unit/L 5* 10  --   --    Magnesium mg/dL 1.8  --   --   --        Drug  "levels (last 3 results):  No results for input(s): "VANCOMYCINRA", "VANCORANDOM", "VANCOMYCINPE", "VANCOPEAK", "VANCOMYCINTR", "VANCOTROUGH" in the last 72 hours.    Microbiologic Results:  Microbiology Results (last 7 days)       Procedure Component Value Units Date/Time    Blood culture [[id number]]     Order Status: Sent Specimen: Blood     Blood culture [[id number]]     Order Status: Sent Specimen: Blood     Urine culture [[id number]] Collected: 07/04/25 1057    Order Status: Sent Specimen: Urine Updated: 07/04/25 1125            "

## 2025-07-04 NOTE — ASSESSMENT & PLAN NOTE
-Orthopedic Surgery consulted  -NWB RLE  -Fall precautions  -PRN analgesics  -NPO at midnight  -Transfer to [facility] ICU given hypotension

## 2025-07-04 NOTE — ED NOTES
Dr. Avendaño at the bedside.     
Dr. Panchal at the bedside.     
Dr. Panchal at the bedside.     
Nephrology at the bedside.    
Orthopedics MD at the bedside.     
No

## 2025-07-04 NOTE — ANESTHESIA PROCEDURE NOTES
Arterial line    Diagnosis: Femur fracture    Patient location during procedure: ICU  Timeout: 7/4/2025 5:15 PM  Procedure end time: 7/4/2025 5:20 PM    Staffing  Authorizing Provider: Aleks Harper MD  Performing Provider: Aleks Harper MD    Staffing  Performed by: Aleks Harper MD  Authorized by: Aleks Harper MD    Anesthesiologist was present at the time of the procedure.    Preanesthetic Checklist  Completed: patient identified, IV checked, site marked, risks and benefits discussed, monitors and equipment checked, pre-op evaluation, timeout performed and anesthesia consent givenArterial line  Skin Prep: chlorhexidine gluconate  Local Infiltration: none  Orientation: left  Location: radial    Catheter Size: 20 G  Catheter placement by Ultrasound guidance. Heme positive aspiration all ports.   Vessel Caliber: medium, patent, compressibility normal  Vascular Doppler:  not done  Needle advanced into vessel with real time Ultrasound guidance.  Sterile sheath used.Insertion Attempts: 1  Assessment  Dressing: sutured in place and taped and tegaderm  Patient: Tolerated well

## 2025-07-04 NOTE — PROGRESS NOTES
Pharmacist Renal Dose Adjustment Note    Sonia Muse is a 65 y.o. female being treated with the medication Cefepime    Patient Data:    Vital Signs (Most Recent):  Temp: 98.8 °F (37.1 °C) (07/04/25 0715)  Pulse: 101 (07/04/25 1202)  Resp: 18 (07/04/25 1041)  BP: (!) 79/50 (07/04/25 1202)  SpO2: 98 % (07/04/25 1202) Vital Signs (72h Range):  Temp:  [98 °F (36.7 °C)-98.8 °F (37.1 °C)]   Pulse:  []   Resp:  [18]   BP: ()/(40-58)   SpO2:  [93 %-100 %]      Recent Labs   Lab 07/03/25  1058 07/04/25  0748   CREATININE 1.4 2.5*     Serum creatinine: 2.5 mg/dL (H) 07/04/25 0748  Estimated creatinine clearance: 27.5 mL/min (A)    Medication:Cefepime dose: 1 gram frequency every 24 hours will be changed to medication:Cefepime dose:1 gram frequency:every 12 hours due to current CrCl of 27.5    Pharmacist's Name: Yuliana Augustine  Pharmacist's Extension: [phone]

## 2025-07-04 NOTE — SUBJECTIVE & OBJECTIVE
Past Medical History:   Diagnosis Date    Allergy     Anxiety     Asthma     DDD (degenerative disc disease), cervical     Depression     Diabetes mellitus     Fibromyalgia     Hypertension     Neuromuscular disorder     Pancreatic mass     PONV (postoperative nausea and vomiting)     Thyroid disease     hypo       Past Surgical History:   Procedure Laterality Date    ADENOIDECTOMY      CARPAL TUNNEL RELEASE Right      SECTION      ENDOSCOPIC ULTRASOUND OF UPPER GASTROINTESTINAL TRACT N/A 2025    Procedure: ULTRASOUND, UPPER GI TRACT, ENDOSCOPIC;  Surgeon: Austen Grimes III, MD;  Location: [facility] ENDO;  Service: Endoscopy;  Laterality: N/A;    ERCP N/A 2025    Procedure: ERCP (ENDOSCOPIC RETROGRADE CHOLANGIOPANCREATOGRAPHY);  Surgeon: Thuy Escobar MD;  Location: [facility] ENDO;  Service: Endoscopy;  Laterality: N/A;    ERCP N/A 2025    Procedure: ERCP (ENDOSCOPIC RETROGRADE CHOLANGIOPANCREATOGRAPHY);  Surgeon: Austen Grimes III, MD;  Location: [facility] ENDO;  Service: Endoscopy;  Laterality: N/A;    INSERTION OF TUNNELED CENTRAL VENOUS CATHETER (CVC) WITH SUBCUTANEOUS PORT Left 2025    Procedure: [id number];  Surgeon: Ata Fernandez III, MD;  Location: [facility] OR;  Service: General;  Laterality: Left;    JOINT REPLACEMENT Right 2016    knee    KNEE ARTHROPLASTY Left 10/19/2020    Procedure: ARTHROPLASTY, KNEE;  Surgeon: Felipe Herring MD;  Location: [facility];  Service: Orthopedics;  Laterality: Left;  Louie Liz    ROTATOR CUFF REPAIR Right     TONSILLECTOMY      TOTAL KNEE ARTHROPLASTY Right     TUBAL LIGATION         Review of patient's allergies indicates:   Allergen Reactions    Erythromycin Swelling    Codeine Nausea And Vomiting     And migraine h/a    [place] Blisters       No current facility-administered medications on file prior to encounter.     Current Outpatient Medications on File Prior to Encounter   Medication Sig    ALPRAZolam (XANAX) 0.25 MG  tablet Take 1 tablet (0.25 mg total) by mouth daily as needed for Anxiety.    amLODIPine (NORVASC) 5 MG tablet Take 1 tablet (5 mg total) by mouth once daily.    cetirizine (ZYRTEC) 10 MG tablet TAKE 1 TABLET BY MOUTH ONCE DAILY    DULoxetine (CYMBALTA) 60 MG capsule TAKE one CAPSULE BY MOUTH EVERY DAY    hydroCHLOROthiazide (HYDRODIURIL) 25 MG tablet TAKE one TABLET BY MOUTH ONCE DAILY    HYDROcodone-acetaminophen (NORCO) 5-325 mg per tablet Take 1 tablet by mouth every 6 (six) hours as needed.    levothyroxine (SYNTHROID) 25 MCG tablet TAKE one TABLET BY MOUTH BEFORE breakfast    losartan (COZAAR) 100 MG tablet TAKE one TABLET BY MOUTH ONCE DAILY    magnesium oxide (MAGOX) 400 mg (241.3 mg magnesium) tablet Take 1 tablet (400 mg total) by mouth once daily.    metoprolol succinate (TOPROL-XL) 25 MG 24 hr tablet Take 1 tablet (25 mg total) by mouth once daily.    montelukast (SINGULAIR) 10 mg tablet TAKE ONE TABLET BY MOUTH EVERY EVENING    OLANZapine (ZYPREXA) 5 MG tablet Take 1 tablet by mouth nightly on days 1-3 of each chemotherapy cycle.    ondansetron (ZOFRAN) 8 MG tablet Take 1 tablet (8 mg total) by mouth every 8 (eight) hours as needed.    prochlorperazine (COMPAZINE) 10 MG tablet Take 1 tablet (10 mg total) by mouth every 6 (six) hours as needed.    albuterol (VENTOLIN HFA) 90 mcg/actuation inhaler Inhale 2 puffs into the lungs every 6 (six) hours as needed for Wheezing or Shortness of Breath. Rescue    blood sugar diagnostic Strp Use 1 strip to check blood sugar 2 times daily    blood-glucose meter kit Use as instructed    insulin glargine U-100, Lantus, 100 unit/mL (3 mL) SubQ InPn pen Inject 21 Units into the skin every evening.    lancets (LANCETS,THIN) Misc 1 each by Misc.(Non-Drug; Combo Route) route 3 (three) times daily.    LIDOcaine-prilocaine (EMLA) cream Apply topically as needed.    loperamide (IMODIUM) 2 mg capsule Take 2 tablets (4mg) by mouth after first loose stool, 1 tablet every 2 hours  "until diarrhea free for 12 hours. May take 2 tablets (4mg) by mouth every 4 hours at night. May require more than the package labeling maximum dose of 16mg/day.    LORazepam (ATIVAN) 0.5 MG tablet Take 1 tablet (0.5 mg total) by mouth 2 (two) times daily.    meloxicam (MOBIC) 15 MG tablet Take 15 mg by mouth once daily. 1/2 tablet am    metronidazole 0.75% (METROCREAM) 0.75 % Crea Apply 1 application  topically 2 (two) times daily.    pen needle, diabetic 32 gauge x 5/32" Ndle 1 Needle by Misc.(Non-Drug; Combo Route) route once daily.     Family History       Problem Relation (Age of Onset)    Anxiety disorder Daughter    Arthritis Mother    Diabetes Mother, Father    Glaucoma Mother    Graves' disease Daughter    Heart disease Mother, Father, Sister    Hypertension Mother, Father, Sister    Stroke Mother          Tobacco Use    Smoking status: Former     Current packs/day: 0.00     Average packs/day: 1 pack/day for 11.0 years (11.0 ttl pk-yrs)     Types: Cigarettes     Start date:      Quit date:      Years since quittin.5    Smokeless tobacco: Never   Substance and Sexual Activity    Alcohol use: Not Currently     Comment: occasional 2x/y    Drug use: Never    Sexual activity: Yes     Partners: Male     Review of Systems   Constitutional:  Positive for activity change, appetite change and fatigue.   Gastrointestinal:  Positive for abdominal pain, diarrhea and nausea.   Musculoskeletal:  Positive for arthralgias, gait problem, joint swelling and myalgias.   Allergic/Immunologic: Positive for immunocompromised state.     Objective:     Vital Signs (Most Recent):  Temp: 98.8 °F (37.1 °C) (25 0715)  Pulse: 97 (25 1155)  Resp: 18 (25 1041)  BP: (!) 87/58 (25 1153)  SpO2: 97 % (25 1155) Vital Signs (24h Range):  Temp:  [98 °F (36.7 °C)-98.8 °F (37.1 °C)] 98.8 °F (37.1 °C)  Pulse:  [] 97  Resp:  [18] 18  SpO2:  [93 %-100 %] 97 %  BP: ()/(40-58) 87/58     Weight: 107 " kg (236 lb)  Body mass index is 40.51 kg/m².     Physical Exam  Vitals and nursing note reviewed.   Constitutional:       General: She is not in acute distress.     Appearance: She is obese. She is ill-appearing.   HENT:      Head: Normocephalic and atraumatic.      Right Ear: External ear normal.      Left Ear: External ear normal.      Nose: Nose normal.      Mouth/Throat:      Mouth: Mucous membranes are dry.   Eyes:      Extraocular Movements: Extraocular movements intact.      Conjunctiva/sclera: Conjunctivae normal.   Cardiovascular:      Rate and Rhythm: Normal rate and regular rhythm.      Pulses: Normal pulses.      Heart sounds: Normal heart sounds.   Pulmonary:      Effort: Pulmonary effort is normal.      Breath sounds: Normal breath sounds.   Abdominal:      General: There is no distension.      Palpations: Abdomen is soft.      Tenderness: There is no right CVA tenderness or left CVA tenderness.   Genitourinary:     General: Normal vulva.      Rectum: Normal.   Musculoskeletal:         General: Tenderness and signs of injury present.      Cervical back: Normal range of motion and neck supple.      Right lower leg: No edema.      Left lower leg: No edema.   Skin:     General: Skin is warm and dry.      Coloration: Skin is pale.   Neurological:      Mental Status: She is alert and oriented to person, place, and time.   Psychiatric:         Mood and Affect: Mood normal.         Behavior: Behavior normal.                Significant Labs: All pertinent labs within the past 24 hours have been reviewed.    Significant Imaging: I have reviewed all pertinent imaging results/findings within the past 24 hours.   0

## 2025-07-04 NOTE — H&P
[facility]  History & Physical    Patient Name: Sonia Muse  MRN: [id number]  Patient Class: IP- Inpatient  Admission Date: 2025  Attending Physician: Tremaine Avendaño MD  Primary Care Provider: Nasra Galvez FNP         Patient information was obtained from patient, past medical records, and ER records.     Subjective:     Principal Problem:Closed fracture of femur    Chief Complaint:   Chief Complaint   Patient presents with    Knee Injury     Fall  / rt. Knee pain         HPI: Sonia Muse is a 65 year old female with a past medical history of pancreatic cancer, anemia, obesity, DM, HTN, and hypothyroidism who presented with a R femur fracture after a fall, MIGUEL ANGEL, hyponatremia and neutropenia. She recently completed her first cycle of chemotherapy after roughly one week ago (patient of Dr. Calle). She endorses chronic diarrhea and decreased appetite as well as fatigue. She states her BP has fallen since being diagnosed with pancreatic cancer and starting chemotherapy. In the ED, the patient received a 1.5 L NS bolus and 1 L LR bolus as well as fentanyl and Zofran. Hospital Medicine was consulted for admission.    Past Medical History:   Diagnosis Date    Allergy     Anxiety     Asthma     DDD (degenerative disc disease), cervical     Depression     Diabetes mellitus     Fibromyalgia     Hypertension     Neuromuscular disorder     Pancreatic mass     PONV (postoperative nausea and vomiting)     Thyroid disease     hypo       Past Surgical History:   Procedure Laterality Date    ADENOIDECTOMY      CARPAL TUNNEL RELEASE Right      SECTION      ENDOSCOPIC ULTRASOUND OF UPPER GASTROINTESTINAL TRACT N/A 2025    Procedure: ULTRASOUND, UPPER GI TRACT, ENDOSCOPIC;  Surgeon: Austen Grimes III, MD;  Location: [facility];  Service: Endoscopy;  Laterality: N/A;    ERCP N/A 2025    Procedure: ERCP (ENDOSCOPIC RETROGRADE  CHOLANGIOPANCREATOGRAPHY);  Surgeon: Thuy Escobar MD;  Location: [facility] ENDO;  Service: Endoscopy;  Laterality: N/A;    ERCP N/A 05/06/2025    Procedure: ERCP (ENDOSCOPIC RETROGRADE CHOLANGIOPANCREATOGRAPHY);  Surgeon: Austen Grimes III, MD;  Location: [facility] ENDO;  Service: Endoscopy;  Laterality: N/A;    INSERTION OF TUNNELED CENTRAL VENOUS CATHETER (CVC) WITH SUBCUTANEOUS PORT Left 6/19/2025    Procedure: [id number];  Surgeon: Ata Fernandez III, MD;  Location: [facility] OR;  Service: General;  Laterality: Left;    JOINT REPLACEMENT Right 2016    knee    KNEE ARTHROPLASTY Left 10/19/2020    Procedure: ARTHROPLASTY, KNEE;  Surgeon: Felipe Herring MD;  Location: [facility] OR;  Service: Orthopedics;  Laterality: Left;  Louie Webbs    ROTATOR CUFF REPAIR Right     TONSILLECTOMY      TOTAL KNEE ARTHROPLASTY Right     TUBAL LIGATION         Review of patient's allergies indicates:   Allergen Reactions    Erythromycin Swelling    Codeine Nausea And Vomiting     And migraine h/a    [place] Blisters       No current facility-administered medications on file prior to encounter.     Current Outpatient Medications on File Prior to Encounter   Medication Sig    ALPRAZolam (XANAX) 0.25 MG tablet Take 1 tablet (0.25 mg total) by mouth daily as needed for Anxiety.    amLODIPine (NORVASC) 5 MG tablet Take 1 tablet (5 mg total) by mouth once daily.    cetirizine (ZYRTEC) 10 MG tablet TAKE 1 TABLET BY MOUTH ONCE DAILY    DULoxetine (CYMBALTA) 60 MG capsule TAKE one CAPSULE BY MOUTH EVERY DAY    hydroCHLOROthiazide (HYDRODIURIL) 25 MG tablet TAKE one TABLET BY MOUTH ONCE DAILY    HYDROcodone-acetaminophen (NORCO) 5-325 mg per tablet Take 1 tablet by mouth every 6 (six) hours as needed.    levothyroxine (SYNTHROID) 25 MCG tablet TAKE one TABLET BY MOUTH BEFORE breakfast    losartan (COZAAR) 100 MG tablet TAKE one TABLET BY MOUTH ONCE DAILY    magnesium oxide (MAGOX) 400 mg (241.3 mg magnesium) tablet Take 1 tablet (400  "mg total) by mouth once daily.    metoprolol succinate (TOPROL-XL) 25 MG 24 hr tablet Take 1 tablet (25 mg total) by mouth once daily.    montelukast (SINGULAIR) 10 mg tablet TAKE ONE TABLET BY MOUTH EVERY EVENING    OLANZapine (ZYPREXA) 5 MG tablet Take 1 tablet by mouth nightly on days 1-3 of each chemotherapy cycle.    ondansetron (ZOFRAN) 8 MG tablet Take 1 tablet (8 mg total) by mouth every 8 (eight) hours as needed.    prochlorperazine (COMPAZINE) 10 MG tablet Take 1 tablet (10 mg total) by mouth every 6 (six) hours as needed.    albuterol (VENTOLIN HFA) 90 mcg/actuation inhaler Inhale 2 puffs into the lungs every 6 (six) hours as needed for Wheezing or Shortness of Breath. Rescue    blood sugar diagnostic Strp Use 1 strip to check blood sugar 2 times daily    blood-glucose meter kit Use as instructed    insulin glargine U-100, Lantus, 100 unit/mL (3 mL) SubQ InPn pen Inject 21 Units into the skin every evening.    lancets (LANCETS,THIN) Misc 1 each by Misc.(Non-Drug; Combo Route) route 3 (three) times daily.    LIDOcaine-prilocaine (EMLA) cream Apply topically as needed.    loperamide (IMODIUM) 2 mg capsule Take 2 tablets (4mg) by mouth after first loose stool, 1 tablet every 2 hours until diarrhea free for 12 hours. May take 2 tablets (4mg) by mouth every 4 hours at night. May require more than the package labeling maximum dose of 16mg/day.    LORazepam (ATIVAN) 0.5 MG tablet Take 1 tablet (0.5 mg total) by mouth 2 (two) times daily.    meloxicam (MOBIC) 15 MG tablet Take 15 mg by mouth once daily. 1/2 tablet am    metronidazole 0.75% (METROCREAM) 0.75 % Crea Apply 1 application  topically 2 (two) times daily.    pen needle, diabetic 32 gauge x 5/32" Ndle 1 Needle by Misc.(Non-Drug; Combo Route) route once daily.     Family History       Problem Relation (Age of Onset)    Anxiety disorder Daughter    Arthritis Mother    Diabetes Mother, Father    Glaucoma Mother    Graves' disease Daughter    Heart disease " Mother, Father, Sister    Hypertension Mother, Father, Sister    Stroke Mother          Tobacco Use    Smoking status: Former     Current packs/day: 0.00     Average packs/day: 1 pack/day for 11.0 years (11.0 ttl pk-yrs)     Types: Cigarettes     Start date:      Quit date:      Years since quittin.5    Smokeless tobacco: Never   Substance and Sexual Activity    Alcohol use: Not Currently     Comment: occasional 2x/y    Drug use: Never    Sexual activity: Yes     Partners: Male     Review of Systems   Constitutional:  Positive for activity change, appetite change and fatigue.   Gastrointestinal:  Positive for abdominal pain, diarrhea and nausea.   Musculoskeletal:  Positive for arthralgias, gait problem, joint swelling and myalgias.   Allergic/Immunologic: Positive for immunocompromised state.     Objective:     Vital Signs (Most Recent):  Temp: 98.8 °F (37.1 °C) (25 0715)  Pulse: 97 (25 1155)  Resp: 18 (25 1041)  BP: (!) 87/58 (25 1153)  SpO2: 97 % (25 1155) Vital Signs (24h Range):  Temp:  [98 °F (36.7 °C)-98.8 °F (37.1 °C)] 98.8 °F (37.1 °C)  Pulse:  [] 97  Resp:  [18] 18  SpO2:  [93 %-100 %] 97 %  BP: ()/(40-58) 87/58     Weight: 107 kg (236 lb)  Body mass index is 40.51 kg/m².     Physical Exam  Vitals and nursing note reviewed.   Constitutional:       General: She is not in acute distress.     Appearance: She is obese. She is ill-appearing.   HENT:      Head: Normocephalic and atraumatic.      Right Ear: External ear normal.      Left Ear: External ear normal.      Nose: Nose normal.      Mouth/Throat:      Mouth: Mucous membranes are dry.   Eyes:      Extraocular Movements: Extraocular movements intact.      Conjunctiva/sclera: Conjunctivae normal.   Cardiovascular:      Rate and Rhythm: Normal rate and regular rhythm.      Pulses: Normal pulses.      Heart sounds: Normal heart sounds.   Pulmonary:      Effort: Pulmonary effort is normal.      Breath  sounds: Normal breath sounds.   Abdominal:      General: There is no distension.      Palpations: Abdomen is soft.      Tenderness: There is no right CVA tenderness or left CVA tenderness.   Genitourinary:     General: Normal vulva.      Rectum: Normal.   Musculoskeletal:         General: Tenderness and signs of injury present.      Cervical back: Normal range of motion and neck supple.      Right lower leg: No edema.      Left lower leg: No edema.   Skin:     General: Skin is warm and dry.      Coloration: Skin is pale.   Neurological:      Mental Status: She is alert and oriented to person, place, and time.   Psychiatric:         Mood and Affect: Mood normal.         Behavior: Behavior normal.                Significant Labs: All pertinent labs within the past 24 hours have been reviewed.    Significant Imaging: I have reviewed all pertinent imaging results/findings within the past 24 hours.  Assessment/Plan:     Assessment & Plan  Closed fracture of femur  -Orthopedic Surgery consulted  -NWGUERA RLE  -Fall precautions  -PRN analgesics  -NPO at midnight  -Transfer to [facility] ICU given hypotension      MIGUEL ANGEL (acute kidney injury)  Likely pre-renal given diarrhea and decreased appetite with low BP.  -IV fluids  -Nephrology consulted  -Avoid nephrotoxic agents  -Renally dose medications  -Monitor UOP and electrolytes  -Trend creatinine  Chemotherapy-induced neutropenia  Chemotherapy roughly one week ago.  -Trend CBC  -Oncology consulted regarding options to increase neutrophil count prior to surgery  -Empiric renally dosed cefepime and vancomycin given UA findings concerning for UTI  -Follow up urine and blood cultures      Hypotension  GCS 15. NSR on telemetry. Appears chronic in setting of dehydration and cancer.  -Continue IV fluids  -Midodrine 10 TID    Essential hypertension  -Hold home medications given hypotension  -Continue to monitor  Pancreatic cancer  -Oncology consulted    Microcytic anemia  In setting of  "pancreatic adenocarcinoma and MIGUEL ANGEL.  -Trend Hgb with CBC  Severe obesity (BMI >= 40)  Body mass index is 40.51 kg/m². Morbid obesity complicates all aspects of disease management from diagnostic modalities to treatment.       DM (diabetes mellitus)  Patient's FSGs are controlled on current medication regimen.  Last A1c reviewed-   Lab Results   Component Value Date    HGBA1C 10.1 (H) 04/25/2025     Most recent fingerstick glucose reviewed- No results for input(s): "POCTGLUCOSE" in the last 24 hours.  Current correctional scale  Medium  Maintain anti-hyperglycemic dose as follows-   Antihyperglycemics (From admission, onward)      Start     Stop Route Frequency Ordered    07/04/25 1142  insulin aspart U-100 pen 0-10 Units         -- SubQ Before meals & nightly PRN 07/04/25 1046          Hold Oral hypoglycemics while patient is in the hospital.  Hypothyroid  -Synthroid  -Check TSH    Hyponatremia  -IV  cc/hr  -Nephrology consulted  -Trend Na  VTE Risk Mitigation (From admission, onward)           Ordered     heparin (porcine) injection 5,000 Units  Every 8 hours         07/04/25 1046     IP VTE HIGH RISK PATIENT  Once         07/04/25 1046     Place sequential compression device  Until discontinued         07/04/25 1046                                                Tremaine Avendaño MD  Department of Hospital Medicine  [facility] - ED          "

## 2025-07-05 ENCOUNTER — ANESTHESIA EVENT (OUTPATIENT)
Dept: SURGERY | Facility: HOSPITAL | Age: 65
End: 2025-07-05
Payer: MEDICAID

## 2025-07-05 PROBLEM — R79.89 ABNORMAL LFTS: Status: ACTIVE | Noted: 2025-07-05

## 2025-07-05 PROBLEM — N30.00 ACUTE CYSTITIS: Status: ACTIVE | Noted: 2025-07-05

## 2025-07-05 PROBLEM — D63.0 ANEMIA IN NEOPLASTIC DISEASE: Status: ACTIVE | Noted: 2025-07-04

## 2025-07-05 PROBLEM — S72.401A CLOSED FRACTURE OF DISTAL END OF RIGHT FEMUR: Status: ACTIVE | Noted: 2025-07-04

## 2025-07-05 PROBLEM — D62 ACUTE BLOOD LOSS ANEMIA: Status: ACTIVE | Noted: 2025-07-04

## 2025-07-05 LAB
ABSOLUTE EOSINOPHIL (SMH): 0.01 K/UL
ABSOLUTE MONOCYTE (SMH): 0.12 K/UL (ref 0.3–1)
ABSOLUTE NEUTROPHIL COUNT (SMH): 0 K/UL (ref 1.8–7.7)
ALBUMIN SERPL-MCNC: 2.7 G/DL (ref 3.5–5.2)
ALBUMIN SERPL-MCNC: 2.7 G/DL (ref 3.5–5.2)
ALP SERPL-CCNC: 110 UNIT/L (ref 55–135)
ALP SERPL-CCNC: 111 UNIT/L (ref 55–135)
ALT SERPL-CCNC: 5 UNIT/L (ref 10–44)
ALT SERPL-CCNC: 6 UNIT/L (ref 10–44)
ANION GAP (SMH): 10 MMOL/L (ref 8–16)
ANION GAP (SMH): 9 MMOL/L (ref 8–16)
AST SERPL-CCNC: 4 UNIT/L (ref 10–40)
AST SERPL-CCNC: 7 UNIT/L (ref 10–40)
BASOPHILS # BLD AUTO: 0 K/UL
BASOPHILS NFR BLD AUTO: 0 %
BILIRUB SERPL-MCNC: 1.6 MG/DL (ref 0.1–1)
BILIRUB SERPL-MCNC: 3.3 MG/DL (ref 0.1–1)
BUN SERPL-MCNC: 31 MG/DL (ref 8–23)
BUN SERPL-MCNC: 36 MG/DL (ref 8–23)
CALCIUM SERPL-MCNC: 7.9 MG/DL (ref 8.7–10.5)
CALCIUM SERPL-MCNC: 8.1 MG/DL (ref 8.7–10.5)
CANCER AG19-9 SERPL-ACNC: 35 U/ML (ref 0–35)
CHLORIDE SERPL-SCNC: 101 MMOL/L (ref 95–110)
CHLORIDE SERPL-SCNC: 102 MMOL/L (ref 95–110)
CO2 SERPL-SCNC: 17 MMOL/L (ref 23–29)
CO2 SERPL-SCNC: 19 MMOL/L (ref 23–29)
CREAT SERPL-MCNC: 2 MG/DL (ref 0.5–1.4)
CREAT SERPL-MCNC: 2.5 MG/DL (ref 0.5–1.4)
ERYTHROCYTE [DISTWIDTH] IN BLOOD BY AUTOMATED COUNT: 14.6 % (ref 11.5–14.5)
FERRITIN SERPL-MCNC: 3357.6 NG/ML (ref 20–300)
GFR SERPLBLD CREATININE-BSD FMLA CKD-EPI: 21 ML/MIN/1.73/M2
GFR SERPLBLD CREATININE-BSD FMLA CKD-EPI: 28 ML/MIN/1.73/M2
GLUCOSE SERPL-MCNC: 263 MG/DL (ref 70–110)
GLUCOSE SERPL-MCNC: 264 MG/DL (ref 70–110)
HCT VFR BLD AUTO: 23.6 % (ref 37–48.5)
HGB BLD-MCNC: 7.6 GM/DL (ref 12–16)
HGB BLD-MCNC: 7.8 GM/DL (ref 12–16)
IMM GRANULOCYTES # BLD AUTO: 0.01 K/UL (ref 0–0.04)
IMM GRANULOCYTES NFR BLD AUTO: 2.1 % (ref 0–0.5)
LYMPHOCYTES # BLD AUTO: 0.32 K/UL (ref 1–4.8)
MAGNESIUM SERPL-MCNC: 1.5 MG/DL (ref 1.6–2.6)
MAGNESIUM SERPL-MCNC: 1.7 MG/DL (ref 1.6–2.6)
MCH RBC QN AUTO: 26.1 PG (ref 27–31)
MCHC RBC AUTO-ENTMCNC: 33.1 G/DL (ref 32–36)
MCV RBC AUTO: 79 FL (ref 82–98)
NUCLEATED RBC (/100WBC) (SMH): 0 /100 WBC
PHOSPHATE SERPL-MCNC: 3.5 MG/DL (ref 2.7–4.5)
PLATELET # BLD AUTO: 172 K/UL (ref 150–450)
PMV BLD AUTO: 10 FL (ref 9.2–12.9)
POCT GLUCOSE: 262 MG/DL (ref 70–110)
POCT GLUCOSE: 275 MG/DL (ref 70–110)
POCT GLUCOSE: 288 MG/DL (ref 70–110)
POCT GLUCOSE: 294 MG/DL (ref 70–110)
POTASSIUM SERPL-SCNC: 3.8 MMOL/L (ref 3.5–5.1)
POTASSIUM SERPL-SCNC: 4.1 MMOL/L (ref 3.5–5.1)
PROT SERPL-MCNC: 6.2 GM/DL (ref 6–8.4)
PROT SERPL-MCNC: 6.2 GM/DL (ref 6–8.4)
RBC # BLD AUTO: 2.99 M/UL (ref 4–5.4)
RELATIVE EOSINOPHIL (SMH): 2.1 % (ref 0–8)
RELATIVE LYMPHOCYTE (SMH): 66.7 % (ref 18–48)
RELATIVE MONOCYTE (SMH): 25 % (ref 4–15)
RELATIVE NEUTROPHIL (SMH): 4.1 % (ref 38–73)
SODIUM SERPL-SCNC: 128 MMOL/L (ref 136–145)
SODIUM SERPL-SCNC: 130 MMOL/L (ref 136–145)
VANCOMYCIN SERPL-MCNC: 14.6 UG/ML (ref ?–80)
WBC # BLD AUTO: 0.48 K/UL (ref 3.9–12.7)

## 2025-07-05 PROCEDURE — 83735 ASSAY OF MAGNESIUM: CPT | Performed by: HOSPITALIST

## 2025-07-05 PROCEDURE — 80202 ASSAY OF VANCOMYCIN: CPT | Performed by: STUDENT IN AN ORGANIZED HEALTH CARE EDUCATION/TRAINING PROGRAM

## 2025-07-05 PROCEDURE — 85025 COMPLETE CBC W/AUTO DIFF WBC: CPT | Performed by: STUDENT IN AN ORGANIZED HEALTH CARE EDUCATION/TRAINING PROGRAM

## 2025-07-05 PROCEDURE — 25000003 PHARM REV CODE 250: Performed by: STUDENT IN AN ORGANIZED HEALTH CARE EDUCATION/TRAINING PROGRAM

## 2025-07-05 PROCEDURE — 27000207 HC ISOLATION

## 2025-07-05 PROCEDURE — 99232 SBSQ HOSP IP/OBS MODERATE 35: CPT | Mod: ,,, | Performed by: INTERNAL MEDICINE

## 2025-07-05 PROCEDURE — 63600175 PHARM REV CODE 636 W HCPCS: Performed by: HOSPITALIST

## 2025-07-05 PROCEDURE — 84100 ASSAY OF PHOSPHORUS: CPT | Performed by: STUDENT IN AN ORGANIZED HEALTH CARE EDUCATION/TRAINING PROGRAM

## 2025-07-05 PROCEDURE — 85018 HEMOGLOBIN: CPT | Performed by: HOSPITALIST

## 2025-07-05 PROCEDURE — 82962 GLUCOSE BLOOD TEST: CPT

## 2025-07-05 PROCEDURE — 94761 N-INVAS EAR/PLS OXIMETRY MLT: CPT

## 2025-07-05 PROCEDURE — 20000000 HC ICU ROOM

## 2025-07-05 PROCEDURE — 99900031 HC PATIENT EDUCATION (STAT)

## 2025-07-05 PROCEDURE — 25000003 PHARM REV CODE 250: Performed by: HOSPITALIST

## 2025-07-05 PROCEDURE — 80053 COMPREHEN METABOLIC PANEL: CPT | Performed by: HOSPITALIST

## 2025-07-05 PROCEDURE — 80053 COMPREHEN METABOLIC PANEL: CPT | Performed by: STUDENT IN AN ORGANIZED HEALTH CARE EDUCATION/TRAINING PROGRAM

## 2025-07-05 PROCEDURE — 27000221 HC OXYGEN, UP TO 24 HOURS

## 2025-07-05 PROCEDURE — 63600175 PHARM REV CODE 636 W HCPCS: Performed by: STUDENT IN AN ORGANIZED HEALTH CARE EDUCATION/TRAINING PROGRAM

## 2025-07-05 PROCEDURE — 63600175 PHARM REV CODE 636 W HCPCS: Mod: JZ,JB | Performed by: INTERNAL MEDICINE

## 2025-07-05 PROCEDURE — 99900035 HC TECH TIME PER 15 MIN (STAT)

## 2025-07-05 PROCEDURE — 83735 ASSAY OF MAGNESIUM: CPT | Performed by: STUDENT IN AN ORGANIZED HEALTH CARE EDUCATION/TRAINING PROGRAM

## 2025-07-05 RX ORDER — SODIUM CHLORIDE 0.9 % (FLUSH) 0.9 %
10 SYRINGE (ML) INJECTION
Status: DISCONTINUED | OUTPATIENT
Start: 2025-07-05 | End: 2025-07-06

## 2025-07-05 RX ORDER — MUPIROCIN 20 MG/G
OINTMENT TOPICAL
Status: DISCONTINUED | OUTPATIENT
Start: 2025-07-05 | End: 2025-07-06

## 2025-07-05 RX ORDER — TRANEXAMIC ACID 10 MG/ML
1000 INJECTION, SOLUTION INTRAVENOUS
Status: DISCONTINUED | OUTPATIENT
Start: 2025-07-05 | End: 2025-07-06

## 2025-07-05 RX ORDER — INSULIN GLARGINE 100 [IU]/ML
15 INJECTION, SOLUTION SUBCUTANEOUS NIGHTLY
Status: DISCONTINUED | OUTPATIENT
Start: 2025-07-05 | End: 2025-07-06

## 2025-07-05 RX ORDER — MAGNESIUM SULFATE 1 G/100ML
1 INJECTION INTRAVENOUS ONCE
Status: COMPLETED | OUTPATIENT
Start: 2025-07-05 | End: 2025-07-05

## 2025-07-05 RX ORDER — NOREPINEPHRINE BITARTRATE/D5W 8 MG/250ML
0-3 PLASTIC BAG, INJECTION (ML) INTRAVENOUS CONTINUOUS
Status: DISCONTINUED | OUTPATIENT
Start: 2025-07-05 | End: 2025-07-08

## 2025-07-05 RX ORDER — MONTELUKAST SODIUM 10 MG/1
10 TABLET ORAL NIGHTLY
Status: DISCONTINUED | OUTPATIENT
Start: 2025-07-05 | End: 2025-07-11 | Stop reason: HOSPADM

## 2025-07-05 RX ADMIN — INSULIN ASPART 6 UNITS: 100 INJECTION, SOLUTION INTRAVENOUS; SUBCUTANEOUS at 01:07

## 2025-07-05 RX ADMIN — OXYCODONE HYDROCHLORIDE 5 MG: 5 TABLET ORAL at 07:07

## 2025-07-05 RX ADMIN — CEFEPIME 1 G: 1 INJECTION, POWDER, FOR SOLUTION INTRAMUSCULAR; INTRAVENOUS at 01:07

## 2025-07-05 RX ADMIN — FILGRASTIM-SNDZ 480 MCG: 480 INJECTION, SOLUTION INTRAVENOUS; SUBCUTANEOUS at 10:07

## 2025-07-05 RX ADMIN — HYDROMORPHONE HYDROCHLORIDE 1 MG: 1 INJECTION, SOLUTION INTRAMUSCULAR; INTRAVENOUS; SUBCUTANEOUS at 07:07

## 2025-07-05 RX ADMIN — MAGNESIUM SULFATE IN DEXTROSE 1 G: 10 INJECTION, SOLUTION INTRAVENOUS at 01:07

## 2025-07-05 RX ADMIN — HYDROMORPHONE HYDROCHLORIDE 1 MG: 1 INJECTION, SOLUTION INTRAMUSCULAR; INTRAVENOUS; SUBCUTANEOUS at 03:07

## 2025-07-05 RX ADMIN — DULOXETINE 60 MG: 30 CAPSULE, DELAYED RELEASE ORAL at 09:07

## 2025-07-05 RX ADMIN — NOREPINEPHRINE BITARTRATE 0.2 MCG/KG/MIN: 4 INJECTION, SOLUTION INTRAVENOUS at 01:07

## 2025-07-05 RX ADMIN — NOREPINEPHRINE BITARTRATE 0.2 MCG/KG/MIN: 4 INJECTION, SOLUTION INTRAVENOUS at 05:07

## 2025-07-05 RX ADMIN — MIDODRINE HYDROCHLORIDE 10 MG: 5 TABLET ORAL at 05:07

## 2025-07-05 RX ADMIN — MUPIROCIN 1 G: 20 OINTMENT TOPICAL at 08:07

## 2025-07-05 RX ADMIN — NOREPINEPHRINE BITARTRATE 0.17 MCG/KG/MIN: 4 INJECTION, SOLUTION INTRAVENOUS at 07:07

## 2025-07-05 RX ADMIN — HEPARIN SODIUM 5000 UNITS: 5000 INJECTION INTRAVENOUS; SUBCUTANEOUS at 09:07

## 2025-07-05 RX ADMIN — VANCOMYCIN HYDROCHLORIDE 750 MG: 750 INJECTION, POWDER, LYOPHILIZED, FOR SOLUTION INTRAVENOUS at 05:07

## 2025-07-05 RX ADMIN — INSULIN ASPART 8 UNITS: 100 INJECTION, SOLUTION INTRAVENOUS; SUBCUTANEOUS at 07:07

## 2025-07-05 RX ADMIN — MONTELUKAST 10 MG: 10 TABLET, FILM COATED ORAL at 08:07

## 2025-07-05 RX ADMIN — NOREPINEPHRINE BITARTRATE 0.17 MCG/KG/MIN: 4 INJECTION, SOLUTION INTRAVENOUS at 03:07

## 2025-07-05 RX ADMIN — MUPIROCIN 1 G: 20 OINTMENT TOPICAL at 09:07

## 2025-07-05 RX ADMIN — NOREPINEPHRINE BITARTRATE 0.18 MCG/KG/MIN: 4 INJECTION, SOLUTION INTRAVENOUS at 08:07

## 2025-07-05 RX ADMIN — HYDROMORPHONE HYDROCHLORIDE 1 MG: 1 INJECTION, SOLUTION INTRAMUSCULAR; INTRAVENOUS; SUBCUTANEOUS at 11:07

## 2025-07-05 RX ADMIN — LORAZEPAM 0.5 MG: 0.5 TABLET ORAL at 08:07

## 2025-07-05 RX ADMIN — HEPARIN SODIUM 5000 UNITS: 5000 INJECTION INTRAVENOUS; SUBCUTANEOUS at 01:07

## 2025-07-05 RX ADMIN — NOREPINEPHRINE BITARTRATE 0.12 MCG/KG/MIN: 4 INJECTION, SOLUTION INTRAVENOUS at 11:07

## 2025-07-05 RX ADMIN — NOREPINEPHRINE BITARTRATE 0.16 MCG/KG/MIN: 4 INJECTION, SOLUTION INTRAVENOUS at 12:07

## 2025-07-05 RX ADMIN — NOREPINEPHRINE BITARTRATE 0.2 MCG/KG/MIN: 4 INJECTION, SOLUTION INTRAVENOUS at 11:07

## 2025-07-05 RX ADMIN — OXYCODONE HYDROCHLORIDE 5 MG: 5 TABLET ORAL at 02:07

## 2025-07-05 RX ADMIN — SODIUM CHLORIDE: 9 INJECTION, SOLUTION INTRAVENOUS at 09:07

## 2025-07-05 RX ADMIN — INSULIN ASPART 3 UNITS: 100 INJECTION, SOLUTION INTRAVENOUS; SUBCUTANEOUS at 09:07

## 2025-07-05 RX ADMIN — INSULIN GLARGINE 15 UNITS: 100 INJECTION, SOLUTION SUBCUTANEOUS at 09:07

## 2025-07-05 RX ADMIN — LEVOTHYROXINE SODIUM 25 MCG: 0.03 TABLET ORAL at 05:07

## 2025-07-05 RX ADMIN — MIDODRINE HYDROCHLORIDE 10 MG: 5 TABLET ORAL at 01:07

## 2025-07-05 RX ADMIN — SODIUM CHLORIDE: 9 INJECTION, SOLUTION INTRAVENOUS at 05:07

## 2025-07-05 RX ADMIN — MIDODRINE HYDROCHLORIDE 10 MG: 5 TABLET ORAL at 09:07

## 2025-07-05 RX ADMIN — INSULIN ASPART 6 UNITS: 100 INJECTION, SOLUTION INTRAVENOUS; SUBCUTANEOUS at 05:07

## 2025-07-05 NOTE — ASSESSMENT & PLAN NOTE
WBC today 0.48 ANC 0  Continue on growth factors with Zarxio 480 mcg daily until an ANC >1000 for 2 consecutive days  Trend CBC with diff daily  Continue empiric renally dosed cefepime and vancomycin given UA findings concerning for UTI  Follow up urine and blood cultures

## 2025-07-05 NOTE — ASSESSMENT & PLAN NOTE
Discussed her case with Orthopedic Surgery; surgery to be postponed to Sunday in view of her severe neutropenia  NWB RLE  Fall precautions  PRN analgesics

## 2025-07-05 NOTE — PROGRESS NOTES
[facility]  Hematology/Oncology  Progress Note    Patient Name: Sonia Muse  Admission Date: 7/4/2025  Hospital Length of Stay: 1 days  Code Status: Full Code     Subjective:     HPI:  Sonia Muse is a 65 year old female with a past medical history of a locally advanced pancreatic cancer, anemia, obesity, DM, HTN, and hypothyroidism who presented with a right femur fracture after a fall, MIGUEL ANGEL, hyponatremia and neutropenia.   She recently completed her first cycle of chemotherapy . She was not able to receive her second one due to prolonged myelosuppression.    She endorses chronic diarrhea and decreased appetite as well as fatigue. The patient denies CP, cough, SOB, abdominal pain, nausea, vomiting, constipation.  The patient denies fever, chills, night sweats, weight loss, new lumps or bumps, easy bruising or bleeding.    In the ED, the patient received a 1.5 L NS bolus and 1 L LR bolus as well as fentanyl and Zofran. Oncology was consulted in view of her severe neutropenia.     Interval History: no events overnight    Oncology Treatment Plan:   OP GI mFOLFIRINOX (oxaliplatin leucovorin irinotecan fluorouracil) Q2W    Medications:  Continuous Infusions:   0.9% NaCl   Intravenous Continuous 125 mL/hr at 07/05/25 0601 Rate Verify at 07/05/25 0601    NORepinephrine bitartrate-D5W  0-3 mcg/kg/min Intravenous Continuous 83 mL/hr at 07/05/25 1352 0.2 mcg/kg/min at 07/05/25 1352     Scheduled Meds:   alteplase  2 mg Intra-Catheter Once    ceFEPime IV (PEDS and ADULTS)  1 g Intravenous Q12H    DULoxetine  60 mg Oral Daily    filgrastim-sndz  480 mcg Subcutaneous Daily    heparin (porcine)  5,000 Units Subcutaneous Q8H    insulin glargine U-100 (Lantus)  15 Units Subcutaneous QHS    levothyroxine  25 mcg Oral Before breakfast    magnesium sulfate 1 g IVPB  1 g Intravenous Once    midodrine  10 mg Oral Q8H    montelukast  10 mg Oral QHS    mupirocin   Nasal BID    vancomycin (VANCOCIN) IV (PEDS  and ADULTS)  750 mg Intravenous Once     PRN Meds:  Current Facility-Administered Medications:     0.9%  NaCl infusion (for blood administration), , Intravenous, Q24H PRN    acetaminophen, 650 mg, Oral, Q8H PRN    dextrose 50%, 12.5 g, Intravenous, PRN    dextrose 50%, 25 g, Intravenous, PRN    glucagon (human recombinant), 1 mg, Intramuscular, PRN    glucose, 16 g, Oral, PRN    glucose, 24 g, Oral, PRN    HYDROmorphone, 1 mg, Intravenous, Q4H PRN    insulin aspart U-100, 0-10 Units, Subcutaneous, QID (AC + HS) PRN    LORazepam, 0.5 mg, Oral, BID PRN    naloxone, 0.02 mg, Intravenous, PRN    oxyCODONE, 5 mg, Oral, Q6H PRN    prochlorperazine, 5 mg, Intravenous, Q6H PRN    sodium chloride 0.9%, 10 mL, Intravenous, PRN    Pharmacy to dose Vancomycin consult, , , Once **AND** vancomycin - pharmacy to dose, , Intravenous, pharmacy to manage frequency     Review of Systems   Constitutional:  Positive for fatigue and fever. Negative for activity change and appetite change.   HENT:  Negative for mouth sores and postnasal drip.    Eyes:  Negative for visual disturbance.   Respiratory:  Positive for cough and shortness of breath. Negative for chest tightness.    Cardiovascular:  Positive for leg swelling. Negative for chest pain and palpitations.   Gastrointestinal:  Negative for abdominal distention, abdominal pain, blood in stool, diarrhea, nausea and vomiting.   Genitourinary:  Negative for difficulty urinating, dysuria, hematuria and urgency.   Musculoskeletal:  Negative for arthralgias, back pain and joint swelling.   Skin:  Negative for rash.   Neurological:  Positive for weakness. Negative for dizziness and headaches.   Hematological:  Negative for adenopathy. Bruises/bleeds easily.   Psychiatric/Behavioral:  Negative for confusion and decreased concentration. The patient is nervous/anxious.      Objective:     Vital Signs (Most Recent):  Temp: 99 °F (37.2 °C) (07/05/25 0701)  Pulse: 106 (07/05/25 0732)  Resp: (!) 23  (07/05/25 1115)  BP: 116/61 (07/05/25 0732)  SpO2: 95 % (07/05/25 1300) Vital Signs (24h Range):  Temp:  [98.1 °F (36.7 °C)-99 °F (37.2 °C)] 99 °F (37.2 °C)  Pulse:  [] 106  Resp:  [11-28] 23  SpO2:  [90 %-98 %] 95 %  BP: ()/(50-78) 116/61  Arterial Line BP: ()/(41-58) 104/54     Weight: 110.7 kg (244 lb 0.8 oz)  Body mass index is 41.89 kg/m².  Body surface area is 2.24 meters squared.      Intake/Output Summary (Last 24 hours) at 7/5/2025 1407  Last data filed at 7/5/2025 0601  Gross per 24 hour   Intake 4670.92 ml   Output 560 ml   Net 4110.92 ml        Physical Exam     Significant Labs:   All pertinent labs from the last 24 hours have been reviewed.    Diagnostic Results:  I have reviewed and interpreted all pertinent imaging results/findings within the past 24 hours.  Assessment/Plan:     * Pancreatic cancer  Currently on neoadjuvant treatment with mFOLFIRONOX.  Her systemic therapy will be put on hold awaiting her neutropenia to resolved.  She will follow-up with her primary Oncologist upon her discharge    Chemotherapy-induced neutropenia  WBC today 0.48 ANC 0  Continue on growth factors with Zarxio 480 mcg daily until an ANC >1000 for 2 consecutive days  Trend CBC with diff daily  Continue empiric renally dosed cefepime and vancomycin given UA findings concerning for UTI  Follow up urine and blood cultures    Closed fracture of femur  Discussed her case with Orthopedic Surgery; surgery to be postponed to Sunday in view of her severe neutropenia  NWB RLE  Fall precautions  PRN analgesics    Microcytic anemia  Multifactorial due to possible bleeding s/p fall as well due to her systemic chemotherapy  Trend H/H q 8 hours.  Transfuse if Hgb is less than 7 g/dl  Iron studies is reflecting anemia of chronic disease.  Hgb 7.8 g/dl    MIGUEL ANGEL (acute kidney injury)  Likely pre-renal given diarrhea and decreased appetite with low BP.  Start IV fluids  Nephrology consulted  Avoid nephrotoxic  agents  Monitor UOP and electrolytes  Crea today 2.5.  F/up BMP    Hypotension  Due to dehydration and sepsis  Continue IV fluid  Started on Levophed      Abnormal LFTs  TB: 3.3  Most likely due to her pancreatic cancer, polypharmacy and sepsis.  Continue to monitor.        Thank you for your consult. I will follow-up with patient. Please contact us if you have any additional questions.     Renee Menon MD  Hematology/Oncology  [facility]

## 2025-07-05 NOTE — PLAN OF CARE
Problem: Acute Kidney Injury/Impairment  Goal: Effective Renal Function  7/5/2025 0338 by Marisa Espinal RN  Outcome: Progressing  7/5/2025 0338 by Marisa Espinal, RN  Outcome: Progressing     Problem: Adult Inpatient Plan of Care  Goal: Optimal Comfort and Wellbeing  7/5/2025 0338 by Marisa Espinal, RN  Outcome: Not Progressing  7/5/2025 0338 by Marisa Espinal, RN  Outcome: Progressing     Problem: Wound  Goal: Optimal Pain Control and Function  7/5/2025 0338 by Marisa Espinal, RN  Outcome: Not Progressing  7/5/2025 0338 by Marisa Espinal, RN  Outcome: Progressing

## 2025-07-05 NOTE — CARE UPDATE
07/04/25 1953   Patient Assessment/Suction   Level of Consciousness (AVPU) alert   Respiratory Effort Unlabored   Expansion/Accessory Muscles/Retractions expansion symmetric   All Lung Fields Breath Sounds clear   Rhythm/Pattern, Respiratory depth regular;pattern regular   Cough Frequency infrequent   Cough Type good;nonproductive   Skin Integrity   $ Wound Care Tech Time 15 min   Area Observed Left;Right;Behind ear;Cheek;Upper lip;Nares   Skin Appearance without discoloration   PRE-TX-O2   Device (Oxygen Therapy) nasal cannula   $ Is the patient on Low Flow Oxygen? Yes   Flow (L/min) (Oxygen Therapy) 2   SpO2 97 %   Pulse Oximetry Type Continuous   $ Pulse Oximetry - Multiple Charge Pulse Oximetry - Multiple   Pulse 89   Resp 13   Education   $ Education Oxygen;15 min

## 2025-07-05 NOTE — ASSESSMENT & PLAN NOTE
Likely pre-renal given diarrhea and decreased appetite with low BP.  Start IV fluids  Nephrology consulted  Avoid nephrotoxic agents  Monitor UOP and electrolytes  Crea today 2.5.  F/up BMP

## 2025-07-05 NOTE — PROGRESS NOTES
Pharmacokinetic Assessment Follow Up: IV Vancomycin    Vancomycin serum concentration assessment(s):    The trough level was drawn correctly and can be used to guide therapy at this time. The measurement is within the desired definitive target range of 10 to 15 mcg/mL.    Vancomycin Regimen Plan:    Change regimen to Vancomycin 750 mg IV once with next serum trough concentration measured at 1600 prior to next dose on 07/06.    Drug levels (last 3 results):  Recent Labs   Lab Result Units 07/05/25  1300   Vancomycin Random ug/ml 14.6       Pharmacy will continue to follow and monitor vancomycin.    Please contact pharmacy at extension [phone] for questions regarding this assessment.    Thank you for the consult,   Lui Muñiz       Patient brief summary:  Sonia Muse is a 65 y.o. female initiated on antimicrobial therapy with IV Vancomycin for treatment of urinary tract infection    Drug Allergies:   Review of patient's allergies indicates:   Allergen Reactions    Erythromycin Swelling    Codeine Nausea And Vomiting     And migraine h/a    Lactose     [place] Blisters       Actual Body Weight:   110.7 kg    Renal Function:   Estimated Creatinine Clearance: 28 mL/min (A) (based on SCr of 2.5 mg/dL (H)).,     Dialysis Method (if applicable):  N/A    CBC (last 72 hours):  Recent Labs   Lab Result Units 07/03/25  1058 07/04/25  0748 07/04/25  1515 07/04/25  2320 07/05/25  0326   WBC K/uL 1.08* 0.52*  --  0.56* 0.48*   Hgb gm/dL 9.2* 7.9* 7.0* 7.8* 7.8*   Hct % 28.9* 24.9*  --  24.1* 23.6*   Platelet Count K/uL 166 169  --  177 172   Mono % %  --   --   --  17.9* 25.0*   Monocyte % % 4.0 4.0  --   --   --    Eos % %  --   --   --  1.8 2.1   Eosinophil % % 2.0 3.0  --   --   --    Basophil % % 0.0  --   --  0.0 0.0       Metabolic Panel (last 72 hours):  Recent Labs   Lab Result Units 07/03/25  1058 07/04/25  0748 07/04/25  1057 07/04/25  1156 07/05/25  0326   Sodium mmol/L 132* 129*  --   --  128*   Urine Sodium  mmol/L  --   --   --  <20*  --    Potassium mmol/L 4.0 4.2  --   --  4.1   Chloride mmol/L 98 98  --   --  101   CO2 mmol/L 22* 20*  --   --  17*   Glucose mg/dL 179* 192*  --   --  264*   Glucose, UA   --   --  Trace*  --   --    BUN mg/dL 31* 34*  --   --  36*   Creatinine mg/dL 1.4 2.5*  --   --  2.5*   Urine Creatinine mg/dL  --   --   --  255.6  --    Albumin g/dL 3.5 2.3*  --   --  2.7*   Bilirubin Total mg/dL 1.0 1.6*  --   --  3.3*   ALP unit/L 116 129  --   --  110   AST unit/L 6* 11  --   --  7*   ALT unit/L 5* 10  --   --  6*   Magnesium mg/dL 1.8  --   --   --  1.5*   Phosphorus Level mg/dL  --   --   --   --  3.5       Vancomycin Administrations:  vancomycin given in the last 96 hours                     vancomycin (VANCOCIN) 2,000 mg in 0.9% NaCl 500 mL IVPB (admixture device) (mg) 2,000 mg New Bag 07/04/25 1508                    Microbiologic Results:  Microbiology Results (last 7 days)       Procedure Component Value Units Date/Time    Urine culture [[id number]] Collected: 07/04/25 1057    Order Status: Completed Specimen: Urine Updated: 07/05/25 0741     Urine Culture No Growth To Date    Blood culture [[id number]]  (Normal) Collected: 07/04/25 1251    Order Status: Completed Specimen: Blood from Peripheral, Hand, Left Updated: 07/05/25 0102     CULTURE, BLOOD ([facility]) No Growth After 6 Hours    Blood culture [[id number]]  (Normal) Collected: 07/04/25 1247    Order Status: Completed Specimen: Blood from Peripheral, Antecubital, Left Updated: 07/05/25 0102     CULTURE, BLOOD ([facility]) No Growth After 6 Hours

## 2025-07-05 NOTE — CONSULTS
INPATIENT NEPHROLOGY CONSULT   [facility] NEPHROLOGY    Sonia Muse  07/05/2025    Reason for consultation:    Acute kidney injury    Chief Complaint:   Chief Complaint   Patient presents with    Knee Injury     Fall  / rt. Knee pain           History of Present Illness:    Per Dillon  Sonia Muse is a 65 year old female with a past medical history of pancreatic cancer, anemia, obesity, DM, HTN, and hypothyroidism who presented with a R femur fracture after a fall, MIGUEL ANGEL, hyponatremia and neutropenia. She recently completed her first cycle of chemotherapy after roughly one week ago (patient of Dr. Calle). She endorses chronic diarrhea and decreased appetite as well as fatigue. She states her BP has fallen since being diagnosed with pancreatic cancer and starting chemotherapy. In the ED, the patient received a 1.5 L NS bolus and 1 L LR bolus as well as fentanyl and Zofran.     7/5  pos leg pain, very tired, intermittent hypotension, on levophed drip, 560 cc uop        Plan of Care:       Assessment:    Acute kidney injury secondary to hemodynamically mediated renal injury.  Got her first round of chemo a week ago.  Oxaliplatin can cause  a rise in creatinine in 5-10% of cases.    --Avoid NSAIDS, Fields II inhibitors, and other non-essential nephrotoxic agents  --volume resuscitation  --Keep mean arterial pressure above 60 and systolic blood pressure above 100 as able to maintain renal perfusion  --hold losartan, hctz, mobic,   --urine sodium <20 implicates low tubular flow.  No casts on urine micro  --catheter in place  --avoid vancomycin toxicity    Hyponatremia  --no hypotonic iv piggy backs  --urine osm 318 implicating impaired free water clearance  --uric acid  --push nutrition    Anemia  --as per heme/onc  --iron adequate    Hypotension  --hold antihypertensives  --volume resuscitation  --treat infections  --vasopressor support as needed         Thank you for allowing us to participate in this  patient's care. We will continue to follow.    Vital Signs:  Temp Readings from Last 3 Encounters:   25 99 °F (37.2 °C) (Axillary)   25 98 °F (36.7 °C) (Temporal)   25 97.2 °F (36.2 °C)       Pulse Readings from Last 3 Encounters:   25 106   25 107   25 76       BP Readings from Last 3 Encounters:   25 116/61   25 (!) 108/55   25 109/69       Weight:  Wt Readings from Last 3 Encounters:   25 110.7 kg (244 lb 0.8 oz)   25 107.4 kg (236 lb 12.4 oz)   25 110.5 kg (243 lb 8 oz)       Past Medical & Surgical History:  Past Medical History:   Diagnosis Date    Allergy     Anxiety     Asthma     DDD (degenerative disc disease), cervical     Depression     Diabetes mellitus     Fibromyalgia     Hypertension     Neuromuscular disorder     Pancreatic mass     PONV (postoperative nausea and vomiting)     Thyroid disease     hypo       Past Surgical History:   Procedure Laterality Date    ADENOIDECTOMY      CARPAL TUNNEL RELEASE Right      SECTION      ENDOSCOPIC ULTRASOUND OF UPPER GASTROINTESTINAL TRACT N/A 2025    Procedure: ULTRASOUND, UPPER GI TRACT, ENDOSCOPIC;  Surgeon: Austen Grimes III, MD;  Location: [facility] ENDO;  Service: Endoscopy;  Laterality: N/A;    ERCP N/A 2025    Procedure: ERCP (ENDOSCOPIC RETROGRADE CHOLANGIOPANCREATOGRAPHY);  Surgeon: Thuy Escobar MD;  Location: [facility] ENDO;  Service: Endoscopy;  Laterality: N/A;    ERCP N/A 2025    Procedure: ERCP (ENDOSCOPIC RETROGRADE CHOLANGIOPANCREATOGRAPHY);  Surgeon: Austen Grimes III, MD;  Location: [facility] ENDO;  Service: Endoscopy;  Laterality: N/A;    INSERTION OF TUNNELED CENTRAL VENOUS CATHETER (CVC) WITH SUBCUTANEOUS PORT Left 2025    Procedure: [id number]-PVWL-R-CATH;  Surgeon: Ata Fernandez III, MD;  Location: [facility] OR;  Service: General;  Laterality: Left;    JOINT REPLACEMENT Right 2016    knee    KNEE ARTHROPLASTY Left 10/19/2020     Procedure: ARTHROPLASTY, KNEE;  Surgeon: Felipe Herring MD;  Location: [facility];  Service: Orthopedics;  Laterality: Left;  Louie Liz    ROTATOR CUFF REPAIR Right     TONSILLECTOMY      TOTAL KNEE ARTHROPLASTY Right     TUBAL LIGATION         Past Social History:  Social History     Socioeconomic History    Marital status:     Number of children: 3   Occupational History    Occupation: [employer]     Comment: primary sub for [employer]   Tobacco Use    Smoking status: Former     Current packs/day: 0.00     Average packs/day: 1 pack/day for 11.0 years (11.0 ttl pk-yrs)     Types: Cigarettes     Start date:      Quit date:      Years since quittin.5    Smokeless tobacco: Never   Substance and Sexual Activity    Alcohol use: Not Currently     Comment: occasional 2x/y    Drug use: Never    Sexual activity: Yes     Partners: Male     Social Drivers of Health     Financial Resource Strain: Low Risk  (2025)    Overall Financial Resource Strain (CARDIA)     Difficulty of Paying Living Expenses: Not hard at all   Recent Concern: Financial Resource Strain - High Risk (2025)    Overall Financial Resource Strain (CARDIA)     Difficulty of Paying Living Expenses: Hard   Food Insecurity: No Food Insecurity (2025)    Hunger Vital Sign     Worried About Running Out of Food in the Last Year: Never true     Ran Out of Food in the Last Year: Never true   Recent Concern: Food Insecurity - Food Insecurity Present (2025)    Hunger Vital Sign     Worried About Running Out of Food in the Last Year: Sometimes true     Ran Out of Food in the Last Year: Sometimes true   Transportation Needs: No Transportation Needs (2025)    PRAPARE - Transportation     Lack of Transportation (Medical): No     Lack of Transportation (Non-Medical): No   Recent Concern: Transportation Needs - Unmet Transportation Needs (2025)    PRAPARE - Transportation     Lack of Transportation (Medical): Yes     Lack of  Transportation (Non-Medical): Yes   Physical Activity: Unknown (4/28/2025)    Exercise Vital Sign     Days of Exercise per Week: Patient declined     Minutes of Exercise per Session: 30 min   Stress: No Stress Concern Present (7/5/2025)    [employer] - Occupational Stress Questionnaire     Feeling of Stress : Not at all   Recent Concern: Stress - Stress Concern Present (4/28/2025)    [employer] - Occupational Stress Questionnaire     Feeling of Stress : Very much   Housing Stability: Low Risk  (7/5/2025)    Housing Stability Vital Sign     Unable to Pay for Housing in the Last Year: No     Number of Times Moved in the Last Year: 0     Homeless in the Last Year: No       Medications:  Medications Ordered Prior to Encounter[1]  Scheduled Meds:   alteplase  2 mg Intra-Catheter Once    ceFEPime IV (PEDS and ADULTS)  1 g Intravenous Q12H    DULoxetine  60 mg Oral Daily    filgrastim-sndz  480 mcg Subcutaneous Daily    heparin (porcine)  5,000 Units Subcutaneous Q8H    insulin glargine U-100 (Lantus)  15 Units Subcutaneous QHS    levothyroxine  25 mcg Oral Before breakfast    midodrine  10 mg Oral Q8H    montelukast  10 mg Oral QHS    mupirocin   Nasal BID    vancomycin (VANCOCIN) IV (PEDS and ADULTS)  750 mg Intravenous Once     Continuous Infusions:   0.9% NaCl   Intravenous Continuous 125 mL/hr at 07/05/25 0601 Rate Verify at 07/05/25 0601    NORepinephrine bitartrate-D5W  0-3 mcg/kg/min Intravenous Continuous 83 mL/hr at 07/05/25 1352 0.2 mcg/kg/min at 07/05/25 1352     PRN Meds:.  Current Facility-Administered Medications:     0.9%  NaCl infusion (for blood administration), , Intravenous, Q24H PRN    acetaminophen, 650 mg, Oral, Q8H PRN    dextrose 50%, 12.5 g, Intravenous, PRN    dextrose 50%, 25 g, Intravenous, PRN    glucagon (human recombinant), 1 mg, Intramuscular, PRN    glucose, 16 g, Oral, PRN    glucose, 24 g, Oral, PRN    HYDROmorphone, 1 mg,  "Intravenous, Q4H PRN    insulin aspart U-100, 0-10 Units, Subcutaneous, QID (AC + HS) PRN    LORazepam, 0.5 mg, Oral, BID PRN    naloxone, 0.02 mg, Intravenous, PRN    oxyCODONE, 5 mg, Oral, Q6H PRN    prochlorperazine, 5 mg, Intravenous, Q6H PRN    sodium chloride 0.9%, 10 mL, Intravenous, PRN    Pharmacy to dose Vancomycin consult, , , Once **AND** vancomycin - pharmacy to dose, , Intravenous, pharmacy to manage frequency    Allergies:  Erythromycin, Codeine, Lactose, and [place]    Past Family History:  Reviewed; refer to Hospitalist Admission Note    Review of Systems:  Review of Systems - All 14 systems reviewed and negative, except as noted in HPI    Physical Exam:    /61   Pulse 106   Temp 99 °F (37.2 °C) (Axillary)   Resp (!) 26   Ht 5' 4" (1.626 m)   Wt 110.7 kg (244 lb 0.8 oz)   SpO2 95%   Breastfeeding No   BMI 41.89 kg/m²     General Appearance:    Alert, cooperative, no distress, appears stated age   Head:    Normocephalic, without obvious abnormality, atraumatic   Eyes:    PER, conjunctiva/corneas clear, EOM's intact in both eyes        Throat:   Lips, mucosa, and tongue normal; teeth and gums normal   Back:     Symmetric, no curvature, ROM normal, no CVA tenderness   Lungs:     Clear to auscultation bilaterally, respirations unlabored   Chest wall:    No tenderness or deformity   Heart:    Regular rate and rhythm, S1 and S2 normal, no murmur, rub   or gallop   Abdomen:     Soft, non-tender, bowel sounds active all four quadrants,     no masses, no organomegaly   Extremities:   Extremities normal, atraumatic, no cyanosis or edema   Pulses:   2+ and symmetric all extremities   MSK:   No joint or muscle swelling, tenderness or deformity   Skin:   Skin color, texture, turgor normal, no rashes or lesions   Neurologic:   CNII-XII intact, normal strength and sensation       Throughout.  No flap     Results:  Lab Results   Component Value Date     (L) 07/05/2025    K 4.1 07/05/2025    CL " 101 07/05/2025    CO2 17 (L) 07/05/2025    BUN 36 (H) 07/05/2025    CREATININE 2.5 (H) 07/05/2025    CALCIUM 7.9 (L) 07/05/2025    ANIONGAP 10 07/05/2025    ESTGFRAFRICA >60.0 01/24/2022    EGFRNONAA >60.0 01/24/2022       Lab Results   Component Value Date    CALCIUM 7.9 (L) 07/05/2025    PHOS 3.5 07/05/2025       Recent Labs   Lab 07/05/25  0326   WBC 0.48*   RBC 2.99*   HGB 7.8*   HCT 23.6*      MCV 79*   MCH 26.1*   MCHC 33.1          I have personally reviewed pertinent radiological imaging and reports.    Kenton Lundberg MD  [facility]  [phone]            [1]   No current facility-administered medications on file prior to encounter.     Current Outpatient Medications on File Prior to Encounter   Medication Sig Dispense Refill    ALPRAZolam (XANAX) 0.25 MG tablet Take 1 tablet (0.25 mg total) by mouth daily as needed for Anxiety. 30 tablet 0    amLODIPine (NORVASC) 5 MG tablet Take 1 tablet (5 mg total) by mouth once daily. 90 tablet 1    cetirizine (ZYRTEC) 10 MG tablet TAKE 1 TABLET BY MOUTH ONCE DAILY 90 tablet 1    DULoxetine (CYMBALTA) 60 MG capsule TAKE one CAPSULE BY MOUTH EVERY DAY 90 capsule 1    hydroCHLOROthiazide (HYDRODIURIL) 25 MG tablet TAKE one TABLET BY MOUTH ONCE DAILY 90 tablet 1    HYDROcodone-acetaminophen (NORCO) 5-325 mg per tablet Take 1 tablet by mouth every 6 (six) hours as needed. 10 tablet 0    levothyroxine (SYNTHROID) 25 MCG tablet TAKE one TABLET BY MOUTH BEFORE breakfast 90 tablet 1    losartan (COZAAR) 100 MG tablet TAKE one TABLET BY MOUTH ONCE DAILY 90 tablet 0    magnesium oxide (MAGOX) 400 mg (241.3 mg magnesium) tablet Take 1 tablet (400 mg total) by mouth once daily. 200 tablet 1    metoprolol succinate (TOPROL-XL) 25 MG 24 hr tablet Take 1 tablet (25 mg total) by mouth once daily. 90 tablet 1    montelukast (SINGULAIR) 10 mg tablet TAKE ONE TABLET BY MOUTH EVERY EVENING 90 tablet 1    OLANZapine (ZYPREXA) 5 MG tablet Take 1 tablet by mouth  "nightly on days 1-3 of each chemotherapy cycle. 3 tablet 11    ondansetron (ZOFRAN) 8 MG tablet Take 1 tablet (8 mg total) by mouth every 8 (eight) hours as needed. 30 tablet 2    prochlorperazine (COMPAZINE) 10 MG tablet Take 1 tablet (10 mg total) by mouth every 6 (six) hours as needed. 30 tablet 1    albuterol (VENTOLIN HFA) 90 mcg/actuation inhaler Inhale 2 puffs into the lungs every 6 (six) hours as needed for Wheezing or Shortness of Breath. Rescue 18 g 1    blood sugar diagnostic Strp Use 1 strip to check blood sugar 2 times daily 100 each 5    blood-glucose meter kit Use as instructed 1 each 0    insulin glargine U-100, Lantus, 100 unit/mL (3 mL) SubQ InPn pen Inject 21 Units into the skin every evening. 9 mL 3    lancets (LANCETS,THIN) Misc 1 each by Misc.(Non-Drug; Combo Route) route 3 (three) times daily. 90 each 0    LIDOcaine-prilocaine (EMLA) cream Apply topically as needed. 30 g 0    loperamide (IMODIUM) 2 mg capsule Take 2 tablets (4mg) by mouth after first loose stool, 1 tablet every 2 hours until diarrhea free for 12 hours. May take 2 tablets (4mg) by mouth every 4 hours at night. May require more than the package labeling maximum dose of 16mg/day. 30 capsule 11    LORazepam (ATIVAN) 0.5 MG tablet Take 1 tablet (0.5 mg total) by mouth 2 (two) times daily. 60 tablet 0    meloxicam (MOBIC) 15 MG tablet Take 15 mg by mouth once daily. 1/2 tablet am      metronidazole 0.75% (METROCREAM) 0.75 % Crea Apply 1 application  topically 2 (two) times daily.      pen needle, diabetic 32 gauge x 5/32" Ndle 1 Needle by Misc.(Non-Drug; Combo Route) route once daily. 100 each 2     "

## 2025-07-05 NOTE — PLAN OF CARE
CM/SW went to Pt room to complete assessment, but pt was not not able to answer the any questions. CM reached out to pt family, but got no answer. CM will reach out to family/patient again.

## 2025-07-05 NOTE — PLAN OF CARE
07/05/25 0732   Patient Assessment/Suction   Level of Consciousness (AVPU) alert   Respiratory Effort Normal;Unlabored   Expansion/Accessory Muscles/Retractions no use of accessory muscles;no retractions   All Lung Fields Breath Sounds clear   PRE-TX-O2   Device (Oxygen Therapy) nasal cannula   $ Is the patient on Low Flow Oxygen? Yes   Flow (L/min) (Oxygen Therapy) 2   SpO2 (!) 93 %   Pulse Oximetry Type Continuous   $ Pulse Oximetry - Multiple Charge Pulse Oximetry - Multiple   Pulse 106   Resp 18   /61   Education   $ Education Oxygen;15 min

## 2025-07-05 NOTE — PLAN OF CARE
Problem: Skin Injury Risk Increased  Goal: Skin Health and Integrity  Outcome: Progressing     Problem: Infection  Goal: Absence of Infection Signs and Symptoms  Outcome: Progressing     Problem: Adult Inpatient Plan of Care  Goal: Plan of Care Review  Outcome: Progressing     Problem: Adult Inpatient Plan of Care  Goal: Patient-Specific Goal (Individualized)  Outcome: Progressing     Problem: Adult Inpatient Plan of Care  Goal: Optimal Comfort and Wellbeing  Outcome: Progressing     Problem: Adult Inpatient Plan of Care  Goal: Readiness for Transition of Care  Outcome: Progressing

## 2025-07-05 NOTE — ANESTHESIA PREPROCEDURE EVALUATION
07/05/2025  Sonia Muse is a 65 y.o., female.        Results for orders placed or performed during the hospital encounter of 07/04/25   EKG 12-lead    Collection Time: 07/04/25 10:09 AM   Result Value Ref Range    QRS Duration 94 ms    OHS QTC Calculation 469 ms    Narrative    Test Reason : Z01.810,    Vent. Rate :  93 BPM     Atrial Rate :  93 BPM     P-R Int : 184 ms          QRS Dur :  94 ms      QT Int : 378 ms       P-R-T Axes :  73 -49  83 degrees    QTcB Int : 469 ms    Normal sinus rhythm  Left axis deviation  Abnormal ECG  When compared with ECG of 24-Apr-2025 17:03,  T wave inversion no longer evident in Anterior leads  Confirmed by Jon Little ([id number]) on 7/4/2025 5:45:26 PM    Referred By: AAAREFERRAL SELF           Confirmed By: Jon Little        Imaging Results              X-Ray Chest AP Portable (Final result)  Result time 07/04/25 12:21:31      Final result by Maycol Pablo Jr., MD (07/04/25 12:21:31)                   Impression:      Bands of atelectasis are noted in both lower lung fields unchanged.  Venous Port-A-Cath in good position.      Electronically signed by: Maycol Pablo MD  Date:    07/04/2025  Time:    12:21               Narrative:    EXAMINATION:  XR CHEST AP PORTABLE    CLINICAL HISTORY:  pre-op;    TECHNIQUE:  Single frontal view of the chest was performed.    COMPARISON:  Chest x-ray of June 19, 2025    FINDINGS:  A venous Port-A-Cath is noted in place in the left chest with central line ending in the superior vena cava.  The cardiac size and contours normal.  Bands of atelectasis are seen in both lung bases unchanged from the prior study.  No pneumothorax or pleural effusion is noted.                                       X-Ray Femur 2 AP/LAT Right (Final result)  Result time 07/04/25 09:22:49      Final result by Maycol Pablo Jr., MD  (07/04/25 09:22:49)                   Impression:      Severely comminuted angulated and displaced fracture of the distal metaphysis of the right femur      Electronically signed by: Maycol Pablo MD  Date:    07/04/2025  Time:    09:22               Narrative:    EXAMINATION:  XR FEMUR 2 VIEW RIGHT    CLINICAL HISTORY:  Unspecified injury of unspecified lower leg, initial encounter    TECHNIQUE:  AP and lateral views of the right femur were performed.    COMPARISON:  None    FINDINGS:  There is a severely comminuted displaced and angulated fracture of the distal metaphysis of the right femur above the right knee prosthesis.  The proximal shaft of the femur is intact and a fracture of the hip is not seen.                                       X-Ray Knee 1 or 2 View Right (Final result)  Result time 07/04/25 09:22:06      Final result by Maycol Pablo Jr., MD (07/04/25 09:22:06)                   Impression:      Comminuted angulated and displaced fracture of the distal metaphysis of the right femur above the knee prosthesis.      Electronically signed by: Maycol Pablo MD  Date:    07/04/2025  Time:    09:22               Narrative:    EXAMINATION:  XR KNEE 1 OR 2 VIEW RIGHT    CLINICAL HISTORY:  knee injury;    TECHNIQUE:  AP and lateral views of the right knee were performed.    COMPARISON:  Knee x-rays of April 3, 2025    FINDINGS:  There is a comminuted angulated displaced fracture of the distal metaphysis of the right femur above the TKA prosthesis.                                       X-Ray Tibia Fibula 2 View Right (Final result)  Result time 07/04/25 09:08:10      Final result by Maycol Pablo Jr., MD (07/04/25 09:08:10)                   Impression:      Prior right total knee arthroplasty.  Otherwise negative x-rays of the right tibia and fibula      Electronically signed by: Maycol Pablo MD  Date:    07/04/2025  Time:    09:08               Narrative:    EXAMINATION:  XR TIBIA  FIBULA 2 VIEW RIGHT    CLINICAL HISTORY:  Unspecified injury of unspecified lower leg, initial encounter    TECHNIQUE:  AP and lateral views of the right tibia and fibula were performed.    COMPARISON:  None.    FINDINGS:  The patient has had a prior right total knee arthroplasty with metallic femoral and tibial components in good position.  Lucency around the prosthesis consistent with osteomyelitis or loosening is not seen.  The rest of the tibia and fibula are intact without fracture or other osseous abnormalities.  Soft tissue abnormalities are not seen.                                         Lab Results   Component Value Date    WBC 0.48 (LL) 07/05/2025    HGB 7.8 (L) 07/05/2025    HCT 23.6 (L) 07/05/2025    MCV 79 (L) 07/05/2025     07/05/2025     BMP  Lab Results   Component Value Date     (L) 07/05/2025    K 4.1 07/05/2025     07/05/2025    CO2 17 (L) 07/05/2025    BUN 36 (H) 07/05/2025    CREATININE 2.5 (H) 07/05/2025    CALCIUM 7.9 (L) 07/05/2025    ANIONGAP 10 07/05/2025     (H) 07/05/2025     (H) 07/04/2025     (H) 07/03/2025       Results for orders placed in visit on 12/03/20    Echo Color Flow Doppler? Yes    Interpretation Summary  · There is left ventricular concentric remodeling.  · The left ventricle is normal in size with normal systolic function. The estimated ejection fraction is 65%.  · Normal left ventricular diastolic function.  · Normal right ventricular size with normal right ventricular systolic function.  · Mild mitral regurgitation.  · Mild tricuspid regurgitation.  · Normal central venous pressure (3 mmHg).  · The estimated PA systolic pressure is 28 mmHg.          Pre-op Assessment    I have reviewed the Patient Summary Reports.     I have reviewed the Nursing Notes. I have reviewed the NPO Status.   I have reviewed the Medications.     Review of Systems  Anesthesia Hx:             Denies Family Hx of Anesthesia complications.   Personal Hx of  Anesthesia complications, Post-Operative Nausea/Vomiting, in the past, but not with recent anesthetics / prophylaxis                    Social:  Former Smoker, No Alcohol Use       Hematology/Oncology:       -- Anemia:               Hematology Comments: Anemia and neutropenia after recent chemo    Current/Recent Cancer. (pancreatic cancer)         chemotherapy       EENT/Dental:  EENT/Dental Normal           Cardiovascular:     Hypertension, well controlled         Denies Orthopnea.      ECG has been reviewed. History of tachycardia Patient on beta blockers Beta blocker taken in last 24 hours                   Hypertension         Pulmonary:    Asthma (albuterol as needed only) mild    Pt reports symptom c/w NICOLAS, no testing. Not using CPAP  Ventolin inhaler use  Singulair use  Oxygen saturation 94% prior to procedure               Renal/:  Chronic Renal Disease, ARF, CKD   MIGUEL ANGEL this admission  CRI     Kidney Function/Disease             Hepatic/GI:        Pancreatic mass and biliary obstruction.  No recent nausea or vomiting.  Chronic diarrhea           Pancreatic Disease Pancreatic Tumor  Musculoskeletal:  Arthritis   Admitted after fall with R femur fracture  Muscle Disorders: Fibromyalgia  Joint Disease:  Arthritis, Osteoarthritis     Spine Disorders: cervical Disc disease and Degenerative disease           Neurological:    Neuromuscular Disease,             Chronic Pain Syndrome Osteoarthritis                         Neuromuscular Disease   Endocrine:  Diabetes, poorly controlled, type 2, using insulin         Morbid Obesity / BMI > 40  Psych:  Psychiatric History anxiety depression                Physical Exam  General: Cooperative, Alert, Oriented and Well nourished    Airway:  Mallampati: I   Mouth Opening: Normal  TM Distance: Normal  Tongue: Normal  Neck ROM: Normal ROM    Dental:  Intact    Chest/Lungs:  Clear to auscultation, Normal Respiratory Rate    Heart:  Rate: Normal  Rhythm: Regular  Rhythm  Sounds: Normal        Anesthesia Plan  Type of Anesthesia, risks & benefits discussed:    Anesthesia Type: Gen ETT  Intra-op Monitoring Plan: Standard ASA Monitors  Post Op Pain Control Plan: multimodal analgesia  Induction:  IV  Informed Consent: Informed consent signed with the Patient and all parties understand the risks and agree with anesthesia plan.  All questions answered.   ASA Score: 4 Emergent  Anesthesia Plan Notes: GETA  IV tylenol OK  Zofran Pepcid       Ready For Surgery From Anesthesia Perspective.     .

## 2025-07-05 NOTE — HPI
Sonia Muse is a 65 year old female with a past medical history of a locally advanced pancreatic cancer, anemia, obesity, DM, HTN, and hypothyroidism who presented with a right femur fracture after a fall, MIGUEL ANGEL, hyponatremia and neutropenia.   She recently completed her first cycle of chemotherapy . She was not able to receive her second one due to prolonged myelosuppression.    She endorses chronic diarrhea and decreased appetite as well as fatigue. The patient denies CP, cough, SOB, abdominal pain, nausea, vomiting, constipation.  The patient denies fever, chills, night sweats, weight loss, new lumps or bumps, easy bruising or bleeding.    In the ED, the patient received a 1.5 L NS bolus and 1 L LR bolus as well as fentanyl and Zofran. Oncology was consulted in view of her severe neutropenia.

## 2025-07-05 NOTE — ASSESSMENT & PLAN NOTE
She will be started on growth factors with Zarxio 480 mcg daily until an ANC >1000 for 2 consecutive days  Trend CBC with diff daily  Continue empiric renally dosed cefepime and vancomycin given UA findings concerning for UTI  Follow up urine and blood cultures

## 2025-07-05 NOTE — ASSESSMENT & PLAN NOTE
Likely pre-renal given diarrhea and decreased appetite with low BP.  Start IV fluids  Nephrology consulted  Avoid nephrotoxic agents  Monitor UOP and electrolytes  F/up BMP

## 2025-07-05 NOTE — SUBJECTIVE & OBJECTIVE
I    Oncology Treatment Plan:   OP GI mFOLFIRINOX (oxaliplatin leucovorin irinotecan fluorouracil) Q2W    Medications:  Continuous Infusions:   0.9% NaCl   Intravenous Continuous 125 mL/hr at 07/04/25 2215 New Bag at 07/04/25 2215    NORepinephrine bitartrate-D5W  0-3 mcg/kg/min Intravenous Continuous 66.4 mL/hr at 07/04/25 2201 0.16 mcg/kg/min at 07/04/25 2201     Scheduled Meds:   alteplase  2 mg Intra-Catheter Once    ceFEPime IV (PEDS and ADULTS)  1 g Intravenous Q12H    DULoxetine  60 mg Oral Daily    filgrastim-sndz  480 mcg Subcutaneous Daily    heparin (porcine)  5,000 Units Subcutaneous Q8H    [START ON 7/5/2025] levothyroxine  25 mcg Oral Before breakfast    midodrine  10 mg Oral Q8H    mupirocin   Nasal BID     PRN Meds:  Current Facility-Administered Medications:     0.9%  NaCl infusion (for blood administration), , Intravenous, Q24H PRN    acetaminophen, 650 mg, Oral, Q8H PRN    dextrose 50%, 12.5 g, Intravenous, PRN    dextrose 50%, 25 g, Intravenous, PRN    glucagon (human recombinant), 1 mg, Intramuscular, PRN    glucose, 16 g, Oral, PRN    glucose, 24 g, Oral, PRN    HYDROmorphone, 1 mg, Intravenous, Q4H PRN    insulin aspart U-100, 0-10 Units, Subcutaneous, QID (AC + HS) PRN    LORazepam, 0.5 mg, Oral, BID PRN    naloxone, 0.02 mg, Intravenous, PRN    oxyCODONE, 5 mg, Oral, Q6H PRN    prochlorperazine, 5 mg, Intravenous, Q6H PRN    sodium chloride 0.9%, 10 mL, Intravenous, PRN    Pharmacy to dose Vancomycin consult, , , Once **AND** vancomycin - pharmacy to dose, , Intravenous, pharmacy to manage frequency     Review of Systems   Constitutional:  Positive for activity change, appetite change, fatigue and unexpected weight change. Negative for fever.   HENT:  Negative for mouth sores and postnasal drip.    Eyes:  Negative for visual disturbance.   Respiratory:  Negative for cough, chest tightness and shortness of breath.    Cardiovascular:  Negative for chest pain, palpitations and leg swelling.    Gastrointestinal:  Positive for diarrhea and nausea. Negative for abdominal distention, abdominal pain, blood in stool and vomiting.   Genitourinary:  Negative for difficulty urinating, dysuria, hematuria and urgency.   Musculoskeletal:  Positive for arthralgias and gait problem. Negative for back pain and joint swelling.   Skin:  Negative for rash.   Neurological:  Positive for weakness. Negative for dizziness and headaches.   Hematological:  Negative for adenopathy. Does not bruise/bleed easily.   Psychiatric/Behavioral:  Positive for decreased concentration. Negative for confusion.      Objective:     Vital Signs (Most Recent):  Temp: 98.2 °F (36.8 °C) (07/04/25 1915)  Pulse: 96 (07/04/25 2215)  Resp: 14 (07/04/25 2215)  BP: 123/69 (07/04/25 2215)  SpO2: 95 % (07/04/25 2215) Vital Signs (24h Range):  Temp:  [98.2 °F (36.8 °C)-98.8 °F (37.1 °C)] 98.2 °F (36.8 °C)  Pulse:  [] 96  Resp:  [13-28] 14  SpO2:  [90 %-100 %] 95 %  BP: ()/(40-71) 123/69  Arterial Line BP: ()/(41-58) 122/58     Weight: 110.7 kg (244 lb 0.8 oz)  Body mass index is 41.89 kg/m².  Body surface area is 2.24 meters squared.      Intake/Output Summary (Last 24 hours) at 7/4/2025 2253  Last data filed at 7/4/2025 2215  Gross per 24 hour   Intake 4564.46 ml   Output 110 ml   Net 4454.46 ml        Physical Exam  Vitals reviewed.   Constitutional:       Appearance: She is ill-appearing.   HENT:      Head: Normocephalic.   Eyes:      General: No scleral icterus.     Pupils: Pupils are equal, round, and reactive to light.   Cardiovascular:      Rate and Rhythm: Normal rate and regular rhythm.      Pulses: Normal pulses.      Heart sounds: Normal heart sounds.   Pulmonary:      Effort: Pulmonary effort is normal.      Breath sounds: Normal breath sounds.   Abdominal:      General: Bowel sounds are normal. There is no distension.   Musculoskeletal:         General: Deformity and signs of injury (right lower extremity) present. No  swelling.   Lymphadenopathy:      Cervical: No cervical adenopathy.   Skin:     General: Skin is warm.      Findings: No rash.   Neurological:      General: No focal deficit present.      Mental Status: She is alert and oriented to person, place, and time.      Cranial Nerves: No cranial nerve deficit.      Motor: Weakness present.      Gait: Gait abnormal.   Psychiatric:         Mood and Affect: Mood normal.          Significant Labs:   All pertinent labs from the last 24 hours have been reviewed.    Diagnostic Results:  I have reviewed and interpreted all pertinent imaging results/findings within the past 24 hours.

## 2025-07-05 NOTE — ASSESSMENT & PLAN NOTE
Multifactorial due to possible bleeding s/p fall as well due to her systemic chemotherapy  Trend H/H q 8 hours.  Transfuse if Hgb is less than 7 g/dl  Will order iron studies

## 2025-07-05 NOTE — ASSESSMENT & PLAN NOTE
Currently on neoadjuvant treatment with mFOLFIRONOX.  Her systemic therapy will be put on hold awaiting her neutropenia to resolved.  She will follow-up with her primary Oncologist upon her discharge

## 2025-07-05 NOTE — PROGRESS NOTES
[facility]  Hematology/Oncology  Consult Note    Patient Name: Sonia Muse  Admission Date: 7/4/2025  Hospital Length of Stay: 0 days  Code Status: Full Code     Subjective:     HPI:  Sonia Muse is a 65 year old female with a past medical history of a locally advanced pancreatic cancer, anemia, obesity, DM, HTN, and hypothyroidism who presented with a right femur fracture after a fall, MIGUEL ANGEL, hyponatremia and neutropenia.   She recently completed her first cycle of chemotherapy . She was not able to receive her second one due to prolonged myelosuppression.    She endorses chronic diarrhea and decreased appetite as well as fatigue. The patient denies CP, cough, SOB, abdominal pain, nausea, vomiting, constipation.  The patient denies fever, chills, night sweats, weight loss, new lumps or bumps, easy bruising or bleeding.    In the ED, the patient received a 1.5 L NS bolus and 1 L LR bolus as well as fentanyl and Zofran. Oncology was consulted in view of her severe neutropenia.     I    Oncology Treatment Plan:   OP GI mFOLFIRINOX (oxaliplatin leucovorin irinotecan fluorouracil) Q2W    Medications:  Continuous Infusions:   0.9% NaCl   Intravenous Continuous 125 mL/hr at 07/04/25 2215 New Bag at 07/04/25 2215    NORepinephrine bitartrate-D5W  0-3 mcg/kg/min Intravenous Continuous 66.4 mL/hr at 07/04/25 2201 0.16 mcg/kg/min at 07/04/25 2201     Scheduled Meds:   alteplase  2 mg Intra-Catheter Once    ceFEPime IV (PEDS and ADULTS)  1 g Intravenous Q12H    DULoxetine  60 mg Oral Daily    filgrastim-sndz  480 mcg Subcutaneous Daily    heparin (porcine)  5,000 Units Subcutaneous Q8H    [START ON 7/5/2025] levothyroxine  25 mcg Oral Before breakfast    midodrine  10 mg Oral Q8H    mupirocin   Nasal BID     PRN Meds:  Current Facility-Administered Medications:     0.9%  NaCl infusion (for blood administration), , Intravenous, Q24H PRN    acetaminophen, 650 mg, Oral, Q8H PRN    dextrose 50%, 12.5 g,  Intravenous, PRN    dextrose 50%, 25 g, Intravenous, PRN    glucagon (human recombinant), 1 mg, Intramuscular, PRN    glucose, 16 g, Oral, PRN    glucose, 24 g, Oral, PRN    HYDROmorphone, 1 mg, Intravenous, Q4H PRN    insulin aspart U-100, 0-10 Units, Subcutaneous, QID (AC + HS) PRN    LORazepam, 0.5 mg, Oral, BID PRN    naloxone, 0.02 mg, Intravenous, PRN    oxyCODONE, 5 mg, Oral, Q6H PRN    prochlorperazine, 5 mg, Intravenous, Q6H PRN    sodium chloride 0.9%, 10 mL, Intravenous, PRN    Pharmacy to dose Vancomycin consult, , , Once **AND** vancomycin - pharmacy to dose, , Intravenous, pharmacy to manage frequency     Review of Systems   Constitutional:  Positive for activity change, appetite change, fatigue and unexpected weight change. Negative for fever.   HENT:  Negative for mouth sores and postnasal drip.    Eyes:  Negative for visual disturbance.   Respiratory:  Negative for cough, chest tightness and shortness of breath.    Cardiovascular:  Negative for chest pain, palpitations and leg swelling.   Gastrointestinal:  Positive for diarrhea and nausea. Negative for abdominal distention, abdominal pain, blood in stool and vomiting.   Genitourinary:  Negative for difficulty urinating, dysuria, hematuria and urgency.   Musculoskeletal:  Positive for arthralgias and gait problem. Negative for back pain and joint swelling.   Skin:  Negative for rash.   Neurological:  Positive for weakness. Negative for dizziness and headaches.   Hematological:  Negative for adenopathy. Does not bruise/bleed easily.   Psychiatric/Behavioral:  Positive for decreased concentration. Negative for confusion.      Objective:     Vital Signs (Most Recent):  Temp: 98.2 °F (36.8 °C) (07/04/25 1915)  Pulse: 96 (07/04/25 2215)  Resp: 14 (07/04/25 2215)  BP: 123/69 (07/04/25 2215)  SpO2: 95 % (07/04/25 2215) Vital Signs (24h Range):  Temp:  [98.2 °F (36.8 °C)-98.8 °F (37.1 °C)] 98.2 °F (36.8 °C)  Pulse:  [] 96  Resp:  [13-28] 14  SpO2:   [90 %-100 %] 95 %  BP: ()/(40-71) 123/69  Arterial Line BP: ()/(41-58) 122/58     Weight: 110.7 kg (244 lb 0.8 oz)  Body mass index is 41.89 kg/m².  Body surface area is 2.24 meters squared.      Intake/Output Summary (Last 24 hours) at 7/4/2025 2253  Last data filed at 7/4/2025 2215  Gross per 24 hour   Intake 4564.46 ml   Output 110 ml   Net 4454.46 ml        Physical Exam  Vitals reviewed.   Constitutional:       Appearance: She is ill-appearing.   HENT:      Head: Normocephalic.   Eyes:      General: No scleral icterus.     Pupils: Pupils are equal, round, and reactive to light.   Cardiovascular:      Rate and Rhythm: Normal rate and regular rhythm.      Pulses: Normal pulses.      Heart sounds: Normal heart sounds.   Pulmonary:      Effort: Pulmonary effort is normal.      Breath sounds: Normal breath sounds.   Abdominal:      General: Bowel sounds are normal. There is no distension.   Musculoskeletal:         General: Deformity and signs of injury (right lower extremity) present. No swelling.   Lymphadenopathy:      Cervical: No cervical adenopathy.   Skin:     General: Skin is warm.      Findings: No rash.   Neurological:      General: No focal deficit present.      Mental Status: She is alert and oriented to person, place, and time.      Cranial Nerves: No cranial nerve deficit.      Motor: Weakness present.      Gait: Gait abnormal.   Psychiatric:         Mood and Affect: Mood normal.          Significant Labs:   All pertinent labs from the last 24 hours have been reviewed.    Diagnostic Results:  I have reviewed and interpreted all pertinent imaging results/findings within the past 24 hours.  Assessment/Plan:     * Pancreatic cancer  Currently on neoadjuvant treatment with mFOLFIRONOX.  Her systemic therapy will be put on hold awaiting her neutropenia to resolved.  She will follow-up with her primary Oncologist upon her discharge    Chemotherapy-induced neutropenia  She will be started on  growth factors with Zarxio 480 mcg daily until an ANC >1000 for 2 consecutive days  Trend CBC with diff daily  Continue empiric renally dosed cefepime and vancomycin given UA findings concerning for UTI  Follow up urine and blood cultures    Closed fracture of femur  Discussed her case with Orthopedic Surgery; surgery to be postponed to Sunday in view of her severe neutropenia  NWB RLE  Fall precautions  PRN analgesics    Microcytic anemia  Multifactorial due to possible bleeding s/p fall as well due to her systemic chemotherapy  Trend H/H q 8 hours.  Transfuse if Hgb is less than 7 g/dl  Will order iron studies    MIGUEL ANGEL (acute kidney injury)  Likely pre-renal given diarrhea and decreased appetite with low BP.  Start IV fluids  Nephrology consulted  Avoid nephrotoxic agents  Monitor UOP and electrolytes  F/up BMP    Hypotension  Due to dehydration and sepsis  Continue IV fluid  Started on Levophed          Thank you for your consult. I will follow-up with patient. Please contact us if you have any additional questions.     Renee Menon MD  Hematology/Oncology  Former[facility]

## 2025-07-05 NOTE — ASSESSMENT & PLAN NOTE
Multifactorial due to possible bleeding s/p fall as well due to her systemic chemotherapy  Trend H/H q 8 hours.  Transfuse if Hgb is less than 7 g/dl  Iron studies is reflecting anemia of chronic disease.  Hgb 7.8 g/dl

## 2025-07-05 NOTE — SUBJECTIVE & OBJECTIVE
Interval History: no events overnight    Oncology Treatment Plan:   OP GI mFOLFIRINOX (oxaliplatin leucovorin irinotecan fluorouracil) Q2W    Medications:  Continuous Infusions:   0.9% NaCl   Intravenous Continuous 125 mL/hr at 07/05/25 0601 Rate Verify at 07/05/25 0601    NORepinephrine bitartrate-D5W  0-3 mcg/kg/min Intravenous Continuous 83 mL/hr at 07/05/25 1352 0.2 mcg/kg/min at 07/05/25 1352     Scheduled Meds:   alteplase  2 mg Intra-Catheter Once    ceFEPime IV (PEDS and ADULTS)  1 g Intravenous Q12H    DULoxetine  60 mg Oral Daily    filgrastim-sndz  480 mcg Subcutaneous Daily    heparin (porcine)  5,000 Units Subcutaneous Q8H    insulin glargine U-100 (Lantus)  15 Units Subcutaneous QHS    levothyroxine  25 mcg Oral Before breakfast    magnesium sulfate 1 g IVPB  1 g Intravenous Once    midodrine  10 mg Oral Q8H    montelukast  10 mg Oral QHS    mupirocin   Nasal BID    vancomycin (VANCOCIN) IV (PEDS and ADULTS)  750 mg Intravenous Once     PRN Meds:  Current Facility-Administered Medications:     0.9%  NaCl infusion (for blood administration), , Intravenous, Q24H PRN    acetaminophen, 650 mg, Oral, Q8H PRN    dextrose 50%, 12.5 g, Intravenous, PRN    dextrose 50%, 25 g, Intravenous, PRN    glucagon (human recombinant), 1 mg, Intramuscular, PRN    glucose, 16 g, Oral, PRN    glucose, 24 g, Oral, PRN    HYDROmorphone, 1 mg, Intravenous, Q4H PRN    insulin aspart U-100, 0-10 Units, Subcutaneous, QID (AC + HS) PRN    LORazepam, 0.5 mg, Oral, BID PRN    naloxone, 0.02 mg, Intravenous, PRN    oxyCODONE, 5 mg, Oral, Q6H PRN    prochlorperazine, 5 mg, Intravenous, Q6H PRN    sodium chloride 0.9%, 10 mL, Intravenous, PRN    Pharmacy to dose Vancomycin consult, , , Once **AND** vancomycin - pharmacy to dose, , Intravenous, pharmacy to manage frequency     Review of Systems   Constitutional:  Positive for fatigue and fever. Negative for activity change and appetite change.   HENT:  Negative for mouth sores and  postnasal drip.    Eyes:  Negative for visual disturbance.   Respiratory:  Positive for cough and shortness of breath. Negative for chest tightness.    Cardiovascular:  Positive for leg swelling. Negative for chest pain and palpitations.   Gastrointestinal:  Negative for abdominal distention, abdominal pain, blood in stool, diarrhea, nausea and vomiting.   Genitourinary:  Negative for difficulty urinating, dysuria, hematuria and urgency.   Musculoskeletal:  Negative for arthralgias, back pain and joint swelling.   Skin:  Negative for rash.   Neurological:  Positive for weakness. Negative for dizziness and headaches.   Hematological:  Negative for adenopathy. Bruises/bleeds easily.   Psychiatric/Behavioral:  Negative for confusion and decreased concentration. The patient is nervous/anxious.      Objective:     Vital Signs (Most Recent):  Temp: 99 °F (37.2 °C) (07/05/25 0701)  Pulse: 106 (07/05/25 0732)  Resp: (!) 23 (07/05/25 1115)  BP: 116/61 (07/05/25 0732)  SpO2: 95 % (07/05/25 1300) Vital Signs (24h Range):  Temp:  [98.1 °F (36.7 °C)-99 °F (37.2 °C)] 99 °F (37.2 °C)  Pulse:  [] 106  Resp:  [11-28] 23  SpO2:  [90 %-98 %] 95 %  BP: ()/(50-78) 116/61  Arterial Line BP: ()/(41-58) 104/54     Weight: 110.7 kg (244 lb 0.8 oz)  Body mass index is 41.89 kg/m².  Body surface area is 2.24 meters squared.      Intake/Output Summary (Last 24 hours) at 7/5/2025 1407  Last data filed at 7/5/2025 0601  Gross per 24 hour   Intake 4670.92 ml   Output 560 ml   Net 4110.92 ml        Physical Exam     Significant Labs:   All pertinent labs from the last 24 hours have been reviewed.    Diagnostic Results:  I have reviewed and interpreted all pertinent imaging results/findings within the past 24 hours.

## 2025-07-06 ENCOUNTER — ANESTHESIA (OUTPATIENT)
Dept: SURGERY | Facility: HOSPITAL | Age: 65
End: 2025-07-06
Payer: MEDICAID

## 2025-07-06 LAB
ABO + RH BLD: NORMAL
ALBUMIN SERPL-MCNC: 2.6 G/DL (ref 3.5–5.2)
ALLENS TEST: ABNORMAL
ALP SERPL-CCNC: 108 UNIT/L (ref 55–135)
ALT SERPL-CCNC: 5 UNIT/L (ref 10–44)
ANION GAP (SMH): 10 MMOL/L (ref 8–16)
ANION GAP (SMH): 12 MMOL/L (ref 8–16)
ANISOCYTOSIS BLD QL SMEAR: SLIGHT
AST SERPL-CCNC: 4 UNIT/L (ref 10–40)
BILIRUB SERPL-MCNC: 1.2 MG/DL (ref 0.1–1)
BLD PROD TYP BPU: NORMAL
BLOOD UNIT EXPIRATION DATE: NORMAL
BLOOD UNIT TYPE CODE: 600
BUN SERPL-MCNC: 25 MG/DL (ref 8–23)
BUN SERPL-MCNC: 26 MG/DL (ref 8–23)
CALCIUM SERPL-MCNC: 8.1 MG/DL (ref 8.7–10.5)
CALCIUM SERPL-MCNC: 8.2 MG/DL (ref 8.7–10.5)
CHLORIDE SERPL-SCNC: 103 MMOL/L (ref 95–110)
CHLORIDE SERPL-SCNC: 107 MMOL/L (ref 95–110)
CHLORIDE UR-SCNC: 31 MMOL/L (ref 25–200)
CO2 SERPL-SCNC: 14 MMOL/L (ref 23–29)
CO2 SERPL-SCNC: 17 MMOL/L (ref 23–29)
CREAT SERPL-MCNC: 1.7 MG/DL (ref 0.5–1.4)
CREAT SERPL-MCNC: 1.8 MG/DL (ref 0.5–1.4)
CREAT UR-MCNC: 50.7 MG/DL (ref 15–325)
CROSSMATCH INTERPRETATION: NORMAL
DACRYOCYTES BLD QL SMEAR: ABNORMAL
DELSYS: ABNORMAL
DISPENSE STATUS: NORMAL
ERYTHROCYTE [DISTWIDTH] IN BLOOD BY AUTOMATED COUNT: 14.7 % (ref 11.5–14.5)
ERYTHROCYTE [DISTWIDTH] IN BLOOD BY AUTOMATED COUNT: 15 % (ref 11.5–14.5)
FLOW: 6
GFR SERPLBLD CREATININE-BSD FMLA CKD-EPI: 31 ML/MIN/1.73/M2
GFR SERPLBLD CREATININE-BSD FMLA CKD-EPI: 34 ML/MIN/1.73/M2
GLUCOSE SERPL-MCNC: 196 MG/DL (ref 70–110)
GLUCOSE SERPL-MCNC: 260 MG/DL (ref 70–110)
GLUCOSE SERPL-MCNC: 297 MG/DL (ref 70–110)
HCO3 UR-SCNC: 14.6 MMOL/L (ref 24–28)
HCT VFR BLD AUTO: 22.4 % (ref 37–48.5)
HCT VFR BLD AUTO: 25.9 % (ref 37–48.5)
HCT VFR BLD CALC: 24 %PCV (ref 36–54)
HGB BLD-MCNC: 7.4 GM/DL (ref 12–16)
HGB BLD-MCNC: 8.3 GM/DL (ref 12–16)
HYPOCHROMIA BLD QL SMEAR: ABNORMAL
LYMPHOCYTES NFR BLD MANUAL: 69 % (ref 18–48)
MAGNESIUM SERPL-MCNC: 1.5 MG/DL (ref 1.6–2.6)
MCH RBC QN AUTO: 26.1 PG (ref 27–31)
MCH RBC QN AUTO: 26.7 PG (ref 27–31)
MCHC RBC AUTO-ENTMCNC: 32 G/DL (ref 32–36)
MCHC RBC AUTO-ENTMCNC: 33 G/DL (ref 32–36)
MCV RBC AUTO: 79 FL (ref 82–98)
MCV RBC AUTO: 83 FL (ref 82–98)
MODE: ABNORMAL
MONOCYTES NFR BLD MANUAL: 26 % (ref 4–15)
NEUTROPHILS NFR BLD MANUAL: 5 % (ref 38–73)
NUCLEATED RBC (/100WBC) (SMH): 0 /100 WBC
PCO2 BLDA: 29.9 MMHG (ref 35–45)
PH SMN: 7.3 [PH] (ref 7.35–7.45)
PLATELET # BLD AUTO: 219 K/UL (ref 150–450)
PLATELET # BLD AUTO: 228 K/UL (ref 150–450)
PLATELET BLD QL SMEAR: ABNORMAL
PMV BLD AUTO: 10 FL (ref 9.2–12.9)
PMV BLD AUTO: 10.1 FL (ref 9.2–12.9)
PO2 BLDA: 76 MMHG (ref 80–100)
POC BE: -12 MMOL/L (ref -2–2)
POC IONIZED CALCIUM: 1.19 MMOL/L (ref 1.06–1.42)
POC SATURATED O2: 94 % (ref 95–100)
POC TCO2: 16 MMOL/L (ref 23–27)
POCT GLUCOSE: 263 MG/DL (ref 70–110)
POCT GLUCOSE: 272 MG/DL (ref 70–110)
POCT GLUCOSE: 283 MG/DL (ref 70–110)
POCT GLUCOSE: 290 MG/DL (ref 70–110)
POIKILOCYTOSIS BLD QL SMEAR: SLIGHT
POLYCHROMASIA BLD QL SMEAR: ABNORMAL
POTASSIUM BLD-SCNC: 4.1 MMOL/L (ref 3.5–5.1)
POTASSIUM SERPL-SCNC: 3.7 MMOL/L (ref 3.5–5.1)
POTASSIUM SERPL-SCNC: 3.9 MMOL/L (ref 3.5–5.1)
POTASSIUM UR-SCNC: 14 MMOL/L (ref 15–95)
PROT SERPL-MCNC: 6 GM/DL (ref 6–8.4)
PROT UR-MCNC: 77 MG/DL
RBC # BLD AUTO: 2.83 M/UL (ref 4–5.4)
RBC # BLD AUTO: 3.11 M/UL (ref 4–5.4)
SAMPLE: ABNORMAL
SITE: ABNORMAL
SODIUM BLD-SCNC: 134 MMOL/L (ref 136–145)
SODIUM SERPL-SCNC: 130 MMOL/L (ref 136–145)
SODIUM SERPL-SCNC: 133 MMOL/L (ref 136–145)
SODIUM UR-SCNC: 15 MMOL/L (ref 20–250)
UNIT NUMBER: NORMAL
UUN UR-MCNC: 241 MG/DL (ref 140–1050)
VANCOMYCIN TROUGH SERPL-MCNC: 8 UG/ML (ref ?–20)
WBC # BLD AUTO: 0.85 K/UL (ref 3.9–12.7)
WBC # BLD AUTO: 0.95 K/UL (ref 3.9–12.7)

## 2025-07-06 PROCEDURE — 36000711: Performed by: ORTHOPAEDIC SURGERY

## 2025-07-06 PROCEDURE — 20000000 HC ICU ROOM

## 2025-07-06 PROCEDURE — 84540 ASSAY OF URINE/UREA-N: CPT | Performed by: INTERNAL MEDICINE

## 2025-07-06 PROCEDURE — 86920 COMPATIBILITY TEST SPIN: CPT | Performed by: STUDENT IN AN ORGANIZED HEALTH CARE EDUCATION/TRAINING PROGRAM

## 2025-07-06 PROCEDURE — 63600175 PHARM REV CODE 636 W HCPCS: Performed by: ORTHOPAEDIC SURGERY

## 2025-07-06 PROCEDURE — 84133 ASSAY OF URINE POTASSIUM: CPT | Performed by: INTERNAL MEDICINE

## 2025-07-06 PROCEDURE — 25000003 PHARM REV CODE 250: Performed by: ORTHOPAEDIC SURGERY

## 2025-07-06 PROCEDURE — 27000221 HC OXYGEN, UP TO 24 HOURS

## 2025-07-06 PROCEDURE — 63600175 PHARM REV CODE 636 W HCPCS: Performed by: NURSE ANESTHETIST, CERTIFIED REGISTERED

## 2025-07-06 PROCEDURE — 94761 N-INVAS EAR/PLS OXIMETRY MLT: CPT | Mod: XB

## 2025-07-06 PROCEDURE — 82330 ASSAY OF CALCIUM: CPT

## 2025-07-06 PROCEDURE — 25000003 PHARM REV CODE 250: Performed by: INTERNAL MEDICINE

## 2025-07-06 PROCEDURE — 27000207 HC ISOLATION

## 2025-07-06 PROCEDURE — 25000003 PHARM REV CODE 250: Performed by: STUDENT IN AN ORGANIZED HEALTH CARE EDUCATION/TRAINING PROGRAM

## 2025-07-06 PROCEDURE — 84295 ASSAY OF SERUM SODIUM: CPT

## 2025-07-06 PROCEDURE — 63600175 PHARM REV CODE 636 W HCPCS: Mod: JZ,JB | Performed by: ORTHOPAEDIC SURGERY

## 2025-07-06 PROCEDURE — 85014 HEMATOCRIT: CPT

## 2025-07-06 PROCEDURE — 99900035 HC TECH TIME PER 15 MIN (STAT)

## 2025-07-06 PROCEDURE — 85027 COMPLETE CBC AUTOMATED: CPT | Performed by: STUDENT IN AN ORGANIZED HEALTH CARE EDUCATION/TRAINING PROGRAM

## 2025-07-06 PROCEDURE — 82803 BLOOD GASES ANY COMBINATION: CPT

## 2025-07-06 PROCEDURE — 36000710: Performed by: ORTHOPAEDIC SURGERY

## 2025-07-06 PROCEDURE — 63600175 PHARM REV CODE 636 W HCPCS: Performed by: STUDENT IN AN ORGANIZED HEALTH CARE EDUCATION/TRAINING PROGRAM

## 2025-07-06 PROCEDURE — 25000003 PHARM REV CODE 250: Performed by: HOSPITALIST

## 2025-07-06 PROCEDURE — C1713 ANCHOR/SCREW BN/BN,TIS/BN: HCPCS | Performed by: ORTHOPAEDIC SURGERY

## 2025-07-06 PROCEDURE — 84300 ASSAY OF URINE SODIUM: CPT | Performed by: INTERNAL MEDICINE

## 2025-07-06 PROCEDURE — 82570 ASSAY OF URINE CREATININE: CPT | Performed by: INTERNAL MEDICINE

## 2025-07-06 PROCEDURE — 85025 COMPLETE CBC W/AUTO DIFF WBC: CPT | Performed by: STUDENT IN AN ORGANIZED HEALTH CARE EDUCATION/TRAINING PROGRAM

## 2025-07-06 PROCEDURE — 83735 ASSAY OF MAGNESIUM: CPT | Performed by: STUDENT IN AN ORGANIZED HEALTH CARE EDUCATION/TRAINING PROGRAM

## 2025-07-06 PROCEDURE — 37799 UNLISTED PX VASCULAR SURGERY: CPT

## 2025-07-06 PROCEDURE — 37000009 HC ANESTHESIA EA ADD 15 MINS: Performed by: ORTHOPAEDIC SURGERY

## 2025-07-06 PROCEDURE — 99232 SBSQ HOSP IP/OBS MODERATE 35: CPT | Mod: ,,, | Performed by: INTERNAL MEDICINE

## 2025-07-06 PROCEDURE — 25000003 PHARM REV CODE 250: Performed by: NURSE ANESTHETIST, CERTIFIED REGISTERED

## 2025-07-06 PROCEDURE — 27507 TREATMENT OF THIGH FRACTURE: CPT | Mod: RT,,, | Performed by: ORTHOPAEDIC SURGERY

## 2025-07-06 PROCEDURE — 27201423 OPTIME MED/SURG SUP & DEVICES STERILE SUPPLY: Performed by: ORTHOPAEDIC SURGERY

## 2025-07-06 PROCEDURE — 84132 ASSAY OF SERUM POTASSIUM: CPT

## 2025-07-06 PROCEDURE — P9016 RBC LEUKOCYTES REDUCED: HCPCS | Performed by: STUDENT IN AN ORGANIZED HEALTH CARE EDUCATION/TRAINING PROGRAM

## 2025-07-06 PROCEDURE — 0QSB04Z REPOSITION RIGHT LOWER FEMUR WITH INTERNAL FIXATION DEVICE, OPEN APPROACH: ICD-10-PCS | Performed by: ORTHOPAEDIC SURGERY

## 2025-07-06 PROCEDURE — 84156 ASSAY OF PROTEIN URINE: CPT | Performed by: INTERNAL MEDICINE

## 2025-07-06 PROCEDURE — 37000008 HC ANESTHESIA 1ST 15 MINUTES: Performed by: ORTHOPAEDIC SURGERY

## 2025-07-06 PROCEDURE — 80202 ASSAY OF VANCOMYCIN: CPT | Performed by: ORTHOPAEDIC SURGERY

## 2025-07-06 PROCEDURE — 84075 ASSAY ALKALINE PHOSPHATASE: CPT | Performed by: STUDENT IN AN ORGANIZED HEALTH CARE EDUCATION/TRAINING PROGRAM

## 2025-07-06 PROCEDURE — 82436 ASSAY OF URINE CHLORIDE: CPT | Performed by: INTERNAL MEDICINE

## 2025-07-06 PROCEDURE — C1769 GUIDE WIRE: HCPCS | Performed by: ORTHOPAEDIC SURGERY

## 2025-07-06 PROCEDURE — 99900031 HC PATIENT EDUCATION (STAT)

## 2025-07-06 PROCEDURE — 63600175 PHARM REV CODE 636 W HCPCS: Performed by: INTERNAL MEDICINE

## 2025-07-06 DEVICE — IMPLANTABLE DEVICE: Type: IMPLANTABLE DEVICE | Site: FEMUR | Status: FUNCTIONAL

## 2025-07-06 DEVICE — SCREW OPTILINK T25 5.0X75MM: Type: IMPLANTABLE DEVICE | Site: FEMUR | Status: FUNCTIONAL

## 2025-07-06 DEVICE — SCREW CORTEX 4.5 36M: Type: IMPLANTABLE DEVICE | Site: FEMUR | Status: FUNCTIONAL

## 2025-07-06 DEVICE — SCREW CORTEX 4.5 38M: Type: IMPLANTABLE DEVICE | Site: FEMUR | Status: FUNCTIONAL

## 2025-07-06 DEVICE — SCREW OPTILINK T25 5.0X70MM: Type: IMPLANTABLE DEVICE | Site: FEMUR | Status: FUNCTIONAL

## 2025-07-06 DEVICE — SCREW OPTILINK T25 5.0X36MM: Type: IMPLANTABLE DEVICE | Site: FEMUR | Status: FUNCTIONAL

## 2025-07-06 RX ORDER — ETOMIDATE 2 MG/ML
INJECTION INTRAVENOUS
Status: DISCONTINUED | OUTPATIENT
Start: 2025-07-06 | End: 2025-07-06

## 2025-07-06 RX ORDER — LIDOCAINE HYDROCHLORIDE 20 MG/ML
INJECTION, SOLUTION EPIDURAL; INFILTRATION; INTRACAUDAL; PERINEURAL
Status: DISCONTINUED | OUTPATIENT
Start: 2025-07-06 | End: 2025-07-06

## 2025-07-06 RX ORDER — PANTOPRAZOLE SODIUM 40 MG/1
40 TABLET, DELAYED RELEASE ORAL DAILY
Status: DISCONTINUED | OUTPATIENT
Start: 2025-07-06 | End: 2025-07-11 | Stop reason: HOSPADM

## 2025-07-06 RX ORDER — VASOPRESSIN 20 [USP'U]/ML
INJECTION, SOLUTION INTRAMUSCULAR; SUBCUTANEOUS
Status: DISCONTINUED | OUTPATIENT
Start: 2025-07-06 | End: 2025-07-06

## 2025-07-06 RX ORDER — DEXMEDETOMIDINE HYDROCHLORIDE 100 UG/ML
INJECTION, SOLUTION INTRAVENOUS
Status: DISCONTINUED | OUTPATIENT
Start: 2025-07-06 | End: 2025-07-06

## 2025-07-06 RX ORDER — ACETAMINOPHEN 10 MG/ML
INJECTION, SOLUTION INTRAVENOUS
Status: DISCONTINUED | OUTPATIENT
Start: 2025-07-06 | End: 2025-07-06

## 2025-07-06 RX ORDER — INSULIN GLARGINE 100 [IU]/ML
15 INJECTION, SOLUTION SUBCUTANEOUS NIGHTLY
Status: DISCONTINUED | OUTPATIENT
Start: 2025-07-06 | End: 2025-07-06

## 2025-07-06 RX ORDER — FENTANYL CITRATE 50 UG/ML
INJECTION, SOLUTION INTRAMUSCULAR; INTRAVENOUS
Status: DISCONTINUED | OUTPATIENT
Start: 2025-07-06 | End: 2025-07-06

## 2025-07-06 RX ORDER — MAGNESIUM SULFATE HEPTAHYDRATE 40 MG/ML
2 INJECTION, SOLUTION INTRAVENOUS ONCE
Status: COMPLETED | OUTPATIENT
Start: 2025-07-06 | End: 2025-07-06

## 2025-07-06 RX ORDER — ENOXAPARIN SODIUM 100 MG/ML
40 INJECTION SUBCUTANEOUS EVERY 12 HOURS
Status: DISCONTINUED | OUTPATIENT
Start: 2025-07-06 | End: 2025-07-11 | Stop reason: HOSPADM

## 2025-07-06 RX ORDER — MIDAZOLAM HYDROCHLORIDE 1 MG/ML
INJECTION INTRAMUSCULAR; INTRAVENOUS
Status: DISCONTINUED | OUTPATIENT
Start: 2025-07-06 | End: 2025-07-06

## 2025-07-06 RX ORDER — ESMOLOL HYDROCHLORIDE 10 MG/ML
INJECTION INTRAVENOUS
Status: DISCONTINUED | OUTPATIENT
Start: 2025-07-06 | End: 2025-07-06

## 2025-07-06 RX ORDER — HYDROCODONE BITARTRATE AND ACETAMINOPHEN 500; 5 MG/1; MG/1
TABLET ORAL
Status: DISCONTINUED | OUTPATIENT
Start: 2025-07-06 | End: 2025-07-11 | Stop reason: HOSPADM

## 2025-07-06 RX ORDER — OXYBUTYNIN CHLORIDE 5 MG/1
5 TABLET ORAL 3 TIMES DAILY
Status: DISCONTINUED | OUTPATIENT
Start: 2025-07-06 | End: 2025-07-07

## 2025-07-06 RX ORDER — ENOXAPARIN SODIUM 100 MG/ML
40 INJECTION SUBCUTANEOUS EVERY 24 HOURS
Status: DISCONTINUED | OUTPATIENT
Start: 2025-07-07 | End: 2025-07-06

## 2025-07-06 RX ORDER — ONDANSETRON HYDROCHLORIDE 2 MG/ML
INJECTION, SOLUTION INTRAMUSCULAR; INTRAVENOUS
Status: DISCONTINUED | OUTPATIENT
Start: 2025-07-06 | End: 2025-07-06

## 2025-07-06 RX ORDER — INSULIN GLARGINE 100 [IU]/ML
20 INJECTION, SOLUTION SUBCUTANEOUS NIGHTLY
Status: DISCONTINUED | OUTPATIENT
Start: 2025-07-06 | End: 2025-07-07

## 2025-07-06 RX ORDER — PROPOFOL 10 MG/ML
VIAL (ML) INTRAVENOUS
Status: DISCONTINUED | OUTPATIENT
Start: 2025-07-06 | End: 2025-07-06

## 2025-07-06 RX ORDER — ROCURONIUM BROMIDE 10 MG/ML
INJECTION, SOLUTION INTRAVENOUS
Status: DISCONTINUED | OUTPATIENT
Start: 2025-07-06 | End: 2025-07-06

## 2025-07-06 RX ORDER — INSULIN GLARGINE 100 [IU]/ML
20 INJECTION, SOLUTION SUBCUTANEOUS NIGHTLY
Status: DISCONTINUED | OUTPATIENT
Start: 2025-07-06 | End: 2025-07-06

## 2025-07-06 RX ORDER — MUPIROCIN 20 MG/G
OINTMENT TOPICAL 2 TIMES DAILY
Status: COMPLETED | OUTPATIENT
Start: 2025-07-06 | End: 2025-07-11

## 2025-07-06 RX ORDER — SODIUM CHLORIDE 0.9 % (FLUSH) 0.9 %
10 SYRINGE (ML) INJECTION
Status: DISCONTINUED | OUTPATIENT
Start: 2025-07-06 | End: 2025-07-11 | Stop reason: HOSPADM

## 2025-07-06 RX ORDER — PHENYLEPHRINE HYDROCHLORIDE 10 MG/ML
INJECTION INTRAVENOUS
Status: DISCONTINUED | OUTPATIENT
Start: 2025-07-06 | End: 2025-07-06

## 2025-07-06 RX ORDER — CALCIUM CHLORIDE INJECTION 100 MG/ML
INJECTION, SOLUTION INTRAVENOUS
Status: DISCONTINUED | OUTPATIENT
Start: 2025-07-06 | End: 2025-07-06

## 2025-07-06 RX ORDER — DEXAMETHASONE SODIUM PHOSPHATE 4 MG/ML
INJECTION, SOLUTION INTRA-ARTICULAR; INTRALESIONAL; INTRAMUSCULAR; INTRAVENOUS; SOFT TISSUE
Status: DISCONTINUED | OUTPATIENT
Start: 2025-07-06 | End: 2025-07-06

## 2025-07-06 RX ADMIN — FENTANYL CITRATE 50 MCG: 0.05 INJECTION, SOLUTION INTRAMUSCULAR; INTRAVENOUS at 08:07

## 2025-07-06 RX ADMIN — CEFEPIME 1 G: 1 INJECTION, POWDER, FOR SOLUTION INTRAMUSCULAR; INTRAVENOUS at 01:07

## 2025-07-06 RX ADMIN — SODIUM CHLORIDE: 900 INJECTION INTRAVENOUS at 08:07

## 2025-07-06 RX ADMIN — LIDOCAINE HYDROCHLORIDE 75 MG: 20 INJECTION, SOLUTION INTRAVENOUS at 08:07

## 2025-07-06 RX ADMIN — NOREPINEPHRINE BITARTRATE 0.1 MCG/KG/MIN: 8 INJECTION, SOLUTION INTRAVENOUS at 01:07

## 2025-07-06 RX ADMIN — INSULIN ASPART 6 UNITS: 100 INJECTION, SOLUTION INTRAVENOUS; SUBCUTANEOUS at 03:07

## 2025-07-06 RX ADMIN — ROCURONIUM BROMIDE 20 MG: 10 INJECTION, SOLUTION INTRAVENOUS at 09:07

## 2025-07-06 RX ADMIN — ESMOLOL HYDROCHLORIDE 20 MG: 10 INJECTION, SOLUTION INTRAVENOUS at 09:07

## 2025-07-06 RX ADMIN — HYDROMORPHONE HYDROCHLORIDE 1 MG: 1 INJECTION, SOLUTION INTRAMUSCULAR; INTRAVENOUS; SUBCUTANEOUS at 01:07

## 2025-07-06 RX ADMIN — SODIUM BICARBONATE: 84 INJECTION, SOLUTION INTRAVENOUS at 06:07

## 2025-07-06 RX ADMIN — MAGNESIUM SULFATE HEPTAHYDRATE 2 G: 40 INJECTION, SOLUTION INTRAVENOUS at 03:07

## 2025-07-06 RX ADMIN — MIDODRINE HYDROCHLORIDE 10 MG: 5 TABLET ORAL at 09:07

## 2025-07-06 RX ADMIN — PHENYLEPHRINE HYDROCHLORIDE 200 MCG: 10 INJECTION INTRAVENOUS at 08:07

## 2025-07-06 RX ADMIN — VANCOMYCIN HYDROCHLORIDE 1250 MG: 1.25 INJECTION, POWDER, LYOPHILIZED, FOR SOLUTION INTRAVENOUS at 06:07

## 2025-07-06 RX ADMIN — NOREPINEPHRINE BITARTRATE 0.24 MCG/KG/MIN: 8 INJECTION, SOLUTION INTRAVENOUS at 01:07

## 2025-07-06 RX ADMIN — OXYCODONE HYDROCHLORIDE 5 MG: 5 TABLET ORAL at 04:07

## 2025-07-06 RX ADMIN — VASOPRESSIN 1 UNITS: 20 INJECTION INTRAVENOUS at 09:07

## 2025-07-06 RX ADMIN — VASOPRESSIN 2 UNITS: 20 INJECTION INTRAVENOUS at 09:07

## 2025-07-06 RX ADMIN — MUPIROCIN 1 G: 20 OINTMENT TOPICAL at 08:07

## 2025-07-06 RX ADMIN — FENTANYL CITRATE 50 MCG: 0.05 INJECTION, SOLUTION INTRAMUSCULAR; INTRAVENOUS at 09:07

## 2025-07-06 RX ADMIN — ESMOLOL HYDROCHLORIDE 10 MG: 10 INJECTION, SOLUTION INTRAVENOUS at 08:07

## 2025-07-06 RX ADMIN — ENOXAPARIN SODIUM 40 MG: 40 INJECTION SUBCUTANEOUS at 08:07

## 2025-07-06 RX ADMIN — ROCURONIUM BROMIDE 20 MG: 10 INJECTION, SOLUTION INTRAVENOUS at 10:07

## 2025-07-06 RX ADMIN — ROCURONIUM BROMIDE 50 MG: 10 INJECTION, SOLUTION INTRAVENOUS at 08:07

## 2025-07-06 RX ADMIN — CALCIUM CHLORIDE 0.5 G: 100 INJECTION, SOLUTION INTRAVENOUS at 09:07

## 2025-07-06 RX ADMIN — ACETAMINOPHEN 1000 MG: 10 INJECTION, SOLUTION INTRAVENOUS at 11:07

## 2025-07-06 RX ADMIN — PHENYLEPHRINE HYDROCHLORIDE 200 MCG: 10 INJECTION INTRAVENOUS at 11:07

## 2025-07-06 RX ADMIN — OXYBUTYNIN CHLORIDE 5 MG: 5 TABLET ORAL at 02:07

## 2025-07-06 RX ADMIN — SODIUM CHLORIDE 3 G: 9 INJECTION, SOLUTION INTRAVENOUS at 08:07

## 2025-07-06 RX ADMIN — ETOMIDATE 10 MG: 2 INJECTION, SOLUTION INTRAVENOUS at 08:07

## 2025-07-06 RX ADMIN — HYDROMORPHONE HYDROCHLORIDE 1 MG: 1 INJECTION, SOLUTION INTRAMUSCULAR; INTRAVENOUS; SUBCUTANEOUS at 11:07

## 2025-07-06 RX ADMIN — VASOPRESSIN 1 UNITS: 20 INJECTION INTRAVENOUS at 10:07

## 2025-07-06 RX ADMIN — ESMOLOL HYDROCHLORIDE 10 MG: 10 INJECTION, SOLUTION INTRAVENOUS at 09:07

## 2025-07-06 RX ADMIN — SODIUM CHLORIDE: 9 INJECTION, SOLUTION INTRAVENOUS at 05:07

## 2025-07-06 RX ADMIN — FILGRASTIM-SNDZ 480 MCG: 480 INJECTION, SOLUTION INTRAVENOUS; SUBCUTANEOUS at 03:07

## 2025-07-06 RX ADMIN — MONTELUKAST 10 MG: 10 TABLET, FILM COATED ORAL at 08:07

## 2025-07-06 RX ADMIN — SUGAMMADEX 200 MG: 100 INJECTION, SOLUTION INTRAVENOUS at 11:07

## 2025-07-06 RX ADMIN — MIDODRINE HYDROCHLORIDE 10 MG: 5 TABLET ORAL at 02:07

## 2025-07-06 RX ADMIN — INSULIN ASPART 6 UNITS: 100 INJECTION, SOLUTION INTRAVENOUS; SUBCUTANEOUS at 06:07

## 2025-07-06 RX ADMIN — ROCURONIUM BROMIDE 10 MG: 10 INJECTION, SOLUTION INTRAVENOUS at 08:07

## 2025-07-06 RX ADMIN — INSULIN GLARGINE 20 UNITS: 100 INJECTION, SOLUTION SUBCUTANEOUS at 10:07

## 2025-07-06 RX ADMIN — PHENYLEPHRINE HYDROCHLORIDE 100 MCG: 10 INJECTION INTRAVENOUS at 10:07

## 2025-07-06 RX ADMIN — OXYCODONE HYDROCHLORIDE 5 MG: 5 TABLET ORAL at 08:07

## 2025-07-06 RX ADMIN — ONDANSETRON 4 MG: 2 INJECTION INTRAMUSCULAR; INTRAVENOUS at 08:07

## 2025-07-06 RX ADMIN — NOREPINEPHRINE BITARTRATE 0.09 MCG/KG/MIN: 8 INJECTION, SOLUTION INTRAVENOUS at 07:07

## 2025-07-06 RX ADMIN — CEFEPIME 1 G: 1 INJECTION, POWDER, FOR SOLUTION INTRAMUSCULAR; INTRAVENOUS at 02:07

## 2025-07-06 RX ADMIN — DEXTROSE MONOHYDRATE 0.13 MCG/KG/MIN: 50 INJECTION, SOLUTION INTRAVENOUS at 08:07

## 2025-07-06 RX ADMIN — PROPOFOL 40 MG: 10 INJECTION, EMULSION INTRAVENOUS at 08:07

## 2025-07-06 RX ADMIN — PHENYLEPHRINE HYDROCHLORIDE 100 MCG: 10 INJECTION INTRAVENOUS at 08:07

## 2025-07-06 RX ADMIN — DULOXETINE 60 MG: 30 CAPSULE, DELAYED RELEASE ORAL at 02:07

## 2025-07-06 RX ADMIN — MIDAZOLAM HYDROCHLORIDE 2 MG: 1 INJECTION, SOLUTION INTRAMUSCULAR; INTRAVENOUS at 08:07

## 2025-07-06 RX ADMIN — DEXAMETHASONE SODIUM PHOSPHATE 4 MG: 4 INJECTION, SOLUTION INTRAMUSCULAR; INTRAVENOUS at 09:07

## 2025-07-06 RX ADMIN — DEXMEDETOMIDINE HYDROCHLORIDE 6 MCG: 100 INJECTION, SOLUTION INTRAVENOUS at 11:07

## 2025-07-06 RX ADMIN — OXYBUTYNIN CHLORIDE 5 MG: 5 TABLET ORAL at 01:07

## 2025-07-06 RX ADMIN — PHENYLEPHRINE HYDROCHLORIDE 200 MCG: 10 INJECTION INTRAVENOUS at 10:07

## 2025-07-06 RX ADMIN — INSULIN ASPART 3 UNITS: 100 INJECTION, SOLUTION INTRAVENOUS; SUBCUTANEOUS at 10:07

## 2025-07-06 NOTE — ASSESSMENT & PLAN NOTE
In setting of pancreatic adenocarcinoma and MIGUEL ANGEL.  Check CBC daily.    Transfuse RBCs when hemoglobin less than seven.  Or when hemoglobin is less than eight and patient has symptoms of anemia.  Hematology-Oncology consulting.

## 2025-07-06 NOTE — OP NOTE
07/06/2025    PREOPERATIVE DIAGNOSIS:      Periprosthetic right distal femur fracture    POSTOPERATIVE DIAGNOSIS: Same    PROCEDURE:      Open reduction internal fixation right periprosthetic distal femur fracture  Intramedullary nailing right femur    SURGEON: Osmany Harding MD    ASSISTANT:  Duglas Badillo FA    He was present and scrubbed for the entirety of the procedure. They were required for patient positioning, retraction, insertion of implants and closure. Without him I would have been unable to safely perform the case due to only one scrub tech available.     ANESTHESIA:  General     ESTIMATED BLOOD LOSS:  200 mL    INDICATIONS:  Sonia Muse is a 65 y.o. year-old female with myelodysplastic syndrome, diabetes and pancreatic cancer who presented to the emergency department with a chief complaint of right leg pain after a fall at home. X-rays revealed a significantly displaced comminuted periprosthetic right distal femur fracture.  She was admitted for medical clearance as well as orthopedic consultation.  Treatment options were discussed in detail with the patient and we elected to proceed with ORIF/intramedullary nailing.     Risks and complications were discussed including, but not limited to risks of anesthetic complications, infections, wound healing complications, instability, DVT, PE. The patient elected to proceed.     COMPONENTS USED:      Implant Name Type Inv. Item Serial No.  Lot No. LRB No. Used [facility] BONE STABILIZATION SYSTEM     [id number] Right 1 Implanted   PLATE 230MM CRV CNDRL PERIART - [id number]  PLATE 230MM CRV CNDRL PERIART  SYNTHES  Right 1 Implanted   SCREW CORTEX 4.5 36M - [id number]  SCREW CORTEX 4.5 36M  [employer]  Right 1 Implanted   SCREW CORTEX 4.5 38M - [id number]  SCREW CORTEX 4.5 38M  [employer]  Right 1 Implanted   SCREW 4.5MM CRTX 70MM SLTP - [id number]  SCREW 4.5MM CRTX 70MM SLTP  SYNTHES  Right 1 Implanted and Explanted   SCREW [email or web address]  SUNNY 5X30MM - [id number]  SCREW [employer] [place] SUNNY 5X30MM  [employer]  Right 2 Implanted   SCREW OPTILINK T25 5.0X36MM - [id number]  SCREW OPTILINK T25 5.0X36MM  [employer]  Right 1 Implanted   SCREW OPTILINK T25 5.0X38MM - [id number]  SCREW OPTILINK T25 5.0X38MM  [employer]  Right 1 Implanted   SCREW OPTILINK T25 5.0X60MM - [id number]  SCREW OPTILINK T25 5.0X60MM  [employer]  Right 1 Implanted   SCREW OPTILINK T25 5.0X70MM - [id number]  SCREW OPTILINK T25 5.0X70MM  [employer] [facility] [place]  Right 2 Implanted   SCREW OPTILINK T25 5.0X75MM - [id number]  SCREW OPTILINK T25 5.0X75MM  [employer] [facility]  Right 1 Implanted         DESCRIPTION OF PROCEDURE:  The patient was taken to the Operating Room where anesthesia was administered by the Anesthesia Department. She was then placed in the supine  position and all superficial neurovascular structures were well padded.  The right lower extremity  was then sterilely prepped and draped in the normal fashion.  Preoperative antibiotics were administered.  A time-out was performed verifying the procedure and laterality, all agreed and elected to proceed as planned.    We did not use a tourniquet for this case due to her body habitus and extend of the fracture.  We began by placing a triangle under the knee and manually pulling traction of the knee with manipulation of the distal femur for a reduction on an AP and lateral view.  We were satisfied with our overall alignment in both planes.  Due to her comminution medially in the medial column as well as her poor bone quality we elected to perform a intramedullary nailing of the femur 1st.  She had a closed the box total knee system so we are unable to use a standard retrograde nail.  For this reason we used the a [employer] photo Dynamic bone stabilization system system.  We marked out our incision medially over the medial femoral condyle and made a small 1 cm incision there.  We  dissected down to the medial femoral condyle and used a entry Reamer over a guide pin to access the canal from the medial side.  We then used a reaming jessica with a slight curvature of the tip of the jessica and entered our  hole and this was advanced up into the shaft.  We then measured for our balloon.  The cannula was then placed over the reaming jessica in the reaming jessica was removed.  We then inflated our balloon verifying our reduction on the AP and lateral view.  Once our balloon was inflated with the solution to a max capacity we then placed the adapter onto the circuit and this was cured after 10 minutes.  At the end of the cure we then began our exposure laterally.  We made a 6 cm longitudinal incision over the lateral femoral condyle.  We split the ITB band and exposed our lateral femoral condyle peeling the fascia subperiosteally off the lateral femoral condyle.  A rongeur was used to flatten out the lateral surface and we chose a 10 hole [employer] [facility] condylar plate.  This was slid up the bone subperiosteally and checked on an AP and lateral view.  We then placed a K-wire through the distal cluster for provisional reduction as well as 2 cortical screws through the aiming arm proximally into the shaft with good compression of the plate to bone.  We then placed a periarticular clamp through our medial incision and this was placed on the lateral cluster and we compressed our plate to the bone and placed multiple locking screws into the distal cluster engaging it also within the balloon.  We then placed 2 additional locking screws more proximally in the shaft and a locking screw just above the flare.  Final x-rays were taken.  Copious irrigation was performed.  We then closed our wounds with a running Vicryl suture distally in the fascia as well as 2-0 Vicryl and Monocryl with Dermabond to close the skin.  She overall tolerated the procedure well.  She was transported back to the ICU in stable condition.  She did  receive PRBCs during the case.    Disposition:      She will be 25% weight-bearing to the right lower extremity.  Range of motion of the right knee allowed.  She will likely need rehab.  Recommend continuation of GSF stimulator to prevent infection.  She will need DVT prophylaxis.  We will start her on Lovenox postop day 1 but medicine team can change per their recommendations.    Osmany Harding MD

## 2025-07-06 NOTE — CONSULTS
INPATIENT NEPHROLOGY CONSULT   [facility] NEPHROLOGY    Sonia Muse  07/06/2025    Reason for consultation:    Acute kidney injury    Chief Complaint:   Chief Complaint   Patient presents with    Knee Injury     Fall  / rt. Knee pain           History of Present Illness:    Per H and P  Sonia Muse is a 65 year old female with a past medical history of pancreatic cancer, anemia, obesity, DM, HTN, and hypothyroidism who presented with a R femur fracture after a fall, MIGUEL ANGEL, hyponatremia and neutropenia. She recently completed her first cycle of chemotherapy after roughly one week ago (patient of Dr. Calle). She endorses chronic diarrhea and decreased appetite as well as fatigue. She states her BP has fallen since being diagnosed with pancreatic cancer and starting chemotherapy. In the ED, the patient received a 1.5 L NS bolus and 1 L LR bolus as well as fentanyl and Zofran.     7/5  pos leg pain, very tired, intermittent hypotension, on levophed drip, 560 cc uop    7/6 s/p Open reduction internal fixation right periprosthetic distal femur fracture this am.  Afebrile, bp controlled.  Tachycardic.    On levophed infusion.  Afebrile.  1539 cc uop.          Plan of Care:       Assessment:    Acute kidney injury secondary to hemodynamically mediated renal injury.  Got her first round of chemo a week ago.  Oxaliplatin can cause  a rise in creatinine in 5-10% of cases.    --Avoid NSAIDS, Fields II inhibitors, and other non-essential nephrotoxic agents  --volume resuscitation  --Keep mean arterial pressure above 60 and systolic blood pressure above 100 as able to maintain renal perfusion  --hold losartan, hctz, mobic,   --urine sodium <20 implicates low tubular flow.  No casts on urine micro  --catheter in place  --avoid vancomycin toxicity    Hyponatremia  --no hypotonic iv piggy backs  --urine osm 318 implicating impaired free water clearance  --uric acid  --push nutrition    Anemia  --as per heme/onc  --iron  adequate    Hypotension  --hold antihypertensives  --volume resuscitation  --treat infections  --vasopressor support as needed    Nongap metabolic acidosis  --trend for now  --urine anion gap  --urine pH 5         Thank you for allowing us to participate in this patient's care. We will continue to follow.    Vital Signs:  Temp Readings from Last 3 Encounters:   25 98.2 °F (36.8 °C) (Oral)   25 98 °F (36.7 °C) (Temporal)   25 97.2 °F (36.2 °C)       Pulse Readings from Last 3 Encounters:   25 (!) 113   25 107   25 76       BP Readings from Last 3 Encounters:   25 121/71   25 (!) 108/55   25 109/69       Weight:  Wt Readings from Last 3 Encounters:   25 115.6 kg (254 lb 13.6 oz)   25 107.4 kg (236 lb 12.4 oz)   25 110.5 kg (243 lb 8 oz)       Past Medical & Surgical History:  Past Medical History:   Diagnosis Date    Allergy     Anxiety     Asthma     DDD (degenerative disc disease), cervical     Depression     Diabetes mellitus     Fibromyalgia     Hypertension     Neuromuscular disorder     Pancreatic mass     PONV (postoperative nausea and vomiting)     Thyroid disease     hypo       Past Surgical History:   Procedure Laterality Date    ADENOIDECTOMY      CARPAL TUNNEL RELEASE Right      SECTION      ENDOSCOPIC ULTRASOUND OF UPPER GASTROINTESTINAL TRACT N/A 2025    Procedure: ULTRASOUND, UPPER GI TRACT, ENDOSCOPIC;  Surgeon: Austen Grimes III, MD;  Location: [facility];  Service: Endoscopy;  Laterality: N/A;    ERCP N/A 2025    Procedure: ERCP (ENDOSCOPIC RETROGRADE CHOLANGIOPANCREATOGRAPHY);  Surgeon: Thuy Escobar MD;  Location: [facility] ENDO;  Service: Endoscopy;  Laterality: N/A;    ERCP N/A 2025    Procedure: ERCP (ENDOSCOPIC RETROGRADE CHOLANGIOPANCREATOGRAPHY);  Surgeon: Austen Grimes III, MD;  Location: [facility];  Service: Endoscopy;  Laterality: N/A;    INSERTION OF TUNNELED CENTRAL VENOUS  CATHETER (CVC) WITH SUBCUTANEOUS PORT Left 2025    Procedure: [id number];  Surgeon: Ata Fernandez III, MD;  Location: [facility] OR;  Service: General;  Laterality: Left;    JOINT REPLACEMENT Right 2016    knee    KNEE ARTHROPLASTY Left 10/19/2020    Procedure: ARTHROPLASTY, KNEE;  Surgeon: Felipe Herring MD;  Location: [facility] OR;  Service: Orthopedics;  Laterality: Left;  Louei Liz    ROTATOR CUFF REPAIR Right     TONSILLECTOMY      TOTAL KNEE ARTHROPLASTY Right     TUBAL LIGATION         Past Social History:  Social History     Socioeconomic History    Marital status:     Number of children: 3   Occupational History    Occupation: [employer]     Comment: primary sub for [employer]   Tobacco Use    Smoking status: Former     Current packs/day: 0.00     Average packs/day: 1 pack/day for 11.0 years (11.0 ttl pk-yrs)     Types: Cigarettes     Start date:      Quit date:      Years since quittin.5    Smokeless tobacco: Never   Substance and Sexual Activity    Alcohol use: Not Currently     Comment: occasional 2x/y    Drug use: Never    Sexual activity: Yes     Partners: Male     Social Drivers of Health     Financial Resource Strain: Low Risk  (2025)    Overall Financial Resource Strain (CARDIA)     Difficulty of Paying Living Expenses: Not hard at all   Recent Concern: Financial Resource Strain - High Risk (2025)    Overall Financial Resource Strain (CARDIA)     Difficulty of Paying Living Expenses: Hard   Food Insecurity: No Food Insecurity (2025)    Hunger Vital Sign     Worried About Running Out of Food in the Last Year: Never true     Ran Out of Food in the Last Year: Never true   Recent Concern: Food Insecurity - Food Insecurity Present (2025)    Hunger Vital Sign     Worried About Running Out of Food in the Last Year: Sometimes true     Ran Out of Food in the Last Year: Sometimes true   Transportation Needs: No Transportation Needs (2025)    PRAPARE -  Transportation     Lack of Transportation (Medical): No     Lack of Transportation (Non-Medical): No   Recent Concern: Transportation Needs - Unmet Transportation Needs (4/28/2025)    PRAPARE - Transportation     Lack of Transportation (Medical): Yes     Lack of Transportation (Non-Medical): Yes   Physical Activity: Unknown (4/28/2025)    Exercise Vital Sign     Days of Exercise per Week: Patient declined     Minutes of Exercise per Session: 30 min   Stress: No Stress Concern Present (7/5/2025)    [place]izen of [place] of Occupational Health - Occupational Stress Questionnaire     Feeling of Stress : Not at all   Recent Concern: Stress - Stress Concern Present (4/28/2025)    [place]izen [place] of Occupational Health - Occupational Stress Questionnaire     Feeling of Stress : Very much   Housing Stability: Low Risk  (7/5/2025)    Housing Stability Vital Sign     Unable to Pay for Housing in the Last Year: No     Number of Times Moved in the Last Year: 0     Homeless in the Last Year: No       Medications:  Medications Ordered Prior to Encounter[1]  Scheduled Meds:   alteplase  2 mg Intra-Catheter Once    ceFEPime IV (PEDS and ADULTS)  1 g Intravenous Q12H    DULoxetine  60 mg Oral Daily    enoxparin  40 mg Subcutaneous Q12H (prophylaxis, 0900/2100)    filgrastim-sndz  480 mcg Subcutaneous Daily    insulin glargine U-100 (Lantus)  15 Units Subcutaneous QHS    levothyroxine  25 mcg Oral Before breakfast    magnesium sulfate 2 g IVPB  2 g Intravenous Once    midodrine  10 mg Oral Q8H    montelukast  10 mg Oral QHS    mupirocin   Nasal BID    oxybutynin  5 mg Oral TID    pantoprazole  40 mg Oral Daily     Continuous Infusions:   0.9% NaCl   Intravenous Continuous 125 mL/hr at 07/06/25 0708 Rate Verify at 07/06/25 0708    NORepinephrine bitartrate-D5W  0-3 mcg/kg/min Intravenous Continuous 27 mL/hr at 07/06/25 0708 0.13 mcg/kg/min at 07/06/25 0708     PRN Meds:.  Current Facility-Administered Medications:     0.9%  NaCl  "infusion (for blood administration), , Intravenous, Q24H PRN    0.9%  NaCl infusion (for blood administration), , Intravenous, Q24H PRN    acetaminophen, 650 mg, Oral, Q8H PRN    dextrose 50%, 12.5 g, Intravenous, PRN    dextrose 50%, 25 g, Intravenous, PRN    glucagon (human recombinant), 1 mg, Intramuscular, PRN    glucose, 16 g, Oral, PRN    glucose, 24 g, Oral, PRN    HYDROmorphone, 1 mg, Intravenous, Q4H PRN    insulin aspart U-100, 0-10 Units, Subcutaneous, QID (AC + HS) PRN    LORazepam, 0.5 mg, Oral, BID PRN    naloxone, 0.02 mg, Intravenous, PRN    oxyCODONE, 5 mg, Oral, Q6H PRN    prochlorperazine, 5 mg, Intravenous, Q6H PRN    sodium chloride 0.9%, 10 mL, Intravenous, PRN    sodium chloride 0.9%, 10 mL, Intravenous, PRN    Pharmacy to dose Vancomycin consult, , , Once **AND** vancomycin - pharmacy to dose, , Intravenous, pharmacy to manage frequency    Allergies:  Erythromycin, Codeine, Lactose, and [place]    Past Family History:  Reviewed; refer to Hospitalist Admission Note    Review of Systems:  Review of Systems - All 14 systems reviewed and negative, except as noted in HPI    Physical Exam:    /71   Pulse (!) 113   Temp 98.2 °F (36.8 °C) (Oral)   Resp (!) 21   Ht 5' 4" (1.626 m)   Wt 115.6 kg (254 lb 13.6 oz)   SpO2 (!) 92%   Breastfeeding No   BMI 43.75 kg/m²     General Appearance:    Alert, cooperative, no distress, appears stated age   Head:    Normocephalic, without obvious abnormality, atraumatic   Eyes:    PER, conjunctiva/corneas clear, EOM's intact in both eyes        Throat:   Lips, mucosa, and tongue normal; teeth and gums normal   Back:     Symmetric, no curvature, ROM normal, no CVA tenderness   Lungs:     Clear to auscultation bilaterally, respirations unlabored   Chest wall:    No tenderness or deformity   Heart:    Regular rate and rhythm, S1 and S2 normal, no murmur, rub   or gallop   Abdomen:     Soft, non-tender, bowel sounds active all four quadrants,     no masses, " no organomegaly   Extremities:   Extremities normal, atraumatic, no cyanosis or edema   Pulses:   2+ and symmetric all extremities   MSK:   No joint or muscle swelling, tenderness or deformity   Skin:   Skin color, texture, turgor normal, no rashes or lesions   Neurologic:   CNII-XII intact, normal strength and sensation       Throughout.  No flap     Results:  Lab Results   Component Value Date     (L) 07/06/2025    K 3.7 07/06/2025     07/06/2025    CO2 17 (L) 07/06/2025    BUN 26 (H) 07/06/2025    CREATININE 1.8 (H) 07/06/2025    CALCIUM 8.1 (L) 07/06/2025    ANIONGAP 10 07/06/2025    ESTGFRAFRICA >60.0 01/24/2022    EGFRNONAA >60.0 01/24/2022       Lab Results   Component Value Date    CALCIUM 8.1 (L) 07/06/2025    PHOS 3.5 07/05/2025       Recent Labs   Lab 07/06/25  0344   WBC 0.85*   RBC 2.83*   HGB 7.4*   HCT 22.4*      MCV 79*   MCH 26.1*   MCHC 33.0          I have personally reviewed pertinent radiological imaging and reports.    Kenton Lundberg MD  [facility]  [phone]              [1]   No current facility-administered medications on file prior to encounter.     Current Outpatient Medications on File Prior to Encounter   Medication Sig Dispense Refill    ALPRAZolam (XANAX) 0.25 MG tablet Take 1 tablet (0.25 mg total) by mouth daily as needed for Anxiety. 30 tablet 0    amLODIPine (NORVASC) 5 MG tablet Take 1 tablet (5 mg total) by mouth once daily. 90 tablet 1    cetirizine (ZYRTEC) 10 MG tablet TAKE 1 TABLET BY MOUTH ONCE DAILY 90 tablet 1    DULoxetine (CYMBALTA) 60 MG capsule TAKE one CAPSULE BY MOUTH EVERY DAY 90 capsule 1    hydroCHLOROthiazide (HYDRODIURIL) 25 MG tablet TAKE one TABLET BY MOUTH ONCE DAILY 90 tablet 1    HYDROcodone-acetaminophen (NORCO) 5-325 mg per tablet Take 1 tablet by mouth every 6 (six) hours as needed. 10 tablet 0    levothyroxine (SYNTHROID) 25 MCG tablet TAKE one TABLET BY MOUTH BEFORE breakfast 90 tablet 1    losartan (COZAAR) 100  MG tablet TAKE one TABLET BY MOUTH ONCE DAILY 90 tablet 0    magnesium oxide (MAGOX) 400 mg (241.3 mg magnesium) tablet Take 1 tablet (400 mg total) by mouth once daily. 200 tablet 1    metoprolol succinate (TOPROL-XL) 25 MG 24 hr tablet Take 1 tablet (25 mg total) by mouth once daily. 90 tablet 1    montelukast (SINGULAIR) 10 mg tablet TAKE ONE TABLET BY MOUTH EVERY EVENING 90 tablet 1    OLANZapine (ZYPREXA) 5 MG tablet Take 1 tablet by mouth nightly on days 1-3 of each chemotherapy cycle. 3 tablet 11    ondansetron (ZOFRAN) 8 MG tablet Take 1 tablet (8 mg total) by mouth every 8 (eight) hours as needed. 30 tablet 2    prochlorperazine (COMPAZINE) 10 MG tablet Take 1 tablet (10 mg total) by mouth every 6 (six) hours as needed. 30 tablet 1    albuterol (VENTOLIN HFA) 90 mcg/actuation inhaler Inhale 2 puffs into the lungs every 6 (six) hours as needed for Wheezing or Shortness of Breath. Rescue 18 g 1    blood sugar diagnostic Strp Use 1 strip to check blood sugar 2 times daily 100 each 5    blood-glucose meter kit Use as instructed 1 each 0    insulin glargine U-100, Lantus, 100 unit/mL (3 mL) SubQ InPn pen Inject 21 Units into the skin every evening. 9 mL 3    lancets (LANCETS,THIN) Misc 1 each by Misc.(Non-Drug; Combo Route) route 3 (three) times daily. 90 each 0    LIDOcaine-prilocaine (EMLA) cream Apply topically as needed. 30 g 0    loperamide (IMODIUM) 2 mg capsule Take 2 tablets (4mg) by mouth after first loose stool, 1 tablet every 2 hours until diarrhea free for 12 hours. May take 2 tablets (4mg) by mouth every 4 hours at night. May require more than the package labeling maximum dose of 16mg/day. 30 capsule 11    LORazepam (ATIVAN) 0.5 MG tablet Take 1 tablet (0.5 mg total) by mouth 2 (two) times daily. 60 tablet 0    meloxicam (MOBIC) 15 MG tablet Take 15 mg by mouth once daily. 1/2 tablet am      metronidazole 0.75% (METROCREAM) 0.75 % Crea Apply 1 application  topically 2 (two) times daily.      pen  "needle, diabetic 32 gauge x 5/32" Ndle 1 Needle by Misc.(Non-Drug; Combo Route) route once daily. 100 each 2     "

## 2025-07-06 NOTE — ASSESSMENT & PLAN NOTE
Secondary to combination of anemia and the use of IV hydromorphone for pain.  -monitor blood pressure = stable range currently   -maintain goal blood pressure map > 65  -midodrine 10 mg p.o. q.8 hours  -normal saline IV at 125 cc an hour   -Levophed IV to help maintain goal map.  Wean off as tolerated

## 2025-07-06 NOTE — ASSESSMENT & PLAN NOTE
Chemotherapy roughly one week ago.  -Trend CBC daily  -Hematology-Oncology consulting.  Patient is on daily granulocyte colony-stimulating factor.  -continue prophylactic antibiotics.  -continue neutropenic precautions  -Follow up urine and blood cultures

## 2025-07-06 NOTE — ASSESSMENT & PLAN NOTE
-Likely due to hemodynamic instability.  -monitor creatinine = decrease  -avoid nephrotoxic agents   -renally dose medications as needed  -continue normal saline at 125 mL an hour  -Nephrology consulted= follow recommendations

## 2025-07-06 NOTE — ASSESSMENT & PLAN NOTE
She is s/p Open reduction internal fixation right periprosthetic distal femur fracture and Intramedullary nailing right femur  NWB RLE  Fall precautions  PRN analgesics  Ortho consulted and following

## 2025-07-06 NOTE — ASSESSMENT & PLAN NOTE
Multifactorial due to possible bleeding s/p fall as well due to her systemic chemotherapy  Trend H/H q 8 hours.  Transfuse if Hgb is less than 7 g/dl  Iron studies is reflecting anemia of chronic disease.  Hgb 7.4 g/dl today

## 2025-07-06 NOTE — BRIEF OP NOTE
[facility]  Brief Operative Note    SUMMARY     Surgery Date: 7/6/2025     Surgeons and Role:     * Osmany Harding MD - Primary    Assisting Surgeon: None    Pre-op Diagnosis:  Periprosthetic right distal femur fracture    Post-op Diagnosis:  Same    Procedure(s) (LRB):  ORIF, FRACTURE, FEMUR (distal femur ORIF, synthes VA condylar plate, izaiah table, C-arm, triangle) (Right)    Anesthesia: General    Implants:  Implant Name Type Inv. Item Serial No.  Lot No. LRB No. Used [facility] BONE STABILIZATION SYSTEM     [id number] Right 1 Implanted   PLATE 230MM CRV CNDRL PERIART - [id number]  PLATE 230MM CRV CNDRL PERIART  SYN[employer]ES  Right 1 Implanted   SCREW CORTEX 4.5 36M - [id number]  SCREW CORTEX 4.5 36M  SYNTHES  Right 1 Implanted   SCREW CORTEX 4.5 38M - [id number]  SCREW CORTEX 4.5 38M  SYNTHES  Right 1 Implanted   SCREW OPTILINK VA SUNNY 5X30MM - [id number]  SCREW OPTILINK VA SUNNY 5X30MM  [employer]  Right 2 Implanted   SCREW OPTILINK T25 5.0X36MM - [id number]  SCREW OPTILINK T25 5.0X36MM  [employer]  Right 1 Implanted   SCREW OPTILINK T25 5.0X38MM - [id number]  SCREW OPTILINK T25 5.0X38MM  [employer]  Right 1 Implanted   SCREW OPTILINK T25 5.0X60MM - [id number]  SCREW OPTILINK T25 5.0X60MM  [employer]  Right 1 Implanted       Operative Findings:  Significantly comminuted right distal periprosthetic femur fracture    Estimated Blood Loss: 150 mL    Estimated Blood Loss has been documented.         Specimens:   Specimen (24h ago, onward)      None          * No specimens in log *    [id number]

## 2025-07-06 NOTE — PROGRESS NOTES
Pharmacist Renal Dose Adjustment Note    Sonia Muse is a 65 y.o. female being treated with the medication Enoxaparin.    Patient Data:    Vital Signs (Most Recent):  Temp: 98.2 °F (36.8 °C) (07/06/25 0745)  Pulse: (!) 113 (07/06/25 0750)  Resp: (!) 21 (07/06/25 0750)  BP: 121/71 (07/06/25 0700)  SpO2: (!) 92 % (07/06/25 0750) Vital Signs (72h Range):  Temp:  [98 °F (36.7 °C)-99.9 °F (37.7 °C)]   Pulse:  []   Resp:  [11-28]   BP: ()/(40-78)   SpO2:  [90 %-100 %]   Arterial Line BP: ()/(41-73)      Recent Labs   Lab 07/05/25  0326 07/05/25  1640 07/06/25  0344   CREATININE 2.5* 2.0* 1.8*     Serum creatinine: 1.8 mg/dL (H) 07/06/25 0344  Estimated creatinine clearance: 39.9 mL/min (A)    Enoxaparin 40 mg subq every 24 hours will be changed to Enoxaparin 40 mg subq every 12 hours due to BMI> 40 kg/min.    Pharmacist's Name: Pricilla Fay  Pharmacist's Extension: [phone]

## 2025-07-06 NOTE — PROGRESS NOTES
Pharmacokinetic Assessment Follow Up: IV Vancomycin    Vancomycin serum concentration assessment(s):    The trough level was drawn correctly and can be used to guide therapy at this time. The measurement is below the desired definitive target range of 10 to 15 mcg/mL.    Vancomycin Regimen Plan:    Continue regimen to Vancomycin 1250 mg IV once  with next serum trough concentration measured at 1700 prior to next dose on 7/7/25    Drug levels (last 3 results):  Recent Labs   Lab Result Units 07/05/25  1300 07/06/25  1638   Vancomycin Random ug/ml 14.6  --    Vancomycin Trough ug/ml  --  8.0       Pharmacy will continue to follow and monitor vancomycin.    Please contact pharmacy at extension [phone] for questions regarding this assessment.    Thank you for the consult,   Yaquelin Bull       Patient brief summary:  Sonia Muse is a 65 y.o. female initiated on antimicrobial therapy with IV Vancomycin for treatment of urinary tract infection    The patient's current regimen is pulse dosing    Drug Allergies:   Review of patient's allergies indicates:   Allergen Reactions    Erythromycin Swelling    Codeine Nausea And Vomiting     And migraine h/a    Lactose     [place] Blisters       Actual Body Weight:   115.6 K    Renal Function:   Estimated Creatinine Clearance: 42.3 mL/min (A) (based on SCr of 1.7 mg/dL (H)).,     Dialysis Method (if applicable):  N/A    CBC (last 72 hours):  Recent Labs   Lab Result Units 07/04/25  0748 07/04/25  1515 07/04/25  2320 07/05/25  0326 07/05/25  1640 07/06/25  0344 07/06/25  1309   WBC K/uL 0.52*  --  0.56* 0.48*  --  0.85* 0.95*   Hgb gm/dL 7.9* 7.0* 7.8* 7.8* 7.6* 7.4* 8.3*   Hct % 24.9*  --  24.1* 23.6*  --  22.4* 25.9*   Platelet Count K/uL 169  --  177 172  --  219 228   Mono % %  --   --  17.9* 25.0*  --   --   --    Monocyte % % 4.0  --   --   --   --  26.0*  --    Eos % %  --   --  1.8 2.1  --   --   --    Eosinophil % % 3.0  --   --   --   --   --   --    Basophil % %  --    --  0.0 0.0  --   --   --        Metabolic Panel (last 72 hours):  Recent Labs   Lab Result Units 07/04/25  0748 07/04/25  1057 07/04/25  1156 07/05/25  0326 07/05/25  1640 07/06/25  0344 07/06/25  1309 07/06/25  1638   Sodium mmol/L 129*  --   --  128* 130* 130* 133*  --    Urine Sodium mmol/L  --   --  <20*  --   --   --   --  15*   Potassium mmol/L 4.2  --   --  4.1 3.8 3.7 3.9  --    Urine Potassium mmol/L  --   --   --   --   --   --   --  14*   [place] mmol/L 98  --   --  101 102 103 107  --    Urine Chloride mmol/L  --   --   --   --   --   --   --  31   CO2 mmol/L 20*  --   --  17* 19* 17* 14*  --    Glucose mg/dL 192*  --   --  264* 263* 196* 260*  --    Glucose, UA   --  Trace*  --   --   --   --   --   --    BUN mg/dL 34*  --   --  36* 31* 26* 25*  --    Creatinine mg/dL 2.5*  --   --  2.5* 2.0* 1.8* 1.7*  --    Urine Creatinine mg/dL  --   --  255.6  --   --   --   --  50.7   Albumin g/dL 2.3*  --   --  2.7* 2.7* 2.6*  --   --    Bilirubin Total mg/dL 1.6*  --   --  3.3* 1.6* 1.2*  --   --    ALP unit/L 129  --   --  110 111 108  --   --    AST unit/L 11  --   --  7* 4* 4*  --   --    ALT unit/L 10  --   --  6* 5* 5*  --   --    Magnesium mg/dL  --   --   --  1.5* 1.7 1.5*  --   --    Phosphorus Level mg/dL  --   --   --  3.5  --   --   --   --        Vancomycin Administrations:  vancomycin given in the last 96 hours                     vancomycin 750 mg in 0.9% NaCl 250 mL IVPB (admixture device) (mg) 750 mg New Bag 07/05/25 1744    vancomycin (VANCOCIN) 2,000 mg in 0.9% NaCl 500 mL IVPB (admixture device) (mg) 2,000 mg New Bag 07/04/25 1508                    Microbiologic Results:  Microbiology Results (last 7 days)       Procedure Component Value Units Date/Time    Blood culture [[id number]]  (Normal) Collected: 07/04/25 1251    Order Status: Completed Specimen: Blood from Peripheral, Hand, Left Updated: 07/06/25 0700     CULTURE, BLOOD ([facility]) No Growth After 36 Hours    Blood culture [[id number]]   (Normal) Collected: 07/04/25 1247    Order Status: Completed Specimen: Blood from Peripheral, Antecubital, Left Updated: 07/06/25 0700     CULTURE, BLOOD (H) No Growth After 36 Hours    Urine culture [[id number]] Collected: 07/04/25 1057    Order Status: Completed Specimen: Urine Updated: 07/06/25 0654     Urine Culture No Growth To Date

## 2025-07-06 NOTE — SUBJECTIVE & OBJECTIVE
Interval History: resting comfortably in bed    Oncology Treatment Plan:   OP GI mFOLFIRINOX (oxaliplatin leucovorin irinotecan fluorouracil) Q2W    Medications:  Continuous Infusions:   0.9% NaCl   Intravenous Continuous 125 mL/hr at 07/06/25 0708 Rate Verify at 07/06/25 0708    NORepinephrine bitartrate-D5W  0-3 mcg/kg/min Intravenous Continuous 27 mL/hr at 07/06/25 0708 0.13 mcg/kg/min at 07/06/25 0708     Scheduled Meds:   alteplase  2 mg Intra-Catheter Once    ceFEPime IV (PEDS and ADULTS)  1 g Intravenous Q12H    DULoxetine  60 mg Oral Daily    filgrastim-sndz  480 mcg Subcutaneous Daily    insulin glargine U-100 (Lantus)  15 Units Subcutaneous QHS    levothyroxine  25 mcg Oral Before breakfast    magnesium sulfate 2 g IVPB  2 g Intravenous Once    midodrine  10 mg Oral Q8H    montelukast  10 mg Oral QHS    mupirocin   Nasal BID    oxybutynin  5 mg Oral TID    pantoprazole  40 mg Oral Daily     PRN Meds:  Current Facility-Administered Medications:     0.9%  NaCl infusion (for blood administration), , Intravenous, Q24H PRN    0.9%  NaCl infusion (for blood administration), , Intravenous, Q24H PRN    acetaminophen, 650 mg, Oral, Q8H PRN    dextrose 50%, 12.5 g, Intravenous, PRN    dextrose 50%, 25 g, Intravenous, PRN    glucagon (human recombinant), 1 mg, Intramuscular, PRN    glucose, 16 g, Oral, PRN    glucose, 24 g, Oral, PRN    HYDROmorphone, 1 mg, Intravenous, Q4H PRN    insulin aspart U-100, 0-10 Units, Subcutaneous, QID (AC + HS) PRN    LORazepam, 0.5 mg, Oral, BID PRN    mupirocin, , Nasal, On Call Procedure    naloxone, 0.02 mg, Intravenous, PRN    oxyCODONE, 5 mg, Oral, Q6H PRN    prochlorperazine, 5 mg, Intravenous, Q6H PRN    sodium chloride 0.9%, 10 mL, Intravenous, PRN    sodium chloride 0.9%, 10 mL, Intravenous, PRN    tranexamic acid (CYKLOKAPRON) infusion, 1,000 mg, Intravenous, On Call Procedure    Pharmacy to dose Vancomycin consult, , , Once **AND** vancomycin - pharmacy to dose, ,  Intravenous, pharmacy to manage frequency     Review of Systems   Constitutional:  Positive for fatigue. Negative for activity change, appetite change and fever.   HENT:  Negative for mouth sores and postnasal drip.    Eyes:  Negative for visual disturbance.   Respiratory:  Negative for cough and chest tightness.    Cardiovascular:  Positive for leg swelling. Negative for chest pain and palpitations.   Gastrointestinal:  Negative for abdominal distention, abdominal pain, blood in stool, diarrhea, nausea and vomiting.   Genitourinary:  Negative for difficulty urinating, dysuria, hematuria and urgency.   Musculoskeletal:  Positive for joint swelling. Negative for arthralgias and back pain.   Skin:  Negative for rash.   Neurological:  Positive for tremors. Negative for dizziness, weakness and headaches.   Hematological:  Negative for adenopathy. Does not bruise/bleed easily.   Psychiatric/Behavioral:  Negative for confusion and decreased concentration.      Objective:     Vital Signs (Most Recent):  Temp: 98.2 °F (36.8 °C) (07/06/25 0745)  Pulse: (!) 113 (07/06/25 0750)  Resp: (!) 21 (07/06/25 0750)  BP: 121/71 (07/06/25 0700)  SpO2: (!) 92 % (07/06/25 0750) Vital Signs (24h Range):  Temp:  [98 °F (36.7 °C)-99.9 °F (37.7 °C)] 98.2 °F (36.8 °C)  Pulse:  [107-120] 113  Resp:  [13-26] 21  SpO2:  [92 %-95 %] 92 %  BP: (102-140)/(62-77) 121/71  Arterial Line BP: ()/(51-73) 118/62     Weight: 115.6 kg (254 lb 13.6 oz)  Body mass index is 43.75 kg/m².  Body surface area is 2.28 meters squared.      Intake/Output Summary (Last 24 hours) at 7/6/2025 1255  Last data filed at 7/6/2025 1150  Gross per 24 hour   Intake 6629.76 ml   Output 1755 ml   Net 4874.76 ml        Physical Exam   unchanged  Significant Labs:   All pertinent labs from the last 24 hours have been reviewed.    Diagnostic Results:  I have reviewed and interpreted all pertinent imaging results/findings within the past 24 hours.

## 2025-07-06 NOTE — ASSESSMENT & PLAN NOTE
Likely pre-renal given diarrhea and decreased appetite with low BP.  Start IV fluids  Nephrology consulted  Avoid nephrotoxic agents  Monitor UOP and electrolytes  Crea today 1.8 improving.  F/up BMP

## 2025-07-06 NOTE — ASSESSMENT & PLAN NOTE
Probably secondary to renal insufficiency.  Monitor sodium daily.    Continue normal saline at 125 mL an hour.  Nephrology is consulting to help with management.

## 2025-07-06 NOTE — ASSESSMENT & PLAN NOTE
TB: 1.2 today improving  Most likely due to her pancreatic cancer, polypharmacy and sepsis.  Continue to monitor.

## 2025-07-06 NOTE — PLAN OF CARE
07/06/25 0730   Patient Assessment/Suction   Level of Consciousness (AVPU) alert   Respiratory Effort Unlabored   Expansion/Accessory Muscles/Retractions no retractions;no use of accessory muscles   All Lung Fields Breath Sounds diminished   Rhythm/Pattern, Respiratory unlabored;pattern regular;depth regular   PRE-TX-O2   Device (Oxygen Therapy) nasal cannula   $ Is the patient on Low Flow Oxygen? Yes   Flow (L/min) (Oxygen Therapy) 3   SpO2 95 %   Pulse Oximetry Type Continuous   $ Pulse Oximetry - Multiple Charge Pulse Oximetry - Multiple   Pulse 108   Resp 13   Education   $ Education Oxygen;15 min

## 2025-07-06 NOTE — PROGRESS NOTES
[facility]  Hematology/Oncology  Progress Note    Patient Name: Sonia Muse  Admission Date: 7/4/2025  Hospital Length of Stay: 2 days  Code Status: Full Code     Subjective:     HPI:  Sonia Muse is a 65 year old female with a past medical history of a locally advanced pancreatic cancer, anemia, obesity, DM, HTN, and hypothyroidism who presented with a right femur fracture after a fall, MIGUEL ANGEL, hyponatremia and neutropenia.   She recently completed her first cycle of chemotherapy . She was not able to receive her second one due to prolonged myelosuppression.    She endorses chronic diarrhea and decreased appetite as well as fatigue. The patient denies CP, cough, SOB, abdominal pain, nausea, vomiting, constipation.  The patient denies fever, chills, night sweats, weight loss, new lumps or bumps, easy bruising or bleeding.    In the ED, the patient received a 1.5 L NS bolus and 1 L LR bolus as well as fentanyl and Zofran. Oncology was consulted in view of her severe neutropenia.     Interval History: resting comfortably in bed    Oncology Treatment Plan:   OP GI mFOLFIRINOX (oxaliplatin leucovorin irinotecan fluorouracil) Q2W    Medications:  Continuous Infusions:   0.9% NaCl   Intravenous Continuous 125 mL/hr at 07/06/25 0708 Rate Verify at 07/06/25 0708    NORepinephrine bitartrate-D5W  0-3 mcg/kg/min Intravenous Continuous 27 mL/hr at 07/06/25 0708 0.13 mcg/kg/min at 07/06/25 0708     Scheduled Meds:   alteplase  2 mg Intra-Catheter Once    ceFEPime IV (PEDS and ADULTS)  1 g Intravenous Q12H    DULoxetine  60 mg Oral Daily    filgrastim-sndz  480 mcg Subcutaneous Daily    insulin glargine U-100 (Lantus)  15 Units Subcutaneous QHS    levothyroxine  25 mcg Oral Before breakfast    magnesium sulfate 2 g IVPB  2 g Intravenous Once    midodrine  10 mg Oral Q8H    montelukast  10 mg Oral QHS    mupirocin   Nasal BID    oxybutynin  5 mg Oral TID    pantoprazole  40 mg Oral Daily     PRN  Meds:  Current Facility-Administered Medications:     0.9%  NaCl infusion (for blood administration), , Intravenous, Q24H PRN    0.9%  NaCl infusion (for blood administration), , Intravenous, Q24H PRN    acetaminophen, 650 mg, Oral, Q8H PRN    dextrose 50%, 12.5 g, Intravenous, PRN    dextrose 50%, 25 g, Intravenous, PRN    glucagon (human recombinant), 1 mg, Intramuscular, PRN    glucose, 16 g, Oral, PRN    glucose, 24 g, Oral, PRN    HYDROmorphone, 1 mg, Intravenous, Q4H PRN    insulin aspart U-100, 0-10 Units, Subcutaneous, QID (AC + HS) PRN    LORazepam, 0.5 mg, Oral, BID PRN    mupirocin, , Nasal, On Call Procedure    naloxone, 0.02 mg, Intravenous, PRN    oxyCODONE, 5 mg, Oral, Q6H PRN    prochlorperazine, 5 mg, Intravenous, Q6H PRN    sodium chloride 0.9%, 10 mL, Intravenous, PRN    sodium chloride 0.9%, 10 mL, Intravenous, PRN    tranexamic acid (CYKLOKAPRON) infusion, 1,000 mg, Intravenous, On Call Procedure    Pharmacy to dose Vancomycin consult, , , Once **AND** vancomycin - pharmacy to dose, , Intravenous, pharmacy to manage frequency     Review of Systems   Constitutional:  Positive for fatigue. Negative for activity change, appetite change and fever.   HENT:  Negative for mouth sores and postnasal drip.    Eyes:  Negative for visual disturbance.   Respiratory:  Negative for cough and chest tightness.    Cardiovascular:  Positive for leg swelling. Negative for chest pain and palpitations.   Gastrointestinal:  Negative for abdominal distention, abdominal pain, blood in stool, diarrhea, nausea and vomiting.   Genitourinary:  Negative for difficulty urinating, dysuria, hematuria and urgency.   Musculoskeletal:  Positive for joint swelling. Negative for arthralgias and back pain.   Skin:  Negative for rash.   Neurological:  Positive for tremors. Negative for dizziness, weakness and headaches.   Hematological:  Negative for adenopathy. Does not bruise/bleed easily.   Psychiatric/Behavioral:  Negative for  confusion and decreased concentration.      Objective:     Vital Signs (Most Recent):  Temp: 98.2 °F (36.8 °C) (07/06/25 0745)  Pulse: (!) 113 (07/06/25 0750)  Resp: (!) 21 (07/06/25 0750)  BP: 121/71 (07/06/25 0700)  SpO2: (!) 92 % (07/06/25 0750) Vital Signs (24h Range):  Temp:  [98 °F (36.7 °C)-99.9 °F (37.7 °C)] 98.2 °F (36.8 °C)  Pulse:  [107-120] 113  Resp:  [13-26] 21  SpO2:  [92 %-95 %] 92 %  BP: (102-140)/(62-77) 121/71  Arterial Line BP: ()/(51-73) 118/62     Weight: 115.6 kg (254 lb 13.6 oz)  Body mass index is 43.75 kg/m².  Body surface area is 2.28 meters squared.      Intake/Output Summary (Last 24 hours) at 7/6/2025 1255  Last data filed at 7/6/2025 1150  Gross per 24 hour   Intake 6629.76 ml   Output 1755 ml   Net 4874.76 ml        Physical Exam   unchanged  Significant Labs:   All pertinent labs from the last 24 hours have been reviewed.    Diagnostic Results:  I have reviewed and interpreted all pertinent imaging results/findings within the past 24 hours.  Assessment/Plan:     * Closed fracture of distal end of right femur  She is s/p Open reduction internal fixation right periprosthetic distal femur fracture and Intramedullary nailing right femur  NWB RLE  Fall precautions  PRN analgesics  Ortho consulted and following    Pancreatic cancer  Currently on neoadjuvant treatment with mFOLFIRONOX.  Her systemic therapy will be put on hold awaiting her neutropenia to resolved.  She will follow-up with her primary Oncologist upon her discharge    Chemotherapy-induced neutropenia  WBC today 0.85  Continue on growth factors with Zarxio 480 mcg daily until an ANC >1000 for 2 consecutive days  Trend CBC with diff daily  Continue empiric renally dosed cefepime and vancomycin given UA findings concerning for UTI  Follow up urine and blood cultures    Anemia in neoplastic disease  Multifactorial due to possible bleeding s/p fall as well due to her systemic chemotherapy  Trend H/H q 8 hours.  Transfuse if  Hgb is less than 7 g/dl  Iron studies is reflecting anemia of chronic disease.  Hgb 7.4 g/dl today    MIGUEL ANGEL (acute kidney injury)  Likely pre-renal given diarrhea and decreased appetite with low BP.  Start IV fluids  Nephrology consulted  Avoid nephrotoxic agents  Monitor UOP and electrolytes  Crea today 1.8 improving.  F/up BMP    Hypotension  Due to dehydration and sepsis  Continue IV fluid  Started on Levophed      Abnormal LFTs  TB: 1.2 today improving  Most likely due to her pancreatic cancer, polypharmacy and sepsis.  Continue to monitor.    Acute cystitis  On cefipime        Thank you for your consult. I will follow-up with patient. Please contact us if you have any additional questions.     Renee Menon MD  Hematology/Oncology  [facility]

## 2025-07-06 NOTE — ASSESSMENT & PLAN NOTE
-POA   -afebrile  -urine culture on 07/04/2025 = negative so far  -acute cystitis will be ruled out if urine culture remains negative at 48 hours  -treat as above

## 2025-07-06 NOTE — PROGRESS NOTES
[facility] Medicine  Progress Note    Patient Name: Sonia Muse  MRN: [id number]  Patient Class: IP- Inpatient   Admission Date: 7/4/2025  Length of Stay: 1 days  Attending Physician: Ashish Canales MD  Primary Care Provider: Nasra Galvez FNP        Subjective     Principal Problem:Closed fracture of distal end of right femur        HPI:  Sonia Muse is a 65 year old female with a past medical history of pancreatic cancer, anemia, obesity, DM, HTN, and hypothyroidism who presented with a R femur fracture after a fall, MIGUEL ANGEL, hyponatremia and neutropenia. She recently completed her first cycle of chemotherapy after roughly one week ago (patient of Dr. Calle). She endorses chronic diarrhea and decreased appetite as well as fatigue. She states her BP has fallen since being diagnosed with pancreatic cancer and starting chemotherapy. In the ED, the patient received a 1.5 L NS bolus and 1 L LR bolus as well as fentanyl and Zofran. Hospital Medicine was consulted for admission.    Overview/Hospital Course:  We admitted the patient with a distal right femur fracture.  The orthopedic surgeon recommended operative repair after medical stabilization.  In response to the neutropenia, we placed her on granulocyte colony-stimulating factor daily.  We provided prophylactic antibiotic.  We consulted with Hematology Oncology, who provided recommendations for further management.  We transfused red blood cells when needed.  She had hypotension, which required a vasopressor.  It was not clear what was causing the hypotension, the two possibilities were the anemia, and the 2nd was the use of frequent intravenous hydromorphone for the pain associated with the fracture.  We also treated her for acute kidney injury, which was likely because of unstable hemodynamics, in addition to the use of oxaliplatin.    Interval History:  She remains on vasopressor.  We are transfusing RBCs.  She is  receiving daily granulocyte colony-stimulating factor.  Today her white count is about the same as it was yesterday.  Her pain is well controlled with current opioids.  Labs show a slight improvement in kidney function.    Review of Systems   Constitutional:  Negative for fever.   Respiratory:  Negative for cough.    Cardiovascular:  Negative for chest pain.     Objective:     Vital Signs (Most Recent):  Temp: 98 °F (36.7 °C) (07/05/25 1912)  Pulse: (!) 117 (07/05/25 2002)  Resp: 17 (07/05/25 2002)  BP: 125/73 (07/05/25 2002)  SpO2: (!) 94 % (07/05/25 2002) Vital Signs (24h Range):  Temp:  [98 °F (36.7 °C)-99.1 °F (37.3 °C)] 98 °F (36.7 °C)  Pulse:  [] 117  Resp:  [11-26] 17  SpO2:  [91 %-97 %] 94 %  BP: (107-130)/(61-78) 125/73  Arterial Line BP: ()/(47-66) 116/65     Weight: 110.7 kg (244 lb 0.8 oz)  Body mass index is 41.89 kg/m².    Intake/Output Summary (Last 24 hours) at 7/5/2025 2031  Last data filed at 7/5/2025 1801  Gross per 24 hour   Intake 2443.66 ml   Output 1255 ml   Net 1188.66 ml         Physical Exam  Constitutional:       General: She is not in acute distress.     Appearance: She is ill-appearing.   Eyes:      General:         Right eye: No discharge.         Left eye: No discharge.   Neck:      Vascular: No JVD.   Cardiovascular:      Rate and Rhythm: Regular rhythm. Tachycardia present.   Pulmonary:      Effort: Pulmonary effort is normal.      Breath sounds: Normal breath sounds.   Abdominal:      General: Abdomen is flat. Bowel sounds are normal. There is no distension.      Palpations: Abdomen is soft.      Tenderness: There is no abdominal tenderness.   Musculoskeletal:      Right lower leg: No edema.      Left lower leg: No edema.   Skin:     General: Skin is warm and moist.      Coloration: Skin is pale.      Findings: No rash.   Neurological:      Mental Status: She is alert.   Psychiatric:         Attention and Perception: Attention normal.         Mood and Affect: Mood and  affect normal.         Speech: Speech normal.               Significant Labs: All pertinent labs within the past 24 hours have been reviewed.    Significant Imaging: No new imaging      Assessment & Plan  Closed fracture of distal end of right femur  -Orthopedic Surgery consulted  -MANJIT RLE  -Fall precautions  -PRN analgesics  -the surgeon recommends operative management after the patient has been stabilized  MIGUEL ANGEL (acute kidney injury)  Likely due to hemodynamic instability.  -continue normal saline at 125 mL an hour  -Nephrology consulted  -Avoid nephrotoxic agents  -Renally dose medications  -Monitor UOP and electrolytes  -follow BMP daily  Chemotherapy-induced neutropenia  Chemotherapy roughly one week ago.  -Trend CBC daily  -Hematology-Oncology consulting.  Patient is on daily granulocyte colony-stimulating factor.  -continue prophylactic antibiotics.  -continue neutropenic precautions  -Follow up urine and blood cultures      Hypotension  Secondary to combination of anemia and the use of IV hydromorphone for pain.  Continue vasopressor   Continue midodrine 10 mg every 8 hours  Continue normal saline at 125 mL an hour  Monitor pressure    Pancreatic cancer  -Oncology consulted  -patient recently began chemotherapy    Anemia in neoplastic disease  In setting of pancreatic adenocarcinoma and MIGUEL ANGEL.  Check CBC daily.    Transfuse RBCs when hemoglobin less than seven.  Or when hemoglobin is less than eight and patient has symptoms of anemia.  Hematology-Oncology consulting.  Hyponatremia  Probably secondary to renal insufficiency.  Monitor sodium daily.    Continue normal saline at 125 mL an hour.  Nephrology is consulting to help with management.  Abnormal LFTs  Unsure of etiology.  Could be secondary to chemotherapy.    Monitor liver function tests daily.    Acute cystitis  It was present on day one of admission.  Continue cefepime.  Urine sample sent off for culture.    VTE Risk Mitigation (From admission, onward)            Ordered     heparin (porcine) injection 5,000 Units  Every 8 hours         07/04/25 1046     IP VTE HIGH RISK PATIENT  Once         07/04/25 1046     Place sequential compression device  Until discontinued         07/04/25 1046                    Discharge Planning   DENZEL:      Code Status: Full Code   Medical Readiness for Discharge Date:            Critical care time spent on the evaluation and treatment of severe organ dysfunction, review of pertinent labs and imaging studies, discussions with consulting providers and discussions with patient/family:  37 minutes.          Ashish Canales MD  Department of Hospital Medicine   [facility]

## 2025-07-06 NOTE — ASSESSMENT & PLAN NOTE
-Orthopedic Surgery consulted= follow recommendations  -Fall precautions  -PRN analgesics  -status post  1.Open reduction internal fixation right periprosthetic distal femur fracture.2. Intramedullary nailing right femur on 07/06/2025 by Orthopedic surgery  -orthopedic surgery recommends 25% weight-bearing to right lower extremity  -defer to Orthopedic surgery

## 2025-07-06 NOTE — SUBJECTIVE & OBJECTIVE
Interval History:  She remains on vasopressor.  We are transfusing RBCs.  She is receiving daily granulocyte colony-stimulating factor.  Today her white count is about the same as it was yesterday.  Her pain is well controlled with current opioids.  Labs show a slight improvement in kidney function.    Review of Systems   Constitutional:  Negative for fever.   Respiratory:  Negative for cough.    Cardiovascular:  Negative for chest pain.     Objective:     Vital Signs (Most Recent):  Temp: 98 °F (36.7 °C) (07/05/25 1912)  Pulse: (!) 117 (07/05/25 2002)  Resp: 17 (07/05/25 2002)  BP: 125/73 (07/05/25 2002)  SpO2: (!) 94 % (07/05/25 2002) Vital Signs (24h Range):  Temp:  [98 °F (36.7 °C)-99.1 °F (37.3 °C)] 98 °F (36.7 °C)  Pulse:  [] 117  Resp:  [11-26] 17  SpO2:  [91 %-97 %] 94 %  BP: (107-130)/(61-78) 125/73  Arterial Line BP: ()/(47-66) 116/65     Weight: 110.7 kg (244 lb 0.8 oz)  Body mass index is 41.89 kg/m².    Intake/Output Summary (Last 24 hours) at 7/5/2025 2031  Last data filed at 7/5/2025 1801  Gross per 24 hour   Intake 2443.66 ml   Output 1255 ml   Net 1188.66 ml         Physical Exam  Constitutional:       General: She is not in acute distress.     Appearance: She is ill-appearing.   Eyes:      General:         Right eye: No discharge.         Left eye: No discharge.   Neck:      Vascular: No JVD.   Cardiovascular:      Rate and Rhythm: Regular rhythm. Tachycardia present.   Pulmonary:      Effort: Pulmonary effort is normal.      Breath sounds: Normal breath sounds.   Abdominal:      General: Abdomen is flat. Bowel sounds are normal. There is no distension.      Palpations: Abdomen is soft.      Tenderness: There is no abdominal tenderness.   Musculoskeletal:      Right lower leg: No edema.      Left lower leg: No edema.   Skin:     General: Skin is warm and moist.      Coloration: Skin is pale.      Findings: No rash.   Neurological:      Mental Status: She is alert.   Psychiatric:          Attention and Perception: Attention normal.         Mood and Affect: Mood and affect normal.         Speech: Speech normal.               Significant Labs: All pertinent labs within the past 24 hours have been reviewed.    Significant Imaging: No new imaging

## 2025-07-06 NOTE — ANESTHESIA PROCEDURE NOTES
Intubation    Date/Time: 7/6/2025 8:14 AM    Performed by: Panchito Cantor CRNA  Authorized by: Beltran Mai MD    Intubation:     Induction:  Intravenous    Mask Ventilation:  Easy mask    Attempts:  1    Attempted By:  CRNA    Method of Intubation:  Video laryngoscopy    Blade:  Haskins 4    Laryngeal View Grade: Grade I - full view of cords      Difficult Airway Encountered?: No      Complications:  None    Airway Device:  Oral endotracheal tube    Airway Device Size:  7.5    Style/Cuff Inflation:  Cuffed    Inflation Amount (mL):  8    Tube secured:  21    Secured at:  The lips    Placement Verified By:  Capnometry and Fiber optic visualization    Complicating Factors:  None    Findings Post-Intubation:  BS equal bilateral and atraumatic/condition of teeth unchanged

## 2025-07-06 NOTE — PROGRESS NOTES
[facility] Medicine  Progress Note    Patient Name: Sonia Muse  MRN: [id number]  Patient Class: IP- Inpatient   Admission Date: 7/4/2025  Length of Stay: 2 days  Attending Physician: Karina Freire MD  Primary Care Provider: Nasra Galvez FNP        Subjective     Principal Problem:Closed fracture of distal end of right femur        HPI:  Sonia Muse is a 65 year old female with a past medical history of pancreatic cancer, anemia, obesity, DM, HTN, and hypothyroidism who presented with a R femur fracture after a fall, MIGUEL ANGEL, hyponatremia and neutropenia. She recently completed her first cycle of chemotherapy after roughly one week ago (patient of Dr. Calle). She endorses chronic diarrhea and decreased appetite as well as fatigue. She states her BP has fallen since being diagnosed with pancreatic cancer and starting chemotherapy. In the ED, the patient received a 1.5 L NS bolus and 1 L LR bolus as well as fentanyl and Zofran. Hospital Medicine was consulted for admission.    Overview/Hospital Course:  We admitted the patient with a distal right femur fracture.  The orthopedic surgeon recommended operative repair after medical stabilization.  In response to the neutropenia, we placed her on granulocyte colony-stimulating factor daily.  We provided prophylactic antibiotic.  We consulted with Hematology Oncology, who provided recommendations for further management.  We transfused red blood cells when needed.  She had hypotension, which required a vasopressor.  It was not clear what was causing the hypotension, the two possibilities were the anemia, and the 2nd was the use of frequent intravenous hydromorphone for the pain associated with the fracture.  We also treated her for acute kidney injury, which was likely because of unstable hemodynamics, in addition to the use of oxaliplatin.  Patient treated with IV fluids, midodrine and Levophed for hypotension with improvement.   Renal function monitored showed improving creatinine with IV fluids.  Filgrastim daily was ordered for 5 days for neutropenia.  Patient remained afebrile.  On morning of 07/06/2025 patient taken for ORIF of right femur by Orthopedic surgery    Interval History:  Patient taken for ORIF early this morning before my evaluation.  She is now post ORIF right femur.   by bedside.  She is still very sleepy and just got back to ICU from surgery.  We will wait for patient to wake up.  She remains on Levophed IV.  Patient seen and assessed along with nurse by bedside.    Review of Systems   Unable to perform ROS: Acuity of condition     Objective:     Vital Signs (Most Recent):  Temp: 98.2 °F (36.8 °C) (07/06/25 0745)  Pulse: (!) 113 (07/06/25 0750)  Resp: (!) 21 (07/06/25 0750)  BP: 121/71 (07/06/25 0700)  SpO2: (!) 92 % (07/06/25 0750) Vital Signs (24h Range):  Temp:  [98 °F (36.7 °C)-99.9 °F (37.7 °C)] 98.2 °F (36.8 °C)  Pulse:  [107-120] 113  Resp:  [13-26] 21  SpO2:  [91 %-95 %] 92 %  BP: (102-140)/(62-77) 121/71  Arterial Line BP: ()/(51-73) 118/62     Weight: 115.6 kg (254 lb 13.6 oz)  Body mass index is 43.75 kg/m².    Intake/Output Summary (Last 24 hours) at 7/6/2025 1004  Last data filed at 7/6/2025 0959  Gross per 24 hour   Intake 6129.76 ml   Output 1605 ml   Net 4524.76 ml         Physical Exam  Constitutional:       General: She is not in acute distress.     Appearance: She is obese.   HENT:      Head: Normocephalic and atraumatic.      Mouth/Throat:      Mouth: Mucous membranes are moist.   Cardiovascular:      Rate and Rhythm: Normal rate and regular rhythm.      Pulses: Normal pulses.      Heart sounds: Normal heart sounds.   Pulmonary:      Effort: Pulmonary effort is normal.      Breath sounds: Normal breath sounds.   Abdominal:      General: Bowel sounds are normal.      Tenderness: There is no abdominal tenderness.   Musculoskeletal:      Comments: Right lower extremity in a brace   Skin:      General: Skin is warm and dry.   Neurological:      Comments: Unable to assess thoroughly as patient is still very drowsy from anesthesia post surgery   Psychiatric:      Comments: Unable to assess due to lethargy               Significant Labs: All pertinent labs within the past 24 hours have been reviewed.    Significant Imaging: I have reviewed all pertinent imaging results/findings within the past 24 hours.      Assessment & Plan  Closed fracture of distal end of right femur  -Orthopedic Surgery consulted= follow recommendations  -Fall precautions  -PRN analgesics  -status post  1.Open reduction internal fixation right periprosthetic distal femur fracture.2. Intramedullary nailing right femur on 07/06/2025 by Orthopedic surgery  -orthopedic surgery recommends 25% weight-bearing to right lower extremity  -defer to Orthopedic surgery  MIGUEL ANGEL (acute kidney injury)  -Likely due to hemodynamic instability.  -monitor creatinine = decrease  -avoid nephrotoxic agents   -renally dose medications as needed  -continue normal saline at 125 mL an hour  -Nephrology consulted= follow recommendations  Chemotherapy-induced neutropenia  -status post Chemotherapy roughly one week ago.  -monitor WBC= still low, with mild increase  -neutropenic precautions   -urine culture on 07/04/2025 = no growth till date  -blood cultures x2 on 07/04/2025 = no growth at 36 hours  -filgrastim 458 mg IV daily x5 days.  Ordered on 07/04/2025  -cefepime 1 g IV b.i.d. empirically.  Ordered on 07/04/2025  -Heme-Onc consulted = follow recommendations      Hypotension  Secondary to combination of anemia and the use of IV hydromorphone for pain.  -monitor blood pressure = stable range currently   -maintain goal blood pressure map > 65  -midodrine 10 mg p.o. q.8 hours  -normal saline IV at 125 cc an hour   -Levophed IV to help maintain goal map.  Wean off as tolerated        Pancreatic cancer  -Oncology consulted  -patient recently began  chemotherapy    Anemia in neoplastic disease  -In setting of pancreatic adenocarcinoma and MIGUEL ANGEL.  -no overt bleeding seen or reported currently  -monitor H&H = stable range with mild decreased (also with contribution from dilutional effect of IV fluids)  -FOBT = pending   -PPI daily   -consider transfusion of 1 unit PRBC if and when hemoglobin less than 7 Or when hemoglobin is less than 8 and patient has symptoms of anemia.  Hematology-Oncology consulting= follow recommendations  Hyponatremia  Probably secondary to renal insufficiency.  Monitor sodium daily= no significant change  Continue normal saline at 125 mL an hour.  Nephrology consulted = appreciate input and follow recommendations  Abnormal LFTs  Unsure of etiology.  Could be secondary to chemotherapy.    Monitor liver function tests daily.    Acute cystitis  -POA   -afebrile  -urine culture on 07/04/2025 = negative so far  -acute cystitis will be ruled out if urine culture remains negative at 48 hours  -treat as above    DM (diabetes mellitus)  Last A1c reviewed-   Lab Results   Component Value Date    HGBA1C 10.1 (H) 04/25/2025     Most recent fingerstick glucose reviewed-   Recent Labs   Lab 07/05/25  1256 07/05/25  1743 07/05/25  2145   POCTGLUCOSE 288* 275* 262*     Current correctional scale  Medium  Maintain anti-hyperglycemic dose as follows-   Antihyperglycemics (From admission, onward)      Start     Stop Route Frequency Ordered    07/06/25 2100  insulin glargine U-100 (Lantus) pen 15 Units         -- SubQ Nightly 07/06/25 1037    07/04/25 1142  insulin aspart U-100 pen 0-10 Units         -- SubQ Before meals & nightly PRN 07/04/25 1046          -Hold Oral hypoglycemics while patient is in the hospital.  -Monitor CBGS = elevated   -Lantus 15 units subQ q.h.s. (titrate as needed)   -goal CBGS while in ICU = 140-180    VTE Risk Mitigation (From admission, onward)           Ordered     IP VTE HIGH RISK PATIENT  Once         07/04/25 1046     Place  sequential compression device  Until discontinued         07/04/25 1046                    Discharge Planning   DENZEL:      Code Status: Full Code   Medical Readiness for Discharge Date:             Karina Freire MD  Department of Hospital Medicine   [facility]

## 2025-07-06 NOTE — PLAN OF CARE
Problem: Adult Inpatient Plan of Care  Goal: Plan of Care Review  Outcome: Ongoing     Problem: Acute Kidney Injury/Impairment  Goal: Effective Renal Function  Outcome: Ongoing     Problem: Fall Injury Risk  Goal: Absence of Fall and Fall-Related Injury  Outcome: Ongoing     Problem: Orthopaedic Fracture  Goal: Fracture Stability  Outcome: Ongoing     Problem: Orthopaedic Fracture  Goal: Optimal Functional Ability  Outcome: Ongoing     Problem: Orthopaedic Fracture  Goal: Optimal Pain Control and Function  Outcome: Ongoing

## 2025-07-06 NOTE — ASSESSMENT & PLAN NOTE
Probably secondary to renal insufficiency.  Monitor sodium daily= no significant change  Continue normal saline at 125 mL an hour.  Nephrology consulted = appreciate input and follow recommendations

## 2025-07-06 NOTE — PROGRESS NOTES
Was called to patient's room for confusion.  Patient's  by bedside.  Patient seen and assessed along with patient's nurse Geraldo by bedside.   states patient confusion is slowly improving but patient still has some degree of confusion.  She is oriented times 2-3.  Has generalized weakness with no focal neuro deficits.  Patient states that she was diagnosed several years ago with sleep apnea and for CPAP for some time and the machine got broken or spoiled and she never got it replaced.  Suspect lingering anesthesia with underlying sleep apnea maybe an etiology for her confusion.  We will get a chest x-ray, UA and an ABG and CT head without contrast as well.

## 2025-07-06 NOTE — ASSESSMENT & PLAN NOTE
-In setting of pancreatic adenocarcinoma and MIGUEL ANGEL.  -no overt bleeding seen or reported currently  -monitor H&H = stable range with mild decreased (also with contribution from dilutional effect of IV fluids)  -FOBT = pending   -PPI daily   -consider transfusion of 1 unit PRBC if and when hemoglobin less than 7 Or when hemoglobin is less than 8 and patient has symptoms of anemia.  Hematology-Oncology consulting= follow recommendations

## 2025-07-06 NOTE — HOSPITAL COURSE
Admitted the patient with a distal right femur fracture.  The orthopedic surgeon recommended operative repair after medical stabilization.  In response to the neutropenia, we placed her on granulocyte colony-stimulating factor daily.  We provided prophylactic antibiotic.  We consulted with Hematology Oncology, who provided recommendations for further management.  We transfused red blood cells when needed.  She had hypotension, which required a vasopressor.  It was not clear what was causing the hypotension, the two possibilities were the anemia, and the 2nd was the use of frequent intravenous hydromorphone for the pain associated with the fracture.  We also treated her for acute kidney injury, which was likely because of unstable hemodynamics, in addition to the use of oxaliplatin.  Patient treated with IV fluids, midodrine and Levophed for hypotension with improvement.  Renal function monitored showed improving creatinine with IV fluids.  Filgrastim daily was ordered for 5 days for neutropenia.  Patient remained afebrile.  On morning of 07/06/2025 patient taken for ORIF of right femur by Orthopedic surgery.  Urine cultures and blood cultures remain negative therefore UTI ruled out and empiric IV antibiotics were DC.  Renal function monitored and improved well with mild increase in edema therefore IV fluids DC.  Patient remains off of Levophed since evening of 07/07/2025.  Patient does have a history of sleep apnea in the distant past and used a CPAP for awhile and then the machine broke therefore she never got this replaced.  She will require a repeat sleep study in outpatient setting as she probably still has underlying sleep apnea.  Patient did have mild acute hospital-acquired psychosis postop and was very emotional.  SSRI continued.  Psych eval consulted.  Patient's mental status improved the next day.  Blood pressure remains in stable range on midodrine.  Suspect midodrine could eventually be tapered off as  tolerated.  Downgrade orders to tele placed on 07/08/2025. Dc to IPR.     Physical Exam  Constitutional:       General: She is not in acute distress.  Cardiovascular:      Rate and Rhythm: Normal rate.   Pulmonary:      Effort: No respiratory distress.      Breath sounds: No stridor.

## 2025-07-06 NOTE — ASSESSMENT & PLAN NOTE
-status post Chemotherapy roughly one week ago.  -monitor WBC= still low, with mild increase  -neutropenic precautions   -urine culture on 07/04/2025 = no growth till date  -blood cultures x2 on 07/04/2025 = no growth at 36 hours  -filgrastim 458 mg IV daily x5 days.  Ordered on 07/04/2025  -cefepime 1 g IV b.i.d. empirically.  Ordered on 07/04/2025  -Heme-Onc consulted = follow recommendations

## 2025-07-06 NOTE — SUBJECTIVE & OBJECTIVE
Interval History:  Patient taken for ORIF early this morning before my evaluation.  She is now post ORIF right femur.   by bedside.  She is still very sleepy and just got back to ICU from surgery.  We will wait for patient to wake up.  She remains on Levophed IV.  Patient seen and assessed along with nurse by bedside.    Review of Systems   Unable to perform ROS: Acuity of condition     Objective:     Vital Signs (Most Recent):  Temp: 98.2 °F (36.8 °C) (07/06/25 0745)  Pulse: (!) 113 (07/06/25 0750)  Resp: (!) 21 (07/06/25 0750)  BP: 121/71 (07/06/25 0700)  SpO2: (!) 92 % (07/06/25 0750) Vital Signs (24h Range):  Temp:  [98 °F (36.7 °C)-99.9 °F (37.7 °C)] 98.2 °F (36.8 °C)  Pulse:  [107-120] 113  Resp:  [13-26] 21  SpO2:  [91 %-95 %] 92 %  BP: (102-140)/(62-77) 121/71  Arterial Line BP: ()/(51-73) 118/62     Weight: 115.6 kg (254 lb 13.6 oz)  Body mass index is 43.75 kg/m².    Intake/Output Summary (Last 24 hours) at 7/6/2025 1004  Last data filed at 7/6/2025 0959  Gross per 24 hour   Intake 6129.76 ml   Output 1605 ml   Net 4524.76 ml         Physical Exam  Constitutional:       General: She is not in acute distress.     Appearance: She is obese.   HENT:      Head: Normocephalic and atraumatic.      Mouth/Throat:      Mouth: Mucous membranes are moist.   Cardiovascular:      Rate and Rhythm: Normal rate and regular rhythm.      Pulses: Normal pulses.      Heart sounds: Normal heart sounds.   Pulmonary:      Effort: Pulmonary effort is normal.      Breath sounds: Normal breath sounds.   Abdominal:      General: Bowel sounds are normal.      Tenderness: There is no abdominal tenderness.   Musculoskeletal:      Comments: Right lower extremity in a brace   Skin:     General: Skin is warm and dry.   Neurological:      Comments: Unable to assess thoroughly as patient is still very drowsy from anesthesia post surgery   Psychiatric:      Comments: Unable to assess due to lethargy               Significant  Labs: All pertinent labs within the past 24 hours have been reviewed.    Significant Imaging: I have reviewed all pertinent imaging results/findings within the past 24 hours.

## 2025-07-06 NOTE — CARE UPDATE
07/05/25 2002   Patient Assessment/Suction   Level of Consciousness (AVPU) alert   Respiratory Effort Unlabored   Expansion/Accessory Muscles/Retractions expansion symmetric   All Lung Fields Breath Sounds clear;diminished   Rhythm/Pattern, Respiratory depth regular;pattern regular   Cough Frequency infrequent   Cough Type good;nonproductive   Skin Integrity   $ Wound Care Tech Time 15 min   Area Observed Left;Right;Behind ear;Cheek;Upper lip;Nares   Skin Appearance without discoloration   PRE-TX-O2   Device (Oxygen Therapy) nasal cannula   $ Is the patient on Low Flow Oxygen? Yes   Flow (L/min) (Oxygen Therapy) 3   SpO2 (!) 94 %   Pulse Oximetry Type Continuous   $ Pulse Oximetry - Multiple Charge Pulse Oximetry - Multiple   Pulse (!) 117   Resp 17   /73   Education   $ Education Oxygen;15 min

## 2025-07-06 NOTE — ANESTHESIA POSTPROCEDURE EVALUATION
Anesthesia Post Evaluation    Patient: Sonia Muse    Procedure(s) Performed: Procedure(s) (LRB):  ORIF, FRACTURE, FEMUR (distal femur ORIF, synthes VA condylar plate, izaiah table, C-arm, triangle) (Right)    Final Anesthesia Type: general      Patient location during evaluation: PACU  Patient participation: Yes- Able to Participate  Level of consciousness: awake  Post-procedure vital signs: reviewed and stable  Pain management: adequate  Airway patency: patent    PONV status at discharge: No PONV  Anesthetic complications: no      Cardiovascular status: tachycardic and stable  Respiratory status: unassisted and spontaneous ventilation  Follow-up not needed.              Vitals Value Taken Time   /74 07/06/25 13:04   Temp  07/06/25 13:06   Pulse 119 07/06/25 13:04   Resp 16 07/06/25 13:04   SpO2 90 % 07/06/25 13:04   Vitals shown include unfiled device data.      No case tracking events are documented in the log.      Pain/Lori Score: Pain Rating Prior to Med Admin: 6 (7/6/2025  4:26 AM)  Pain Rating Post Med Admin: 3 (7/6/2025  5:26 AM)

## 2025-07-06 NOTE — ASSESSMENT & PLAN NOTE
-Orthopedic Surgery consulted  -NWB RLE  -Fall precautions  -PRN analgesics  -the surgeon recommends operative management after the patient has been stabilized

## 2025-07-06 NOTE — ASSESSMENT & PLAN NOTE
WBC today 0.85  Continue on growth factors with Zarxio 480 mcg daily until an ANC >1000 for 2 consecutive days  Trend CBC with diff daily  Continue empiric renally dosed cefepime and vancomycin given UA findings concerning for UTI  Follow up urine and blood cultures

## 2025-07-06 NOTE — ASSESSMENT & PLAN NOTE
Likely due to hemodynamic instability.  -continue normal saline at 125 mL an hour  -Nephrology consulted  -Avoid nephrotoxic agents  -Renally dose medications  -Monitor UOP and electrolytes  -follow BMP daily

## 2025-07-06 NOTE — ASSESSMENT & PLAN NOTE
Secondary to combination of anemia and the use of IV hydromorphone for pain.  Continue vasopressor   Continue midodrine 10 mg every 8 hours  Continue normal saline at 125 mL an hour  Monitor pressure

## 2025-07-06 NOTE — ASSESSMENT & PLAN NOTE
Last A1c reviewed-   Lab Results   Component Value Date    HGBA1C 10.1 (H) 04/25/2025     Most recent fingerstick glucose reviewed-   Recent Labs   Lab 07/05/25  1256 07/05/25  1743 07/05/25  2145   POCTGLUCOSE 288* 275* 262*     Current correctional scale  Medium  Maintain anti-hyperglycemic dose as follows-   Antihyperglycemics (From admission, onward)      Start     Stop Route Frequency Ordered    07/06/25 2100  insulin glargine U-100 (Lantus) pen 15 Units         -- SubQ Nightly 07/06/25 1037    07/04/25 1142  insulin aspart U-100 pen 0-10 Units         -- SubQ Before meals & nightly PRN 07/04/25 1046          -Hold Oral hypoglycemics while patient is in the hospital.  -Monitor CBGS = elevated   -Lantus 15 units subQ q.h.s. (titrate as needed)   -goal CBGS while in ICU = 140-180

## 2025-07-07 PROBLEM — E87.20 METABOLIC ACIDOSIS: Status: ACTIVE | Noted: 2025-07-07

## 2025-07-07 PROBLEM — G47.33 OSA (OBSTRUCTIVE SLEEP APNEA): Status: ACTIVE | Noted: 2025-07-07

## 2025-07-07 PROBLEM — F23 ACUTE PSYCHOSIS: Status: ACTIVE | Noted: 2025-07-07

## 2025-07-07 LAB
ALBUMIN SERPL-MCNC: 2.4 G/DL (ref 3.5–5.2)
ALLENS TEST: ABNORMAL
ALP SERPL-CCNC: 103 UNIT/L (ref 55–135)
ALT SERPL-CCNC: 5 UNIT/L (ref 10–44)
ANION GAP (SMH): 10 MMOL/L (ref 8–16)
AST SERPL-CCNC: 11 UNIT/L (ref 10–40)
BACTERIA UR CULT: NO GROWTH
BILIRUB SERPL-MCNC: 1.4 MG/DL (ref 0.1–1)
BUN SERPL-MCNC: 28 MG/DL (ref 8–23)
CALCIUM SERPL-MCNC: 8.6 MG/DL (ref 8.7–10.5)
CHLORIDE SERPL-SCNC: 108 MMOL/L (ref 95–110)
CO2 SERPL-SCNC: 21 MMOL/L (ref 23–29)
CREAT SERPL-MCNC: 1.6 MG/DL (ref 0.5–1.4)
DACRYOCYTES BLD QL SMEAR: ABNORMAL
DELSYS: ABNORMAL
ERYTHROCYTE [DISTWIDTH] IN BLOOD BY AUTOMATED COUNT: 15.7 % (ref 11.5–14.5)
FLOW: 6
GFR SERPLBLD CREATININE-BSD FMLA CKD-EPI: 36 ML/MIN/1.73/M2
GLUCOSE SERPL-MCNC: 203 MG/DL (ref 70–110)
GLUCOSE SERPL-MCNC: 258 MG/DL (ref 70–110)
HCO3 UR-SCNC: 18.4 MMOL/L (ref 24–28)
HCO3 UR-SCNC: 20.3 MMOL/L (ref 24–28)
HCT VFR BLD AUTO: 23.7 % (ref 37–48.5)
HCT VFR BLD CALC: 28 %PCV (ref 36–54)
HGB BLD-MCNC: 7.8 GM/DL (ref 12–16)
HYPOCHROMIA BLD QL SMEAR: ABNORMAL
LACTATE SERPL-SCNC: 0.9 MMOL/L (ref 0.5–1.9)
LYMPHOCYTES NFR BLD MANUAL: 29 % (ref 18–48)
MAGNESIUM SERPL-MCNC: 1.9 MG/DL (ref 1.6–2.6)
MCH RBC QN AUTO: 25.1 PG (ref 27–31)
MCHC RBC AUTO-ENTMCNC: 32.9 G/DL (ref 32–36)
MCV RBC AUTO: 76 FL (ref 82–98)
MODE: ABNORMAL
MONOCYTES NFR BLD MANUAL: 39 % (ref 4–15)
NEUTROPHILS NFR BLD MANUAL: 31 % (ref 38–73)
NEUTS BAND NFR BLD MANUAL: 1 %
NUCLEATED RBC (/100WBC) (SMH): 0 /100 WBC
PCO2 BLDA: 35 MMHG (ref 35–45)
PCO2 BLDA: 65.9 MMHG (ref 35–45)
PH SMN: 7.05 [PH] (ref 7.35–7.45)
PH SMN: 7.37 [PH] (ref 7.35–7.45)
PHOSPHATE SERPL-MCNC: 2.6 MG/DL (ref 2.7–4.5)
PLATELET # BLD AUTO: 231 K/UL (ref 150–450)
PLATELET BLD QL SMEAR: ABNORMAL
PMV BLD AUTO: 9.6 FL (ref 9.2–12.9)
PO2 BLDA: 84 MMHG (ref 80–100)
PO2 BLDA: 88 MMHG (ref 80–100)
POC BE: -12 MMOL/L (ref -2–2)
POC BE: -5 MMOL/L (ref -2–2)
POC IONIZED CALCIUM: 1.31 MMOL/L (ref 1.06–1.42)
POC SATURATED O2: 90 % (ref 95–100)
POC SATURATED O2: 97 % (ref 95–100)
POC TCO2: 20 MMOL/L (ref 23–27)
POC TCO2: 21 MMOL/L (ref 23–27)
POCT GLUCOSE: 150 MG/DL (ref 70–110)
POCT GLUCOSE: 159 MG/DL (ref 70–110)
POCT GLUCOSE: 180 MG/DL (ref 70–110)
POIKILOCYTOSIS BLD QL SMEAR: SLIGHT
POLYCHROMASIA BLD QL SMEAR: ABNORMAL
POTASSIUM BLD-SCNC: 4.3 MMOL/L (ref 3.5–5.1)
POTASSIUM SERPL-SCNC: 3.7 MMOL/L (ref 3.5–5.1)
PROT SERPL-MCNC: 5.7 GM/DL (ref 6–8.4)
RBC # BLD AUTO: 3.11 M/UL (ref 4–5.4)
SAMPLE: ABNORMAL
SAMPLE: ABNORMAL
SITE: ABNORMAL
SODIUM BLD-SCNC: 134 MMOL/L (ref 136–145)
SODIUM SERPL-SCNC: 139 MMOL/L (ref 136–145)
VANCOMYCIN TROUGH SERPL-MCNC: 14.1 UG/ML (ref ?–20)
WBC # BLD AUTO: 1.44 K/UL (ref 3.9–12.7)

## 2025-07-07 PROCEDURE — 51798 US URINE CAPACITY MEASURE: CPT

## 2025-07-07 PROCEDURE — 99900035 HC TECH TIME PER 15 MIN (STAT)

## 2025-07-07 PROCEDURE — 97166 OT EVAL MOD COMPLEX 45 MIN: CPT

## 2025-07-07 PROCEDURE — 37799 UNLISTED PX VASCULAR SURGERY: CPT

## 2025-07-07 PROCEDURE — 25000003 PHARM REV CODE 250: Performed by: INTERNAL MEDICINE

## 2025-07-07 PROCEDURE — 20000000 HC ICU ROOM

## 2025-07-07 PROCEDURE — 63600175 PHARM REV CODE 636 W HCPCS: Mod: JZ | Performed by: ORTHOPAEDIC SURGERY

## 2025-07-07 PROCEDURE — 25000003 PHARM REV CODE 250: Performed by: ORTHOPAEDIC SURGERY

## 2025-07-07 PROCEDURE — 5A09357 ASSISTANCE WITH RESPIRATORY VENTILATION, LESS THAN 24 CONSECUTIVE HOURS, CONTINUOUS POSITIVE AIRWAY PRESSURE: ICD-10-PCS | Performed by: STUDENT IN AN ORGANIZED HEALTH CARE EDUCATION/TRAINING PROGRAM

## 2025-07-07 PROCEDURE — 97162 PT EVAL MOD COMPLEX 30 MIN: CPT

## 2025-07-07 PROCEDURE — 94660 CPAP INITIATION&MGMT: CPT

## 2025-07-07 PROCEDURE — 63600175 PHARM REV CODE 636 W HCPCS: Mod: JZ,JB | Performed by: ORTHOPAEDIC SURGERY

## 2025-07-07 PROCEDURE — 97535 SELF CARE MNGMENT TRAINING: CPT

## 2025-07-07 PROCEDURE — 27000221 HC OXYGEN, UP TO 24 HOURS

## 2025-07-07 PROCEDURE — 99900031 HC PATIENT EDUCATION (STAT)

## 2025-07-07 PROCEDURE — 80202 ASSAY OF VANCOMYCIN: CPT | Performed by: INTERNAL MEDICINE

## 2025-07-07 PROCEDURE — 94761 N-INVAS EAR/PLS OXIMETRY MLT: CPT | Mod: XB

## 2025-07-07 PROCEDURE — 80053 COMPREHEN METABOLIC PANEL: CPT | Performed by: ORTHOPAEDIC SURGERY

## 2025-07-07 PROCEDURE — 27000207 HC ISOLATION

## 2025-07-07 PROCEDURE — 84100 ASSAY OF PHOSPHORUS: CPT | Performed by: INTERNAL MEDICINE

## 2025-07-07 PROCEDURE — 5A0935A ASSISTANCE WITH RESPIRATORY VENTILATION, LESS THAN 24 CONSECUTIVE HOURS, HIGH NASAL FLOW/VELOCITY: ICD-10-PCS | Performed by: STUDENT IN AN ORGANIZED HEALTH CARE EDUCATION/TRAINING PROGRAM

## 2025-07-07 PROCEDURE — 51701 INSERT BLADDER CATHETER: CPT

## 2025-07-07 PROCEDURE — 99233 SBSQ HOSP IP/OBS HIGH 50: CPT | Mod: ,,, | Performed by: INTERNAL MEDICINE

## 2025-07-07 PROCEDURE — 85025 COMPLETE CBC W/AUTO DIFF WBC: CPT | Performed by: ORTHOPAEDIC SURGERY

## 2025-07-07 PROCEDURE — 83735 ASSAY OF MAGNESIUM: CPT | Performed by: ORTHOPAEDIC SURGERY

## 2025-07-07 PROCEDURE — 63600175 PHARM REV CODE 636 W HCPCS: Performed by: INTERNAL MEDICINE

## 2025-07-07 PROCEDURE — 83605 ASSAY OF LACTIC ACID: CPT | Performed by: INTERNAL MEDICINE

## 2025-07-07 PROCEDURE — 82803 BLOOD GASES ANY COMBINATION: CPT

## 2025-07-07 PROCEDURE — 97530 THERAPEUTIC ACTIVITIES: CPT

## 2025-07-07 RX ORDER — CEFEPIME HYDROCHLORIDE 1 G/1
1 INJECTION, POWDER, FOR SOLUTION INTRAMUSCULAR; INTRAVENOUS
Status: DISCONTINUED | OUTPATIENT
Start: 2025-07-07 | End: 2025-07-08

## 2025-07-07 RX ORDER — INSULIN GLARGINE 100 [IU]/ML
23 INJECTION, SOLUTION SUBCUTANEOUS NIGHTLY
Status: DISCONTINUED | OUTPATIENT
Start: 2025-07-07 | End: 2025-07-09

## 2025-07-07 RX ADMIN — MIDODRINE HYDROCHLORIDE 10 MG: 5 TABLET ORAL at 05:07

## 2025-07-07 RX ADMIN — INSULIN ASPART 2 UNITS: 100 INJECTION, SOLUTION INTRAVENOUS; SUBCUTANEOUS at 04:07

## 2025-07-07 RX ADMIN — LORAZEPAM 0.5 MG: 0.5 TABLET ORAL at 09:07

## 2025-07-07 RX ADMIN — MUPIROCIN 1 G: 20 OINTMENT TOPICAL at 09:07

## 2025-07-07 RX ADMIN — DULOXETINE 60 MG: 30 CAPSULE, DELAYED RELEASE ORAL at 09:07

## 2025-07-07 RX ADMIN — CEFEPIME 1 G: 1 INJECTION, POWDER, FOR SOLUTION INTRAMUSCULAR; INTRAVENOUS at 05:07

## 2025-07-07 RX ADMIN — CEFEPIME 1 G: 1 INJECTION, POWDER, FOR SOLUTION INTRAMUSCULAR; INTRAVENOUS at 12:07

## 2025-07-07 RX ADMIN — VANCOMYCIN HYDROCHLORIDE 1250 MG: 1.25 INJECTION, POWDER, LYOPHILIZED, FOR SOLUTION INTRAVENOUS at 07:07

## 2025-07-07 RX ADMIN — MIDODRINE HYDROCHLORIDE 10 MG: 5 TABLET ORAL at 02:07

## 2025-07-07 RX ADMIN — ENOXAPARIN SODIUM 40 MG: 40 INJECTION SUBCUTANEOUS at 09:07

## 2025-07-07 RX ADMIN — HYDROMORPHONE HYDROCHLORIDE 1 MG: 1 INJECTION, SOLUTION INTRAMUSCULAR; INTRAVENOUS; SUBCUTANEOUS at 12:07

## 2025-07-07 RX ADMIN — FILGRASTIM-SNDZ 480 MCG: 480 INJECTION, SOLUTION INTRAVENOUS; SUBCUTANEOUS at 09:07

## 2025-07-07 RX ADMIN — INSULIN GLARGINE 23 UNITS: 100 INJECTION, SOLUTION SUBCUTANEOUS at 08:07

## 2025-07-07 RX ADMIN — MIDODRINE HYDROCHLORIDE 10 MG: 5 TABLET ORAL at 09:07

## 2025-07-07 RX ADMIN — ENOXAPARIN SODIUM 40 MG: 40 INJECTION SUBCUTANEOUS at 08:07

## 2025-07-07 RX ADMIN — SODIUM BICARBONATE: 84 INJECTION, SOLUTION INTRAVENOUS at 04:07

## 2025-07-07 RX ADMIN — PANTOPRAZOLE SODIUM 40 MG: 40 TABLET, DELAYED RELEASE ORAL at 05:07

## 2025-07-07 RX ADMIN — LEVOTHYROXINE SODIUM 25 MCG: 0.03 TABLET ORAL at 05:07

## 2025-07-07 RX ADMIN — OXYBUTYNIN CHLORIDE 5 MG: 5 TABLET ORAL at 09:07

## 2025-07-07 RX ADMIN — SODIUM BICARBONATE: 84 INJECTION, SOLUTION INTRAVENOUS at 11:07

## 2025-07-07 RX ADMIN — INSULIN ASPART 2 UNITS: 100 INJECTION, SOLUTION INTRAVENOUS; SUBCUTANEOUS at 07:07

## 2025-07-07 RX ADMIN — SODIUM BICARBONATE: 84 INJECTION, SOLUTION INTRAVENOUS at 02:07

## 2025-07-07 RX ADMIN — MUPIROCIN 1 G: 20 OINTMENT TOPICAL at 08:07

## 2025-07-07 RX ADMIN — MONTELUKAST 10 MG: 10 TABLET, FILM COATED ORAL at 08:07

## 2025-07-07 NOTE — PT/OT/SLP EVAL
Physical Therapy Evaluation    Patient Name:  Sonia Muse   MRN:  [id number]    Recommendations:     Discharge Recommendations: Moderate Intensity Therapy   Discharge Equipment Recommendations: to be determined by next level of care   Barriers to discharge: pain, increase assist with mobility, high fall risk, PWB R LE,     Assessment:     Sonia Muse is a 65 y.o. female admitted with a medical diagnosis of Closed fracture of distal end of right femur.  She presents with the following impairments/functional limitations: weakness, impaired endurance, impaired self care skills, impaired functional mobility, gait instability, impaired balance, decreased safety awareness, decreased lower extremity function, pain, impaired cardiopulmonary response to activity.     present.  provided some PLOF information as patient with difficulty with recall. Discussed PWB on R LE. Knee immobilize in place. Pt agreeable to visit. Pt vocalizing significant pain with bed mobility. Pt with increase confusion while at the EOB. MD and RN arrived once at EOB. Pt kept eyes closed with tangential speech about varies family members. Attempted sit to stand but unable to achieve buttock clearance but was able to perform anterior ws with no weight on R LE.     Rehab Prognosis: Fair; patient would benefit from acute skilled PT services to address these deficits and reach maximum level of function.    Recent Surgery: Procedure(s) (LRB):  ORIF, FRACTURE, FEMUR (distal femur ORIF, synthes VA condylar plate, izaiah table, C-arm, triangle) (Right) 1 Day Post-Op    Plan:     During this hospitalization, patient to be seen daily to address the identified rehab impairments via gait training, therapeutic activities, therapeutic exercises, neuromuscular re-education and progress toward the following goals:    Plan of Care Expires:  08/07/25    Subjective     Chief Complaint: confusion, emotion, pain  Patient/Family Comments/goals:  per  to get better  Pain/Comfort:  Pain Rating 1: 10/10  Location - Side 1: Right  Location 1: leg  Pain Addressed 1: Reposition, Cessation of Activity, Distraction, Nurse notified    Patients cultural, spiritual, Anglican conflicts given the current situation: no    Living Environment:  Pt lives with her  in a one story home with 2 BECCA  Prior to admission, patients level of function was MI rollator.  Equipment used at home: rollator.  DME owned (not currently used): none.  Upon discharge, patient will have assistance from spouse.    Objective:     Communicated with RN prior to session.  Patient found HOB elevated with peripheral IV, telemetry, blood pressure cuff, pulse ox (continuous)  upon PT entry to room.    General Precautions: Standard, fall  Orthopedic Precautions:RLE partial weight bearing (25%)   Braces: Knee immobilizer  Respiratory Status: Nasal cannula, flow 4 L/min    Exams:  RLE ROM: hip and ankle WFL did not assess knee due to immobilizer,  RLE Strength: hip flexion 3-/5, ankle DF 4/5, did not assess knee due to immobilizer  LLE ROM: WFL  LLE Strength: 4-/5 throughout    Functional Mobility:  Bed Mobility:     Supine to Sit: maximal assistance and of 2 persons  Sit to Supine: maximal assistance and of 2 persons  Balance: intermittent CGA sitting balance at EOB      AM-PAC 6 CLICK MOBILITY  Total Score:8       Treatment & Education:  Pt educated on POC, discharge recommendation, importance of time OOB, PWB R LE, need for assist with mobility, use of call bell to seek assistance as needed and fall prevention      Patient left HOB elevated with all lines intact, call button in reach, bed alarm on, and RN and  present.    GOALS:   Multidisciplinary Problems       Physical Therapy Goals          Problem: Physical Therapy    Goal Priority Disciplines Outcome Interventions   Physical Therapy Goal     PT, PT/OT Progressing    Description: Goals to be met by: 8/7/25     Patient will  increase functional independence with mobility by performin. Supine to sit with MInimal Assistance  2. Sit to stand transfer with Minimal Assistance  3. Bed to chair transfer with Minimal Assistance using Rolling Walker  4. R/L rolling with minimal assistance using bedside rail as needed    All goals while maintaining 25 % PWB on R LE                                 DME Justifications:  No DME recommended requiring DME justifications    History:     Past Medical History:   Diagnosis Date    Allergy     Anxiety     Asthma     DDD (degenerative disc disease), cervical     Depression     Diabetes mellitus     Fibromyalgia     Hypertension     Neuromuscular disorder     Pancreatic mass     PONV (postoperative nausea and vomiting)     Thyroid disease     hypo       Past Surgical History:   Procedure Laterality Date    ADENOIDECTOMY      CARPAL TUNNEL RELEASE Right      SECTION      ENDOSCOPIC ULTRASOUND OF UPPER GASTROINTESTINAL TRACT N/A 2025    Procedure: ULTRASOUND, UPPER GI TRACT, ENDOSCOPIC;  Surgeon: Austen Grimes III, MD;  Location: [facility] ENDO;  Service: Endoscopy;  Laterality: N/A;    ERCP N/A 2025    Procedure: ERCP (ENDOSCOPIC RETROGRADE CHOLANGIOPANCREATOGRAPHY);  Surgeon: Thuy Escobar MD;  Location: [facility] ENDO;  Service: Endoscopy;  Laterality: N/A;    ERCP N/A 2025    Procedure: ERCP (ENDOSCOPIC RETROGRADE CHOLANGIOPANCREATOGRAPHY);  Surgeon: Austen Grimes III, MD;  Location: [facility] ENDO;  Service: Endoscopy;  Laterality: N/A;    INSERTION OF TUNNELED CENTRAL VENOUS CATHETER (CVC) WITH SUBCUTANEOUS PORT Left 2025    Procedure: [id number];  Surgeon: Ata Fernandez III, MD;  Location: [facility] OR;  Service: General;  Laterality: Left;    JOINT REPLACEMENT Right 2016    knee    KNEE ARTHROPLASTY Left 10/19/2020    Procedure: ARTHROPLASTY, KNEE;  Surgeon: Felipe Herring MD;  Location: [facility] OR;  Service: Orthopedics;  Laterality: Left;  Louie  Agusto    ORIF FEMUR FRACTURE Right 7/6/2025    Procedure: ORIF, FRACTURE, FEMUR (distal femur ORIF, synthes VA condylar plate, izaiah table, C-arm, triangle);  Surgeon: Osmany Harding MD;  Location: [facility];  Service: Orthopedics;  Laterality: Right;    ROTATOR CUFF REPAIR Right     TONSILLECTOMY      TOTAL KNEE ARTHROPLASTY Right     TUBAL LIGATION         Time Tracking:     PT Received On: 07/07/25  PT Start Time: 0855     PT Stop Time: 0918  PT Total Time (min): 23 min     Billable Minutes: Evaluation 11 and Therapeutic Activity 12      07/07/2025

## 2025-07-07 NOTE — PLAN OF CARE
[facility]  Initial Discharge Assessment    Met with pt who had friend and spouse at bedside. Pt lives in a SSH with spouse that has 2 steps with no hand rail. Prior to hospitalization pt was independent in functioning, own CG, drove. Family expressed feeling that pt will require rehab prior to return home. Discussed therapy evaluations pending.      Primary Care Provider: Nasra Galvez FNP    Admission Diagnosis: Displaced fracture of femur [S72.90XA]  Closed bicondylar fracture of right femur, initial encounter [S72.421A, S72.431A]    Admission Date: 7/4/2025  Expected Discharge Date: 7/9/2025    Transition of Care Barriers: None    Payor: MEDICAID / Plan: HEALTHY BLUE ([employer]) / Product Type: Managed Medicaid /     Extended Emergency Contact Information  Primary Emergency Contact: Cat Vasquez   [facility]  Home Phone: [phone]  Mobile Phone: [phone]  Relation: Friend  Secondary Emergency Contact: Josué Guadarrama   [place]  Mobile Phone: [phone]  Relation: Spouse    Discharge Plan A: Rehab  Discharge Plan B: Skilled Nursing Facility      [facility] - [place] - [address]  [address] [place] [address]  Phone: [phone] Fax: [phone]      Initial Assessment (most recent)       Adult Discharge Assessment - 07/07/25 0939          Discharge Assessment    Assessment Type Discharge Planning Assessment     Confirmed/corrected address, phone number and insurance Yes     Confirmed Demographics Correct on Facesheet     Source of Information family     If unable to respond/provide information was family/caregiver contacted? Other (see comments)   Spouse Josué and friend Cat at bedside    Communicated DENZEL with patient/caregiver Date not available/Unable to determine     People in Home spouse     Name(s) of People in Home Josué Heckippo     Do you expect to return to your current living situation? Yes   Following  rehab    Do you have help at home or someone to help you manage your care at home? Yes     Who are your caregiver(s) and their phone number(s)? derek Moses     Current cognitive status: --   Pt participated briefly then appeared to fall asleep and did not respond to questions-family answered    Walking or Climbing Stairs Difficulty no     Dressing/Bathing Difficulty no     Equipment Currently Used at Home rollator     Readmission within 30 days? No     Patient currently being followed by outpatient case management? No     Do you currently have service(s) that help you manage your care at home? No     Do you take prescription medications? Yes     Do you have prescription coverage? Yes     Do you have any problems affording any of your prescribed medications? No     Is the patient taking medications as prescribed? yes     Who is going to help you get home at discharge? Spouse or friend     How do you get to doctors appointments? car, drives self;family or friend will provide     Are you on dialysis? No     Do you take coumadin? No     Discharge Plan A Rehab     Discharge Plan B Skilled Nursing Facility     DME Needed Upon Discharge  none     Discharge Plan discussed with: Spouse/sig other     Name(s) and Number(s) Derek Moses     Transition of Care Barriers None

## 2025-07-07 NOTE — ASSESSMENT & PLAN NOTE
-likely multifactorial with multiple comorbidities, including depression anxiety as well as untreated sleep apnea  -suspect a component of hospital-acquired delirium as well  -CT head without contrast on 07/06/2025 =No CT evidence of acute intracranial abnormality. If the patient has an acute, focal neurological deficit, MRI of the brain may be indicated   -ABG on 07/06/2025 = pH 7.298, pCO2 29, PO2 76, HC03 14.6 on 6 L nasal cannula  -repeat ABG on 07/07/2025 = pH 7.32, pCO2 35, PO2 88, HCT 20.3 on 6 L nasal cannula  -provide emotional support as needed   -continue duloxetine 60 mg p.o. daily   -cover with Ativan 0.5 mg p.o. b.i.d. PRN  -psych consulted = follow recommendations

## 2025-07-07 NOTE — ASSESSMENT & PLAN NOTE
Monitor sodium daily= resolved/stable range currently  Nephrology consulted = appreciate input and follow recommendations

## 2025-07-07 NOTE — ASSESSMENT & PLAN NOTE
Last A1c reviewed-   Lab Results   Component Value Date    HGBA1C 10.1 (H) 04/25/2025     Most recent fingerstick glucose reviewed-   Recent Labs   Lab 07/06/25  1542 07/06/25  1817 07/06/25  2203 07/07/25  0749   POCTGLUCOSE 272* 283* 263* 180*     Current correctional scale  Medium  Maintain anti-hyperglycemic dose as follows-   Antihyperglycemics (From admission, onward)      Start     Stop Route Frequency Ordered    07/07/25 2100  insulin glargine U-100 (Lantus) pen 23 Units         -- SubQ Nightly 07/07/25 0932    07/04/25 1142  insulin aspart U-100 pen 0-10 Units         -- SubQ Before meals & nightly PRN 07/04/25 1046          -Hold Oral hypoglycemics while patient is in the hospital.  -Monitor CBGS = improving, not at goal  -Lantus 20 units subQ q.h.s. increased to Lantus 23 units subQ q.h.s. (titrate as needed)   -goal CBGS while in ICU = 140-180

## 2025-07-07 NOTE — PLAN OF CARE
Goals to be met by: 8/7/25     Patient will increase functional independence with ADLs by performing:    UE Dressing with [place].  LE Dressing with [place].  Grooming while standing at sink with [place].  Toileting from toilet with [place] for hygiene and clothing management.   Bathing from  shower chair/bench with Modified [place] with use of shower chair.  Toilet transfer to toilet with [place].

## 2025-07-07 NOTE — PROGRESS NOTES
"Pharmacist Renal Dose Adjustment Note    Sonia Muse is a 65 y.o. female being treated with the medication Cefepime    Ht: 5' 4" (1.626 m)  Wt: 115.6 kg (254 lb 13.6 oz)  Estimated Creatinine Clearance: 44.9 mL/min (A) (based on SCr of 1.6 mg/dL (H)).  Body mass index is 43.75 kg/m².    Per [facility] renal dosing protocol:     Previous Order: Cefepime 1 g Q 12 hours    Will be changed to:     New Order: Cefepime 1 g Q 8 hours,    Due to: crcl improvement 30-60 ml/min    Renal dose adjustments performed as noted above.    We will continue monitoring and adjusting as necessary.    Pharmacist: Kenton Garcia, PharmD  Ext: [phone]      "

## 2025-07-07 NOTE — PROGRESS NOTES
INPATIENT NEPHROLOGY CONSULT   [facility] NEPHROLOGY    Sonia Muse  07/07/2025    Reason for consultation:  Acute kidney injury    Chief Complaint:   Chief Complaint   Patient presents with    Knee Injury     Fall  / rt. Knee pain      History of Present Illness:    Per Dillon  Sonia Muse is a 65 year old female with a past medical history of pancreatic cancer, anemia, obesity, DM, HTN, and hypothyroidism who presented with a R femur fracture after a fall, MIGUEL ANGEL, hyponatremia and neutropenia. She recently completed her first cycle of chemotherapy after roughly one week ago (patient of Dr. Calle). She endorses chronic diarrhea and decreased appetite as well as fatigue. She states her BP has fallen since being diagnosed with pancreatic cancer and starting chemotherapy. In the ED, the patient received a 1.5 L NS bolus and 1 L LR bolus as well as fentanyl and Zofran.     7/5  pos leg pain, very tired, intermittent hypotension, on levophed drip, 560 cc uop  7/7 VSS.    Plan of Care:    Acute kidney injury secondary to hemodynamically mediated renal injury. Got her first round of chemo a week ago. Oxaliplatin can cause  a rise in creatinine in 5-10% of cases. urine sodium <20 implicates low tubular flow.  No casts on urine micro.  --Avoid NSAIDS, Fields II inhibitors, and other non-essential nephrotoxic agents  --Keep mean arterial pressure above 60 and systolic blood pressure above 100 as able to maintain renal perfusion  --hold losartan, hctz, mobic,   --catheter in place  --avoid vancomycin toxicity    Hyponatremia  Hypothyroidism  DM  --no hypotonic iv piggy backs  --urine osm 318 implicating impaired free water clearance  --uric acid  --push nutrition    Anemia  --as per heme/onc  --iron adequate, transfuse as needed    Hypotension  --hold antihypertensives  --volume resuscitation  --treat infections  --vasopressor support as needed    Thank you for allowing us to participate in this patient's care. We  will continue to follow.    Vital Signs:  Temp Readings from Last 3 Encounters:   25 97.6 °F (36.4 °C) (Axillary)   25 98 °F (36.7 °C) (Temporal)   25 97.2 °F (36.2 °C)       Pulse Readings from Last 3 Encounters:   25 103   25 107   25 76       BP Readings from Last 3 Encounters:   25 112/60   25 (!) 108/55   25 109/69       Weight:  Wt Readings from Last 3 Encounters:   25 115.6 kg (254 lb 13.6 oz)   25 107.4 kg (236 lb 12.4 oz)   25 110.5 kg (243 lb 8 oz)       Past Medical & Surgical History:  Past Medical History:   Diagnosis Date    Allergy     Anxiety     Asthma     DDD (degenerative disc disease), cervical     Depression     Diabetes mellitus     Fibromyalgia     Hypertension     Neuromuscular disorder     Pancreatic mass     PONV (postoperative nausea and vomiting)     Thyroid disease     hypo       Past Surgical History:   Procedure Laterality Date    ADENOIDECTOMY      CARPAL TUNNEL RELEASE Right      SECTION      ENDOSCOPIC ULTRASOUND OF UPPER GASTROINTESTINAL TRACT N/A 2025    Procedure: ULTRASOUND, UPPER GI TRACT, ENDOSCOPIC;  Surgeon: Austen Grimes III, MD;  Location: [facility] ENDO;  Service: Endoscopy;  Laterality: N/A;    ERCP N/A 2025    Procedure: ERCP (ENDOSCOPIC RETROGRADE CHOLANGIOPANCREATOGRAPHY);  Surgeon: Thuy Escobar MD;  Location: [facility] ENDO;  Service: Endoscopy;  Laterality: N/A;    ERCP N/A 2025    Procedure: ERCP (ENDOSCOPIC RETROGRADE CHOLANGIOPANCREATOGRAPHY);  Surgeon: Austen Grimes III, MD;  Location: [facility] ENDO;  Service: Endoscopy;  Laterality: N/A;    INSERTION OF TUNNELED CENTRAL VENOUS CATHETER (CVC) WITH SUBCUTANEOUS PORT Left 2025    Procedure: XORDCHTRE-SIIM-D-CATH;  Surgeon: Ata Fernandez III, MD;  Location: [facility] OR;  Service: General;  Laterality: Left;    JOINT REPLACEMENT Right 2016    knee    KNEE ARTHROPLASTY Left 10/19/2020    Procedure:  ARTHROPLASTY, KNEE;  Surgeon: Felipe Herring MD;  Location: [facility];  Service: Orthopedics;  Laterality: Left;  Louie Liz    ROTATOR CUFF REPAIR Right     TONSILLECTOMY      TOTAL KNEE ARTHROPLASTY Right     TUBAL LIGATION         Past Social History:  Social History     Socioeconomic History    Marital status:     Number of children: 3   Occupational History    Occupation: [employer]     Comment: primary sub for [employer]   Tobacco Use    Smoking status: Former     Current packs/day: 0.00     Average packs/day: 1 pack/day for 11.0 years (11.0 ttl pk-yrs)     Types: Cigarettes     Start date:      Quit date:      Years since quittin.5    Smokeless tobacco: Never   Substance and Sexual Activity    Alcohol use: Not Currently     Comment: occasional 2x/y    Drug use: Never    Sexual activity: Yes     Partners: Male     Social Drivers of Health     Financial Resource Strain: Low Risk  (2025)    Overall Financial Resource Strain (CARDIA)     Difficulty of Paying Living Expenses: Not hard at all   Recent Concern: Financial Resource Strain - High Risk (2025)    Overall Financial Resource Strain (CARDIA)     Difficulty of Paying Living Expenses: Hard   Food Insecurity: No Food Insecurity (2025)    Hunger Vital Sign     Worried About Running Out of Food in the Last Year: Never true     Ran Out of Food in the Last Year: Never true   Recent Concern: Food Insecurity - Food Insecurity Present (2025)    Hunger Vital Sign     Worried About Running Out of Food in the Last Year: Sometimes true     Ran Out of Food in the Last Year: Sometimes true   Transportation Needs: No Transportation Needs (2025)    PRAPARE - Transportation     Lack of Transportation (Medical): No     Lack of Transportation (Non-Medical): No   Recent Concern: Transportation Needs - Unmet Transportation Needs (2025)    PRAPARE - Transportation     Lack of Transportation (Medical): Yes     Lack of Transportation  (Non-Medical): Yes   Physical Activity: Unknown (4/28/2025)    Exercise Vital Sign     Days of Exercise per Week: Patient declined     Minutes of Exercise per Session: 30 min   Stress: No Stress Concern Present (7/5/2025)    [employer] - Occupational Stress Questionnaire     Feeling of Stress : Not at all   Recent Concern: Stress - Stress Concern Present (4/28/2025)    [employer] [place] [employer] - Occupational Stress Questionnaire     Feeling of Stress : Very much   Housing Stability: Low Risk  (7/5/2025)    Housing Stability Vital Sign     Unable to Pay for Housing in the Last Year: No     Number of Times Moved in the Last Year: 0     Homeless in the Last Year: No       Medications:  Medications Ordered Prior to Encounter[1]  Scheduled Meds:   alteplase  2 mg Intra-Catheter Once    ceFEPime IV (PEDS and ADULTS)  1 g Intravenous Q8H    DULoxetine  60 mg Oral Daily    enoxparin  40 mg Subcutaneous Q12H (prophylaxis, 0900/2100)    filgrastim-sndz  480 mcg Subcutaneous Daily    insulin glargine U-100 (Lantus)  23 Units Subcutaneous QHS    levothyroxine  25 mcg Oral Before breakfast    midodrine  10 mg Oral Q8H    montelukast  10 mg Oral QHS    mupirocin   Nasal BID    oxybutynin  5 mg Oral TID    pantoprazole  40 mg Oral Daily     Continuous Infusions:   NORepinephrine bitartrate-D5W  0-3 mcg/kg/min Intravenous Continuous   Stopped at 07/07/25 0547    sodium bicarbonate 100 mEq in 0.45% NaCl 1,000 mL infusion   Intravenous Continuous 100 mL/hr at 07/07/25 0651 Rate Verify at 07/07/25 0651     PRN Meds:.  Current Facility-Administered Medications:     0.9%  NaCl infusion (for blood administration), , Intravenous, Q24H PRN    0.9%  NaCl infusion (for blood administration), , Intravenous, Q24H PRN    acetaminophen, 650 mg, Oral, Q8H PRN    dextrose 50%, 12.5 g, Intravenous, PRN    dextrose 50%, 25 g, Intravenous, PRN    glucagon (human recombinant), 1 mg, Intramuscular, PRN     "glucose, 16 g, Oral, PRN    glucose, 24 g, Oral, PRN    HYDROmorphone, 1 mg, Intravenous, Q4H PRN    insulin aspart U-100, 0-10 Units, Subcutaneous, QID (AC + HS) PRN    LORazepam, 0.5 mg, Oral, BID PRN    naloxone, 0.02 mg, Intravenous, PRN    oxyCODONE, 5 mg, Oral, Q6H PRN    prochlorperazine, 5 mg, Intravenous, Q6H PRN    sodium chloride 0.9%, 10 mL, Intravenous, PRN    sodium chloride 0.9%, 10 mL, Intravenous, PRN    Pharmacy to dose Vancomycin consult, , , Once **AND** vancomycin - pharmacy to dose, , Intravenous, pharmacy to manage frequency    Allergies:  Erythromycin, Codeine, Lactose, and [place]    Past Family History:  Reviewed; refer to Hospitalist Admission Note    Review of Systems:  Review of Systems - All 14 systems reviewed and negative, except as noted in HPI    Physical Exam:    /60   Pulse 103   Temp 97.6 °F (36.4 °C) (Axillary)   Resp (!) 29   Ht 5' 4" (1.626 m)   Wt 115.6 kg (254 lb 13.6 oz)   SpO2 95%   Breastfeeding No   BMI 43.75 kg/m²     General Appearance:    Alert, cooperative, no distress, appears stated age   Head:    Normocephalic, without obvious abnormality, atraumatic   Eyes:    PER, conjunctiva/corneas clear, EOM's intact in both eyes        Throat:   Lips, mucosa, and tongue normal; teeth and gums normal   Back:     Symmetric, no curvature, ROM normal, no CVA tenderness   Lungs:     Clear to auscultation bilaterally, respirations unlabored   Chest wall:    No tenderness or deformity   Heart:    Regular rate and rhythm, S1 and S2 normal, no murmur, rub   or gallop   Abdomen:     Soft, non-tender, bowel sounds active all four quadrants,     no masses, no organomegaly   Extremities:   Extremities normal, atraumatic, no cyanosis or edema   Pulses:   2+ and symmetric all extremities   MSK:   No joint or muscle swelling, tenderness or deformity   Skin:   Skin color, texture, turgor normal, no rashes or lesions   Neurologic:   CNII-XII intact, normal strength and sensation  "      Throughout.  No flap     Results:  Recent Labs   Lab 07/06/25  0344 07/06/25  1309 07/07/25  0427   * 133* 139   K 3.7 3.9 3.7    107 108   CO2 17* 14* 21*   BUN 26* 25* 28*   CREATININE 1.8* 1.7* 1.6*   * 260* 203*       Recent Labs   Lab 07/05/25  0326 07/05/25  1640 07/06/25  0344 07/06/25  1309 07/07/25  0427   CALCIUM 7.9* 8.1* 8.1* 8.2* 8.6*   ALBUMIN 2.7* 2.7* 2.6*  --  2.4*   PHOS 3.5  --   --   --  2.6*   MG 1.5* 1.7 1.5*  --  1.9             Recent Labs   Lab 07/04/25  2218 07/05/25  0744 07/05/25  1256 07/05/25  1743 07/05/25  2145 07/06/25  1518 07/06/25  1542 07/06/25  1817 07/06/25  2203 07/07/25  0749   POCTGLUCOSE 257* 294* 288* 275* 262* 290* 272* 283* 263* 180*       Recent Labs   Lab 05/25/24  0951 11/21/24  1049 04/25/25  0751   Hemoglobin A1C 6.4 H 6.1 H  --    Hemoglobin A1c  --   --  10.1 H       Recent Labs   Lab 07/06/25  0344 07/06/25  1309 07/06/25  1725 07/07/25  0427   WBC 0.85* 0.95*  --  1.44*   HGB 7.4* 8.3*  --  7.8*   HCT 22.4* 25.9* 24* 23.7*    228  --  231   MCV 79* 83  --  76*   MCHC 33.0 32.0  --  32.9       Recent Labs   Lab 07/05/25  1640 07/06/25  0344 07/07/25  0427   BILITOT 1.6* 1.2* 1.4*   PROT 6.2 6.0 5.7*   ALBUMIN 2.7* 2.6* 2.4*   ALKPHOS 111 108 103   ALT 5* 5* 5*   AST 4* 4* 11       Recent Labs   Lab 03/18/23  0920 05/25/24  0951 08/13/24  1525 04/24/25  1404 04/24/25  1636 07/04/25  1057   Color, UA YELLOW DARK YELLOW  --   --   --   --    Appearance, UA CLEAR CLEAR  --   --  Clear Cloudy A   pH, UA 8.0 6.0 7.0 5.5  --   --    Spec Grav UA  --   --   --   --  >=1.030 A 1.025   Specific Gravity, UA 1.018 1.020  --   --   --   --    Protein, UA 1+ A 1+ A  --   --  Negative 1+ A   Ketones, UA NEGATIVE NEGATIVE  --   --   --   --    Urobilinogen, UA  --   --   --   --  Negative 2.0-3.0 A   Bilirubin, UA  --   --   --   --  1+ A 1+ A   Occult Blood UA NEGATIVE NEGATIVE  --   --   --   --    Nitrite, UA NEGATIVE NEGATIVE  --   --   --   --     RBC, UA  --   --   --   --  1 3   WBC, UA  --   --   --   --  1 15 H   Bacteria, UA  --   --   --   --  Rare  --        Recent Labs   Lab 04/24/25  1949 07/06/25  1725 07/07/25  0310   POC PH  --  7.298 L 7.372   POC PCO2  --  29.9 L 35.0   POC HCO3  --  14.6 L 20.3 L   POC PO2  --  76 L 88   POC SATURATED O2  --  94 97   POC BE  --  -12 L -5 L   Sample VENOUS ARTERIAL ARTERIAL       Microbiology Results (last 7 days)       Procedure Component Value Units Date/Time    Urine culture [[id number]] Collected: 07/04/25 1057    Order Status: Completed Specimen: Urine Updated: 07/07/25 0754     Urine Culture No Growth    Blood culture [[id number]]  (Normal) Collected: 07/04/25 1251    Order Status: Completed Specimen: Blood from Peripheral, Hand, Left Updated: 07/06/25 1901     CULTURE, BLOOD ([facility]) No Growth After 48 Hours    Blood culture [[id number]]  (Normal) Collected: 07/04/25 1247    Order Status: Completed Specimen: Blood from Peripheral, Antecubital, Left Updated: 07/06/25 1901     CULTURE, BLOOD ([facility]) No Growth After 48 Hours            Can Bell MD  [facility]  [phone]           [1]   No current facility-administered medications on file prior to encounter.     Current Outpatient Medications on File Prior to Encounter   Medication Sig Dispense Refill    ALPRAZolam (XANAX) 0.25 MG tablet Take 1 tablet (0.25 mg total) by mouth daily as needed for Anxiety. 30 tablet 0    amLODIPine (NORVASC) 5 MG tablet Take 1 tablet (5 mg total) by mouth once daily. 90 tablet 1    cetirizine (ZYRTEC) 10 MG tablet TAKE 1 TABLET BY MOUTH ONCE DAILY 90 tablet 1    DULoxetine (CYMBALTA) 60 MG capsule TAKE one CAPSULE BY MOUTH EVERY DAY 90 capsule 1    hydroCHLOROthiazide (HYDRODIURIL) 25 MG tablet TAKE one TABLET BY MOUTH ONCE DAILY 90 tablet 1    HYDROcodone-acetaminophen (NORCO) 5-325 mg per tablet Take 1 tablet by mouth every 6 (six) hours as needed. 10 tablet 0    levothyroxine (SYNTHROID) 25 MCG  tablet TAKE one TABLET BY MOUTH BEFORE breakfast 90 tablet 1    losartan (COZAAR) 100 MG tablet TAKE one TABLET BY MOUTH ONCE DAILY 90 tablet 0    magnesium oxide (MAGOX) 400 mg (241.3 mg magnesium) tablet Take 1 tablet (400 mg total) by mouth once daily. 200 tablet 1    metoprolol succinate (TOPROL-XL) 25 MG 24 hr tablet Take 1 tablet (25 mg total) by mouth once daily. 90 tablet 1    montelukast (SINGULAIR) 10 mg tablet TAKE ONE TABLET BY MOUTH EVERY EVENING 90 tablet 1    OLANZapine (ZYPREXA) 5 MG tablet Take 1 tablet by mouth nightly on days 1-3 of each chemotherapy cycle. 3 tablet 11    ondansetron (ZOFRAN) 8 MG tablet Take 1 tablet (8 mg total) by mouth every 8 (eight) hours as needed. 30 tablet 2    prochlorperazine (COMPAZINE) 10 MG tablet Take 1 tablet (10 mg total) by mouth every 6 (six) hours as needed. 30 tablet 1    albuterol (VENTOLIN HFA) 90 mcg/actuation inhaler Inhale 2 puffs into the lungs every 6 (six) hours as needed for Wheezing or Shortness of Breath. Rescue 18 g 1    blood sugar diagnostic Strp Use 1 strip to check blood sugar 2 times daily 100 each 5    blood-glucose meter kit Use as instructed 1 each 0    insulin glargine U-100, Lantus, 100 unit/mL (3 mL) SubQ InPn pen Inject 21 Units into the skin every evening. 9 mL 3    lancets (LANCETS,THIN) Misc 1 each by Misc.(Non-Drug; Combo Route) route 3 (three) times daily. 90 each 0    LIDOcaine-prilocaine (EMLA) cream Apply topically as needed. 30 g 0    loperamide (IMODIUM) 2 mg capsule Take 2 tablets (4mg) by mouth after first loose stool, 1 tablet every 2 hours until diarrhea free for 12 hours. May take 2 tablets (4mg) by mouth every 4 hours at night. May require more than the package labeling maximum dose of 16mg/day. 30 capsule 11    LORazepam (ATIVAN) 0.5 MG tablet Take 1 tablet (0.5 mg total) by mouth 2 (two) times daily. 60 tablet 0    meloxicam (MOBIC) 15 MG tablet Take 15 mg by mouth once daily. 1/2 tablet am      metronidazole 0.75%  "(METROCREAM) 0.75 % Crea Apply 1 application  topically 2 (two) times daily.      pen needle, diabetic 32 gauge x 5/32" Ndle 1 Needle by Misc.(Non-Drug; Combo Route) route once daily. 100 each 2     "

## 2025-07-07 NOTE — CONSULTS
Critical access hospital  Wound Care    Patient Name:  Sonia Muse   MRN:  [id number]  Date: 7/7/2025  Diagnosis: Closed fracture of distal end of right femur    History:     Past Medical History:   Diagnosis Date    Allergy     Anxiety     Asthma     DDD (degenerative disc disease), cervical     Depression     Diabetes mellitus     Fibromyalgia     Hypertension 2005    Neuromuscular disorder     Pancreatic mass     PONV (postoperative nausea and vomiting)     Thyroid disease     hypo       Social History[1]    Precautions:     Allergies as of 07/04/2025 - Reviewed 07/04/2025   Allergen Reaction Noted    Erythromycin Swelling 08/19/2020    Codeine Nausea And Vomiting 08/19/2020    Lactose  07/04/2025    [place] Blisters 05/03/2023       WOC Assessment Details/Treatment     Wound care consulted to rule out possible skin failure. Patient with pink sacral area and bilateral posterior heels are pink at this assessment. Air pump attached to bed mattress for pressure relief and moisture control. Heels offloaded from bed with pillows. Educated the family at the bedside regarding skin plan of care. Wound care team to continue to follow up as needed throughout hospital stay.        07/07/25 1726   WOCN Assessment   WOCN Total Time (mins) 20   Visit Date 07/07/25   Visit Time 1700   Consult Type New   WOCN Speciality Wound   Wound pressure   Intervention assessed;chart review;coordination of care   Teaching on-going        Wound 07/06/25 0945 Incision Right distal;medial Thigh   Date First Assessed/Time First Assessed: 07/06/25 0945   Primary Wound Type: Incision  Side: Right  Orientation: distal;medial  Location: Thigh  Closure Method: Sutures   Dressing Appearance Dry;Intact;Clean   Appearance Dressing in place, unable to visualize        Wound 07/06/25 0946 Incision Right distal;lateral Thigh   Date First Assessed/Time First Assessed: 07/06/25 0946   Present on Original Admission: No  Primary Wound Type: Incision   Side: Right  Orientation: distal;lateral  Location: Thigh  Closure Method: Sutures   Dressing Appearance Dry;Intact;Clean   Appearance Dressing in place, unable to visualize        Wound 25 0000 Other (comment) Buttocks   Date First Assessed/Time First Assessed: 25 0000   Primary Wound Type: Other (comment)  Location: Buttocks   Dressing Appearance Intact;Dry   Drainage Amount None   Appearance Pink   Off Loading Pillow       Sacral area pink    Right posterior heel pink    Left posterior heel pink    Recommendations made to primary team to offload heels and continue to turn and reposition Q 2h and as needed.     2025         [1]   Social History  Socioeconomic History    Marital status:     Number of children: 3   Occupational History    Occupation: [employer]     Comment: primary sub for [employer]   Tobacco Use    Smoking status: Former     Current packs/day: 0.00     Average packs/day: 1 pack/day for 11.0 years (11.0 ttl pk-yrs)     Types: Cigarettes     Start date:      Quit date:      Years since quittin.5    Smokeless tobacco: Never   Substance and Sexual Activity    Alcohol use: Not Currently     Comment: occasional 2x/y    Drug use: Never    Sexual activity: Yes     Partners: Male     Social Drivers of Health     Financial Resource Strain: Low Risk  (2025)    Overall Financial Resource Strain (CARDIA)     Difficulty of Paying Living Expenses: Not hard at all   Recent Concern: Financial Resource Strain - High Risk (2025)    Overall Financial Resource Strain (CARDIA)     Difficulty of Paying Living Expenses: Hard   Food Insecurity: No Food Insecurity (2025)    Hunger Vital Sign     Worried About Running Out of Food in the Last Year: Never true     Ran Out of Food in the Last Year: Never true   Recent Concern: Food Insecurity - Food Insecurity Present (2025)    Hunger Vital Sign     Worried About Running Out of Food in the Last Year: Sometimes true     Ran  Out of Food in the Last Year: Sometimes true   Transportation Needs: No Transportation Needs (7/5/2025)    PRAPARE - Transportation     Lack of Transportation (Medical): No     Lack of Transportation (Non-Medical): No   Recent Concern: Transportation Needs - Unmet Transportation Needs (4/28/2025)    PRAPARE - Transportation     Lack of Transportation (Medical): Yes     Lack of Transportation (Non-Medical): Yes   Physical Activity: Unknown (4/28/2025)    Exercise Vital Sign     Days of Exercise per Week: Patient declined     Minutes of Exercise per Session: 30 min   Stress: No Stress Concern Present (7/5/2025)    [employer] [employer] - Occupational Stress Questionnaire     Feeling of Stress : Not at all   Recent Concern: Stress - Stress Concern Present (4/28/2025)    [employer] - Occupational Stress Questionnaire     Feeling of Stress : Very much   Housing Stability: Low Risk  (7/5/2025)    Housing Stability Vital Sign     Unable to Pay for Housing in the Last Year: No     Number of Times Moved in the Last Year: 0     Homeless in the Last Year: No

## 2025-07-07 NOTE — PLAN OF CARE
SW attempted to discuss discharge planning with pt. Pt sleeping and unable to participate in discussion without falling back to sleep.    Pt said that SW can call Velma. Number not in chart.    CM to follow up tomorrow.

## 2025-07-07 NOTE — CARE UPDATE
07/07/25 0655   Patient Assessment/Suction   Level of Consciousness (AVPU) alert   Respiratory Effort Normal;Unlabored   Rhythm/Pattern, Respiratory unlabored;pattern regular   Cough Frequency infrequent   Cough Type nonproductive;good   Skin Integrity   $ Wound Care Tech Time 15 min   Area Observed Left;Right;Behind ear;Cheek;Upper lip;Nares   Skin Appearance without discoloration   PRE-TX-O2   Device (Oxygen Therapy) high flow nasal cannula   $ Is the patient on Low Flow Oxygen? Yes   Flow (L/min) (Oxygen Therapy) (S)  6  (found on 8L)   SpO2 97 %   Pulse Oximetry Type Continuous   $ Pulse Oximetry - Multiple Charge Pulse Oximetry - Multiple   Pulse 105   Resp (!) 23   /70   Education   $ Education Oxygen;15 min

## 2025-07-07 NOTE — PLAN OF CARE
Problem: Skin Injury Risk Increased  Goal: Skin Health and Integrity  Outcome: Progressing     Problem: Infection  Goal: Absence of Infection Signs and Symptoms  Outcome: Progressing     Problem: Adult Inpatient Plan of Care  Goal: Plan of Care Review  Outcome: Progressing     Problem: Adult Inpatient Plan of Care  Goal: Patient-Specific Goal (Individualized)  Outcome: Progressing     Problem: Adult Inpatient Plan of Care  Goal: Absence of Hospital-Acquired Illness or Injury  Outcome: Progressing     Problem: Adult Inpatient Plan of Care  Goal: Optimal Comfort and Wellbeing  Outcome: Progressing     Problem: Wound  Goal: Improved Oral Intake  Outcome: Progressing     Problem: Wound  Goal: Absence of Infection Signs and Symptoms  Outcome: Progressing

## 2025-07-07 NOTE — ASSESSMENT & PLAN NOTE
-status post Chemotherapy roughly one week ago.  -monitor WBC= improving  -neutropenic precautions   -urine culture on 07/04/2025 = no growth till date (UTI ruled out)  -blood cultures x2 on 07/04/2025 = no growth at 48 hours  -filgrastim 458 mg IV daily x5 days.  Ordered on 07/04/2025  -cefepime 1 g IV b.i.d. empirically.  Ordered on 07/04/2025.  We will likely discontinue on 07/08/2025 if urine and blood cultures remain negative  -Heme-Onc consulted = follow recommendations

## 2025-07-07 NOTE — ASSESSMENT & PLAN NOTE
Secondary to combination of anemia and the use of IV hydromorphone for pain.  -monitor blood pressure = stable range currently   -maintain goal blood pressure map > 65  -midodrine 10 mg p.o. q.8 hours  -IV fluids at 100 cc an hour  -cover with Levophed IV to help maintain goal map.  = currently paused

## 2025-07-07 NOTE — PT/OT/SLP EVAL
Occupational Therapy   Evaluation    Name: Sonia Muse  MRN: [id number]  Admitting Diagnosis: Closed fracture of distal end of right femur  Recent Surgery: Procedure(s) (LRB):  ORIF, FRACTURE, FEMUR (distal femur ORIF, synthes VA condylar plate, izaiah table, C-arm, triangle) (Right) 1 Day Post-Op    Recommendations:     Discharge Recommendations: Moderate Intensity Therapy  Discharge Equipment Recommendations:  to be determined by next level of care  Barriers to discharge:  Other (Comment) (medical status, increased assistance with ADLs)    Assessment:     Sonia Muse is a 65 y.o. female with a medical diagnosis of Closed fracture of distal end of right femur. Performance deficits affecting function: weakness, impaired endurance, impaired self care skills, impaired functional mobility, gait instability, impaired balance, decreased coordination, decreased upper extremity function, decreased lower extremity function, pain, decreased ROM.      Rehab Prognosis: Fair; patient would benefit from acute skilled OT services to address these deficits and reach maximum level of function.       Plan:     Patient to be seen 5 x/week to address the above listed problems via self-care/home management, therapeutic activities, therapeutic exercises  Plan of Care Expires: 08/07/25  Plan of Care Reviewed with: patient, friend    Subjective     Chief Complaint: pain  Patient/Family Comments/goals: to get better    Occupational Profile:  Living Environment: pt lives with spouse in [facility] with 2 BECCA and no rails, WIS and standard toilet  Previous level of function: Ind with most ADLs, Mod I for showering with use of shower chair, driving, cooking, cleaning   Roles and Routines: wife   Equipment Used at Home: shower chair, glucometer  Assistance upon Discharge: family    Pain/Comfort:  Pain Rating 1: 5/10  Location - Side 1: Right  Location - Orientation 1: lower  Location 1: leg  Pain Addressed 1: Reposition,  Distraction, Nurse notified, Cessation of Activity  Pain Rating Post-Intervention 1: 4/10    Patients cultural, spiritual, Jewish conflicts given the current situation: no    Objective:     Communicated with: nurse prior to session.  Patient found HOB elevated with telemetry, peripheral IV, blood pressure cuff, pulse ox (continuous), oxygen upon OT entry to room.    General Precautions: Standard, fall, aspiration  Orthopedic Precautions: RLE partial weight bearing (25%)  Braces: Knee immobilizer  Respiratory Status: Nasal cannula, flow 4 L/min    Occupational Performance:    Bed Mobility:    Patient completed Supine to Sit with maximal assistance and 2 persons  Patient completed Sit to Supine with maximal assistance and 2 persons    Activities of Daily Living:  Grooming: moderate assistance hand hygiene at bed level  Lower Body Dressing: maximal assistance don/doff socks at bed level  Toileting: dependence loja in place    Cognitive/Visual Perceptual:  Cognitive/Psychosocial Skills:     -       Oriented to: Person, Place, and confusion noted   -       Follows Commands/attention:Inattentive, Easily distracted, and Follows one-step commands  -       Communication: clear/fluent and pt required VC  to stay alert  -       Memory: Poor immediate recall  -       Safety awareness/insight to disability: impaired   -       Mood/Affect/Coping skills/emotional control: Appropriate to situation, Cooperative, and Lethargic    Physical Exam:  Upper Extremity Range of Motion:     -       Right Upper Extremity: Deficits: -2/5; Pt unable to flex shoulders with VC secondary to weakness, pt able to flex elbow ~70 degrees  -       Left Upper Extremity: Deficits: -2/5; Pt unable to flex shoulders with VC secondary to weakness, pt able to flex elbow ~70 degrees  Upper Extremity Strength:    -       Right Upper Extremity: Deficits: could not raise arms against gravity   -       Left Upper Extremity: Deficits: could not raise arms  against gravity    Strength:    -       Right Upper Extremity: Deficits: 2/5  -       Left Upper Extremity: Deficits: 2/5  Fine Motor Coordination:    -       Impaired  Left hand thumb/finger opposition skills shaky/tremulous  and Right hand thumb/finger opposition skills shaky/tremulous       AMPAC 6 Click ADL:  AMPAC Total Score: 14    Treatment & Education:  -Pt educated on role of OT and POC, use of call light to assist with any needs/transfers, importance of EOB/OOB activities to help negate the negative impact of prolonged hospital stay.       Patient left HOB elevated with all lines intact, call button in reach, bed alarm on, and friend present    GOALS:   Multidisciplinary Problems       Occupational Therapy Goals          Problem: Occupational Therapy    Goal Priority Disciplines Outcome Interventions   Occupational Therapy Goal     OT, PT/OT     Description: Goals to be met by: 25     Patient will increase functional independence with ADLs by performing:    UE Dressing with Maud.  LE Dressing with Maud.  Grooming while standing at sink with Maud.  Toileting from toilet with Maud for hygiene and clothing management.   Bathing from  shower chair/bench with Modified Maud with use of shower chair.  Toilet transfer to toilet with Maud.                           History:     Past Medical History:   Diagnosis Date    Allergy     Anxiety     Asthma     DDD (degenerative disc disease), cervical     Depression     Diabetes mellitus     Fibromyalgia     Hypertension     Neuromuscular disorder     Pancreatic mass     PONV (postoperative nausea and vomiting)     Thyroid disease     hypo         Past Surgical History:   Procedure Laterality Date    ADENOIDECTOMY      CARPAL TUNNEL RELEASE Right      SECTION      ENDOSCOPIC ULTRASOUND OF UPPER GASTROINTESTINAL TRACT N/A 2025    Procedure: ULTRASOUND, UPPER GI TRACT, ENDOSCOPIC;  Surgeon: Sydney  Austen SANDERS III, MD;  Location: [facility] ENDO;  Service: Endoscopy;  Laterality: N/A;    ERCP N/A 04/25/2025    Procedure: ERCP (ENDOSCOPIC RETROGRADE CHOLANGIOPANCREATOGRAPHY);  Surgeon: Thuy Escobar MD;  Location: [facility] ENDO;  Service: Endoscopy;  Laterality: N/A;    ERCP N/A 05/06/2025    Procedure: ERCP (ENDOSCOPIC RETROGRADE CHOLANGIOPANCREATOGRAPHY);  Surgeon: Austen Grimes III, MD;  Location: [facility] ENDO;  Service: Endoscopy;  Laterality: N/A;    INSERTION OF TUNNELED CENTRAL VENOUS CATHETER (CVC) WITH SUBCUTANEOUS PORT Left 6/19/2025    Procedure: [id number];  Surgeon: Ata Fernandez III, MD;  Location: [facility];  Service: General;  Laterality: Left;    JOINT REPLACEMENT Right 2016    knee    KNEE ARTHROPLASTY Left 10/19/2020    Procedure: ARTHROPLASTY, KNEE;  Surgeon: Felipe Herring MD;  Location: [facility] OR;  Service: Orthopedics;  Laterality: Left;  Louie Liz    ORIF FEMUR FRACTURE Right 7/6/2025    Procedure: ORIF, FRACTURE, FEMUR (distal femur ORIF, synthes VA condylar plate, izaiah table, C-arm, triangle);  Surgeon: Osmany Harding MD;  Location: [facility] OR;  Service: Orthopedics;  Laterality: Right;    ROTATOR CUFF REPAIR Right     TONSILLECTOMY      TOTAL KNEE ARTHROPLASTY Right     TUBAL LIGATION         Time Tracking:     OT Date of Treatment: 07/07/25  OT Start Time: 1131  OT Stop Time: 1149  OT Total Time (min): 18 min    Billable Minutes:Evaluation 10  Self Care/Home Management 8    7/7/2025

## 2025-07-07 NOTE — PROGRESS NOTES
[facility]  Progress Note    Patient Name: Sonia Muse  MRN: [id number]  Patient Class: IP- Inpatient   Admission Date: 7/4/2025  Length of Stay: 3 days  Attending Physician: Karina Freire MD  Primary Care Provider: Nasra Galvez FNP        Subjective     Principal Problem:Closed fracture of distal end of right femur        HPI:  Sonia Muse is a 65 year old female with a past medical history of pancreatic cancer, anemia, obesity, DM, HTN, and hypothyroidism who presented with a R femur fracture after a fall, MIGUEL ANGEL, hyponatremia and neutropenia. She recently completed her first cycle of chemotherapy after roughly one week ago (patient of Dr. Calle). She endorses chronic diarrhea and decreased appetite as well as fatigue. She states her BP has fallen since being diagnosed with pancreatic cancer and starting chemotherapy. In the ED, the patient received a 1.5 L NS bolus and 1 L LR bolus as well as fentanyl and Zofran. Hospital Medicine was consulted for admission.    Overview/Hospital Course:  We admitted the patient with a distal right femur fracture.  The orthopedic surgeon recommended operative repair after medical stabilization.  In response to the neutropenia, we placed her on granulocyte colony-stimulating factor daily.  We provided prophylactic antibiotic.  We consulted with Hematology Oncology, who provided recommendations for further management.  We transfused red blood cells when needed.  She had hypotension, which required a vasopressor.  It was not clear what was causing the hypotension, the two possibilities were the anemia, and the 2nd was the use of frequent intravenous hydromorphone for the pain associated with the fracture.  We also treated her for acute kidney injury, which was likely because of unstable hemodynamics, in addition to the use of oxaliplatin.  Patient treated with IV fluids, midodrine and Levophed for hypotension with improvement.   Renal function monitored showed improving creatinine with IV fluids.  Filgrastim daily was ordered for 5 days for neutropenia.  Patient remained afebrile.  On morning of 07/06/2025 patient taken for ORIF of right femur by Orthopedic surgery    Interval History:  Patient seen and examined along with patient's  by bedside and patient's nurse by bedside and physical therapist by bedside.  Patient is sitting up on side of the bed.  Appears to be confused and delirious.  She is able to sit on the side of the bed with standby assistance only.  She does move all extremities to command weekly.  She appears confused.  She knows that she is in a hospital but does not know what year or who the president is.  She talks about her family.   states she is going through a lot at home and is waiting on her emotionally.  Patient does appear emotional and in tears.  She states that she wants her nurse jessica (from yesterday).  Informed her that today we have nurse zachary.  Thoughts are very disorganized .  Discussed with patient .  Patient is currently on 6 L nasal cannula.  We will place patient on CPAP q.h.s. and when sleeping after discussing with patient.  Patient does have a history of sleep apnea from several years ago and used a CPAP for some time but when the CPAP broke she could not have it replaced.  I have discussed with case management to try and get an outpatient sleep study again on discharge.  We will also get a psych eval and continue her SSRI.  Patient currently on duloxetine..  Continue to provide emotional support to patient as needed.  Systolic blood pressure is low 100s.  Levophed has been paused since earlier this morning so far per patient's nurse Zachary.    Review of Systems   Constitutional:  Positive for fatigue. Negative for activity change and fever.   Respiratory:  Negative for cough and shortness of breath.    Cardiovascular:  Negative for chest pain and leg swelling.   Gastrointestinal:   Negative for abdominal pain, nausea and vomiting.   Musculoskeletal:  Positive for myalgias.   Psychiatric/Behavioral:  Positive for confusion. The patient is nervous/anxious.    All other systems reviewed and are negative.    Objective:     Vital Signs (Most Recent):  Temp: 97.6 °F (36.4 °C) (07/07/25 0745)  Pulse: 103 (07/07/25 0830)  Resp: (!) 29 (07/07/25 0830)  BP: 112/60 (07/07/25 0830)  SpO2: 95 % (07/07/25 0830) Vital Signs (24h Range):  Temp:  [97.4 °F (36.3 °C)-98 °F (36.7 °C)] 97.6 °F (36.4 °C)  Pulse:  [] 103  Resp:  [10-38] 29  SpO2:  [92 %-98 %] 95 %  BP: ()/(58-95) 112/60  Arterial Line BP: ()/(32-83) 100/58     Weight: 115.6 kg (254 lb 13.6 oz)  Body mass index is 43.75 kg/m².    Intake/Output Summary (Last 24 hours) at 7/7/2025 0914  Last data filed at 7/7/2025 0651  Gross per 24 hour   Intake 4103.68 ml   Output 1945 ml   Net 2158.68 ml         Physical Exam  Constitutional:       General: She is not in acute distress.     Appearance: She is obese.   HENT:      Head: Normocephalic and atraumatic.      Nose: Nose normal.      Mouth/Throat:      Mouth: Mucous membranes are moist.   Eyes:      Pupils: Pupils are equal, round, and reactive to light.   Cardiovascular:      Rate and Rhythm: Normal rate and regular rhythm.      Pulses: Normal pulses.      Heart sounds: Normal heart sounds.   Pulmonary:      Effort: Pulmonary effort is normal.      Breath sounds: Normal breath sounds.   Abdominal:      General: Bowel sounds are normal. There is no distension.      Palpations: Abdomen is soft.      Tenderness: There is no abdominal tenderness.   Musculoskeletal:         General: No deformity.      Comments: Decreased range of motion right lower extremity due to right lower extremity brace in place   Skin:     General: Skin is warm and dry.   Neurological:      General: No focal deficit present.      Mental Status: She is alert.      Comments: Patient is awake alert and oriented to self and  place.  Does not know what year it is or who the president is.  She moves all 4 extremities to command but with generalized weakness.  Limited range of motion to right lower extremity due to brace in place.  He is able to sit on the side of the bed without much assistance.   Psychiatric:      Comments: Poor judgment and insight currently.  Poor thought process               Significant Labs: All pertinent labs within the past 24 hours have been reviewed.    Significant Imaging: I have reviewed all pertinent imaging results/findings within the past 24 hours.      Assessment & Plan  Closed fracture of distal end of right femur  -Orthopedic Surgery consulted= follow recommendations  -Fall precautions  -PRN analgesics  -status post  1.Open reduction internal fixation right periprosthetic distal femur fracture.2. Intramedullary nailing right femur on 07/06/2025 by Orthopedic surgery  -orthopedic surgery recommends 25% weight-bearing to right lower extremity  -defer to Orthopedic surgery  MIGUEL ANGEL (acute kidney injury)  -Likely due to hemodynamic instability.  -monitor creatinine = decrease  -avoid nephrotoxic agents   -renally dose medications as needed  -continue normal saline at 125 mL an hour  -Nephrology consulted= follow recommendations  Chemotherapy-induced neutropenia  -status post Chemotherapy roughly one week ago.  -monitor WBC= improving  -neutropenic precautions   -urine culture on 07/04/2025 = no growth till date (UTI ruled out)  -blood cultures x2 on 07/04/2025 = no growth at 48 hours  -filgrastim 458 mg IV daily x5 days.  Ordered on 07/04/2025  -cefepime 1 g IV b.i.d. empirically.  Ordered on 07/04/2025.  We will likely discontinue on 07/08/2025 if urine and blood cultures remain negative  -Heme-Onc consulted = follow recommendations      Hypotension  Secondary to combination of anemia and the use of IV hydromorphone for pain.  -monitor blood pressure = stable range currently   -maintain goal blood pressure map  > 65  -midodrine 10 mg p.o. q.8 hours  -IV fluids at 100 cc an hour  -cover with Levophed IV to help maintain goal map.  = currently paused        Pancreatic cancer  -Oncology consulted  -patient recently began chemotherapy    Anemia in neoplastic disease  -In setting of pancreatic adenocarcinoma and MIGUEL ANGEL.  -no overt bleeding seen or reported currently  -monitor H&H = stable range with mild decreased (also with contribution from dilutional effect of IV fluids)  -FOBT = pending   -PPI daily   -consider transfusion of 1 unit PRBC if and when hemoglobin less than 7 Or when hemoglobin is less than 8 and patient has symptoms of anemia.  Hematology-Oncology consulting= follow recommendations  Hyponatremia  Monitor sodium daily= resolved/stable range currently  Nephrology consulted = appreciate input and follow recommendations  Abnormal LFTs  Unsure of etiology.  Could be secondary to chemotherapy.    Monitor liver function tests daily.    Acute cystitis  -POA   -no acute cystitis on this admission.  UTI ruled out with negative urine culture  -afebrile  -urine culture on 07/04/2025 = negative at 48 hours  -lactic acid on 07/07/2025 = within normal limits    DM (diabetes mellitus)  Last A1c reviewed-   Lab Results   Component Value Date    HGBA1C 10.1 (H) 04/25/2025     Most recent fingerstick glucose reviewed-   Recent Labs   Lab 07/06/25  1542 07/06/25  1817 07/06/25  2203 07/07/25  0749   POCTGLUCOSE 272* 283* 263* 180*     Current correctional scale  Medium  Maintain anti-hyperglycemic dose as follows-   Antihyperglycemics (From admission, onward)      Start     Stop Route Frequency Ordered    07/07/25 2100  insulin glargine U-100 (Lantus) pen 23 Units         -- SubQ Nightly 07/07/25 0932    07/04/25 1142  insulin aspart U-100 pen 0-10 Units         -- SubQ Before meals & nightly PRN 07/04/25 1046          -Hold Oral hypoglycemics while patient is in the hospital.  -Monitor CBGS = improving, not at goal  -Lantus 20  units subQ q.h.s. increased to Lantus 23 units subQ q.h.s. (titrate as needed)   -goal CBGS while in ICU = 140-180  Acute psychosis  -likely multifactorial with multiple comorbidities, including depression anxiety as well as untreated sleep apnea  -suspect a component of hospital-acquired delirium as well  -CT head without contrast on 07/06/2025 =No CT evidence of acute intracranial abnormality. If the patient has an acute, focal neurological deficit, MRI of the brain may be indicated   -ABG on 07/06/2025 = pH 7.298, pCO2 29, PO2 76, HC03 14.6 on 6 L nasal cannula  -repeat ABG on 07/07/2025 = pH 7.32, pCO2 35, PO2 88, HCT 20.3 on 6 L nasal cannula  -provide emotional support as needed   -continue duloxetine 60 mg p.o. daily   -cover with Ativan 0.5 mg p.o. b.i.d. PRN  -psych consulted = follow recommendations    Metabolic acidosis  -monitor = improving   -half-normal saline +100 mEq bicarb IV at 100 cc an hour       NICOLAS (obstructive sleep apnea)  -Patient patient's  stated that patient was diagnosed with sleep apnea several years ago and was on a CPAP machine until it broke.  After it broke she never got it replaced.  -outpatient sleep study as soon as possible on discharge.   informed to obtain outpatient sleep study as soon as possible on discharge as well  -patient and  have agreed for a trial of CPAP while sleeping  -CPAP q.h.s.    VTE Risk Mitigation (From admission, onward)           Ordered     enoxaparin injection 40 mg  Every 12 hours         07/06/25 1305     IP VTE HIGH RISK PATIENT  Once         07/06/25 1302     Place sequential compression device  Until discontinued         07/04/25 1046                    Discharge Planning   DENZEL: 7/9/2025     Code Status: Full Code   Medical Readiness for Discharge Date:   Discharge Plan A: Rehab            Karina Freire MD  Department of Hospital Medicine   [facility]

## 2025-07-07 NOTE — SUBJECTIVE & OBJECTIVE
Interval History:  Patient seen and examined along with patient's  by bedside and patient's nurse by bedside and physical therapist by bedside.  Patient is sitting up on side of the bed.  Appears to be confused and delirious.  She is able to sit on the side of the bed with standby assistance only.  She does move all extremities to command weekly.  She appears confused.  She knows that she is in a hospital but does not know what year or who the president is.  She talks about her family.   states she is going through a lot at home and is waiting on her emotionally.  Patient does appear emotional and in tears.  She states that she wants her nurse jessica (from yesterday).  Informed her that today we have nurse zachary.  Thoughts are very disorganized .  Discussed with patient .  Patient is currently on 6 L nasal cannula.  We will place patient on CPAP q.h.s. and when sleeping after discussing with patient.  Patient does have a history of sleep apnea from several years ago and used a CPAP for some time but when the CPAP broke she could not have it replaced.  I have discussed with case management to try and get an outpatient sleep study again on discharge.  We will also get a psych eval and continue her SSRI.  Patient currently on duloxetine..  Continue to provide emotional support to patient as needed.  Systolic blood pressure is low 100s.  Levophed has been paused since earlier this morning so far per patient's nurse Zachary.    Review of Systems   Constitutional:  Positive for fatigue. Negative for activity change and fever.   Respiratory:  Negative for cough and shortness of breath.    Cardiovascular:  Negative for chest pain and leg swelling.   Gastrointestinal:  Negative for abdominal pain, nausea and vomiting.   Musculoskeletal:  Positive for myalgias.   Psychiatric/Behavioral:  Positive for confusion. The patient is nervous/anxious.    All other systems reviewed and are negative.    Objective:      Vital Signs (Most Recent):  Temp: 97.6 °F (36.4 °C) (07/07/25 0745)  Pulse: 103 (07/07/25 0830)  Resp: (!) 29 (07/07/25 0830)  BP: 112/60 (07/07/25 0830)  SpO2: 95 % (07/07/25 0830) Vital Signs (24h Range):  Temp:  [97.4 °F (36.3 °C)-98 °F (36.7 °C)] 97.6 °F (36.4 °C)  Pulse:  [] 103  Resp:  [10-38] 29  SpO2:  [92 %-98 %] 95 %  BP: ()/(58-95) 112/60  Arterial Line BP: ()/(32-83) 100/58     Weight: 115.6 kg (254 lb 13.6 oz)  Body mass index is 43.75 kg/m².    Intake/Output Summary (Last 24 hours) at 7/7/2025 0914  Last data filed at 7/7/2025 0651  Gross per 24 hour   Intake 4103.68 ml   Output 1945 ml   Net 2158.68 ml         Physical Exam  Constitutional:       General: She is not in acute distress.     Appearance: She is obese.   HENT:      Head: Normocephalic and atraumatic.      Nose: Nose normal.      Mouth/Throat:      Mouth: Mucous membranes are moist.   Eyes:      Pupils: Pupils are equal, round, and reactive to light.   Cardiovascular:      Rate and Rhythm: Normal rate and regular rhythm.      Pulses: Normal pulses.      Heart sounds: Normal heart sounds.   Pulmonary:      Effort: Pulmonary effort is normal.      Breath sounds: Normal breath sounds.   Abdominal:      General: Bowel sounds are normal. There is no distension.      Palpations: Abdomen is soft.      Tenderness: There is no abdominal tenderness.   Musculoskeletal:         General: No deformity.      Comments: Decreased range of motion right lower extremity due to right lower extremity brace in place   Skin:     General: Skin is warm and dry.   Neurological:      General: No focal deficit present.      Mental Status: She is alert.      Comments: Patient is awake alert and oriented to self and place.  Does not know what year it is or who the president is.  She moves all 4 extremities to command but with generalized weakness.  Limited range of motion to right lower extremity due to brace in place.  He is able to sit on the side  of the bed without much assistance.   Psychiatric:      Comments: Poor judgment and insight currently.  Poor thought process               Significant Labs: All pertinent labs within the past 24 hours have been reviewed.    Significant Imaging: I have reviewed all pertinent imaging results/findings within the past 24 hours.

## 2025-07-07 NOTE — CARE UPDATE
07/06/25 1945   Patient Assessment/Suction   Level of Consciousness (AVPU) alert   Respiratory Effort Unlabored   Expansion/Accessory Muscles/Retractions expansion symmetric   All Lung Fields Breath Sounds clear   Rhythm/Pattern, Respiratory depth regular;pattern regular   Cough Frequency infrequent   Cough Type good;nonproductive   Skin Integrity   $ Wound Care Tech Time 15 min   Area Observed Left;Right;Behind ear;Cheek;Upper lip;Nares   Skin Appearance without discoloration   PRE-TX-O2   Device (Oxygen Therapy) nasal cannula with humidification   $ Is the patient on Low Flow Oxygen? Yes   Flow (L/min) (Oxygen Therapy) 6   SpO2 95 %   Pulse Oximetry Type Continuous   $ Pulse Oximetry - Multiple Charge Pulse Oximetry - Multiple   Pulse 109   Resp 15   Education   $ Education Oxygen;15 min

## 2025-07-07 NOTE — PROGRESS NOTES
Pharmacokinetic Assessment Follow Up: IV Vancomycin    Vancomycin serum concentration assessment(s):    The random level was drawn correctly and can be used to guide therapy at this time. The measurement is within the desired definitive target range of 10 to 15 mcg/mL.    Vancomycin Regimen Plan:    Give vancomycin 1250 mg x 1 dose and Re-dose when the random level is less than 10 mcg/mL, next level to be drawn at 1800 on 7/8    Drug levels (last 3 results):  Recent Labs   Lab Result Units 07/05/25  1300 07/06/25  1638 07/07/25  1749   Vancomycin Random ug/ml 14.6  --   --    Vancomycin Trough ug/ml  --  8.0 14.1       Pharmacy will continue to follow and monitor vancomycin.    Please contact pharmacy at extension [phone] for questions regarding this assessment.    Thank you for the consult,   Lc Moreno       Patient brief summary:  Sonia Muse is a 65 y.o. female initiated on antimicrobial therapy with IV Vancomycin for treatment of urinary tract infection    The patient's current regimen is pulse dosing based on random levels    Drug Allergies:   Review of patient's allergies indicates:   Allergen Reactions    Erythromycin Swelling    Codeine Nausea And Vomiting     And migraine h/a    Lactose     [place] Blisters       Actual Body Weight:   115.6 kg    Renal Function:   Estimated Creatinine Clearance: 44.9 mL/min (A) (based on SCr of 1.6 mg/dL (H)).,     Dialysis Method (if applicable):  N/A    CBC (last 72 hours):  Recent Labs   Lab Result Units 07/04/25  2320 07/05/25  0326 07/05/25  1640 07/06/25  0344 07/06/25  1309 07/07/25  0427   WBC K/uL 0.56* 0.48*  --  0.85* 0.95* 1.44*   Hgb gm/dL 7.8* 7.8* 7.6* 7.4* 8.3* 7.8*   Hct % 24.1* 23.6*  --  22.4* 25.9* 23.7*   Platelet Count K/uL 177 172  --  219 228 231   Mono % % 17.9* 25.0*  --   --   --   --    Monocyte % %  --   --   --  26.0*  --  39.0*   Eos % % 1.8 2.1  --   --   --   --    Basophil % % 0.0 0.0  --   --   --   --        Metabolic Panel (last  72 hours):  Recent Labs   Lab Result Units 07/05/25  0326 07/05/25  1640 07/06/25  0344 07/06/25  1309 07/06/25  1638 07/07/25  0427   Sodium mmol/L 128* 130* 130* 133*  --  139   Urine Sodium mmol/L  --   --   --   --  15*  --    Potassium mmol/L 4.1 3.8 3.7 3.9  --  3.7   Urine Potassium mmol/L  --   --   --   --  14*  --    Chloride mmol/L 101 102 103 107  --  108   Urine Chloride mmol/L  --   --   --   --  31  --    CO2 mmol/L 17* 19* 17* 14*  --  21*   Glucose mg/dL 264* 263* 196* 260*  --  203*   BUN mg/dL 36* 31* 26* 25*  --  28*   Creatinine mg/dL 2.5* 2.0* 1.8* 1.7*  --  1.6*   Urine Creatinine mg/dL  --   --   --   --  50.7  --    Albumin g/dL 2.7* 2.7* 2.6*  --   --  2.4*   Bilirubin Total mg/dL 3.3* 1.6* 1.2*  --   --  1.4*   ALP unit/L 110 111 108  --   --  103   AST unit/L 7* 4* 4*  --   --  11   ALT unit/L 6* 5* 5*  --   --  5*   Magnesium mg/dL 1.5* 1.7 1.5*  --   --  1.9   Phosphorus Level mg/dL 3.5  --   --   --   --  2.6*       Vancomycin Administrations:  vancomycin given in the last 96 hours                     vancomycin 1,250 mg in 0.9% NaCl 250 mL IVPB (admixture device) (mg) 1,250 mg New Bag 07/06/25 1817    vancomycin 750 mg in 0.9% NaCl 250 mL IVPB (admixture device) (mg) 750 mg New Bag 07/05/25 1744    vancomycin (VANCOCIN) 2,000 mg in 0.9% NaCl 500 mL IVPB (admixture device) (mg) 2,000 mg New Bag 07/04/25 1508                    Microbiologic Results:  Microbiology Results (last 7 days)       Procedure Component Value Units Date/Time    Urine culture [[id number]] Collected: 07/04/25 1057    Order Status: Completed Specimen: Urine Updated: 07/07/25 0754     Urine Culture No Growth    Blood culture [[id number]]  (Normal) Collected: 07/04/25 1251    Order Status: Completed Specimen: Blood from Peripheral, Hand, Left Updated: 07/06/25 1901     CULTURE, BLOOD (H) No Growth After 48 Hours    Blood culture [[id number]]  (Normal) Collected: 07/04/25 1247    Order Status: Completed Specimen:  Blood from Peripheral, Antecubital, Left Updated: 07/06/25 1901     CULTURE, BLOOD ([facility]) No Growth After 48 Hours

## 2025-07-07 NOTE — PROGRESS NOTES
KATY Muse is a 65 year old female with a past medical history of a locally advanced pancreatic cancer, anemia, obesity, DM, HTN, and hypothyroidism who presented with a right femur fracture after a fall, MIGUEL ANGEL, hyponatremia and neutropenia.   She recently completed her first cycle of chemotherapy . She was not able to receive her second one due to prolonged myelosuppression.     She endorses chronic diarrhea and decreased appetite as well as fatigue. The patient denies CP, cough, SOB, abdominal pain, nausea, vomiting, constipation.  The patient denies fever, chills, night sweats, weight loss, new lumps or bumps, easy bruising or bleeding.     In the ED, the patient received a 1.5 L NS bolus and 1 L LR bolus as well as fentanyl and Zofran. Oncology was consulted in view of her severe neutropenia.       Past Medical History:   Diagnosis Date    Allergy     Anxiety     Asthma     DDD (degenerative disc disease), cervical     Depression     Diabetes mellitus     Fibromyalgia     Hypertension     Neuromuscular disorder     Pancreatic mass     PONV (postoperative nausea and vomiting)     Thyroid disease     hypo     Past Surgical History:   Procedure Laterality Date    ADENOIDECTOMY      CARPAL TUNNEL RELEASE Right      SECTION      ENDOSCOPIC ULTRASOUND OF UPPER GASTROINTESTINAL TRACT N/A 2025    Procedure: ULTRASOUND, UPPER GI TRACT, ENDOSCOPIC;  Surgeon: Austen Grimes III, MD;  Location: [facility];  Service: Endoscopy;  Laterality: N/A;    ERCP N/A 2025    Procedure: ERCP (ENDOSCOPIC RETROGRADE CHOLANGIOPANCREATOGRAPHY);  Surgeon: Thuy Escobar MD;  Location: [facility] ENDO;  Service: Endoscopy;  Laterality: N/A;    ERCP N/A 2025    Procedure: ERCP (ENDOSCOPIC RETROGRADE CHOLANGIOPANCREATOGRAPHY);  Surgeon: Austen Grimes III, MD;  Location: [facility] ENDO;  Service: Endoscopy;  Laterality: N/A;    INSERTION OF TUNNELED CENTRAL VENOUS CATHETER (CVC) WITH SUBCUTANEOUS  PORT Left 2025    Procedure: [id number];  Surgeon: Ata Fernandez III, MD;  Location: [facility] OR;  Service: General;  Laterality: Left;    JOINT REPLACEMENT Right 2016    knee    KNEE ARTHROPLASTY Left 10/19/2020    Procedure: ARTHROPLASTY, KNEE;  Surgeon: Felipe Herring MD;  Location: [facility] OR;  Service: Orthopedics;  Laterality: Left;  Louie Liz    ORIF FEMUR FRACTURE Right 2025    Procedure: ORIF, FRACTURE, FEMUR (distal femur ORIF, synthes VA condylar plate, izaiah table, C-arm, triangle);  Surgeon: Osmany Harding MD;  Location: [facility] OR;  Service: Orthopedics;  Laterality: Right;    ROTATOR CUFF REPAIR Right     TONSILLECTOMY      TOTAL KNEE ARTHROPLASTY Right     TUBAL LIGATION       Social History     Socioeconomic History    Marital status:     Number of children: 3   Occupational History    Occupation: [employer]     Comment: primary sub for [employer]   Tobacco Use    Smoking status: Former     Current packs/day: 0.00     Average packs/day: 1 pack/day for 11.0 years (11.0 ttl pk-yrs)     Types: Cigarettes     Start date:      Quit date:      Years since quittin.5    Smokeless tobacco: Never   Substance and Sexual Activity    Alcohol use: Not Currently     Comment: occasional 2x/y    Drug use: Never    Sexual activity: Yes     Partners: Male     Social Drivers of Health     Financial Resource Strain: Low Risk  (2025)    Overall Financial Resource Strain (CARDIA)     Difficulty of Paying Living Expenses: Not hard at all   Recent Concern: Financial Resource Strain - High Risk (2025)    Overall Financial Resource Strain (CARDIA)     Difficulty of Paying Living Expenses: Hard   Food Insecurity: No Food Insecurity (2025)    Hunger Vital Sign     Worried About Running Out of Food in the Last Year: Never true     Ran Out of Food in the Last Year: Never true   Recent Concern: Food Insecurity - Food Insecurity Present (2025)    Hunger Vital Sign      Worried About Running Out of Food in the Last Year: Sometimes true     Ran Out of Food in the Last Year: Sometimes true   Transportation Needs: No Transportation Needs (7/5/2025)    PRAPARE - Transportation     Lack of Transportation (Medical): No     Lack of Transportation (Non-Medical): No   Recent Concern: Transportation Needs - Unmet Transportation Needs (4/28/2025)    PRAPARE - Transportation     Lack of Transportation (Medical): Yes     Lack of Transportation (Non-Medical): Yes   Physical Activity: Unknown (4/28/2025)    Exercise Vital Sign     Days of Exercise per Week: Patient declined     Minutes of Exercise per Session: 30 min   Stress: No Stress Concern Present (7/5/2025)    [place] [employer] - Occupational Stress Questionnaire     Feeling of Stress : Not at all   Recent Concern: Stress - Stress Concern Present (4/28/2025)    [place] [employer] - Occupational Stress Questionnaire     Feeling of Stress : Very much   Housing Stability: Low Risk  (7/5/2025)    Housing Stability Vital Sign     Unable to Pay for Housing in the Last Year: No     Number of Times Moved in the Last Year: 0     Homeless in the Last Year: No     Review of patient's allergies indicates:   Allergen Reactions    Erythromycin Swelling    Codeine Nausea And Vomiting     And migraine h/a    Lactose     [place] Blisters     Physical exam  Vitals:    07/07/25 1249   BP:    Pulse:    Resp: (!) 29   Temp:      Patient does not know where she is physical exam suggestive of delirium.  She is awake alert to herself.  CT head is negative for any acute bleeding.  Normal respiratory effort  Normal rate  normocephalic      Lab Results   Component Value Date    WBC 1.44 (LL) 07/07/2025    HGB 7.8 (L) 07/07/2025    HCT 23.7 (L) 07/07/2025    MCV 76 (L) 07/07/2025     07/07/2025       CMP  Sodium   Date Value Ref Range Status   07/07/2025 139 136 - 145 mmol/L Final     Potassium   Date Value Ref Range  Status   07/07/2025 3.7 3.5 - 5.1 mmol/L Final     Chloride   Date Value Ref Range Status   07/07/2025 108 95 - 110 mmol/L Final     CO2   Date Value Ref Range Status   07/07/2025 21 (L) 23 - 29 mmol/L Final     Glucose   Date Value Ref Range Status   07/07/2025 203 (H) 70 - 110 mg/dL Final     BUN   Date Value Ref Range Status   07/07/2025 28 (H) 8 - 23 mg/dL Final     Creatinine   Date Value Ref Range Status   07/07/2025 1.6 (H) 0.5 - 1.4 mg/dL Final     Calcium   Date Value Ref Range Status   07/07/2025 8.6 (L) 8.7 - 10.5 mg/dL Final     Protein Total   Date Value Ref Range Status   07/07/2025 5.7 (L) 6.0 - 8.4 gm/dL Final     Albumin   Date Value Ref Range Status   07/07/2025 2.4 (L) 3.5 - 5.2 g/dL Final     Bilirubin Total   Date Value Ref Range Status   07/07/2025 1.4 (H) 0.1 - 1.0 mg/dL Final     Comment:     For infants and newborns, interpretation of results should be based   on gestational age, weight and in agreement with clinical   observations.    Premature Infant recommended reference ranges:   0-24 hours:  <8.0 mg/dL   24-48 hours: <12.0 mg/dL   3-5 days:    <15.0 mg/dL   6-29 days:   <15.0 mg/dL     ALP   Date Value Ref Range Status   07/07/2025 103 55 - 135 unit/L Final     AST   Date Value Ref Range Status   07/07/2025 11 10 - 40 unit/L Final     ALT   Date Value Ref Range Status   07/07/2025 5 (L) 10 - 44 unit/L Final     Anion Gap   Date Value Ref Range Status   07/07/2025 10 8 - 16 mmol/L Final     eGFR   Date Value Ref Range Status   07/07/2025 36 (L) >60 mL/min/1.73/m2 Final   11/21/2024 70 > OR = 60 mL/min/1.73m2 Final     Ct head    Impression:     No CT evidence of acute intracranial abnormality. If the patient has an acute, focal neurological deficit, MRI of the brain may be indicated.      Assessment recommendation     Closed fracture of distal end of right femur  She is s/p Open reduction internal fixation right periprosthetic distal femur fracture and Intramedullary nailing right  femur  NWB RLE  Fall precautions  PRN analgesics  Ortho consulted and following     Pancreatic cancer  Currently on neoadjuvant treatment with mFOLFIRONOX.  Her systemic therapy will be put on hold awaiting her neutropenia to resolved.  She will follow-up with Dr Calle upon her discharge     Chemotherapy-induced neutropenia  WBC today 0.85 -> 1.44  Continue on growth factors with Zarxio 480 mcg daily until an ANC >1000 for 2 consecutive days  Trend CBC with diff daily  Continue empiric renally dosed cefepime and vancomycin given UA findings concerning for UTI  Follow up urine and blood cultures     Anemia in neoplastic disease  Multifactorial due to possible bleeding s/p fall as well due to her systemic chemotherapy  Trend H/H daily   Transfuse if Hgb is less than 7 g/dl  Iron studies is reflecting anemia of chronic disease.  Hgb 7.8 g/dl today     MIGUEL ANGEL (acute kidney injury)  Likely pre-renal given diarrhea and decreased appetite with low BP.  Start IV fluids  Nephrology consulted  Avoid nephrotoxic agents  Monitor UOP and electrolytes  F/up BMP     Hypotension  Due to dehydration and sepsis   IV fluid  Started on Levophed tapering off         Abnormal LFTs  TB: 1.2 today improving  Most likely due to her pancreatic cancer, polypharmacy and sepsis.  Continue to monitor.     Acute cystitis  On cefipime

## 2025-07-07 NOTE — ASSESSMENT & PLAN NOTE
-Patient patient's  stated that patient was diagnosed with sleep apnea several years ago and was on a CPAP machine until it broke.  After it broke she never got it replaced.  -outpatient sleep study as soon as possible on discharge.   informed to obtain outpatient sleep study as soon as possible on discharge as well  -patient and  have agreed for a trial of CPAP while sleeping  -CPAP q.h.s.

## 2025-07-07 NOTE — ASSESSMENT & PLAN NOTE
-POA   -no acute cystitis on this admission.  UTI ruled out with negative urine culture  -afebrile  -urine culture on 07/04/2025 = negative at 48 hours  -lactic acid on 07/07/2025 = within normal limits

## 2025-07-08 PROBLEM — Z71.89 GOALS OF CARE, COUNSELING/DISCUSSION: Status: ACTIVE | Noted: 2025-07-08

## 2025-07-08 LAB
ALBUMIN SERPL-MCNC: 2.2 G/DL (ref 3.5–5.2)
ALP SERPL-CCNC: 91 UNIT/L (ref 55–135)
ALT SERPL-CCNC: 4 UNIT/L (ref 10–44)
ANION GAP (SMH): 8 MMOL/L (ref 8–16)
AST SERPL-CCNC: 7 UNIT/L (ref 10–40)
BILIRUB SERPL-MCNC: 0.8 MG/DL (ref 0.1–1)
BUN SERPL-MCNC: 29 MG/DL (ref 8–23)
CALCIUM SERPL-MCNC: 8.1 MG/DL (ref 8.7–10.5)
CHLORIDE SERPL-SCNC: 107 MMOL/L (ref 95–110)
CO2 SERPL-SCNC: 26 MMOL/L (ref 23–29)
CREAT SERPL-MCNC: 1.4 MG/DL (ref 0.5–1.4)
ERYTHROCYTE [DISTWIDTH] IN BLOOD BY AUTOMATED COUNT: 15.8 % (ref 11.5–14.5)
GFR SERPLBLD CREATININE-BSD FMLA CKD-EPI: 42 ML/MIN/1.73/M2
GLUCOSE SERPL-MCNC: 104 MG/DL (ref 70–110)
HCT VFR BLD AUTO: 22.1 % (ref 37–48.5)
HGB BLD-MCNC: 7.2 GM/DL (ref 12–16)
MAGNESIUM SERPL-MCNC: 1.6 MG/DL (ref 1.6–2.6)
MCH RBC QN AUTO: 25.2 PG (ref 27–31)
MCHC RBC AUTO-ENTMCNC: 32.6 G/DL (ref 32–36)
MCV RBC AUTO: 77 FL (ref 82–98)
PHOSPHATE SERPL-MCNC: 1.5 MG/DL (ref 2.7–4.5)
PLATELET # BLD AUTO: 256 K/UL (ref 150–450)
PMV BLD AUTO: 9.7 FL (ref 9.2–12.9)
POCT GLUCOSE: 103 MG/DL (ref 70–110)
POCT GLUCOSE: 64 MG/DL (ref 70–110)
POCT GLUCOSE: 71 MG/DL (ref 70–110)
POCT GLUCOSE: 73 MG/DL (ref 70–110)
POCT GLUCOSE: 95 MG/DL (ref 70–110)
POTASSIUM SERPL-SCNC: 3.2 MMOL/L (ref 3.5–5.1)
PROT SERPL-MCNC: 5.1 GM/DL (ref 6–8.4)
RBC # BLD AUTO: 2.86 M/UL (ref 4–5.4)
SODIUM SERPL-SCNC: 141 MMOL/L (ref 136–145)
WBC # BLD AUTO: 4.46 K/UL (ref 3.9–12.7)

## 2025-07-08 PROCEDURE — 63600175 PHARM REV CODE 636 W HCPCS: Mod: JZ,JB | Performed by: ORTHOPAEDIC SURGERY

## 2025-07-08 PROCEDURE — 94761 N-INVAS EAR/PLS OXIMETRY MLT: CPT

## 2025-07-08 PROCEDURE — 97530 THERAPEUTIC ACTIVITIES: CPT

## 2025-07-08 PROCEDURE — 27000221 HC OXYGEN, UP TO 24 HOURS

## 2025-07-08 PROCEDURE — 25000003 PHARM REV CODE 250: Performed by: ORTHOPAEDIC SURGERY

## 2025-07-08 PROCEDURE — 99900031 HC PATIENT EDUCATION (STAT)

## 2025-07-08 PROCEDURE — 27000207 HC ISOLATION

## 2025-07-08 PROCEDURE — 84100 ASSAY OF PHOSPHORUS: CPT | Performed by: INTERNAL MEDICINE

## 2025-07-08 PROCEDURE — 97535 SELF CARE MNGMENT TRAINING: CPT

## 2025-07-08 PROCEDURE — 94660 CPAP INITIATION&MGMT: CPT

## 2025-07-08 PROCEDURE — 21400001 HC TELEMETRY ROOM

## 2025-07-08 PROCEDURE — 25000003 PHARM REV CODE 250: Performed by: INTERNAL MEDICINE

## 2025-07-08 PROCEDURE — 63600175 PHARM REV CODE 636 W HCPCS: Performed by: INTERNAL MEDICINE

## 2025-07-08 PROCEDURE — 82040 ASSAY OF SERUM ALBUMIN: CPT | Performed by: ORTHOPAEDIC SURGERY

## 2025-07-08 PROCEDURE — 99223 1ST HOSP IP/OBS HIGH 75: CPT | Mod: ,,, | Performed by: NURSE PRACTITIONER

## 2025-07-08 PROCEDURE — 85027 COMPLETE CBC AUTOMATED: CPT | Performed by: INTERNAL MEDICINE

## 2025-07-08 PROCEDURE — 99900035 HC TECH TIME PER 15 MIN (STAT)

## 2025-07-08 PROCEDURE — 99233 SBSQ HOSP IP/OBS HIGH 50: CPT | Mod: ,,, | Performed by: INTERNAL MEDICINE

## 2025-07-08 PROCEDURE — 83735 ASSAY OF MAGNESIUM: CPT | Performed by: ORTHOPAEDIC SURGERY

## 2025-07-08 PROCEDURE — 25000003 PHARM REV CODE 250: Performed by: STUDENT IN AN ORGANIZED HEALTH CARE EDUCATION/TRAINING PROGRAM

## 2025-07-08 RX ORDER — LANOLIN ALCOHOL/MO/W.PET/CERES
800 CREAM (GRAM) TOPICAL
Status: DISCONTINUED | OUTPATIENT
Start: 2025-07-08 | End: 2025-07-11 | Stop reason: HOSPADM

## 2025-07-08 RX ORDER — SODIUM,POTASSIUM PHOSPHATES 280-250MG
2 POWDER IN PACKET (EA) ORAL
Status: DISCONTINUED | OUTPATIENT
Start: 2025-07-08 | End: 2025-07-11 | Stop reason: HOSPADM

## 2025-07-08 RX ORDER — POTASSIUM CHLORIDE 29.8 MG/ML
40 INJECTION INTRAVENOUS
Status: DISCONTINUED | OUTPATIENT
Start: 2025-07-08 | End: 2025-07-11 | Stop reason: HOSPADM

## 2025-07-08 RX ORDER — POTASSIUM CHLORIDE 29.8 MG/ML
80 INJECTION INTRAVENOUS
Status: DISCONTINUED | OUTPATIENT
Start: 2025-07-08 | End: 2025-07-11 | Stop reason: HOSPADM

## 2025-07-08 RX ORDER — SODIUM,POTASSIUM PHOSPHATES 280-250MG
1 POWDER IN PACKET (EA) ORAL
Status: DISCONTINUED | OUTPATIENT
Start: 2025-07-08 | End: 2025-07-11 | Stop reason: HOSPADM

## 2025-07-08 RX ORDER — POTASSIUM CHLORIDE 750 MG/1
10 CAPSULE, EXTENDED RELEASE ORAL 3 TIMES DAILY
Status: DISCONTINUED | OUTPATIENT
Start: 2025-07-08 | End: 2025-07-11 | Stop reason: HOSPADM

## 2025-07-08 RX ORDER — POTASSIUM CHLORIDE 14.9 MG/ML
60 INJECTION INTRAVENOUS
Status: DISCONTINUED | OUTPATIENT
Start: 2025-07-08 | End: 2025-07-11 | Stop reason: HOSPADM

## 2025-07-08 RX ADMIN — Medication 800 MG: at 05:07

## 2025-07-08 RX ADMIN — POTASSIUM CHLORIDE 10 MEQ: 750 CAPSULE, EXTENDED RELEASE ORAL at 02:07

## 2025-07-08 RX ADMIN — ENOXAPARIN SODIUM 40 MG: 40 INJECTION SUBCUTANEOUS at 09:07

## 2025-07-08 RX ADMIN — POTASSIUM CHLORIDE 60 MEQ: 14.9 INJECTION, SOLUTION INTRAVENOUS at 05:07

## 2025-07-08 RX ADMIN — MIDODRINE HYDROCHLORIDE 10 MG: 5 TABLET ORAL at 05:07

## 2025-07-08 RX ADMIN — MUPIROCIN 1 G: 20 OINTMENT TOPICAL at 09:07

## 2025-07-08 RX ADMIN — FILGRASTIM-SNDZ 480 MCG: 480 INJECTION, SOLUTION INTRAVENOUS; SUBCUTANEOUS at 09:07

## 2025-07-08 RX ADMIN — CEFEPIME 1 G: 1 INJECTION, POWDER, FOR SOLUTION INTRAMUSCULAR; INTRAVENOUS at 01:07

## 2025-07-08 RX ADMIN — POTASSIUM & SODIUM PHOSPHATES POWDER PACK 280-160-250 MG 1 PACKET: 280-160-250 PACK at 09:07

## 2025-07-08 RX ADMIN — MIDODRINE HYDROCHLORIDE 10 MG: 5 TABLET ORAL at 09:07

## 2025-07-08 RX ADMIN — MIDODRINE HYDROCHLORIDE 10 MG: 5 TABLET ORAL at 02:07

## 2025-07-08 RX ADMIN — PANTOPRAZOLE SODIUM 40 MG: 40 TABLET, DELAYED RELEASE ORAL at 05:07

## 2025-07-08 RX ADMIN — MONTELUKAST 10 MG: 10 TABLET, FILM COATED ORAL at 09:07

## 2025-07-08 RX ADMIN — POTASSIUM & SODIUM PHOSPHATES POWDER PACK 280-160-250 MG 1 PACKET: 280-160-250 PACK at 05:07

## 2025-07-08 RX ADMIN — POTASSIUM CHLORIDE 10 MEQ: 750 CAPSULE, EXTENDED RELEASE ORAL at 09:07

## 2025-07-08 RX ADMIN — LEVOTHYROXINE SODIUM 25 MCG: 0.03 TABLET ORAL at 05:07

## 2025-07-08 RX ADMIN — Medication 2 PACKET: at 05:07

## 2025-07-08 RX ADMIN — DULOXETINE 60 MG: 30 CAPSULE, DELAYED RELEASE ORAL at 09:07

## 2025-07-08 RX ADMIN — DEXTROSE MONOHYDRATE 12.5 G: 25 INJECTION, SOLUTION INTRAVENOUS at 09:07

## 2025-07-08 NOTE — PLAN OF CARE
Problem: Physical Therapy  Goal: Physical Therapy Goal  Description: Goals to be met by: 25     Patient will increase functional independence with mobility by performin. Supine to sit with MInimal Assistance  2. Sit to stand transfer with Minimal Assistance  3. Bed to chair transfer with Minimal Assistance using Rolling Walker  4. R/L rolling with minimal assistance using bedside rail as needed    All goals while maintaining 25 % PWB on R LE            Outcome: Progressing

## 2025-07-08 NOTE — CONSULTS
[facility]  Psychiatry  Consult Note    Patient Name: Sonia Muse  MRN: [id number]   Code Status: Full Code  Admission Date: 7/4/2025  Hospital Length of Stay: 4 days  Attending Physician: Karina Freire MD  Primary Care Provider: Nasra Galvez FNP    Current Legal Status: Formal Voluntary Admission (FVA)    Patient information was obtained from patient, ER records, and primary team.   Consults  Subjective:     Principal Problem:Closed fracture of distal end of right femur    Chief Complaint:  Delirium      HPI: 7/8/2025    Identifying information  65 years old white female, admitted to [facility] on 07/04/2025 on formal voluntary admission.  Patient lives at [address] [place] [address] a residential telephone number is [phone].  Information obtained from medical record from the patient and discussed with the attending physician.    History of present problems  It is rated led patient had a fall in the process she landed alone both her knees the radiological workup showed communicated displaced prosthetic right distal femur fracture, Patient was found to be confused and delirious emotional and thoughts disorganized she was hypotensive as her vital signs indicated.   Patient herself did not volunteer much information, except that she was very tired, that she wanted to sleep and that she was thirsty- as I gave her a glass of water patient opened her eyes looked at me intently and very graciously thanked me,.  Patient knows that she is at [facility] and the this is month of July.  She did not volunteer any other information.  Patient is reported to be having alteration of consciousness she was reported to be much more alert and oriented this morning as per Dr. Freire.     Past psychiatric history  Patient seems to have history of depression and she is on Cymbalta 60 mg once a day.  She also gets Zyprexa 5 mg for 3 days during her chemotherapy cycle.      Family history  Not available    Social history  Patient is , much of the information in the chart as given by him.  Patient quit smoking a while ago and she no longer drinks.  Patient's granddaughter works as an RN in ICU in our hospital.     Medical surgical history  Pancreatic cancer, hypertension, diabetes, fibromyalgia, hypothyroidism, sleep apnea, ORIF, MIGUEL ANGEL,    Allergies  Erythromycin, codeine, walnut, lactose    Current psychotropics  Cymbalta 60 mg p.o. q.d., Zyprexa 5 mg p.o. q.d. for 3 days during chemotherapy cycle    Mental status examination  65-year-old white female, overweight female, who kept her eyes closed, made very brief statements, low volume speech, thought processes were limited to yes and no answers, she is sedated with flat affect, mood profoundly sad, there is no evidence of any auditory or visual hallucinations, she denied any homicide or suicide intent, she seems oriented to the hospital and to the month, patient's insight and judgment seems to be limited.    Impression  1. Episodes of delirium superimposed on probable dementia of multifactorial etiologies.  2. Major depression recurrent with psychosis  3. Cognitive impairment-multifactorial in nature    Recommendations   If okay with the attendings  1. DC Zyprexa(patient does diabetic), DC Cymbalta  2. Cymbalta 30 mg p.o. q.a.m. and 60 mg p.o. q.h.s.  3. Risperdal 0.5 mg p.o. Q 8 hours to resolve the delirium.  4. Please obtain B12, folate, D3, magnesium, levels in the a.m..   Please reconsult as needed  Thank you Dr. Freire    [facility] Course: No notes on file    No new subjective & objective note has been filed under this hospital service since the last note was generated.    Assessment/Plan:     No notes have been filed under this hospital service.  Service: Psychiatry       Total Time:  120 minutes     Chica Fisher MD   Psychiatry  [facility]

## 2025-07-08 NOTE — PLAN OF CARE
"    MIC[facility] care plan note    Dx: Closed fracture of distal end of right femur    Shift Events: Patient confused and speaks randomly about her family and then starts to cry. Patient reoriented to surroundings. Patient had loose BM, gown and linen changed.     Goals of Care: Encourage independence.     Neuro: Follows Commands, Moves All Extremities, and Confused    Vital Signs: /73   Pulse 95   Temp 98.4 °F (36.9 °C) (Axillary)   Resp (!) 21   Ht 5' 4" (1.626 m)   Wt 115.6 kg (254 lb 13.6 oz)   SpO2 (!) 92%   Breastfeeding No   BMI 43.75 kg/m²     Respiratory: HFNC    GI: proton pump inhibitor per orders diarrhea    Thrombus: DVT prophylaxis. Pulmonary toilet including IS.    Diet: Diabetic Diet    Gtts: MIVF    Urine Output: Incontinent see results cc/shift     Labs/Accuchecks: see results.    Skin: Intact        Plan of care reviewed with patient, Sonia Muse , verbalized understanding. Patient progressing toward goals of care. CAMPOS. Safety measures in place and maintained throughout shift.     "

## 2025-07-08 NOTE — ASSESSMENT & PLAN NOTE
-POA   -no acute cystitis on this admission.  UTI ruled out with negative urine culture  -afebrile  -urine culture on 07/04/2025 = negative at 48 hours  -lactic acid on 07/07/2025 = within normal limits  -DC cefepime IV on 07/08/2025

## 2025-07-08 NOTE — PROGRESS NOTES
[facility] Medicine  Progress Note    Patient Name: Sonia Muse  MRN: [id number]  Patient Class: IP- Inpatient   Admission Date: 7/4/2025  Length of Stay: 4 days  Attending Physician: Karina Freire MD  Primary Care Provider: Nasra Galvez FNP        Subjective     Principal Problem:Closed fracture of distal end of right femur        HPI:  Sonia Muse is a 65 year old female with a past medical history of pancreatic cancer, anemia, obesity, DM, HTN, and hypothyroidism who presented with a R femur fracture after a fall, MIGUEL ANGEL, hyponatremia and neutropenia. She recently completed her first cycle of chemotherapy after roughly one week ago (patient of Dr. Calle). She endorses chronic diarrhea and decreased appetite as well as fatigue. She states her BP has fallen since being diagnosed with pancreatic cancer and starting chemotherapy. In the ED, the patient received a 1.5 L NS bolus and 1 L LR bolus as well as fentanyl and Zofran. Hospital Medicine was consulted for admission.    Overview/Hospital Course:  We admitted the patient with a distal right femur fracture.  The orthopedic surgeon recommended operative repair after medical stabilization.  In response to the neutropenia, we placed her on granulocyte colony-stimulating factor daily.  We provided prophylactic antibiotic.  We consulted with Hematology Oncology, who provided recommendations for further management.  We transfused red blood cells when needed.  She had hypotension, which required a vasopressor.  It was not clear what was causing the hypotension, the two possibilities were the anemia, and the 2nd was the use of frequent intravenous hydromorphone for the pain associated with the fracture.  We also treated her for acute kidney injury, which was likely because of unstable hemodynamics, in addition to the use of oxaliplatin.  Patient treated with IV fluids, midodrine and Levophed for hypotension with improvement.   Renal function monitored showed improving creatinine with IV fluids.  Filgrastim daily was ordered for 5 days for neutropenia.  Patient remained afebrile.  On morning of 07/06/2025 patient taken for ORIF of right femur by Orthopedic surgery.  Urine cultures and blood cultures remain negative therefore UTI ruled out and empiric IV antibiotics were DC.  Renal function monitored and improved well with mild increase in edema therefore IV fluids DC.  Patient remains off of Levophed since evening of 07/07/2025.  Patient does have a history of sleep apnea in the distant past and used a CPAP for awhile and then the machine broke therefore she never got this replaced.  She will require a repeat sleep study in outpatient setting as she probably still has underlying sleep apnea.  Patient did have mild acute hospital-acquired psychosis postop and was very emotional.  SSRI continued.  Psych eval consulted.  Patient's mental status improved the next day.  Blood pressure remains in stable range on midodrine.  Suspect midodrine could eventually be tapered off as tolerated.  Downgrade orders to tele placed on 07/08/2025    Interval History:  Patient awake alert and in no acute distress.  Remains afebrile.  Denies chest pain shortness for breath nausea vomiting abdominal pain.  Appears more comfortable today.  Not emotional today currently.  Moves all extremities to command but weakly.  Urinating well spontaneously.  Blood pressure currently in stable range.  Slept with CPAP for half the night but then took it off per nurse.  Right lower extremity remains in brace.  Right leg pain appears controlled.  Nurse reports that patient has been off of Levophed since yesterday evening.  Has some increasing edema to lower extremities bilaterally.  Bicarb has normalized.  Renal function has improved well.  We will DC IV fluids.  Case and plan discussed with patient's nurse Zachary.  We will consider downgrade to tele this afternoon if patient  remains stable    Review of Systems   Constitutional:  Negative for activity change, fatigue and fever.   Respiratory:  Negative for cough and shortness of breath.    Cardiovascular:  Negative for chest pain and leg swelling.   Gastrointestinal:  Negative for abdominal pain, nausea and vomiting.   Musculoskeletal:  Positive for myalgias.   Skin: Negative.    Neurological: Negative.         Patient does have generalized weakness    Psychiatric/Behavioral: Negative.     All other systems reviewed and are negative.    Objective:     Vital Signs (Most Recent):  Temp: 97.5 °F (36.4 °C) (07/08/25 0715)  Pulse: 92 (07/08/25 0715)  Resp: (!) 28 (07/08/25 0715)  BP: 102/71 (07/08/25 0715)  SpO2: (!) 82 % (07/08/25 0715) Vital Signs (24h Range):  Temp:  [97.4 °F (36.3 °C)-98.4 °F (36.9 °C)] 97.5 °F (36.4 °C)  Pulse:  [] 92  Resp:  [7-47] 28  SpO2:  [79 %-98 %] 82 %  BP: ()/(46-74) 102/71  Arterial Line BP: (75-98)/(43-74) 77/72     Weight: 115.6 kg (254 lb 13.6 oz)  Body mass index is 43.75 kg/m².    Intake/Output Summary (Last 24 hours) at 7/8/2025 0834  Last data filed at 7/8/2025 0601  Gross per 24 hour   Intake 3586.78 ml   Output 550 ml   Net 3036.78 ml         Physical Exam  Constitutional:       General: She is not in acute distress.     Appearance: She is obese.   HENT:      Head: Normocephalic and atraumatic.      Nose: Nose normal.      Mouth/Throat:      Mouth: Mucous membranes are moist.   Cardiovascular:      Rate and Rhythm: Normal rate and regular rhythm.      Pulses: Normal pulses.      Heart sounds: Normal heart sounds.   Pulmonary:      Effort: Pulmonary effort is normal.      Breath sounds: Normal breath sounds.   Abdominal:      General: Bowel sounds are normal. There is no distension.      Tenderness: There is no abdominal tenderness. There is no guarding.   Musculoskeletal:      Comments: Bilateral lower extremity 1+ edema.   Skin:     General: Skin is warm and dry.   Neurological:       General: No focal deficit present.      Mental Status: She is alert and oriented to person, place, and time.      Comments: Decreased range of motion right lower extremity due to right leg brace   Psychiatric:         Mood and Affect: Mood normal.               Significant Labs: All pertinent labs within the past 24 hours have been reviewed.    Significant Imaging: I have reviewed all pertinent imaging results/findings within the past 24 hours.      Assessment & Plan  Closed fracture of distal end of right femur  -Orthopedic Surgery consulted= follow recommendations  -Fall precautions  -PRN analgesics  -status post  1.Open reduction internal fixation right periprosthetic distal femur fracture.2. Intramedullary nailing right femur on 07/06/2025 by Orthopedic surgery  -orthopedic surgery recommends 25% weight-bearing to right lower extremity  -defer to Orthopedic surgery  MIGUEL ANGEL (acute kidney injury)  -Likely due to hemodynamic instability.  -monitor creatinine = improved  -avoid nephrotoxic agents   -renally dose medications as needed  -DC IV fluids at 100 cc an hour  -Nephrology consulted= follow recommendations  Chemotherapy-induced neutropenia  -status post Chemotherapy roughly one week ago.  -monitor WBC= improved  -neutropenic precautions   -urine culture on 07/04/2025 = no growth till date (UTI ruled out)  -blood cultures x2 on 07/04/2025 = remains negative till date  -filgrastim 458 mg IV daily x5 days.  Ordered on 07/04/2025.  (per Heme-Onc continue filgrastim until an ANC >1000 for 2 days)  -DC cefepime 1 g IV b.i.d. empirically.  Ordered on 07/04/2025.  DC on 07/08/2025 ( urine and blood cultures remain negative)  -Heme-Onc consulted = follow recommendations      Hypotension  Secondary to combination of anemia and the use of IV hydromorphone for pain.  -monitor blood pressure = stable range currently   -maintain goal blood pressure map > 65  -midodrine 10 mg p.o. q.8 hours  -DC IV fluids at 100 cc an  hour  -remains off of Levophed IV to help maintain goal map        Pancreatic cancer  -Oncology consulted  -patient recently began chemotherapy    Anemia in neoplastic disease  -In setting of pancreatic adenocarcinoma and MIGUEL ANGEL.  -no overt bleeding seen or reported currently  -monitor H&H = stable range with mild decreased (also with contribution from dilutional effect of IV fluids)  -FOBT = pending   -PPI daily   -DC IV fluids at 100 cc an hour.  DC on 07/08/2025  -consider transfusion of 1 unit PRBC if and when hemoglobin less than 7 Or when hemoglobin is less than 8 and patient has symptoms of anemia.  Hematology-Oncology consulting= follow recommendations  Hyponatremia  Monitor sodium daily= resolved/stable range currently  Nephrology consulted = appreciate input and follow recommendations  Abnormal LFTs  Unsure of etiology.  Could be secondary to chemotherapy.    Monitor liver function tests daily.    Acute cystitis  -POA   -no acute cystitis on this admission.  UTI ruled out with negative urine culture  -afebrile  -urine culture on 07/04/2025 = negative at 48 hours  -lactic acid on 07/07/2025 = within normal limits  -DC cefepime IV on 07/08/2025    DM (diabetes mellitus)  Last A1c reviewed-   Lab Results   Component Value Date    HGBA1C 10.1 (H) 04/25/2025     Most recent fingerstick glucose reviewed-   Recent Labs   Lab 07/07/25  1635 07/07/25  2048 07/08/25  0721   POCTGLUCOSE 159* 150* 103     Current correctional scale  Medium  Maintain anti-hyperglycemic dose as follows-   Antihyperglycemics (From admission, onward)      Start     Stop Route Frequency Ordered    07/07/25 2100  insulin glargine U-100 (Lantus) pen 23 Units         -- SubQ Nightly 07/07/25 0932    07/04/25 1142  insulin aspart U-100 pen 0-10 Units         -- SubQ Before meals & nightly PRN 07/04/25 1046          -Hold Oral hypoglycemics while patient is in the hospital.  -Monitor CBGS = stable range currently  -Lantus 23 units subQ q.h.s.  (titrate as needed)   Acute psychosis  -improving   --likely multifactorial with multiple comorbidities, including depression anxiety as well as untreated sleep apnea  -suspect a component of hospital-acquired delirium as well  -CT head without contrast on 07/06/2025 =No CT evidence of acute intracranial abnormality. If the patient has an acute, focal neurological deficit, MRI of the brain may be indicated   -ABG on 07/06/2025 = pH 7.298, pCO2 29, PO2 76, HC03 14.6 on 6 L nasal cannula  -repeat ABG on 07/07/2025 = pH 7.32, pCO2 35, PO2 88, HCT 20.3 on 6 L nasal cannula  -provide emotional support as needed   -continue duloxetine 60 mg p.o. daily   -cover with Ativan 0.5 mg p.o. b.i.d. PRN  -psych consulted = follow recommendations    Metabolic acidosis  -monitor = improved/resolved  -DC half-normal saline +100 mEq bicarb IV at 100 cc an hour .  DC on 07/08/2025      NICOLAS (obstructive sleep apnea)  -Patient patient's  stated that patient was diagnosed with sleep apnea several years ago and was on a CPAP machine until it broke.  After it broke she never got it replaced.  -outpatient sleep study as soon as possible on discharge.   informed to obtain outpatient sleep study as soon as possible on discharge as well  -patient and  have agreed for a trial of CPAP while sleeping  -CPAP q.h.s.      VTE Risk Mitigation (From admission, onward)           Ordered     enoxaparin injection 40 mg  Every 12 hours         07/06/25 1305     IP VTE HIGH RISK PATIENT  Once         07/06/25 1302     Place sequential compression device  Until discontinued         07/04/25 1046                    Discharge Planning   DENZEL: 7/10/2025     Code Status: Full Code   Medical Readiness for Discharge Date:   Discharge Plan A: Rehab            Downgrade to tele this afternoon if blood pressure continues to remain stable      Karina Freire MD  Department of Hospital Medicine   [facility]

## 2025-07-08 NOTE — ASSESSMENT & PLAN NOTE
Last A1c reviewed-   Lab Results   Component Value Date    HGBA1C 10.1 (H) 04/25/2025     Most recent fingerstick glucose reviewed-   Recent Labs   Lab 07/07/25  1635 07/07/25  2048 07/08/25  0721   POCTGLUCOSE 159* 150* 103     Current correctional scale  Medium  Maintain anti-hyperglycemic dose as follows-   Antihyperglycemics (From admission, onward)      Start     Stop Route Frequency Ordered    07/07/25 2100  insulin glargine U-100 (Lantus) pen 23 Units         -- SubQ Nightly 07/07/25 0932    07/04/25 1142  insulin aspart U-100 pen 0-10 Units         -- SubQ Before meals & nightly PRN 07/04/25 1046          -Hold Oral hypoglycemics while patient is in the hospital.  -Monitor CBGS = stable range currently  -Lantus 23 units subQ q.h.s. (titrate as needed)

## 2025-07-08 NOTE — CARE UPDATE
07/07/25 1935   Patient Assessment/Suction   Level of Consciousness (AVPU) alert   Respiratory Effort Unlabored   Expansion/Accessory Muscles/Retractions expansion symmetric   All Lung Fields Breath Sounds clear;diminished   Rhythm/Pattern, Respiratory depth regular;pattern regular   Cough Frequency infrequent   Cough Type good;nonproductive   Skin Integrity   $ Wound Care Tech Time 15 min   Area Observed Left;Right;Cheek;Chin;Behind ear;Upper lip;Nares   PRE-TX-O2   Device (Oxygen Therapy) high flow nasal cannula w/device   $ Is the patient on Low Flow Oxygen? Yes   Flow (L/min) (Oxygen Therapy) 3   SpO2 97 %   Pulse Oximetry Type Continuous   $ Pulse Oximetry - Multiple Charge Pulse Oximetry - Multiple   Pulse 90   Resp 18   Education   $ Education BiPAP;15 min

## 2025-07-08 NOTE — ASSESSMENT & PLAN NOTE
-Likely due to hemodynamic instability.  -monitor creatinine = improved  -avoid nephrotoxic agents   -renally dose medications as needed  -DC IV fluids at 100 cc an hour  -Nephrology consulted= follow recommendations

## 2025-07-08 NOTE — PT/OT/SLP PROGRESS
Physical Therapy Treatment    Patient Name:  Sonia Muse   MRN:  [id number]    Recommendations:     Discharge Recommendations: High Intensity Therapy  Discharge Equipment Recommendations: to be determined by next level of care  Barriers to discharge: pain, increase assist with mobility, high fall risk, PWB R LE,    Assessment:     Sonia Muse is a 65 y.o. female admitted with a medical diagnosis of Closed fracture of distal end of right femur.  She presents with the following impairments/functional limitations: weakness, impaired endurance, impaired self care skills, impaired functional mobility, gait instability, impaired balance, decreased safety awareness, decreased lower extremity function, pain, impaired cardiopulmonary response to activity.    Pt presents with improved pain management. Pt presented with improved R LE movement with bed mobility with self assist. Pt also increased attempt to scoot to EOB to improve positioning for sit to stand. Pt able to achieve three full standing this date with PWB on R LE and mod A x 2 to RW. Standing tolerance limited due to decrease activity tolerance and confusion. While confused patient removed O2 and desat to 86%. Able to redirect and maintain on oxygen for remainder of session.    Rehab Prognosis: Fair; patient would benefit from acute skilled PT services to address these deficits and reach maximum level of function.    Recent Surgery: Procedure(s) (LRB):  ORIF, FRACTURE, FEMUR (distal femur ORIF, synthes VA condylar plate, izaiah table, C-arm, triangle) (Right) 3 Days Post-Op    Plan:     During this hospitalization, patient to be seen daily to address the identified rehab impairments via gait training, therapeutic activities, therapeutic exercises, neuromuscular re-education and progress toward the following goals:    Plan of Care Expires:  08/07/25    Subjective     Chief Complaint: R knee pain   Patient/Family Comments/goals: go home  Pain/Comfort:          Objective:     Communicated with RN prior to session.  Patient found HOB elevated with peripheral IV, telemetry, blood pressure cuff, pulse ox (continuous), oxygen upon PT entry to room.     General Precautions: Standard, fall  Orthopedic Precautions: RLE partial weight bearing (25%)  Braces: Knee immobilizer  Respiratory Status: Nasal cannula, flow 3 L/min     Functional Mobility:  Bed Mobility:     Supine to Sit: moderate assistance and of 2 persons  Sit to Supine: moderate assistance and of 2 persons  Transfers:     Sit to Stand:  moderate assistance and of 2 persons with rolling walker      AM-PAC 6 CLICK MOBILITY          Treatment & Education:  Pt educated on POC, discharge recommendation, importance of time OOB, PWB R LE, proper form with mobility, need for assist with mobility, use of call bell to seek assistance as needed and fall prevention      Patient left HOB elevated with all lines intact, call button in reach, bed alarm on, RN notified, and friend present..    GOALS:   Multidisciplinary Problems       Physical Therapy Goals          Problem: Physical Therapy    Goal Priority Disciplines Outcome Interventions   Physical Therapy Goal     PT, PT/OT Progressing    Description: Goals to be met by: 25     Patient will increase functional independence with mobility by performin. Supine to sit with MInimal Assistance  2. Sit to stand transfer with Minimal Assistance  3. Bed to chair transfer with Minimal Assistance using Rolling Walker  4. R/L rolling with minimal assistance using bedside rail as needed    All goals while maintaining 25 % PWB on R LE                                 DME Justifications:  No DME recommended requiring DME justifications    Time Tracking:     PT Received On: 25  PT Start Time: 847     PT Stop Time: 913  PT Total Time (min): 26 min     Billable Minutes: Therapeutic Activity 26    Treatment Type: Treatment  PT/PTA: PT     Number of PTA visits since last PT  visit: 0     07/09/2025

## 2025-07-08 NOTE — ASSESSMENT & PLAN NOTE
-improving   --likely multifactorial with multiple comorbidities, including depression anxiety as well as untreated sleep apnea  -suspect a component of hospital-acquired delirium as well  -CT head without contrast on 07/06/2025 =No CT evidence of acute intracranial abnormality. If the patient has an acute, focal neurological deficit, MRI of the brain may be indicated   -ABG on 07/06/2025 = pH 7.298, pCO2 29, PO2 76, HC03 14.6 on 6 L nasal cannula  -repeat ABG on 07/07/2025 = pH 7.32, pCO2 35, PO2 88, HCT 20.3 on 6 L nasal cannula  -provide emotional support as needed   -continue duloxetine 60 mg p.o. daily   -cover with Ativan 0.5 mg p.o. b.i.d. PRN  -psych consulted = follow recommendations

## 2025-07-08 NOTE — ASSESSMENT & PLAN NOTE
-monitor = improved/resolved  -DC half-normal saline +100 mEq bicarb IV at 100 cc an hour .  DC on 07/08/2025

## 2025-07-08 NOTE — ASSESSMENT & PLAN NOTE
-In setting of pancreatic adenocarcinoma and MIGUEL ANGEL.  -no overt bleeding seen or reported currently  -monitor H&H = stable range with mild decreased (also with contribution from dilutional effect of IV fluids)  -FOBT = pending   -PPI daily   -DC IV fluids at 100 cc an hour.  DC on 07/08/2025  -consider transfusion of 1 unit PRBC if and when hemoglobin less than 7 Or when hemoglobin is less than 8 and patient has symptoms of anemia.  Hematology-Oncology consulting= follow recommendations

## 2025-07-08 NOTE — CARE UPDATE
07/08/25 0629   Patient Assessment/Suction   Level of Consciousness (AVPU) alert   Respiratory Effort Normal;Unlabored   Expansion/Accessory Muscles/Retractions no use of accessory muscles;no retractions   All Lung Fields Breath Sounds Anterior:;Lateral:;clear;equal bilaterally   Rhythm/Pattern, Respiratory pattern regular;unlabored   Cough Frequency infrequent   Cough Type nonproductive;good   Skin Integrity   $ Wound Care Tech Time 15 min   Area Observed Left;Right;Behind ear;Cheek;Upper lip;Nares   Skin Appearance without discoloration   PRE-TX-O2   Device (Oxygen Therapy) nasal cannula   $ Is the patient on Low Flow Oxygen? Yes   Flow (L/min) (Oxygen Therapy) 3   SpO2 (!) 94 %   Pulse Oximetry Type Continuous   $ Pulse Oximetry - Multiple Charge Pulse Oximetry - Multiple   Pulse 93   Resp (!) 27   Preset CPAP/BiPAP Settings   Mode Of Delivery CPAP;standby   CPAP/BIPAP charged w/in last 24 h YES   Equipment Type [place]iss   Education   $ Education Oxygen;15 min

## 2025-07-08 NOTE — PROGRESS NOTES
INPATIENT NEPHROLOGY CONSULT   [facility] NEPHROLOGY    Sonia Muse  07/08/2025    Reason for consultation:  Acute kidney injury    Chief Complaint:   Chief Complaint   Patient presents with    Knee Injury     Fall  / rt. Knee pain      History of Present Illness:    Per H and P  Sonia Muse is a 65 year old female with a past medical history of pancreatic cancer, anemia, obesity, DM, HTN, and hypothyroidism who presented with a R femur fracture after a fall, MIGUEL ANGEL, hyponatremia and neutropenia. She recently completed her first cycle of chemotherapy after roughly one week ago (patient of Dr. Calle). She endorses chronic diarrhea and decreased appetite as well as fatigue. She states her BP has fallen since being diagnosed with pancreatic cancer and starting chemotherapy. In the ED, the patient received a 1.5 L NS bolus and 1 L LR bolus as well as fentanyl and Zofran.     7/5  pos leg pain, very tired, intermittent hypotension, on levophed drip, 560 cc uop  7/7 VSS.  7/8 VSS, decent UO, sCr better. Off pressors. Hypokalemia is new, add supplement, monitor Mg.    Plan of Care:    Acute kidney injury secondary to hemodynamically mediated renal injury. S/p first round of chemo a week ago. Oxaliplatin can cause  a rise in creatinine in 5-10% of cases. Urine sodium < 20 implicates low tubular flow. No casts on urine micro.  --Avoid NSAIDS, Fields II inhibitors, and other non-essential nephrotoxic agents  --Keep mean arterial pressure above 60 and systolic blood pressure above 100 as able to maintain renal perfusion  --Hold losartan, hctz, mobic,   --Urinary catheter in place, consider dc when mobile  --Avoid vancomycin toxicity    Hypotension  --hold antihypertensives, agree with midodrine  --volume resuscitation and pressors PRN  --treat infections    Hyponatremia  Hypokalemia  Hypophosphatemia  Hypothyroidism  DM  --add oral KCL and Phos supplement, monitor and replete Mg, she takes PPI  --no hypotonic iv piggy  bags  --urine osm 318 implicating impaired free water clearance  --push nutrition, control DM    Anemia  --as per heme/onc recommendations  --iron adequate, transfuse as needed    Thank you for allowing us to participate in this patient's care. We will continue to follow.    Vital Signs:  Temp Readings from Last 3 Encounters:   25 97.5 °F (36.4 °C) (Axillary)   25 98 °F (36.7 °C) (Temporal)   25 97.2 °F (36.2 °C)       Pulse Readings from Last 3 Encounters:   25 92   25 107   25 76       BP Readings from Last 3 Encounters:   25 102/71   25 (!) 108/55   25 109/69       Weight:  Wt Readings from Last 3 Encounters:   25 115.6 kg (254 lb 13.6 oz)   25 107.4 kg (236 lb 12.4 oz)   25 110.5 kg (243 lb 8 oz)       Past Medical & Surgical History:  Past Medical History:   Diagnosis Date    Allergy     Anxiety     Asthma     DDD (degenerative disc disease), cervical     Depression     Diabetes mellitus     Fibromyalgia     Hypertension     Neuromuscular disorder     Pancreatic mass     PONV (postoperative nausea and vomiting)     Thyroid disease     hypo       Past Surgical History:   Procedure Laterality Date    ADENOIDECTOMY      CARPAL TUNNEL RELEASE Right      SECTION      ENDOSCOPIC ULTRASOUND OF UPPER GASTROINTESTINAL TRACT N/A 2025    Procedure: ULTRASOUND, UPPER GI TRACT, ENDOSCOPIC;  Surgeon: Austen Grimes III, MD;  Location: [facility];  Service: Endoscopy;  Laterality: N/A;    ERCP N/A 2025    Procedure: ERCP (ENDOSCOPIC RETROGRADE CHOLANGIOPANCREATOGRAPHY);  Surgeon: Thuy Escobar MD;  Location: [facility];  Service: Endoscopy;  Laterality: N/A;    ERCP N/A 2025    Procedure: ERCP (ENDOSCOPIC RETROGRADE CHOLANGIOPANCREATOGRAPHY);  Surgeon: Austen Grimes III, MD;  Location: [facility];  Service: Endoscopy;  Laterality: N/A;    INSERTION OF TUNNELED CENTRAL VENOUS CATHETER (CVC) WITH SUBCUTANEOUS  PORT Left 2025    Procedure: [id number]-PRSL-B-CATH;  Surgeon: Ata Fernandez III, MD;  Location: [facility] OR;  Service: General;  Laterality: Left;    JOINT REPLACEMENT Right 2016    knee    KNEE ARTHROPLASTY Left 10/19/2020    Procedure: ARTHROPLASTY, KNEE;  Surgeon: Felipe Herring MD;  Location: [facility] OR;  Service: Orthopedics;  Laterality: Left;  Louie Liz    ORIF FEMUR FRACTURE Right 2025    Procedure: ORIF, FRACTURE, FEMUR (distal femur ORIF, synthes VA condylar plate, izaiah table, C-arm, triangle);  Surgeon: Osmany Harding MD;  Location: [facility] OR;  Service: Orthopedics;  Laterality: Right;    ROTATOR CUFF REPAIR Right     TONSILLECTOMY      TOTAL KNEE ARTHROPLASTY Right     TUBAL LIGATION         Past Social History:  Social History     Socioeconomic History    Marital status:     Number of children: 3   Occupational History    Occupation: [employer]     Comment: primary sub for [employer]   Tobacco Use    Smoking status: Former     Current packs/day: 0.00     Average packs/day: 1 pack/day for 11.0 years (11.0 ttl pk-yrs)     Types: Cigarettes     Start date:      Quit date:      Years since quittin.5    Smokeless tobacco: Never   Substance and Sexual Activity    Alcohol use: Not Currently     Comment: occasional 2x/y    Drug use: Never    Sexual activity: Yes     Partners: Male     Social Drivers of Health     Financial Resource Strain: Low Risk  (2025)    Overall Financial Resource Strain (CARDIA)     Difficulty of Paying Living Expenses: Not hard at all   Recent Concern: Financial Resource Strain - High Risk (2025)    Overall Financial Resource Strain (CARDIA)     Difficulty of Paying Living Expenses: Hard   Food Insecurity: No Food Insecurity (2025)    Hunger Vital Sign     Worried About Running Out of Food in the Last Year: Never true     Ran Out of Food in the Last Year: Never true   Recent Concern: Food Insecurity - Food Insecurity Present (2025)     Hunger Vital Sign     Worried About Running Out of Food in the Last Year: Sometimes true     Ran Out of Food in the Last Year: Sometimes true   Transportation Needs: No Transportation Needs (7/5/2025)    PRAPARE - Transportation     Lack of Transportation (Medical): No     Lack of Transportation (Non-Medical): No   Recent Concern: Transportation Needs - Unmet Transportation Needs (4/28/2025)    PRAPARE - Transportation     Lack of Transportation (Medical): Yes     Lack of Transportation (Non-Medical): Yes   Physical Activity: Unknown (4/28/2025)    Exercise Vital Sign     Days of Exercise per Week: Patient declined     Minutes of Exercise per Session: 30 min   Stress: No Stress Concern Present (7/5/2025)    [employer] - Occupational Stress Questionnaire     Feeling of Stress : Not at all   Recent Concern: Stress - Stress Concern Present (4/28/2025)    [employer] - Occupational Stress Questionnaire     Feeling of Stress : Very much   Housing Stability: Low Risk  (7/5/2025)    Housing Stability Vital Sign     Unable to Pay for Housing in the Last Year: No     Number of Times Moved in the Last Year: 0     Homeless in the Last Year: No       Medications:  Medications Ordered Prior to Encounter[1]  Scheduled Meds:   alteplase  2 mg Intra-Catheter Once    DULoxetine  60 mg Oral Daily    enoxparin  40 mg Subcutaneous Q12H (prophylaxis, 0900/2100)    filgrastim-sndz  480 mcg Subcutaneous Daily    insulin glargine U-100 (Lantus)  23 Units Subcutaneous QHS    levothyroxine  25 mcg Oral Before breakfast    midodrine  10 mg Oral Q8H    montelukast  10 mg Oral QHS    mupirocin   Nasal BID    pantoprazole  40 mg Oral Daily     Continuous Infusions:      PRN Meds:.  Current Facility-Administered Medications:     0.9%  NaCl infusion (for blood administration), , Intravenous, Q24H PRN    0.9%  NaCl infusion (for blood administration), , Intravenous, Q24H PRN     "acetaminophen, 650 mg, Oral, Q8H PRN    dextrose 50%, 12.5 g, Intravenous, PRN    dextrose 50%, 25 g, Intravenous, PRN    glucagon (human recombinant), 1 mg, Intramuscular, PRN    glucose, 16 g, Oral, PRN    glucose, 24 g, Oral, PRN    HYDROmorphone, 1 mg, Intravenous, Q4H PRN    insulin aspart U-100, 0-10 Units, Subcutaneous, QID (AC + HS) PRN    LORazepam, 0.5 mg, Oral, BID PRN    magnesium oxide, 800 mg, Oral, PRN    magnesium oxide, 800 mg, Oral, PRN    naloxone, 0.02 mg, Intravenous, PRN    oxyCODONE, 5 mg, Oral, Q6H PRN    potassium chloride in water, 40 mEq, Intravenous, PRN **AND** potassium chloride in water, 60 mEq, Intravenous, PRN **AND** potassium chloride in water, 80 mEq, Intravenous, PRN    potassium, sodium phosphates, 2 packet, Oral, PRN    potassium, sodium phosphates, 2 packet, Oral, PRN    potassium, sodium phosphates, 2 packet, Oral, PRN    prochlorperazine, 5 mg, Intravenous, Q6H PRN    sodium chloride 0.9%, 10 mL, Intravenous, PRN    sodium chloride 0.9%, 10 mL, Intravenous, PRN    Pharmacy to dose Vancomycin consult, , , Once **AND** vancomycin - pharmacy to dose, , Intravenous, pharmacy to manage frequency    Allergies:  Erythromycin, Codeine, Lactose, and [place]    Past Family History:  Reviewed; refer to Hospitalist Admission Note    Review of Systems:  Review of Systems - All 14 systems reviewed and negative, except as noted in HPI    Physical Exam:    /71   Pulse 92   Temp 97.5 °F (36.4 °C) (Axillary)   Resp (!) 28   Ht 5' 4" (1.626 m)   Wt 115.6 kg (254 lb 13.6 oz)   SpO2 (!) 82%   Breastfeeding No   BMI 43.75 kg/m²     General Appearance:    Alert, cooperative, no distress, appears stated age   Head:    Normocephalic, without obvious abnormality, atraumatic   Eyes:    PER, conjunctiva/corneas clear, EOM's intact in both eyes        Throat:   Lips, mucosa, and tongue normal; teeth and gums normal   Back:     Symmetric, no curvature, ROM normal, no CVA tenderness   Lungs:  "    Clear to auscultation bilaterally, respirations unlabored   Chest wall:    No tenderness or deformity   Heart:    Regular rate and rhythm, S1 and S2 normal, no murmur, rub   or gallop   Abdomen:     Soft, non-tender, bowel sounds active all four quadrants,     no masses, no organomegaly   Extremities:   Extremities normal, atraumatic, no cyanosis or edema   Pulses:   2+ and symmetric all extremities   MSK:   No joint or muscle swelling, tenderness or deformity   Skin:   Skin color, texture, turgor normal, no rashes or lesions   Neurologic:   CNII-XII intact, normal strength and sensation       Throughout.  No flap     Results:  Recent Labs   Lab 07/06/25  1309 07/07/25  0427 07/08/25  0317   * 139 141   K 3.9 3.7 3.2*    108 107   CO2 14* 21* 26   BUN 25* 28* 29*   CREATININE 1.7* 1.6* 1.4   * 203* 104       Recent Labs   Lab 07/05/25  0326 07/05/25  1640 07/06/25  0344 07/06/25  1309 07/07/25  0427 07/08/25  0317   CALCIUM 7.9*   < > 8.1* 8.2* 8.6* 8.1*   ALBUMIN 2.7*   < > 2.6*  --  2.4* 2.2*   PHOS 3.5  --   --   --  2.6* 1.5*   MG 1.5*   < > 1.5*  --  1.9 1.6    < > = values in this interval not displayed.             Recent Labs   Lab 07/05/25  1743 07/05/25  2145 07/06/25  1518 07/06/25  1542 07/06/25  1817 07/06/25  2203 07/07/25  0749 07/07/25  1635 07/07/25  2048 07/08/25  0721   POCTGLUCOSE 275* 262* 290* 272* 283* 263* 180* 159* 150* 103       Recent Labs   Lab 05/25/24  0951 11/21/24  1049 04/25/25  0751   Hemoglobin A1C 6.4 H 6.1 H  --    Hemoglobin A1c  --   --  10.1 H       Recent Labs   Lab 07/06/25  1309 07/06/25  1725 07/07/25  0427 07/08/25 0317   WBC 0.95*  --  1.44* 4.46   HGB 8.3*  --  7.8* 7.2*   HCT 25.9* 24* 23.7* 22.1*     --  231 256   MCV 83  --  76* 77*   MCHC 32.0  --  32.9 32.6       Recent Labs   Lab 07/06/25  0344 07/07/25 0427 07/08/25 0317   BILITOT 1.2* 1.4* 0.8   PROT 6.0 5.7* 5.1*   ALBUMIN 2.6* 2.4* 2.2*   ALKPHOS 108 103 91   ALT 5* 5* 4*   AST  4* 11 7*       Recent Labs   Lab 03/18/23  0920 05/25/24  0951 08/13/24  1525 04/24/25  1404 04/24/25  1636 07/04/25  1057   Color, UA YELLOW DARK YELLOW  --   --   --   --    Appearance, UA CLEAR CLEAR  --   --  Clear Cloudy A   pH, UA 8.0 6.0 7.0 5.5  --   --    Spec Grav UA  --   --   --   --  >=1.030 A 1.025   Specific Gravity, UA 1.018 1.020  --   --   --   --    Protein, UA 1+ A 1+ A  --   --  Negative 1+ A   Ketones, UA NEGATIVE NEGATIVE  --   --   --   --    Urobilinogen, UA  --   --   --   --  Negative 2.0-3.0 A   Bilirubin, UA  --   --   --   --  1+ A 1+ A   Occult Blood UA NEGATIVE NEGATIVE  --   --   --   --    Nitrite, UA NEGATIVE NEGATIVE  --   --   --   --    RBC, UA  --   --   --   --  1 3   WBC, UA  --   --   --   --  1 15 H   Bacteria, UA  --   --   --   --  Rare  --        Recent Labs   Lab 07/06/25  1020 07/06/25  1725 07/07/25  0310   POC PH 7.054 LL 7.298 L 7.372   POC PCO2 65.9 HH 29.9 L 35.0   POC HCO3 18.4 L 14.6 L 20.3 L   POC PO2 84 76 L 88   POC SATURATED O2 90 94 97   POC BE -12 L -12 L -5 L   Sample ARTERIAL ARTERIAL ARTERIAL       Microbiology Results (last 7 days)       Procedure Component Value Units Date/Time    Blood culture [[id number]]  (Normal) Collected: 07/04/25 1251    Order Status: Completed Specimen: Blood from Peripheral, Hand, Left Updated: 07/07/25 1901     CULTURE, BLOOD ([facility]) No Growth After 72 Hours    Blood culture [[id number]]  (Normal) Collected: 07/04/25 1247    Order Status: Completed Specimen: Blood from Peripheral, Antecubital, Left Updated: 07/07/25 1901     CULTURE, BLOOD ([facility]) No Growth After 72 Hours    Urine culture [[id number]] Collected: 07/04/25 1057    Order Status: Completed Specimen: Urine Updated: 07/07/25 0754     Urine Culture No Growth            Can Bell MD  [facility]  [phone]             [1]   No current facility-administered medications on file prior to encounter.     Current Outpatient Medications on File  Prior to Encounter   Medication Sig Dispense Refill    ALPRAZolam (XANAX) 0.25 MG tablet Take 1 tablet (0.25 mg total) by mouth daily as needed for Anxiety. 30 tablet 0    amLODIPine (NORVASC) 5 MG tablet Take 1 tablet (5 mg total) by mouth once daily. 90 tablet 1    cetirizine (ZYRTEC) 10 MG tablet TAKE 1 TABLET BY MOUTH ONCE DAILY 90 tablet 1    DULoxetine (CYMBALTA) 60 MG capsule TAKE one CAPSULE BY MOUTH EVERY DAY 90 capsule 1    hydroCHLOROthiazide (HYDRODIURIL) 25 MG tablet TAKE one TABLET BY MOUTH ONCE DAILY 90 tablet 1    HYDROcodone-acetaminophen (NORCO) 5-325 mg per tablet Take 1 tablet by mouth every 6 (six) hours as needed. 10 tablet 0    levothyroxine (SYNTHROID) 25 MCG tablet TAKE one TABLET BY MOUTH BEFORE breakfast 90 tablet 1    losartan (COZAAR) 100 MG tablet TAKE one TABLET BY MOUTH ONCE DAILY 90 tablet 0    magnesium oxide (MAGOX) 400 mg (241.3 mg magnesium) tablet Take 1 tablet (400 mg total) by mouth once daily. 200 tablet 1    metoprolol succinate (TOPROL-XL) 25 MG 24 hr tablet Take 1 tablet (25 mg total) by mouth once daily. 90 tablet 1    montelukast (SINGULAIR) 10 mg tablet TAKE ONE TABLET BY MOUTH EVERY EVENING 90 tablet 1    OLANZapine (ZYPREXA) 5 MG tablet Take 1 tablet by mouth nightly on days 1-3 of each chemotherapy cycle. 3 tablet 11    ondansetron (ZOFRAN) 8 MG tablet Take 1 tablet (8 mg total) by mouth every 8 (eight) hours as needed. 30 tablet 2    prochlorperazine (COMPAZINE) 10 MG tablet Take 1 tablet (10 mg total) by mouth every 6 (six) hours as needed. 30 tablet 1    albuterol (VENTOLIN HFA) 90 mcg/actuation inhaler Inhale 2 puffs into the lungs every 6 (six) hours as needed for Wheezing or Shortness of Breath. Rescue 18 g 1    blood sugar diagnostic Strp Use 1 strip to check blood sugar 2 times daily 100 each 5    blood-glucose meter kit Use as instructed 1 each 0    insulin glargine U-100, Lantus, 100 unit/mL (3 mL) SubQ InPn pen Inject 21 Units into the skin every evening.  "9 mL 3    lancets (LANCETS,THIN) Misc 1 each by Misc.(Non-Drug; Combo Route) route 3 (three) times daily. 90 each 0    LIDOcaine-prilocaine (EMLA) cream Apply topically as needed. 30 g 0    loperamide (IMODIUM) 2 mg capsule Take 2 tablets (4mg) by mouth after first loose stool, 1 tablet every 2 hours until diarrhea free for 12 hours. May take 2 tablets (4mg) by mouth every 4 hours at night. May require more than the package labeling maximum dose of 16mg/day. 30 capsule 11    LORazepam (ATIVAN) 0.5 MG tablet Take 1 tablet (0.5 mg total) by mouth 2 (two) times daily. 60 tablet 0    meloxicam (MOBIC) 15 MG tablet Take 15 mg by mouth once daily. 1/2 tablet am      metronidazole 0.75% (METROCREAM) 0.75 % Crea Apply 1 application  topically 2 (two) times daily.      pen needle, diabetic 32 gauge x 5/32" Ndle 1 Needle by Misc.(Non-Drug; Combo Route) route once daily. 100 each 2     "

## 2025-07-08 NOTE — PLAN OF CARE
SW spoke with Spouse to review discharge recommendation of IPR and is agreeable to plan    Patient/family provided list of facilities in-network with patient's payor plan. Providers that are owned, operated, or affiliated with [facility] are included on the list.     Notified that referral sent to below listed facilities from in-network list based on proximity to home/family support:   No Preference   2.  3.  4.  5. (can send more than 5)    Patient/family instructed to identify preference.    Preferred Facility: (if more than 1, listed in order of descending preference)  1.  OR  Patient has declined to select a preferred provider and elects placement with the first accepting in network provider that is available to provide services as ordered by the physician       If an additional preferred facility not listed above is identified, additional referral to be sent. If above facilities unable to accept, will send additional referrals to in-network providers.    07/08/25 1454   Post-Acute Status   Post-Acute Authorization Placement   Post-Acute Placement Status Referrals Sent   Discharge Delays None known at this time   Discharge Plan   Discharge Plan A Rehab   Discharge Plan B Home with family

## 2025-07-08 NOTE — ASSESSMENT & PLAN NOTE
-status post Chemotherapy roughly one week ago.  -monitor WBC= improved  -neutropenic precautions   -urine culture on 07/04/2025 = no growth till date (UTI ruled out)  -blood cultures x2 on 07/04/2025 = remains negative till date  -filgrastim 458 mg IV daily x5 days.  Ordered on 07/04/2025.  (per Heme-Onc continue filgrastim until an ANC >1000 for 2 days)  -DC cefepime 1 g IV b.i.d. empirically.  Ordered on 07/04/2025.  DC on 07/08/2025 ( urine and blood cultures remain negative)  -Heme-Onc consulted = follow recommendations

## 2025-07-08 NOTE — PLAN OF CARE
URI spoke to spouse/Josué via phone [phone], to discuss therapy recommendation. URI informed the spouse that unfortunately due to the patients insurance, SNF is not a covered service and due to the patient wanting to continue her chemo treatment she would not be a good candidate for SNF.    Spouse was agreeable to outpatient PT/OT.     07/08/25 0921   Post-Acute Status   Post-Acute Authorization Placement   Post-Acute Placement Status Discharge Plan Changed   Discharge Delays None known at this time   Discharge Plan   Discharge Plan A Home with family   Discharge Plan B Home with family

## 2025-07-08 NOTE — PT/OT/SLP PROGRESS
Occupational Therapy   Treatment    Name: Sonia Muse  MRN: [id number]  Admitting Diagnosis:  Closed fracture of distal end of right femur  2 Days Post-Op    Recommendations:     Discharge Recommendations: Moderate Intensity Therapy  Discharge Equipment Recommendations:  to be determined by next level of care  Barriers to discharge:  Other (Comment) (increased assistance with ADLs)    Assessment:     Sonia Muse is a 65 y.o. female with a medical diagnosis of Closed fracture of distal end of right femur.  Performance deficits affecting function are weakness, impaired endurance, impaired self care skills, impaired functional mobility, gait instability, impaired balance, decreased coordination, decreased upper extremity function, decreased lower extremity function, pain, decreased ROM.     Rehab Prognosis:  Fair; patient would benefit from acute skilled OT services to address these deficits and reach maximum level of function.       Plan:     Patient to be seen 5 x/week to address the above listed problems via self-care/home management, therapeutic activities, therapeutic exercises  Plan of Care Expires: 08/07/25  Plan of Care Reviewed with: patient    Subjective     Chief Complaint: none stated  Patient/Family Comments/goals: none stated  Pain/Comfort:  Pain Rating 1: 4/10  Location - Side 1: Right  Location - Orientation 1: lower  Location 1: leg  Pain Addressed 1: Reposition, Distraction, Cessation of Activity, Nurse notified  Pain Rating Post-Intervention 1: 3/10    Objective:     Communicated with: nurse prior to session.  Patient found HOB elevated with PureWick, peripheral IV, telemetry, oxygen, pulse ox (continuous), bed alarm upon OT entry to room.    General Precautions: Standard, aspiration, airborne, contact, respiratory    Orthopedic Precautions:RLE partial weight bearing (25%)  Braces: Knee immobilizer  Respiratory Status: Nasal cannula, flow 3 L/min     Occupational Performance:     Bed  Mobility:    Patient completed Scooting/Bridging with maximal assistance and 2 persons  Patient completed Supine to Sit with moderate assistance and 2 persons  Patient completed Sit to Supine with moderate assistance and 2 persons       Activities of Daily Living:  Grooming: minimum assistance for oral care sitting supported EOB with elbows on tray top.Pt required extended time to complete ADL and pt required Min A from OT to support forearm up to mouth secondary to weakness.  Lower Body Dressing: maximal assistance sock management at bed level      [facility] 6 Click ADL: 15    Treatment & Education:  -Pt educated on role of OT and POC, use of call light to assist with any needs/transfers, importance of EOB/OOB activities to help negate the negative impact of prolonged hospital stay.   -During g/h pt observed having difficulty maintaining grasp on toothbrush and observed having transient arm weakness which required assistance from OT as described above.   -Pt required multiple >3 VC to open eyes to see what they are doing and who they are working with.     Patient left HOB elevated with all lines intact, call button in reach, bed alarm on, and nurse notified    GOALS:   Multidisciplinary Problems       Occupational Therapy Goals          Problem: Occupational Therapy    Goal Priority Disciplines Outcome Interventions   Occupational Therapy Goal     OT, PT/OT     Description: Goals to be met by: 8/7/25     Patient will increase functional independence with ADLs by performing:    UE Dressing with [place].  LE Dressing with [place].  Grooming while standing at sink with [place].  Toileting from toilet with [place] for hygiene and clothing management.   Bathing from  shower chair/bench with Modified [place] with use of shower chair.  Toilet transfer to toilet with [place].                         Time Tracking:     OT Date of Treatment: 07/08/25  OT Start Time: 1106  OT Stop Time: 1145  OT  Total Time (min): 39 min    Billable Minutes:Self Care/Home Management 39    OT/JOHN PAUL: OT          7/8/2025

## 2025-07-08 NOTE — ASSESSMENT & PLAN NOTE
Secondary to combination of anemia and the use of IV hydromorphone for pain.  -monitor blood pressure = stable range currently   -maintain goal blood pressure map > 65  -midodrine 10 mg p.o. q.8 hours  -DC IV fluids at 100 cc an hour  -remains off of Levophed IV to help maintain goal map

## 2025-07-08 NOTE — PROGRESS NOTES
KATY Muse is a 65 year old female with a past medical history of a locally advanced pancreatic cancer, anemia, obesity, DM, HTN, and hypothyroidism who presented with a right femur fracture after a fall, MIGUEL ANGEL, hyponatremia and neutropenia.   She recently completed her first cycle of chemotherapy . She was not able to receive her second one due to prolonged myelosuppression.     She endorses chronic diarrhea and decreased appetite as well as fatigue. The patient denies CP, cough, SOB, abdominal pain, nausea, vomiting, constipation.  The patient denies fever, chills, night sweats, weight loss, new lumps or bumps, easy bruising or bleeding.     In the ED, the patient received a 1.5 L NS bolus and 1 L LR bolus as well as fentanyl and Zofran. Oncology was consulted in view of her severe neutropenia.       Past Medical History:   Diagnosis Date    Allergy     Anxiety     Asthma     DDD (degenerative disc disease), cervical     Depression     Diabetes mellitus     Fibromyalgia     Hypertension     Neuromuscular disorder     Pancreatic mass     PONV (postoperative nausea and vomiting)     Thyroid disease     hypo     Past Surgical History:   Procedure Laterality Date    ADENOIDECTOMY      CARPAL TUNNEL RELEASE Right      SECTION      ENDOSCOPIC ULTRASOUND OF UPPER GASTROINTESTINAL TRACT N/A 2025    Procedure: ULTRASOUND, UPPER GI TRACT, ENDOSCOPIC;  Surgeon: Austen Grimes III, MD;  Location: [facility];  Service: Endoscopy;  Laterality: N/A;    ERCP N/A 2025    Procedure: ERCP (ENDOSCOPIC RETROGRADE CHOLANGIOPANCREATOGRAPHY);  Surgeon: Thuy Escobar MD;  Location: [facility] ENDO;  Service: Endoscopy;  Laterality: N/A;    ERCP N/A 2025    Procedure: ERCP (ENDOSCOPIC RETROGRADE CHOLANGIOPANCREATOGRAPHY);  Surgeon: Austen Grimes III, MD;  Location: [facility] ENDO;  Service: Endoscopy;  Laterality: N/A;    INSERTION OF TUNNELED CENTRAL VENOUS CATHETER (CVC) WITH SUBCUTANEOUS  PORT Left 2025    Procedure: [id number];  Surgeon: Ata Fernandez III, MD;  Location: [facility] OR;  Service: General;  Laterality: Left;    JOINT REPLACEMENT Right 2016    knee    KNEE ARTHROPLASTY Left 10/19/2020    Procedure: ARTHROPLASTY, KNEE;  Surgeon: Felipe Herring MD;  Location: [facility] OR;  Service: Orthopedics;  Laterality: Left;  Louie Liz    ORIF FEMUR FRACTURE Right 2025    Procedure: ORIF, FRACTURE, FEMUR (distal femur ORIF, synthes VA condylar plate, izaiah table, C-arm, triangle);  Surgeon: Osmany Harding MD;  Location: [facility] OR;  Service: Orthopedics;  Laterality: Right;    ROTATOR CUFF REPAIR Right     TONSILLECTOMY      TOTAL KNEE ARTHROPLASTY Right     TUBAL LIGATION       Social History     Socioeconomic History    Marital status:     Number of children: 3   Occupational History    Occupation: [employer]     Comment: primary sub for [employer]   Tobacco Use    Smoking status: Former     Current packs/day: 0.00     Average packs/day: 1 pack/day for 11.0 years (11.0 ttl pk-yrs)     Types: Cigarettes     Start date:      Quit date:      Years since quittin.5    Smokeless tobacco: Never   Substance and Sexual Activity    Alcohol use: Not Currently     Comment: occasional 2x/y    Drug use: Never    Sexual activity: Yes     Partners: Male     Social Drivers of Health     Financial Resource Strain: Low Risk  (2025)    Overall Financial Resource Strain (CARDIA)     Difficulty of Paying Living Expenses: Not hard at all   Recent Concern: Financial Resource Strain - High Risk (2025)    Overall Financial Resource Strain (CARDIA)     Difficulty of Paying Living Expenses: Hard   Food Insecurity: No Food Insecurity (2025)    Hunger Vital Sign     Worried About Running Out of Food in the Last Year: Never true     Ran Out of Food in the Last Year: Never true   Recent Concern: Food Insecurity - Food Insecurity Present (2025)    Hunger Vital Sign      Worried About Running Out of Food in the Last Year: Sometimes true     Ran Out of Food in the Last Year: Sometimes true   Transportation Needs: No Transportation Needs (7/5/2025)    PRAPARE - Transportation     Lack of Transportation (Medical): No     Lack of Transportation (Non-Medical): No   Recent Concern: Transportation Needs - Unmet Transportation Needs (4/28/2025)    PRAPARE - Transportation     Lack of Transportation (Medical): Yes     Lack of Transportation (Non-Medical): Yes   Physical Activity: Unknown (4/28/2025)    Exercise Vital Sign     Days of Exercise per Week: Patient declined     Minutes of Exercise per Session: 30 min   Stress: No Stress Concern Present (7/5/2025)    [place] [employer] - Occupational Stress Questionnaire     Feeling of Stress : Not at all   Recent Concern: Stress - Stress Concern Present (4/28/2025)    [place] [employer] - Occupational Stress Questionnaire     Feeling of Stress : Very much   Housing Stability: Low Risk  (7/5/2025)    Housing Stability Vital Sign     Unable to Pay for Housing in the Last Year: No     Number of Times Moved in the Last Year: 0     Homeless in the Last Year: No     Review of patient's allergies indicates:   Allergen Reactions    Erythromycin Swelling    Codeine Nausea And Vomiting     And migraine h/a    Lactose     [place] Blisters     Physical exam  Vitals:    07/08/25 0715   BP: 102/71   Pulse: 92   Resp: (!) 28   Temp: 97.5 °F (36.4 °C)     Patient does not know where she is physical exam suggestive of delirium.  She is awake alert to herself.  CT head is negative for any acute bleeding.  Normal respiratory effort  Normal rate  normocephalic      Lab Results   Component Value Date    WBC 4.46 07/08/2025    HGB 7.2 (L) 07/08/2025    HCT 22.1 (L) 07/08/2025    MCV 77 (L) 07/08/2025     07/08/2025       CMP  Sodium   Date Value Ref Range Status   07/08/2025 141 136 - 145 mmol/L Final     Potassium    Date Value Ref Range Status   07/08/2025 3.2 (L) 3.5 - 5.1 mmol/L Final     Chloride   Date Value Ref Range Status   07/08/2025 107 95 - 110 mmol/L Final     CO2   Date Value Ref Range Status   07/08/2025 26 23 - 29 mmol/L Final     Glucose   Date Value Ref Range Status   07/08/2025 104 70 - 110 mg/dL Final     BUN   Date Value Ref Range Status   07/08/2025 29 (H) 8 - 23 mg/dL Final     Creatinine   Date Value Ref Range Status   07/08/2025 1.4 0.5 - 1.4 mg/dL Final     Calcium   Date Value Ref Range Status   07/08/2025 8.1 (L) 8.7 - 10.5 mg/dL Final     Protein Total   Date Value Ref Range Status   07/08/2025 5.1 (L) 6.0 - 8.4 gm/dL Final     Albumin   Date Value Ref Range Status   07/08/2025 2.2 (L) 3.5 - 5.2 g/dL Final     Bilirubin Total   Date Value Ref Range Status   07/08/2025 0.8 0.1 - 1.0 mg/dL Final     Comment:     For infants and newborns, interpretation of results should be based   on gestational age, weight and in agreement with clinical   observations.    Premature Infant recommended reference ranges:   0-24 hours:  <8.0 mg/dL   24-48 hours: <12.0 mg/dL   3-5 days:    <15.0 mg/dL   6-29 days:   <15.0 mg/dL     ALP   Date Value Ref Range Status   07/08/2025 91 55 - 135 unit/L Final     AST   Date Value Ref Range Status   07/08/2025 7 (L) 10 - 40 unit/L Final     ALT   Date Value Ref Range Status   07/08/2025 4 (L) 10 - 44 unit/L Final     Anion Gap   Date Value Ref Range Status   07/08/2025 8 8 - 16 mmol/L Final     eGFR   Date Value Ref Range Status   07/08/2025 42 (L) >60 mL/min/1.73/m2 Final   11/21/2024 70 > OR = 60 mL/min/1.73m2 Final     Ct head    Impression:     No CT evidence of acute intracranial abnormality. If the patient has an acute, focal neurological deficit, MRI of the brain may be indicated.      Assessment recommendation     Closed fracture of distal end of right femur  She is s/p Open reduction internal fixation right periprosthetic distal femur fracture and Intramedullary nailing  right femur  NWB RLE  Fall precautions  PRN analgesics  Ortho consulted and following     Pancreatic cancer  Currently on neoadjuvant treatment with mFOLFIRONOX.  Her systemic therapy will be put on hold awaiting her neutropenia to resolved.  She will follow-up with Dr Calle upon her discharge     Chemotherapy-induced neutropenia  WBC today 0.85 -> 1.44 -> 4.46  Hold GCSF  Trend CBC with diff     Anemia in neoplastic disease  Multifactorial due to possible bleeding s/p fall as well due to her systemic chemotherapy  Trend H/H daily   Transfuse if Hgb is less than 7 g/dl  Iron studies is reflecting anemia of chronic disease.  Hgb 7.2 g/dl today     MIGUEL ANGEL (acute kidney injury)  Likely pre-renal given diarrhea and decreased appetite with low BP.  Start IV fluids  Nephrology consulted  Avoid nephrotoxic agents  Monitor UOP and electrolytes  F/up BMP        Abnormal LFTs   TB: 0.8 today resolved   Most likely due to her pancreatic cancer, polypharmacy and sepsis.  Continue to monitor.     Acute cystitis  On cefipime

## 2025-07-08 NOTE — HPI
7/8/2025    Identifying information  65 years old white female, admitted to [facility] on 07/04/2025 on formal voluntary admission.  Patient lives at [address] [place] [address] a residential telephone number is [phone].  Information obtained from medical record from the patient and discussed with the attending physician.    History of present problems  It is rated led patient had a fall in the process she landed alone both her knees the radiological workup showed communicated displaced prosthetic right distal femur fracture, Patient was found to be confused and delirious emotional and thoughts disorganized she was hypotensive as her vital signs indicated.   Patient herself did not volunteer much information, except that she was very tired, that she wanted to sleep and that she was thirsty- as I gave her a glass of water patient opened her eyes looked at me intently and very graciously thanked me,.  Patient knows that she is at [facility] and the this is month of July.  She did not volunteer any other information.  Patient is reported to be having alteration of consciousness she was reported to be much more alert and oriented this morning as per Dr. Freire.     Past psychiatric history  Patient seems to have history of depression and she is on Cymbalta 60 mg once a day.  She also gets Zyprexa 5 mg for 3 days during her chemotherapy cycle.     Family history  Not available    Social history  Patient is , much of the information in the chart as given by him.  Patient quit smoking a while ago and she no longer drinks.  Patient's granddaughter works as an RN in ICU in our hospital.     Medical surgical history  Pancreatic cancer, hypertension, diabetes, fibromyalgia, hypothyroidism, sleep apnea, ORIF, MIGUEL ANGEL,    Allergies  Erythromycin, codeine, walnut, lactose    Current psychotropics  Cymbalta 60 mg p.o. q.d., Zyprexa 5 mg p.o. q.d. for 3 days during chemotherapy  cycle    Mental status examination  65-year-old white female, overweight female, who kept her eyes closed, made very brief statements, low volume speech, thought processes were limited to yes and no answers, she is sedated with flat affect, mood profoundly sad, there is no evidence of any auditory or visual hallucinations, she denied any homicide or suicide intent, she seems oriented to the hospital and to the month, patient's insight and judgment seems to be limited.    Impression  1. Episodes of delirium superimposed on probable dementia of multifactorial etiologies.  2. Major depression recurrent with psychosis  3. Cognitive impairment-multifactorial in nature    Recommendations   If okay with the attendings  1. DC Zyprexa(patient does diabetic), DC Cymbalta  2. Cymbalta 30 mg p.o. q.a.m. and 60 mg p.o. q.h.s.  3. Risperdal 0.5 mg p.o. Q 8 hours to resolve the delirium.  4. Please obtain B12, folate, D3, magnesium, levels in the a.m..   Please reconsult as needed  Thank you Dr. Freire

## 2025-07-08 NOTE — SUBJECTIVE & OBJECTIVE
Interval History:  Patient awake alert and in no acute distress.  Remains afebrile.  Denies chest pain shortness for breath nausea vomiting abdominal pain.  Appears more comfortable today.  Not emotional today currently.  Moves all extremities to command but weakly.  Urinating well spontaneously.  Blood pressure currently in stable range.  Slept with CPAP for half the night but then took it off per nurse.  Right lower extremity remains in brace.  Right leg pain appears controlled.  Nurse reports that patient has been off of Levophed since yesterday evening.  Has some increasing edema to lower extremities bilaterally.  Bicarb has normalized.  Renal function has improved well.  We will DC IV fluids.  Case and plan discussed with patient's nurse Zachary.  We will consider downgrade to tele this afternoon if patient remains stable    Review of Systems   Constitutional:  Negative for activity change, fatigue and fever.   Respiratory:  Negative for cough and shortness of breath.    Cardiovascular:  Negative for chest pain and leg swelling.   Gastrointestinal:  Negative for abdominal pain, nausea and vomiting.   Musculoskeletal:  Positive for myalgias.   Skin: Negative.    Neurological: Negative.         Patient does have generalized weakness    Psychiatric/Behavioral: Negative.     All other systems reviewed and are negative.    Objective:     Vital Signs (Most Recent):  Temp: 97.5 °F (36.4 °C) (07/08/25 0715)  Pulse: 92 (07/08/25 0715)  Resp: (!) 28 (07/08/25 0715)  BP: 102/71 (07/08/25 0715)  SpO2: (!) 82 % (07/08/25 0715) Vital Signs (24h Range):  Temp:  [97.4 °F (36.3 °C)-98.4 °F (36.9 °C)] 97.5 °F (36.4 °C)  Pulse:  [] 92  Resp:  [7-47] 28  SpO2:  [79 %-98 %] 82 %  BP: ()/(46-74) 102/71  Arterial Line BP: (75-98)/(43-74) 77/72     Weight: 115.6 kg (254 lb 13.6 oz)  Body mass index is 43.75 kg/m².    Intake/Output Summary (Last 24 hours) at 7/8/2025 0850  Last data filed at 7/8/2025 0601  Gross per 24 hour    Intake 3586.78 ml   Output 550 ml   Net 3036.78 ml         Physical Exam  Constitutional:       General: She is not in acute distress.     Appearance: She is obese.   HENT:      Head: Normocephalic and atraumatic.      Nose: Nose normal.      Mouth/Throat:      Mouth: Mucous membranes are moist.   Cardiovascular:      Rate and Rhythm: Normal rate and regular rhythm.      Pulses: Normal pulses.      Heart sounds: Normal heart sounds.   Pulmonary:      Effort: Pulmonary effort is normal.      Breath sounds: Normal breath sounds.   Abdominal:      General: Bowel sounds are normal. There is no distension.      Tenderness: There is no abdominal tenderness. There is no guarding.   Musculoskeletal:      Comments: Bilateral lower extremity 1+ edema.   Skin:     General: Skin is warm and dry.   Neurological:      General: No focal deficit present.      Mental Status: She is alert and oriented to person, place, and time.      Comments: Decreased range of motion right lower extremity due to right leg brace   Psychiatric:         Mood and Affect: Mood normal.               Significant Labs: All pertinent labs within the past 24 hours have been reviewed.    Significant Imaging: I have reviewed all pertinent imaging results/findings within the past 24 hours.
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
## 2025-07-09 LAB
25(OH)D3+25(OH)D2 SERPL-MCNC: 27 NG/ML (ref 30–96)
ALBUMIN SERPL-MCNC: 2.3 G/DL (ref 3.5–5.2)
ALP SERPL-CCNC: 100 UNIT/L (ref 55–135)
ALT SERPL-CCNC: 4 UNIT/L (ref 10–44)
ANION GAP (SMH): 8 MMOL/L (ref 8–16)
AST SERPL-CCNC: 7 UNIT/L (ref 10–40)
BACTERIA BLD CULT: NORMAL
BACTERIA BLD CULT: NORMAL
BILIRUB SERPL-MCNC: 0.7 MG/DL (ref 0.1–1)
BUN SERPL-MCNC: 29 MG/DL (ref 8–23)
CALCIUM SERPL-MCNC: 8.1 MG/DL (ref 8.7–10.5)
CHLORIDE SERPL-SCNC: 107 MMOL/L (ref 95–110)
CO2 SERPL-SCNC: 27 MMOL/L (ref 23–29)
CREAT SERPL-MCNC: 1.3 MG/DL (ref 0.5–1.4)
ERYTHROCYTE [DISTWIDTH] IN BLOOD BY AUTOMATED COUNT: 15.9 % (ref 11.5–14.5)
FOLATE SERPL-MCNC: >22.3 NG/ML (ref 4–24)
GFR SERPLBLD CREATININE-BSD FMLA CKD-EPI: 46 ML/MIN/1.73/M2
GLUCOSE SERPL-MCNC: 73 MG/DL (ref 70–110)
HCT VFR BLD AUTO: 23 % (ref 37–48.5)
HGB BLD-MCNC: 7.2 GM/DL (ref 12–16)
LACTATE SERPL-SCNC: 1.6 MMOL/L (ref 0.5–1.9)
LACTATE SERPL-SCNC: 1.6 MMOL/L (ref 0.5–1.9)
MAGNESIUM SERPL-MCNC: 1.7 MG/DL (ref 1.6–2.6)
MCH RBC QN AUTO: 25 PG (ref 27–31)
MCHC RBC AUTO-ENTMCNC: 31.3 G/DL (ref 32–36)
MCV RBC AUTO: 80 FL (ref 82–98)
OB PNL STL: NEGATIVE
PHOSPHATE SERPL-MCNC: 1.4 MG/DL (ref 2.7–4.5)
PLATELET # BLD AUTO: 263 K/UL (ref 150–450)
PMV BLD AUTO: 9.8 FL (ref 9.2–12.9)
POCT GLUCOSE: 105 MG/DL (ref 70–110)
POCT GLUCOSE: 59 MG/DL (ref 70–110)
POCT GLUCOSE: 60 MG/DL (ref 70–110)
POCT GLUCOSE: 61 MG/DL (ref 70–110)
POCT GLUCOSE: 64 MG/DL (ref 70–110)
POCT GLUCOSE: 64 MG/DL (ref 70–110)
POCT GLUCOSE: 71 MG/DL (ref 70–110)
POCT GLUCOSE: 81 MG/DL (ref 70–110)
POCT GLUCOSE: 90 MG/DL (ref 70–110)
POCT GLUCOSE: 96 MG/DL (ref 70–110)
POTASSIUM SERPL-SCNC: 3.7 MMOL/L (ref 3.5–5.1)
PROT SERPL-MCNC: 5.1 GM/DL (ref 6–8.4)
RBC # BLD AUTO: 2.88 M/UL (ref 4–5.4)
SODIUM SERPL-SCNC: 142 MMOL/L (ref 136–145)
VIT B12 SERPL-MCNC: >1500 PG/ML (ref 210–950)
WBC # BLD AUTO: 10.71 K/UL (ref 3.9–12.7)

## 2025-07-09 PROCEDURE — 25000003 PHARM REV CODE 250: Performed by: ORTHOPAEDIC SURGERY

## 2025-07-09 PROCEDURE — 99233 SBSQ HOSP IP/OBS HIGH 50: CPT | Mod: ,,, | Performed by: INTERNAL MEDICINE

## 2025-07-09 PROCEDURE — 25000003 PHARM REV CODE 250: Performed by: INTERNAL MEDICINE

## 2025-07-09 PROCEDURE — 97530 THERAPEUTIC ACTIVITIES: CPT | Mod: CQ

## 2025-07-09 PROCEDURE — 83735 ASSAY OF MAGNESIUM: CPT | Performed by: ORTHOPAEDIC SURGERY

## 2025-07-09 PROCEDURE — 82607 VITAMIN B-12: CPT | Performed by: INTERNAL MEDICINE

## 2025-07-09 PROCEDURE — 99233 SBSQ HOSP IP/OBS HIGH 50: CPT | Mod: ,,, | Performed by: NURSE PRACTITIONER

## 2025-07-09 PROCEDURE — 99900031 HC PATIENT EDUCATION (STAT)

## 2025-07-09 PROCEDURE — 36410 VNPNXR 3YR/> PHY/QHP DX/THER: CPT

## 2025-07-09 PROCEDURE — 82746 ASSAY OF FOLIC ACID SERUM: CPT | Performed by: INTERNAL MEDICINE

## 2025-07-09 PROCEDURE — 63600175 PHARM REV CODE 636 W HCPCS: Performed by: INTERNAL MEDICINE

## 2025-07-09 PROCEDURE — 27000207 HC ISOLATION

## 2025-07-09 PROCEDURE — 25000003 PHARM REV CODE 250: Performed by: STUDENT IN AN ORGANIZED HEALTH CARE EDUCATION/TRAINING PROGRAM

## 2025-07-09 PROCEDURE — 27000221 HC OXYGEN, UP TO 24 HOURS

## 2025-07-09 PROCEDURE — 84100 ASSAY OF PHOSPHORUS: CPT | Performed by: INTERNAL MEDICINE

## 2025-07-09 PROCEDURE — 82306 VITAMIN D 25 HYDROXY: CPT | Performed by: INTERNAL MEDICINE

## 2025-07-09 PROCEDURE — 99900035 HC TECH TIME PER 15 MIN (STAT)

## 2025-07-09 PROCEDURE — 63600175 PHARM REV CODE 636 W HCPCS: Performed by: STUDENT IN AN ORGANIZED HEALTH CARE EDUCATION/TRAINING PROGRAM

## 2025-07-09 PROCEDURE — 94761 N-INVAS EAR/PLS OXIMETRY MLT: CPT

## 2025-07-09 PROCEDURE — P9047 ALBUMIN (HUMAN), 25%, 50ML: HCPCS | Performed by: STUDENT IN AN ORGANIZED HEALTH CARE EDUCATION/TRAINING PROGRAM

## 2025-07-09 PROCEDURE — 94660 CPAP INITIATION&MGMT: CPT

## 2025-07-09 PROCEDURE — 80053 COMPREHEN METABOLIC PANEL: CPT | Performed by: ORTHOPAEDIC SURGERY

## 2025-07-09 PROCEDURE — 83605 ASSAY OF LACTIC ACID: CPT | Performed by: INTERNAL MEDICINE

## 2025-07-09 PROCEDURE — 97535 SELF CARE MNGMENT TRAINING: CPT

## 2025-07-09 PROCEDURE — 97110 THERAPEUTIC EXERCISES: CPT | Mod: CQ

## 2025-07-09 PROCEDURE — 82272 OCCULT BLD FECES 1-3 TESTS: CPT | Performed by: ORTHOPAEDIC SURGERY

## 2025-07-09 PROCEDURE — 85027 COMPLETE CBC AUTOMATED: CPT | Performed by: INTERNAL MEDICINE

## 2025-07-09 PROCEDURE — 11000001 HC ACUTE MED/SURG PRIVATE ROOM

## 2025-07-09 PROCEDURE — 63600175 PHARM REV CODE 636 W HCPCS: Mod: JZ | Performed by: ORTHOPAEDIC SURGERY

## 2025-07-09 RX ORDER — ALBUMIN HUMAN 250 G/1000ML
25 SOLUTION INTRAVENOUS ONCE
Status: COMPLETED | OUTPATIENT
Start: 2025-07-09 | End: 2025-07-09

## 2025-07-09 RX ORDER — RISPERIDONE 0.25 MG/1
0.5 TABLET ORAL 3 TIMES DAILY
Status: DISCONTINUED | OUTPATIENT
Start: 2025-07-09 | End: 2025-07-11 | Stop reason: HOSPADM

## 2025-07-09 RX ORDER — DULOXETIN HYDROCHLORIDE 30 MG/1
60 CAPSULE, DELAYED RELEASE ORAL NIGHTLY
Status: DISCONTINUED | OUTPATIENT
Start: 2025-07-10 | End: 2025-07-11 | Stop reason: HOSPADM

## 2025-07-09 RX ORDER — DULOXETIN HYDROCHLORIDE 30 MG/1
30 CAPSULE, DELAYED RELEASE ORAL DAILY
Status: DISCONTINUED | OUTPATIENT
Start: 2025-07-10 | End: 2025-07-11 | Stop reason: HOSPADM

## 2025-07-09 RX ORDER — INSULIN GLARGINE 100 [IU]/ML
20 INJECTION, SOLUTION SUBCUTANEOUS NIGHTLY
Status: DISCONTINUED | OUTPATIENT
Start: 2025-07-09 | End: 2025-07-11 | Stop reason: HOSPADM

## 2025-07-09 RX ADMIN — Medication 16 G: at 11:07

## 2025-07-09 RX ADMIN — MONTELUKAST 10 MG: 10 TABLET, FILM COATED ORAL at 08:07

## 2025-07-09 RX ADMIN — POTASSIUM & SODIUM PHOSPHATES POWDER PACK 280-160-250 MG 1 PACKET: 280-160-250 PACK at 02:07

## 2025-07-09 RX ADMIN — DEXTROSE MONOHYDRATE 12.5 G: 25 INJECTION, SOLUTION INTRAVENOUS at 12:07

## 2025-07-09 RX ADMIN — ENOXAPARIN SODIUM 40 MG: 40 INJECTION SUBCUTANEOUS at 08:07

## 2025-07-09 RX ADMIN — POTASSIUM CHLORIDE 10 MEQ: 750 CAPSULE, EXTENDED RELEASE ORAL at 08:07

## 2025-07-09 RX ADMIN — POTASSIUM & SODIUM PHOSPHATES POWDER PACK 280-160-250 MG 1 PACKET: 280-160-250 PACK at 10:07

## 2025-07-09 RX ADMIN — MIDODRINE HYDROCHLORIDE 10 MG: 5 TABLET ORAL at 09:07

## 2025-07-09 RX ADMIN — LEVOTHYROXINE SODIUM 25 MCG: 0.03 TABLET ORAL at 05:07

## 2025-07-09 RX ADMIN — Medication 800 MG: at 05:07

## 2025-07-09 RX ADMIN — MUPIROCIN 1 G: 20 OINTMENT TOPICAL at 09:07

## 2025-07-09 RX ADMIN — POTASSIUM CHLORIDE 10 MEQ: 750 CAPSULE, EXTENDED RELEASE ORAL at 09:07

## 2025-07-09 RX ADMIN — POTASSIUM & SODIUM PHOSPHATES POWDER PACK 280-160-250 MG 1 PACKET: 280-160-250 PACK at 05:07

## 2025-07-09 RX ADMIN — ENOXAPARIN SODIUM 40 MG: 40 INJECTION SUBCUTANEOUS at 09:07

## 2025-07-09 RX ADMIN — PANTOPRAZOLE SODIUM 40 MG: 40 TABLET, DELAYED RELEASE ORAL at 05:07

## 2025-07-09 RX ADMIN — RISPERIDONE 0.5 MG: 0.25 TABLET, FILM COATED ORAL at 02:07

## 2025-07-09 RX ADMIN — MIDODRINE HYDROCHLORIDE 10 MG: 5 TABLET ORAL at 05:07

## 2025-07-09 RX ADMIN — MIDODRINE HYDROCHLORIDE 10 MG: 5 TABLET ORAL at 02:07

## 2025-07-09 RX ADMIN — DEXTROSE MONOHYDRATE 12.5 G: 25 INJECTION, SOLUTION INTRAVENOUS at 03:07

## 2025-07-09 RX ADMIN — MUPIROCIN 1 G: 20 OINTMENT TOPICAL at 08:07

## 2025-07-09 RX ADMIN — POTASSIUM CHLORIDE 10 MEQ: 750 CAPSULE, EXTENDED RELEASE ORAL at 02:07

## 2025-07-09 RX ADMIN — SODIUM CHLORIDE 500 ML: 9 INJECTION, SOLUTION INTRAVENOUS at 02:07

## 2025-07-09 RX ADMIN — Medication 16 G: at 09:07

## 2025-07-09 RX ADMIN — ALBUMIN (HUMAN) 25 G: 12.5 SOLUTION INTRAVENOUS at 02:07

## 2025-07-09 RX ADMIN — POTASSIUM & SODIUM PHOSPHATES POWDER PACK 280-160-250 MG 1 PACKET: 280-160-250 PACK at 08:07

## 2025-07-09 RX ADMIN — RISPERIDONE 0.5 MG: 0.25 TABLET, FILM COATED ORAL at 08:07

## 2025-07-09 RX ADMIN — HYDROMORPHONE HYDROCHLORIDE 1 MG: 1 INJECTION, SOLUTION INTRAMUSCULAR; INTRAVENOUS; SUBCUTANEOUS at 10:07

## 2025-07-09 RX ADMIN — DEXTROSE MONOHYDRATE 12.5 G: 25 INJECTION, SOLUTION INTRAVENOUS at 05:07

## 2025-07-09 RX ADMIN — DULOXETINE 60 MG: 30 CAPSULE, DELAYED RELEASE ORAL at 10:07

## 2025-07-09 NOTE — ASSESSMENT & PLAN NOTE
-improving   --likely multifactorial with multiple comorbidities, including depression anxiety as well as untreated sleep apnea  -suspect a component of hospital-acquired delirium as well  -CT head without contrast on 07/06/2025 =No CT evidence of acute intracranial abnormality. If the patient has an acute, focal neurological deficit, MRI of the brain may be indicated   -ABG on 07/06/2025 = pH 7.298, pCO2 29, PO2 76, HC03 14.6 on 6 L nasal cannula  -repeat ABG on 07/07/2025 = pH 7.32, pCO2 35, PO2 88, HCT 20.3 on 6 L nasal cannula  -provide emotional support as needed   -duloxetine 60 mg p.o. daily changed to duloxetine 30 mg p.o. q.a.m. and duloxetine 60 mg q.h.s., per psych recommendations  -Risperdal 0.5 mg p.o. t.i.d. per psych recommendations  -vitamin B12 level on 07/09/2025 per psych = pending   -folic acid level on 07/09/2025 per psych = pending   -vitamin D3 level on 07/09/2025 per psych = pending  -cover with Ativan 0.5 mg p.o. b.i.d. PRN  -psych consulted = appreciate input and follow recommendations

## 2025-07-09 NOTE — NURSING
Messaged night hospitalist around 0205 regarding patients low BP. Levophed was previously order but has been d/c. Orders given for 500cc NS bolus and albumin 25% (25g) IV. Patient is sleepy but awakens easily and is alert and oriented.       0720- Updated Dr. Freire on events of last night with patient dropping her BP and blood sugar. Order placed for dietary consult.

## 2025-07-09 NOTE — SUBJECTIVE & OBJECTIVE
Interval History:  Patient seen today for palliative medicine consult for goals of care discussion.    Past Medical History:   Diagnosis Date    Allergy     Anxiety     Asthma     DDD (degenerative disc disease), cervical     Depression     Diabetes mellitus     Fibromyalgia     Hypertension     Neuromuscular disorder     Pancreatic mass     PONV (postoperative nausea and vomiting)     Thyroid disease     hypo       Past Surgical History:   Procedure Laterality Date    ADENOIDECTOMY      CARPAL TUNNEL RELEASE Right      SECTION      ENDOSCOPIC ULTRASOUND OF UPPER GASTROINTESTINAL TRACT N/A 2025    Procedure: ULTRASOUND, UPPER GI TRACT, ENDOSCOPIC;  Surgeon: Austen Grimes III, MD;  Location: [facility] ENDO;  Service: Endoscopy;  Laterality: N/A;    ERCP N/A 2025    Procedure: ERCP (ENDOSCOPIC RETROGRADE CHOLANGIOPANCREATOGRAPHY);  Surgeon: Thuy Escobar MD;  Location: [facility] ENDO;  Service: Endoscopy;  Laterality: N/A;    ERCP N/A 2025    Procedure: ERCP (ENDOSCOPIC RETROGRADE CHOLANGIOPANCREATOGRAPHY);  Surgeon: Austen Grimes III, MD;  Location: [facility];  Service: Endoscopy;  Laterality: N/A;    INSERTION OF TUNNELED CENTRAL VENOUS CATHETER (CVC) WITH SUBCUTANEOUS PORT Left 2025    Procedure: LABUOFVWR-FIIN-N-CATH;  Surgeon: Ata Fernandez III, MD;  Location: [facility];  Service: General;  Laterality: Left;    JOINT REPLACEMENT Right     knee    KNEE ARTHROPLASTY Left 10/19/2020    Procedure: ARTHROPLASTY, KNEE;  Surgeon: Felipe Herring MD;  Location: [facility] OR;  Service: Orthopedics;  Laterality: Left;  Louie Liz    ORIF FEMUR FRACTURE Right 2025    Procedure: ORIF, FRACTURE, FEMUR (distal femur ORIF, synthes VA condylar plate, izaiah table, C-arm, triangle);  Surgeon: Osmany Harding MD;  Location: [facility] OR;  Service: Orthopedics;  Laterality: Right;    ROTATOR CUFF REPAIR Right     TONSILLECTOMY      TOTAL KNEE ARTHROPLASTY Right     TUBAL LIGATION          Review of patient's allergies indicates:   Allergen Reactions    Erythromycin Swelling    Codeine Nausea And Vomiting     And migraine h/a    Lactose     [place] Blisters       Medications:  Continuous Infusions:  Scheduled Meds:   alteplase  2 mg Intra-Catheter Once    DULoxetine  60 mg Oral Daily    enoxparin  40 mg Subcutaneous Q12H (prophylaxis, 0900/2100)    insulin glargine U-100 (Lantus)  23 Units Subcutaneous QHS    levothyroxine  25 mcg Oral Before breakfast    midodrine  10 mg Oral Q8H    montelukast  10 mg Oral QHS    mupirocin   Nasal BID    pantoprazole  40 mg Oral Daily    potassium chloride  10 mEq Oral TID    potassium, sodium phosphates  1 packet Oral QID (AC & HS)     PRN Meds:  Current Facility-Administered Medications:     0.9%  NaCl infusion (for blood administration), , Intravenous, Q24H PRN    acetaminophen, 650 mg, Oral, Q8H PRN    dextrose 50%, 12.5 g, Intravenous, PRN    dextrose 50%, 25 g, Intravenous, PRN    glucagon (human recombinant), 1 mg, Intramuscular, PRN    glucose, 16 g, Oral, PRN    glucose, 24 g, Oral, PRN    HYDROmorphone, 1 mg, Intravenous, Q4H PRN    insulin aspart U-100, 0-10 Units, Subcutaneous, QID (AC + HS) PRN    LORazepam, 0.5 mg, Oral, BID PRN    magnesium oxide, 800 mg, Oral, PRN    magnesium oxide, 800 mg, Oral, PRN    naloxone, 0.02 mg, Intravenous, PRN    oxyCODONE, 5 mg, Oral, Q6H PRN    potassium chloride in water, 40 mEq, Intravenous, PRN **AND** potassium chloride in water, 60 mEq, Intravenous, PRN **AND** potassium chloride in water, 80 mEq, Intravenous, PRN    potassium, sodium phosphates, 2 packet, Oral, PRN    potassium, sodium phosphates, 2 packet, Oral, PRN    potassium, sodium phosphates, 2 packet, Oral, PRN    prochlorperazine, 5 mg, Intravenous, Q6H PRN    sodium chloride 0.9%, 10 mL, Intravenous, PRN    Family History       Problem Relation (Age of Onset)    Anxiety disorder Daughter    Arthritis Mother    Diabetes Mother, Father    Glaucoma  Mother    Graves' disease Daughter    Heart disease Mother, Father, Sister    Hypertension Mother, Father, Sister    Stroke Mother          Tobacco Use    Smoking status: Former     Current packs/day: 0.00     Average packs/day: 1 pack/day for 11.0 years (11.0 ttl pk-yrs)     Types: Cigarettes     Start date:      Quit date:      Years since quittin.5    Smokeless tobacco: Never   Substance and Sexual Activity    Alcohol use: Not Currently     Comment: occasional 2x/y    Drug use: Never    Sexual activity: Yes     Partners: Male       Review of Systems   Constitutional:  Positive for activity change, appetite change and fatigue.   Gastrointestinal:  Negative for abdominal pain, nausea and vomiting.   Musculoskeletal:  Positive for arthralgias and gait problem.   Psychiatric/Behavioral:  Positive for confusion.      Objective:     Vital Signs (Most Recent):  Temp: 97.7 °F (36.5 °C) (25)  Pulse: 84 (25)  Resp: (!) 24 (25)  BP: 97/62 (25)  SpO2: 95 % (25) Vital Signs (24h Range):  Temp:  [97.3 °F (36.3 °C)-98.4 °F (36.9 °C)] 97.7 °F (36.5 °C)  Pulse:  [] 84  Resp:  [13-43] 24  SpO2:  [77 %-99 %] 95 %  BP: ()/(45-79) 97/62     Weight: 115.6 kg (254 lb 13.6 oz)  Body mass index is 43.75 kg/m².       Physical Exam  Vitals and nursing note reviewed.   Constitutional:       General: She is not in acute distress.     Appearance: She is ill-appearing.      Comments: Drowsy, arouses to verbal stimuli.   HENT:      Mouth/Throat:      Mouth: Mucous membranes are moist.      Pharynx: Oropharynx is clear.   Eyes:      General: No scleral icterus.     Pupils: Pupils are equal, round, and reactive to light.   Cardiovascular:      Rate and Rhythm: Normal rate and regular rhythm.      Pulses: Normal pulses.   Pulmonary:      Effort: Pulmonary effort is normal. No respiratory distress.   Abdominal:      General: There is no distension.      Palpations:  Abdomen is soft.   Musculoskeletal:      Comments: Right leg immobilizer in use   Skin:     General: Skin is warm and dry.   Neurological:      Mental Status: She is oriented to person, place, and time.      Comments: Unable to fully participate in conversation due to drowsiness.  Mentation waxing and waning during conversation.   Psychiatric:         Mood and Affect: Mood normal.         Behavior: Behavior normal.            Review of Symptoms      Symptom Assessment (ESAS 0-10 Scale)  Pain:  0  Dyspnea:  0  Anxiety:  0  Nausea:  0  Depression:  0  Anorexia:  0  Fatigue:  10  Insomnia:  0  Restlessness:  0  Agitation:  0         Performance Status:  40    Living Arrangements:  Lives in home and Lives with spouse    Psychosocial/Cultural:   See Palliative Psychosocial Note: No  **Primary  to Follow**  Palliative Care  Consult: No        Advance Care Planning   Advance Directives:   Living Will: No    Do Not Resuscitate Status: No    Medical Power of : No      Decision Making:  Patient answered questions and Family answered questions  Goals of Care: What is most important right now is to focus on curative/life-prolongation (regardless of treatment burdens). Accordingly, we have decided that the best plan to meet the patient's goals includes continuing with treatment.         Significant Labs: All pertinent labs within the past 24 hours have been reviewed.  CBC:   Recent Labs   Lab 07/08/25 0317   WBC 4.46   HGB 7.2*   HCT 22.1*   MCV 77*        BMP:  Recent Labs   Lab 07/08/25 0317         K 3.2*      CO2 26   BUN 29*   CREATININE 1.4   CALCIUM 8.1*   MG 1.6     LFT:  Lab Results   Component Value Date    AST 7 (L) 07/08/2025    ALKPHOS 91 07/08/2025    BILITOT 0.8 07/08/2025     Albumin:   Albumin   Date Value Ref Range Status   07/08/2025 2.2 (L) 3.5 - 5.2 g/dL Final     Protein:   Protein Total   Date Value Ref Range Status   07/08/2025 5.1 (L) 6.0 -  8.4 gm/dL Final     Lactic acid:   Lab Results   Component Value Date    LACTATE 0.9 07/07/2025       Significant Imaging: I have reviewed all pertinent imaging results/findings within the past 24 hours.

## 2025-07-09 NOTE — SUBJECTIVE & OBJECTIVE
Interval History:  Patient seen and examined.  Awake alert in no acute distress.  Sitting in chair on side of the bed currently.  Family members by bedside.  Patient is oriented x3 and moves all extremities to command.  Remains afebrile.  Denies chest pain shortness on breath.  Had significant confusion yesterday with delirium.  Case discussed with Dr. Fisher yesterday and he has made some adjustments.  She is currently calm and cooperative and appears better than yesterday.    Review of Systems   Constitutional:  Negative for activity change, fatigue and fever.   Respiratory:  Negative for cough and shortness of breath.    Cardiovascular:  Negative for chest pain and leg swelling.   Gastrointestinal:  Negative for abdominal pain, nausea and vomiting.   All other systems reviewed and are negative.    Objective:     Vital Signs (Most Recent):  Temp: 98.5 °F (36.9 °C) (07/09/25 0701)  Pulse: 88 (07/09/25 0746)  Resp: (!) 28 (07/09/25 0746)  BP: 106/65 (07/09/25 0701)  SpO2: 96 % (07/09/25 0746) Vital Signs (24h Range):  Temp:  [97.3 °F (36.3 °C)-98.5 °F (36.9 °C)] 98.5 °F (36.9 °C)  Pulse:  [72-99] 88  Resp:  [13-34] 28  SpO2:  [77 %-99 %] 96 %  BP: ()/(45-77) 106/65     Weight: 115.6 kg (254 lb 13.6 oz)  Body mass index is 43.75 kg/m².    Intake/Output Summary (Last 24 hours) at 7/9/2025 1002  Last data filed at 7/9/2025 0601  Gross per 24 hour   Intake 1433.11 ml   Output 600 ml   Net 833.11 ml         Physical Exam  Constitutional:       General: She is not in acute distress.     Appearance: She is obese.   HENT:      Head: Normocephalic and atraumatic.      Nose: Nose normal.      Mouth/Throat:      Mouth: Mucous membranes are moist.   Cardiovascular:      Rate and Rhythm: Normal rate and regular rhythm.      Pulses: Normal pulses.      Heart sounds: Normal heart sounds.   Pulmonary:      Effort: Pulmonary effort is normal. No respiratory distress.      Breath sounds: Normal breath sounds.   Abdominal:       General: Bowel sounds are normal.      Tenderness: There is no abdominal tenderness.   Musculoskeletal:         General: No deformity.      Comments: Bilateral lower extremity 1+ edema.  Right lower extremity in a brace   Skin:     General: Skin is warm and dry.   Neurological:      General: No focal deficit present.      Mental Status: She is alert and oriented to person, place, and time.      Comments: Generalized weakness +.  Decreased range of motion right lower extremity due to pain               Significant Labs: All pertinent labs within the past 24 hours have been reviewed.    Significant Imaging: I have reviewed all pertinent imaging results/findings within the past 24 hours.

## 2025-07-09 NOTE — PT/OT/SLP PROGRESS
Occupational Therapy   Treatment    Name: Sonia Muse  MRN: [id number]  Admitting Diagnosis:  Closed fracture of distal end of right femur  3 Days Post-Op    Recommendations:     Discharge Recommendations: High Intensity Therapy  Discharge Equipment Recommendations:  to be determined by next level of care  Barriers to discharge:  Other (Comment) (increased assistance with ADLs)    Assessment:     Sonia Muse is a 65 y.o. female with a medical diagnosis of Closed fracture of distal end of right femur.   Performance deficits affecting function are weakness, impaired endurance, impaired self care skills, impaired functional mobility, gait instability, impaired balance, decreased coordination, decreased upper extremity function, decreased lower extremity function, pain, decreased ROM.     Rehab Prognosis:  Fair; patient would benefit from acute skilled OT services to address these deficits and reach maximum level of function.       Plan:     Patient to be seen 5 x/week to address the above listed problems via self-care/home management, therapeutic activities, therapeutic exercises  Plan of Care Expires: 08/07/25  Plan of Care Reviewed with: patient    Subjective     Chief Complaint: pain in knee  Patient/Family Comments/goals: to get better  Pain/Comfort:  Pain Rating 1: 4/10  Location - Side 1: Right  Location - Orientation 1: generalized  Location 1: knee  Pain Addressed 1: Reposition, Distraction, Nurse notified, Cessation of Activity  Pain Rating Post-Intervention 1: 3/10    Objective:     Communicated with: nurse prior to session.  Patient found up in chair with PureWick, telemetry, peripheral IV, blood pressure cuff, chair check, knee immobilizer upon OT entry to room.    General Precautions: Standard, fall, aspiration, airborne, contact, respiratory    Orthopedic Precautions:RLE partial weight bearing  Braces: Knee immobilizer  Respiratory Status: Nasal cannula, flow 3 L/min     Occupational  Performance:     Bed Mobility:    Patient completed Rolling/Turning to Left with  moderate assistance and 2 persons  Patient completed Rolling/Turning to Right with moderate assistance and 2 persons  Patient completed Scooting/Bridging with maximal assistance and 2 persons  Patient completed Sit to Supine with moderate assistance and 2 persons     Functional Mobility/Transfers:  Patient completed Sit <> Stand Transfer with maximal assistance and of 2 persons  with  rolling walker   Patient completed Bed <> Chair Transfer using Stand Pivot technique with total assistance and of 2 persons with rolling walker      Activities of Daily Living:  Grooming: minimum assistance oral care and face hygiene UIC   Lower Body Dressing: maximal assistance sock management UIC  Toileting: dependence purewick attached      [facility] 6 Click ADL: 16    Treatment & Education:  -Pt educated on role of OT and POC, use of call light to assist with any needs/transfers, importance of EOB/OOB activities to help negate the negative impact of prolonged hospital stay.   -During g/h UIC pt was able to perform face hygiene with setup assistance and SBA as needed. During oral care [facility] pt was observed with continued transient arm weakness during oral care which require OT to give Min A to forearm for support to have increased occupational performance.    Patient left HOB elevated with all lines intact, call button in reach, bed alarm on, and nurse present    GOALS:   Multidisciplinary Problems       Occupational Therapy Goals          Problem: Occupational Therapy    Goal Priority Disciplines Outcome Interventions   Occupational Therapy Goal     OT, PT/OT     Description: Goals to be met by: 8/7/25     Patient will increase functional independence with ADLs by performing:    UE Dressing with [place].  LE Dressing with [place].  Grooming while standing at sink with [place].  Toileting from toilet with [place] for hygiene and clothing  management.   Bathing from  shower chair/bench with Modified [place] with use of shower chair.  Toilet transfer to toilet with [place].                             Time Tracking:     OT Date of Treatment: 07/09/25  OT Start Time: 1048  OT Stop Time: 1126  OT Total Time (min): 38 min    Billable Minutes:Self Care/Home Management 38    OT/JOHN PAUL: OT          7/9/2025

## 2025-07-09 NOTE — ASSESSMENT & PLAN NOTE
I reviewed the patient's chart and discussed the case with the patient's team.      I examined Sonia Muse at bedside.  The patient lacks capacity for complex medical decision-making.  I spoke with pt and her spouse Josué at bedside.    Patient is known to me from previous outpt palliative visits.      Patient's mentation has been waxing and waning during her hospitalization.  Today she is drowsy and minimally able to participate in conversation.  She recalls visiting with me in the past.    I spoke with her spouse at bedside.  He recalls my previous visits with them outpatient.    We spoke back and forth about patient's recent chemo treatment and current hospitalization.  Patient was experiencing diarrhea post chemo with assoc generalized weakness and decreased p.o. intake which led to her fall at home.    Spouse is happy that surgery went well and he feels that patient is progressing today.    We discussed patient will have delay in further chemo treatments due to her acute fracture and neutropenia.  He verbalized understanding.    Per my previous conversation with patient she was unsure she was willing to continue with cancer treatment  due to fear of experiencing side effects from chemo and decreasing her quality of life.  Spouse is worried that patient may not want to continue with further treatment but remains hopeful that she will once she recovers from her acute fracture.    Per spouse patient is agreeable to continued therapy at inpatient rehab facility to improve her strength and regain her independence.    I will plan to revisit with patient tomorrow to assess her decision making capacity.      Patient did not want to discuss advanced care planning with me during our previous visits. She was provided with five wishes booklet. Spouse states they have not discussed ACP or reviewed booklet.  I let him know that it is important that we have this conversation in the coming days. He agrees.     I  appreciate being involved in patient's care.  Please reach out if I can be of any assistance.

## 2025-07-09 NOTE — SUBJECTIVE & OBJECTIVE
Interval History: Pt doing a little better today. Her mentation conts to wax and wane.     Review of Systems   Constitutional:  Positive for activity change, appetite change and fatigue.   Gastrointestinal:  Negative for abdominal pain, nausea and vomiting.   Musculoskeletal:  Positive for arthralgias and gait problem.   Psychiatric/Behavioral:  Positive for confusion.      Objective:     Vital Signs (Most Recent):  Temp: 98 °F (36.7 °C) (07/09/25 1101)  Pulse: 79 (07/09/25 1330)  Resp: 20 (07/09/25 1330)  BP: 105/66 (07/09/25 1330)  SpO2: 97 % (07/09/25 1330) Vital Signs (24h Range):  Temp:  [97.3 °F (36.3 °C)-98.5 °F (36.9 °C)] 98 °F (36.7 °C)  Pulse:  [72-93] 79  Resp:  [11-32] 20  SpO2:  [80 %-98 %] 97 %  BP: ()/(46-95) 105/66     Weight: 115.6 kg (254 lb 13.6 oz)  Body mass index is 43.75 kg/m².    Estimated Creatinine Clearance: 55.3 mL/min (based on SCr of 1.3 mg/dL).     Physical Exam  Vitals and nursing note reviewed.   Constitutional:       General: She is not in acute distress.     Appearance: She is ill-appearing.      Comments: Drowsy, arouses to verbal stimuli.   HENT:      Mouth/Throat:      Mouth: Mucous membranes are moist.      Pharynx: Oropharynx is clear.   Eyes:      General: No scleral icterus.     Pupils: Pupils are equal, round, and reactive to light.   Cardiovascular:      Rate and Rhythm: Normal rate and regular rhythm.      Pulses: Normal pulses.   Pulmonary:      Effort: Pulmonary effort is normal. No respiratory distress.   Abdominal:      General: There is no distension.      Palpations: Abdomen is soft.   Musculoskeletal:      Comments: Right leg immobilizer in use   Skin:     General: Skin is warm and dry.   Neurological:      Mental Status: She is oriented to person, place, and time.      Comments: Unable to fully participate in conversation due to drowsiness.  Mentation waxing and waning during conversation.   Psychiatric:         Mood and Affect: Mood normal.          Behavior: Behavior normal.          Significant Labs: All pertinent labs within the past 24 hours have been reviewed.    Significant Imaging: I have reviewed all pertinent imaging results/findings within the past 24 hours.

## 2025-07-09 NOTE — SUBJECTIVE & OBJECTIVE
Interval History: Pt doing a little better today. She is sitting up in bedside chair and worked with PT this morning. She remains drowsy and her mentation conts to wax and wane.     Medications:  Continuous Infusions:  Scheduled Meds:   alteplase  2 mg Intra-Catheter Once    [START ON 7/10/2025] DULoxetine  30 mg Oral Daily    [START ON 7/10/2025] DULoxetine  60 mg Oral QHS    enoxparin  40 mg Subcutaneous Q12H (prophylaxis, 0900/2100)    insulin glargine U-100 (Lantus)  20 Units Subcutaneous QHS    levothyroxine  25 mcg Oral Before breakfast    midodrine  10 mg Oral Q8H    montelukast  10 mg Oral QHS    mupirocin   Nasal BID    pantoprazole  40 mg Oral Daily    potassium chloride  10 mEq Oral TID    potassium, sodium phosphates  1 packet Oral QID (AC & HS)    risperiDONE  0.5 mg Oral TID     PRN Meds:  Current Facility-Administered Medications:     0.9%  NaCl infusion (for blood administration), , Intravenous, Q24H PRN    acetaminophen, 650 mg, Oral, Q8H PRN    dextrose 50%, 12.5 g, Intravenous, PRN    dextrose 50%, 25 g, Intravenous, PRN    glucagon (human recombinant), 1 mg, Intramuscular, PRN    glucose, 16 g, Oral, PRN    glucose, 24 g, Oral, PRN    HYDROmorphone, 1 mg, Intravenous, Q4H PRN    insulin aspart U-100, 0-10 Units, Subcutaneous, QID (AC + HS) PRN    LORazepam, 0.5 mg, Oral, BID PRN    magnesium oxide, 800 mg, Oral, PRN    magnesium oxide, 800 mg, Oral, PRN    naloxone, 0.02 mg, Intravenous, PRN    oxyCODONE, 5 mg, Oral, Q6H PRN    potassium chloride in water, 40 mEq, Intravenous, PRN **AND** potassium chloride in water, 60 mEq, Intravenous, PRN **AND** potassium chloride in water, 80 mEq, Intravenous, PRN    potassium, sodium phosphates, 2 packet, Oral, PRN    potassium, sodium phosphates, 2 packet, Oral, PRN    potassium, sodium phosphates, 2 packet, Oral, PRN    prochlorperazine, 5 mg, Intravenous, Q6H PRN    sodium chloride 0.9%, 10 mL, Intravenous, PRN    Objective:     Vital Signs (Most  Recent):  Temp: 98 °F (36.7 °C) (07/09/25 1101)  Pulse: 79 (07/09/25 1330)  Resp: 20 (07/09/25 1330)  BP: 105/66 (07/09/25 1330)  SpO2: 97 % (07/09/25 1330) Vital Signs (24h Range):  Temp:  [97.3 °F (36.3 °C)-98.5 °F (36.9 °C)] 98 °F (36.7 °C)  Pulse:  [72-93] 79  Resp:  [11-32] 20  SpO2:  [80 %-98 %] 97 %  BP: ()/(46-95) 105/66     Weight: 115.6 kg (254 lb 13.6 oz)  Body mass index is 43.75 kg/m².       Physical Exam  Vitals and nursing note reviewed.   Constitutional:       General: She is not in acute distress.     Appearance: She is ill-appearing.      Comments: Drowsy, arouses to verbal stimuli.   HENT:      Mouth/Throat:      Mouth: Mucous membranes are moist.      Pharynx: Oropharynx is clear.   Eyes:      General: No scleral icterus.     Pupils: Pupils are equal, round, and reactive to light.   Cardiovascular:      Rate and Rhythm: Normal rate and regular rhythm.      Pulses: Normal pulses.   Pulmonary:      Effort: Pulmonary effort is normal. No respiratory distress.   Abdominal:      General: There is no distension.      Palpations: Abdomen is soft.   Musculoskeletal:      Comments: Right leg immobilizer in use   Skin:     General: Skin is warm and dry.   Neurological:      Mental Status: She is oriented to person, place, and time.      Comments: Unable to fully participate in conversation due to drowsiness.  Mentation waxing and waning during conversation.   Psychiatric:         Mood and Affect: Mood normal.         Behavior: Behavior normal.            Review of Symptoms      Symptom Assessment (ESAS 0-10 Scale)  Pain:  0  Dyspnea:  0  Anxiety:  0  Nausea:  0  Depression:  0  Anorexia:  0  Fatigue:  10  Insomnia:  0  Restlessness:  0  Agitation:  0         Performance Status:  50    Living Arrangements:  Lives in home and Lives with spouse    Psychosocial/Cultural:   See Palliative Psychosocial Note: No  **Primary  to Follow**  Palliative Care  Consult: No        Advance  Care Planning   Advance Directives:   Living Will: No    Do Not Resuscitate Status: No      Decision Making:  Patient answered questions  Goals of Care: What is most important right now is to focus on curative/life-prolongation (regardless of treatment burdens). Accordingly, we have decided that the best plan to meet the patient's goals includes continuing with treatment.         Significant Labs: All pertinent labs within the past 24 hours have been reviewed.  CBC:   Recent Labs   Lab 07/09/25 0214   WBC 10.71   HGB 7.2*   HCT 23.0*   MCV 80*        BMP:  Recent Labs   Lab 07/09/25 0214   GLU 73      K 3.7      CO2 27   BUN 29*   CREATININE 1.3   CALCIUM 8.1*   MG 1.7     LFT:  Lab Results   Component Value Date    AST 7 (L) 07/09/2025    ALKPHOS 100 07/09/2025    BILITOT 0.7 07/09/2025     Albumin:   Albumin   Date Value Ref Range Status   07/09/2025 2.3 (L) 3.5 - 5.2 g/dL Final     Protein:   Protein Total   Date Value Ref Range Status   07/09/2025 5.1 (L) 6.0 - 8.4 gm/dL Final     Lactic acid:   Lab Results   Component Value Date    LACTATE 1.6 07/09/2025    LACTATE 1.6 07/09/2025       Significant Imaging: I have reviewed all pertinent imaging results/findings within the past 24 hours.

## 2025-07-09 NOTE — ASSESSMENT & PLAN NOTE
Advance Care Planning      Palliative care consulted = appreciate input and follow recommendations

## 2025-07-09 NOTE — PROGRESS NOTES
Critical access hospital Medicine  Progress Note    Patient Name: Sonia Muse  MRN: [id number]  Patient Class: IP- Inpatient   Admission Date: 7/4/2025  Length of Stay: 5 days  Attending Physician: Karina Freire MD  Primary Care Provider: Nasra Galvez FNP        Subjective     Principal Problem:Closed fracture of distal end of right femur        HPI:  Sonia Muse is a 65 year old female with a past medical history of pancreatic cancer, anemia, obesity, DM, HTN, and hypothyroidism who presented with a R femur fracture after a fall, MIGUEL ANGEL, hyponatremia and neutropenia. She recently completed her first cycle of chemotherapy after roughly one week ago (patient of Dr. Calle). She endorses chronic diarrhea and decreased appetite as well as fatigue. She states her BP has fallen since being diagnosed with pancreatic cancer and starting chemotherapy. In the ED, the patient received a 1.5 L NS bolus and 1 L LR bolus as well as fentanyl and Zofran. Hospital Medicine was consulted for admission.    Overview/Hospital Course:  We admitted the patient with a distal right femur fracture.  The orthopedic surgeon recommended operative repair after medical stabilization.  In response to the neutropenia, we placed her on granulocyte colony-stimulating factor daily.  We provided prophylactic antibiotic.  We consulted with Hematology Oncology, who provided recommendations for further management.  We transfused red blood cells when needed.  She had hypotension, which required a vasopressor.  It was not clear what was causing the hypotension, the two possibilities were the anemia, and the 2nd was the use of frequent intravenous hydromorphone for the pain associated with the fracture.  We also treated her for acute kidney injury, which was likely because of unstable hemodynamics, in addition to the use of oxaliplatin.  Patient treated with IV fluids, midodrine and Levophed for hypotension with improvement.   Renal function monitored showed improving creatinine with IV fluids.  Filgrastim daily was ordered for 5 days for neutropenia.  Patient remained afebrile.  On morning of 07/06/2025 patient taken for ORIF of right femur by Orthopedic surgery.  Urine cultures and blood cultures remain negative therefore UTI ruled out and empiric IV antibiotics were DC.  Renal function monitored and improved well with mild increase in edema therefore IV fluids DC.  Patient remains off of Levophed since evening of 07/07/2025.  Patient does have a history of sleep apnea in the distant past and used a CPAP for awhile and then the machine broke therefore she never got this replaced.  She will require a repeat sleep study in outpatient setting as she probably still has underlying sleep apnea.  Patient did have mild acute hospital-acquired psychosis postop and was very emotional.  SSRI continued.  Psych eval consulted.  Patient's mental status improved the next day.  Blood pressure remains in stable range on midodrine.  Suspect midodrine could eventually be tapered off as tolerated.  Downgrade orders to tele placed on 07/08/2025    Interval History:  Patient seen and examined.  Awake alert in no acute distress.  Sitting in chair on side of the bed currently.  Family members by bedside.  Patient is oriented x3 and moves all extremities to command.  Remains afebrile.  Denies chest pain shortness on breath.  Had significant confusion yesterday with delirium.  Case discussed with Dr. Fisher yesterday and he has made some adjustments.  She is currently calm and cooperative and appears better than yesterday.    Review of Systems   Constitutional:  Negative for activity change, fatigue and fever.   Respiratory:  Negative for cough and shortness of breath.    Cardiovascular:  Negative for chest pain and leg swelling.   Gastrointestinal:  Negative for abdominal pain, nausea and vomiting.   All other systems reviewed and are negative.    Objective:      Vital Signs (Most Recent):  Temp: 98.5 °F (36.9 °C) (07/09/25 0701)  Pulse: 88 (07/09/25 0746)  Resp: (!) 28 (07/09/25 0746)  BP: 106/65 (07/09/25 0701)  SpO2: 96 % (07/09/25 0746) Vital Signs (24h Range):  Temp:  [97.3 °F (36.3 °C)-98.5 °F (36.9 °C)] 98.5 °F (36.9 °C)  Pulse:  [72-99] 88  Resp:  [13-34] 28  SpO2:  [77 %-99 %] 96 %  BP: ()/(45-77) 106/65     Weight: 115.6 kg (254 lb 13.6 oz)  Body mass index is 43.75 kg/m².    Intake/Output Summary (Last 24 hours) at 7/9/2025 1002  Last data filed at 7/9/2025 0601  Gross per 24 hour   Intake 1433.11 ml   Output 600 ml   Net 833.11 ml         Physical Exam  Constitutional:       General: She is not in acute distress.     Appearance: She is obese.   HENT:      Head: Normocephalic and atraumatic.      Nose: Nose normal.      Mouth/Throat:      Mouth: Mucous membranes are moist.   Cardiovascular:      Rate and Rhythm: Normal rate and regular rhythm.      Pulses: Normal pulses.      Heart sounds: Normal heart sounds.   Pulmonary:      Effort: Pulmonary effort is normal. No respiratory distress.      Breath sounds: Normal breath sounds.   Abdominal:      General: Bowel sounds are normal.      Tenderness: There is no abdominal tenderness.   Musculoskeletal:         General: No deformity.      Comments: Bilateral lower extremity 1+ edema.  Right lower extremity in a brace   Skin:     General: Skin is warm and dry.   Neurological:      General: No focal deficit present.      Mental Status: She is alert and oriented to person, place, and time.      Comments: Generalized weakness +.  Decreased range of motion right lower extremity due to pain               Significant Labs: All pertinent labs within the past 24 hours have been reviewed.    Significant Imaging: I have reviewed all pertinent imaging results/findings within the past 24 hours.      Assessment & Plan  Closed fracture of distal end of right femur  -Orthopedic Surgery consulted= follow  recommendations  -Fall precautions  -PRN analgesics  -status post  1.Open reduction internal fixation right periprosthetic distal femur fracture.2. Intramedullary nailing right femur on 07/06/2025 by Orthopedic surgery  -orthopedic surgery recommends 25% weight-bearing to right lower extremity  -defer to Orthopedic surgery  MIGUEL ANGEL (acute kidney injury)  -Likely due to hemodynamic instability.  -monitor creatinine = improved  -avoid nephrotoxic agents   -renally dose medications as needed  -DC IV fluids at 100 cc an hour  -Nephrology consulted= follow recommendations  Chemotherapy-induced neutropenia  -status post Chemotherapy roughly one week ago.  -monitor WBC= improved/resolved  -neutropenic precautions   -urine culture on 07/04/2025 = no growth till date (UTI ruled out)  -blood cultures x2 on 07/04/2025 = remains negative till date  -DC filgrastim 458 mg IV daily x5 days.  Ordered on 07/04/2025.  DC on 07/08/2025 per Heme-Onc  -DC cefepime 1 g IV b.i.d. empirically.  Ordered on 07/04/2025.  DC on 07/08/2025 ( urine and blood cultures remain negative)  -Heme-Onc consulted = follow recommendations      Hypotension  Secondary to combination of anemia and the use of IV hydromorphone for pain.  -monitor blood pressure = stable range currently   -maintain goal blood pressure map > 65  -midodrine 10 mg p.o. q.8 hours  -DC IV fluids at 100 cc an hour  -remains off of Levophed IV to help maintain goal map        Pancreatic cancer  -Oncology consulted  -patient recently began chemotherapy    Anemia in neoplastic disease  -In setting of pancreatic adenocarcinoma and MIGUEL ANGEL.  -no overt bleeding seen or reported currently  -monitor H&H = remains anemic but in stable range with no significant change currently (also with contribution from dilutional effect of recent IV fluids)  -FOBT = pending   -PPI daily   -DC'd IV fluids at 100 cc an hour.  DC on 07/08/2025  -consider transfusion of 1 unit PRBC if and when hemoglobin less than 7  Or when hemoglobin is less than 8 and patient has symptoms of anemia.  Hematology-Oncology consulting= follow recommendations  Hyponatremia  Monitor sodium daily= resolved/stable range currently  Nephrology consulted = appreciate input and follow recommendations  Abnormal LFTs  Unsure of etiology.  Could be secondary to chemotherapy.    Monitor liver function tests daily.    Acute cystitis  -POA   -no acute cystitis on this admission.  UTI ruled out with negative urine culture  -afebrile  -urine culture on 07/04/2025 = negative at 48 hours  -lactic acid on 07/07/2025 = within normal limits  -DC cefepime IV on 07/08/2025    DM (diabetes mellitus)  Last A1c reviewed-   Lab Results   Component Value Date    HGBA1C 10.1 (H) 04/25/2025     Most recent fingerstick glucose reviewed-   Recent Labs   Lab 07/08/25  2203 07/09/25  0018 07/09/25  0040 07/09/25  0214   POCTGLUCOSE 95 61* 96 81     Current correctional scale  Medium  Maintain anti-hyperglycemic dose as follows-   Antihyperglycemics (From admission, onward)      Start     Stop Route Frequency Ordered    07/09/25 2100  insulin glargine U-100 (Lantus) pen 20 Units         -- SubQ Nightly 07/09/25 1016    07/04/25 1142  insulin aspart U-100 pen 0-10 Units         -- SubQ Before meals & nightly PRN 07/04/25 1046          -Hold Oral hypoglycemics while patient is in the hospital.  -Monitor CBGS = on the lower range of normal  -Lantus 23 units subQ q.h.s. decreased to 20 units subQ q.h.s. (titrate as needed)   Acute psychosis  -improving   --likely multifactorial with multiple comorbidities, including depression anxiety as well as untreated sleep apnea  -suspect a component of hospital-acquired delirium as well  -CT head without contrast on 07/06/2025 =No CT evidence of acute intracranial abnormality. If the patient has an acute, focal neurological deficit, MRI of the brain may be indicated   -ABG on 07/06/2025 = pH 7.298, pCO2 29, PO2 76, HC03 14.6 on 6 L nasal  cannula  -repeat ABG on 07/07/2025 = pH 7.32, pCO2 35, PO2 88, HCT 20.3 on 6 L nasal cannula  -provide emotional support as needed   -duloxetine 60 mg p.o. daily changed to duloxetine 30 mg p.o. q.a.m. and duloxetine 60 mg q.h.s., per psych recommendations  -Risperdal 0.5 mg p.o. t.i.d. per psych recommendations  -vitamin B12 level on 07/09/2025 per psych = pending   -folic acid level on 07/09/2025 per psych = pending   -vitamin D3 level on 07/09/2025 per psych = pending  -cover with Ativan 0.5 mg p.o. b.i.d. PRN  -psych consulted = appreciate input and follow recommendations    Metabolic acidosis  -monitor = improved/resolved  -DC half-normal saline +100 mEq bicarb IV at 100 cc an hour .  DC on 07/08/2025      NICOLAS (obstructive sleep apnea)  -Patient patient's  stated that patient was diagnosed with sleep apnea several years ago and was on a CPAP machine until it broke.  After it broke she never got it replaced.  -outpatient sleep study as soon as possible on discharge.   informed to obtain outpatient sleep study as soon as possible on discharge as well  -patient and  have agreed for a trial of CPAP while sleeping  -CPAP q.h.s.    Goals of care, counseling/discussion  Advance Care Planning      Palliative care consulted = appreciate input and follow recommendations      VTE Risk Mitigation (From admission, onward)           Ordered     enoxaparin injection 40 mg  Every 12 hours         07/06/25 1305     IP VTE HIGH RISK PATIENT  Once         07/06/25 1302     Place sequential compression device  Until discontinued         07/04/25 1046                    Discharge Planning   DENZEL: 7/10/2025     Code Status: Full Code   Medical Readiness for Discharge Date:   Discharge Plan A: Rehab   Discharge Delays: None known at this time          Karina Freire MD  Department of Hospital Medicine   [facility]

## 2025-07-09 NOTE — PROGRESS NOTES
[facility]  Palliative Medicine  Progress Note    Patient Name: Sonia Muse  MRN: [id number]  Admission Date: 7/4/2025  Hospital Length of Stay: 5 days  Code Status: Full Code   Attending Provider: Karina Freire MD  Consulting Provider: Adilia Stokes NP  Primary Care Physician: Nasra Galvez FNP  Principal Problem:Closed fracture of distal end of right femur    Patient information was obtained from patient, caregiver / friend, and primary team.      Assessment/Plan:     Palliative Care  Goals of care, counseling/discussion  I reviewed the patient's chart and discussed the case with the patient's team.      I examined Sonia Muse at bedside.  The patient lacks capacity for complex medical decision-making.  I spoke with pt and her friend at bedside.    Patient is a little better today.  She remains drowsy and her mentation is waxing and waning.  She is sitting up in chair and worked with PT this morning.    Today she is more confused to me than yesterday.  She is not able recall me visiting with her yesterday and can not recall my previous outpatient visits with her.    We discussed discharge to inpatient rehab when she is medically stable.  She is agreeable to this and wants to work on her strength and hopefully regain her independence.    I attempted to have conversation with her about cancer treatment. She was not able to hold conversation with me today due to her lack of understanding and ability to participate in conversation.    Her spouse is coming to hospital today.  I will have our palliative RN follow-up about ACP documentation with them.      We will continue to follow along and are happy to assist in any way we can.    I appreciate being involved in patient's care.  Please reach out if I can be of any assistance.             Subjective:     Chief Complaint:   Chief Complaint   Patient presents with    Knee Injury     Fall  / rt. Knee pain        HPI:   From  admission HPI:  Sonia Muse is a 65 year old female with a past medical history of pancreatic cancer, anemia, obesity, DM, HTN, and hypothyroidism who presented with a R femur fracture after a fall, MIGUEL ANGEL, hyponatremia and neutropenia. She recently completed her first cycle of chemotherapy after roughly one week ago (patient of Dr. Calle). She endorses chronic diarrhea and decreased appetite as well as fatigue. She states her BP has fallen since being diagnosed with pancreatic cancer and starting chemotherapy. In the ED, the patient received a 1.5 L NS bolus and 1 L LR bolus as well as fentanyl and Zofran. Hospital Medicine was consulted for admission.    Palliative medicine consult:  Patient admitted for right femur fracture, MIGUEL ANGEL, hyponatremia and neutropenia.  She underwent ORIF for right femur repair.  She is known to me from previous outpatient palliative visit.  She is receiving chemotherapy for newly diagnosed pancreatic cancer.  We have been consulted for goals of care discussion.    Hospital Course:  No notes on file    Interval History: Pt doing a little better today. She is sitting up in bedside chair and worked with PT this morning. She remains drowsy and her mentation conts to wax and wane.     Medications:  Continuous Infusions:  Scheduled Meds:   alteplase  2 mg Intra-Catheter Once    [START ON 7/10/2025] DULoxetine  30 mg Oral Daily    [START ON 7/10/2025] DULoxetine  60 mg Oral QHS    enoxparin  40 mg Subcutaneous Q12H (prophylaxis, 0900/2100)    insulin glargine U-100 (Lantus)  20 Units Subcutaneous QHS    levothyroxine  25 mcg Oral Before breakfast    midodrine  10 mg Oral Q8H    montelukast  10 mg Oral QHS    mupirocin   Nasal BID    pantoprazole  40 mg Oral Daily    potassium chloride  10 mEq Oral TID    potassium, sodium phosphates  1 packet Oral QID (AC & HS)    risperiDONE  0.5 mg Oral TID     PRN Meds:  Current Facility-Administered Medications:     0.9%  NaCl infusion (for blood  administration), , Intravenous, Q24H PRN    acetaminophen, 650 mg, Oral, Q8H PRN    dextrose 50%, 12.5 g, Intravenous, PRN    dextrose 50%, 25 g, Intravenous, PRN    glucagon (human recombinant), 1 mg, Intramuscular, PRN    glucose, 16 g, Oral, PRN    glucose, 24 g, Oral, PRN    HYDROmorphone, 1 mg, Intravenous, Q4H PRN    insulin aspart U-100, 0-10 Units, Subcutaneous, QID (AC + HS) PRN    LORazepam, 0.5 mg, Oral, BID PRN    magnesium oxide, 800 mg, Oral, PRN    magnesium oxide, 800 mg, Oral, PRN    naloxone, 0.02 mg, Intravenous, PRN    oxyCODONE, 5 mg, Oral, Q6H PRN    potassium chloride in water, 40 mEq, Intravenous, PRN **AND** potassium chloride in water, 60 mEq, Intravenous, PRN **AND** potassium chloride in water, 80 mEq, Intravenous, PRN    potassium, sodium phosphates, 2 packet, Oral, PRN    potassium, sodium phosphates, 2 packet, Oral, PRN    potassium, sodium phosphates, 2 packet, Oral, PRN    prochlorperazine, 5 mg, Intravenous, Q6H PRN    sodium chloride 0.9%, 10 mL, Intravenous, PRN    Objective:     Vital Signs (Most Recent):  Temp: 98 °F (36.7 °C) (07/09/25 1101)  Pulse: 79 (07/09/25 1330)  Resp: 20 (07/09/25 1330)  BP: 105/66 (07/09/25 1330)  SpO2: 97 % (07/09/25 1330) Vital Signs (24h Range):  Temp:  [97.3 °F (36.3 °C)-98.5 °F (36.9 °C)] 98 °F (36.7 °C)  Pulse:  [72-93] 79  Resp:  [11-32] 20  SpO2:  [80 %-98 %] 97 %  BP: ()/(46-95) 105/66     Weight: 115.6 kg (254 lb 13.6 oz)  Body mass index is 43.75 kg/m².       Physical Exam  Vitals and nursing note reviewed.   Constitutional:       General: She is not in acute distress.     Appearance: She is ill-appearing.      Comments: Drowsy, arouses to verbal stimuli.   HENT:      Mouth/Throat:      Mouth: Mucous membranes are moist.      Pharynx: Oropharynx is clear.   Eyes:      General: No scleral icterus.     Pupils: Pupils are equal, round, and reactive to light.   Cardiovascular:      Rate and Rhythm: Normal rate and regular rhythm.       Pulses: Normal pulses.   Pulmonary:      Effort: Pulmonary effort is normal. No respiratory distress.   Abdominal:      General: There is no distension.      Palpations: Abdomen is soft.   Musculoskeletal:      Comments: Right leg immobilizer in use   Skin:     General: Skin is warm and dry.   Neurological:      Mental Status: She is oriented to person, place, and time.      Comments: Unable to fully participate in conversation due to drowsiness.  Mentation waxing and waning during conversation.   Psychiatric:         Mood and Affect: Mood normal.         Behavior: Behavior normal.            Review of Symptoms      Symptom Assessment (ESAS 0-10 Scale)  Pain:  0  Dyspnea:  0  Anxiety:  0  Nausea:  0  Depression:  0  Anorexia:  0  Fatigue:  10  Insomnia:  0  Restlessness:  0  Agitation:  0         Performance Status:  50    Living Arrangements:  Lives in home and Lives with spouse    Psychosocial/Cultural:   See Palliative Psychosocial Note: No  **Primary  to Follow**  Palliative Care  Consult: No        Advance Care Planning  Advance Directives:   Living Will: No    Do Not Resuscitate Status: No      Decision Making:  Patient answered questions  Goals of Care: What is most important right now is to focus on curative/life-prolongation (regardless of treatment burdens). Accordingly, we have decided that the best plan to meet the patient's goals includes continuing with treatment.         Significant Labs: All pertinent labs within the past 24 hours have been reviewed.  CBC:   Recent Labs   Lab 07/09/25 0214   WBC 10.71   HGB 7.2*   HCT 23.0*   MCV 80*        BMP:  Recent Labs   Lab 07/09/25 0214   GLU 73      K 3.7      CO2 27   BUN 29*   CREATININE 1.3   CALCIUM 8.1*   MG 1.7     LFT:  Lab Results   Component Value Date    AST 7 (L) 07/09/2025    ALKPHOS 100 07/09/2025    BILITOT 0.7 07/09/2025     Albumin:   Albumin   Date Value Ref Range Status   07/09/2025 2.3 (L) 3.5  - 5.2 g/dL Final     Protein:   Protein Total   Date Value Ref Range Status   07/09/2025 5.1 (L) 6.0 - 8.4 gm/dL Final     Lactic acid:   Lab Results   Component Value Date    LACTATE 1.6 07/09/2025    LACTATE 1.6 07/09/2025       Significant Imaging: I have reviewed all pertinent imaging results/findings within the past 24 hours.    > 50 min visit spent in chart review, face to face discussion of goals of care,  symptom assessment, coordination of care and emotional support.     Adilia Stokes, NP  Palliative Medicine  [facility]

## 2025-07-09 NOTE — PT/OT/SLP PROGRESS
Physical Therapy Treatment    Patient Name:  Sonia Muse   MRN:  [id number]    Recommendations:     Discharge Recommendations: High Intensity Therapy  Discharge Equipment Recommendations: to be determined by next level of care  Barriers to discharge: Decreased functional mobility, Increased caregiver burnden    Assessment:     Sonia Muse is a 65 y.o. female admitted with a medical diagnosis of Closed fracture of distal end of right femur.  She presents with the following impairments/functional limitations: weakness, impaired endurance, impaired self care skills, impaired functional mobility, gait instability, impaired balance, decreased lower extremity function, decreased safety awareness, pain, decreased ROM, impaired cardiopulmonary response to activity, orthopedic precautions . Pt was able to mobilize to eob with mod a and VI for sequencing and technique. Frequent VI given to maintain opened eyes when participating in therapy. RN administered pain medication prior to standing activity. 3 sit to stands competed with short static stands. Pt seems to be compliant with WB precautions. Attempted stand pivot to bed side chair with rw, but was unable to sequence. Squat pivot completed to chair completed with RN increasing O2 to 3L due to pt desat to 87%.     Rehab Prognosis: Fair; patient would benefit from acute skilled PT services to address these deficits and reach maximum level of function.    Recent Surgery: Procedure(s) (LRB):  ORIF, FRACTURE, FEMUR (distal femur ORIF, synthes VA condylar plate, izaiah table, C-arm, triangle) (Right) 3 Days Post-Op    Plan:     During this hospitalization, patient to be seen daily to address the identified rehab impairments via gait training, therapeutic activities, therapeutic exercises, neuromuscular re-education and progress toward the following goals:    Plan of Care Expires:  08/07/25    Subjective     Chief Complaint: R knee pain  Patient/Family  Comments/goals: 1/2 pain 1/2 anxiety (when questioned about her being reluctant in transfers)  Pain/Comfort:  Pain Rating 1: 0/10  Location - Side 1: Right  Location - Orientation 1: generalized  Location 1: knee  Pain Addressed 1: Nurse notified  Pain Rating Post-Intervention 1: other (see comments) (moderate in WB)      Objective:     Communicated with RN prior to session.  Patient found HOB elevated with bed alarm, blood pressure cuff, oxygen, PureWick, peripheral IV, pulse ox (continuous), telemetry, knee immobilizer upon PT entry to room.     General Precautions: Standard, fall  Orthopedic Precautions: RLE partial weight bearing  Braces: Knee immobilizer  Respiratory Status: Nasal cannula, flow 2 L/min     Functional Mobility:  Bed Mobility:     Supine to Sit: moderate assistance  Transfers:     Sit to Stand:  maximal assistance with rolling walker  Bed to Chair: maximal assistance and of 2 persons with  hand-held assist  using  Squat Pivot      AM-PAC 6 CLICK MOBILITY          Treatment & Education:  Pt was educated on the following: call light use, importance of OOB activity and functional mobility to negate the negative effects of prolonged bed rest during this hospitalization, safe transfers/ambulation and discharge planning recommendations/options.     Pt performed L LE there ex sitting x 20 reps with vc for proper execution and joint protection:laq, marching.    Patient left up in chair with all lines intact, call button in reach, RN notified, and family present..    GOALS:   Multidisciplinary Problems       Physical Therapy Goals          Problem: Physical Therapy    Goal Priority Disciplines Outcome Interventions   Physical Therapy Goal     PT, PT/OT Progressing    Description: Goals to be met by: 25     Patient will increase functional independence with mobility by performin. Supine to sit with MInimal Assistance  2. Sit to stand transfer with Minimal Assistance  3. Bed to chair transfer  with Minimal Assistance using Rolling Walker  4. R/L rolling with minimal assistance using bedside rail as needed    All goals while maintaining 25 % PWB on R LE                                     Time Tracking:     PT Received On: 07/09/25  PT Start Time: 0940     PT Stop Time: 1018  PT Total Time (min): 38 min     Billable Minutes: Therapeutic Activity 23 and Therapeutic Exercise 15    Treatment Type: Treatment  PT/PTA: PTA     Number of PTA visits since last PT visit: 1 07/09/2025

## 2025-07-09 NOTE — PROGRESS NOTES
KATY Muse is a 65 year old female with a past medical history of a locally advanced pancreatic cancer, anemia, obesity, DM, HTN, and hypothyroidism who presented with a right femur fracture after a fall, MIGUEL ANGEL, hyponatremia and neutropenia.   She recently completed her first cycle of chemotherapy . She was not able to receive her second one due to prolonged myelosuppression.     She endorses chronic diarrhea and decreased appetite as well as fatigue. The patient denies CP, cough, SOB, abdominal pain, nausea, vomiting, constipation.  The patient denies fever, chills, night sweats, weight loss, new lumps or bumps, easy bruising or bleeding.     In the ED, the patient received a 1.5 L NS bolus and 1 L LR bolus as well as fentanyl and Zofran. Oncology was consulted in view of her severe neutropenia.       Past Medical History:   Diagnosis Date    Allergy     Anxiety     Asthma     DDD (degenerative disc disease), cervical     Depression     Diabetes mellitus     Fibromyalgia     Hypertension     Neuromuscular disorder     Pancreatic mass     PONV (postoperative nausea and vomiting)     Thyroid disease     hypo     Past Surgical History:   Procedure Laterality Date    ADENOIDECTOMY      CARPAL TUNNEL RELEASE Right      SECTION      ENDOSCOPIC ULTRASOUND OF UPPER GASTROINTESTINAL TRACT N/A 2025    Procedure: ULTRASOUND, UPPER GI TRACT, ENDOSCOPIC;  Surgeon: Austen Grimes III, MD;  Location: [facility];  Service: Endoscopy;  Laterality: N/A;    ERCP N/A 2025    Procedure: ERCP (ENDOSCOPIC RETROGRADE CHOLANGIOPANCREATOGRAPHY);  Surgeon: Thuy Escobar MD;  Location: [facility] ENDO;  Service: Endoscopy;  Laterality: N/A;    ERCP N/A 2025    Procedure: ERCP (ENDOSCOPIC RETROGRADE CHOLANGIOPANCREATOGRAPHY);  Surgeon: Austen Grimes III, MD;  Location: [facility] ENDO;  Service: Endoscopy;  Laterality: N/A;    INSERTION OF TUNNELED CENTRAL VENOUS CATHETER (CVC) WITH SUBCUTANEOUS  PORT Left 2025    Procedure: [id number];  Surgeon: Ata Fernandez III, MD;  Location: [facility] OR;  Service: General;  Laterality: Left;    JOINT REPLACEMENT Right 2016    knee    KNEE ARTHROPLASTY Left 10/19/2020    Procedure: ARTHROPLASTY, KNEE;  Surgeon: Felipe Herring MD;  Location: [facility] OR;  Service: Orthopedics;  Laterality: Left;  Louie Liz    ORIF FEMUR FRACTURE Right 2025    Procedure: ORIF, FRACTURE, FEMUR (distal femur ORIF, synthes VA condylar plate, izaiah table, C-arm, triangle);  Surgeon: Osmany Harding MD;  Location: [facility] OR;  Service: Orthopedics;  Laterality: Right;    ROTATOR CUFF REPAIR Right     TONSILLECTOMY      TOTAL KNEE ARTHROPLASTY Right     TUBAL LIGATION       Social History     Socioeconomic History    Marital status:     Number of children: 3   Occupational History    Occupation: [employer]     Comment: primary sub for [employer]   Tobacco Use    Smoking status: Former     Current packs/day: 0.00     Average packs/day: 1 pack/day for 11.0 years (11.0 ttl pk-yrs)     Types: Cigarettes     Start date:      Quit date:      Years since quittin.5    Smokeless tobacco: Never   Substance and Sexual Activity    Alcohol use: Not Currently     Comment: occasional 2x/y    Drug use: Never    Sexual activity: Yes     Partners: Male     Social Drivers of Health     Financial Resource Strain: Low Risk  (2025)    Overall Financial Resource Strain (CARDIA)     Difficulty of Paying Living Expenses: Not hard at all   Recent Concern: Financial Resource Strain - High Risk (2025)    Overall Financial Resource Strain (CARDIA)     Difficulty of Paying Living Expenses: Hard   Food Insecurity: No Food Insecurity (2025)    Hunger Vital Sign     Worried About Running Out of Food in the Last Year: Never true     Ran Out of Food in the Last Year: Never true   Recent Concern: Food Insecurity - Food Insecurity Present (2025)    Hunger Vital Sign      Worried About Running Out of Food in the Last Year: Sometimes true     Ran Out of Food in the Last Year: Sometimes true   Transportation Needs: No Transportation Needs (7/5/2025)    PRAPARE - Transportation     Lack of Transportation (Medical): No     Lack of Transportation (Non-Medical): No   Recent Concern: Transportation Needs - Unmet Transportation Needs (4/28/2025)    PRAPARE - Transportation     Lack of Transportation (Medical): Yes     Lack of Transportation (Non-Medical): Yes   Physical Activity: Unknown (4/28/2025)    Exercise Vital Sign     Days of Exercise per Week: Patient declined     Minutes of Exercise per Session: 30 min   Stress: No Stress Concern Present (7/5/2025)    [employer] [place] [employer] - Occupational Stress Questionnaire     Feeling of Stress : Not at all   Recent Concern: Stress - Stress Concern Present (4/28/2025)    [employer] [place] [employer] - Occupational Stress Questionnaire     Feeling of Stress : Very much   Housing Stability: Low Risk  (7/5/2025)    Housing Stability Vital Sign     Unable to Pay for Housing in the Last Year: No     Number of Times Moved in the Last Year: 0     Homeless in the Last Year: No     Review of patient's allergies indicates:   Allergen Reactions    Erythromycin Swelling    Codeine Nausea And Vomiting     And migraine h/a    Lactose     Nichols Blisters     Physical exam  Vitals:    07/09/25 1003   BP:    Pulse:    Resp: (!) 22   Temp:      Patient does not know where she is physical exam suggestive of delirium.  She is awake alert to herself.  CT head is negative for any acute bleeding.  Normal respiratory effort  Normal rate  normocephalic      Lab Results   Component Value Date    WBC 10.71 07/09/2025    HGB 7.2 (L) 07/09/2025    HCT 23.0 (L) 07/09/2025    MCV 80 (L) 07/09/2025     07/09/2025       CMP  Sodium   Date Value Ref Range Status   07/09/2025 142 136 - 145 mmol/L Final     Potassium   Date Value Ref Range  Status   07/09/2025 3.7 3.5 - 5.1 mmol/L Final     Chloride   Date Value Ref Range Status   07/09/2025 107 95 - 110 mmol/L Final     CO2   Date Value Ref Range Status   07/09/2025 27 23 - 29 mmol/L Final     Glucose   Date Value Ref Range Status   07/09/2025 73 70 - 110 mg/dL Final     BUN   Date Value Ref Range Status   07/09/2025 29 (H) 8 - 23 mg/dL Final     Creatinine   Date Value Ref Range Status   07/09/2025 1.3 0.5 - 1.4 mg/dL Final     Calcium   Date Value Ref Range Status   07/09/2025 8.1 (L) 8.7 - 10.5 mg/dL Final     Protein Total   Date Value Ref Range Status   07/09/2025 5.1 (L) 6.0 - 8.4 gm/dL Final     Albumin   Date Value Ref Range Status   07/09/2025 2.3 (L) 3.5 - 5.2 g/dL Final     Bilirubin Total   Date Value Ref Range Status   07/09/2025 0.7 0.1 - 1.0 mg/dL Final     Comment:     For infants and newborns, interpretation of results should be based   on gestational age, weight and in agreement with clinical   observations.    Premature Infant recommended reference ranges:   0-24 hours:  <8.0 mg/dL   24-48 hours: <12.0 mg/dL   3-5 days:    <15.0 mg/dL   6-29 days:   <15.0 mg/dL     ALP   Date Value Ref Range Status   07/09/2025 100 55 - 135 unit/L Final     AST   Date Value Ref Range Status   07/09/2025 7 (L) 10 - 40 unit/L Final     ALT   Date Value Ref Range Status   07/09/2025 4 (L) 10 - 44 unit/L Final     Anion Gap   Date Value Ref Range Status   07/09/2025 8 8 - 16 mmol/L Final     eGFR   Date Value Ref Range Status   07/09/2025 46 (L) >60 mL/min/1.73/m2 Final   11/21/2024 70 > OR = 60 mL/min/1.73m2 Final     Ct head    Impression:     No CT evidence of acute intracranial abnormality. If the patient has an acute, focal neurological deficit, MRI of the brain may be indicated.      Assessment recommendation     Closed fracture of distal end of right femur  She is s/p Open reduction internal fixation right periprosthetic distal femur fracture and Intramedullary nailing right femur  NWB RLE  Fall  precautions  PRN analgesics  Ortho consulted and following     Pancreatic cancer  Currently on neoadjuvant treatment with mFOLFIRONOX.  Her systemic therapy will be put on hold awaiting her neutropenia to resolved.  She will follow-up with Dr Calle upon her discharge  Oncology sign off please re consult for any questions      Chemotherapy-induced neutropenia  WBC today 0.85 -> 1.44 -> 4.46 -> 10.71  D/c GCSF       Anemia in neoplastic disease  Multifactorial due to possible bleeding s/p fall as well due to her systemic chemotherapy  Trend H/H   Transfuse if Hgb is less than 7 g/dl  Iron studies is reflecting anemia of chronic disease.

## 2025-07-09 NOTE — CONSULTS
[facility]  Palliative Medicine  Consult Note    Patient Name: Sonia Muse  MRN: [id number]  Admission Date: 7/4/2025  Hospital Length of Stay: 4 days  Code Status: Full Code   Attending Provider: Karina Freire MD  Consulting Provider: Adilia Stokes NP  Primary Care Physician: Nasra Galvez FNP  Principal Problem:Closed fracture of distal end of right femur    Patient information was obtained from patient, spouse/SO, past medical records, ER records, and primary team.      Inpatient consult to Palliative Care  Consult performed by: Adilia Stokes NP  Consult ordered by: Karina Freire MD        Assessment/Plan:     Palliative Care  Goals of care, counseling/discussion  I reviewed the patient's chart and discussed the case with the patient's team.      I examined Sonia Muse at bedside.  The patient lacks capacity for complex medical decision-making.  I spoke with pt and her spouse Josué at bedside.    Patient is known to me from previous outpt palliative visits.      Patient's mentation has been waxing and waning during her hospitalization.  Today she is drowsy and minimally able to participate in conversation.  She recalls visiting with me in the past.    I spoke with her spouse at bedside.  He recalls my previous visits with them outpatient.    We spoke back and forth about patient's recent chemo treatment and current hospitalization.  Patient was experiencing diarrhea post chemo with assoc generalized weakness and decreased p.o. intake which led to her fall at home.    Spouse is happy that surgery went well and he feels that patient is progressing today.    We discussed patient will have delay in further chemo treatments due to her acute fracture and neutropenia.  He verbalized understanding.    Per my previous conversation with patient she was unsure she was willing to continue with cancer treatment  due to fear of experiencing side effects from chemo and  decreasing her quality of life.  Spouse is worried that patient may not want to continue with further treatment but remains hopeful that she will once she recovers from her acute fracture.    Per spouse patient is agreeable to continued therapy at inpatient rehab facility to improve her strength and regain her independence.    I will plan to revisit with patient tomorrow to assess her decision making capacity.      Patient did not want to discuss advanced care planning with me during our previous visits. She was provided with five wishes booklet. Spouse states they have not discussed ACP or reviewed booklet.  I let him know that it is important that we have this conversation in the coming days. He agrees.     I appreciate being involved in patient's care.  Please reach out if I can be of any assistance.        Thank you for your consult.      Subjective:     HPI:   From admission HPI:  Sonia Muse is a 65 year old female with a past medical history of pancreatic cancer, anemia, obesity, DM, HTN, and hypothyroidism who presented with a R femur fracture after a fall, MIGUEL ANGEL, hyponatremia and neutropenia. She recently completed her first cycle of chemotherapy after roughly one week ago (patient of Dr. Calle). She endorses chronic diarrhea and decreased appetite as well as fatigue. She states her BP has fallen since being diagnosed with pancreatic cancer and starting chemotherapy. In the ED, the patient received a 1.5 L NS bolus and 1 L LR bolus as well as fentanyl and Zofran. Hospital Medicine was consulted for admission.    Palliative medicine consult:  Patient admitted for right femur fracture, MIGUEL ANGEL, hyponatremia and neutropenia.  She underwent ORIF for right femur repair.  She is known to me from previous outpatient palliative visit.  She is receiving chemotherapy for newly diagnosed pancreatic cancer.  We have been consulted for goals of care discussion.    Hospital Course:  No notes on file    Interval History:   Patient seen today for palliative medicine consult for goals of care discussion.    Past Medical History:   Diagnosis Date    Allergy     Anxiety     Asthma     DDD (degenerative disc disease), cervical     Depression     Diabetes mellitus     Fibromyalgia     Hypertension     Neuromuscular disorder     Pancreatic mass     PONV (postoperative nausea and vomiting)     Thyroid disease     hypo       Past Surgical History:   Procedure Laterality Date    ADENOIDECTOMY      CARPAL TUNNEL RELEASE Right      SECTION      ENDOSCOPIC ULTRASOUND OF UPPER GASTROINTESTINAL TRACT N/A 2025    Procedure: ULTRASOUND, UPPER GI TRACT, ENDOSCOPIC;  Surgeon: Austen Grimes III, MD;  Location: [facility] ENDO;  Service: Endoscopy;  Laterality: N/A;    ERCP N/A 2025    Procedure: ERCP (ENDOSCOPIC RETROGRADE CHOLANGIOPANCREATOGRAPHY);  Surgeon: Thuy Escobar MD;  Location: [facility] ENDO;  Service: Endoscopy;  Laterality: N/A;    ERCP N/A 2025    Procedure: ERCP (ENDOSCOPIC RETROGRADE CHOLANGIOPANCREATOGRAPHY);  Surgeon: Austen Grimes III, MD;  Location: [facility] ENDO;  Service: Endoscopy;  Laterality: N/A;    INSERTION OF TUNNELED CENTRAL VENOUS CATHETER (CVC) WITH SUBCUTANEOUS PORT Left 2025    Procedure: [id number];  Surgeon: Ata Fernandez III, MD;  Location: [facility] OR;  Service: General;  Laterality: Left;    JOINT REPLACEMENT Right     knee    KNEE ARTHROPLASTY Left 10/19/2020    Procedure: ARTHROPLASTY, KNEE;  Surgeon: Felipe Herring MD;  Location: [facility] OR;  Service: Orthopedics;  Laterality: Left;  Louie Liz    ORIF FEMUR FRACTURE Right 2025    Procedure: ORIF, FRACTURE, FEMUR (distal femur ORIF, synthes VA condylar plate, izaiah table, C-arm, triangle);  Surgeon: Osmany Harding MD;  Location: [facility] OR;  Service: Orthopedics;  Laterality: Right;    ROTATOR CUFF REPAIR Right     TONSILLECTOMY      TOTAL KNEE ARTHROPLASTY Right     TUBAL LIGATION         Review of  patient's allergies indicates:   Allergen Reactions    Erythromycin Swelling    Codeine Nausea And Vomiting     And migraine h/a    Lactose     Grundy Blisters       Medications:  Continuous Infusions:  Scheduled Meds:   alteplase  2 mg Intra-Catheter Once    DULoxetine  60 mg Oral Daily    enoxparin  40 mg Subcutaneous Q12H (prophylaxis, 0900/2100)    insulin glargine U-100 (Lantus)  23 Units Subcutaneous QHS    levothyroxine  25 mcg Oral Before breakfast    midodrine  10 mg Oral Q8H    montelukast  10 mg Oral QHS    mupirocin   Nasal BID    pantoprazole  40 mg Oral Daily    potassium chloride  10 mEq Oral TID    potassium, sodium phosphates  1 packet Oral QID (AC & HS)     PRN Meds:  Current Facility-Administered Medications:     0.9%  NaCl infusion (for blood administration), , Intravenous, Q24H PRN    acetaminophen, 650 mg, Oral, Q8H PRN    dextrose 50%, 12.5 g, Intravenous, PRN    dextrose 50%, 25 g, Intravenous, PRN    glucagon (human recombinant), 1 mg, Intramuscular, PRN    glucose, 16 g, Oral, PRN    glucose, 24 g, Oral, PRN    HYDROmorphone, 1 mg, Intravenous, Q4H PRN    insulin aspart U-100, 0-10 Units, Subcutaneous, QID (AC + HS) PRN    LORazepam, 0.5 mg, Oral, BID PRN    magnesium oxide, 800 mg, Oral, PRN    magnesium oxide, 800 mg, Oral, PRN    naloxone, 0.02 mg, Intravenous, PRN    oxyCODONE, 5 mg, Oral, Q6H PRN    potassium chloride in water, 40 mEq, Intravenous, PRN **AND** potassium chloride in water, 60 mEq, Intravenous, PRN **AND** potassium chloride in water, 80 mEq, Intravenous, PRN    potassium, sodium phosphates, 2 packet, Oral, PRN    potassium, sodium phosphates, 2 packet, Oral, PRN    potassium, sodium phosphates, 2 packet, Oral, PRN    prochlorperazine, 5 mg, Intravenous, Q6H PRN    sodium chloride 0.9%, 10 mL, Intravenous, PRN    Family History       Problem Relation (Age of Onset)    Anxiety disorder Daughter    Arthritis Mother    Diabetes Mother, Father    Glaucoma Mother    Graves'  disease Daughter    Heart disease Mother, Father, Sister    Hypertension Mother, Father, Sister    Stroke Mother          Tobacco Use    Smoking status: Former     Current packs/day: 0.00     Average packs/day: 1 pack/day for 11.0 years (11.0 ttl pk-yrs)     Types: Cigarettes     Start date:      Quit date:      Years since quittin.5    Smokeless tobacco: Never   Substance and Sexual Activity    Alcohol use: Not Currently     Comment: occasional 2x/y    Drug use: Never    Sexual activity: Yes     Partners: Male       Review of Systems   Constitutional:  Positive for activity change, appetite change and fatigue.   Gastrointestinal:  Negative for abdominal pain, nausea and vomiting.   Musculoskeletal:  Positive for arthralgias and gait problem.   Psychiatric/Behavioral:  Positive for confusion.      Objective:     Vital Signs (Most Recent):  Temp: 97.7 °F (36.5 °C) (25)  Pulse: 84 (25)  Resp: (!) 24 (25)  BP: 97/62 (25)  SpO2: 95 % (25) Vital Signs (24h Range):  Temp:  [97.3 °F (36.3 °C)-98.4 °F (36.9 °C)] 97.7 °F (36.5 °C)  Pulse:  [] 84  Resp:  [13-43] 24  SpO2:  [77 %-99 %] 95 %  BP: ()/(45-79) 97/62     Weight: 115.6 kg (254 lb 13.6 oz)  Body mass index is 43.75 kg/m².       Physical Exam  Vitals and nursing note reviewed.   Constitutional:       General: She is not in acute distress.     Appearance: She is ill-appearing.      Comments: Drowsy, arouses to verbal stimuli.   HENT:      Mouth/Throat:      Mouth: Mucous membranes are moist.      Pharynx: Oropharynx is clear.   Eyes:      General: No scleral icterus.     Pupils: Pupils are equal, round, and reactive to light.   Cardiovascular:      Rate and Rhythm: Normal rate and regular rhythm.      Pulses: Normal pulses.   Pulmonary:      Effort: Pulmonary effort is normal. No respiratory distress.   Abdominal:      General: There is no distension.      Palpations: Abdomen is soft.    Musculoskeletal:      Comments: Right leg immobilizer in use   Skin:     General: Skin is warm and dry.   Neurological:      Mental Status: She is oriented to person, place, and time.      Comments: Unable to fully participate in conversation due to drowsiness.  Mentation waxing and waning during conversation.   Psychiatric:         Mood and Affect: Mood normal.         Behavior: Behavior normal.            Review of Symptoms      Symptom Assessment (ESAS 0-10 Scale)  Pain:  0  Dyspnea:  0  Anxiety:  0  Nausea:  0  Depression:  0  Anorexia:  0  Fatigue:  10  Insomnia:  0  Restlessness:  0  Agitation:  0         Performance Status:  40    Living Arrangements:  Lives in home and Lives with spouse    Psychosocial/Cultural:   See Palliative Psychosocial Note: No  **Primary  to Follow**  Palliative Care  Consult: No        Advance Care Planning  Advance Directives:   Living Will: No    Do Not Resuscitate Status: No    Medical Power of : No      Decision Making:  Patient answered questions and Family answered questions  Goals of Care: What is most important right now is to focus on curative/life-prolongation (regardless of treatment burdens). Accordingly, we have decided that the best plan to meet the patient's goals includes continuing with treatment.         Significant Labs: All pertinent labs within the past 24 hours have been reviewed.  CBC:   Recent Labs   Lab 07/08/25 0317   WBC 4.46   HGB 7.2*   HCT 22.1*   MCV 77*        BMP:  Recent Labs   Lab 07/08/25 0317         K 3.2*      CO2 26   BUN 29*   CREATININE 1.4   CALCIUM 8.1*   MG 1.6     LFT:  Lab Results   Component Value Date    AST 7 (L) 07/08/2025    ALKPHOS 91 07/08/2025    BILITOT 0.8 07/08/2025     Albumin:   Albumin   Date Value Ref Range Status   07/08/2025 2.2 (L) 3.5 - 5.2 g/dL Final     Protein:   Protein Total   Date Value Ref Range Status   07/08/2025 5.1 (L) 6.0 - 8.4 gm/dL Final      Lactic acid:   Lab Results   Component Value Date    LACTATE 0.9 07/07/2025       Significant Imaging: I have reviewed all pertinent imaging results/findings within the past 24 hours.      I spent a total of 75 minutes on the day of the visit. This includes face to face time in discussion of goals of care, symptom assessment, coordination of care and emotional support.  This also includes non-face to face time preparing to see the patient (eg, review of tests/imaging), obtaining and/or reviewing separately obtained history, documenting clinical information in the electronic or other health record, independently interpreting results and communicating results to the patient/family/caregiver, or care coordinator.      Adilia Stokes, NP  Palliative Medicine  [facility]

## 2025-07-09 NOTE — ASSESSMENT & PLAN NOTE
-In setting of pancreatic adenocarcinoma and MIGUEL ANGEL.  -no overt bleeding seen or reported currently  -monitor H&H = remains anemic but in stable range with no significant change currently (also with contribution from dilutional effect of recent IV fluids)  -FOBT = pending   -PPI daily   -DC'd IV fluids at 100 cc an hour.  DC on 07/08/2025  -consider transfusion of 1 unit PRBC if and when hemoglobin less than 7 Or when hemoglobin is less than 8 and patient has symptoms of anemia.  Hematology-Oncology consulting= follow recommendations

## 2025-07-09 NOTE — ASSESSMENT & PLAN NOTE
Last A1c reviewed-   Lab Results   Component Value Date    HGBA1C 10.1 (H) 04/25/2025     Most recent fingerstick glucose reviewed-   Recent Labs   Lab 07/08/25  2203 07/09/25  0018 07/09/25  0040 07/09/25  0214   POCTGLUCOSE 95 61* 96 81     Current correctional scale  Medium  Maintain anti-hyperglycemic dose as follows-   Antihyperglycemics (From admission, onward)      Start     Stop Route Frequency Ordered    07/09/25 2100  insulin glargine U-100 (Lantus) pen 20 Units         -- SubQ Nightly 07/09/25 1016    07/04/25 1142  insulin aspart U-100 pen 0-10 Units         -- SubQ Before meals & nightly PRN 07/04/25 1046          -Hold Oral hypoglycemics while patient is in the hospital.  -Monitor CBGS = on the lower range of normal  -Lantus 23 units subQ q.h.s. decreased to 20 units subQ q.h.s. (titrate as needed)

## 2025-07-09 NOTE — HPI
From admission HPI:  Sonia Muse is a 65 year old female with a past medical history of pancreatic cancer, anemia, obesity, DM, HTN, and hypothyroidism who presented with a R femur fracture after a fall, MIGUEL ANGEL, hyponatremia and neutropenia. She recently completed her first cycle of chemotherapy after roughly one week ago (patient of Dr. Calle). She endorses chronic diarrhea and decreased appetite as well as fatigue. She states her BP has fallen since being diagnosed with pancreatic cancer and starting chemotherapy. In the ED, the patient received a 1.5 L NS bolus and 1 L LR bolus as well as fentanyl and Zofran. Hospital Medicine was consulted for admission.    Palliative medicine consult:  Patient admitted for right femur fracture, MIGUEL ANGEL, hyponatremia and neutropenia.  She underwent ORIF for right femur repair.  She is known to me from previous outpatient palliative visit.  She is receiving chemotherapy for newly diagnosed pancreatic cancer.  We have been consulted for goals of care discussion.

## 2025-07-09 NOTE — PLAN OF CARE
"    MICU care plan note    Dx: Closed fracture of distal end of right femur    Shift Events: Patient more alert tonight and participating in care. Blood sugar low and few times and IVP dextrose was given. Patient refusing to drink juice or eat snacks. Patient educated on the importance of a balanced diet. Patient requested multiple times to walk to the restroom. Patient educated on partial weightbearing and recent break to right femur. Patient voices understanding but continues to request to get out of bed.      Goals of Care: ***    Neuro: {SICU Neuro:99838}    Vital Signs: BP (!) 91/50   Pulse 76   Temp 97.7 °F (36.5 °C) (Oral)   Resp 16   Ht 5' 4" (1.626 m)   Wt 115.6 kg (254 lb 13.6 oz)   SpO2 (!) 92%   Breastfeeding No   BMI 43.75 kg/m²     Respiratory: {SICU Respiratory:68563}    GI: {meds:22266::"***"} {Abd/Bowel Function:93644::"diarrhea","constipation","melena","hematachezia"}    Thrombus: {STANDARD CARE:52759::"DVT prophylaxis.","Pulmonary toilet including IS.","Early ambulation."}    Diet: {SICU DIET (Optional):08839}    Gtts: {SICU GTTS:38331}    Urine Output: {ICU Output:27033} *** cc/{ICU Timeframe:30946}    Drains:   {ICU Drains (Optional):49063}  {ICU Drains (Optional):86213}  {ICU Drains (Optional):83321}    {SICU Frequent (Optional):26484}    {SICU Frequent (Optional):98919}    {SICU Frequent (Optional):34663}     Labs/Accuchecks: ***.    Skin: {skin assessment:790244367::"Intact","pressure ulcer","***"}        Plan of care reviewed with patient, Sonia Allenfilippo , verbalized understanding. Patient progressing toward goals of care. NAEON. Safety measures in place and maintained throughout shift.     "

## 2025-07-09 NOTE — PROGRESS NOTES
INPATIENT NEPHROLOGY CONSULT   [facility] NEPHROLOGY    Sonia Muse  07/09/2025    Reason for consultation:  Acute kidney injury    Chief Complaint:   Chief Complaint   Patient presents with    Knee Injury     Fall  / rt. Knee pain      History of Present Illness:    Per H and P  Sonia Muse is a 65 year old female with a past medical history of pancreatic cancer, anemia, obesity, DM, HTN, and hypothyroidism who presented with a R femur fracture after a fall, MIGUEL ANGEL, hyponatremia and neutropenia. She recently completed her first cycle of chemotherapy after roughly one week ago (patient of Dr. Calle). She endorses chronic diarrhea and decreased appetite as well as fatigue. She states her BP has fallen since being diagnosed with pancreatic cancer and starting chemotherapy. In the ED, the patient received a 1.5 L NS bolus and 1 L LR bolus as well as fentanyl and Zofran.     7/5  pos leg pain, very tired, intermittent hypotension, on levophed drip, 560 cc uop  7/7 VSS.  7/8 VSS, decent UO, sCr better. Off pressors. Hypokalemia is new, add supplement, monitor Mg.  7/9 VSS, no new complains. Add Phos level, check vitamon d level. Appreciate Psychiatry, Palliative care, Heme/Onc input.    Plan of Care:    Acute kidney injury secondary to hemodynamically mediated renal injury. S/p first round of chemo a week ago. Oxaliplatin can cause  a rise in creatinine in 5-10% of cases. Urine sodium < 20 implicates low tubular flow. No casts on urine micro.  --Avoid NSAIDS, Fields II inhibitors, and other non-essential nephrotoxic agents  --Keep mean arterial pressure above 60 and systolic blood pressure above 100 as able to maintain renal perfusion  --Hold losartan, hctz, mobic,   --Urinary catheter in place, consider dc when mobile  --Avoid vancomycin toxicity    Hypotension  --hold antihypertensives, agree with midodrine  --volume resuscitation and pressors PRN  --treat  infections    Hyponatremia  Hypokalemia  Hypophosphatemia  Hypothyroidism  DM  --added oral KCL and Phos supplement, monitor and replete Mg, she takes PPI  --check vitamin d level  --no hypotonic iv piggy bags  --urine osm 318 implicating impaired free water clearance  --push nutrition, control DM    Anemia  --as per heme/onc recommendations  --iron adequate, transfuse as needed    Thank you for allowing us to participate in this patient's care. We will continue to follow.    Vital Signs:  Temp Readings from Last 3 Encounters:   25 98.5 °F (36.9 °C) (Oral)   25 98 °F (36.7 °C) (Temporal)   25 97.2 °F (36.2 °C)       Pulse Readings from Last 3 Encounters:   25 88   25 107   25 76       BP Readings from Last 3 Encounters:   25 106/65   25 (!) 108/55   25 109/69       Weight:  Wt Readings from Last 3 Encounters:   25 115.6 kg (254 lb 13.6 oz)   25 107.4 kg (236 lb 12.4 oz)   25 110.5 kg (243 lb 8 oz)       Past Medical & Surgical History:  Past Medical History:   Diagnosis Date    Allergy     Anxiety     Asthma     DDD (degenerative disc disease), cervical     Depression     Diabetes mellitus     Fibromyalgia     Hypertension     Neuromuscular disorder     Pancreatic mass     PONV (postoperative nausea and vomiting)     Thyroid disease     hypo       Past Surgical History:   Procedure Laterality Date    ADENOIDECTOMY      CARPAL TUNNEL RELEASE Right      SECTION      ENDOSCOPIC ULTRASOUND OF UPPER GASTROINTESTINAL TRACT N/A 2025    Procedure: ULTRASOUND, UPPER GI TRACT, ENDOSCOPIC;  Surgeon: Austen Grimes III, MD;  Location: [facility];  Service: Endoscopy;  Laterality: N/A;    ERCP N/A 2025    Procedure: ERCP (ENDOSCOPIC RETROGRADE CHOLANGIOPANCREATOGRAPHY);  Surgeon: hTuy Escobar MD;  Location: [facility];  Service: Endoscopy;  Laterality: N/A;    ERCP N/A 2025    Procedure: ERCP (ENDOSCOPIC RETROGRADE  CHOLANGIOPANCREATOGRAPHY);  Surgeon: Austen Grimes III, MD;  Location: [facility] ENDO;  Service: Endoscopy;  Laterality: N/A;    INSERTION OF TUNNELED CENTRAL VENOUS CATHETER (CVC) WITH SUBCUTANEOUS PORT Left 2025    Procedure: [id number];  Surgeon: Ata Fernandez III, MD;  Location: [facility] OR;  Service: General;  Laterality: Left;    JOINT REPLACEMENT Right 2016    knee    KNEE ARTHROPLASTY Left 10/19/2020    Procedure: ARTHROPLASTY, KNEE;  Surgeon: Felipe Herring MD;  Location: [facility] OR;  Service: Orthopedics;  Laterality: Left;  Louie Liz    ORIF FEMUR FRACTURE Right 2025    Procedure: ORIF, FRACTURE, FEMUR (distal femur ORIF, synthes VA condylar plate, izaiah table, C-arm, triangle);  Surgeon: Osmany Harding MD;  Location: [facility] OR;  Service: Orthopedics;  Laterality: Right;    ROTATOR CUFF REPAIR Right     TONSILLECTOMY      TOTAL KNEE ARTHROPLASTY Right     TUBAL LIGATION         Past Social History:  Social History     Socioeconomic History    Marital status:     Number of children: 3   Occupational History    Occupation: [employer]     Comment: primary sub for [employer]   Tobacco Use    Smoking status: Former     Current packs/day: 0.00     Average packs/day: 1 pack/day for 11.0 years (11.0 ttl pk-yrs)     Types: Cigarettes     Start date:      Quit date:      Years since quittin.5    Smokeless tobacco: Never   Substance and Sexual Activity    Alcohol use: Not Currently     Comment: occasional 2x/y    Drug use: Never    Sexual activity: Yes     Partners: Male     Social Drivers of Health     Financial Resource Strain: Low Risk  (2025)    Overall Financial Resource Strain (CARDIA)     Difficulty of Paying Living Expenses: Not hard at all   Recent Concern: Financial Resource Strain - High Risk (2025)    Overall Financial Resource Strain (CARDIA)     Difficulty of Paying Living Expenses: Hard   Food Insecurity: No Food Insecurity (2025)    Hunger  Vital Sign     Worried About Running Out of Food in the Last Year: Never true     Ran Out of Food in the Last Year: Never true   Recent Concern: Food Insecurity - Food Insecurity Present (4/28/2025)    Hunger Vital Sign     Worried About Running Out of Food in the Last Year: Sometimes true     Ran Out of Food in the Last Year: Sometimes true   Transportation Needs: No Transportation Needs (7/5/2025)    PRAPARE - Transportation     Lack of Transportation (Medical): No     Lack of Transportation (Non-Medical): No   Recent Concern: Transportation Needs - Unmet Transportation Needs (4/28/2025)    PRAPARE - Transportation     Lack of Transportation (Medical): Yes     Lack of Transportation (Non-Medical): Yes   Physical Activity: Unknown (4/28/2025)    Exercise Vital Sign     Days of Exercise per Week: Patient declined     Minutes of Exercise per Session: 30 min   Stress: No Stress Concern Present (7/5/2025)    [employer] [place] [employer] - Occupational Stress Questionnaire     Feeling of Stress : Not at all   Recent Concern: Stress - Stress Concern Present (4/28/2025)    [employer] [place] [employer] - Occupational Stress Questionnaire     Feeling of Stress : Very much   Housing Stability: Low Risk  (7/5/2025)    Housing Stability Vital Sign     Unable to Pay for Housing in the Last Year: No     Number of Times Moved in the Last Year: 0     Homeless in the Last Year: No     Medications:  Medications Ordered Prior to Encounter[1]  Scheduled Meds:   alteplase  2 mg Intra-Catheter Once    DULoxetine  60 mg Oral Daily    enoxparin  40 mg Subcutaneous Q12H (prophylaxis, 0900/2100)    insulin glargine U-100 (Lantus)  23 Units Subcutaneous QHS    levothyroxine  25 mcg Oral Before breakfast    midodrine  10 mg Oral Q8H    montelukast  10 mg Oral QHS    mupirocin   Nasal BID    pantoprazole  40 mg Oral Daily    potassium chloride  10 mEq Oral TID    potassium, sodium phosphates  1 packet Oral QID (AC & HS)  "    Continuous Infusions:      PRN Meds:.  Current Facility-Administered Medications:     0.9%  NaCl infusion (for blood administration), , Intravenous, Q24H PRN    acetaminophen, 650 mg, Oral, Q8H PRN    dextrose 50%, 12.5 g, Intravenous, PRN    dextrose 50%, 25 g, Intravenous, PRN    glucagon (human recombinant), 1 mg, Intramuscular, PRN    glucose, 16 g, Oral, PRN    glucose, 24 g, Oral, PRN    HYDROmorphone, 1 mg, Intravenous, Q4H PRN    insulin aspart U-100, 0-10 Units, Subcutaneous, QID (AC + HS) PRN    LORazepam, 0.5 mg, Oral, BID PRN    magnesium oxide, 800 mg, Oral, PRN    magnesium oxide, 800 mg, Oral, PRN    naloxone, 0.02 mg, Intravenous, PRN    oxyCODONE, 5 mg, Oral, Q6H PRN    potassium chloride in water, 40 mEq, Intravenous, PRN **AND** potassium chloride in water, 60 mEq, Intravenous, PRN **AND** potassium chloride in water, 80 mEq, Intravenous, PRN    potassium, sodium phosphates, 2 packet, Oral, PRN    potassium, sodium phosphates, 2 packet, Oral, PRN    potassium, sodium phosphates, 2 packet, Oral, PRN    prochlorperazine, 5 mg, Intravenous, Q6H PRN    sodium chloride 0.9%, 10 mL, Intravenous, PRN    Allergies:  Erythromycin, Codeine, Lactose, and [place]    Past Family History:  Reviewed; refer to Hospitalist Admission Note    Review of Systems:  Review of Systems - All 14 systems reviewed and negative, except as noted in HPI    Physical Exam:    /65 (BP Location: Right forearm, Patient Position: Lying)   Pulse 88   Temp 98.5 °F (36.9 °C) (Oral)   Resp (!) 28   Ht 5' 4" (1.626 m)   Wt 115.6 kg (254 lb 13.6 oz)   SpO2 96%   Breastfeeding No   BMI 43.75 kg/m²     General Appearance:    Alert, cooperative, no distress, appears stated age   Head:    Normocephalic, without obvious abnormality, atraumatic   Eyes:    PER, conjunctiva/corneas clear, EOM's intact in both eyes        Throat:   Lips, mucosa, and tongue normal; teeth and gums normal   Back:     Symmetric, no curvature, ROM " normal, no CVA tenderness   Lungs:     Clear to auscultation bilaterally, respirations unlabored   Chest wall:    No tenderness or deformity   Heart:    Regular rate and rhythm, S1 and S2 normal, no murmur, rub   or gallop   Abdomen:     Soft, non-tender, bowel sounds active all four quadrants,     no masses, no organomegaly   Extremities:   Extremities normal, atraumatic, no cyanosis or edema   Pulses:   2+ and symmetric all extremities   MSK:   No joint or muscle swelling, tenderness or deformity   Skin:   Skin color, texture, turgor normal, no rashes or lesions   Neurologic:   CNII-XII intact, normal strength and sensation       Throughout.  No flap     Results:  Recent Labs   Lab 07/07/25 0427 07/08/25  0317 07/09/25  0214    141 142   K 3.7 3.2* 3.7    107 107   CO2 21* 26 27   BUN 28* 29* 29*   CREATININE 1.6* 1.4 1.3   * 104 73       Recent Labs   Lab 07/05/25  0326 07/05/25  1640 07/07/25 0427 07/08/25  0317 07/09/25  0214   CALCIUM 7.9*   < > 8.6* 8.1* 8.1*   ALBUMIN 2.7*   < > 2.4* 2.2* 2.3*   PHOS 3.5  --  2.6* 1.5*  --    MG 1.5*   < > 1.9 1.6 1.7    < > = values in this interval not displayed.             Recent Labs   Lab 07/07/25  1635 07/07/25  2048 07/08/25  0721 07/08/25  1153 07/08/25  1713 07/08/25  2122 07/08/25  2203 07/09/25  0018 07/09/25  0040 07/09/25  0214   POCTGLUCOSE 159* 150* 103 73 71 64* 95 61* 96 81       Recent Labs   Lab 05/25/24  0951 11/21/24  1049 04/25/25  0751   Hemoglobin A1C 6.4 H 6.1 H  --    Hemoglobin A1c  --   --  10.1 H       Recent Labs   Lab 07/07/25  0427 07/08/25  0317 07/09/25  0214   WBC 1.44* 4.46 10.71   HGB 7.8* 7.2* 7.2*   HCT 23.7* 22.1* 23.0*    256 263   MCV 76* 77* 80*   MCHC 32.9 32.6 31.3*       Recent Labs   Lab 07/07/25  0427 07/08/25  0317 07/09/25  0214   BILITOT 1.4* 0.8 0.7   PROT 5.7* 5.1* 5.1*   ALBUMIN 2.4* 2.2* 2.3*   ALKPHOS 103 91 100   ALT 5* 4* 4*   AST 11 7* 7*       Recent Labs   Lab 03/18/23  0920  05/25/24  0951 08/13/24  1525 04/24/25  1404 04/24/25  1636 07/04/25  1057   Color, UA YELLOW DARK YELLOW  --   --   --   --    Appearance, UA CLEAR CLEAR  --   --  Clear Cloudy A   pH, UA 8.0 6.0 7.0 5.5  --   --    Spec Grav UA  --   --   --   --  >=1.030 A 1.025   Specific Gravity, UA 1.018 1.020  --   --   --   --    Protein, UA 1+ A 1+ A  --   --  Negative 1+ A   Ketones, UA NEGATIVE NEGATIVE  --   --   --   --    Urobilinogen, UA  --   --   --   --  Negative 2.0-3.0 A   Bilirubin, UA  --   --   --   --  1+ A 1+ A   Occult Blood UA NEGATIVE NEGATIVE  --   --   --   --    Nitrite, UA NEGATIVE NEGATIVE  --   --   --   --    RBC, UA  --   --   --   --  1 3   WBC, UA  --   --   --   --  1 15 H   Bacteria, UA  --   --   --   --  Rare  --        Recent Labs   Lab 07/06/25  1020 07/06/25  1725 07/07/25  0310   POC PH 7.054 LL 7.298 L 7.372   POC PCO2 65.9 HH 29.9 L 35.0   POC HCO3 18.4 L 14.6 L 20.3 L   POC PO2 84 76 L 88   POC SATURATED O2 90 94 97   POC BE -12 L -12 L -5 L   Sample ARTERIAL ARTERIAL ARTERIAL       Microbiology Results (last 7 days)       Procedure Component Value Units Date/Time    Blood culture [[id number]]  (Normal) Collected: 07/04/25 1251    Order Status: Completed Specimen: Blood from Peripheral, Hand, Left Updated: 07/07/25 1901     CULTURE, BLOOD ([facility]) No Growth After 72 Hours    Blood culture [[id number]]  (Normal) Collected: 07/04/25 1247    Order Status: Completed Specimen: Blood from Peripheral, Antecubital, Left Updated: 07/07/25 1901     CULTURE, BLOOD ([facility]) No Growth After 72 Hours    Urine culture [[id number]] Collected: 07/04/25 1057    Order Status: Completed Specimen: Urine Updated: 07/07/25 0754     Urine Culture No Growth          Can Bell MD  [facility] [place]  [phone]         [1]   No current facility-administered medications on file prior to encounter.     Current Outpatient Medications on File Prior to Encounter   Medication Sig Dispense Refill     ALPRAZolam (XANAX) 0.25 MG tablet Take 1 tablet (0.25 mg total) by mouth daily as needed for Anxiety. 30 tablet 0    amLODIPine (NORVASC) 5 MG tablet Take 1 tablet (5 mg total) by mouth once daily. 90 tablet 1    cetirizine (ZYRTEC) 10 MG tablet TAKE 1 TABLET BY MOUTH ONCE DAILY 90 tablet 1    DULoxetine (CYMBALTA) 60 MG capsule TAKE one CAPSULE BY MOUTH EVERY DAY 90 capsule 1    hydroCHLOROthiazide (HYDRODIURIL) 25 MG tablet TAKE one TABLET BY MOUTH ONCE DAILY 90 tablet 1    HYDROcodone-acetaminophen (NORCO) 5-325 mg per tablet Take 1 tablet by mouth every 6 (six) hours as needed. 10 tablet 0    levothyroxine (SYNTHROID) 25 MCG tablet TAKE one TABLET BY MOUTH BEFORE breakfast 90 tablet 1    losartan (COZAAR) 100 MG tablet TAKE one TABLET BY MOUTH ONCE DAILY 90 tablet 0    magnesium oxide (MAGOX) 400 mg (241.3 mg magnesium) tablet Take 1 tablet (400 mg total) by mouth once daily. 200 tablet 1    metoprolol succinate (TOPROL-XL) 25 MG 24 hr tablet Take 1 tablet (25 mg total) by mouth once daily. 90 tablet 1    montelukast (SINGULAIR) 10 mg tablet TAKE ONE TABLET BY MOUTH EVERY EVENING 90 tablet 1    OLANZapine (ZYPREXA) 5 MG tablet Take 1 tablet by mouth nightly on days 1-3 of each chemotherapy cycle. 3 tablet 11    ondansetron (ZOFRAN) 8 MG tablet Take 1 tablet (8 mg total) by mouth every 8 (eight) hours as needed. 30 tablet 2    prochlorperazine (COMPAZINE) 10 MG tablet Take 1 tablet (10 mg total) by mouth every 6 (six) hours as needed. 30 tablet 1    albuterol (VENTOLIN HFA) 90 mcg/actuation inhaler Inhale 2 puffs into the lungs every 6 (six) hours as needed for Wheezing or Shortness of Breath. Rescue 18 g 1    blood sugar diagnostic Strp Use 1 strip to check blood sugar 2 times daily 100 each 5    blood-glucose meter kit Use as instructed 1 each 0    insulin glargine U-100, Lantus, 100 unit/mL (3 mL) SubQ InPn pen Inject 21 Units into the skin every evening. 9 mL 3    lancets (LANCETS,THIN) Misc 1 each by  "Misc.(Non-Drug; Combo Route) route 3 (three) times daily. 90 each 0    LIDOcaine-prilocaine (EMLA) cream Apply topically as needed. 30 g 0    loperamide (IMODIUM) 2 mg capsule Take 2 tablets (4mg) by mouth after first loose stool, 1 tablet every 2 hours until diarrhea free for 12 hours. May take 2 tablets (4mg) by mouth every 4 hours at night. May require more than the package labeling maximum dose of 16mg/day. 30 capsule 11    LORazepam (ATIVAN) 0.5 MG tablet Take 1 tablet (0.5 mg total) by mouth 2 (two) times daily. 60 tablet 0    meloxicam (MOBIC) 15 MG tablet Take 15 mg by mouth once daily. 1/2 tablet am      metronidazole 0.75% (METROCREAM) 0.75 % Crea Apply 1 application  topically 2 (two) times daily.      pen needle, diabetic 32 gauge x 5/32" Ndle 1 Needle by Misc.(Non-Drug; Combo Route) route once daily. 100 each 2     "

## 2025-07-09 NOTE — CONSULTS
[facility]  Inpatient Nutrition Assessment    Admit Date: 7/4/2025   Total duration of encounter: 5 days     Nutrition Recommendation/Prescription   1. Recommend [employer] Standard, BID. 2. Continue 2000 consistent carbohydrate diet and  encourage po intake. 3. RD following.    Communication of RD recs:    Nutrition Discharge Planning: Therapeutic diet (comments)      NUTRITION ASSESSMENT    MEDICAL CHART REVIEW:  Patient Age: 65 y.o. female  Reason For Assessment: consult  General Information Comments: Pt leathargic. Family states poor intake over the past few days. Pt has allergy to lactose- offered [employer] supplement- lactose free supplement- willing to try. No recent weight loss. No issues chewing or swallowing just decreased appetite.  Nutrition Risk Screen  MST Score: 0  Have you recently lost weight without trying?: No  Weight loss score: 0  Have you been eating poorly because of a decreased appetite?: No  Appetite score: 0    The primary encounter diagnosis was Closed bicondylar fracture of right femur, initial encounter. Diagnoses of Knee injury, Preoperative cardiovascular examination, MIGUEL ANGEL (acute kidney injury), Anemia, unspecified type, Displaced fracture of femur, Chest pain, and Pancreatic cancer were also pertinent to this visit.     Past Medical History:   Diagnosis Date    Allergy     Anxiety     Asthma     DDD (degenerative disc disease), cervical     Depression     Diabetes mellitus     Fibromyalgia     Hypertension 2005    Neuromuscular disorder     Pancreatic mass     PONV (postoperative nausea and vomiting)     Thyroid disease     hypo         SOCIAL AND DIET HISTORY:  Nutrition/Diet History  Spiritual, Cultural Beliefs, Congregational Practices, Values that Affect Care: no  Food Allergies: other (see comments) (lactose, walnut)  Factors Affecting Nutritional Intake: decreased appetite  Nutrition-related SDOH: None Identified    Pertinent Labs Reviewed: reviewed    Recent Labs    Lab 07/03/25  1058 07/04/25  0748 07/05/25  0326 07/05/25  1640 07/06/25  0344 07/06/25  1309 07/07/25  0427 07/08/25  0317 07/09/25  0214   * 129* 128* 130* 130* 133* 139 141 142   K 4.0 4.2 4.1 3.8 3.7 3.9 3.7 3.2* 3.7   CALCIUM 9.4 9.0 7.9* 8.1* 8.1* 8.2* 8.6* 8.1* 8.1*   PHOS  --   --  3.5  --   --   --  2.6* 1.5* 1.4*   MG 1.8  --  1.5* 1.7 1.5*  --  1.9 1.6 1.7   CL 98 98 101 102 103 107 108 107 107   CO2 22* 20* 17* 19* 17* 14* 21* 26 27   BUN 31* 34* 36* 31* 26* 25* 28* 29* 29*   CREATININE 1.4 2.5* 2.5* 2.0* 1.8* 1.7* 1.6* 1.4 1.3   EGFRNORACEVR 42* 21* 21* 28* 31* 34* 36* 42* 46*   * 192* 264* 263* 196* 260* 203* 104 73   BILITOT 1.0 1.6* 3.3* 1.6* 1.2*  --  1.4* 0.8 0.7   ALKPHOS 116 129 110 111 108  --  103 91 100   ALT 5* 10 6* 5* 5*  --  5* 4* 4*   AST 6* 11 7* 4* 4*  --  11 7* 7*   ALBUMIN 3.5 2.3* 2.7* 2.7* 2.6*  --  2.4* 2.2* 2.3*       Pertinent Medications Reviewed: reviewed    Scheduled Meds:   alteplase  2 mg Intra-Catheter Once    [START ON 7/10/2025] DULoxetine  30 mg Oral Daily    [START ON 7/10/2025] DULoxetine  60 mg Oral QHS    enoxparin  40 mg Subcutaneous Q12H (prophylaxis, 0900/2100)    insulin glargine U-100 (Lantus)  20 Units Subcutaneous QHS    levothyroxine  25 mcg Oral Before breakfast    midodrine  10 mg Oral Q8H    montelukast  10 mg Oral QHS    mupirocin   Nasal BID    pantoprazole  40 mg Oral Daily    potassium chloride  10 mEq Oral TID    potassium, sodium phosphates  1 packet Oral QID (AC & HS)    risperiDONE  0.5 mg Oral TID     Continuous Meds:     NUTRITION FOCUSED PHYSICAL EXAM (NFPE):   Does not meet criteria at this time.       Energy Intake (Malnutrition): less than 75% for greater than 7 days   Orbital Region (Subcutaneous Fat Loss): well nourished  Upper Arm Region (Subcutaneous Fat Loss): well nourished   [place] Region (Muscle Loss): well nourished  Clavicle Bone Region (Muscle Loss): well nourished  Clavicle and Acromion Bone Region (Muscle Loss):  "well nourished          A minimum of two characteristics is recommended for diagnosis of either severe or non-severe malnutrition.  Wound(s):     Wound 07/07/25 0000 Moisture associated dermatitis Buttocks-Wound Image: Images linked    Anthropometrics  Height: 5' 4" (162.6 cm)  Height (inches): 64 in Height Method: Stated   Weight: 115.6 kg (254 lb 13.6 oz) Weight (lb): 254.85 lb Weight Method: Bed Scale           BMI (Calculated): 43.7 BMI Grade: greater than 40 - morbid obesity                        Weight History:  Wt Readings from Last 5 Encounters:   07/06/25 115.6 kg (254 lb 13.6 oz)   07/03/25 107.4 kg (236 lb 12.4 oz)   06/26/25 110.5 kg (243 lb 8 oz)   06/24/25 111.2 kg (245 lb 2.4 oz)   06/23/25 112.8 kg (248 lb 10.9 oz)   Admit Weight: 107 kg (236 lb) (07/04/25 0715), Weight Method: Bed Scale    Diet order:   Diet Consistent Carbohydrate 2000 Calories (up to 75 gm per meal); Lactose Free; Standard Tray        ESTIMATED NUTRITION REQUIREMENTS:  Weight Used For Calorie Calculations: 115 kg (253 lb 8.5 oz)  Energy Calorie Requirements (kcal): 4813-3333 kcal (20-25 kcal/ kg bw)  Energy Need Method: Kcal/kg  Weight Used For Protein Calculations: 115 kg (253 lb 8.5 oz)  Protein Requirements: 172-230 gm (1.5-2.0 gm/ kg bw)- continue to monitor renal fxn and adjust of needed     CHO Requirement: n/a    Evaluation of Received Nutrient/Fluid Intake  % Intake of Estimated Energy Needs: 0 - 25 %  % Meal Intake: 0 - 25 %  Energy Calories Required: not meeting needs  Protein Required: not meeting needs  Fluid Required: not meeting needs     Intake/Output Summary (Last 24 hours) at 7/9/2025 1502  Last data filed at 7/9/2025 1200  Gross per 24 hour   Intake 1363.11 ml   Output 300 ml   Net 1063.11 ml      Last Bowel Movement: 07/09/25    NUTRITION DIAGNOSIS   Inadequate protein energy intake related to Chronic illness as evidence by Intake <50% estimated needs  Status: New    NUTRITION INTERVENTION    Nutrition " Interventions: Medical food supplement therapy, Collaboration and referral of nutrition care    Patient Education: educated family to continue to encourage po intake  NUTRITION MONITORING AND EVALUATION    Monitor and Evaluation: Beliefs and attitudes, Inflammatory profile, Food and beverage intake, Electrolyte and renal panel, Food and nutrition knowledge, Nutrition focused physical findings, Weight, Glucose/endocrine profile   Nutrition Goals:   Goal #1: PO intake will meet greater than or equal to 50% of estimated needs by RD follow up   Nutrition Goal Status #1: progressing towards goal  Goal #2: Oral supplement consumption of greater than or equal to 50% of estimated needs by RD follow up  Nutrition Goal Status #2: progressing towards goal    Nutrition Risk  Level of Risk/Frequency of Follow-up: high (2 x week)     Nutrition Follow-Up  RD Follow-up?: Yes    Please consult if re-assessment needed sooner.    Yuni Esipnosa RD 07/09/2025 3:04 PM

## 2025-07-09 NOTE — PLAN OF CARE
POC reviewed with patient and spouse. Patient is oriented to person, place, and situation. Up with P.T.  to chair for approx. One hour. Pain medication as needed. Afebrile. Appetite poor. Monitoring blood glucose. Surgical incision wrapped with ace bandage. No drainage noted. Positive pulses bilaterally. Safety measures in place.   Problem: Skin Injury Risk Increased  Goal: Skin Health and Integrity  Outcome: Progressing     Problem: Infection  Goal: Absence of Infection Signs and Symptoms  Outcome: Progressing     Problem: Adult Inpatient Plan of Care  Goal: Plan of Care Review  Outcome: Progressing  Goal: Patient-Specific Goal (Individualized)  Outcome: Progressing  Goal: Absence of Hospital-Acquired Illness or Injury  Outcome: Progressing  Goal: Optimal Comfort and Wellbeing  Outcome: Progressing  Goal: Readiness for Transition of Care  Outcome: Progressing     Problem: Bariatric Environmental Safety  Goal: Safety Maintained with Care  Outcome: Progressing     Problem: Diabetes Comorbidity  Goal: Blood Glucose Level Within Targeted Range  Outcome: Progressing     Problem: Acute Kidney Injury/Impairment  Goal: Fluid and Electrolyte Balance  Outcome: Progressing  Goal: Improved Oral Intake  Outcome: Progressing  Goal: Effective Renal Function  Outcome: Progressing     Problem: Wound  Goal: Optimal Coping  Outcome: Progressing  Goal: Optimal Functional Ability  Outcome: Progressing  Goal: Absence of Infection Signs and Symptoms  Outcome: Progressing  Goal: Improved Oral Intake  Outcome: Progressing  Goal: Optimal Pain Control and Function  Outcome: Progressing  Goal: Skin Health and Integrity  Outcome: Progressing  Goal: Optimal Wound Healing  Outcome: Progressing     Problem: Fall Injury Risk  Goal: Absence of Fall and Fall-Related Injury  Outcome: Progressing     Problem: Orthopaedic Fracture  Goal: Absence of Bleeding  Outcome: Progressing  Goal: Bowel Elimination  Outcome: Progressing  Goal: Absence of Embolism  Signs and Symptoms  Outcome: Progressing  Goal: Fracture Stability  Outcome: Progressing  Goal: Optimal Functional Ability  Outcome: Progressing  Goal: Absence of Infection Signs and Symptoms  Outcome: Progressing  Goal: Effective Tissue Perfusion  Outcome: Progressing  Goal: Optimal Pain Control and Function  Outcome: Progressing  Goal: Effective Oxygenation and Ventilation  Outcome: Progressing     Problem: Coping Ineffective  Goal: Effective Coping  Outcome: Progressing

## 2025-07-09 NOTE — ASSESSMENT & PLAN NOTE
I reviewed the patient's chart and discussed the case with the patient's team.      I examined Sonia Muse at bedside.  The patient lacks capacity for complex medical decision-making.  I spoke with pt and her friend at bedside.    Patient is a little better today.  She remains drowsy and her mentation is waxing and waning.  She is sitting up in chair and worked with PT this morning.    Today she is more confused to me than yesterday.  She is not able recall me visiting with her yesterday and can not recall my previous outpatient visits with her.    We discussed discharge to inpatient rehab when she is medically stable.  She is agreeable to this and wants to work on her strength and hopefully regain her independence.    I attempted to have conversation with her about cancer treatment. She was not able to hold conversation with me today due to her lack of understanding and ability to participate in conversation.    Her spouse is coming to hospital today.  I will have our palliative RN follow-up about ACP documentation with them.      We will continue to follow along and are happy to assist in any way we can.    I appreciate being involved in patient's care.  Please reach out if I can be of any assistance.

## 2025-07-09 NOTE — ASSESSMENT & PLAN NOTE
-status post Chemotherapy roughly one week ago.  -monitor WBC= improved/resolved  -neutropenic precautions   -urine culture on 07/04/2025 = no growth till date (UTI ruled out)  -blood cultures x2 on 07/04/2025 = remains negative till date  -DC filgrastim 458 mg IV daily x5 days.  Ordered on 07/04/2025.  DC on 07/08/2025 per Heme-Onc  -DC cefepime 1 g IV b.i.d. empirically.  Ordered on 07/04/2025.  DC on 07/08/2025 ( urine and blood cultures remain negative)  -Heme-Onc consulted = follow recommendations

## 2025-07-09 NOTE — CARE UPDATE
07/09/25 0746   Patient Assessment/Suction   Level of Consciousness (AVPU) alert   Respiratory Effort Normal;Unlabored   All Lung Fields Breath Sounds diminished;clear   Rhythm/Pattern, Respiratory unlabored;pattern regular;depth regular   Skin Integrity   $ Wound Care Tech Time 15 min   Area Observed Left;Right;Cheek;Nares   Skin Appearance without discoloration   PRE-TX-O2   Device (Oxygen Therapy) nasal cannula with humidification   $ Is the patient on Low Flow Oxygen? Yes   Flow (L/min) (Oxygen Therapy) 3   SpO2 96 %   Pulse Oximetry Type Continuous   $ Pulse Oximetry - Multiple Charge Pulse Oximetry - Multiple   Pulse 88   Resp (!) 28   Preset CPAP/BiPAP Settings   Mode Of Delivery standby;CPAP   $ CPAP/BiPAP Daily Charge 1   CPAP/BIPAP charged w/in last 24 h YES   $ Initial CPAP/BiPAP Setup? No   $ Is patient using? No/refused

## 2025-07-10 PROBLEM — Z71.89 ACP (ADVANCE CARE PLANNING): Status: ACTIVE | Noted: 2025-07-10

## 2025-07-10 LAB
ALBUMIN SERPL-MCNC: 2.4 G/DL (ref 3.5–5.2)
ALP SERPL-CCNC: 95 UNIT/L (ref 55–135)
ALT SERPL-CCNC: 4 UNIT/L (ref 10–44)
ANION GAP (SMH): 4 MMOL/L (ref 8–16)
ANISOCYTOSIS BLD QL SMEAR: SLIGHT
AST SERPL-CCNC: 6 UNIT/L (ref 10–40)
BILIRUB SERPL-MCNC: 0.7 MG/DL (ref 0.1–1)
BUN SERPL-MCNC: 26 MG/DL (ref 8–23)
CALCIUM SERPL-MCNC: 7.8 MG/DL (ref 8.7–10.5)
CHLORIDE SERPL-SCNC: 109 MMOL/L (ref 95–110)
CO2 SERPL-SCNC: 30 MMOL/L (ref 23–29)
CREAT SERPL-MCNC: 1.2 MG/DL (ref 0.5–1.4)
EOSINOPHIL NFR BLD MANUAL: 1 % (ref 0–8)
ERYTHROCYTE [DISTWIDTH] IN BLOOD BY AUTOMATED COUNT: 16.2 % (ref 11.5–14.5)
GFR SERPLBLD CREATININE-BSD FMLA CKD-EPI: 51 ML/MIN/1.73/M2
GLUCOSE SERPL-MCNC: 92 MG/DL (ref 70–110)
HCT VFR BLD AUTO: 27.8 % (ref 37–48.5)
HGB BLD-MCNC: 8.4 GM/DL (ref 12–16)
HYPOCHROMIA BLD QL SMEAR: NORMAL
LYMPHOCYTES NFR BLD MANUAL: 20 % (ref 18–48)
MAGNESIUM SERPL-MCNC: 1.6 MG/DL (ref 1.6–2.6)
MCH RBC QN AUTO: 25.4 PG (ref 27–31)
MCHC RBC AUTO-ENTMCNC: 30.2 G/DL (ref 32–36)
MCV RBC AUTO: 84 FL (ref 82–98)
MONOCYTES NFR BLD MANUAL: 15 % (ref 4–15)
MYELOCYTES NFR BLD MANUAL: 1 %
NEUTROPHILS NFR BLD MANUAL: 59 % (ref 38–73)
NEUTS BAND NFR BLD MANUAL: 4 %
NUCLEATED RBC (/100WBC) (SMH): 0 /100 WBC
PHOSPHATE SERPL-MCNC: 1.8 MG/DL (ref 2.7–4.5)
PLATELET # BLD AUTO: 240 K/UL (ref 150–450)
PMV BLD AUTO: 9.5 FL (ref 9.2–12.9)
POCT GLUCOSE: 101 MG/DL (ref 70–110)
POCT GLUCOSE: 112 MG/DL (ref 70–110)
POCT GLUCOSE: 130 MG/DL (ref 70–110)
POCT GLUCOSE: 86 MG/DL (ref 70–110)
POCT GLUCOSE: 92 MG/DL (ref 70–110)
POCT GLUCOSE: 92 MG/DL (ref 70–110)
POCT GLUCOSE: 98 MG/DL (ref 70–110)
POTASSIUM SERPL-SCNC: 3.9 MMOL/L (ref 3.5–5.1)
PROT SERPL-MCNC: 4.9 GM/DL (ref 6–8.4)
RBC # BLD AUTO: 3.31 M/UL (ref 4–5.4)
SODIUM SERPL-SCNC: 143 MMOL/L (ref 136–145)
WBC # BLD AUTO: 12.59 K/UL (ref 3.9–12.7)

## 2025-07-10 PROCEDURE — 99900031 HC PATIENT EDUCATION (STAT)

## 2025-07-10 PROCEDURE — 63600175 PHARM REV CODE 636 W HCPCS: Performed by: INTERNAL MEDICINE

## 2025-07-10 PROCEDURE — 94761 N-INVAS EAR/PLS OXIMETRY MLT: CPT

## 2025-07-10 PROCEDURE — 99900035 HC TECH TIME PER 15 MIN (STAT)

## 2025-07-10 PROCEDURE — 83735 ASSAY OF MAGNESIUM: CPT | Performed by: ORTHOPAEDIC SURGERY

## 2025-07-10 PROCEDURE — 11000001 HC ACUTE MED/SURG PRIVATE ROOM

## 2025-07-10 PROCEDURE — 25000003 PHARM REV CODE 250: Performed by: ORTHOPAEDIC SURGERY

## 2025-07-10 PROCEDURE — 97530 THERAPEUTIC ACTIVITIES: CPT

## 2025-07-10 PROCEDURE — 25000003 PHARM REV CODE 250: Performed by: INTERNAL MEDICINE

## 2025-07-10 PROCEDURE — 25000003 PHARM REV CODE 250: Performed by: STUDENT IN AN ORGANIZED HEALTH CARE EDUCATION/TRAINING PROGRAM

## 2025-07-10 PROCEDURE — 27000221 HC OXYGEN, UP TO 24 HOURS

## 2025-07-10 PROCEDURE — 99233 SBSQ HOSP IP/OBS HIGH 50: CPT | Mod: 25,,, | Performed by: NURSE PRACTITIONER

## 2025-07-10 PROCEDURE — 63600175 PHARM REV CODE 636 W HCPCS: Performed by: STUDENT IN AN ORGANIZED HEALTH CARE EDUCATION/TRAINING PROGRAM

## 2025-07-10 PROCEDURE — 99497 ADVNCD CARE PLAN 30 MIN: CPT | Mod: 25,,, | Performed by: NURSE PRACTITIONER

## 2025-07-10 PROCEDURE — 84100 ASSAY OF PHOSPHORUS: CPT | Performed by: INTERNAL MEDICINE

## 2025-07-10 PROCEDURE — 85025 COMPLETE CBC W/AUTO DIFF WBC: CPT | Performed by: STUDENT IN AN ORGANIZED HEALTH CARE EDUCATION/TRAINING PROGRAM

## 2025-07-10 PROCEDURE — 80053 COMPREHEN METABOLIC PANEL: CPT | Performed by: ORTHOPAEDIC SURGERY

## 2025-07-10 PROCEDURE — 97530 THERAPEUTIC ACTIVITIES: CPT | Mod: CQ

## 2025-07-10 RX ORDER — MAGNESIUM SULFATE HEPTAHYDRATE 40 MG/ML
2 INJECTION, SOLUTION INTRAVENOUS ONCE
Status: COMPLETED | OUTPATIENT
Start: 2025-07-10 | End: 2025-07-10

## 2025-07-10 RX ORDER — OXYCODONE AND ACETAMINOPHEN 10; 325 MG/1; MG/1
1 TABLET ORAL EVERY 4 HOURS PRN
Refills: 0 | Status: DISCONTINUED | OUTPATIENT
Start: 2025-07-10 | End: 2025-07-11 | Stop reason: HOSPADM

## 2025-07-10 RX ORDER — LANOLIN ALCOHOL/MO/W.PET/CERES
400 CREAM (GRAM) TOPICAL DAILY
Status: DISCONTINUED | OUTPATIENT
Start: 2025-07-10 | End: 2025-07-11 | Stop reason: HOSPADM

## 2025-07-10 RX ORDER — DILTIAZEM HYDROCHLORIDE 5 MG/ML
10 INJECTION INTRAVENOUS ONCE
Status: DISCONTINUED | OUTPATIENT
Start: 2025-07-10 | End: 2025-07-10

## 2025-07-10 RX ORDER — CHOLECALCIFEROL (VITAMIN D3) 50 MCG
4000 TABLET ORAL DAILY
Status: DISCONTINUED | OUTPATIENT
Start: 2025-07-10 | End: 2025-07-11 | Stop reason: HOSPADM

## 2025-07-10 RX ORDER — CALCIUM CARBONATE 200(500)MG
500 TABLET,CHEWABLE ORAL ONCE
Status: COMPLETED | OUTPATIENT
Start: 2025-07-10 | End: 2025-07-10

## 2025-07-10 RX ORDER — OXYCODONE AND ACETAMINOPHEN 5; 325 MG/1; MG/1
1 TABLET ORAL EVERY 4 HOURS PRN
Refills: 0 | Status: DISCONTINUED | OUTPATIENT
Start: 2025-07-10 | End: 2025-07-11 | Stop reason: HOSPADM

## 2025-07-10 RX ADMIN — ENOXAPARIN SODIUM 40 MG: 40 INJECTION SUBCUTANEOUS at 09:07

## 2025-07-10 RX ADMIN — MAGNESIUM SULFATE HEPTAHYDRATE 2 G: 40 INJECTION, SOLUTION INTRAVENOUS at 09:07

## 2025-07-10 RX ADMIN — MONTELUKAST 10 MG: 10 TABLET, FILM COATED ORAL at 09:07

## 2025-07-10 RX ADMIN — MIDODRINE HYDROCHLORIDE 10 MG: 5 TABLET ORAL at 09:07

## 2025-07-10 RX ADMIN — MIDODRINE HYDROCHLORIDE 10 MG: 5 TABLET ORAL at 01:07

## 2025-07-10 RX ADMIN — POTASSIUM & SODIUM PHOSPHATES POWDER PACK 280-160-250 MG 1 PACKET: 280-160-250 PACK at 09:07

## 2025-07-10 RX ADMIN — MUPIROCIN 1 G: 20 OINTMENT TOPICAL at 09:07

## 2025-07-10 RX ADMIN — Medication 2 PACKET: at 05:07

## 2025-07-10 RX ADMIN — POTASSIUM & SODIUM PHOSPHATES POWDER PACK 280-160-250 MG 1 PACKET: 280-160-250 PACK at 04:07

## 2025-07-10 RX ADMIN — INSULIN GLARGINE 20 UNITS: 100 INJECTION, SOLUTION SUBCUTANEOUS at 10:07

## 2025-07-10 RX ADMIN — DULOXETINE 60 MG: 30 CAPSULE, DELAYED RELEASE ORAL at 09:07

## 2025-07-10 RX ADMIN — OXYCODONE HYDROCHLORIDE AND ACETAMINOPHEN 1 TABLET: 10; 325 TABLET ORAL at 01:07

## 2025-07-10 RX ADMIN — MIDODRINE HYDROCHLORIDE 10 MG: 5 TABLET ORAL at 05:07

## 2025-07-10 RX ADMIN — LEVOTHYROXINE SODIUM 25 MCG: 0.03 TABLET ORAL at 05:07

## 2025-07-10 RX ADMIN — OXYCODONE HYDROCHLORIDE AND ACETAMINOPHEN 1 TABLET: 5; 325 TABLET ORAL at 10:07

## 2025-07-10 RX ADMIN — PANTOPRAZOLE SODIUM 40 MG: 40 TABLET, DELAYED RELEASE ORAL at 05:07

## 2025-07-10 RX ADMIN — RISPERIDONE 0.5 MG: 0.25 TABLET, FILM COATED ORAL at 09:07

## 2025-07-10 RX ADMIN — POTASSIUM CHLORIDE 10 MEQ: 750 CAPSULE, EXTENDED RELEASE ORAL at 09:07

## 2025-07-10 RX ADMIN — OXYCODONE HYDROCHLORIDE 5 MG: 5 TABLET ORAL at 04:07

## 2025-07-10 RX ADMIN — Medication 4000 UNITS: at 09:07

## 2025-07-10 RX ADMIN — DULOXETINE 30 MG: 30 CAPSULE, DELAYED RELEASE ORAL at 09:07

## 2025-07-10 RX ADMIN — CALCIUM CARBONATE (ANTACID) CHEW TAB 500 MG 500 MG: 500 CHEW TAB at 11:07

## 2025-07-10 RX ADMIN — RISPERIDONE 0.5 MG: 0.25 TABLET, FILM COATED ORAL at 04:07

## 2025-07-10 RX ADMIN — Medication 400 MG: at 01:07

## 2025-07-10 RX ADMIN — POTASSIUM CHLORIDE 10 MEQ: 750 CAPSULE, EXTENDED RELEASE ORAL at 04:07

## 2025-07-10 NOTE — PT/OT/SLP PROGRESS
Physical Therapy Treatment    Patient Name:  Sonia Muse   MRN:  [id number]    Recommendations:     Discharge Recommendations: High Intensity Therapy  Discharge Equipment Recommendations: to be determined by next level of care  Barriers to discharge: Impaired functional mobility    Assessment:     Sonia Muse is a 65 y.o. female admitted with a medical diagnosis of Closed fracture of distal end of right femur.  She presents with the following impairments/functional limitations: weakness, impaired endurance, impaired self care skills, impaired functional mobility, gait instability, impaired balance, decreased lower extremity function, decreased safety awareness, pain, decreased ROM, impaired cardiopulmonary response to activity, orthopedic precautions . Pt assisted with toilet t/f per RN request. She required mod x2 person for t/f and was dependent for hygiene in standing following + BM.     Rehab Prognosis: Fair; patient would benefit from acute skilled PT services to address these deficits and reach maximum level of function.    Recent Surgery: Procedure(s) (LRB):  ORIF, FRACTURE, FEMUR (distal femur ORIF, synthes VA condylar plate, izaiah table, C-arm, triangle) (Right) 4 Days Post-Op    Plan:     During this hospitalization, patient to be seen daily to address the identified rehab impairments via gait training, therapeutic activities, therapeutic exercises, neuromuscular re-education and progress toward the following goals:    Plan of Care Expires:  08/07/25    Subjective     Chief Complaint: None stated  Patient/Family Comments/goals: Pt agreeable to toilet t/f  Pain/Comfort:  Pain Rating 1: 0/10  Location - Side 1: Right  Location - Orientation 1: generalized  Location 1: knee  Pain Addressed 1: Cessation of Activity  Pain Rating Post-Intervention 1: other (see comments) (moderate)      Objective:     Communicated with RN prior to session.  Patient found up in chair with chair check, telemetry,  pulse ox (continuous), PureWick, peripheral IV upon PT entry to room.     General Precautions: Standard, fall  Orthopedic Precautions: RLE partial weight bearing  Braces: Knee immobilizer  Respiratory Status: Nasal cannula, flow 2 L/min     Functional Mobility:  Transfers:     Toilet Transfer: moderate assistance and of 2 persons with  rolling walker  using  Stand Pivot      AM-PAC 6 CLICK MOBILITY            Patient left up in chair with all lines intact, call button in reach, and chair alarm on..    GOALS:   Multidisciplinary Problems       Physical Therapy Goals          Problem: Physical Therapy    Goal Priority Disciplines Outcome Interventions   Physical Therapy Goal     PT, PT/OT Progressing    Description: Goals to be met by: 25     Patient will increase functional independence with mobility by performin. Supine to sit with MInimal Assistance  2. Sit to stand transfer with Minimal Assistance  3. Bed to chair transfer with Minimal Assistance using Rolling Walker  4. R/L rolling with minimal assistance using bedside rail as needed    All goals while maintaining 25 % PWB on R LE                                     Time Tracking:     PT Received On: 07/10/25  PT Start Time: 1051     PT Stop Time: 1107  PT Total Time (min): 16 min     Billable Minutes: Therapeutic Activity 16    Treatment Type: Treatment  PT/PTA: PTA     Number of PTA visits since last PT visit: 2     07/10/2025

## 2025-07-10 NOTE — ASSESSMENT & PLAN NOTE
Advance Care Planning     Date: 07/10/2025    Code Status  In light of the patients advanced and life limiting illness,I engaged the the patient in a voluntary conversation about the patient's preferences for care  at the very end of life. The patient does wish to have CPR or other invasive treatments performed when her heart and/or breathing stops.  If she has no chance for meaningful recovery she would not want to live in a vegetative state dependent on machines.  I communicated to the patient that she will remain full code.    [facility]  I engaged the patient in a voluntary conversation about advance care planning and we specifically addressed what the goals of care would be moving forward, in light of the patient's change in clinical status, specifically pancreatic cancer, femur fracture.  We did specifically address the patient's likely prognosis, which is good .  We explored the patient's values and preferences for future care.  The patient endorses that what is most important right now is to focus on spending time at home, remaining as independent as possible, and quality of life, even if it means sacrificing a little time.     Accordingly, we have decided that the best plan to meet the patient's goals includes continuing with treatment

## 2025-07-10 NOTE — PROGRESS NOTES
[facility]  Palliative Medicine  Progress Note    Patient Name: Sonia Muse  MRN: [id number]  Admission Date: 7/4/2025  Hospital Length of Stay: 6 days  Code Status: Full Code   Attending Provider: Yahir Campos MD  Consulting Provider: Adilia Stokes NP  Primary Care Physician: Nasra Galvez FNP  Principal Problem:Closed fracture of distal end of right femur    Patient information was obtained from patient and primary team.      Assessment/Plan:     Palliative Care  ACP (advance care planning)  Advance Care Planning    Date: 07/10/2025    Code Status  In light of the patients advanced and life limiting illness,I engaged the the patient in a voluntary conversation about the patient's preferences for care  at the very end of life. The patient does wish to have CPR or other invasive treatments performed when her heart and/or breathing stops.  If she has no chance for meaningful recovery she would not want to live in a vegetative state dependent on machines.  I communicated to the patient that she will remain full code.    [facility]  I engaged the patient in a voluntary conversation about advance care planning and we specifically addressed what the goals of care would be moving forward, in light of the patient's change in clinical status, specifically pancreatic cancer, femur fracture.  We did specifically address the patient's likely prognosis, which is good .  We explored the patient's values and preferences for future care.  The patient endorses that what is most important right now is to focus on spending time at home, remaining as independent as possible, and quality of life, even if it means sacrificing a little time.     Accordingly, we have decided that the best plan to meet the patient's goals includes continuing with treatment             Goals of care, counseling/discussion  I reviewed the patient's chart and discussed the case with the patient's team.      I examined Sonia GIL  Almaz at bedside.  The patient is AAO x 3 and maintains capacity for complex medical decision-making today.  I spoke with pt at bedside.    Pt smiled when I entered her room and knew my name and my role as palliative NP.  Her mentation is significantly improved today and she is currently at her baseline.    She does not recall the past several days and admits to feeling confused during those times.  She is feeling much better today and is grateful that her mentation has improved.  We discussed the acute events that have occurred since her hospitalization along with events leading to her admission.    She wants to proceed with rehab on discharge.  She is going to focus on healing from her acute fracture at this time and then would like to meet with me after her recovery to discuss her goals of care moving forward.  She is unsure if she is willing to continue with chemotherapy given the adverse effects she had after her 1st round of chemo.  She feels that focusing on her comfort and quality of life may be more aligned with her goals moving forward.  I let her know I am happy to discuss this further with her in the coming weeks.  I also encouraged her to speak with her oncologist again to discuss further treatment options that may not have as many  adverse reactions.  She is agreeable to this.    We discussed ACP documentation.  She is agreeable to meet with our palliative RN today to complete this paperwork.  She desires to remain full code at this time.  She would not want to live dependent on machines or in a vegetative state.  If this event occurred she would desire to transition to comfort focused care.    She would like to complete medical POA as well.  Our palliative RN will complete this with her during her visit.    I appreciate being involved in patient's care.  I will remain available for support. Please reach out if I can be of any assistance.             Subjective:     Chief Complaint:   Chief  Complaint   Patient presents with    Knee Injury     Fall  / rt. Knee pain        HPI:   From admission HPI:  Soina Muse is a 65 year old female with a past medical history of pancreatic cancer, anemia, obesity, DM, HTN, and hypothyroidism who presented with a R femur fracture after a fall, MIGUEL ANGEL, hyponatremia and neutropenia. She recently completed her first cycle of chemotherapy after roughly one week ago (patient of Dr. Calle). She endorses chronic diarrhea and decreased appetite as well as fatigue. She states her BP has fallen since being diagnosed with pancreatic cancer and starting chemotherapy. In the ED, the patient received a 1.5 L NS bolus and 1 L LR bolus as well as fentanyl and Zofran. Hospital Medicine was consulted for admission.    Palliative medicine consult:  Patient admitted for right femur fracture, MIGUEL ANGEL, hyponatremia and neutropenia.  She underwent ORIF for right femur repair.  She is known to me from previous outpatient palliative visit.  She is receiving chemotherapy for newly diagnosed pancreatic cancer.  We have been consulted for goals of care discussion.    Hospital Course:  No notes on file    Interval History: Pt much better today. She is AAO x 3 and able to hold full conversation today.    Medications:  Continuous Infusions:  Scheduled Meds:   cholecalciferol (vitamin D3)  4,000 Units Oral Daily    DULoxetine  30 mg Oral Daily    DULoxetine  60 mg Oral QHS    enoxparin  40 mg Subcutaneous Q12H (prophylaxis, 0900/2100)    insulin glargine U-100 (Lantus)  20 Units Subcutaneous QHS    levothyroxine  25 mcg Oral Before breakfast    magnesium oxide  400 mg Oral Daily    midodrine  10 mg Oral Q8H    montelukast  10 mg Oral QHS    mupirocin   Nasal BID    pantoprazole  40 mg Oral Daily    potassium chloride  10 mEq Oral TID    potassium, sodium phosphates  1 packet Oral QID (AC & HS)    risperiDONE  0.5 mg Oral TID     PRN Meds:  Current Facility-Administered Medications:     0.9%  NaCl  infusion (for blood administration), , Intravenous, Q24H PRN    acetaminophen, 650 mg, Oral, Q8H PRN    dextrose 50%, 12.5 g, Intravenous, PRN    dextrose 50%, 25 g, Intravenous, PRN    glucagon (human recombinant), 1 mg, Intramuscular, PRN    glucose, 16 g, Oral, PRN    glucose, 24 g, Oral, PRN    insulin aspart U-100, 0-10 Units, Subcutaneous, QID (AC + HS) PRN    LORazepam, 0.5 mg, Oral, BID PRN    magnesium oxide, 800 mg, Oral, PRN    magnesium oxide, 800 mg, Oral, PRN    naloxone, 0.02 mg, Intravenous, PRN    oxyCODONE-acetaminophen, 1 tablet, Oral, Q4H PRN    oxyCODONE-acetaminophen, 1 tablet, Oral, Q4H PRN    potassium chloride in water, 40 mEq, Intravenous, PRN **AND** potassium chloride in water, 60 mEq, Intravenous, PRN **AND** potassium chloride in water, 80 mEq, Intravenous, PRN    potassium, sodium phosphates, 2 packet, Oral, PRN    potassium, sodium phosphates, 2 packet, Oral, PRN    potassium, sodium phosphates, 2 packet, Oral, PRN    prochlorperazine, 5 mg, Intravenous, Q6H PRN    sodium chloride 0.9%, 10 mL, Intravenous, PRN    Objective:     Vital Signs (Most Recent):  Temp: 97.9 °F (36.6 °C) (07/10/25 1220)  Pulse: 76 (07/10/25 1220)  Resp: 16 (07/10/25 1305)  BP: 98/67 (07/10/25 1220)  SpO2: 100 % (07/10/25 1220) Vital Signs (24h Range):  Temp:  [97.7 °F (36.5 °C)-98.7 °F (37.1 °C)] 97.9 °F (36.6 °C)  Pulse:  [] 76  Resp:  [14-33] 16  SpO2:  [91 %-100 %] 100 %  BP: ()/(58-81) 98/67     Weight: 115.6 kg (254 lb 13.6 oz)  Body mass index is 43.75 kg/m².       Physical Exam  Vitals and nursing note reviewed.   Constitutional:       General: She is not in acute distress.     Appearance: She is not ill-appearing.   HENT:      Mouth/Throat:      Mouth: Mucous membranes are moist.      Pharynx: Oropharynx is clear.   Eyes:      General: No scleral icterus.     Pupils: Pupils are equal, round, and reactive to light.   Cardiovascular:      Rate and Rhythm: Normal rate and regular rhythm.       Pulses: Normal pulses.   Pulmonary:      Effort: Pulmonary effort is normal. No respiratory distress.   Abdominal:      General: There is no distension.      Palpations: Abdomen is soft.   Musculoskeletal:      Comments: Right leg immobilizer in use   Skin:     General: Skin is warm and dry.   Neurological:      Mental Status: She is alert and oriented to person, place, and time.      Comments: Awake and alert today. Able to hold full conversation today.   Psychiatric:         Mood and Affect: Mood normal.         Behavior: Behavior normal.         Thought Content: Thought content normal.         Judgment: Judgment normal.            Review of Symptoms      Symptom Assessment (ESAS 0-10 Scale)  Pain:  0  Dyspnea:  0  Anxiety:  0  Nausea:  0  Depression:  0  Anorexia:  0  Fatigue:  0  Insomnia:  0  Restlessness:  0  Agitation:  0         Performance Status:  50    Psychosocial/Cultural:   See Palliative Psychosocial Note: No  **Primary  to Follow**  Palliative Care  Consult: No        Advance Care Planning  Advance Directives:   Living Will: No    Do Not Resuscitate Status: No    Medical Power of : No      Decision Making:  Patient answered questions  Goals of Care: The patient endorses that what is most important right now is to focus on remaining as independent as possible and quality of life, even if it means sacrificing a little time    Accordingly, we have decided that the best plan to meet the patient's goals includes continuing with treatment.           Significant Labs: All pertinent labs within the past 24 hours have been reviewed.  CBC:   Recent Labs   Lab 07/10/25  1204   WBC 12.59   HGB 8.4*   HCT 27.8*   MCV 84        BMP:  Recent Labs   Lab 07/10/25  0543   GLU 92      K 3.9      CO2 30*   BUN 26*   CREATININE 1.2   CALCIUM 7.8*   MG 1.6     LFT:  Lab Results   Component Value Date    AST 6 (L) 07/10/2025    ALKPHOS 95 07/10/2025    BILITOT 0.7  07/10/2025     Albumin:   Albumin   Date Value Ref Range Status   07/10/2025 2.4 (L) 3.5 - 5.2 g/dL Final     Protein:   Protein Total   Date Value Ref Range Status   07/10/2025 4.9 (L) 6.0 - 8.4 gm/dL Final     Lactic acid:   Lab Results   Component Value Date    LACTATE 1.6 07/09/2025    LACTATE 1.6 07/09/2025       Significant Imaging: I have reviewed all pertinent imaging results/findings within the past 24 hours.    > 50 min visit spent in chart review, face to face discussion of goals of care,  symptom assessment, coordination of care and emotional support.     I spent an additional 16 minutes discussing advance care planning.     Adilia Stokes, NP  Palliative Medicine  [facility]

## 2025-07-10 NOTE — PROGRESS NOTES
INPATIENT NEPHROLOGY CONSULT   [facility] NEPHROLOGY    Sonia Muse  07/10/2025    Reason for consultation:  Acute kidney injury    Chief Complaint:   Chief Complaint   Patient presents with    Knee Injury     Fall  / rt. Knee pain      History of Present Illness:    Per H and P  Sonia Muse is a 65 year old female with a past medical history of pancreatic cancer, anemia, obesity, DM, HTN, and hypothyroidism who presented with a R femur fracture after a fall, MIGUEL ANGEL, hyponatremia and neutropenia. She recently completed her first cycle of chemotherapy after roughly one week ago (patient of Dr. Calle). She endorses chronic diarrhea and decreased appetite as well as fatigue. She states her BP has fallen since being diagnosed with pancreatic cancer and starting chemotherapy. In the ED, the patient received a 1.5 L NS bolus and 1 L LR bolus as well as fentanyl and Zofran.     7/5  pos leg pain, very tired, intermittent hypotension, on levophed drip, 560 cc uop  7/7 VSS.  7/8 VSS, decent UO, sCr better. Off pressors. Hypokalemia is new, add supplement, monitor Mg.  7/9 VSS, no new complains. Add Phos level, check vitamon d level. Appreciate Psychiatry, Palliative care, Heme/Onc input.  7/10 VSS. NO new complains. Add vitamon D supplement.    Plan of Care:    Acute kidney injury secondary to hemodynamically mediated renal injury. S/p first round of chemo just now. Oxaliplatin can cause  a rise in creatinine in 5-10% of cases. Urine sodium < 20 implicates low tubular flow. No casts on urine micro.  --Avoid NSAIDS, Fields II inhibitors, and other non-essential nephrotoxic agents  --Keep mean arterial pressure above 60 and systolic blood pressure above 100 as able to maintain renal perfusion  --Hold losartan, hctz, mobic,   --Urinary catheter in place, consider dc when mobile  --Avoid vancomycin toxicity    Hypotension  --hold antihypertensives, agree with midodrine  --volume resuscitation and pressors PRN  --treat  infections    Hyponatremia  Hypokalemia  Hypophosphatemia, Hypocalcemia due to hypovitamonosis D  Hypothyroidism  DM  --added oral KCL and Phos supplement, monitor and replete Mg, she takes PPI  --add VItamin D3 supplement.  --vitamin d level 27  --no hypotonic iv piggy bags  --urine osm 318 implicating impaired free water clearance  --push nutrition, control DM    Anemia  --as per heme/onc recommendations  --iron adequate, transfuse as needed    Thank you for allowing us to participate in this patient's care. We will continue to follow.    Vital Signs:  Temp Readings from Last 3 Encounters:   07/10/25 98 °F (36.7 °C) (Oral)   25 98 °F (36.7 °C) (Temporal)   25 97.2 °F (36.2 °C)       Pulse Readings from Last 3 Encounters:   07/10/25 87   25 107   25 76       BP Readings from Last 3 Encounters:   07/10/25 114/77   25 (!) 108/55   25 109/69       Weight:  Wt Readings from Last 3 Encounters:   25 115.6 kg (254 lb 13.6 oz)   25 107.4 kg (236 lb 12.4 oz)   25 110.5 kg (243 lb 8 oz)       Past Medical & Surgical History:  Past Medical History:   Diagnosis Date    Allergy     Anxiety     Asthma     DDD (degenerative disc disease), cervical     Depression     Diabetes mellitus     Fibromyalgia     Hypertension     Neuromuscular disorder     Pancreatic mass     PONV (postoperative nausea and vomiting)     Thyroid disease     hypo       Past Surgical History:   Procedure Laterality Date    ADENOIDECTOMY      CARPAL TUNNEL RELEASE Right      SECTION      ENDOSCOPIC ULTRASOUND OF UPPER GASTROINTESTINAL TRACT N/A 2025    Procedure: ULTRASOUND, UPPER GI TRACT, ENDOSCOPIC;  Surgeon: Austen Grimes III, MD;  Location: [facility] ENDO;  Service: Endoscopy;  Laterality: N/A;    ERCP N/A 2025    Procedure: ERCP (ENDOSCOPIC RETROGRADE CHOLANGIOPANCREATOGRAPHY);  Surgeon: Thuy Escobar MD;  Location: [facility] ENDO;  Service: Endoscopy;  Laterality: N/A;     ERCP N/A 2025    Procedure: ERCP (ENDOSCOPIC RETROGRADE CHOLANGIOPANCREATOGRAPHY);  Surgeon: Austen Grimes III, MD;  Location: [facility] ENDO;  Service: Endoscopy;  Laterality: N/A;    INSERTION OF TUNNELED CENTRAL VENOUS CATHETER (CVC) WITH SUBCUTANEOUS PORT Left 2025    Procedure: CUXBYMHEG-MREA-O-CATH;  Surgeon: Ata Fernandez III, MD;  Location: [facility] OR;  Service: General;  Laterality: Left;    JOINT REPLACEMENT Right 2016    knee    KNEE ARTHROPLASTY Left 10/19/2020    Procedure: ARTHROPLASTY, KNEE;  Surgeon: Felipe Herring MD;  Location: [facility] OR;  Service: Orthopedics;  Laterality: Left;  Louie Castanedahens    ORIF FEMUR FRACTURE Right 2025    Procedure: ORIF, FRACTURE, FEMUR (distal femur ORIF, synthes VA condylar plate, izaiah table, C-arm, triangle);  Surgeon: Osmany Harding MD;  Location: [facility] OR;  Service: Orthopedics;  Laterality: Right;    ROTATOR CUFF REPAIR Right     TONSILLECTOMY      TOTAL KNEE ARTHROPLASTY Right     TUBAL LIGATION         Past Social History:  Social History     Socioeconomic History    Marital status:     Number of children: 3   Occupational History    Occupation: [employer]     Comment: primary sub for [employer]   Tobacco Use    Smoking status: Former     Current packs/day: 0.00     Average packs/day: 1 pack/day for 11.0 years (11.0 ttl pk-yrs)     Types: Cigarettes     Start date:      Quit date:      Years since quittin.5    Smokeless tobacco: Never   Substance and Sexual Activity    Alcohol use: Not Currently     Comment: occasional 2x/y    Drug use: Never    Sexual activity: Yes     Partners: Male     Social Drivers of Health     Financial Resource Strain: Low Risk  (2025)    Overall Financial Resource Strain (CARDIA)     Difficulty of Paying Living Expenses: Not hard at all   Recent Concern: Financial Resource Strain - High Risk (2025)    Overall Financial Resource Strain (CARDIA)     Difficulty of Paying Living Expenses: Hard    Food Insecurity: No Food Insecurity (7/5/2025)    Hunger Vital Sign     Worried About Running Out of Food in the Last Year: Never true     Ran Out of Food in the Last Year: Never true   Recent Concern: Food Insecurity - Food Insecurity Present (4/28/2025)    Hunger Vital Sign     Worried About Running Out of Food in the Last Year: Sometimes true     Ran Out of Food in the Last Year: Sometimes true   Transportation Needs: No Transportation Needs (7/5/2025)    PRAPARE - Transportation     Lack of Transportation (Medical): No     Lack of Transportation (Non-Medical): No   Recent Concern: Transportation Needs - Unmet Transportation Needs (4/28/2025)    PRAPARE - Transportation     Lack of Transportation (Medical): Yes     Lack of Transportation (Non-Medical): Yes   Physical Activity: Unknown (4/28/2025)    Exercise Vital Sign     Days of Exercise per Week: Patient declined     Minutes of Exercise per Session: 30 min   Stress: No Stress Concern Present (7/5/2025)    [employer] - Occupational Stress Questionnaire     Feeling of Stress : Not at all   Recent Concern: Stress - Stress Concern Present (4/28/2025)    [employer] - Occupational Stress Questionnaire     Feeling of Stress : Very much   Housing Stability: Low Risk  (7/5/2025)    Housing Stability Vital Sign     Unable to Pay for Housing in the Last Year: No     Number of Times Moved in the Last Year: 0     Homeless in the Last Year: No     Medications:  Medications Ordered Prior to Encounter[1]  Scheduled Meds:   alteplase  2 mg Intra-Catheter Once    DULoxetine  30 mg Oral Daily    DULoxetine  60 mg Oral QHS    enoxparin  40 mg Subcutaneous Q12H (prophylaxis, 0900/2100)    insulin glargine U-100 (Lantus)  20 Units Subcutaneous QHS    levothyroxine  25 mcg Oral Before breakfast    magnesium sulfate 2 g IVPB  2 g Intravenous Once    midodrine  10 mg Oral Q8H    montelukast  10 mg Oral QHS    mupirocin    "Nasal BID    pantoprazole  40 mg Oral Daily    potassium chloride  10 mEq Oral TID    potassium, sodium phosphates  1 packet Oral QID (AC & HS)    risperiDONE  0.5 mg Oral TID     Continuous Infusions:      PRN Meds:.  Current Facility-Administered Medications:     0.9%  NaCl infusion (for blood administration), , Intravenous, Q24H PRN    acetaminophen, 650 mg, Oral, Q8H PRN    dextrose 50%, 12.5 g, Intravenous, PRN    dextrose 50%, 25 g, Intravenous, PRN    glucagon (human recombinant), 1 mg, Intramuscular, PRN    glucose, 16 g, Oral, PRN    glucose, 24 g, Oral, PRN    HYDROmorphone, 1 mg, Intravenous, Q4H PRN    insulin aspart U-100, 0-10 Units, Subcutaneous, QID (AC + HS) PRN    LORazepam, 0.5 mg, Oral, BID PRN    magnesium oxide, 800 mg, Oral, PRN    magnesium oxide, 800 mg, Oral, PRN    naloxone, 0.02 mg, Intravenous, PRN    oxyCODONE, 5 mg, Oral, Q6H PRN    potassium chloride in water, 40 mEq, Intravenous, PRN **AND** potassium chloride in water, 60 mEq, Intravenous, PRN **AND** potassium chloride in water, 80 mEq, Intravenous, PRN    potassium, sodium phosphates, 2 packet, Oral, PRN    potassium, sodium phosphates, 2 packet, Oral, PRN    potassium, sodium phosphates, 2 packet, Oral, PRN    prochlorperazine, 5 mg, Intravenous, Q6H PRN    sodium chloride 0.9%, 10 mL, Intravenous, PRN    Allergies:  Erythromycin, Codeine, Lactose, and Kimberly    Past Family History:  Reviewed; refer to Hospitalist Admission Note    Review of Systems:  Review of Systems - All 14 systems reviewed and negative, except as noted in HPI    Physical Exam:    /77   Pulse 87   Temp 98 °F (36.7 °C) (Oral)   Resp 18   Ht 5' 4" (1.626 m)   Wt 115.6 kg (254 lb 13.6 oz)   SpO2 95%   Breastfeeding No   BMI 43.75 kg/m²     General Appearance:    Alert, cooperative, no distress, appears stated age   Head:    Normocephalic, without obvious abnormality, atraumatic   Eyes:    PER, conjunctiva/corneas clear, EOM's intact in both eyes    "     Throat:   Lips, mucosa, and tongue normal; teeth and gums normal   Back:     Symmetric, no curvature, ROM normal, no CVA tenderness   Lungs:     Clear to auscultation bilaterally, respirations unlabored   Chest wall:    No tenderness or deformity   Heart:    Regular rate and rhythm, S1 and S2 normal, no murmur, rub   or gallop   Abdomen:     Soft, non-tender, bowel sounds active all four quadrants,     no masses, no organomegaly   Extremities:   Extremities normal, atraumatic, no cyanosis or edema   Pulses:   2+ and symmetric all extremities   MSK:   No joint or muscle swelling, tenderness or deformity   Skin:   Skin color, texture, turgor normal, no rashes or lesions   Neurologic:   CNII-XII intact, normal strength and sensation       Throughout.  No flap     Results:  Recent Labs   Lab 07/08/25  0317 07/09/25  0214 07/10/25  0543    142 143   K 3.2* 3.7 3.9    107 109   CO2 26 27 30*   BUN 29* 29* 26*   CREATININE 1.4 1.3 1.2    73 92       Recent Labs   Lab 07/08/25 0317 07/09/25 0214 07/10/25  0543   CALCIUM 8.1* 8.1* 7.8*   ALBUMIN 2.2* 2.3* 2.4*   PHOS 1.5* 1.4* 1.8*   MG 1.6 1.7 1.6             Recent Labs   Lab 07/09/25  0214 07/09/25  0523 07/09/25  0621 07/09/25  1100 07/09/25  1527 07/09/25  1615 07/09/25  2016 07/09/25  2145 07/10/25  0051 07/10/25  0446   POCTGLUCOSE 81 60* 90 71 59* 105 64* 64* 86 92       Recent Labs   Lab 05/25/24  0951 11/21/24  1049 04/25/25  0751   Hemoglobin A1C 6.4 H 6.1 H  --    Hemoglobin A1c  --   --  10.1 H       Recent Labs   Lab 07/07/25  0427 07/08/25 0317 07/09/25  0214   WBC 1.44* 4.46 10.71   HGB 7.8* 7.2* 7.2*   HCT 23.7* 22.1* 23.0*    256 263   MCV 76* 77* 80*   MCHC 32.9 32.6 31.3*       Recent Labs   Lab 07/08/25  0317 07/09/25  0214 07/10/25  0543   BILITOT 0.8 0.7 0.7   PROT 5.1* 5.1* 4.9*   ALBUMIN 2.2* 2.3* 2.4*   ALKPHOS 91 100 95   ALT 4* 4* 4*   AST 7* 7* 6*       Recent Labs   Lab 03/18/23  0920 05/25/24  0951  08/13/24  1525 04/24/25  1404 04/24/25  1636 07/04/25  1057   Color, UA YELLOW DARK YELLOW  --   --   --   --    Appearance, UA CLEAR CLEAR  --   --  Clear Cloudy A   pH, UA 8.0 6.0 7.0 5.5  --   --    Spec Grav UA  --   --   --   --  >=1.030 A 1.025   Specific Gravity, UA 1.018 1.020  --   --   --   --    Protein, UA 1+ A 1+ A  --   --  Negative 1+ A   Ketones, UA NEGATIVE NEGATIVE  --   --   --   --    Urobilinogen, UA  --   --   --   --  Negative 2.0-3.0 A   Bilirubin, UA  --   --   --   --  1+ A 1+ A   Occult Blood UA NEGATIVE NEGATIVE  --   --   --   --    Nitrite, UA NEGATIVE NEGATIVE  --   --   --   --    RBC, UA  --   --   --   --  1 3   WBC, UA  --   --   --   --  1 15 H   Bacteria, UA  --   --   --   --  Rare  --        Recent Labs   Lab 07/06/25  1020 07/06/25  1725 07/07/25  0310   POC PH 7.054 LL 7.298 L 7.372   POC PCO2 65.9 HH 29.9 L 35.0   POC HCO3 18.4 L 14.6 L 20.3 L   POC PO2 84 76 L 88   POC SATURATED O2 90 94 97   POC BE -12 L -12 L -5 L   Sample ARTERIAL ARTERIAL ARTERIAL       Microbiology Results (last 7 days)       Procedure Component Value Units Date/Time    Blood culture [[id number]]  (Normal) Collected: 07/04/25 1251    Order Status: Completed Specimen: Blood from Peripheral, Hand, Left Updated: 07/09/25 1901     CULTURE, BLOOD ([facility]) No Growth After 5 Days    Blood culture [[id number]]  (Normal) Collected: 07/04/25 1247    Order Status: Completed Specimen: Blood from Peripheral, Antecubital, Left Updated: 07/09/25 1901     CULTURE, BLOOD ([facility]) No Growth After 5 Days    Clostridium difficile EIA [[id number]] Collected: 07/09/25 1507    Order Status: Sent Specimen: Stool Updated: 07/09/25 1527    Urine culture [[id number]] Collected: 07/04/25 1057    Order Status: Completed Specimen: Urine Updated: 07/07/25 0754     Urine Culture No Growth          Can Bell MD  [facility] [place]  [phone]         [1]   No current facility-administered medications on file prior to  encounter.     Current Outpatient Medications on File Prior to Encounter   Medication Sig Dispense Refill    ALPRAZolam (XANAX) 0.25 MG tablet Take 1 tablet (0.25 mg total) by mouth daily as needed for Anxiety. 30 tablet 0    amLODIPine (NORVASC) 5 MG tablet Take 1 tablet (5 mg total) by mouth once daily. 90 tablet 1    cetirizine (ZYRTEC) 10 MG tablet TAKE 1 TABLET BY MOUTH ONCE DAILY 90 tablet 1    DULoxetine (CYMBALTA) 60 MG capsule TAKE one CAPSULE BY MOUTH EVERY DAY 90 capsule 1    hydroCHLOROthiazide (HYDRODIURIL) 25 MG tablet TAKE one TABLET BY MOUTH ONCE DAILY 90 tablet 1    HYDROcodone-acetaminophen (NORCO) 5-325 mg per tablet Take 1 tablet by mouth every 6 (six) hours as needed. 10 tablet 0    levothyroxine (SYNTHROID) 25 MCG tablet TAKE one TABLET BY MOUTH BEFORE breakfast 90 tablet 1    losartan (COZAAR) 100 MG tablet TAKE one TABLET BY MOUTH ONCE DAILY 90 tablet 0    magnesium oxide (MAGOX) 400 mg (241.3 mg magnesium) tablet Take 1 tablet (400 mg total) by mouth once daily. 200 tablet 1    metoprolol succinate (TOPROL-XL) 25 MG 24 hr tablet Take 1 tablet (25 mg total) by mouth once daily. 90 tablet 1    montelukast (SINGULAIR) 10 mg tablet TAKE ONE TABLET BY MOUTH EVERY EVENING 90 tablet 1    OLANZapine (ZYPREXA) 5 MG tablet Take 1 tablet by mouth nightly on days 1-3 of each chemotherapy cycle. 3 tablet 11    ondansetron (ZOFRAN) 8 MG tablet Take 1 tablet (8 mg total) by mouth every 8 (eight) hours as needed. 30 tablet 2    prochlorperazine (COMPAZINE) 10 MG tablet Take 1 tablet (10 mg total) by mouth every 6 (six) hours as needed. 30 tablet 1    albuterol (VENTOLIN HFA) 90 mcg/actuation inhaler Inhale 2 puffs into the lungs every 6 (six) hours as needed for Wheezing or Shortness of Breath. Rescue 18 g 1    blood sugar diagnostic Strp Use 1 strip to check blood sugar 2 times daily 100 each 5    blood-glucose meter kit Use as instructed 1 each 0    insulin glargine U-100, Lantus, 100 unit/mL (3 mL) SubQ  "InPn pen Inject 21 Units into the skin every evening. 9 mL 3    lancets (LANCETS,THIN) Misc 1 each by Misc.(Non-Drug; Combo Route) route 3 (three) times daily. 90 each 0    LIDOcaine-prilocaine (EMLA) cream Apply topically as needed. 30 g 0    loperamide (IMODIUM) 2 mg capsule Take 2 tablets (4mg) by mouth after first loose stool, 1 tablet every 2 hours until diarrhea free for 12 hours. May take 2 tablets (4mg) by mouth every 4 hours at night. May require more than the package labeling maximum dose of 16mg/day. 30 capsule 11    LORazepam (ATIVAN) 0.5 MG tablet Take 1 tablet (0.5 mg total) by mouth 2 (two) times daily. 60 tablet 0    meloxicam (MOBIC) 15 MG tablet Take 15 mg by mouth once daily. 1/2 tablet am      metronidazole 0.75% (METROCREAM) 0.75 % Crea Apply 1 application  topically 2 (two) times daily.      pen needle, diabetic 32 gauge x 5/32" Ndle 1 Needle by Misc.(Non-Drug; Combo Route) route once daily. 100 each 2     "

## 2025-07-10 NOTE — PLAN OF CARE
Problem: Occupational Therapy  Goal: Occupational Therapy Goal  Description: Goals to be met by: 8/7/25     Patient will increase functional independence with ADLs by performing:    UE Dressing with [place].  LE Dressing with [place].  Grooming while standing at sink with [place].  Toileting from toilet with [place] for hygiene and clothing management. Currently maxA  Bathing from  shower chair/bench with Modified [place] with use of shower chair.  Toilet transfer to toilet with [place]. Currently t/f ModA x2    Outcome: Progressing

## 2025-07-10 NOTE — CARE UPDATE
07/10/25 0701   Patient Assessment/Suction   Level of Consciousness (AVPU) alert   Respiratory Effort Normal;Unlabored   Expansion/Accessory Muscles/Retractions no use of accessory muscles;expansion symmetric   All Lung Fields Breath Sounds clear;diminished   Rhythm/Pattern, Respiratory unlabored;pattern regular;depth regular   Skin Integrity   $ Wound Care Tech Time 15 min   Area Observed Left;Right;Behind ear;Cheek;Bridge of nose   Skin Appearance without discoloration   PRE-TX-O2   Device (Oxygen Therapy) nasal cannula   $ Is the patient on Low Flow Oxygen? Yes   Flow (L/min) (Oxygen Therapy) 2   Pulse Oximetry Type Intermittent   $ Pulse Oximetry - Multiple Charge Pulse Oximetry - Multiple   Preset CPAP/BiPAP Settings   Mode Of Delivery CPAP;standby   CPAP/BIPAP charged w/in last 24 h YES

## 2025-07-10 NOTE — SUBJECTIVE & OBJECTIVE
Interval History:  Patient seen and examined this morning, reports overall improvement in symptoms.  Alert and oriented x3.  Hemoglobin stable.    Review of Systems   Constitutional:  Negative for diaphoresis.   Respiratory:  Negative for shortness of breath.    Cardiovascular:  Negative for chest pain.     Objective:     Vital Signs (Most Recent):  Temp: 97.9 °F (36.6 °C) (07/10/25 1220)  Pulse: 76 (07/10/25 1220)  Resp: 18 (07/10/25 1220)  BP: 98/67 (07/10/25 1220)  SpO2: 100 % (07/10/25 1220) Vital Signs (24h Range):  Temp:  [97.7 °F (36.5 °C)-98.7 °F (37.1 °C)] 97.9 °F (36.6 °C)  Pulse:  [] 76  Resp:  [14-33] 18  SpO2:  [91 %-100 %] 100 %  BP: ()/(58-81) 98/67     Weight: 115.6 kg (254 lb 13.6 oz)  Body mass index is 43.75 kg/m².    Intake/Output Summary (Last 24 hours) at 7/10/2025 1301  Last data filed at 7/10/2025 0900  Gross per 24 hour   Intake 600 ml   Output 1450 ml   Net -850 ml         Physical Exam  Constitutional:       General: She is not in acute distress.  Cardiovascular:      Rate and Rhythm: Normal rate.   Pulmonary:      Effort: No respiratory distress.      Breath sounds: No stridor.   Abdominal:      Palpations: Abdomen is soft.   Skin:     General: Skin is dry.   Neurological:      General: No focal deficit present.      Mental Status: She is alert and oriented to person, place, and time.               Significant Labs: All pertinent labs within the past 24 hours have been reviewed.  CBC:   Recent Labs   Lab 07/09/25  0214 07/10/25  1204   WBC 10.71 12.59   HGB 7.2* 8.4*   HCT 23.0* 27.8*    240     CMP:   Recent Labs   Lab 07/09/25  0214 07/10/25  0543    143   K 3.7 3.9    109   CO2 27 30*   GLU 73 92   BUN 29* 26*   CREATININE 1.3 1.2   CALCIUM 8.1* 7.8*   PROT 5.1* 4.9*   ALBUMIN 2.3* 2.4*   BILITOT 0.7 0.7   ALKPHOS 100 95   AST 7* 6*   ALT 4* 4*   ANIONGAP 8 4*       Significant Imaging: I have reviewed all pertinent imaging results/findings within the  past 24 hours.

## 2025-07-10 NOTE — PROGRESS NOTES
[facility] Medicine  Progress Note    Patient Name: Sonia Muse  MRN: [id number]  Patient Class: IP- Inpatient   Admission Date: 7/4/2025  Length of Stay: 6 days  Attending Physician: Yahir Campos MD  Primary Care Provider: Nasra Galvez FNP        Subjective     Principal Problem:Closed fracture of distal end of right femur        HPI:  Sonia Muse is a 65 year old female with a past medical history of pancreatic cancer, anemia, obesity, DM, HTN, and hypothyroidism who presented with a R femur fracture after a fall, MIGUEL ANGEL, hyponatremia and neutropenia. She recently completed her first cycle of chemotherapy after roughly one week ago (patient of Dr. Calle). She endorses chronic diarrhea and decreased appetite as well as fatigue. She states her BP has fallen since being diagnosed with pancreatic cancer and starting chemotherapy. In the ED, the patient received a 1.5 L NS bolus and 1 L LR bolus as well as fentanyl and Zofran. Hospital Medicine was consulted for admission.    Overview/Hospital Course:  Admitted the patient with a distal right femur fracture.  The orthopedic surgeon recommended operative repair after medical stabilization.  In response to the neutropenia, we placed her on granulocyte colony-stimulating factor daily.  We provided prophylactic antibiotic.  We consulted with Hematology Oncology, who provided recommendations for further management.  We transfused red blood cells when needed.  She had hypotension, which required a vasopressor.  It was not clear what was causing the hypotension, the two possibilities were the anemia, and the 2nd was the use of frequent intravenous hydromorphone for the pain associated with the fracture.  We also treated her for acute kidney injury, which was likely because of unstable hemodynamics, in addition to the use of oxaliplatin.  Patient treated with IV fluids, midodrine and Levophed for hypotension with improvement.  Renal  function monitored showed improving creatinine with IV fluids.  Filgrastim daily was ordered for 5 days for neutropenia.  Patient remained afebrile.  On morning of 07/06/2025 patient taken for ORIF of right femur by Orthopedic surgery.  Urine cultures and blood cultures remain negative therefore UTI ruled out and empiric IV antibiotics were DC.  Renal function monitored and improved well with mild increase in edema therefore IV fluids DC.  Patient remains off of Levophed since evening of 07/07/2025.  Patient does have a history of sleep apnea in the distant past and used a CPAP for awhile and then the machine broke therefore she never got this replaced.  She will require a repeat sleep study in outpatient setting as she probably still has underlying sleep apnea.  Patient did have mild acute hospital-acquired psychosis postop and was very emotional.  SSRI continued.  Psych eval consulted.  Patient's mental status improved the next day.  Blood pressure remains in stable range on midodrine.  Suspect midodrine could eventually be tapered off as tolerated.  Downgrade orders to tele placed on 07/08/2025. Plan on dc to IPR.     Interval History:  Patient seen and examined this morning, reports overall improvement in symptoms.  Alert and oriented x3.  Hemoglobin stable.    Review of Systems   Constitutional:  Negative for diaphoresis.   Respiratory:  Negative for shortness of breath.    Cardiovascular:  Negative for chest pain.     Objective:     Vital Signs (Most Recent):  Temp: 97.9 °F (36.6 °C) (07/10/25 1220)  Pulse: 76 (07/10/25 1220)  Resp: 18 (07/10/25 1220)  BP: 98/67 (07/10/25 1220)  SpO2: 100 % (07/10/25 1220) Vital Signs (24h Range):  Temp:  [97.7 °F (36.5 °C)-98.7 °F (37.1 °C)] 97.9 °F (36.6 °C)  Pulse:  [] 76  Resp:  [14-33] 18  SpO2:  [91 %-100 %] 100 %  BP: ()/(58-81) 98/67     Weight: 115.6 kg (254 lb 13.6 oz)  Body mass index is 43.75 kg/m².    Intake/Output Summary (Last 24 hours) at 7/10/2025  1301  Last data filed at 7/10/2025 0900  Gross per 24 hour   Intake 600 ml   Output 1450 ml   Net -850 ml         Physical Exam  Constitutional:       General: She is not in acute distress.  Cardiovascular:      Rate and Rhythm: Normal rate.   Pulmonary:      Effort: No respiratory distress.      Breath sounds: No stridor.   Abdominal:      Palpations: Abdomen is soft.   Skin:     General: Skin is dry.   Neurological:      General: No focal deficit present.      Mental Status: She is alert and oriented to person, place, and time.               Significant Labs: All pertinent labs within the past 24 hours have been reviewed.  CBC:   Recent Labs   Lab 07/09/25  0214 07/10/25  1204   WBC 10.71 12.59   HGB 7.2* 8.4*   HCT 23.0* 27.8*    240     CMP:   Recent Labs   Lab 07/09/25  0214 07/10/25  0543    143   K 3.7 3.9    109   CO2 27 30*   GLU 73 92   BUN 29* 26*   CREATININE 1.3 1.2   CALCIUM 8.1* 7.8*   PROT 5.1* 4.9*   ALBUMIN 2.3* 2.4*   BILITOT 0.7 0.7   ALKPHOS 100 95   AST 7* 6*   ALT 4* 4*   ANIONGAP 8 4*       Significant Imaging: I have reviewed all pertinent imaging results/findings within the past 24 hours.      Assessment & Plan  Closed fracture of distal end of right femur  -status post  1.Open reduction internal fixation right periprosthetic distal femur fracture.2. Intramedullary nailing right femur on 07/06/2025 by Orthopedic surgery  -orthopedic surgery recommends 25% weight-bearing to right lower extremity  Plan on discharge to inpatient rehab  MIGUEL ANGEL (acute kidney injury)  -Likely due to hemodynamic instability.  Creatinine improving  Chemotherapy-induced neutropenia  -status post Chemotherapy roughly one week ago.  Improving  -neutropenic precautions   -urine culture on 07/04/2025 = no growth till date (UTI ruled out)  -blood cultures x2 on 07/04/2025 = remains negative till date  -DC filgrastim 458 mg IV daily x5 days.  Ordered on 07/04/2025.  DC on 07/08/2025 per Heme-Onc  -DC  cefepime 1 g IV b.i.d. empirically.  Ordered on 07/04/2025.  DC on 07/08/2025 ( urine and blood cultures remain negative)      Hypotension  Secondary to combination of anemia and the use of IV hydromorphone for pain.  -monitor blood pressure = stable range currently   -maintain goal blood pressure map > 65  -midodrine 10 mg p.o. q.8 hours        Pancreatic cancer  -Oncology consulted  -patient recently began chemotherapy  Follow up with Dr. Calle outpatient.    Anemia in neoplastic disease  -In setting of pancreatic adenocarcinoma and MIGUEL ANGEL.  -no overt bleeding seen or reported currently  -monitor H&H = remains anemic but in stable range with no significant change currently (also with contribution from dilutional effect of recent IV fluids)  -FOBT negative  -PPI daily   Stable  Hyponatremia  Improving  Abnormal LFTs  Resolving  Acute cystitis  -POA   -no acute cystitis on this admission.  UTI ruled out with negative urine culture  -afebrile  -urine culture on 07/04/2025 = negative at 48 hours  -lactic acid on 07/07/2025 = within normal limits  -DC cefepime IV on 07/08/2025    DM (diabetes mellitus)  Last A1c reviewed-   Lab Results   Component Value Date    HGBA1C 10.1 (H) 04/25/2025     Most recent fingerstick glucose reviewed-   Recent Labs   Lab 07/10/25  0051 07/10/25  0446 07/10/25  0807 07/10/25  1221   POCTGLUCOSE 86 92 92 98     Current correctional scale  Medium  Maintain anti-hyperglycemic dose as follows-   Antihyperglycemics (From admission, onward)      Start     Stop Route Frequency Ordered    07/09/25 2100  insulin glargine U-100 (Lantus) pen 20 Units         -- SubQ Nightly 07/09/25 1016    07/04/25 1142  insulin aspart U-100 pen 0-10 Units         -- SubQ Before meals & nightly PRN 07/04/25 1046          -Hold Oral hypoglycemics while patient is in the hospital.  -Monitor CBGS = on the lower range of normal  -Lantus 23 units subQ q.h.s. decreased to 20 units subQ q.h.s. (titrate as needed)   Acute  psychosis  -improving   --likely multifactorial with multiple comorbidities, including depression anxiety as well as untreated sleep apnea  -suspect a component of hospital-acquired delirium as well  -CT head without contrast on 07/06/2025 =No CT evidence of acute intracranial abnormality. If the patient has an acute, focal neurological deficit, MRI of the brain may be indicated     -duloxetine 60 mg p.o. daily changed to duloxetine 30 mg p.o. q.a.m. and duloxetine 60 mg q.h.s., per psych recommendations  -Risperdal 0.5 mg p.o. t.i.d. per psych recommendations  Metabolic acidosis  Stable      NICOLAS (obstructive sleep apnea)  -Patient patient's  stated that patient was diagnosed with sleep apnea several years ago and was on a CPAP machine until it broke.  After it broke she never got it replaced.  -outpatient sleep study as soon as possible on discharge.   informed to obtain outpatient sleep study as soon as possible on discharge as well  -patient and  have agreed for a trial of CPAP while sleeping  -CPAP q.h.s.    Goals of care, counseling/discussion  Advance Care Planning      Palliative care consulted = appreciate input and follow recommendations    VTE Risk Mitigation (From admission, onward)           Ordered     enoxaparin injection 40 mg  Every 12 hours         07/06/25 1305     IP VTE HIGH RISK PATIENT  Once         07/06/25 1302     Place sequential compression device  Until discontinued         07/04/25 1046                    Discharge Planning   DENZEL: 7/14/2025     Code Status: Full Code   Medical Readiness for Discharge Date:   Discharge Plan A: Rehab   Discharge Delays: None known at this time              Please place Justification for DME      Yahir Campos MD  Department of Hospital Medicine   [facility]

## 2025-07-10 NOTE — SUBJECTIVE & OBJECTIVE
Interval History: Pt much better today. She is AAO x 3 and able to hold full conversation today.    Medications:  Continuous Infusions:  Scheduled Meds:   cholecalciferol (vitamin D3)  4,000 Units Oral Daily    DULoxetine  30 mg Oral Daily    DULoxetine  60 mg Oral QHS    enoxparin  40 mg Subcutaneous Q12H (prophylaxis, 0900/2100)    insulin glargine U-100 (Lantus)  20 Units Subcutaneous QHS    levothyroxine  25 mcg Oral Before breakfast    magnesium oxide  400 mg Oral Daily    midodrine  10 mg Oral Q8H    montelukast  10 mg Oral QHS    mupirocin   Nasal BID    pantoprazole  40 mg Oral Daily    potassium chloride  10 mEq Oral TID    potassium, sodium phosphates  1 packet Oral QID (AC & HS)    risperiDONE  0.5 mg Oral TID     PRN Meds:  Current Facility-Administered Medications:     0.9%  NaCl infusion (for blood administration), , Intravenous, Q24H PRN    acetaminophen, 650 mg, Oral, Q8H PRN    dextrose 50%, 12.5 g, Intravenous, PRN    dextrose 50%, 25 g, Intravenous, PRN    glucagon (human recombinant), 1 mg, Intramuscular, PRN    glucose, 16 g, Oral, PRN    glucose, 24 g, Oral, PRN    insulin aspart U-100, 0-10 Units, Subcutaneous, QID (AC + HS) PRN    LORazepam, 0.5 mg, Oral, BID PRN    magnesium oxide, 800 mg, Oral, PRN    magnesium oxide, 800 mg, Oral, PRN    naloxone, 0.02 mg, Intravenous, PRN    oxyCODONE-acetaminophen, 1 tablet, Oral, Q4H PRN    oxyCODONE-acetaminophen, 1 tablet, Oral, Q4H PRN    potassium chloride in water, 40 mEq, Intravenous, PRN **AND** potassium chloride in water, 60 mEq, Intravenous, PRN **AND** potassium chloride in water, 80 mEq, Intravenous, PRN    potassium, sodium phosphates, 2 packet, Oral, PRN    potassium, sodium phosphates, 2 packet, Oral, PRN    potassium, sodium phosphates, 2 packet, Oral, PRN    prochlorperazine, 5 mg, Intravenous, Q6H PRN    sodium chloride 0.9%, 10 mL, Intravenous, PRN    Objective:     Vital Signs (Most Recent):  Temp: 97.9 °F (36.6 °C) (07/10/25  1220)  Pulse: 76 (07/10/25 1220)  Resp: 16 (07/10/25 1305)  BP: 98/67 (07/10/25 1220)  SpO2: 100 % (07/10/25 1220) Vital Signs (24h Range):  Temp:  [97.7 °F (36.5 °C)-98.7 °F (37.1 °C)] 97.9 °F (36.6 °C)  Pulse:  [] 76  Resp:  [14-33] 16  SpO2:  [91 %-100 %] 100 %  BP: ()/(58-81) 98/67     Weight: 115.6 kg (254 lb 13.6 oz)  Body mass index is 43.75 kg/m².       Physical Exam  Vitals and nursing note reviewed.   Constitutional:       General: She is not in acute distress.     Appearance: She is not ill-appearing.   HENT:      Mouth/Throat:      Mouth: Mucous membranes are moist.      Pharynx: Oropharynx is clear.   Eyes:      General: No scleral icterus.     Pupils: Pupils are equal, round, and reactive to light.   Cardiovascular:      Rate and Rhythm: Normal rate and regular rhythm.      Pulses: Normal pulses.   Pulmonary:      Effort: Pulmonary effort is normal. No respiratory distress.   Abdominal:      General: There is no distension.      Palpations: Abdomen is soft.   Musculoskeletal:      Comments: Right leg immobilizer in use   Skin:     General: Skin is warm and dry.   Neurological:      Mental Status: She is alert and oriented to person, place, and time.      Comments: Awake and alert today. Able to hold full conversation today.   Psychiatric:         Mood and Affect: Mood normal.         Behavior: Behavior normal.         Thought Content: Thought content normal.         Judgment: Judgment normal.            Review of Symptoms      Symptom Assessment (ESAS 0-10 Scale)  Pain:  0  Dyspnea:  0  Anxiety:  0  Nausea:  0  Depression:  0  Anorexia:  0  Fatigue:  0  Insomnia:  0  Restlessness:  0  Agitation:  0         Performance Status:  50    Psychosocial/Cultural:   See Palliative Psychosocial Note: No  **Primary  to Follow**  Palliative Care  Consult: No        Advance Care Planning   Advance Directives:   Living Will: No    Do Not Resuscitate Status: No    Medical Power  of : No      Decision Making:  Patient answered questions  Goals of Care: The patient endorses that what is most important right now is to focus on remaining as independent as possible and quality of life, even if it means sacrificing a little time    Accordingly, we have decided that the best plan to meet the patient's goals includes continuing with treatment.           Significant Labs: All pertinent labs within the past 24 hours have been reviewed.  CBC:   Recent Labs   Lab 07/10/25  1204   WBC 12.59   HGB 8.4*   HCT 27.8*   MCV 84        BMP:  Recent Labs   Lab 07/10/25  0543   GLU 92      K 3.9      CO2 30*   BUN 26*   CREATININE 1.2   CALCIUM 7.8*   MG 1.6     LFT:  Lab Results   Component Value Date    AST 6 (L) 07/10/2025    ALKPHOS 95 07/10/2025    BILITOT 0.7 07/10/2025     Albumin:   Albumin   Date Value Ref Range Status   07/10/2025 2.4 (L) 3.5 - 5.2 g/dL Final     Protein:   Protein Total   Date Value Ref Range Status   07/10/2025 4.9 (L) 6.0 - 8.4 gm/dL Final     Lactic acid:   Lab Results   Component Value Date    LACTATE 1.6 07/09/2025    LACTATE 1.6 07/09/2025       Significant Imaging: I have reviewed all pertinent imaging results/findings within the past 24 hours.

## 2025-07-10 NOTE — PT/OT/SLP PROGRESS
Physical Therapy Treatment    Patient Name:  Sonia Muse   MRN:  [id number]    Recommendations:     Discharge Recommendations: High Intensity Therapy  Discharge Equipment Recommendations: to be determined by next level of care  Barriers to discharge: Decreased caregiver support and increased caregiver burden    Assessment:     Sonia Muse is a 65 y.o. female admitted with a medical diagnosis of Closed fracture of distal end of right femur.  She presents with the following impairments/functional limitations: weakness, impaired endurance, impaired self care skills, impaired functional mobility, gait instability, impaired balance, decreased lower extremity function, decreased safety awareness, pain, decreased ROM, impaired cardiopulmonary response to activity, orthopedic precautions . Pt with improved alertness today. Still exhibits difficulty with transfers, but required less assistance on today and completed t/f with rw.     Rehab Prognosis: Fair; patient would benefit from acute skilled PT services to address these deficits and reach maximum level of function.    Recent Surgery: Procedure(s) (LRB):  ORIF, FRACTURE, FEMUR (distal femur ORIF, synthes VA condylar plate, izaiah table, C-arm, triangle) (Right) 4 Days Post-Op    Plan:     During this hospitalization, patient to be seen daily to address the identified rehab impairments via gait training, therapeutic activities, therapeutic exercises, neuromuscular re-education and progress toward the following goals:    Plan of Care Expires:  08/07/25    Subjective     Chief Complaint: R knee pain  Patient/Family Comments/goals: Pt agreeable to PT.   Pain/Comfort:  Pain Rating 1: 0/10  Location - Side 1: Right  Location - Orientation 1: generalized  Location 1: knee  Pain Addressed 1: Cessation of Activity  Pain Rating Post-Intervention 1: other (see comments) (moderate)      Objective:     Communicated with RN prior to session.  Patient found HOB elevated  with bed alarm, telemetry, oxygen, PureWick, peripheral IV upon PT entry to room.     General Precautions: Standard, fall  Orthopedic Precautions: RLE partial weight bearing  Braces: Knee immobilizer  Respiratory Status: Nasal cannula, flow 3 L/min     Functional Mobility:  Bed Mobility:     Supine to Sit: moderate assistance  Transfers:     Sit to Stand:  moderate assistance with rolling walker  Bed to Chair: moderate assistance and of 2 persons with  rolling walker  using  Stand Pivot      AM-PAC 6 CLICK MOBILITY          Treatment & Education:  Pt was educated on the following: call light use, importance of OOB activity and functional mobility to negate the negative effects of prolonged bed rest during this hospitalization, safe transfers/ambulation and discharge planning recommendations/options.     Pt educated and instructed to perform B LE there ex sitting x 30 reps with vc for proper execution and joint protection: ap, LAQ (to L LE), hip abd/add, gs/qs.     Patient left up in chair with all lines intact, call button in reach, chair alarm on, and RN notified..    GOALS:   Multidisciplinary Problems       Physical Therapy Goals          Problem: Physical Therapy    Goal Priority Disciplines Outcome Interventions   Physical Therapy Goal     PT, PT/OT Progressing    Description: Goals to be met by: 25     Patient will increase functional independence with mobility by performin. Supine to sit with MInimal Assistance  2. Sit to stand transfer with Minimal Assistance  3. Bed to chair transfer with Minimal Assistance using Rolling Walker  4. R/L rolling with minimal assistance using bedside rail as needed    All goals while maintaining 25 % PWB on R LE                                 DME Justifications:  No DME recommended requiring DME justifications    Time Tracking:     PT Received On: 07/10/25  PT Start Time: 1002     PT Stop Time: 1031  PT Total Time (min): 29 min     Billable Minutes: Therapeutic  Activity 29    Treatment Type: Treatment  PT/PTA: PTA     Number of PTA visits since last PT visit: 2     07/10/2025

## 2025-07-10 NOTE — PLAN OF CARE
Problem: Skin Injury Risk Increased  Goal: Skin Health and Integrity  Outcome: Progressing     Problem: Infection  Goal: Absence of Infection Signs and Symptoms  Outcome: Progressing     Problem: Adult Inpatient Plan of Care  Goal: Plan of Care Review  Outcome: Progressing  Goal: Patient-Specific Goal (Individualized)  Outcome: Progressing  Goal: Absence of Hospital-Acquired Illness or Injury  Outcome: Progressing  Goal: Optimal Comfort and Wellbeing  Outcome: Progressing  Goal: Readiness for Transition of Care  Outcome: Progressing     Problem: Bariatric Environmental Safety  Goal: Safety Maintained with Care  Outcome: Progressing     Problem: Diabetes Comorbidity  Goal: Blood Glucose Level Within Targeted Range  Outcome: Progressing     Problem: Acute Kidney Injury/Impairment  Goal: Fluid and Electrolyte Balance  Outcome: Progressing  Goal: Improved Oral Intake  Outcome: Progressing  Goal: Effective Renal Function  Outcome: Progressing     Problem: Wound  Goal: Optimal Coping  Outcome: Progressing  Goal: Optimal Functional Ability  Outcome: Progressing  Goal: Absence of Infection Signs and Symptoms  Outcome: Progressing  Goal: Improved Oral Intake  Outcome: Progressing  Goal: Optimal Pain Control and Function  Outcome: Progressing  Goal: Skin Health and Integrity  Outcome: Progressing  Goal: Optimal Wound Healing  Outcome: Progressing     Problem: Fall Injury Risk  Goal: Absence of Fall and Fall-Related Injury  Outcome: Progressing     Problem: Orthopaedic Fracture  Goal: Absence of Bleeding  Outcome: Progressing  Goal: Bowel Elimination  Outcome: Progressing  Goal: Absence of Embolism Signs and Symptoms  Outcome: Progressing  Goal: Fracture Stability  Outcome: Progressing  Goal: Optimal Functional Ability  Outcome: Progressing  Goal: Absence of Infection Signs and Symptoms  Outcome: Progressing  Goal: Effective Tissue Perfusion  Outcome: Progressing  Goal: Optimal Pain Control and Function  Outcome:  Progressing  Goal: Effective Oxygenation and Ventilation  Outcome: Progressing     Problem: Coping Ineffective  Goal: Effective Coping  Outcome: Progressing

## 2025-07-10 NOTE — ASSESSMENT & PLAN NOTE
I reviewed the patient's chart and discussed the case with the patient's team.      I examined Sonia Muse at bedside.  The patient is AAO x 3 and maintains capacity for complex medical decision-making today.  I spoke with pt at bedside.    Pt smiled when I entered her room and knew my name and my role as palliative NP.  Her mentation is significantly improved today and she is currently at her baseline.    She does not recall the past several days and admits to feeling confused during those times.  She is feeling much better today and is grateful that her mentation has improved.  We discussed the acute events that have occurred since her hospitalization along with events leading to her admission.    She wants to proceed with rehab on discharge.  She is going to focus on healing from her acute fracture at this time and then would like to meet with me after her recovery to discuss her goals of care moving forward.  She is unsure if she is willing to continue with chemotherapy given the adverse effects she had after her 1st round of chemo.  She feels that focusing on her comfort and quality of life may be more aligned with her goals moving forward.  I let her know I am happy to discuss this further with her in the coming weeks.  I also encouraged her to speak with her oncologist again to discuss further treatment options that may not have as many  adverse reactions.  She is agreeable to this.    We discussed ACP documentation.  She is agreeable to meet with our palliative RN today to complete this paperwork.  She desires to remain full code at this time.  She would not want to live dependent on machines or in a vegetative state.  If this event occurred she would desire to transition to comfort focused care.    She would like to complete medical POA as well.  Our palliative RN will complete this with her during her visit.    I appreciate being involved in patient's care.  I will remain available for support.  Please reach out if I can be of any assistance.

## 2025-07-10 NOTE — ASSESSMENT & PLAN NOTE
Last A1c reviewed-   Lab Results   Component Value Date    HGBA1C 10.1 (H) 04/25/2025     Most recent fingerstick glucose reviewed-   Recent Labs   Lab 07/10/25  0051 07/10/25  0446 07/10/25  0807 07/10/25  1221   POCTGLUCOSE 86 92 92 98     Current correctional scale  Medium  Maintain anti-hyperglycemic dose as follows-   Antihyperglycemics (From admission, onward)      Start     Stop Route Frequency Ordered    07/09/25 2100  insulin glargine U-100 (Lantus) pen 20 Units         -- SubQ Nightly 07/09/25 1016    07/04/25 1142  insulin aspart U-100 pen 0-10 Units         -- SubQ Before meals & nightly PRN 07/04/25 1046          -Hold Oral hypoglycemics while patient is in the hospital.  -Monitor CBGS = on the lower range of normal  -Lantus 23 units subQ q.h.s. decreased to 20 units subQ q.h.s. (titrate as needed)

## 2025-07-10 NOTE — NURSING
1950 report called to Analilia MARISCAL on 3100 med surg unit.   2100 pt transferred to med surg unit. No acute events noted at this time

## 2025-07-10 NOTE — ASSESSMENT & PLAN NOTE
-status post Chemotherapy roughly one week ago.  Improving  -neutropenic precautions   -urine culture on 07/04/2025 = no growth till date (UTI ruled out)  -blood cultures x2 on 07/04/2025 = remains negative till date  -DC filgrastim 458 mg IV daily x5 days.  Ordered on 07/04/2025.  DC on 07/08/2025 per Heme-Onc  -DC cefepime 1 g IV b.i.d. empirically.  Ordered on 07/04/2025.  DC on 07/08/2025 ( urine and blood cultures remain negative)

## 2025-07-10 NOTE — ASSESSMENT & PLAN NOTE
-In setting of pancreatic adenocarcinoma and MIGUEL ANGEL.  -no overt bleeding seen or reported currently  -monitor H&H = remains anemic but in stable range with no significant change currently (also with contribution from dilutional effect of recent IV fluids)  -FOBT negative  -PPI daily   Stable

## 2025-07-10 NOTE — ASSESSMENT & PLAN NOTE
Secondary to combination of anemia and the use of IV hydromorphone for pain.  -monitor blood pressure = stable range currently   -maintain goal blood pressure map > 65  -midodrine 10 mg p.o. q.8 hours

## 2025-07-10 NOTE — PT/OT/SLP PROGRESS
Occupational Therapy   Treatment    Name: Sonia Muse  MRN: [id number]  Admitting Diagnosis:  Closed fracture of distal end of right femur  4 Days Post-Op    Recommendations:     Discharge Recommendations: High Intensity Therapy  Discharge Equipment Recommendations:  to be determined by next level of care  Barriers to discharge:   increased assistance needed for ADL's and functional mobility; orthopedic precautions    Assessment:     Sonia Muse is a 65 y.o. female with a medical diagnosis of Closed fracture of distal end of right femur.  She presents with weakness and desire to return to bed from chair. Performance deficits affecting function are weakness, impaired endurance, impaired self care skills, impaired functional mobility, gait instability, impaired balance, decreased coordination, decreased upper extremity function, decreased lower extremity function, pain, decreased ROM.     Rehab Prognosis:  Good; patient would benefit from acute skilled OT services to address these deficits and reach maximum level of function.       Plan:     Patient to be seen 5 x/week to address the above listed problems via self-care/home management, therapeutic activities, therapeutic exercises  Plan of Care Expires: 08/07/25  Plan of Care Reviewed with: patient    Subjective     Chief Complaint: weakness in arms  Patient/Family Comments/goals: wants to get back to bed after being up in the chair today.  Pain/Comfort:  Pain Rating 1: 0/10    Objective:     Communicated with: nurse Griffith prior to session.  Patient found up in chair with chair check, telemetry, PureWick, peripheral IV upon OT entry to room.    General Precautions: Standard, aspiration, fall, special contact    Orthopedic Precautions:RLE partial weight bearing  Braces: Knee immobilizer  Respiratory Status: Room air     Occupational Performance:       Functional Mobility/Transfers:  Patient completed Bed <> Chair Transfer using Squat Pivot technique with  moderate assistance and of 2 persons with no assistive device  Patient instructed on hand placement for squat pivot transfer back to bed from bedside chair. Patient able to maintain PWB on RLE during transfer.     Patient completed sit>supine ModA x2 and scooting to HOB modA x2 with verbal cues to use B UE to help pull up in the bed.           [facility] 6 Click ADL:      Treatment & Education:  Therapist provided facilitation and instruction of proper body mechanics and fall prevention strategies during tasks listed above.   Instructed patient to sit in bedside chair as tolerated daily to increase OOB/activity tolerance.   Instructed patient to use call light to have nursing staff assist with needs/transfers.   Discussed OT POC and answered all questions within OT scope of practice.        Patient left HOB elevated with all lines intact, call button in reach, and bed alarm on    GOALS:   Multidisciplinary Problems       Occupational Therapy Goals          Problem: Occupational Therapy    Goal Priority Disciplines Outcome Interventions   Occupational Therapy Goal     OT, PT/OT     Description: Goals to be met by: 8/7/25     Patient will increase functional independence with ADLs by performing:    UE Dressing with [place].  LE Dressing with [place].  Grooming while standing at sink with [place].  Toileting from toilet with [place] for hygiene and clothing management.   Bathing from  shower chair/bench with Modified [place] with use of shower chair.  Toilet transfer to toilet with [place].                           Time Tracking:     OT Date of Treatment: 07/10/25  OT Start Time: 1332  OT Stop Time: 1350  OT Total Time (min): 18 min    Billable Minutes:Therapeutic Activity 18    OT/JOHN PAUL: OT          7/10/2025

## 2025-07-10 NOTE — ASSESSMENT & PLAN NOTE
-improving   --likely multifactorial with multiple comorbidities, including depression anxiety as well as untreated sleep apnea  -suspect a component of hospital-acquired delirium as well  -CT head without contrast on 07/06/2025 =No CT evidence of acute intracranial abnormality. If the patient has an acute, focal neurological deficit, MRI of the brain may be indicated     -duloxetine 60 mg p.o. daily changed to duloxetine 30 mg p.o. q.a.m. and duloxetine 60 mg q.h.s., per psych recommendations  -Risperdal 0.5 mg p.o. t.i.d. per psych recommendations

## 2025-07-10 NOTE — PLAN OF CARE
Pt accepted with NSR and NSR to submit auth today 7/10/25. SW will continue to follow Auth ref # [id number]    07/10/25 1420   Post-Acute Status   Post-Acute Authorization Placement   Post-Acute Placement Status Pending payor review/awaiting authorization (if required)   Discharge Delays None known at this time   Discharge Plan   Discharge Plan A Rehab   Discharge Plan B Home with family

## 2025-07-10 NOTE — ASSESSMENT & PLAN NOTE
-status post  1.Open reduction internal fixation right periprosthetic distal femur fracture.2. Intramedullary nailing right femur on 07/06/2025 by Orthopedic surgery  -orthopedic surgery recommends 25% weight-bearing to right lower extremity  Plan on discharge to inpatient rehab

## 2025-07-11 VITALS
WEIGHT: 254.88 LBS | BODY MASS INDEX: 43.51 KG/M2 | SYSTOLIC BLOOD PRESSURE: 101 MMHG | DIASTOLIC BLOOD PRESSURE: 56 MMHG | RESPIRATION RATE: 18 BRPM | HEIGHT: 64 IN | HEART RATE: 95 BPM | OXYGEN SATURATION: 98 % | TEMPERATURE: 97 F

## 2025-07-11 PROBLEM — E87.1 HYPONATREMIA: Status: RESOLVED | Noted: 2025-07-04 | Resolved: 2025-07-11

## 2025-07-11 PROBLEM — T45.1X5A CHEMOTHERAPY-INDUCED NEUTROPENIA: Status: RESOLVED | Noted: 2025-07-04 | Resolved: 2025-07-11

## 2025-07-11 PROBLEM — D70.1 CHEMOTHERAPY-INDUCED NEUTROPENIA: Status: RESOLVED | Noted: 2025-07-04 | Resolved: 2025-07-11

## 2025-07-11 PROBLEM — F23 ACUTE PSYCHOSIS: Status: RESOLVED | Noted: 2025-07-07 | Resolved: 2025-07-11

## 2025-07-11 PROBLEM — R79.89 ABNORMAL LFTS: Status: RESOLVED | Noted: 2025-07-05 | Resolved: 2025-07-11

## 2025-07-11 PROBLEM — E87.20 METABOLIC ACIDOSIS: Status: RESOLVED | Noted: 2025-07-07 | Resolved: 2025-07-11

## 2025-07-11 PROBLEM — N17.9 AKI (ACUTE KIDNEY INJURY): Status: RESOLVED | Noted: 2025-04-24 | Resolved: 2025-07-11

## 2025-07-11 PROBLEM — S72.90XA: Status: ACTIVE | Noted: 2025-07-11

## 2025-07-11 LAB
ANION GAP (SMH): 7 MMOL/L (ref 8–16)
BUN SERPL-MCNC: 23 MG/DL (ref 8–23)
CALCIUM SERPL-MCNC: 8 MG/DL (ref 8.7–10.5)
CHLORIDE SERPL-SCNC: 107 MMOL/L (ref 95–110)
CO2 SERPL-SCNC: 28 MMOL/L (ref 23–29)
CREAT SERPL-MCNC: 1.1 MG/DL (ref 0.5–1.4)
GFR SERPLBLD CREATININE-BSD FMLA CKD-EPI: 57 ML/MIN/1.73/M2
GLUCOSE SERPL-MCNC: 98 MG/DL (ref 70–110)
MAGNESIUM SERPL-MCNC: 1.7 MG/DL (ref 1.6–2.6)
POCT GLUCOSE: 93 MG/DL (ref 70–110)
POCT GLUCOSE: 95 MG/DL (ref 70–110)
POTASSIUM SERPL-SCNC: 4.2 MMOL/L (ref 3.5–5.1)
SODIUM SERPL-SCNC: 142 MMOL/L (ref 136–145)

## 2025-07-11 PROCEDURE — 25000003 PHARM REV CODE 250: Performed by: INTERNAL MEDICINE

## 2025-07-11 PROCEDURE — 25000003 PHARM REV CODE 250: Performed by: ORTHOPAEDIC SURGERY

## 2025-07-11 PROCEDURE — 97530 THERAPEUTIC ACTIVITIES: CPT

## 2025-07-11 PROCEDURE — 99232 SBSQ HOSP IP/OBS MODERATE 35: CPT | Mod: ,,, | Performed by: INTERNAL MEDICINE

## 2025-07-11 PROCEDURE — 80048 BASIC METABOLIC PNL TOTAL CA: CPT | Performed by: STUDENT IN AN ORGANIZED HEALTH CARE EDUCATION/TRAINING PROGRAM

## 2025-07-11 PROCEDURE — 27000221 HC OXYGEN, UP TO 24 HOURS

## 2025-07-11 PROCEDURE — 97110 THERAPEUTIC EXERCISES: CPT

## 2025-07-11 PROCEDURE — 83735 ASSAY OF MAGNESIUM: CPT | Performed by: ORTHOPAEDIC SURGERY

## 2025-07-11 PROCEDURE — 25000003 PHARM REV CODE 250: Performed by: STUDENT IN AN ORGANIZED HEALTH CARE EDUCATION/TRAINING PROGRAM

## 2025-07-11 PROCEDURE — 99900031 HC PATIENT EDUCATION (STAT)

## 2025-07-11 PROCEDURE — 99900035 HC TECH TIME PER 15 MIN (STAT)

## 2025-07-11 PROCEDURE — 63600175 PHARM REV CODE 636 W HCPCS: Performed by: INTERNAL MEDICINE

## 2025-07-11 PROCEDURE — 94761 N-INVAS EAR/PLS OXIMETRY MLT: CPT

## 2025-07-11 PROCEDURE — 63600175 PHARM REV CODE 636 W HCPCS: Performed by: STUDENT IN AN ORGANIZED HEALTH CARE EDUCATION/TRAINING PROGRAM

## 2025-07-11 RX ORDER — SODIUM,POTASSIUM PHOSPHATES 280-250MG
1 POWDER IN PACKET (EA) ORAL
Status: ON HOLD
Start: 2025-07-11

## 2025-07-11 RX ORDER — MIDODRINE HYDROCHLORIDE 10 MG/1
10 TABLET ORAL EVERY 8 HOURS
Status: ON HOLD
Start: 2025-07-11 | End: 2026-07-11

## 2025-07-11 RX ORDER — DULOXETIN HYDROCHLORIDE 30 MG/1
30 CAPSULE, DELAYED RELEASE ORAL DAILY
Status: ON HOLD
Start: 2025-07-12 | End: 2026-07-12

## 2025-07-11 RX ORDER — POTASSIUM CHLORIDE 750 MG/1
20 CAPSULE, EXTENDED RELEASE ORAL DAILY
Status: ON HOLD
Start: 2025-07-11

## 2025-07-11 RX ORDER — RISPERIDONE 0.5 MG/1
0.5 TABLET ORAL 3 TIMES DAILY
Status: ON HOLD
Start: 2025-07-11 | End: 2026-07-11

## 2025-07-11 RX ORDER — MAGNESIUM SULFATE HEPTAHYDRATE 40 MG/ML
2 INJECTION, SOLUTION INTRAVENOUS ONCE
Status: COMPLETED | OUTPATIENT
Start: 2025-07-11 | End: 2025-07-11

## 2025-07-11 RX ORDER — DULOXETIN HYDROCHLORIDE 60 MG/1
60 CAPSULE, DELAYED RELEASE ORAL NIGHTLY
Status: ON HOLD
Start: 2025-07-11 | End: 2026-07-11

## 2025-07-11 RX ORDER — CHOLECALCIFEROL (VITAMIN D3) 50 MCG
4000 TABLET ORAL DAILY
Status: ON HOLD
Start: 2025-07-12

## 2025-07-11 RX ADMIN — MIDODRINE HYDROCHLORIDE 10 MG: 5 TABLET ORAL at 05:07

## 2025-07-11 RX ADMIN — POTASSIUM & SODIUM PHOSPHATES POWDER PACK 280-160-250 MG 1 PACKET: 280-160-250 PACK at 01:07

## 2025-07-11 RX ADMIN — POTASSIUM & SODIUM PHOSPHATES POWDER PACK 280-160-250 MG 1 PACKET: 280-160-250 PACK at 05:07

## 2025-07-11 RX ADMIN — MIDODRINE HYDROCHLORIDE 10 MG: 5 TABLET ORAL at 01:07

## 2025-07-11 RX ADMIN — POTASSIUM CHLORIDE 10 MEQ: 750 CAPSULE, EXTENDED RELEASE ORAL at 07:07

## 2025-07-11 RX ADMIN — OXYCODONE HYDROCHLORIDE AND ACETAMINOPHEN 1 TABLET: 10; 325 TABLET ORAL at 07:07

## 2025-07-11 RX ADMIN — MAGNESIUM SULFATE HEPTAHYDRATE 2 G: 40 INJECTION, SOLUTION INTRAVENOUS at 07:07

## 2025-07-11 RX ADMIN — ENOXAPARIN SODIUM 40 MG: 40 INJECTION SUBCUTANEOUS at 07:07

## 2025-07-11 RX ADMIN — Medication 4000 UNITS: at 07:07

## 2025-07-11 RX ADMIN — RISPERIDONE 0.5 MG: 0.25 TABLET, FILM COATED ORAL at 07:07

## 2025-07-11 RX ADMIN — DULOXETINE 30 MG: 30 CAPSULE, DELAYED RELEASE ORAL at 07:07

## 2025-07-11 RX ADMIN — Medication 400 MG: at 07:07

## 2025-07-11 RX ADMIN — LEVOTHYROXINE SODIUM 25 MCG: 0.03 TABLET ORAL at 05:07

## 2025-07-11 RX ADMIN — PANTOPRAZOLE SODIUM 40 MG: 40 TABLET, DELAYED RELEASE ORAL at 05:07

## 2025-07-11 RX ADMIN — OXYCODONE HYDROCHLORIDE AND ACETAMINOPHEN 1 TABLET: 10; 325 TABLET ORAL at 01:07

## 2025-07-11 RX ADMIN — MUPIROCIN 1 G: 20 OINTMENT TOPICAL at 07:07

## 2025-07-11 NOTE — DISCHARGE SUMMARY
[facility] Medicine  Discharge Summary      Patient Name: Sonia Muse  MRN: [id number]  JEREMIAS: [id number]  Patient Class: IP- Inpatient  Admission Date: 7/4/2025  Hospital Length of Stay: 7 days  Discharge Date and Time: 7/11/2025  2:51 PM  Attending Physician: Alejandra att. providers found   Discharging Provider: Yahir Campos MD  Primary Care Provider: Nasra Galvez FNP    Primary Care Team: Networked reference to record PCT     HPI:   Sonia Muse is a 65 year old female with a past medical history of pancreatic cancer, anemia, obesity, DM, HTN, and hypothyroidism who presented with a R femur fracture after a fall, MIGUEL ANGEL, hyponatremia and neutropenia. She recently completed her first cycle of chemotherapy after roughly one week ago (patient of Dr. Calle). She endorses chronic diarrhea and decreased appetite as well as fatigue. She states her BP has fallen since being diagnosed with pancreatic cancer and starting chemotherapy. In the ED, the patient received a 1.5 L NS bolus and 1 L LR bolus as well as fentanyl and Zofran. Hospital Medicine was consulted for admission.    Procedure(s) (LRB):  ORIF, FRACTURE, FEMUR (distal femur ORIF, synthes VA condylar plate, izaiah table, C-arm, triangle) (Right)      Hospital Course:   Admitted the patient with a distal right femur fracture.  The orthopedic surgeon recommended operative repair after medical stabilization.  In response to the neutropenia, we placed her on granulocyte colony-stimulating factor daily.  We provided prophylactic antibiotic.  We consulted with Hematology Oncology, who provided recommendations for further management.  We transfused red blood cells when needed.  She had hypotension, which required a vasopressor.  It was not clear what was causing the hypotension, the two possibilities were the anemia, and the 2nd was the use of frequent intravenous hydromorphone for the pain associated with the fracture.  We also treated  her for acute kidney injury, which was likely because of unstable hemodynamics, in addition to the use of oxaliplatin.  Patient treated with IV fluids, midodrine and Levophed for hypotension with improvement.  Renal function monitored showed improving creatinine with IV fluids.  Filgrastim daily was ordered for 5 days for neutropenia.  Patient remained afebrile.  On morning of 07/06/2025 patient taken for ORIF of right femur by Orthopedic surgery.  Urine cultures and blood cultures remain negative therefore UTI ruled out and empiric IV antibiotics were DC.  Renal function monitored and improved well with mild increase in edema therefore IV fluids DC.  Patient remains off of Levophed since evening of 07/07/2025.  Patient does have a history of sleep apnea in the distant past and used a CPAP for awhile and then the machine broke therefore she never got this replaced.  She will require a repeat sleep study in outpatient setting as she probably still has underlying sleep apnea.  Patient did have mild acute hospital-acquired psychosis postop and was very emotional.  SSRI continued.  Psych eval consulted.  Patient's mental status improved the next day.  Blood pressure remains in stable range on midodrine.  Suspect midodrine could eventually be tapered off as tolerated.  Downgrade orders to tele placed on 07/08/2025. Dc to IPR.     Physical Exam  Constitutional:       General: She is not in acute distress.  Cardiovascular:      Rate and Rhythm: Normal rate.   Pulmonary:      Effort: No respiratory distress.      Breath sounds: No stridor.      Goals of Care Treatment Preferences:  Code Status: Full Code    Health care agent: Cat Vasquez / Josué Muse / [facility]  Health care agent number:  /  /     Living Will: Yes     What is most important right now is to focus on spending time at home, remaining as independent as possible, quality of life, even if it means sacrificing a  little time.  Accordingly, we have decided that the best plan to meet the patient's goals includes continuing with treatment.         Consults:   Consults (From admission, onward)          Status Ordering Provider     Inpatient consult to Registered Dietitian/Nutritionist  Once        Provider:  (Not yet assigned)    Completed SHEREE ARTEAGA     Inpatient consult to Palliative Care  Once        Provider:  Antonio Hines MD    Completed SHEREE ARTEAGA     Inpatient consult to Psychiatry  Once        Provider:  Chica Fisher MD    Acknowledged SHEREE ARTEAGA     Inpatient consult to Midline team  Once        Provider:  (Not yet assigned)    Completed BLAIR OSHEA     Inpatient consult to Hematology/Oncology  Once        Provider:  Renee Menon MD    Acknowledged BLAIR OSHEA     Inpatient consult to Orthopedic Surgery  Once        Provider:  Blair Oshea MD    Completed THERESE VANESSA     Inpatient consult to Nephrology  Once        Provider:  Kenton Lundberg MD    Completed THERESE VANESSA            Assessment & Plan  Closed fracture of distal end of right femur  -status post  1.Open reduction internal fixation right periprosthetic distal femur fracture.2. Intramedullary nailing right femur on 07/06/2025 by Orthopedic surgery  -orthopedic surgery recommends 25% weight-bearing to right lower extremity  Plan on discharge to inpatient rehab  Chemotherapy induced neutropenia  -status post Chemotherapy roughly one week ago.  Improving  -neutropenic precautions   -urine culture on 07/04/2025 = no growth till date (UTI ruled out)  -blood cultures x2 on 07/04/2025 = remains negative till date  -DC filgrastim 458 mg IV daily x5 days.  Ordered on 07/04/2025.  DC on 07/08/2025 per Heme-Onc  -DC cefepime 1 g IV b.i.d. empirically.  Ordered on 07/04/2025.  DC on 07/08/2025 ( urine and blood cultures remain negative)      Hypotension  Secondary to combination of anemia and the use of IV  hydromorphone for pain.  -monitor blood pressure = stable range currently   -maintain goal blood pressure map > 65  -midodrine 10 mg p.o. q.8 hours        Pancreatic cancer  -Oncology consulted  -patient recently began chemotherapy  Follow up with Dr. Calle outpatient.    Anemia in neoplastic disease  -In setting of pancreatic adenocarcinoma and MIGUEL ANGEL.  -no overt bleeding seen or reported currently  -monitor H&H = remains anemic but in stable range with no significant change currently (also with contribution from dilutional effect of recent IV fluids)  -FOBT negative  -PPI daily   Stable  Acute cystitis  -POA   -no acute cystitis on this admission.  UTI ruled out with negative urine culture  -afebrile  -urine culture on 07/04/2025 = negative at 48 hours  -lactic acid on 07/07/2025 = within normal limits  -DC cefepime IV on 07/08/2025    DM (diabetes mellitus)  Last A1c reviewed-   Lab Results   Component Value Date    HGBA1C 10.1 (H) 04/25/2025     Most recent fingerstick glucose reviewed-   Recent Labs   Lab 07/10/25  1950 07/10/25  2119 07/11/25  0841 07/11/25  1144   POCTGLUCOSE 101 112* 95 93     Current correctional scale  Medium  Maintain anti-hyperglycemic dose as follows-   Antihyperglycemics (From admission, onward)      None          -Hold Oral hypoglycemics while patient is in the hospital.  -Monitor CBGS = on the lower range of normal  -Lantus 23 units subQ q.h.s. decreased to 20 units subQ q.h.s. (titrate as needed)   NICOLAS (obstructive sleep apnea)  -Patient patient's  stated that patient was diagnosed with sleep apnea several years ago and was on a CPAP machine until it broke.  After it broke she never got it replaced.  -outpatient sleep study as soon as possible on discharge.   informed to obtain outpatient sleep study as soon as possible on discharge as well  -patient and  have agreed for a trial of CPAP while sleeping  -CPAP q.h.s.    Goals of care,  counseling/discussion  Advance Care Planning      Palliative care consulted = appreciate input and follow recommendations    ACP (advance care planning)      Displaced fracture of femur      Final Active Diagnoses:    Diagnosis Date Noted POA    PRINCIPAL PROBLEM:  Closed fracture of distal end of right femur [S72.401A] 07/04/2025 Yes    Displaced fracture of femur [S72.90XA] 07/11/2025 Unknown    ACP (advance care planning) [Z71.89] 07/10/2025 Not Applicable    Goals of care, counseling/discussion [Z71.89] 07/08/2025 Not Applicable    NICOLAS (obstructive sleep apnea) [G47.33] 07/07/2025 Yes    Acute cystitis [N30.00] 07/05/2025 Yes    Anemia in neoplastic disease [D63.0] 07/04/2025 Yes    Chemotherapy induced neutropenia [D70.1, T45.1X5A] 07/04/2025 Yes    Hypotension [I95.9] 07/04/2025 Yes    DM (diabetes mellitus) [E11.9] 07/04/2025 Yes    Pancreatic cancer [C25.9] 05/19/2025 Yes      Problems Resolved During this Admission:    Diagnosis Date Noted Date Resolved POA    Acute psychosis [F23] 07/07/2025 07/11/2025 No    Metabolic acidosis [E87.20] 07/07/2025 07/11/2025 Yes    Abnormal LFTs [R79.89] 07/05/2025 07/11/2025 Yes    Hyponatremia [E87.1] 07/04/2025 07/11/2025 Yes    MIGUEL ANGEL (acute kidney injury) [N17.9] 04/24/2025 07/11/2025 Yes       Discharged Condition: stable    Disposition: Rehab Facility    Follow Up:   Follow-up Information       Nasra Galvez FNP Follow up.    Specialty: Family Medicine  Contact information:  [address] [facility]  [address]  [place] [address]  [phone]               Paty Calle MD Follow up.    Specialties: Hematology and Oncology, Hematology, Oncology  Contact information:  [address] [facility]  [address]  [place] [address]  [phone]               Osmany Harding MD Follow up.    Specialty: Orthopedic Surgery  Contact information:  [address] [place] [address] [address] [place] [address]  [phone]                           Patient Instructions:      Ambulatory referral/consult to Sleep  Disorders   Standing Status: Future   Referral Priority: Routine Referral Type: Consultation   Requested Specialty: Sleep Medicine   Number of Visits Requested: 1     Diet diabetic       Significant Diagnostic Studies: Labs: CMP   Recent Labs   Lab 07/10/25  0543 07/11/25  0612    142   K 3.9 4.2    107   CO2 30* 28   GLU 92 98   BUN 26* 23   CREATININE 1.2 1.1   CALCIUM 7.8* 8.0*   PROT 4.9*  --    ALBUMIN 2.4*  --    BILITOT 0.7  --    ALKPHOS 95  --    AST 6*  --    ALT 4*  --    ANIONGAP 4* 7*    and CBC   Recent Labs   Lab 07/10/25  1204   WBC 12.59   HGB 8.4*   HCT 27.8*          Pending Diagnostic Studies:       None           Medications:  Reconciled Home Medications:      Medication List        START taking these medications      cholecalciferol (vitamin D3) 50 mcg (2,000 unit) Tab  Commonly known as: VITAMIN D3  Take 2 tablets (4,000 Units total) by mouth once daily.  Start taking on: July 12, 2025     midodrine 10 MG tablet  Commonly known as: PROAMATINE  Take 1 tablet (10 mg total) by mouth every 8 (eight) hours.     potassium chloride 10 MEQ Cpsr  Commonly known as: MICRO-K  Take 2 capsules (20 mEq total) by mouth once daily.     potassium, sodium phosphates 280-160-250 mg Pwpk  Commonly known as: PHOS-NAK  Take 1 packet by mouth 4 (four) times daily before meals and nightly.     risperiDONE 0.5 MG Tab  Commonly known as: RISPERDAL  Take 1 tablet (0.5 mg total) by mouth 3 (three) times daily.            CHANGE how you take these medications      * DULoxetine 60 MG capsule  Commonly known as: CYMBALTA  Take 1 capsule (60 mg total) by mouth every evening.  What changed: when to take this     * DULoxetine 30 MG capsule  Commonly known as: CYMBALTA  Take 1 capsule (30 mg total) by mouth once daily.  Start taking on: July 12, 2025  What changed: You were already taking a medication with the same name, and this prescription was added. Make sure you understand how and when to take each.            * This list has 2 medication(s) that are the same as other medications prescribed for you. Read the directions carefully, and ask your doctor or other care provider to review them with you.                CONTINUE taking these medications      albuterol 90 mcg/actuation inhaler  Commonly known as: VENTOLIN HFA  Inhale 2 puffs into the lungs every 6 (six) hours as needed for Wheezing or Shortness of Breath. Rescue     blood sugar diagnostic Strp  Use 1 strip to check blood sugar 2 times daily     blood-glucose meter kit  Use as instructed     cetirizine 10 MG tablet  Commonly known as: ZYRTEC  TAKE 1 TABLET BY MOUTH ONCE DAILY     HYDROcodone-acetaminophen 5-325 mg per tablet  Commonly known as: NORCO  Take 1 tablet by mouth every 6 (six) hours as needed.     insulin glargine U-100 (Lantus) 100 unit/mL (3 mL) Inpn pen  Inject 21 Units into the skin every evening.     lancets Misc  Commonly known as: LANCETS,THIN  1 each by Misc.(Non-Drug; Combo Route) route 3 (three) times daily.     levothyroxine 25 MCG tablet  Commonly known as: SYNTHROID  TAKE one TABLET BY MOUTH BEFORE breakfast     LIDOcaine-prilocaine cream  Commonly known as: EMLA  Apply topically as needed.     loperamide 2 mg capsule  Commonly known as: IMODIUM  Take 2 tablets (4mg) by mouth after first loose stool, 1 tablet every 2 hours until diarrhea free for 12 hours. May take 2 tablets (4mg) by mouth every 4 hours at night. May require more than the package labeling maximum dose of 16mg/day.     LORazepam 0.5 MG tablet  Commonly known as: ATIVAN  Take 1 tablet (0.5 mg total) by mouth 2 (two) times daily.     magnesium oxide 400 mg (241.3 mg magnesium) tablet  Commonly known as: MAGOX  Take 1 tablet (400 mg total) by mouth once daily.     meloxicam 15 MG tablet  Commonly known as: MOBIC  Take 15 mg by mouth once daily. 1/2 tablet am     metronidazole 0.75% 0.75 % Crea  Commonly known as: METROCREAM  Apply 1 application  topically 2 (two) times  "daily.     montelukast 10 mg tablet  Commonly known as: SINGULAIR  TAKE ONE TABLET BY MOUTH EVERY EVENING     OLANZapine 5 MG tablet  Commonly known as: ZyPREXA  Take 1 tablet by mouth nightly on days 1-3 of each chemotherapy cycle.     ondansetron 8 MG tablet  Commonly known as: ZOFRAN  Take 1 tablet (8 mg total) by mouth every 8 (eight) hours as needed.     pen needle, diabetic 32 gauge x 5/32" Ndle  1 Needle by Misc.(Non-Drug; Combo Route) route once daily.     prochlorperazine 10 MG tablet  Commonly known as: COMPAZINE  Take 1 tablet (10 mg total) by mouth every 6 (six) hours as needed.            STOP taking these medications      ALPRAZolam 0.25 MG tablet  Commonly known as: XANAX     amLODIPine 5 MG tablet  Commonly known as: NORVASC     hydroCHLOROthiazide 25 MG tablet  Commonly known as: HYDRODIURIL     losartan 100 MG tablet  Commonly known as: COZAAR     metoprolol succinate 25 MG 24 hr tablet  Commonly known as: TOPROL-XL              Indwelling Lines/Drains at time of discharge:   Lines/Drains/Airways       Drain  Duration             Female External Urinary Catheter w/ Suction 07/07/25 0605 4 days                        Time spent on the discharge of patient: 35 minutes         Yahir Campos MD  Department of Hospital Medicine  [facility]  "

## 2025-07-11 NOTE — SUBJECTIVE & OBJECTIVE
Interval History:  Patient seen and examined this morning, overall improvement in symptoms.  Alert and oriented x3.  Discharge pending placement.    Review of Systems   Constitutional:  Negative for diaphoresis.   Respiratory:  Negative for shortness of breath.    Cardiovascular:  Negative for chest pain.     Objective:     Vital Signs (Most Recent):  Temp: 97.8 °F (36.6 °C) (07/11/25 0840)  Pulse: 95 (07/11/25 0840)  Resp: 16 (07/11/25 0818)  BP: 99/69 (07/11/25 0840)  SpO2: 98 % (07/11/25 0840) Vital Signs (24h Range):  Temp:  [97.8 °F (36.6 °C)-98.9 °F (37.2 °C)] 97.8 °F (36.6 °C)  Pulse:  [] 95  Resp:  [16-18] 16  SpO2:  [95 %-100 %] 98 %  BP: ()/(59-79) 99/69     Weight: 115.6 kg (254 lb 13.6 oz)  Body mass index is 43.75 kg/m².    Intake/Output Summary (Last 24 hours) at 7/11/2025 0952  Last data filed at 7/11/2025 0916  Gross per 24 hour   Intake 680 ml   Output 1150 ml   Net -470 ml         Physical Exam  Constitutional:       General: She is not in acute distress.  Cardiovascular:      Rate and Rhythm: Normal rate.   Pulmonary:      Effort: No respiratory distress.      Breath sounds: No stridor.   Abdominal:      Palpations: Abdomen is soft.   Skin:     General: Skin is dry.   Neurological:      General: No focal deficit present.      Mental Status: She is alert and oriented to person, place, and time.               Significant Labs: All pertinent labs within the past 24 hours have been reviewed.  CBC:   Recent Labs   Lab 07/10/25  1204   WBC 12.59   HGB 8.4*   HCT 27.8*        CMP:   Recent Labs   Lab 07/10/25  0543 07/11/25  0612    142   K 3.9 4.2    107   CO2 30* 28   GLU 92 98   BUN 26* 23   CREATININE 1.2 1.1   CALCIUM 7.8* 8.0*   PROT 4.9*  --    ALBUMIN 2.4*  --    BILITOT 0.7  --    ALKPHOS 95  --    AST 6*  --    ALT 4*  --    ANIONGAP 4* 7*       Significant Imaging: I have reviewed all pertinent imaging results/findings within the past 24 hours.

## 2025-07-11 NOTE — PROGRESS NOTES
INPATIENT NEPHROLOGY CONSULT   [facility]    Sonia Muse  07/11/2025    Reason for consultation:  Acute kidney injury    Chief Complaint:   Chief Complaint   Patient presents with    Knee Injury     Fall  / rt. Knee pain      History of Present Illness:    Per H and P  Sonia Muse is a 65 year old female with a past medical history of pancreatic cancer, anemia, obesity, DM, HTN, and hypothyroidism who presented with a R femur fracture after a fall, MIGUEL ANGEL, hyponatremia and neutropenia. She recently completed her first cycle of chemotherapy after roughly one week ago (patient of Dr. Calle). She endorses chronic diarrhea and decreased appetite as well as fatigue. She states her BP has fallen since being diagnosed with pancreatic cancer and starting chemotherapy. In the ED, the patient received a 1.5 L NS bolus and 1 L LR bolus as well as fentanyl and Zofran.     7/5  pos leg pain, very tired, intermittent hypotension, on levophed drip, 560 cc uop  7/7 VSS.  7/8 VSS, decent UO, sCr better. Off pressors. Hypokalemia is new, add supplement, monitor Mg.  7/9 VSS, no new complains. Add Phos level, check vitamon d level. Appreciate Psychiatry, Palliative care, Heme/Onc input.  7/10 VSS. NO new complains. Add vitamin D supplement.  7/11 VSS, good UO.    Plan of Care:    Acute kidney injury secondary to hemodynamically mediated renal injury. S/p first round of chemo just now. Oxaliplatin can cause  a rise in creatinine in 5-10% of cases. Urine sodium < 20 implicates low tubular flow. No casts on urine micro.  --Avoid NSAIDS, Fields II inhibitors, and other non-essential nephrotoxic agents  --Keep mean arterial pressure above 60 and systolic blood pressure above 100 as able to maintain renal perfusion  --Hold losartan, hctz, mobic,   --Urinary catheter in place, consider dc when mobile  --Avoid vancomycin toxicity    Hypotension  --hold antihypertensives, agree with midodrine  --volume resuscitation and  pressors PRN  --treat infections    Hyponatremia  Hypokalemia  Hypophosphatemia, Hypocalcemia due to hypovitamonosis D  Hypothyroidism  DM  --added oral KCL and Phos supplement, monitor and replete Mg, she takes PPI  --add VItamin D3 supplement.  --vitamin d level 27  --no hypotonic iv piggy bags  --urine osm 318 implicating impaired free water clearance  --push nutrition, control DM    Anemia  --as per heme/onc recommendations  --iron adequate, transfuse as needed    Thank you for allowing us to participate in this patient's care. We will continue to follow.    Vital Signs:  Temp Readings from Last 3 Encounters:   25 97.8 °F (36.6 °C) (Oral)   25 98 °F (36.7 °C) (Temporal)   25 97.2 °F (36.2 °C)       Pulse Readings from Last 3 Encounters:   25 95   25 107   25 76       BP Readings from Last 3 Encounters:   25 99/69   25 (!) 108/55   25 109/69       Weight:  Wt Readings from Last 3 Encounters:   25 115.6 kg (254 lb 13.6 oz)   25 107.4 kg (236 lb 12.4 oz)   25 110.5 kg (243 lb 8 oz)       Past Medical & Surgical History:  Past Medical History:   Diagnosis Date    Allergy     Anxiety     Asthma     DDD (degenerative disc disease), cervical     Depression     Diabetes mellitus     Fibromyalgia     Hypertension     Neuromuscular disorder     Pancreatic mass     PONV (postoperative nausea and vomiting)     Thyroid disease     hypo       Past Surgical History:   Procedure Laterality Date    ADENOIDECTOMY      CARPAL TUNNEL RELEASE Right      SECTION      ENDOSCOPIC ULTRASOUND OF UPPER GASTROINTESTINAL TRACT N/A 2025    Procedure: ULTRASOUND, UPPER GI TRACT, ENDOSCOPIC;  Surgeon: Austen Grimes III, MD;  Location: [facility];  Service: Endoscopy;  Laterality: N/A;    ERCP N/A 2025    Procedure: ERCP (ENDOSCOPIC RETROGRADE CHOLANGIOPANCREATOGRAPHY);  Surgeon: Thuy Escobar MD;  Location: [facility];  Service: Endoscopy;   Laterality: N/A;    ERCP N/A 2025    Procedure: ERCP (ENDOSCOPIC RETROGRADE CHOLANGIOPANCREATOGRAPHY);  Surgeon: Austen Grimes III, MD;  Location: [facility] ENDO;  Service: Endoscopy;  Laterality: N/A;    INSERTION OF TUNNELED CENTRAL VENOUS CATHETER (CVC) WITH SUBCUTANEOUS PORT Left 2025    Procedure: [id number];  Surgeon: Ata Fernandez III, MD;  Location: [facility] OR;  Service: General;  Laterality: Left;    JOINT REPLACEMENT Right 2016    knee    KNEE ARTHROPLASTY Left 10/19/2020    Procedure: ARTHROPLASTY, KNEE;  Surgeon: Felipe Herring MD;  Location: [facility] OR;  Service: Orthopedics;  Laterality: Left;  Louie Liz    ORIF FEMUR FRACTURE Right 2025    Procedure: ORIF, FRACTURE, FEMUR (distal femur ORIF, synthes VA condylar plate, izaiah table, C-arm, triangle);  Surgeon: Osmany Harding MD;  Location: [facility] OR;  Service: Orthopedics;  Laterality: Right;    ROTATOR CUFF REPAIR Right     TONSILLECTOMY      TOTAL KNEE ARTHROPLASTY Right     TUBAL LIGATION         Past Social History:  Social History     Socioeconomic History    Marital status:     Number of children: 3   Occupational History    Occupation: [employer]     Comment: primary sub for [employer]   Tobacco Use    Smoking status: Former     Current packs/day: 0.00     Average packs/day: 1 pack/day for 11.0 years (11.0 ttl pk-yrs)     Types: Cigarettes     Start date:      Quit date:      Years since quittin.5    Smokeless tobacco: Never   Substance and Sexual Activity    Alcohol use: Not Currently     Comment: occasional 2x/y    Drug use: Never    Sexual activity: Yes     Partners: Male     Social Drivers of Health     Financial Resource Strain: Low Risk  (2025)    Overall Financial Resource Strain (CARDIA)     Difficulty of Paying Living Expenses: Not hard at all   Recent Concern: Financial Resource Strain - High Risk (2025)    Overall Financial Resource Strain (CARDIA)     Difficulty of Paying  Living Expenses: Hard   Food Insecurity: No Food Insecurity (7/5/2025)    Hunger Vital Sign     Worried About Running Out of Food in the Last Year: Never true     Ran Out of Food in the Last Year: Never true   Recent Concern: Food Insecurity - Food Insecurity Present (4/28/2025)    Hunger Vital Sign     Worried About Running Out of Food in the Last Year: Sometimes true     Ran Out of Food in the Last Year: Sometimes true   Transportation Needs: No Transportation Needs (7/5/2025)    PRAPARE - Transportation     Lack of Transportation (Medical): No     Lack of Transportation (Non-Medical): No   Recent Concern: Transportation Needs - Unmet Transportation Needs (4/28/2025)    PRAPARE - Transportation     Lack of Transportation (Medical): Yes     Lack of Transportation (Non-Medical): Yes   Physical Activity: Unknown (4/28/2025)    Exercise Vital Sign     Days of Exercise per Week: Patient declined     Minutes of Exercise per Session: 30 min   Stress: No Stress Concern Present (7/5/2025)    [employer] - Occupational Stress Questionnaire     Feeling of Stress : Not at all   Recent Concern: Stress - Stress Concern Present (4/28/2025)    [employer] - Occupational Stress Questionnaire     Feeling of Stress : Very much   Housing Stability: Low Risk  (7/5/2025)    Housing Stability Vital Sign     Unable to Pay for Housing in the Last Year: No     Number of Times Moved in the Last Year: 0     Homeless in the Last Year: No     Medications:  Medications Ordered Prior to Encounter[1]  Scheduled Meds:   cholecalciferol (vitamin D3)  4,000 Units Oral Daily    DULoxetine  30 mg Oral Daily    DULoxetine  60 mg Oral QHS    enoxparin  40 mg Subcutaneous Q12H (prophylaxis, 0900/2100)    insulin glargine U-100 (Lantus)  20 Units Subcutaneous QHS    levothyroxine  25 mcg Oral Before breakfast    magnesium oxide  400 mg Oral Daily    magnesium sulfate 2 g IVPB  2 g Intravenous Once  "   midodrine  10 mg Oral Q8H    montelukast  10 mg Oral QHS    pantoprazole  40 mg Oral Daily    potassium chloride  10 mEq Oral TID    potassium, sodium phosphates  1 packet Oral QID (AC & HS)    risperiDONE  0.5 mg Oral TID     Continuous Infusions:      PRN Meds:.  Current Facility-Administered Medications:     0.9%  NaCl infusion (for blood administration), , Intravenous, Q24H PRN    acetaminophen, 650 mg, Oral, Q8H PRN    dextrose 50%, 12.5 g, Intravenous, PRN    dextrose 50%, 25 g, Intravenous, PRN    glucagon (human recombinant), 1 mg, Intramuscular, PRN    glucose, 16 g, Oral, PRN    glucose, 24 g, Oral, PRN    insulin aspart U-100, 0-10 Units, Subcutaneous, QID (AC + HS) PRN    LORazepam, 0.5 mg, Oral, BID PRN    magnesium oxide, 800 mg, Oral, PRN    magnesium oxide, 800 mg, Oral, PRN    naloxone, 0.02 mg, Intravenous, PRN    oxyCODONE-acetaminophen, 1 tablet, Oral, Q4H PRN    oxyCODONE-acetaminophen, 1 tablet, Oral, Q4H PRN    potassium chloride in water, 40 mEq, Intravenous, PRN **AND** potassium chloride in water, 60 mEq, Intravenous, PRN **AND** potassium chloride in water, 80 mEq, Intravenous, PRN    potassium, sodium phosphates, 2 packet, Oral, PRN    potassium, sodium phosphates, 2 packet, Oral, PRN    potassium, sodium phosphates, 2 packet, Oral, PRN    prochlorperazine, 5 mg, Intravenous, Q6H PRN    sodium chloride 0.9%, 10 mL, Intravenous, PRN    Allergies:  Erythromycin, Codeine, Lactose, and [place]    Past Family History:  Reviewed; refer to Hospitalist Admission Note    Review of Systems:  Review of Systems - All 14 systems reviewed and negative, except as noted in HPI    Physical Exam:    BP 99/69   Pulse 95   Temp 97.8 °F (36.6 °C) (Oral)   Resp 16   Ht 5' 4" (1.626 m)   Wt 115.6 kg (254 lb 13.6 oz)   SpO2 98%   Breastfeeding No   BMI 43.75 kg/m²     General Appearance:    Alert, cooperative, no distress, appears stated age   Head:    Normocephalic, without obvious abnormality, " atraumatic   Eyes:    PER, conjunctiva/corneas clear, EOM's intact in both eyes        Throat:   Lips, mucosa, and tongue normal; teeth and gums normal   Back:     Symmetric, no curvature, ROM normal, no CVA tenderness   Lungs:     Clear to auscultation bilaterally, respirations unlabored   Chest wall:    No tenderness or deformity   Heart:    Regular rate and rhythm, S1 and S2 normal, no murmur, rub   or gallop   Abdomen:     Soft, non-tender, bowel sounds active all four quadrants,     no masses, no organomegaly   Extremities:   Extremities normal, atraumatic, no cyanosis or edema   Pulses:   2+ and symmetric all extremities   MSK:   No joint or muscle swelling, tenderness or deformity   Skin:   Skin color, texture, turgor normal, no rashes or lesions   Neurologic:   CNII-XII intact, normal strength and sensation       Throughout.  No flap     Results:  Recent Labs   Lab 07/09/25  0214 07/10/25  0543 07/11/25  0612    143 142   K 3.7 3.9 4.2    109 107   CO2 27 30* 28   BUN 29* 26* 23   CREATININE 1.3 1.2 1.1   GLU 73 92 98       Recent Labs   Lab 07/08/25 0317 07/09/25 0214 07/10/25  0543 07/11/25  0612   CALCIUM 8.1* 8.1* 7.8* 8.0*   ALBUMIN 2.2* 2.3* 2.4*  --    PHOS 1.5* 1.4* 1.8*  --    MG 1.6 1.7 1.6 1.7             Recent Labs   Lab 07/09/25 2016 07/09/25  2145 07/10/25  0051 07/10/25  0446 07/10/25  0807 07/10/25  1221 07/10/25  1604 07/10/25  1950 07/10/25  2119 07/11/25  0841   POCTGLUCOSE 64* 64* 86 92 92 98 130* 101 112* 95       Recent Labs   Lab 05/25/24  0951 11/21/24  1049 04/25/25  0751   Hemoglobin A1C 6.4 H 6.1 H  --    Hemoglobin A1c  --   --  10.1 H       Recent Labs   Lab 07/08/25  0317 07/09/25  0214 07/10/25  1204   WBC 4.46 10.71 12.59   HGB 7.2* 7.2* 8.4*   HCT 22.1* 23.0* 27.8*    263 240   MCV 77* 80* 84   MCHC 32.6 31.3* 30.2*       Recent Labs   Lab 07/08/25  0317 07/09/25  0214 07/10/25  0543   BILITOT 0.8 0.7 0.7   PROT 5.1* 5.1* 4.9*   ALBUMIN 2.2* 2.3* 2.4*    ALKPHOS 91 100 95   ALT 4* 4* 4*   AST 7* 7* 6*       Recent Labs   Lab 03/18/23  0920 05/25/24  0951 08/13/24  1525 04/24/25  1404 04/24/25  1636 07/04/25  1057   Color, UA YELLOW DARK YELLOW  --   --   --   --    Appearance, UA CLEAR CLEAR  --   --  Clear Cloudy A   pH, UA 8.0 6.0 7.0 5.5  --   --    Spec Grav UA  --   --   --   --  >=1.030 A 1.025   Specific Gravity, UA 1.018 1.020  --   --   --   --    Protein, UA 1+ A 1+ A  --   --  Negative 1+ A   Ketones, UA NEGATIVE NEGATIVE  --   --   --   --    Urobilinogen, UA  --   --   --   --  Negative 2.0-3.0 A   Bilirubin, UA  --   --   --   --  1+ A 1+ A   Occult Blood UA NEGATIVE NEGATIVE  --   --   --   --    Nitrite, UA NEGATIVE NEGATIVE  --   --   --   --    RBC, UA  --   --   --   --  1 3   WBC, UA  --   --   --   --  1 15 H   Bacteria, UA  --   --   --   --  Rare  --        Recent Labs   Lab 07/06/25  1020 07/06/25  1725 07/07/25  0310   POC PH 7.054 LL 7.298 L 7.372   POC PCO2 65.9 HH 29.9 L 35.0   POC HCO3 18.4 L 14.6 L 20.3 L   POC PO2 84 76 L 88   POC SATURATED O2 90 94 97   POC BE -12 L -12 L -5 L   Sample ARTERIAL ARTERIAL ARTERIAL       Microbiology Results (last 7 days)       Procedure Component Value Units Date/Time    Blood culture [[id number]]  (Normal) Collected: 07/04/25 1251    Order Status: Completed Specimen: Blood from Peripheral, Hand, Left Updated: 07/09/25 1901     CULTURE, BLOOD ([facility]) No Growth After 5 Days    Blood culture [[id number]]  (Normal) Collected: 07/04/25 1247    Order Status: Completed Specimen: Blood from Peripheral, Antecubital, Left Updated: 07/09/25 1901     CULTURE, BLOOD ([facility]) No Growth After 5 Days    Clostridium difficile EIA [[id number]] Collected: 07/09/25 1507    Order Status: Canceled Specimen: Stool Updated: 07/09/25 1527    Urine culture [[id number]] Collected: 07/04/25 1057    Order Status: Completed Specimen: Urine Updated: 07/07/25 0754     Urine Culture No Growth          Can Bell MD  [place]  [facility]  [phone]         [1]   No current facility-administered medications on file prior to encounter.     Current Outpatient Medications on File Prior to Encounter   Medication Sig Dispense Refill    ALPRAZolam (XANAX) 0.25 MG tablet Take 1 tablet (0.25 mg total) by mouth daily as needed for Anxiety. 30 tablet 0    amLODIPine (NORVASC) 5 MG tablet Take 1 tablet (5 mg total) by mouth once daily. 90 tablet 1    cetirizine (ZYRTEC) 10 MG tablet TAKE 1 TABLET BY MOUTH ONCE DAILY 90 tablet 1    DULoxetine (CYMBALTA) 60 MG capsule TAKE one CAPSULE BY MOUTH EVERY DAY 90 capsule 1    hydroCHLOROthiazide (HYDRODIURIL) 25 MG tablet TAKE one TABLET BY MOUTH ONCE DAILY 90 tablet 1    HYDROcodone-acetaminophen (NORCO) 5-325 mg per tablet Take 1 tablet by mouth every 6 (six) hours as needed. 10 tablet 0    levothyroxine (SYNTHROID) 25 MCG tablet TAKE one TABLET BY MOUTH BEFORE breakfast 90 tablet 1    losartan (COZAAR) 100 MG tablet TAKE one TABLET BY MOUTH ONCE DAILY 90 tablet 0    magnesium oxide (MAGOX) 400 mg (241.3 mg magnesium) tablet Take 1 tablet (400 mg total) by mouth once daily. 200 tablet 1    metoprolol succinate (TOPROL-XL) 25 MG 24 hr tablet Take 1 tablet (25 mg total) by mouth once daily. 90 tablet 1    montelukast (SINGULAIR) 10 mg tablet TAKE ONE TABLET BY MOUTH EVERY EVENING 90 tablet 1    OLANZapine (ZYPREXA) 5 MG tablet Take 1 tablet by mouth nightly on days 1-3 of each chemotherapy cycle. 3 tablet 11    ondansetron (ZOFRAN) 8 MG tablet Take 1 tablet (8 mg total) by mouth every 8 (eight) hours as needed. 30 tablet 2    prochlorperazine (COMPAZINE) 10 MG tablet Take 1 tablet (10 mg total) by mouth every 6 (six) hours as needed. 30 tablet 1    albuterol (VENTOLIN HFA) 90 mcg/actuation inhaler Inhale 2 puffs into the lungs every 6 (six) hours as needed for Wheezing or Shortness of Breath. Rescue 18 g 1    blood sugar diagnostic Strp Use 1 strip to check blood sugar 2 times daily 100 each 5     "blood-glucose meter kit Use as instructed 1 each 0    insulin glargine U-100, Lantus, 100 unit/mL (3 mL) SubQ InPn pen Inject 21 Units into the skin every evening. 9 mL 3    lancets (LANCETS,THIN) Misc 1 each by Misc.(Non-Drug; Combo Route) route 3 (three) times daily. 90 each 0    LIDOcaine-prilocaine (EMLA) cream Apply topically as needed. 30 g 0    loperamide (IMODIUM) 2 mg capsule Take 2 tablets (4mg) by mouth after first loose stool, 1 tablet every 2 hours until diarrhea free for 12 hours. May take 2 tablets (4mg) by mouth every 4 hours at night. May require more than the package labeling maximum dose of 16mg/day. 30 capsule 11    LORazepam (ATIVAN) 0.5 MG tablet Take 1 tablet (0.5 mg total) by mouth 2 (two) times daily. 60 tablet 0    meloxicam (MOBIC) 15 MG tablet Take 15 mg by mouth once daily. 1/2 tablet am      metronidazole 0.75% (METROCREAM) 0.75 % Crea Apply 1 application  topically 2 (two) times daily.      pen needle, diabetic 32 gauge x 5/32" Ndle 1 Needle by Misc.(Non-Drug; Combo Route) route once daily. 100 each 2     "

## 2025-07-11 NOTE — PROGRESS NOTES
[facility] Medicine  Progress Note    Patient Name: Sonia Muse  MRN: [id number]  Patient Class: IP- Inpatient   Admission Date: 7/4/2025  Length of Stay: 7 days  Attending Physician: Yahir Campos MD  Primary Care Provider: Nasra Galvez FNP        Subjective     Principal Problem:Closed fracture of distal end of right femur        HPI:  Sonia Muse is a 65 year old female with a past medical history of pancreatic cancer, anemia, obesity, DM, HTN, and hypothyroidism who presented with a R femur fracture after a fall, MIGUEL ANGEL, hyponatremia and neutropenia. She recently completed her first cycle of chemotherapy after roughly one week ago (patient of Dr. Calle). She endorses chronic diarrhea and decreased appetite as well as fatigue. She states her BP has fallen since being diagnosed with pancreatic cancer and starting chemotherapy. In the ED, the patient received a 1.5 L NS bolus and 1 L LR bolus as well as fentanyl and Zofran. Hospital Medicine was consulted for admission.    Overview/Hospital Course:  Admitted the patient with a distal right femur fracture.  The orthopedic surgeon recommended operative repair after medical stabilization.  In response to the neutropenia, we placed her on granulocyte colony-stimulating factor daily.  We provided prophylactic antibiotic.  We consulted with Hematology Oncology, who provided recommendations for further management.  We transfused red blood cells when needed.  She had hypotension, which required a vasopressor.  It was not clear what was causing the hypotension, the two possibilities were the anemia, and the 2nd was the use of frequent intravenous hydromorphone for the pain associated with the fracture.  We also treated her for acute kidney injury, which was likely because of unstable hemodynamics, in addition to the use of oxaliplatin.  Patient treated with IV fluids, midodrine and Levophed for hypotension with improvement.  Renal  function monitored showed improving creatinine with IV fluids.  Filgrastim daily was ordered for 5 days for neutropenia.  Patient remained afebrile.  On morning of 07/06/2025 patient taken for ORIF of right femur by Orthopedic surgery.  Urine cultures and blood cultures remain negative therefore UTI ruled out and empiric IV antibiotics were DC.  Renal function monitored and improved well with mild increase in edema therefore IV fluids DC.  Patient remains off of Levophed since evening of 07/07/2025.  Patient does have a history of sleep apnea in the distant past and used a CPAP for awhile and then the machine broke therefore she never got this replaced.  She will require a repeat sleep study in outpatient setting as she probably still has underlying sleep apnea.  Patient did have mild acute hospital-acquired psychosis postop and was very emotional.  SSRI continued.  Psych eval consulted.  Patient's mental status improved the next day.  Blood pressure remains in stable range on midodrine.  Suspect midodrine could eventually be tapered off as tolerated.  Downgrade orders to tele placed on 07/08/2025. Plan on dc to IPR.     Interval History:  Patient seen and examined this morning, overall improvement in symptoms.  Alert and oriented x3.  Discharge pending placement.    Review of Systems   Constitutional:  Negative for diaphoresis.   Respiratory:  Negative for shortness of breath.    Cardiovascular:  Negative for chest pain.     Objective:     Vital Signs (Most Recent):  Temp: 97.8 °F (36.6 °C) (07/11/25 0840)  Pulse: 95 (07/11/25 0840)  Resp: 16 (07/11/25 0818)  BP: 99/69 (07/11/25 0840)  SpO2: 98 % (07/11/25 0840) Vital Signs (24h Range):  Temp:  [97.8 °F (36.6 °C)-98.9 °F (37.2 °C)] 97.8 °F (36.6 °C)  Pulse:  [] 95  Resp:  [16-18] 16  SpO2:  [95 %-100 %] 98 %  BP: ()/(59-79) 99/69     Weight: 115.6 kg (254 lb 13.6 oz)  Body mass index is 43.75 kg/m².    Intake/Output Summary (Last 24 hours) at 7/11/2025  0952  Last data filed at 7/11/2025 0916  Gross per 24 hour   Intake 680 ml   Output 1150 ml   Net -470 ml         Physical Exam  Constitutional:       General: She is not in acute distress.  Cardiovascular:      Rate and Rhythm: Normal rate.   Pulmonary:      Effort: No respiratory distress.      Breath sounds: No stridor.   Abdominal:      Palpations: Abdomen is soft.   Skin:     General: Skin is dry.   Neurological:      General: No focal deficit present.      Mental Status: She is alert and oriented to person, place, and time.               Significant Labs: All pertinent labs within the past 24 hours have been reviewed.  CBC:   Recent Labs   Lab 07/10/25  1204   WBC 12.59   HGB 8.4*   HCT 27.8*        CMP:   Recent Labs   Lab 07/10/25  0543 07/11/25  0612    142   K 3.9 4.2    107   CO2 30* 28   GLU 92 98   BUN 26* 23   CREATININE 1.2 1.1   CALCIUM 7.8* 8.0*   PROT 4.9*  --    ALBUMIN 2.4*  --    BILITOT 0.7  --    ALKPHOS 95  --    AST 6*  --    ALT 4*  --    ANIONGAP 4* 7*       Significant Imaging: I have reviewed all pertinent imaging results/findings within the past 24 hours.      Assessment & Plan  Closed fracture of distal end of right femur  -status post  1.Open reduction internal fixation right periprosthetic distal femur fracture.2. Intramedullary nailing right femur on 07/06/2025 by Orthopedic surgery  -orthopedic surgery recommends 25% weight-bearing to right lower extremity  Plan on discharge to inpatient rehab  MIGUEL ANGEL (acute kidney injury)  -Likely due to hemodynamic instability.  Creatinine improving  Chemotherapy-induced neutropenia  -status post Chemotherapy roughly one week ago.  Improving  -neutropenic precautions   -urine culture on 07/04/2025 = no growth till date (UTI ruled out)  -blood cultures x2 on 07/04/2025 = remains negative till date  -DC filgrastim 458 mg IV daily x5 days.  Ordered on 07/04/2025.  DC on 07/08/2025 per Heme-Onc  -DC cefepime 1 g IV b.i.d. empirically.   Ordered on 07/04/2025.  DC on 07/08/2025 ( urine and blood cultures remain negative)      Hypotension  Secondary to combination of anemia and the use of IV hydromorphone for pain.  -monitor blood pressure = stable range currently   -maintain goal blood pressure map > 65  -midodrine 10 mg p.o. q.8 hours        Pancreatic cancer  -Oncology consulted  -patient recently began chemotherapy  Follow up with Dr. Calle outpatient.    Anemia in neoplastic disease  -In setting of pancreatic adenocarcinoma and MIGUEL ANGEL.  -no overt bleeding seen or reported currently  -monitor H&H = remains anemic but in stable range with no significant change currently (also with contribution from dilutional effect of recent IV fluids)  -FOBT negative  -PPI daily   Stable  Hyponatremia  Improving  Abnormal LFTs  Resolving  Acute cystitis  -POA   -no acute cystitis on this admission.  UTI ruled out with negative urine culture  -afebrile  -urine culture on 07/04/2025 = negative at 48 hours  -lactic acid on 07/07/2025 = within normal limits  -DC cefepime IV on 07/08/2025    DM (diabetes mellitus)  Last A1c reviewed-   Lab Results   Component Value Date    HGBA1C 10.1 (H) 04/25/2025     Most recent fingerstick glucose reviewed-   Recent Labs   Lab 07/10/25  1604 07/10/25  1950 07/10/25  2119 07/11/25  0841   POCTGLUCOSE 130* 101 112* 95     Current correctional scale  Medium  Maintain anti-hyperglycemic dose as follows-   Antihyperglycemics (From admission, onward)      Start     Stop Route Frequency Ordered    07/09/25 2100  insulin glargine U-100 (Lantus) pen 20 Units         -- SubQ Nightly 07/09/25 1016    07/04/25 1142  insulin aspart U-100 pen 0-10 Units         -- SubQ Before meals & nightly PRN 07/04/25 1046          -Hold Oral hypoglycemics while patient is in the hospital.  -Monitor CBGS = on the lower range of normal  -Lantus 23 units subQ q.h.s. decreased to 20 units subQ q.h.s. (titrate as needed)   Acute psychosis  -improving   --likely  multifactorial with multiple comorbidities, including depression anxiety as well as untreated sleep apnea  -suspect a component of hospital-acquired delirium as well  -CT head without contrast on 07/06/2025 =No CT evidence of acute intracranial abnormality. If the patient has an acute, focal neurological deficit, MRI of the brain may be indicated     -duloxetine 60 mg p.o. daily changed to duloxetine 30 mg p.o. q.a.m. and duloxetine 60 mg q.h.s., per psych recommendations  -Risperdal 0.5 mg p.o. t.i.d. per psych recommendations  Metabolic acidosis  Stable      NICOLAS (obstructive sleep apnea)  -Patient patient's  stated that patient was diagnosed with sleep apnea several years ago and was on a CPAP machine until it broke.  After it broke she never got it replaced.  -outpatient sleep study as soon as possible on discharge.   informed to obtain outpatient sleep study as soon as possible on discharge as well  -patient and  have agreed for a trial of CPAP while sleeping  -CPAP q.h.s.    Goals of care, counseling/discussion  Advance Care Planning      Palliative care consulted = appreciate input and follow recommendations    ACP (advance care planning)      VTE Risk Mitigation (From admission, onward)           Ordered     enoxaparin injection 40 mg  Every 12 hours         07/06/25 1305     IP VTE HIGH RISK PATIENT  Once         07/06/25 1302     Place sequential compression device  Until discontinued         07/04/25 1046                    Discharge Planning   DENZEL: 7/11/2025     Code Status: Full Code   Medical Readiness for Discharge Date:   Discharge Plan A: Rehab   Discharge Delays: None known at this time              Please place Justification for DME      Yahir Campos MD  Department of Hospital Medicine   [facility]

## 2025-07-11 NOTE — PLAN OF CARE
Patient will admit to NSR today . Transportation will  @ 2:30 pm . Primary nurse to call report to [phone] after 1:00 - notified PN at this time.        07/11/25 1246   Post-Acute Status   Post-Acute Authorization Placement   Post-Acute Placement Status Set-up Complete/Auth obtained   Discharge Plan   Discharge Plan A Rehab   Discharge Plan B Rehab

## 2025-07-11 NOTE — DISCHARGE INSTRUCTIONS
[facility]  Facility Transfer Orders        Admit to: NSR    Diagnoses:   Active Hospital Problems    Diagnosis  POA    *Closed fracture of distal end of right femur [S72.401A]  Yes    ACP (advance care planning) [Z71.89]  Not Applicable    Goals of care, counseling/discussion [Z71.89]  Not Applicable    NICOLAS (obstructive sleep apnea) [G47.33]  Yes    Acute cystitis [N30.00]  Yes    Anemia in neoplastic disease [D63.0]  Yes    Hypotension [I95.9]  Yes    DM (diabetes mellitus) [E11.9]  Yes    Pancreatic cancer [C25.9]  Yes      Resolved Hospital Problems    Diagnosis Date Resolved POA    Acute psychosis [F23] 07/11/2025 No    Metabolic acidosis [E87.20] 07/11/2025 Yes    Abnormal LFTs [R79.89] 07/11/2025 Yes    Chemotherapy-induced neutropenia [D70.1, T45.1X5A] 07/11/2025 Yes    Hyponatremia [E87.1] 07/11/2025 Yes    MIGUEL ANGEL (acute kidney injury) [N17.9] 07/11/2025 Yes     Allergies:   Review of patient's allergies indicates:   Allergen Reactions    Erythromycin Swelling    Codeine Nausea And Vomiting     And migraine h/a    Lactose     [place] Blisters       Code Status: Full Code    Vitals: Routine       Diet: diabetic diet: 2000 calorie    Activity: 25% weight-bearing to right lower extremity     Nursing Precautions: Fall    Bed/Surface: Pressure Redistribution    Consults: PT to evaluate and treat- 5 times a week and OT to evaluate and treat- 5 times a week      Pending Diagnostic Studies:       None            Medications: Discontinue all previous medication orders, if any. See new list below.  Current Discharge Medication List        START taking these medications    Details   cholecalciferol, vitamin D3, (VITAMIN D3) 50 mcg (2,000 unit) Tab Take 2 tablets (4,000 Units total) by mouth once daily.      midodrine (PROAMATINE) 10 MG tablet Take 1 tablet (10 mg total) by mouth every 8 (eight) hours.      potassium chloride (MICRO-K) 10 MEQ CpSR Take 2 capsules (20 mEq total) by mouth once daily.       potassium, sodium phosphates (PHOS-NAK) 280-160-250 mg PwPk Take 1 packet by mouth 4 (four) times daily before meals and nightly.      risperiDONE (RISPERDAL) 0.5 MG Tab Take 1 tablet (0.5 mg total) by mouth 3 (three) times daily.           CONTINUE these medications which have CHANGED    Details   !! DULoxetine (CYMBALTA) 30 MG capsule Take 1 capsule (30 mg total) by mouth once daily.      !! DULoxetine (CYMBALTA) 60 MG capsule Take 1 capsule (60 mg total) by mouth every evening.       !! - Potential duplicate medications found. Please discuss with provider.        CONTINUE these medications which have NOT CHANGED    Details   cetirizine (ZYRTEC) 10 MG tablet TAKE 1 TABLET BY MOUTH ONCE DAILY  Qty: 90 tablet, Refills: 1    Comments: This prescription was filled on 2/6/2025. Any refills authorized will be placed on file.  Associated Diagnoses: Asthma, unspecified asthma severity, unspecified whether complicated, unspecified whether persistent      HYDROcodone-acetaminophen (NORCO) 5-325 mg per tablet Take 1 tablet by mouth every 6 (six) hours as needed.  Qty: 10 tablet, Refills: 0    Comments: Quantity prescribed more than 7 day supply? No  Associated Diagnoses: Venous insufficiency      levothyroxine (SYNTHROID) 25 MCG tablet TAKE one TABLET BY MOUTH BEFORE breakfast  Qty: 90 tablet, Refills: 1    Comments: This prescription was filled on 5/15/2025. Any refills authorized will be placed on file.  Associated Diagnoses: Hypothyroidism, unspecified type      magnesium oxide (MAGOX) 400 mg (241.3 mg magnesium) tablet Take 1 tablet (400 mg total) by mouth once daily.  Qty: 200 tablet, Refills: 1    Associated Diagnoses: Malignant neoplasm of head of pancreas      montelukast (SINGULAIR) 10 mg tablet TAKE ONE TABLET BY MOUTH EVERY EVENING  Qty: 90 tablet, Refills: 1    Comments: This prescription was filled on 5/15/2025. Any refills authorized will be placed on file.  Associated Diagnoses: Asthma, unspecified asthma  severity, unspecified whether complicated, unspecified whether persistent      OLANZapine (ZYPREXA) 5 MG tablet Take 1 tablet by mouth nightly on days 1-3 of each chemotherapy cycle.  Qty: 3 tablet, Refills: 11    Associated Diagnoses: Malignant neoplasm of head of pancreas      ondansetron (ZOFRAN) 8 MG tablet Take 1 tablet (8 mg total) by mouth every 8 (eight) hours as needed.  Qty: 30 tablet, Refills: 2    Associated Diagnoses: Malignant neoplasm of head of pancreas      prochlorperazine (COMPAZINE) 10 MG tablet Take 1 tablet (10 mg total) by mouth every 6 (six) hours as needed.  Qty: 30 tablet, Refills: 1    Associated Diagnoses: Malignant neoplasm of head of pancreas      albuterol (VENTOLIN HFA) 90 mcg/actuation inhaler Inhale 2 puffs into the lungs every 6 (six) hours as needed for Wheezing or Shortness of Breath. Rescue  Qty: 18 g, Refills: 1    Associated Diagnoses: Mild intermittent asthma with acute exacerbation      blood sugar diagnostic Strp Use 1 strip to check blood sugar 2 times daily  Qty: 100 each, Refills: 5    Associated Diagnoses: Type 2 diabetes mellitus with hyperglycemia, with long-term current use of insulin      blood-glucose meter kit Use as instructed  Qty: 1 each, Refills: 0      insulin glargine U-100, Lantus, 100 unit/mL (3 mL) SubQ InPn pen Inject 21 Units into the skin every evening.  Qty: 9 mL, Refills: 3    Associated Diagnoses: Type 2 diabetes mellitus with hyperglycemia, with long-term current use of insulin      lancets (LANCETS,THIN) Misc 1 each by Misc.(Non-Drug; Combo Route) route 3 (three) times daily.  Qty: 90 each, Refills: 0      LIDOcaine-prilocaine (EMLA) cream Apply topically as needed.  Qty: 30 g, Refills: 0    Associated Diagnoses: Malignant neoplasm of head of pancreas      loperamide (IMODIUM) 2 mg capsule Take 2 tablets (4mg) by mouth after first loose stool, 1 tablet every 2 hours until diarrhea free for 12 hours. May take 2 tablets (4mg) by mouth every 4 hours  "at night. May require more than the package labeling maximum dose of 16mg/day.  Qty: 30 capsule, Refills: 11    Associated Diagnoses: Malignant neoplasm of head of pancreas      LORazepam (ATIVAN) 0.5 MG tablet Take 1 tablet (0.5 mg total) by mouth 2 (two) times daily.  Qty: 60 tablet, Refills: 0    Associated Diagnoses: Malignant neoplasm of head of pancreas      meloxicam (MOBIC) 15 MG tablet Take 15 mg by mouth once daily. 1/2 tablet am      metronidazole 0.75% (METROCREAM) 0.75 % Crea Apply 1 application  topically 2 (two) times daily.      pen needle, diabetic 32 gauge x 5/32" Ndle 1 Needle by Misc.(Non-Drug; Combo Route) route once daily.  Qty: 100 each, Refills: 2    Associated Diagnoses: Type 2 diabetes mellitus with hyperglycemia, with long-term current use of insulin           STOP taking these medications       ALPRAZolam (XANAX) 0.25 MG tablet Comments:   Reason for Stopping:         amLODIPine (NORVASC) 5 MG tablet Comments:   Reason for Stopping:         hydroCHLOROthiazide (HYDRODIURIL) 25 MG tablet Comments:   Reason for Stopping:         losartan (COZAAR) 100 MG tablet Comments:   Reason for Stopping:         metoprolol succinate (TOPROL-XL) 25 MG 24 hr tablet Comments:   Reason for Stopping:             Follow up:    Follow-up Information       Nasra Galvez, [facility] Follow up.    Specialty: Family Medicine  Contact information:  [address] [facility]  [address]  [place] [address]  [phone]               Paty Calle MD Follow up.    Specialties: Hematology and Oncology, Hematology, Oncology  Contact information:  [address] [facility]  [address]  [place] [address]  [phone]               Osmany Harding MD Follow up.    Specialty: Orthopedic Surgery  Contact information:  [address] [place] [address] [address] [place] [address]  [phone]                               Immunizations Administered as of 7/11/2025       Name Date Dose VIS Date Route Exp Date    COVID-19, vector-nr, rS-Ad26 (J&J) 6/1/2021 0.5 mL " 4/23/2021 Intramuscular --    Site: Left arm     : [employer]     Lot: [id number]     External: Confirmed     Comment: Adminis                 Yahir Campos MD

## 2025-07-11 NOTE — PT/OT/SLP PROGRESS
Occupational Therapy   Treatment    Name: Sonia Muse  MRN: [id number]  Admitting Diagnosis:  Closed fracture of distal end of right femur  5 Days Post-Op    Recommendations:     Discharge Recommendations: High Intensity Therapy  Discharge Equipment Recommendations:  to be determined by next level of care  Barriers to discharge:  Noneincreased assistance needed for ADL's and functional mobility; orthopedic precautions    Assessment:     Sonia Muse is a 65 y.o. female with a medical diagnosis of Closed fracture of distal end of right femur.  She presents with weakness and desire to return to bed from chair. Performance deficits affecting function are weakness, impaired endurance, impaired self care skills, impaired functional mobility, gait instability, impaired balance, decreased coordination, decreased upper extremity function, decreased lower extremity function, pain, decreased ROM.     Rehab Prognosis:  Good; patient would benefit from acute skilled OT services to address these deficits and reach maximum level of function.       Plan:     Patient to be seen 5 x/week to address the above listed problems via self-care/home management, therapeutic activities, therapeutic exercises  Plan of Care Expires: 08/07/25  Plan of Care Reviewed with: patient, family    Subjective     Chief Complaint: weakness in arms  Patient/Family Comments/goals: wants to get back to bed after being up in the chair today.  Pain/Comfort:       Objective:     Communicated with: nurse Griffith prior to session.  Patient found up in chair with chair check, telemetry, PureWick, peripheral IV upon OT entry to room.    General Precautions: Standard, aspiration, fall    Orthopedic Precautions:RLE partial weight bearing  Braces: Knee immobilizer  Respiratory Status: Room air     Occupational Performance:       Functional Mobility/Transfers:  Patient completed Bed <> Chair Transfer using Squat Pivot technique with moderate assistance and  of 2 persons with no assistive device  Patient instructed on hand placement for squat pivot transfer back to bed from bedside chair. Patient able to maintain PWB on RLE during transfer.     Patient completed sit>supine ModA x2 and scooting to HOB modA x2 with verbal cues to use B UE to help pull up in the bed.     Pt performed B UE resistive exercises using red theraband x 3 sets of 10 reps of shoulder flexion, elbow extension/flexion, horizontal ad/abduction with rest breaks taken between each set.   Pt able to perform all exercises with Min A and cues after demonstration provided.       [facility] 6 Click ADL:      Treatment & Education:  Therapist provided facilitation and instruction of proper body mechanics and fall prevention strategies during tasks listed above.   Instructed patient to sit in bedside chair as tolerated daily to increase OOB/activity tolerance.   Instructed patient to use call light to have nursing staff assist with needs/transfers.   Discussed OT POC and answered all questions within OT scope of practice.        Patient left HOB elevated with all lines intact, call button in reach, and bed alarm on    GOALS:   Multidisciplinary Problems       Occupational Therapy Goals          Problem: Occupational Therapy    Goal Priority Disciplines Outcome Interventions   Occupational Therapy Goal     OT, PT/OT Progressing    Description: Goals to be met by: 8/7/25     Patient will increase functional independence with ADLs by performing:    UE Dressing with [place].  LE Dressing with [place].  Grooming while standing at sink with [place].  Toileting from toilet with [place] for hygiene and clothing management.   Bathing from  shower chair/bench with Modified [place] with use of shower chair.  Toilet transfer to toilet with [place].                           Time Tracking:     OT Date of Treatment: 07/11/25  OT Start Time: 1156  OT Stop Time: 1220  OT Total Time (min): 24  min    Billable Minutes: therapeutic activity 12 minutes  Therapeutic exercise 12 minutes    OT/JOHN PAUL: OT          7/11/2025

## 2025-07-11 NOTE — PROGRESS NOTES
07/11/25 1425   WOCN Assessment   WOCN Total Time (mins) 15   Wound moisture  (resolved)   Intervention assessed;applied;team conference   Teaching on-going        Wound 07/06/25 0945 Incision Right distal;medial Thigh   Date First Assessed/Time First Assessed: 07/06/25 0945   Primary Wound Type: Incision  Side: Right  Orientation: distal;medial  Location: Thigh  Closure Method: Sutures   Appearance Dressing in place, unable to visualize   Off Loading Pillow  (Knee immobilizer remains in place)        Wound 07/07/25 0000 Moisture associated dermatitis Buttocks   Date First Assessed/Time First Assessed: 07/07/25 0000   Primary Wound Type: Moisture associated dermatitis  Location: Buttocks   Appearance   (no redness no breakdown,)   Care   (cleaned and dried and applied Triad, pt feeling like having BM, place on bed pan.  Nurse at bedside.)     Bilateral feet edema, no redness not breakdown complete skin assessment

## 2025-07-11 NOTE — PROGRESS NOTES
Health Maintenance Summary     Topic Due On Due Status Completed On    Colorectal Cancer Screening - Colonoscopy May 2, 2021 Not Due May 2, 2011    Immunization - Td/Tdap Apr 8, 2021 Not Due Apr 8, 2011    Immunization-Influenza  Completed Nov 1, 2016          Patient is up to date, no discussion needed .       KATY Muse is a 65 year old female with a past medical history of a locally advanced pancreatic cancer, anemia, obesity, DM, HTN, and hypothyroidism who presented with a right femur fracture after a fall, MIGUEL ANGEL, hyponatremia and neutropenia.   She recently completed her first cycle of chemotherapy . She was not able to receive her second one due to prolonged myelosuppression.     She endorses chronic diarrhea and decreased appetite as well as fatigue. The patient denies CP, cough, SOB, abdominal pain, nausea, vomiting, constipation.  The patient denies fever, chills, night sweats, weight loss, new lumps or bumps, easy bruising or bleeding.     In the ED, the patient received a 1.5 L NS bolus and 1 L LR bolus as well as fentanyl and Zofran. Oncology was consulted in view of her severe neutropenia.       Past Medical History:   Diagnosis Date    Allergy     Anxiety     Asthma     DDD (degenerative disc disease), cervical     Depression     Diabetes mellitus     Fibromyalgia     Hypertension     Neuromuscular disorder     Pancreatic mass     PONV (postoperative nausea and vomiting)     Thyroid disease     hypo     Past Surgical History:   Procedure Laterality Date    ADENOIDECTOMY      CARPAL TUNNEL RELEASE Right      SECTION      ENDOSCOPIC ULTRASOUND OF UPPER GASTROINTESTINAL TRACT N/A 2025    Procedure: ULTRASOUND, UPPER GI TRACT, ENDOSCOPIC;  Surgeon: Austen Grimes III, MD;  Location: [facility];  Service: Endoscopy;  Laterality: N/A;    ERCP N/A 2025    Procedure: ERCP (ENDOSCOPIC RETROGRADE CHOLANGIOPANCREATOGRAPHY);  Surgeon: Thuy Escobar MD;  Location: [facility] ENDO;  Service: Endoscopy;  Laterality: N/A;    ERCP N/A 2025    Procedure: ERCP (ENDOSCOPIC RETROGRADE CHOLANGIOPANCREATOGRAPHY);  Surgeon: Austen Grimes III, MD;  Location: [facility] ENDO;  Service: Endoscopy;  Laterality: N/A;    INSERTION OF TUNNELED CENTRAL VENOUS CATHETER (CVC) WITH SUBCUTANEOUS  PORT Left 2025    Procedure: [id number]-W-CATH;  Surgeon: Ata Fernandez III, MD;  Location: [facility] OR;  Service: General;  Laterality: Left;    JOINT REPLACEMENT Right 2016    knee    KNEE ARTHROPLASTY Left 10/19/2020    Procedure: ARTHROPLASTY, KNEE;  Surgeon: Felipe Herring MD;  Location: [facility] OR;  Service: Orthopedics;  Laterality: Left;  Louie Liz    ORIF FEMUR FRACTURE Right 2025    Procedure: ORIF, FRACTURE, FEMUR (distal femur ORIF, synthes VA condylar plate, izaiah table, C-arm, triangle);  Surgeon: Osmany Harding MD;  Location: [facility] OR;  Service: Orthopedics;  Laterality: Right;    ROTATOR CUFF REPAIR Right     TONSILLECTOMY      TOTAL KNEE ARTHROPLASTY Right     TUBAL LIGATION       Social History     Socioeconomic History    Marital status:     Number of children: 3   Occupational History    Occupation: [employer]     Comment: primary sub for [employer]   Tobacco Use    Smoking status: Former     Current packs/day: 0.00     Average packs/day: 1 pack/day for 11.0 years (11.0 ttl pk-yrs)     Types: Cigarettes     Start date:      Quit date:      Years since quittin.5    Smokeless tobacco: Never   Substance and Sexual Activity    Alcohol use: Not Currently     Comment: occasional 2x/y    Drug use: Never    Sexual activity: Yes     Partners: Male     Social Drivers of Health     Financial Resource Strain: Low Risk  (2025)    Overall Financial Resource Strain (CARDIA)     Difficulty of Paying Living Expenses: Not hard at all   Recent Concern: Financial Resource Strain - High Risk (2025)    Overall Financial Resource Strain (CARDIA)     Difficulty of Paying Living Expenses: Hard   Food Insecurity: No Food Insecurity (2025)    Hunger Vital Sign     Worried About Running Out of Food in the Last Year: Never true     Ran Out of Food in the Last Year: Never true   Recent Concern: Food Insecurity - Food Insecurity Present (2025)    Hunger Vital Sign      Worried About Running Out of Food in the Last Year: Sometimes true     Ran Out of Food in the Last Year: Sometimes true   Transportation Needs: No Transportation Needs (7/5/2025)    PRAPARE - Transportation     Lack of Transportation (Medical): No     Lack of Transportation (Non-Medical): No   Recent Concern: Transportation Needs - Unmet Transportation Needs (4/28/2025)    PRAPARE - Transportation     Lack of Transportation (Medical): Yes     Lack of Transportation (Non-Medical): Yes   Physical Activity: Unknown (4/28/2025)    Exercise Vital Sign     Days of Exercise per Week: Patient declined     Minutes of Exercise per Session: 30 min   Stress: No Stress Concern Present (7/5/2025)    [employer] - Occupational Stress Questionnaire     Feeling of Stress : Not at all   Recent Concern: Stress - Stress Concern Present (4/28/2025)    [employer] - Occupational Stress Questionnaire     Feeling of Stress : Very much   Housing Stability: Low Risk  (7/5/2025)    Housing Stability Vital Sign     Unable to Pay for Housing in the Last Year: No     Number of Times Moved in the Last Year: 0     Homeless in the Last Year: No     Review of patient's allergies indicates:   Allergen Reactions    Erythromycin Swelling    Codeine Nausea And Vomiting     And migraine h/a    Lactose     [place] Blisters     Physical exam  Vitals:    07/11/25 1314   BP:    Pulse:    Resp: 18   Temp:      Patient does not know where she is physical exam suggestive of delirium.  She is awake alert to herself.  CT head is negative for any acute bleeding.  Normal respiratory effort  Normal rate  normocephalic      Lab Results   Component Value Date    WBC 12.59 07/10/2025    HGB 8.4 (L) 07/10/2025    HCT 27.8 (L) 07/10/2025    MCV 84 07/10/2025     07/10/2025       CMP  Sodium   Date Value Ref Range Status   07/11/2025 142 136 - 145 mmol/L Final     Potassium   Date Value Ref Range Status    07/11/2025 4.2 3.5 - 5.1 mmol/L Final     Chloride   Date Value Ref Range Status   07/11/2025 107 95 - 110 mmol/L Final     CO2   Date Value Ref Range Status   07/11/2025 28 23 - 29 mmol/L Final     Glucose   Date Value Ref Range Status   07/11/2025 98 70 - 110 mg/dL Final     BUN   Date Value Ref Range Status   07/11/2025 23 8 - 23 mg/dL Final     Creatinine   Date Value Ref Range Status   07/11/2025 1.1 0.5 - 1.4 mg/dL Final     Calcium   Date Value Ref Range Status   07/11/2025 8.0 (L) 8.7 - 10.5 mg/dL Final     Protein Total   Date Value Ref Range Status   07/10/2025 4.9 (L) 6.0 - 8.4 gm/dL Final     Albumin   Date Value Ref Range Status   07/10/2025 2.4 (L) 3.5 - 5.2 g/dL Final     Bilirubin Total   Date Value Ref Range Status   07/10/2025 0.7 0.1 - 1.0 mg/dL Final     Comment:     For infants and newborns, interpretation of results should be based   on gestational age, weight and in agreement with clinical   observations.    Premature Infant recommended reference ranges:   0-24 hours:  <8.0 mg/dL   24-48 hours: <12.0 mg/dL   3-5 days:    <15.0 mg/dL   6-29 days:   <15.0 mg/dL     ALP   Date Value Ref Range Status   07/10/2025 95 55 - 135 unit/L Final     AST   Date Value Ref Range Status   07/10/2025 6 (L) 10 - 40 unit/L Final     ALT   Date Value Ref Range Status   07/10/2025 4 (L) 10 - 44 unit/L Final     Anion Gap   Date Value Ref Range Status   07/11/2025 7 (L) 8 - 16 mmol/L Final     eGFR   Date Value Ref Range Status   07/11/2025 57 (L) >60 mL/min/1.73/m2 Final   11/21/2024 70 > OR = 60 mL/min/1.73m2 Final     Ct head    Impression:     No CT evidence of acute intracranial abnormality. If the patient has an acute, focal neurological deficit, MRI of the brain may be indicated.      Assessment recommendation     Closed fracture of distal end of right femur  She is s/p Open reduction internal fixation right periprosthetic distal femur fracture and Intramedullary nailing right femur  NWB RLE  Fall  precautions  PRN analgesics  Going to rehab     Pancreatic cancer  Currently on neoadjuvant treatment with mFOLFIRONOX.  Her systemic therapy will be put on hold awaiting her neutropenia to resolved.  She will follow-up with Dr Calle upon her discharge  Oncology sign off please re consult for any questions   OK to go to rehab      Chemotherapy-induced neutropenia  WBC today 0.85 -> 1.44 -> 4.46 -> 10.71 resolved   D/c GCSF

## 2025-07-11 NOTE — ACP (ADVANCE CARE PLANNING)
Advance Care Planning     Date: 07/11/2025    Living Will  During this visit, I engaged the patient  in the voluntary advance care planning process.  The patient and I reviewed the role for advance directives and their purpose in directing future healthcare if the patient's unable to speak for him/herself.  At this point in time, the patient does have full decision-making capacity.  We discussed different extreme health states that she could experience, and reviewed what kind of medical care she would want in those situations.  The patient communicated that if she were comatose and had little chance of a meaningful recovery, she would not want machines/life-sustaining treatments used. In addition to the above preference, other important end-of-life issues for the patient include no nutrition/hydration. The patient has completed a living will to reflect these preferences.  I spent a total of 10 minutes engaging the patient in this advance care planning discussion.          Power of   I initiated the process of voluntary advance care planning today and explained the importance of this process to the patient.  I introduced the concept of advance directives to the patient, as well. Then the patient received detailed information about the importance of designating a Health Care Power of  (HCPOA). She was also instructed to communicate with this person about their wishes for future healthcare, should she become sick and lose decision-making capacity. The patient has not previously appointed a HCPOA. After our discussion, the patient has decided to complete a HCPOA and has appointed her friend / spouse / friend, health care agent: Cat Vasquez / Josué Muse / Megha Floyd & health care agent number:  /  / . I encouraged her to communicate with this person about their wishes for future healthcare, should she become sick and lose decision-making capacity.      A  total of 10 min was spent on advance care planning, goals of care discussion, emotional support, formulating and communicating prognosis and exploring burden/benefit of various approaches of treatment. This discussion occurred on a fully voluntary basis with the verbal consent of the patient and/or family.

## 2025-07-11 NOTE — PT/OT/SLP PROGRESS
Physical Therapy Treatment    Patient Name:  Sonia Muse   MRN:  [id number]    Recommendations:     Discharge Recommendations: High Intensity Therapy  Discharge Equipment Recommendations: to be determined by next level of care  Barriers to discharge: increase assist with mobility, high fall risk, PWB R LE    Assessment:     Sonia Muse is a 65 y.o. female admitted with a medical diagnosis of Closed fracture of distal end of right femur.  She presents with the following impairments/functional limitations: weakness, impaired endurance, impaired self care skills, impaired functional mobility, impaired balance, decreased upper extremity function, decreased lower extremity function, impaired skin, edema, impaired cardiopulmonary response to activity, orthopedic precautions. Patient is agreeable to participation with PT treatment. Knee immobilized adjusted at patient's request. She requires SBA for log rolling right and side lying to sitting transfer. She requires ModA for sit to stand with RW. She attempted to perform anterior steps with RW and RLE PWB 25%, but was unable which she attributes to current BUE weakness. She returned to bed and PT provided education on weight shifting from heel/toe on LLE for stand pivot to chair. She performed stand pivot to BSC with RW and Min-ModA with VC for RLE PWB 25%. After having BM she requires TotalA for hygiene. BSC removed and chair placed behind patient. She is agreeable to remain up in chair with chair alarm on and all needs met.     Rehab Prognosis: Good; patient would benefit from acute skilled PT services to address these deficits and reach maximum level of function.    Recent Surgery: Procedure(s) (LRB):  ORIF, FRACTURE, FEMUR (distal femur ORIF, synthes VA condylar plate, izaiah table, C-arm, triangle) (Right) 5 Days Post-Op    Plan:     During this hospitalization, patient to be seen daily to address the identified rehab impairments via gait training,  therapeutic activities, therapeutic exercises, neuromuscular re-education and progress toward the following goals:    Plan of Care Expires:  08/07/25    Subjective     Chief Complaint: needs to have BM  Patient/Family Comments/goals: D/C to rehab  Pain/Comfort:  Pain Rating 1: 0/10      Objective:     Communicated with DAVEY Griffith prior to session.  Patient found HOB elevated with PureWick, peripheral IV, telemetry, oxygen upon PT entry to room.     General Precautions: Standard, fall  Orthopedic Precautions: RLE partial weight bearing (25%)  Braces: Knee immobilizer  Respiratory Status: Nasal cannula, flow 2 L/min     Functional Mobility:  Bed Mobility:     Supine to Sit: stand by assistance  Transfers:     Sit to Stand:  moderate assistance with rolling walker  Toilet Transfer: moderate assistance with  rolling walker  using  Stand Pivot      AM-PAC 6 CLICK MOBILITY  Turning over in bed (including adjusting bedclothes, sheets and blankets)?: 4  Sitting down on and standing up from a chair with arms (e.g., wheelchair, bedside commode, etc.): 2  Moving from lying on back to sitting on the side of the bed?: 4  Moving to and from a bed to a chair (including a wheelchair)?: 2  Need to walk in hospital room?: 1  Climbing 3-5 steps with a railing?: 1  Basic Mobility Total Score: 14       Treatment & Education:  Patient was educated on the importance of OOB activity and functional mobility to negate negative effects of prolonged bed rest during hospitalization, safe transfers and ambulation, RLE PWB 25%, and D/C planning     She requires ModA for BSC transfer and TotalA for hygiene     Patient left up in chair with all lines intact, call button in reach, chair alarm on, and RN notified..    GOALS:   Multidisciplinary Problems       Physical Therapy Goals          Problem: Physical Therapy    Goal Priority Disciplines Outcome Interventions   Physical Therapy Goal     PT, PT/OT Progressing    Description: Goals to be met by:  25     Patient will increase functional independence with mobility by performin. Supine to sit with MInimal Assistance  2. Sit to stand transfer with Minimal Assistance  3. Bed to chair transfer with Minimal Assistance using Rolling Walker  4. R/L rolling with minimal assistance using bedside rail as needed    All goals while maintaining 25 % PWB on R LE                                 DME Justifications:  TBD at next level of care    Time Tracking:     PT Received On: 25  PT Start Time: 1022     PT Stop Time: 1100  PT Total Time (min): 38 min     Billable Minutes: Therapeutic Activity 38    Treatment Type: Treatment  PT/PTA: PT     Number of PTA visits since last PT visit: 0     2025

## 2025-07-11 NOTE — PLAN OF CARE
Auth received for NSR and NSR can admit Pt today 7/11 dc orders asked for   07/11/25 1218   Post-Acute Status   Post-Acute Authorization Placement   Post-Acute Placement Status Set-up Complete/Auth obtained   Discharge Delays None known at this time   Discharge Plan   Discharge Plan A Rehab   Discharge Plan B Rehab

## 2025-07-11 NOTE — ASSESSMENT & PLAN NOTE
Last A1c reviewed-   Lab Results   Component Value Date    HGBA1C 10.1 (H) 04/25/2025     Most recent fingerstick glucose reviewed-   Recent Labs   Lab 07/10/25  1950 07/10/25  2119 07/11/25  0841 07/11/25  1144   POCTGLUCOSE 101 112* 95 93     Current correctional scale  Medium  Maintain anti-hyperglycemic dose as follows-   Antihyperglycemics (From admission, onward)      None          -Hold Oral hypoglycemics while patient is in the hospital.  -Monitor CBGS = on the lower range of normal  -Lantus 23 units subQ q.h.s. decreased to 20 units subQ q.h.s. (titrate as needed)

## 2025-07-11 NOTE — ASSESSMENT & PLAN NOTE
Last A1c reviewed-   Lab Results   Component Value Date    HGBA1C 10.1 (H) 04/25/2025     Most recent fingerstick glucose reviewed-   Recent Labs   Lab 07/10/25  1604 07/10/25  1950 07/10/25  2119 07/11/25  0841   POCTGLUCOSE 130* 101 112* 95     Current correctional scale  Medium  Maintain anti-hyperglycemic dose as follows-   Antihyperglycemics (From admission, onward)      Start     Stop Route Frequency Ordered    07/09/25 2100  insulin glargine U-100 (Lantus) pen 20 Units         -- SubQ Nightly 07/09/25 1016    07/04/25 1142  insulin aspart U-100 pen 0-10 Units         -- SubQ Before meals & nightly PRN 07/04/25 1046          -Hold Oral hypoglycemics while patient is in the hospital.  -Monitor CBGS = on the lower range of normal  -Lantus 23 units subQ q.h.s. decreased to 20 units subQ q.h.s. (titrate as needed)

## 2025-07-11 NOTE — PLAN OF CARE
Pt clear for DC from case management standpoint. Discharging to NSR - Transportation will  at 2:30 pm. Primary nurse to call report  to [phone] after 1:00 pm.       07/11/25 1247   Final Note   Assessment Type Final Discharge Note   Anticipated Discharge Disposition Rehab

## 2025-07-14 DIAGNOSIS — C25.0 MALIGNANT NEOPLASM OF HEAD OF PANCREAS: Primary | ICD-10-CM

## 2025-07-17 DIAGNOSIS — S72.401D CLOSED FRACTURE OF DISTAL END OF RIGHT FEMUR WITH ROUTINE HEALING, UNSPECIFIED FRACTURE MORPHOLOGY, SUBSEQUENT ENCOUNTER: Primary | ICD-10-CM

## 2025-07-18 ENCOUNTER — TELEPHONE (OUTPATIENT)
Facility: CLINIC | Age: 65
End: 2025-07-18
Payer: MEDICAID

## 2025-07-18 NOTE — TELEPHONE ENCOUNTER
Left voicemail to remind patient of their upcoming appointment 7/28/25. Also reminded pt of labs due 7/28/25.

## 2025-07-28 ENCOUNTER — TELEPHONE (OUTPATIENT)
Facility: CLINIC | Age: 65
End: 2025-07-28
Payer: MEDICAID

## 2025-07-28 NOTE — NURSING
Left voicemail for call back. Inquiring about estimated date of discharge from inpatient rehab. Patient will need follow up with Dr. Calle.

## 2025-07-31 DIAGNOSIS — S72.401D CLOSED FRACTURE OF DISTAL END OF RIGHT FEMUR WITH ROUTINE HEALING, UNSPECIFIED FRACTURE MORPHOLOGY, SUBSEQUENT ENCOUNTER: Primary | ICD-10-CM

## 2025-08-01 ENCOUNTER — HOSPITAL ENCOUNTER (OUTPATIENT)
Dept: RADIOLOGY | Facility: HOSPITAL | Age: 65
Discharge: HOME | End: 2025-08-01
Attending: ORTHOPAEDIC SURGERY
Payer: MEDICAID

## 2025-08-01 ENCOUNTER — OFFICE VISIT (OUTPATIENT)
Dept: ORTHOPEDICS | Facility: CLINIC | Age: 65
End: 2025-08-01
Payer: MEDICAID

## 2025-08-01 VITALS — RESPIRATION RATE: 16 BRPM | HEIGHT: 64 IN | BODY MASS INDEX: 43.51 KG/M2 | WEIGHT: 254.88 LBS

## 2025-08-01 DIAGNOSIS — Z87.81 STATUS POST OPEN REDUCTION AND INTERNAL FIXATION (ORIF) OF FRACTURE: Primary | ICD-10-CM

## 2025-08-01 DIAGNOSIS — S72.401D CLOSED FRACTURE OF DISTAL END OF RIGHT FEMUR WITH ROUTINE HEALING, UNSPECIFIED FRACTURE MORPHOLOGY, SUBSEQUENT ENCOUNTER: ICD-10-CM

## 2025-08-01 DIAGNOSIS — Z98.890 STATUS POST OPEN REDUCTION AND INTERNAL FIXATION (ORIF) OF FRACTURE: Primary | ICD-10-CM

## 2025-08-01 PROCEDURE — 73560 X-RAY EXAM OF KNEE 1 OR 2: CPT | Mod: 26,RT,, | Performed by: RADIOLOGY

## 2025-08-01 PROCEDURE — 99999 PR PBB SHADOW E&M-EST. PATIENT-LVL IV: CPT | Mod: PBBFAC,,,

## 2025-08-01 PROCEDURE — 99214 OFFICE O/P EST MOD 30 MIN: CPT | Mod: PBBFAC,25,PO

## 2025-08-01 PROCEDURE — 73560 X-RAY EXAM OF KNEE 1 OR 2: CPT | Mod: TC,PO,RT

## 2025-08-01 NOTE — PROGRESS NOTES
Chief Complaint:   Chief Complaint   Patient presents with    Post-op Evaluation      2-3wk po right femur ORIF DOS 07/06/25 Mehdi         History of present illness:  65-year-old female status post right femur ORIF on 07/06/2025 with Dr. Harding presents to the clinic for 1st postop appointment.  She is discharged from rehab hospital yesterday and begins physical therapy/occupational therapy on Monday.  She is on Eliquis 5 mg b.i.d..  Pain is rated as 5/10.  Declines numbness, tingling in the right lower extremity.  Has been on partial weight-bearing restrictions.  In a wheelchair for appointment today.      Review of Systems   Constitutional: Negative for chills and fever.   Cardiovascular:  Negative for chest pain.   Respiratory:  Negative for shortness of breath.    Skin:  Negative for rash.   Musculoskeletal:  Positive for joint pain, joint swelling and stiffness. Negative for falls, muscle cramps, muscle weakness and myalgias.   Neurological:  Negative for numbness, paresthesias and weakness.            Right Knee Exam     Tenderness   Right knee tenderness location: Distal femur TTP.    Range of Motion   Extension:  0   Flexion:  80     Tests   Varus: negative Valgus: negative    Comments:  Incisions clean, dry, intact with no surrounding signs of infection at this time  Extensor mechanism intact             X-rays:  ORIF right distal femur in good position with right TKA components also in good position.  Little healing seen with fracture fragments at this time.         Assessment:  1. Status post right distal femur ORIF      Plan:  Overall patient doing well postoperatively.  Discussed x-rays, treatment options with her in detail and all questions answered.  Physical therapy to begin Monday and we will continue 25% weight-bearing status of the right lower extremity at this time.  We will walk with walker at all times.  Patient declined pain medication refill at this visit.  Continue anticoagulation  therapy protocol.  Follow up in clinic in 3 weeks with Dr. Harding.  She expressed understanding and agreement with the plan as above.    Status post open reduction and internal fixation (ORIF) of fracture           Past Medical History:   Diagnosis Date    Allergy     Anxiety     Asthma     DDD (degenerative disc disease), cervical     Depression     Diabetes mellitus     Fibromyalgia     Hypertension     Neuromuscular disorder     Pancreatic mass     PONV (postoperative nausea and vomiting)     Thyroid disease     hypo       Past Surgical History:   Procedure Laterality Date    ADENOIDECTOMY      CARPAL TUNNEL RELEASE Right      SECTION      ENDOSCOPIC ULTRASOUND OF UPPER GASTROINTESTINAL TRACT N/A 2025    Procedure: ULTRASOUND, UPPER GI TRACT, ENDOSCOPIC;  Surgeon: Austen Grimes III, MD;  Location: [facility] ENDO;  Service: Endoscopy;  Laterality: N/A;    ERCP N/A 2025    Procedure: ERCP (ENDOSCOPIC RETROGRADE CHOLANGIOPANCREATOGRAPHY);  Surgeon: Thuy Escobar MD;  Location: [facility] ENDO;  Service: Endoscopy;  Laterality: N/A;    ERCP N/A 2025    Procedure: ERCP (ENDOSCOPIC RETROGRADE CHOLANGIOPANCREATOGRAPHY);  Surgeon: Austen Grimes III, MD;  Location: [facility];  Service: Endoscopy;  Laterality: N/A;    INSERTION OF TUNNELED CENTRAL VENOUS CATHETER (CVC) WITH SUBCUTANEOUS PORT Left 2025    Procedure: [id number]-CATH;  Surgeon: Ata Fernandez III, MD;  Location: [facility];  Service: General;  Laterality: Left;    JOINT REPLACEMENT Right 2016    knee    KNEE ARTHROPLASTY Left 10/19/2020    Procedure: ARTHROPLASTY, KNEE;  Surgeon: Felipe Herring MD;  Location: [facility];  Service: Orthopedics;  Laterality: Left;  Louie Liz    ORIF FEMUR FRACTURE Right 2025    Procedure: ORIF, FRACTURE, FEMUR (distal femur ORIF, synthes VA condylar plate, izaiah table, C-arm, triangle);  Surgeon: Osmany Harding MD;  Location: [facility];  Service: Orthopedics;   Laterality: Right;    ROTATOR CUFF REPAIR Right     TONSILLECTOMY      TOTAL KNEE ARTHROPLASTY Right     TUBAL LIGATION         Current Outpatient Medications   Medication Sig    apixaban (ELIQUIS) 2.5 mg Tab Take 2.5 mg by mouth.    albuterol (VENTOLIN HFA) 90 mcg/actuation inhaler Inhale 2 puffs into the lungs every 6 (six) hours as needed for Wheezing or Shortness of Breath. Rescue    blood sugar diagnostic Strp Use 1 strip to check blood sugar 2 times daily    blood-glucose meter kit Use as instructed    cetirizine (ZYRTEC) 10 MG tablet TAKE 1 TABLET BY MOUTH ONCE DAILY    cholecalciferol, vitamin D3, (VITAMIN D3) 50 mcg (2,000 unit) Tab Take 2 tablets (4,000 Units total) by mouth once daily.    DULoxetine (CYMBALTA) 30 MG capsule Take 1 capsule (30 mg total) by mouth once daily.    DULoxetine (CYMBALTA) 60 MG capsule Take 1 capsule (60 mg total) by mouth every evening.    HYDROcodone-acetaminophen (NORCO) 5-325 mg per tablet Take 1 tablet by mouth every 6 (six) hours as needed.    insulin glargine U-100, Lantus, 100 unit/mL (3 mL) SubQ InPn pen Inject 21 Units into the skin every evening.    lancets (LANCETS,THIN) Misc 1 each by Misc.(Non-Drug; Combo Route) route 3 (three) times daily.    levothyroxine (SYNTHROID) 25 MCG tablet TAKE one TABLET BY MOUTH BEFORE breakfast    LIDOcaine-prilocaine (EMLA) cream Apply topically as needed.    loperamide (IMODIUM) 2 mg capsule Take 2 tablets (4mg) by mouth after first loose stool, 1 tablet every 2 hours until diarrhea free for 12 hours. May take 2 tablets (4mg) by mouth every 4 hours at night. May require more than the package labeling maximum dose of 16mg/day.    LORazepam (ATIVAN) 0.5 MG tablet Take 1 tablet (0.5 mg total) by mouth 2 (two) times daily.    magnesium oxide (MAGOX) 400 mg (241.3 mg magnesium) tablet Take 1 tablet (400 mg total) by mouth once daily.    meloxicam (MOBIC) 15 MG tablet Take 15 mg by mouth once daily. 1/2 tablet am    metronidazole 0.75%  "(METROCREAM) 0.75 % Crea Apply 1 application  topically 2 (two) times daily.    midodrine (PROAMATINE) 10 MG tablet Take 1 tablet (10 mg total) by mouth every 8 (eight) hours.    montelukast (SINGULAIR) 10 mg tablet TAKE ONE TABLET BY MOUTH EVERY EVENING    OLANZapine (ZYPREXA) 5 MG tablet Take 1 tablet by mouth nightly on days 1-3 of each chemotherapy cycle.    ondansetron (ZOFRAN) 8 MG tablet Take 1 tablet (8 mg total) by mouth every 8 (eight) hours as needed.    pen needle, diabetic 32 gauge x 5/32" Ndle 1 Needle by Misc.(Non-Drug; Combo Route) route once daily.    potassium chloride (MICRO-K) 10 MEQ CpSR Take 2 capsules (20 mEq total) by mouth once daily.    potassium, sodium phosphates (PHOS-NAK) 280-160-250 mg PwPk Take 1 packet by mouth 4 (four) times daily before meals and nightly.    prochlorperazine (COMPAZINE) 10 MG tablet Take 1 tablet (10 mg total) by mouth every 6 (six) hours as needed.    risperiDONE (RISPERDAL) 0.5 MG Tab Take 1 tablet (0.5 mg total) by mouth 3 (three) times daily.     No current facility-administered medications for this visit.       Review of patient's allergies indicates:   Allergen Reactions    Erythromycin Swelling    Codeine Nausea And Vomiting     And migraine h/a    Lactose     [place] Blisters       Family History   Problem Relation Name Age of Onset    Diabetes Mother Libertad     Hypertension Mother Libertad     Heart disease Mother Libertad     Stroke Mother Libertad     Glaucoma Mother Libertad     Arthritis Mother Libertad     Diabetes Father Fredis     Hypertension Father Fredis     Heart disease Father Fredis     Hypertension Sister 1     Heart disease Sister 1     Graves' disease Daughter      Anxiety disorder Daughter         Social History[1]              All previous pertinent notes including ER visits, physical therapy visits, other orthopedic visits as well as other care for the same musculoskeletal problem were reviewed.  All pertinent lab values and previous imaging " was reviewed pertinent to the current visit.    This note was created using [employer] voice recognition software that occasionally misinterpreted phrases or words.    Consult note is delivered via Epic messaging service.    Osmani Mcnamara PA-C            [1]   Social History  Socioeconomic History    Marital status:     Number of children: 3   Occupational History    Occupation: [employer]     Comment: primary sub for [employer]   Tobacco Use    Smoking status: Former     Current packs/day: 0.00     Average packs/day: 1 pack/day for 11.0 years (11.0 ttl pk-yrs)     Types: Cigarettes     Start date:      Quit date:      Years since quittin.6    Smokeless tobacco: Never   Substance and Sexual Activity    Alcohol use: Not Currently     Comment: occasional 2x/y    Drug use: Never    Sexual activity: Yes     Partners: Male     Social Drivers of Health     Financial Resource Strain: Low Risk  (2025)    Received from [facility]    Overall Financial Resource Strain (CARDIA)     Difficulty of Paying Living Expenses: Not hard at all   Recent Concern: Financial Resource Strain - High Risk (2025)    Overall Financial Resource Strain (CARDIA)     Difficulty of Paying Living Expenses: Hard   Food Insecurity: Food Insecurity Present (2025)    Received from [facility]    Hunger Vital Sign     Worried About Running Out of Food in the Last Year: Sometimes true     Ran Out of Food in the Last Year: Sometimes true   Transportation Needs: No Transportation Needs (2025)    Received from [facility]     SDOH Transportation Source     Has lack of transportation kept you from medical appointments or from getting medications?: No     Has lack of transportation kept you from meetings, work, or from getting things needed for daily living?: No   Physical Activity: Unknown (2025)    Exercise Vital Sign     Days of Exercise per Week: Patient declined     Minutes of Exercise per Session: 30  min   Stress: No Stress Concern Present (7/31/2025)    Received from [facility] - Occupational Stress Questionnaire     Feeling of Stress : Not at all   Housing Stability: Low Risk  (7/14/2025)    Received from [facility]    Housing Stability Vital Sign     Unable to Pay for Housing in the Last Year: No     Number of Times Moved in the Last Year: 0     Homeless in the Last Year: No

## 2025-08-05 ENCOUNTER — DOCUMENT SCAN (OUTPATIENT)
Dept: HOME HEALTH SERVICES | Facility: HOSPITAL | Age: 65
End: 2025-08-05
Payer: MEDICAID

## 2025-08-05 ENCOUNTER — PATIENT MESSAGE (OUTPATIENT)
Dept: FAMILY MEDICINE | Facility: CLINIC | Age: 65
End: 2025-08-05
Payer: MEDICAID

## 2025-08-06 ENCOUNTER — TELEPHONE (OUTPATIENT)
Dept: HEMATOLOGY/ONCOLOGY | Facility: CLINIC | Age: 65
End: 2025-08-06
Payer: MEDICAID

## 2025-08-06 ENCOUNTER — TELEPHONE (OUTPATIENT)
Facility: CLINIC | Age: 65
End: 2025-08-06
Payer: MEDICAID

## 2025-08-07 ENCOUNTER — TELEPHONE (OUTPATIENT)
Dept: FAMILY MEDICINE | Facility: CLINIC | Age: 65
End: 2025-08-07
Payer: MEDICAID

## 2025-08-07 NOTE — TELEPHONE ENCOUNTER
----- Message from Adilia sent at 8/7/2025  2:40 PM CDT -----  Pt hear rate is getting high again. She just got discharged from rehab hospital. She is not sure what medication she used to get for that.   [phone]

## 2025-08-08 ENCOUNTER — NURSE TRIAGE (OUTPATIENT)
Dept: ADMINISTRATIVE | Facility: CLINIC | Age: 65
End: 2025-08-08
Payer: MEDICAID

## 2025-08-09 NOTE — TELEPHONE ENCOUNTER
Patient is post distal femur ORIF on 7/6/2025. Reports an axillary temp of 101.5. Also c/o redness to her incision. Per protocol, will contact the on call provider. Per Dr. Case, patient should go to the ER for evaluation. Patient was advised and verbalizes understanding. Advised the patient to call back with any further questions or if symptoms worsen.        Reason for Disposition   [1] Fever > 101 F (38.3 C) AND [2] age > 60 years   Fever > 100.5 F (38.1 C)    Additional Information   Negative: Shock suspected (e.g., cold/pale/clammy skin, too weak to stand, low BP, rapid pulse)   Negative: Difficult to awaken or acting confused (e.g., disoriented, slurred speech)   Negative: Bluish (or gray) lips or face now   Negative: New-onset rash with many purple (or blood-colored) spots or dots   Negative: Sounds like a life-threatening emergency to the triager   Negative: Fever > 103 F (39.4 C)   Negative: [1] Neutropenia known or suspected (e.g., recent cancer chemotherapy, stem cell transplant) AND [2] fever > 100.4 F (38.0 C)   Negative: [1] Neutropenia known or suspected AND [2] signs or symptoms of suspected infection are present   Negative: [1] Headache AND [2] stiff neck (can't touch chin to chest)   Negative: Difficulty breathing   Negative: [1] Drinking very little AND [2] dehydration suspected (e.g., no urine > 12 hours, very dry mouth, very lightheaded)   Negative: Patient sounds very sick or weak to the triager  (Exception: Mild weakness AND hasn't taken fever medicine.)   Negative: SEVERE chills (i.e., feeling extremely cold WITH shaking chills)   Negative: [1] Major abdominal surgical incision AND [2] wound gaping open AND [3] visible internal organs   Negative: Sounds like a life-threatening emergency to the triager   Negative: [1] Bleeding from incision AND [2] won't stop after 10 minutes of direct pressure   Negative: [1] Widespread rash AND [2] bright red, sunburn-like   Negative: Severe pain in the  incision   Negative: [1] Suture came out early AND [2] wound gaping AND [3] < 48 hours since sutures placed   Negative: [1] Incision gaping open AND [2] length of opening > 2 inches (5 cm)   Negative: Patient sounds very sick or weak to the triager   Negative: Sounds like a serious complication to the triager    Protocols used: Cancer - Fever-A-AH, Post-Op Incision Symptoms-A-AH

## 2025-08-10 ENCOUNTER — PATIENT MESSAGE (OUTPATIENT)
Dept: FAMILY MEDICINE | Facility: CLINIC | Age: 65
End: 2025-08-10
Payer: MEDICAID

## 2025-08-10 DIAGNOSIS — Z79.4 TYPE 2 DIABETES MELLITUS WITH HYPERGLYCEMIA, WITH LONG-TERM CURRENT USE OF INSULIN: ICD-10-CM

## 2025-08-10 DIAGNOSIS — I10 ESSENTIAL HYPERTENSION: ICD-10-CM

## 2025-08-10 DIAGNOSIS — E03.9 HYPOTHYROIDISM, UNSPECIFIED TYPE: Primary | ICD-10-CM

## 2025-08-10 DIAGNOSIS — Z83.438 FAMILY HISTORY OF HYPERLIPIDEMIA: ICD-10-CM

## 2025-08-10 DIAGNOSIS — R82.90 ABNORMAL URINE FINDINGS: ICD-10-CM

## 2025-08-10 DIAGNOSIS — E11.65 TYPE 2 DIABETES MELLITUS WITH HYPERGLYCEMIA, WITH LONG-TERM CURRENT USE OF INSULIN: ICD-10-CM

## 2025-08-12 ENCOUNTER — LAB VISIT (OUTPATIENT)
Dept: LAB | Facility: HOSPITAL | Age: 65
End: 2025-08-12
Attending: NURSE PRACTITIONER
Payer: MEDICAID

## 2025-08-12 DIAGNOSIS — E11.65 TYPE 2 DIABETES MELLITUS WITH HYPERGLYCEMIA, WITH LONG-TERM CURRENT USE OF INSULIN: ICD-10-CM

## 2025-08-12 DIAGNOSIS — E03.9 HYPOTHYROIDISM, UNSPECIFIED TYPE: ICD-10-CM

## 2025-08-12 DIAGNOSIS — Z79.4 TYPE 2 DIABETES MELLITUS WITH HYPERGLYCEMIA, WITH LONG-TERM CURRENT USE OF INSULIN: ICD-10-CM

## 2025-08-12 DIAGNOSIS — R82.90 ABNORMAL URINE FINDINGS: ICD-10-CM

## 2025-08-12 DIAGNOSIS — Z83.438 FAMILY HISTORY OF HYPERLIPIDEMIA: ICD-10-CM

## 2025-08-12 LAB
ALBUMIN/CREAT UR: 69.8 UG/MG
AMORPH CRY UR QL COMP ASSIST: ABNORMAL
BILIRUB UR QL STRIP.AUTO: NEGATIVE
CHOLEST SERPL-MCNC: 126 MG/DL (ref 120–199)
CHOLEST/HDLC SERPL: 4.3 {RATIO} (ref 2–5)
CLARITY UR: ABNORMAL
COLOR UR AUTO: YELLOW
CREAT UR-MCNC: 154.8 MG/DL (ref 15–325)
EAG (SMH): 120 MG/DL (ref 68–131)
GLUCOSE UR QL STRIP: NEGATIVE
HBA1C MFR BLD: 5.8 % (ref 4.5–6.2)
HDLC SERPL-MCNC: 29 MG/DL (ref 40–75)
HDLC SERPL: 23 % (ref 20–50)
HGB UR QL STRIP: ABNORMAL
KETONES UR QL STRIP: NEGATIVE
LDLC SERPL CALC-MCNC: 66.4 MG/DL (ref 63–159)
LEUKOCYTE ESTERASE UR QL STRIP: ABNORMAL
MICROALBUMIN UR-MCNC: 108 UG/ML
MICROSCOPIC COMMENT: ABNORMAL
NITRITE UR QL STRIP: NEGATIVE
NONHDLC SERPL-MCNC: 97 MG/DL
PH UR STRIP: 6 [PH]
PROT UR QL STRIP: ABNORMAL
RBC #/AREA URNS AUTO: 17 /HPF
SP GR UR STRIP: 1.01
SQUAMOUS #/AREA URNS AUTO: 3 /HPF
T4 FREE SERPL-MCNC: 1.07 NG/DL (ref 0.71–1.51)
TRIGL SERPL-MCNC: 153 MG/DL (ref 30–150)
TSH SERPL-ACNC: 2.65 UIU/ML (ref 0.34–5.6)
UROBILINOGEN UR STRIP-ACNC: NEGATIVE EU/DL
WBC #/AREA URNS AUTO: 85 /HPF
WBC CLUMPS UR QL AUTO: ABNORMAL

## 2025-08-12 PROCEDURE — 87086 URINE CULTURE/COLONY COUNT: CPT

## 2025-08-12 PROCEDURE — 82043 UR ALBUMIN QUANTITATIVE: CPT

## 2025-08-12 PROCEDURE — 83036 HEMOGLOBIN GLYCOSYLATED A1C: CPT

## 2025-08-12 PROCEDURE — 36415 COLL VENOUS BLD VENIPUNCTURE: CPT

## 2025-08-12 PROCEDURE — 84439 ASSAY OF FREE THYROXINE: CPT

## 2025-08-12 PROCEDURE — 82465 ASSAY BLD/SERUM CHOLESTEROL: CPT

## 2025-08-12 PROCEDURE — 84443 ASSAY THYROID STIM HORMONE: CPT

## 2025-08-12 PROCEDURE — 81003 URINALYSIS AUTO W/O SCOPE: CPT

## 2025-08-13 LAB — BACTERIA UR CULT: NORMAL

## 2025-08-14 ENCOUNTER — OFFICE VISIT (OUTPATIENT)
Dept: FAMILY MEDICINE | Facility: CLINIC | Age: 65
End: 2025-08-14
Payer: MEDICAID

## 2025-08-14 DIAGNOSIS — R39.89 ABNORMAL URINE COLOR: Primary | ICD-10-CM

## 2025-08-14 DIAGNOSIS — C25.0 MALIGNANT NEOPLASM OF HEAD OF PANCREAS: ICD-10-CM

## 2025-08-14 DIAGNOSIS — N30.00 ACUTE CYSTITIS WITHOUT HEMATURIA: ICD-10-CM

## 2025-08-14 PROCEDURE — 3066F NEPHROPATHY DOC TX: CPT | Mod: CPTII,95,, | Performed by: NURSE PRACTITIONER

## 2025-08-14 PROCEDURE — 1159F MED LIST DOCD IN RCRD: CPT | Mod: CPTII,95,, | Performed by: NURSE PRACTITIONER

## 2025-08-14 PROCEDURE — 1160F RVW MEDS BY RX/DR IN RCRD: CPT | Mod: CPTII,95,, | Performed by: NURSE PRACTITIONER

## 2025-08-14 PROCEDURE — 4010F ACE/ARB THERAPY RXD/TAKEN: CPT | Mod: CPTII,95,, | Performed by: NURSE PRACTITIONER

## 2025-08-14 PROCEDURE — 3060F POS MICROALBUMINURIA REV: CPT | Mod: CPTII,95,, | Performed by: NURSE PRACTITIONER

## 2025-08-14 PROCEDURE — 3044F HG A1C LEVEL LT 7.0%: CPT | Mod: CPTII,95,, | Performed by: NURSE PRACTITIONER

## 2025-08-14 PROCEDURE — 98006 SYNCH AUDIO-VIDEO EST MOD 30: CPT | Mod: 95,,, | Performed by: NURSE PRACTITIONER

## 2025-08-14 RX ORDER — NITROFURANTOIN 25; 75 MG/1; MG/1
100 CAPSULE ORAL 2 TIMES DAILY
Qty: 10 CAPSULE | Refills: 0 | Status: SHIPPED | OUTPATIENT
Start: 2025-08-14 | End: 2025-08-19

## 2025-08-15 ENCOUNTER — TELEPHONE (OUTPATIENT)
Facility: CLINIC | Age: 65
End: 2025-08-15
Payer: MEDICAID

## 2025-08-18 DIAGNOSIS — Z98.890 STATUS POST OPEN REDUCTION AND INTERNAL FIXATION (ORIF) OF FRACTURE: Primary | ICD-10-CM

## 2025-08-18 DIAGNOSIS — Z87.81 STATUS POST OPEN REDUCTION AND INTERNAL FIXATION (ORIF) OF FRACTURE: Primary | ICD-10-CM

## 2025-08-19 ENCOUNTER — OFFICE VISIT (OUTPATIENT)
Dept: FAMILY MEDICINE | Facility: CLINIC | Age: 65
End: 2025-08-19
Payer: MEDICAID

## 2025-08-19 VITALS
TEMPERATURE: 98 F | RESPIRATION RATE: 16 BRPM | HEIGHT: 64 IN | HEART RATE: 105 BPM | BODY MASS INDEX: 37.98 KG/M2 | WEIGHT: 222.44 LBS | OXYGEN SATURATION: 97 % | DIASTOLIC BLOOD PRESSURE: 80 MMHG | SYSTOLIC BLOOD PRESSURE: 123 MMHG

## 2025-08-19 DIAGNOSIS — E11.65 TYPE 2 DIABETES MELLITUS WITH HYPERGLYCEMIA, WITH LONG-TERM CURRENT USE OF INSULIN: ICD-10-CM

## 2025-08-19 DIAGNOSIS — Z79.4 TYPE 2 DIABETES MELLITUS WITH HYPERGLYCEMIA, WITH LONG-TERM CURRENT USE OF INSULIN: ICD-10-CM

## 2025-08-19 DIAGNOSIS — R00.0 TACHYCARDIA: ICD-10-CM

## 2025-08-19 DIAGNOSIS — R11.0 NAUSEA: ICD-10-CM

## 2025-08-19 DIAGNOSIS — I10 ESSENTIAL HYPERTENSION: Primary | ICD-10-CM

## 2025-08-19 PROBLEM — E66.01 SEVERE OBESITY (BMI >= 40): Status: RESOLVED | Noted: 2025-07-04 | Resolved: 2025-08-19

## 2025-08-19 PROCEDURE — 3060F POS MICROALBUMINURIA REV: CPT | Mod: CPTII,,, | Performed by: NURSE PRACTITIONER

## 2025-08-19 PROCEDURE — 4010F ACE/ARB THERAPY RXD/TAKEN: CPT | Mod: CPTII,,, | Performed by: NURSE PRACTITIONER

## 2025-08-19 PROCEDURE — G2211 COMPLEX E/M VISIT ADD ON: HCPCS | Mod: ,,, | Performed by: NURSE PRACTITIONER

## 2025-08-19 PROCEDURE — 3066F NEPHROPATHY DOC TX: CPT | Mod: CPTII,,, | Performed by: NURSE PRACTITIONER

## 2025-08-19 PROCEDURE — 99999 PR PBB SHADOW E&M-EST. PATIENT-LVL IV: CPT | Mod: PBBFAC,,, | Performed by: NURSE PRACTITIONER

## 2025-08-19 PROCEDURE — 1159F MED LIST DOCD IN RCRD: CPT | Mod: CPTII,,, | Performed by: NURSE PRACTITIONER

## 2025-08-19 PROCEDURE — 3008F BODY MASS INDEX DOCD: CPT | Mod: CPTII,,, | Performed by: NURSE PRACTITIONER

## 2025-08-19 PROCEDURE — 99214 OFFICE O/P EST MOD 30 MIN: CPT | Mod: PBBFAC,PN | Performed by: NURSE PRACTITIONER

## 2025-08-19 PROCEDURE — 3079F DIAST BP 80-89 MM HG: CPT | Mod: CPTII,,, | Performed by: NURSE PRACTITIONER

## 2025-08-19 PROCEDURE — 3044F HG A1C LEVEL LT 7.0%: CPT | Mod: CPTII,,, | Performed by: NURSE PRACTITIONER

## 2025-08-19 PROCEDURE — 99214 OFFICE O/P EST MOD 30 MIN: CPT | Mod: S$PBB,,, | Performed by: NURSE PRACTITIONER

## 2025-08-19 PROCEDURE — 3074F SYST BP LT 130 MM HG: CPT | Mod: CPTII,,, | Performed by: NURSE PRACTITIONER

## 2025-08-19 RX ORDER — METOPROLOL SUCCINATE 50 MG/1
50 TABLET, EXTENDED RELEASE ORAL DAILY
Qty: 90 TABLET | Refills: 1 | Status: SHIPPED | OUTPATIENT
Start: 2025-08-19

## 2025-08-19 RX ORDER — METOPROLOL SUCCINATE 50 MG/1
50 TABLET, EXTENDED RELEASE ORAL DAILY
COMMUNITY
End: 2025-08-19 | Stop reason: SDUPTHER

## 2025-08-19 RX ORDER — LANCETS
EACH MISCELLANEOUS
Qty: 100 EACH | Refills: 5 | Status: SHIPPED | OUTPATIENT
Start: 2025-08-19

## 2025-08-19 RX ORDER — PANTOPRAZOLE SODIUM 20 MG/1
20 TABLET, DELAYED RELEASE ORAL
Qty: 14 TABLET | Refills: 2 | Status: SHIPPED | OUTPATIENT
Start: 2025-08-19

## 2025-08-19 RX ORDER — INSULIN GLARGINE 100 [IU]/ML
10 INJECTION, SOLUTION SUBCUTANEOUS NIGHTLY
Qty: 3 ML | Refills: 2 | Status: SHIPPED | OUTPATIENT
Start: 2025-08-19 | End: 2026-08-19

## 2025-08-22 ENCOUNTER — TELEPHONE (OUTPATIENT)
Dept: FAMILY MEDICINE | Facility: CLINIC | Age: 65
End: 2025-08-22
Payer: MEDICAID

## 2025-08-22 ENCOUNTER — HOSPITAL ENCOUNTER (OUTPATIENT)
Dept: RADIOLOGY | Facility: HOSPITAL | Age: 65
Discharge: HOME | End: 2025-08-22
Attending: ORTHOPAEDIC SURGERY
Payer: MEDICAID

## 2025-08-22 ENCOUNTER — OFFICE VISIT (OUTPATIENT)
Dept: ORTHOPEDICS | Facility: CLINIC | Age: 65
End: 2025-08-22
Payer: MEDICAID

## 2025-08-22 VITALS — BODY MASS INDEX: 37.98 KG/M2 | WEIGHT: 222.44 LBS | HEIGHT: 64 IN

## 2025-08-22 DIAGNOSIS — Z87.81 STATUS POST OPEN REDUCTION AND INTERNAL FIXATION (ORIF) OF FRACTURE: Primary | ICD-10-CM

## 2025-08-22 DIAGNOSIS — Z98.890 STATUS POST OPEN REDUCTION AND INTERNAL FIXATION (ORIF) OF FRACTURE: Primary | ICD-10-CM

## 2025-08-22 DIAGNOSIS — Z87.81 STATUS POST OPEN REDUCTION AND INTERNAL FIXATION (ORIF) OF FRACTURE: ICD-10-CM

## 2025-08-22 DIAGNOSIS — Z98.890 STATUS POST OPEN REDUCTION AND INTERNAL FIXATION (ORIF) OF FRACTURE: ICD-10-CM

## 2025-08-22 PROCEDURE — 99999 PR PBB SHADOW E&M-EST. PATIENT-LVL II: CPT | Mod: PBBFAC,,, | Performed by: ORTHOPAEDIC SURGERY

## 2025-08-22 PROCEDURE — 73560 X-RAY EXAM OF KNEE 1 OR 2: CPT | Mod: 26,RT,, | Performed by: RADIOLOGY

## 2025-08-22 PROCEDURE — 99212 OFFICE O/P EST SF 10 MIN: CPT | Mod: PBBFAC,25,PO | Performed by: ORTHOPAEDIC SURGERY

## 2025-08-22 PROCEDURE — 73560 X-RAY EXAM OF KNEE 1 OR 2: CPT | Mod: TC,PO,RT

## 2025-08-23 ENCOUNTER — HOSPITAL ENCOUNTER (INPATIENT)
Facility: HOSPITAL | Age: 65
LOS: 4 days | Discharge: HOME | DRG: 919 | End: 2025-08-27
Attending: STUDENT IN AN ORGANIZED HEALTH CARE EDUCATION/TRAINING PROGRAM | Admitting: INTERNAL MEDICINE
Payer: MEDICAID

## 2025-08-23 DIAGNOSIS — K86.89 PANCREATIC MASS: ICD-10-CM

## 2025-08-23 DIAGNOSIS — R07.9 CHEST PAIN: ICD-10-CM

## 2025-08-23 DIAGNOSIS — R79.89 ABNORMAL LFTS: ICD-10-CM

## 2025-08-23 DIAGNOSIS — R17 ELEVATED BILIRUBIN: ICD-10-CM

## 2025-08-23 DIAGNOSIS — R65.20 SEVERE SEPSIS: Primary | ICD-10-CM

## 2025-08-23 DIAGNOSIS — A41.9 SEVERE SEPSIS: Primary | ICD-10-CM

## 2025-08-23 DIAGNOSIS — Z13.6 SCREENING FOR CARDIOVASCULAR CONDITION: ICD-10-CM

## 2025-08-23 PROBLEM — T85.590A BILIARY STENT OBSTRUCTION: Status: ACTIVE | Noted: 2025-08-23

## 2025-08-23 PROBLEM — Z79.4 TYPE 2 DIABETES MELLITUS, WITH LONG-TERM CURRENT USE OF INSULIN: Status: ACTIVE | Noted: 2025-07-04

## 2025-08-23 PROBLEM — E87.6 HYPOKALEMIA: Status: ACTIVE | Noted: 2025-08-23

## 2025-08-23 LAB
ABSOLUTE EOSINOPHIL (SMH): 0.04 K/UL
ABSOLUTE MONOCYTE (SMH): 0.48 K/UL (ref 0.3–1)
ABSOLUTE NEUTROPHIL COUNT (SMH): 11.9 K/UL (ref 1.8–7.7)
ALBUMIN SERPL-MCNC: 3.3 G/DL (ref 3.5–5.2)
ALP SERPL-CCNC: 794 UNIT/L (ref 55–135)
ALT SERPL-CCNC: 71 UNIT/L (ref 10–44)
ANION GAP (SMH): 12 MMOL/L (ref 8–16)
AST SERPL-CCNC: 155 UNIT/L (ref 10–40)
BASOPHILS # BLD AUTO: 0.02 K/UL
BASOPHILS NFR BLD AUTO: 0.1 %
BILIRUB SERPL-MCNC: 3.8 MG/DL (ref 0.1–1)
BILIRUB UR QL STRIP.AUTO: NEGATIVE
BUN SERPL-MCNC: 8 MG/DL (ref 8–23)
CALCIUM SERPL-MCNC: 8.8 MG/DL (ref 8.7–10.5)
CHLORIDE SERPL-SCNC: 96 MMOL/L (ref 95–110)
CLARITY UR: CLEAR
CO2 SERPL-SCNC: 25 MMOL/L (ref 23–29)
COLOR UR AUTO: YELLOW
CREAT SERPL-MCNC: 0.9 MG/DL (ref 0.5–1.4)
ERYTHROCYTE [DISTWIDTH] IN BLOOD BY AUTOMATED COUNT: 17.1 % (ref 11.5–14.5)
FLUAV AG UPPER RESP QL IA.RAPID: NEGATIVE
FLUBV AG UPPER RESP QL IA.RAPID: NEGATIVE
GFR SERPLBLD CREATININE-BSD FMLA CKD-EPI: >60 ML/MIN/1.73/M2
GLUCOSE SERPL-MCNC: 229 MG/DL (ref 70–110)
GLUCOSE UR QL STRIP: NEGATIVE
HCT VFR BLD AUTO: 28.8 % (ref 37–48.5)
HGB BLD-MCNC: 8.9 GM/DL (ref 12–16)
HGB UR QL STRIP: ABNORMAL
IMM GRANULOCYTES # BLD AUTO: 0.14 K/UL (ref 0–0.04)
IMM GRANULOCYTES NFR BLD AUTO: 1 % (ref 0–0.5)
KETONES UR QL STRIP: NEGATIVE
LDH SERPL L TO P-CCNC: 2.97 MMOL/L (ref 0.5–2.2)
LEUKOCYTE ESTERASE UR QL STRIP: NEGATIVE
LYMPHOCYTES # BLD AUTO: 0.97 K/UL (ref 1–4.8)
MCH RBC QN AUTO: 25.6 PG (ref 27–31)
MCHC RBC AUTO-ENTMCNC: 30.9 G/DL (ref 32–36)
MCV RBC AUTO: 83 FL (ref 82–98)
NITRITE UR QL STRIP: NEGATIVE
NUCLEATED RBC (/100WBC) (SMH): 0 /100 WBC
PH UR STRIP: 6 [PH]
PLATELET # BLD AUTO: 476 K/UL (ref 150–450)
PMV BLD AUTO: 9.2 FL (ref 9.2–12.9)
POTASSIUM SERPL-SCNC: 3.1 MMOL/L (ref 3.5–5.1)
PROT SERPL-MCNC: 7.6 GM/DL (ref 6–8.4)
PROT UR QL STRIP: NEGATIVE
RBC # BLD AUTO: 3.48 M/UL (ref 4–5.4)
RELATIVE EOSINOPHIL (SMH): 0.3 % (ref 0–8)
RELATIVE LYMPHOCYTE (SMH): 7.1 % (ref 18–48)
RELATIVE MONOCYTE (SMH): 3.5 % (ref 4–15)
RELATIVE NEUTROPHIL (SMH): 88 % (ref 38–73)
SAMPLE: ABNORMAL
SARS-COV-2 RDRP RESP QL NAA+PROBE: NEGATIVE
SODIUM SERPL-SCNC: 133 MMOL/L (ref 136–145)
SP GR UR STRIP: <1.005
UROBILINOGEN UR STRIP-ACNC: NEGATIVE EU/DL
WBC # BLD AUTO: 13.58 K/UL (ref 3.9–12.7)

## 2025-08-23 PROCEDURE — 21400001 HC TELEMETRY ROOM

## 2025-08-23 PROCEDURE — 96365 THER/PROPH/DIAG IV INF INIT: CPT

## 2025-08-23 PROCEDURE — 99285 EMERGENCY DEPT VISIT HI MDM: CPT | Mod: 25

## 2025-08-23 PROCEDURE — 93010 ELECTROCARDIOGRAM REPORT: CPT | Mod: ,,, | Performed by: INTERNAL MEDICINE

## 2025-08-23 PROCEDURE — 63600175 PHARM REV CODE 636 W HCPCS: Performed by: NURSE PRACTITIONER

## 2025-08-23 PROCEDURE — 25000003 PHARM REV CODE 250

## 2025-08-23 PROCEDURE — 11000001 HC ACUTE MED/SURG PRIVATE ROOM

## 2025-08-23 PROCEDURE — 87502 INFLUENZA DNA AMP PROBE: CPT | Performed by: STUDENT IN AN ORGANIZED HEALTH CARE EDUCATION/TRAINING PROGRAM

## 2025-08-23 PROCEDURE — 93005 ELECTROCARDIOGRAM TRACING: CPT | Performed by: INTERNAL MEDICINE

## 2025-08-23 PROCEDURE — 81003 URINALYSIS AUTO W/O SCOPE: CPT

## 2025-08-23 PROCEDURE — 87040 BLOOD CULTURE FOR BACTERIA: CPT

## 2025-08-23 PROCEDURE — U0002 COVID-19 LAB TEST NON-CDC: HCPCS | Performed by: STUDENT IN AN ORGANIZED HEALTH CARE EDUCATION/TRAINING PROGRAM

## 2025-08-23 PROCEDURE — 63600175 PHARM REV CODE 636 W HCPCS: Performed by: STUDENT IN AN ORGANIZED HEALTH CARE EDUCATION/TRAINING PROGRAM

## 2025-08-23 PROCEDURE — 25500020 PHARM REV CODE 255: Performed by: STUDENT IN AN ORGANIZED HEALTH CARE EDUCATION/TRAINING PROGRAM

## 2025-08-23 PROCEDURE — 25000003 PHARM REV CODE 250: Performed by: STUDENT IN AN ORGANIZED HEALTH CARE EDUCATION/TRAINING PROGRAM

## 2025-08-23 PROCEDURE — 80053 COMPREHEN METABOLIC PANEL: CPT

## 2025-08-23 PROCEDURE — 85025 COMPLETE CBC W/AUTO DIFF WBC: CPT

## 2025-08-23 PROCEDURE — 36415 COLL VENOUS BLD VENIPUNCTURE: CPT

## 2025-08-23 PROCEDURE — 25000003 PHARM REV CODE 250: Performed by: INTERNAL MEDICINE

## 2025-08-23 RX ORDER — MORPHINE SULFATE 2 MG/ML
2 INJECTION, SOLUTION INTRAMUSCULAR; INTRAVENOUS EVERY 4 HOURS PRN
Refills: 0 | Status: DISCONTINUED | OUTPATIENT
Start: 2025-08-23 | End: 2025-08-23

## 2025-08-23 RX ORDER — INSULIN ASPART 100 [IU]/ML
0-10 INJECTION, SOLUTION INTRAVENOUS; SUBCUTANEOUS
Status: DISCONTINUED | OUTPATIENT
Start: 2025-08-23 | End: 2025-08-27 | Stop reason: HOSPADM

## 2025-08-23 RX ORDER — IBUPROFEN 200 MG
16 TABLET ORAL
Status: DISCONTINUED | OUTPATIENT
Start: 2025-08-23 | End: 2025-08-27 | Stop reason: HOSPADM

## 2025-08-23 RX ORDER — HEPARIN SODIUM 5000 [USP'U]/ML
5000 INJECTION, SOLUTION INTRAVENOUS; SUBCUTANEOUS EVERY 8 HOURS
Status: DISCONTINUED | OUTPATIENT
Start: 2025-08-23 | End: 2025-08-23

## 2025-08-23 RX ORDER — IBUPROFEN 200 MG
24 TABLET ORAL
Status: DISCONTINUED | OUTPATIENT
Start: 2025-08-23 | End: 2025-08-27 | Stop reason: HOSPADM

## 2025-08-23 RX ORDER — GLUCAGON 1 MG
1 KIT INJECTION
Status: DISCONTINUED | OUTPATIENT
Start: 2025-08-23 | End: 2025-08-27 | Stop reason: HOSPADM

## 2025-08-23 RX ORDER — SODIUM CHLORIDE 0.9 % (FLUSH) 0.9 %
2 SYRINGE (ML) INJECTION
Status: DISCONTINUED | OUTPATIENT
Start: 2025-08-23 | End: 2025-08-27 | Stop reason: HOSPADM

## 2025-08-23 RX ORDER — ONDANSETRON HYDROCHLORIDE 2 MG/ML
4 INJECTION, SOLUTION INTRAVENOUS EVERY 6 HOURS PRN
Status: DISCONTINUED | OUTPATIENT
Start: 2025-08-23 | End: 2025-08-27 | Stop reason: HOSPADM

## 2025-08-23 RX ORDER — ACETAMINOPHEN 325 MG/1
650 TABLET ORAL EVERY 4 HOURS PRN
Status: DISCONTINUED | OUTPATIENT
Start: 2025-08-23 | End: 2025-08-27 | Stop reason: HOSPADM

## 2025-08-23 RX ORDER — HYDROMORPHONE HYDROCHLORIDE 1 MG/ML
0.5 INJECTION, SOLUTION INTRAMUSCULAR; INTRAVENOUS; SUBCUTANEOUS
Status: DISCONTINUED | OUTPATIENT
Start: 2025-08-23 | End: 2025-08-27

## 2025-08-23 RX ORDER — SODIUM CHLORIDE 9 MG/ML
INJECTION, SOLUTION INTRAVENOUS CONTINUOUS
Status: DISCONTINUED | OUTPATIENT
Start: 2025-08-23 | End: 2025-08-27

## 2025-08-23 RX ORDER — SODIUM CHLORIDE 9 MG/ML
INJECTION, SOLUTION INTRAVENOUS CONTINUOUS
Status: DISCONTINUED | OUTPATIENT
Start: 2025-08-23 | End: 2025-08-23

## 2025-08-23 RX ORDER — PANTOPRAZOLE SODIUM 40 MG/1
40 TABLET, DELAYED RELEASE ORAL DAILY
Status: DISCONTINUED | OUTPATIENT
Start: 2025-08-24 | End: 2025-08-27 | Stop reason: HOSPADM

## 2025-08-23 RX ORDER — POTASSIUM CHLORIDE 7.45 MG/ML
10 INJECTION INTRAVENOUS
Status: COMPLETED | OUTPATIENT
Start: 2025-08-23 | End: 2025-08-24

## 2025-08-23 RX ORDER — MORPHINE SULFATE 4 MG/ML
4 INJECTION, SOLUTION INTRAMUSCULAR; INTRAVENOUS EVERY 4 HOURS PRN
Refills: 0 | Status: DISCONTINUED | OUTPATIENT
Start: 2025-08-23 | End: 2025-08-23

## 2025-08-23 RX ORDER — NALOXONE HCL 0.4 MG/ML
0.02 VIAL (ML) INJECTION
Status: DISCONTINUED | OUTPATIENT
Start: 2025-08-23 | End: 2025-08-27 | Stop reason: HOSPADM

## 2025-08-23 RX ORDER — ACETAMINOPHEN 500 MG
1000 TABLET ORAL
Status: COMPLETED | OUTPATIENT
Start: 2025-08-23 | End: 2025-08-23

## 2025-08-23 RX ADMIN — POTASSIUM CHLORIDE 10 MEQ: 7.46 INJECTION, SOLUTION INTRAVENOUS at 08:08

## 2025-08-23 RX ADMIN — ACETAMINOPHEN 1000 MG: 500 TABLET, FILM COATED ORAL at 03:08

## 2025-08-23 RX ADMIN — VANCOMYCIN HYDROCHLORIDE 2000 MG: 2 INJECTION, POWDER, LYOPHILIZED, FOR SOLUTION INTRAVENOUS at 05:08

## 2025-08-23 RX ADMIN — SODIUM CHLORIDE 1000 ML: 9 INJECTION, SOLUTION INTRAVENOUS at 04:08

## 2025-08-23 RX ADMIN — IOHEXOL 100 ML: 350 INJECTION, SOLUTION INTRAVENOUS at 05:08

## 2025-08-23 RX ADMIN — PIPERACILLIN SODIUM AND TAZOBACTAM SODIUM 4.5 G: 4; .5 INJECTION, POWDER, LYOPHILIZED, FOR SOLUTION INTRAVENOUS at 04:08

## 2025-08-23 RX ADMIN — SODIUM CHLORIDE 500 ML: 9 INJECTION, SOLUTION INTRAVENOUS at 05:08

## 2025-08-23 RX ADMIN — IBUPROFEN 600 MG: 400 TABLET ORAL at 05:08

## 2025-08-23 RX ADMIN — POTASSIUM CHLORIDE 10 MEQ: 7.46 INJECTION, SOLUTION INTRAVENOUS at 10:08

## 2025-08-23 RX ADMIN — SODIUM CHLORIDE: 9 INJECTION, SOLUTION INTRAVENOUS at 08:08

## 2025-08-24 ENCOUNTER — ANESTHESIA EVENT (OUTPATIENT)
Dept: SURGERY | Facility: HOSPITAL | Age: 65
DRG: 872 | End: 2025-08-24
Payer: MEDICAID

## 2025-08-24 ENCOUNTER — ANESTHESIA (OUTPATIENT)
Dept: SURGERY | Facility: HOSPITAL | Age: 65
DRG: 872 | End: 2025-08-24
Payer: MEDICAID

## 2025-08-24 LAB
ABSOLUTE EOSINOPHIL (SMH): 0 K/UL
ABSOLUTE MONOCYTE (SMH): 0.68 K/UL (ref 0.3–1)
ABSOLUTE NEUTROPHIL COUNT (SMH): 9.3 K/UL (ref 1.8–7.7)
ALBUMIN SERPL-MCNC: 2.7 G/DL (ref 3.5–5.2)
ALP SERPL-CCNC: 651 UNIT/L (ref 55–135)
ALT SERPL-CCNC: 56 UNIT/L (ref 10–44)
ANION GAP (SMH): 8 MMOL/L (ref 8–16)
AST SERPL-CCNC: 66 UNIT/L (ref 10–40)
BASOPHILS # BLD AUTO: 0.02 K/UL
BASOPHILS NFR BLD AUTO: 0.2 %
BILIRUB SERPL-MCNC: 4.8 MG/DL (ref 0.1–1)
BUN SERPL-MCNC: 7 MG/DL (ref 8–23)
CALCIUM SERPL-MCNC: 8.1 MG/DL (ref 8.7–10.5)
CHLORIDE SERPL-SCNC: 106 MMOL/L (ref 95–110)
CO2 SERPL-SCNC: 26 MMOL/L (ref 23–29)
CREAT SERPL-MCNC: 0.8 MG/DL (ref 0.5–1.4)
ERYTHROCYTE [DISTWIDTH] IN BLOOD BY AUTOMATED COUNT: 17.3 % (ref 11.5–14.5)
FOLATE SERPL-MCNC: 20.3 NG/ML (ref 4–24)
GFR SERPLBLD CREATININE-BSD FMLA CKD-EPI: >60 ML/MIN/1.73/M2
GLUCOSE SERPL-MCNC: 220 MG/DL (ref 70–110)
HCT VFR BLD AUTO: 24.9 % (ref 37–48.5)
HGB BLD-MCNC: 7.5 GM/DL (ref 12–16)
IMM GRANULOCYTES # BLD AUTO: 0.08 K/UL (ref 0–0.04)
IMM GRANULOCYTES NFR BLD AUTO: 0.8 % (ref 0–0.5)
IRON SATN MFR SERPL: <10 % (ref 20–50)
IRON SERPL-MCNC: 12 UG/DL (ref 30–160)
LACTATE SERPL-SCNC: 0.6 MMOL/L (ref 0.5–1.9)
LYMPHOCYTES # BLD AUTO: 0.31 K/UL (ref 1–4.8)
MAGNESIUM SERPL-MCNC: 1.5 MG/DL (ref 1.6–2.6)
MCH RBC QN AUTO: 25.7 PG (ref 27–31)
MCHC RBC AUTO-ENTMCNC: 30.1 G/DL (ref 32–36)
MCV RBC AUTO: 85 FL (ref 82–98)
NUCLEATED RBC (/100WBC) (SMH): 0 /100 WBC
PLATELET # BLD AUTO: 305 K/UL (ref 150–450)
PLATELET BLD QL SMEAR: NORMAL
PMV BLD AUTO: 9.5 FL (ref 9.2–12.9)
POCT GLUCOSE: 161 MG/DL (ref 70–110)
POCT GLUCOSE: 238 MG/DL (ref 70–110)
POTASSIUM SERPL-SCNC: 2.9 MMOL/L (ref 3.5–5.1)
PROT SERPL-MCNC: 6.1 GM/DL (ref 6–8.4)
RBC # BLD AUTO: 2.92 M/UL (ref 4–5.4)
RELATIVE EOSINOPHIL (SMH): 0 % (ref 0–8)
RELATIVE LYMPHOCYTE (SMH): 3 % (ref 18–48)
RELATIVE MONOCYTE (SMH): 6.5 % (ref 4–15)
RELATIVE NEUTROPHIL (SMH): 89.5 % (ref 38–73)
SODIUM SERPL-SCNC: 140 MMOL/L (ref 136–145)
TIBC SERPL-MCNC: 164 UG/DL (ref 250–450)
TRANSFERRIN SERPL-MCNC: 117 MG/DL (ref 200–375)
VIT B12 SERPL-MCNC: 642 PG/ML (ref 210–950)
WBC # BLD AUTO: 10.4 K/UL (ref 3.9–12.7)

## 2025-08-24 PROCEDURE — 25000003 PHARM REV CODE 250: Performed by: NURSE ANESTHETIST, CERTIFIED REGISTERED

## 2025-08-24 PROCEDURE — 37000008 HC ANESTHESIA 1ST 15 MINUTES: Performed by: INTERNAL MEDICINE

## 2025-08-24 PROCEDURE — 82746 ASSAY OF FOLIC ACID SERUM: CPT | Performed by: INTERNAL MEDICINE

## 2025-08-24 PROCEDURE — 82962 GLUCOSE BLOOD TEST: CPT

## 2025-08-24 PROCEDURE — 25500020 PHARM REV CODE 255: Performed by: INTERNAL MEDICINE

## 2025-08-24 PROCEDURE — 63600175 PHARM REV CODE 636 W HCPCS: Performed by: NURSE PRACTITIONER

## 2025-08-24 PROCEDURE — 83540 ASSAY OF IRON: CPT | Performed by: INTERNAL MEDICINE

## 2025-08-24 PROCEDURE — 0DB98ZX EXCISION OF DUODENUM, VIA NATURAL OR ARTIFICIAL OPENING ENDOSCOPIC, DIAGNOSTIC: ICD-10-PCS | Performed by: HOSPITALIST

## 2025-08-24 PROCEDURE — 80053 COMPREHEN METABOLIC PANEL: CPT | Performed by: NURSE PRACTITIONER

## 2025-08-24 PROCEDURE — 21400001 HC TELEMETRY ROOM

## 2025-08-24 PROCEDURE — 25000003 PHARM REV CODE 250: Performed by: ANESTHESIOLOGY

## 2025-08-24 PROCEDURE — 11000001 HC ACUTE MED/SURG PRIVATE ROOM

## 2025-08-24 PROCEDURE — 25000003 PHARM REV CODE 250: Performed by: NURSE PRACTITIONER

## 2025-08-24 PROCEDURE — 63600175 PHARM REV CODE 636 W HCPCS: Performed by: INTERNAL MEDICINE

## 2025-08-24 PROCEDURE — 63600175 PHARM REV CODE 636 W HCPCS: Performed by: NURSE ANESTHETIST, CERTIFIED REGISTERED

## 2025-08-24 PROCEDURE — 37000009 HC ANESTHESIA EA ADD 15 MINS: Performed by: INTERNAL MEDICINE

## 2025-08-24 PROCEDURE — 83605 ASSAY OF LACTIC ACID: CPT | Performed by: INTERNAL MEDICINE

## 2025-08-24 PROCEDURE — 85025 COMPLETE CBC W/AUTO DIFF WBC: CPT | Performed by: NURSE PRACTITIONER

## 2025-08-24 PROCEDURE — 82607 VITAMIN B-12: CPT | Performed by: INTERNAL MEDICINE

## 2025-08-24 PROCEDURE — C1769 GUIDE WIRE: HCPCS | Performed by: INTERNAL MEDICINE

## 2025-08-24 PROCEDURE — 25000003 PHARM REV CODE 250: Performed by: INTERNAL MEDICINE

## 2025-08-24 PROCEDURE — 43261 ENDO CHOLANGIOPANCREATOGRAPH: CPT | Mod: 74 | Performed by: INTERNAL MEDICINE

## 2025-08-24 PROCEDURE — 83735 ASSAY OF MAGNESIUM: CPT | Performed by: NURSE PRACTITIONER

## 2025-08-24 RX ORDER — PROPOFOL 10 MG/ML
VIAL (ML) INTRAVENOUS
Status: DISCONTINUED | OUTPATIENT
Start: 2025-08-24 | End: 2025-08-24

## 2025-08-24 RX ORDER — SODIUM,POTASSIUM PHOSPHATES 280-250MG
2 POWDER IN PACKET (EA) ORAL
Status: DISCONTINUED | OUTPATIENT
Start: 2025-08-24 | End: 2025-08-27 | Stop reason: HOSPADM

## 2025-08-24 RX ORDER — DIPHENHYDRAMINE HYDROCHLORIDE 50 MG/ML
25 INJECTION, SOLUTION INTRAMUSCULAR; INTRAVENOUS EVERY 6 HOURS PRN
OUTPATIENT
Start: 2025-08-24

## 2025-08-24 RX ORDER — FAMOTIDINE 10 MG/ML
INJECTION, SOLUTION INTRAVENOUS
Status: DISCONTINUED | OUTPATIENT
Start: 2025-08-24 | End: 2025-08-24

## 2025-08-24 RX ORDER — CALCIUM GLUCONATE 20 MG/ML
3 INJECTION, SOLUTION INTRAVENOUS
Status: DISCONTINUED | OUTPATIENT
Start: 2025-08-24 | End: 2025-08-27 | Stop reason: HOSPADM

## 2025-08-24 RX ORDER — MAGNESIUM SULFATE HEPTAHYDRATE 40 MG/ML
2 INJECTION, SOLUTION INTRAVENOUS
Status: DISCONTINUED | OUTPATIENT
Start: 2025-08-24 | End: 2025-08-27 | Stop reason: HOSPADM

## 2025-08-24 RX ORDER — INDOMETHACIN 50 MG/1
SUPPOSITORY RECTAL
Status: DISCONTINUED | OUTPATIENT
Start: 2025-08-24 | End: 2025-08-24 | Stop reason: HOSPADM

## 2025-08-24 RX ORDER — ROCURONIUM BROMIDE 10 MG/ML
INJECTION, SOLUTION INTRAVENOUS
Status: DISCONTINUED | OUTPATIENT
Start: 2025-08-24 | End: 2025-08-24

## 2025-08-24 RX ORDER — POTASSIUM CHLORIDE 7.45 MG/ML
40 INJECTION INTRAVENOUS
Status: DISCONTINUED | OUTPATIENT
Start: 2025-08-24 | End: 2025-08-27 | Stop reason: HOSPADM

## 2025-08-24 RX ORDER — LANOLIN ALCOHOL/MO/W.PET/CERES
800 CREAM (GRAM) TOPICAL
Status: DISCONTINUED | OUTPATIENT
Start: 2025-08-24 | End: 2025-08-27 | Stop reason: HOSPADM

## 2025-08-24 RX ORDER — DIPHENHYDRAMINE HYDROCHLORIDE 50 MG/ML
INJECTION, SOLUTION INTRAMUSCULAR; INTRAVENOUS
Status: DISCONTINUED | OUTPATIENT
Start: 2025-08-24 | End: 2025-08-24

## 2025-08-24 RX ORDER — ONDANSETRON HYDROCHLORIDE 2 MG/ML
4 INJECTION, SOLUTION INTRAVENOUS DAILY PRN
OUTPATIENT
Start: 2025-08-24

## 2025-08-24 RX ORDER — LIDOCAINE HYDROCHLORIDE 20 MG/ML
INJECTION INTRAVENOUS
Status: DISCONTINUED | OUTPATIENT
Start: 2025-08-24 | End: 2025-08-24

## 2025-08-24 RX ORDER — ONDANSETRON HYDROCHLORIDE 2 MG/ML
INJECTION, SOLUTION INTRAVENOUS
Status: DISCONTINUED | OUTPATIENT
Start: 2025-08-24 | End: 2025-08-24

## 2025-08-24 RX ORDER — LIDOCAINE HYDROCHLORIDE 20 MG/ML
JELLY TOPICAL
Status: DISCONTINUED | OUTPATIENT
Start: 2025-08-24 | End: 2025-08-24

## 2025-08-24 RX ORDER — CALCIUM GLUCONATE 20 MG/ML
2 INJECTION, SOLUTION INTRAVENOUS
Status: DISCONTINUED | OUTPATIENT
Start: 2025-08-24 | End: 2025-08-27 | Stop reason: HOSPADM

## 2025-08-24 RX ORDER — FENTANYL CITRATE 50 UG/ML
25 INJECTION, SOLUTION INTRAMUSCULAR; INTRAVENOUS EVERY 5 MIN PRN
Refills: 0 | OUTPATIENT
Start: 2025-08-24

## 2025-08-24 RX ORDER — SCOPOLAMINE 1 MG/3D
1 PATCH, EXTENDED RELEASE TRANSDERMAL ONCE
Status: COMPLETED | OUTPATIENT
Start: 2025-08-24 | End: 2025-08-27

## 2025-08-24 RX ORDER — INSULIN GLARGINE 100 [IU]/ML
10 INJECTION, SOLUTION SUBCUTANEOUS NIGHTLY
Status: CANCELLED | OUTPATIENT
Start: 2025-08-24

## 2025-08-24 RX ORDER — POTASSIUM CHLORIDE 7.45 MG/ML
60 INJECTION INTRAVENOUS
Status: DISCONTINUED | OUTPATIENT
Start: 2025-08-24 | End: 2025-08-27 | Stop reason: HOSPADM

## 2025-08-24 RX ORDER — CALCIUM GLUCONATE 20 MG/ML
1 INJECTION, SOLUTION INTRAVENOUS
Status: DISCONTINUED | OUTPATIENT
Start: 2025-08-24 | End: 2025-08-27 | Stop reason: HOSPADM

## 2025-08-24 RX ORDER — HYDROMORPHONE HYDROCHLORIDE 1 MG/ML
0.2 INJECTION, SOLUTION INTRAMUSCULAR; INTRAVENOUS; SUBCUTANEOUS EVERY 5 MIN PRN
Refills: 0 | OUTPATIENT
Start: 2025-08-24

## 2025-08-24 RX ORDER — OXYCODONE HYDROCHLORIDE 5 MG/1
5 TABLET ORAL
Refills: 0 | OUTPATIENT
Start: 2025-08-24

## 2025-08-24 RX ORDER — GLUCAGON 1 MG
1 KIT INJECTION
OUTPATIENT
Start: 2025-08-24

## 2025-08-24 RX ORDER — SYRINGE, DISPOSABLE, 3 ML
SYRINGE, EMPTY DISPOSABLE MISCELLANEOUS 2 TIMES DAILY
Status: CANCELLED | OUTPATIENT
Start: 2025-08-24

## 2025-08-24 RX ORDER — SUCCINYLCHOLINE CHLORIDE 20 MG/ML
INJECTION INTRAMUSCULAR; INTRAVENOUS
Status: DISCONTINUED | OUTPATIENT
Start: 2025-08-24 | End: 2025-08-24

## 2025-08-24 RX ORDER — FENTANYL CITRATE 50 UG/ML
INJECTION, SOLUTION INTRAMUSCULAR; INTRAVENOUS
Status: DISCONTINUED | OUTPATIENT
Start: 2025-08-24 | End: 2025-08-24

## 2025-08-24 RX ORDER — POTASSIUM CHLORIDE 7.45 MG/ML
80 INJECTION INTRAVENOUS
Status: DISCONTINUED | OUTPATIENT
Start: 2025-08-24 | End: 2025-08-27 | Stop reason: HOSPADM

## 2025-08-24 RX ADMIN — PIPERACILLIN AND TAZOBACTAM 4.5 G: 4; .5 INJECTION, POWDER, LYOPHILIZED, FOR SOLUTION INTRAVENOUS at 06:08

## 2025-08-24 RX ADMIN — PIPERACILLIN AND TAZOBACTAM 4.5 G: 4; .5 INJECTION, POWDER, LYOPHILIZED, FOR SOLUTION INTRAVENOUS at 09:08

## 2025-08-24 RX ADMIN — PIPERACILLIN AND TAZOBACTAM 4.5 G: 4; .5 INJECTION, POWDER, LYOPHILIZED, FOR SOLUTION INTRAVENOUS at 12:08

## 2025-08-24 RX ADMIN — SCOPOLAMINE 1 PATCH: 1.5 PATCH, EXTENDED RELEASE TRANSDERMAL at 01:08

## 2025-08-24 RX ADMIN — LIDOCAINE HYDROCHLORIDE 75 MG: 20 INJECTION, SOLUTION INTRAVENOUS at 02:08

## 2025-08-24 RX ADMIN — LIDOCAINE HYDROCHLORIDE 3 ML: 20 JELLY TOPICAL at 02:08

## 2025-08-24 RX ADMIN — FAMOTIDINE 20 MG: 10 INJECTION, SOLUTION INTRAVENOUS at 01:08

## 2025-08-24 RX ADMIN — FENTANYL CITRATE 100 MCG: 50 INJECTION, SOLUTION INTRAMUSCULAR; INTRAVENOUS at 02:08

## 2025-08-24 RX ADMIN — DIPHENHYDRAMINE HYDROCHLORIDE 6.25 MG: 50 INJECTION INTRAMUSCULAR; INTRAVENOUS at 02:08

## 2025-08-24 RX ADMIN — POTASSIUM CHLORIDE 80 MEQ: 7.46 INJECTION, SOLUTION INTRAVENOUS at 11:08

## 2025-08-24 RX ADMIN — ROCURONIUM BROMIDE 10 MG: 10 INJECTION, SOLUTION INTRAVENOUS at 02:08

## 2025-08-24 RX ADMIN — ONDANSETRON 4 MG: 2 INJECTION INTRAMUSCULAR; INTRAVENOUS at 01:08

## 2025-08-24 RX ADMIN — Medication 140 MG: at 02:08

## 2025-08-24 RX ADMIN — PROPOFOL 130 MG: 10 INJECTION, EMULSION INTRAVENOUS at 02:08

## 2025-08-24 RX ADMIN — SODIUM CHLORIDE, SODIUM LACTATE, POTASSIUM CHLORIDE, AND CALCIUM CHLORIDE: .6; .31; .03; .02 INJECTION, SOLUTION INTRAVENOUS at 01:08

## 2025-08-25 PROBLEM — E66.9 OBESITY: Status: ACTIVE | Noted: 2025-08-25

## 2025-08-25 LAB
ABO + RH BLD: NORMAL
ABSOLUTE EOSINOPHIL (SMH): 0.27 K/UL
ABSOLUTE MONOCYTE (SMH): 0.42 K/UL (ref 0.3–1)
ABSOLUTE NEUTROPHIL COUNT (SMH): 5 K/UL (ref 1.8–7.7)
ALBUMIN SERPL-MCNC: 2.4 G/DL (ref 3.5–5.2)
ALP SERPL-CCNC: 619 UNIT/L (ref 55–135)
ALT SERPL-CCNC: 58 UNIT/L (ref 10–44)
ANION GAP (SMH): 8 MMOL/L (ref 8–16)
AST SERPL-CCNC: 83 UNIT/L (ref 10–40)
BASOPHILS # BLD AUTO: 0.02 K/UL
BASOPHILS NFR BLD AUTO: 0.3 %
BILIRUB SERPL-MCNC: 3.6 MG/DL (ref 0.1–1)
BLD PROD TYP BPU: NORMAL
BLOOD UNIT EXPIRATION DATE: NORMAL
BLOOD UNIT TYPE CODE: 6200
BUN SERPL-MCNC: 9 MG/DL (ref 8–23)
CALCIUM SERPL-MCNC: 7.9 MG/DL (ref 8.7–10.5)
CHLORIDE SERPL-SCNC: 104 MMOL/L (ref 95–110)
CO2 SERPL-SCNC: 25 MMOL/L (ref 23–29)
CREAT SERPL-MCNC: 0.8 MG/DL (ref 0.5–1.4)
CROSSMATCH INTERPRETATION: NORMAL
DISPENSE STATUS: NORMAL
ERYTHROCYTE [DISTWIDTH] IN BLOOD BY AUTOMATED COUNT: 17.4 % (ref 11.5–14.5)
GFR SERPLBLD CREATININE-BSD FMLA CKD-EPI: >60 ML/MIN/1.73/M2
GLUCOSE SERPL-MCNC: 185 MG/DL (ref 70–110)
HCT VFR BLD AUTO: 22.6 % (ref 37–48.5)
HGB BLD-MCNC: 6.8 GM/DL (ref 12–16)
IMM GRANULOCYTES # BLD AUTO: 0.06 K/UL (ref 0–0.04)
IMM GRANULOCYTES NFR BLD AUTO: 0.9 % (ref 0–0.5)
INDIRECT COOMBS: NORMAL
LYMPHOCYTES # BLD AUTO: 0.71 K/UL (ref 1–4.8)
MAGNESIUM SERPL-MCNC: 1.5 MG/DL (ref 1.6–2.6)
MCH RBC QN AUTO: 25.5 PG (ref 27–31)
MCHC RBC AUTO-ENTMCNC: 30.1 G/DL (ref 32–36)
MCV RBC AUTO: 85 FL (ref 82–98)
NUCLEATED RBC (/100WBC) (SMH): 0 /100 WBC
OHS QRS DURATION: 82 MS
OHS QTC CALCULATION: 460 MS
PLATELET # BLD AUTO: 303 K/UL (ref 150–450)
PMV BLD AUTO: 9.7 FL (ref 9.2–12.9)
POCT GLUCOSE: 176 MG/DL (ref 70–110)
POCT GLUCOSE: 192 MG/DL (ref 70–110)
POCT GLUCOSE: 207 MG/DL (ref 70–110)
POTASSIUM SERPL-SCNC: 3.4 MMOL/L (ref 3.5–5.1)
PROT SERPL-MCNC: 5.7 GM/DL (ref 6–8.4)
RBC # BLD AUTO: 2.67 M/UL (ref 4–5.4)
RELATIVE EOSINOPHIL (SMH): 4.2 % (ref 0–8)
RELATIVE LYMPHOCYTE (SMH): 11 % (ref 18–48)
RELATIVE MONOCYTE (SMH): 6.5 % (ref 4–15)
RELATIVE NEUTROPHIL (SMH): 77.1 % (ref 38–73)
RH BLD: NORMAL
SODIUM SERPL-SCNC: 137 MMOL/L (ref 136–145)
SPECIMEN OUTDATE: NORMAL
UNIT NUMBER: NORMAL
WBC # BLD AUTO: 6.46 K/UL (ref 3.9–12.7)

## 2025-08-25 PROCEDURE — 85025 COMPLETE CBC W/AUTO DIFF WBC: CPT | Performed by: NURSE PRACTITIONER

## 2025-08-25 PROCEDURE — 36415 COLL VENOUS BLD VENIPUNCTURE: CPT | Performed by: NURSE PRACTITIONER

## 2025-08-25 PROCEDURE — 63600175 PHARM REV CODE 636 W HCPCS: Performed by: NURSE PRACTITIONER

## 2025-08-25 PROCEDURE — 36415 COLL VENOUS BLD VENIPUNCTURE: CPT | Performed by: HOSPITALIST

## 2025-08-25 PROCEDURE — 30233N1 TRANSFUSION OF NONAUTOLOGOUS RED BLOOD CELLS INTO PERIPHERAL VEIN, PERCUTANEOUS APPROACH: ICD-10-PCS | Performed by: HOSPITALIST

## 2025-08-25 PROCEDURE — 83735 ASSAY OF MAGNESIUM: CPT | Performed by: NURSE PRACTITIONER

## 2025-08-25 PROCEDURE — 86920 COMPATIBILITY TEST SPIN: CPT | Performed by: HOSPITALIST

## 2025-08-25 PROCEDURE — 11000001 HC ACUTE MED/SURG PRIVATE ROOM

## 2025-08-25 PROCEDURE — 25000003 PHARM REV CODE 250: Performed by: INTERNAL MEDICINE

## 2025-08-25 PROCEDURE — 25000003 PHARM REV CODE 250: Performed by: NURSE PRACTITIONER

## 2025-08-25 PROCEDURE — 36430 TRANSFUSION BLD/BLD COMPNT: CPT | Performed by: HOSPITALIST

## 2025-08-25 PROCEDURE — 86901 BLOOD TYPING SEROLOGIC RH(D): CPT | Performed by: HOSPITALIST

## 2025-08-25 PROCEDURE — P9016 RBC LEUKOCYTES REDUCED: HCPCS | Performed by: HOSPITALIST

## 2025-08-25 PROCEDURE — 80053 COMPREHEN METABOLIC PANEL: CPT | Performed by: NURSE PRACTITIONER

## 2025-08-25 PROCEDURE — 21400001 HC TELEMETRY ROOM

## 2025-08-25 RX ORDER — HYDROCODONE BITARTRATE AND ACETAMINOPHEN 500; 5 MG/1; MG/1
TABLET ORAL
Status: DISCONTINUED | OUTPATIENT
Start: 2025-08-25 | End: 2025-08-27 | Stop reason: HOSPADM

## 2025-08-25 RX ADMIN — SODIUM CHLORIDE: 9 INJECTION, SOLUTION INTRAVENOUS at 11:08

## 2025-08-25 RX ADMIN — PIPERACILLIN AND TAZOBACTAM 4.5 G: 4; .5 INJECTION, POWDER, LYOPHILIZED, FOR SOLUTION INTRAVENOUS at 06:08

## 2025-08-25 RX ADMIN — SODIUM CHLORIDE: 9 INJECTION, SOLUTION INTRAVENOUS at 02:08

## 2025-08-25 RX ADMIN — Medication 800 MG: at 06:08

## 2025-08-25 RX ADMIN — POTASSIUM BICARBONATE 35 MEQ: 391 TABLET, EFFERVESCENT ORAL at 08:08

## 2025-08-25 RX ADMIN — PIPERACILLIN AND TAZOBACTAM 4.5 G: 4; .5 INJECTION, POWDER, LYOPHILIZED, FOR SOLUTION INTRAVENOUS at 01:08

## 2025-08-25 RX ADMIN — PIPERACILLIN AND TAZOBACTAM 4.5 G: 4; .5 INJECTION, POWDER, LYOPHILIZED, FOR SOLUTION INTRAVENOUS at 11:08

## 2025-08-25 RX ADMIN — PIPERACILLIN AND TAZOBACTAM 4.5 G: 4; .5 INJECTION, POWDER, LYOPHILIZED, FOR SOLUTION INTRAVENOUS at 08:08

## 2025-08-25 RX ADMIN — PANTOPRAZOLE SODIUM 40 MG: 40 TABLET, DELAYED RELEASE ORAL at 08:08

## 2025-08-26 ENCOUNTER — ANESTHESIA EVENT (OUTPATIENT)
Dept: SURGERY | Facility: HOSPITAL | Age: 65
DRG: 872 | End: 2025-08-26
Payer: MEDICAID

## 2025-08-26 ENCOUNTER — ANESTHESIA (OUTPATIENT)
Dept: SURGERY | Facility: HOSPITAL | Age: 65
DRG: 872 | End: 2025-08-26
Payer: MEDICAID

## 2025-08-26 LAB
ABSOLUTE EOSINOPHIL (SMH): 0.31 K/UL
ABSOLUTE MONOCYTE (SMH): 0.45 K/UL (ref 0.3–1)
ABSOLUTE NEUTROPHIL COUNT (SMH): 3.6 K/UL (ref 1.8–7.7)
ALBUMIN SERPL-MCNC: 2.5 G/DL (ref 3.5–5.2)
ALP SERPL-CCNC: 579 UNIT/L (ref 55–135)
ALT SERPL-CCNC: 54 UNIT/L (ref 10–44)
ANION GAP (SMH): 6 MMOL/L (ref 8–16)
AST SERPL-CCNC: 54 UNIT/L (ref 10–40)
BASOPHILS # BLD AUTO: 0.02 K/UL
BASOPHILS NFR BLD AUTO: 0.4 %
BILIRUB SERPL-MCNC: 2.7 MG/DL (ref 0.1–1)
BUN SERPL-MCNC: 7 MG/DL (ref 8–23)
CALCIUM SERPL-MCNC: 8.2 MG/DL (ref 8.7–10.5)
CHLORIDE SERPL-SCNC: 107 MMOL/L (ref 95–110)
CO2 SERPL-SCNC: 28 MMOL/L (ref 23–29)
CREAT SERPL-MCNC: 0.8 MG/DL (ref 0.5–1.4)
ERYTHROCYTE [DISTWIDTH] IN BLOOD BY AUTOMATED COUNT: 17.7 % (ref 11.5–14.5)
GFR SERPLBLD CREATININE-BSD FMLA CKD-EPI: >60 ML/MIN/1.73/M2
GLUCOSE SERPL-MCNC: 148 MG/DL (ref 70–110)
HCT VFR BLD AUTO: 26.4 % (ref 37–48.5)
HGB BLD-MCNC: 7.9 GM/DL (ref 12–16)
IMM GRANULOCYTES # BLD AUTO: 0.18 K/UL (ref 0–0.04)
IMM GRANULOCYTES NFR BLD AUTO: 3.2 % (ref 0–0.5)
LYMPHOCYTES # BLD AUTO: 1.13 K/UL (ref 1–4.8)
MAGNESIUM SERPL-MCNC: 1.5 MG/DL (ref 1.6–2.6)
MCH RBC QN AUTO: 25.5 PG (ref 27–31)
MCHC RBC AUTO-ENTMCNC: 29.9 G/DL (ref 32–36)
MCV RBC AUTO: 85 FL (ref 82–98)
NUCLEATED RBC (/100WBC) (SMH): 0 /100 WBC
PLATELET # BLD AUTO: 327 K/UL (ref 150–450)
PMV BLD AUTO: 9.3 FL (ref 9.2–12.9)
POCT GLUCOSE: 162 MG/DL (ref 70–110)
POCT GLUCOSE: 181 MG/DL (ref 70–110)
POCT GLUCOSE: 204 MG/DL (ref 70–110)
POTASSIUM SERPL-SCNC: 3.9 MMOL/L (ref 3.5–5.1)
PROT SERPL-MCNC: 5.9 GM/DL (ref 6–8.4)
RBC # BLD AUTO: 3.1 M/UL (ref 4–5.4)
RELATIVE EOSINOPHIL (SMH): 5.5 % (ref 0–8)
RELATIVE LYMPHOCYTE (SMH): 20 % (ref 18–48)
RELATIVE MONOCYTE (SMH): 8 % (ref 4–15)
RELATIVE NEUTROPHIL (SMH): 62.9 % (ref 38–73)
SODIUM SERPL-SCNC: 141 MMOL/L (ref 136–145)
WBC # BLD AUTO: 5.65 K/UL (ref 3.9–12.7)

## 2025-08-26 PROCEDURE — 80053 COMPREHEN METABOLIC PANEL: CPT | Performed by: NURSE PRACTITIONER

## 2025-08-26 PROCEDURE — C1874 STENT, COATED/COV W/DEL SYS: HCPCS | Performed by: INTERNAL MEDICINE

## 2025-08-26 PROCEDURE — 25000003 PHARM REV CODE 250: Performed by: NURSE PRACTITIONER

## 2025-08-26 PROCEDURE — 97161 PT EVAL LOW COMPLEX 20 MIN: CPT

## 2025-08-26 PROCEDURE — 27202125 HC BALLOON, EXTRACTION (ANY): Performed by: INTERNAL MEDICINE

## 2025-08-26 PROCEDURE — 74328 X-RAY BILE DUCT ENDOSCOPY: CPT | Mod: TC | Performed by: INTERNAL MEDICINE

## 2025-08-26 PROCEDURE — 99900035 HC TECH TIME PER 15 MIN (STAT)

## 2025-08-26 PROCEDURE — 94761 N-INVAS EAR/PLS OXIMETRY MLT: CPT

## 2025-08-26 PROCEDURE — 36415 COLL VENOUS BLD VENIPUNCTURE: CPT | Performed by: NURSE PRACTITIONER

## 2025-08-26 PROCEDURE — 97166 OT EVAL MOD COMPLEX 45 MIN: CPT

## 2025-08-26 PROCEDURE — 63600175 PHARM REV CODE 636 W HCPCS: Performed by: ANESTHESIOLOGY

## 2025-08-26 PROCEDURE — 37000008 HC ANESTHESIA 1ST 15 MINUTES: Performed by: INTERNAL MEDICINE

## 2025-08-26 PROCEDURE — 83735 ASSAY OF MAGNESIUM: CPT | Performed by: NURSE PRACTITIONER

## 2025-08-26 PROCEDURE — 85025 COMPLETE CBC W/AUTO DIFF WBC: CPT | Performed by: NURSE PRACTITIONER

## 2025-08-26 PROCEDURE — 21400001 HC TELEMETRY ROOM

## 2025-08-26 PROCEDURE — 97535 SELF CARE MNGMENT TRAINING: CPT

## 2025-08-26 PROCEDURE — 94799 UNLISTED PULMONARY SVC/PX: CPT

## 2025-08-26 PROCEDURE — 27000221 HC OXYGEN, UP TO 24 HOURS

## 2025-08-26 PROCEDURE — 63600175 PHARM REV CODE 636 W HCPCS: Performed by: NURSE PRACTITIONER

## 2025-08-26 PROCEDURE — 63600175 PHARM REV CODE 636 W HCPCS: Performed by: STUDENT IN AN ORGANIZED HEALTH CARE EDUCATION/TRAINING PROGRAM

## 2025-08-26 PROCEDURE — 0F798DZ DILATION OF COMMON BILE DUCT WITH INTRALUMINAL DEVICE, VIA NATURAL OR ARTIFICIAL OPENING ENDOSCOPIC: ICD-10-PCS | Performed by: HOSPITALIST

## 2025-08-26 PROCEDURE — C1769 GUIDE WIRE: HCPCS | Performed by: INTERNAL MEDICINE

## 2025-08-26 PROCEDURE — 97530 THERAPEUTIC ACTIVITIES: CPT

## 2025-08-26 PROCEDURE — 37000009 HC ANESTHESIA EA ADD 15 MINS: Performed by: INTERNAL MEDICINE

## 2025-08-26 PROCEDURE — 11000001 HC ACUTE MED/SURG PRIVATE ROOM

## 2025-08-26 PROCEDURE — 82962 GLUCOSE BLOOD TEST: CPT | Performed by: INTERNAL MEDICINE

## 2025-08-26 PROCEDURE — 25000003 PHARM REV CODE 250: Performed by: STUDENT IN AN ORGANIZED HEALTH CARE EDUCATION/TRAINING PROGRAM

## 2025-08-26 PROCEDURE — 43274 ERCP DUCT STENT PLACEMENT: CPT | Performed by: INTERNAL MEDICINE

## 2025-08-26 PROCEDURE — C1876 STENT, NON-COA/NON-COV W/DEL: HCPCS | Performed by: INTERNAL MEDICINE

## 2025-08-26 RX ORDER — SODIUM CHLORIDE, SODIUM LACTATE, POTASSIUM CHLORIDE, CALCIUM CHLORIDE 600; 310; 30; 20 MG/100ML; MG/100ML; MG/100ML; MG/100ML
INJECTION, SOLUTION INTRAVENOUS CONTINUOUS PRN
Status: DISCONTINUED | OUTPATIENT
Start: 2025-08-26 | End: 2025-08-26

## 2025-08-26 RX ORDER — ROCURONIUM BROMIDE 10 MG/ML
INJECTION, SOLUTION INTRAVENOUS
Status: DISCONTINUED | OUTPATIENT
Start: 2025-08-26 | End: 2025-08-26

## 2025-08-26 RX ORDER — DIPHENHYDRAMINE HYDROCHLORIDE 50 MG/ML
INJECTION, SOLUTION INTRAMUSCULAR; INTRAVENOUS
Status: DISCONTINUED | OUTPATIENT
Start: 2025-08-26 | End: 2025-08-26

## 2025-08-26 RX ORDER — PROPOFOL 10 MG/ML
VIAL (ML) INTRAVENOUS
Status: DISCONTINUED | OUTPATIENT
Start: 2025-08-26 | End: 2025-08-26

## 2025-08-26 RX ORDER — ONDANSETRON HYDROCHLORIDE 2 MG/ML
4 INJECTION, SOLUTION INTRAVENOUS DAILY PRN
Status: DISCONTINUED | OUTPATIENT
Start: 2025-08-26 | End: 2025-08-27 | Stop reason: HOSPADM

## 2025-08-26 RX ORDER — GLUCAGON 1 MG
1 KIT INJECTION
Status: DISCONTINUED | OUTPATIENT
Start: 2025-08-26 | End: 2025-08-27 | Stop reason: HOSPADM

## 2025-08-26 RX ORDER — SUCCINYLCHOLINE CHLORIDE 20 MG/ML
INJECTION INTRAMUSCULAR; INTRAVENOUS
Status: DISCONTINUED | OUTPATIENT
Start: 2025-08-26 | End: 2025-08-26

## 2025-08-26 RX ORDER — ONDANSETRON HYDROCHLORIDE 2 MG/ML
INJECTION, SOLUTION INTRAVENOUS
Status: DISCONTINUED | OUTPATIENT
Start: 2025-08-26 | End: 2025-08-26

## 2025-08-26 RX ORDER — FENTANYL CITRATE 50 UG/ML
25 INJECTION, SOLUTION INTRAMUSCULAR; INTRAVENOUS EVERY 5 MIN PRN
Status: DISCONTINUED | OUTPATIENT
Start: 2025-08-26 | End: 2025-08-27

## 2025-08-26 RX ORDER — FAMOTIDINE 10 MG/ML
INJECTION, SOLUTION INTRAVENOUS
Status: DISCONTINUED | OUTPATIENT
Start: 2025-08-26 | End: 2025-08-26

## 2025-08-26 RX ORDER — KETOROLAC TROMETHAMINE 30 MG/ML
30 INJECTION, SOLUTION INTRAMUSCULAR; INTRAVENOUS ONCE
Status: COMPLETED | OUTPATIENT
Start: 2025-08-26 | End: 2025-08-26

## 2025-08-26 RX ORDER — LIDOCAINE HYDROCHLORIDE 20 MG/ML
INJECTION, SOLUTION EPIDURAL; INFILTRATION; INTRACAUDAL; PERINEURAL
Status: DISCONTINUED | OUTPATIENT
Start: 2025-08-26 | End: 2025-08-26

## 2025-08-26 RX ORDER — OXYCODONE HYDROCHLORIDE 5 MG/1
5 TABLET ORAL
Status: DISCONTINUED | OUTPATIENT
Start: 2025-08-26 | End: 2025-08-27 | Stop reason: HOSPADM

## 2025-08-26 RX ORDER — MEPERIDINE HYDROCHLORIDE 50 MG/ML
12.5 INJECTION INTRAMUSCULAR; INTRAVENOUS; SUBCUTANEOUS EVERY 10 MIN PRN
Status: ACTIVE | OUTPATIENT
Start: 2025-08-26 | End: 2025-08-26

## 2025-08-26 RX ORDER — KETOROLAC TROMETHAMINE 30 MG/ML
15 INJECTION, SOLUTION INTRAMUSCULAR; INTRAVENOUS ONCE
Status: DISCONTINUED | OUTPATIENT
Start: 2025-08-26 | End: 2025-08-26

## 2025-08-26 RX ORDER — DIPHENHYDRAMINE HYDROCHLORIDE 50 MG/ML
12.5 INJECTION, SOLUTION INTRAMUSCULAR; INTRAVENOUS
Status: DISCONTINUED | OUTPATIENT
Start: 2025-08-26 | End: 2025-08-27 | Stop reason: HOSPADM

## 2025-08-26 RX ADMIN — ROCURONIUM BROMIDE 5 MG: 10 INJECTION, SOLUTION INTRAVENOUS at 04:08

## 2025-08-26 RX ADMIN — FAMOTIDINE 20 MG: 10 INJECTION, SOLUTION INTRAVENOUS at 04:08

## 2025-08-26 RX ADMIN — ONDANSETRON 4 MG: 2 INJECTION INTRAMUSCULAR; INTRAVENOUS at 05:08

## 2025-08-26 RX ADMIN — ACETAMINOPHEN 650 MG: 325 TABLET ORAL at 02:08

## 2025-08-26 RX ADMIN — PIPERACILLIN AND TAZOBACTAM 4.5 G: 4; .5 INJECTION, POWDER, LYOPHILIZED, FOR SOLUTION INTRAVENOUS at 11:08

## 2025-08-26 RX ADMIN — PIPERACILLIN AND TAZOBACTAM 4.5 G: 4; .5 INJECTION, POWDER, LYOPHILIZED, FOR SOLUTION INTRAVENOUS at 08:08

## 2025-08-26 RX ADMIN — Medication 200 MG: at 04:08

## 2025-08-26 RX ADMIN — ROCURONIUM BROMIDE 35 MG: 10 INJECTION, SOLUTION INTRAVENOUS at 04:08

## 2025-08-26 RX ADMIN — SUGAMMADEX 200 MG: 100 INJECTION, SOLUTION INTRAVENOUS at 05:08

## 2025-08-26 RX ADMIN — DIPHENHYDRAMINE HYDROCHLORIDE 6.25 MG: 50 INJECTION INTRAMUSCULAR; INTRAVENOUS at 04:08

## 2025-08-26 RX ADMIN — LIDOCAINE HYDROCHLORIDE 100 MG: 20 INJECTION, SOLUTION INTRAVENOUS at 04:08

## 2025-08-26 RX ADMIN — PROPOFOL 120 MG: 10 INJECTION, EMULSION INTRAVENOUS at 04:08

## 2025-08-26 RX ADMIN — ONDANSETRON 4 MG: 2 INJECTION INTRAMUSCULAR; INTRAVENOUS at 04:08

## 2025-08-26 RX ADMIN — KETOROLAC TROMETHAMINE 30 MG: 30 INJECTION, SOLUTION INTRAMUSCULAR at 06:08

## 2025-08-26 RX ADMIN — SODIUM CHLORIDE, SODIUM LACTATE, POTASSIUM CHLORIDE, AND CALCIUM CHLORIDE: .6; .31; .03; .02 INJECTION, SOLUTION INTRAVENOUS at 04:08

## 2025-08-27 VITALS
HEIGHT: 63 IN | OXYGEN SATURATION: 96 % | BODY MASS INDEX: 39.34 KG/M2 | DIASTOLIC BLOOD PRESSURE: 84 MMHG | TEMPERATURE: 98 F | WEIGHT: 222 LBS | HEART RATE: 101 BPM | SYSTOLIC BLOOD PRESSURE: 144 MMHG | RESPIRATION RATE: 18 BRPM

## 2025-08-27 LAB
ABSOLUTE EOSINOPHIL (SMH): 0.15 K/UL
ABSOLUTE MONOCYTE (SMH): 0.53 K/UL (ref 0.3–1)
ABSOLUTE NEUTROPHIL COUNT (SMH): 4.3 K/UL (ref 1.8–7.7)
ALBUMIN SERPL-MCNC: 2.5 G/DL (ref 3.5–5.2)
ALP SERPL-CCNC: 548 UNIT/L (ref 55–135)
ALT SERPL-CCNC: 33 UNIT/L (ref 10–44)
ANION GAP (SMH): 8 MMOL/L (ref 8–16)
AST SERPL-CCNC: 15 UNIT/L (ref 10–40)
BASOPHILS # BLD AUTO: 0.05 K/UL
BASOPHILS NFR BLD AUTO: 0.7 %
BILIRUB SERPL-MCNC: 1.5 MG/DL (ref 0.1–1)
BUN SERPL-MCNC: 7 MG/DL (ref 8–23)
CALCIUM SERPL-MCNC: 8.1 MG/DL (ref 8.7–10.5)
CHLORIDE SERPL-SCNC: 106 MMOL/L (ref 95–110)
CO2 SERPL-SCNC: 28 MMOL/L (ref 23–29)
CREAT SERPL-MCNC: 0.8 MG/DL (ref 0.5–1.4)
ERYTHROCYTE [DISTWIDTH] IN BLOOD BY AUTOMATED COUNT: 17.5 % (ref 11.5–14.5)
GFR SERPLBLD CREATININE-BSD FMLA CKD-EPI: >60 ML/MIN/1.73/M2
GLUCOSE SERPL-MCNC: 204 MG/DL (ref 70–110)
HCT VFR BLD AUTO: 27.2 % (ref 37–48.5)
HGB BLD-MCNC: 8.1 GM/DL (ref 12–16)
IMM GRANULOCYTES # BLD AUTO: 0.27 K/UL (ref 0–0.04)
IMM GRANULOCYTES NFR BLD AUTO: 4 % (ref 0–0.5)
LYMPHOCYTES # BLD AUTO: 1.49 K/UL (ref 1–4.8)
MAGNESIUM SERPL-MCNC: 1.5 MG/DL (ref 1.6–2.6)
MCH RBC QN AUTO: 26 PG (ref 27–31)
MCHC RBC AUTO-ENTMCNC: 29.8 G/DL (ref 32–36)
MCV RBC AUTO: 88 FL (ref 82–98)
NUCLEATED RBC (/100WBC) (SMH): 0 /100 WBC
PLATELET # BLD AUTO: 311 K/UL (ref 150–450)
PMV BLD AUTO: 9.6 FL (ref 9.2–12.9)
POCT GLUCOSE: 166 MG/DL (ref 70–110)
POCT GLUCOSE: 199 MG/DL (ref 70–110)
POCT GLUCOSE: 204 MG/DL (ref 70–110)
POCT GLUCOSE: 215 MG/DL (ref 70–110)
POTASSIUM SERPL-SCNC: 3.5 MMOL/L (ref 3.5–5.1)
PROT SERPL-MCNC: 5.8 GM/DL (ref 6–8.4)
RBC # BLD AUTO: 3.11 M/UL (ref 4–5.4)
RELATIVE EOSINOPHIL (SMH): 2.2 % (ref 0–8)
RELATIVE LYMPHOCYTE (SMH): 22 % (ref 18–48)
RELATIVE MONOCYTE (SMH): 7.8 % (ref 4–15)
RELATIVE NEUTROPHIL (SMH): 63.3 % (ref 38–73)
SODIUM SERPL-SCNC: 142 MMOL/L (ref 136–145)
WBC # BLD AUTO: 6.78 K/UL (ref 3.9–12.7)

## 2025-08-27 PROCEDURE — 63600175 PHARM REV CODE 636 W HCPCS: Performed by: NURSE PRACTITIONER

## 2025-08-27 PROCEDURE — 83735 ASSAY OF MAGNESIUM: CPT | Performed by: NURSE PRACTITIONER

## 2025-08-27 PROCEDURE — 36415 COLL VENOUS BLD VENIPUNCTURE: CPT | Performed by: NURSE PRACTITIONER

## 2025-08-27 PROCEDURE — 25000003 PHARM REV CODE 250: Performed by: INTERNAL MEDICINE

## 2025-08-27 PROCEDURE — 25000003 PHARM REV CODE 250: Performed by: NURSE PRACTITIONER

## 2025-08-27 PROCEDURE — 25000003 PHARM REV CODE 250: Performed by: HOSPITALIST

## 2025-08-27 PROCEDURE — 94761 N-INVAS EAR/PLS OXIMETRY MLT: CPT

## 2025-08-27 PROCEDURE — 80053 COMPREHEN METABOLIC PANEL: CPT | Performed by: NURSE PRACTITIONER

## 2025-08-27 PROCEDURE — 85025 COMPLETE CBC W/AUTO DIFF WBC: CPT | Performed by: NURSE PRACTITIONER

## 2025-08-27 RX ORDER — PANTOPRAZOLE SODIUM 40 MG/1
40 TABLET, DELAYED RELEASE ORAL DAILY
Qty: 90 TABLET | Refills: 3 | Status: SHIPPED | OUTPATIENT
Start: 2025-08-28 | End: 2026-08-28

## 2025-08-27 RX ORDER — OXYCODONE HYDROCHLORIDE 5 MG/1
5 TABLET ORAL
Qty: 20 TABLET | Refills: 0 | Status: SHIPPED | OUTPATIENT
Start: 2025-08-27

## 2025-08-27 RX ORDER — POTASSIUM CHLORIDE 20 MEQ/1
40 TABLET, EXTENDED RELEASE ORAL ONCE
Status: COMPLETED | OUTPATIENT
Start: 2025-08-27 | End: 2025-08-27

## 2025-08-27 RX ORDER — URSODIOL 300 MG/1
300 CAPSULE ORAL 2 TIMES DAILY
Status: DISCONTINUED | OUTPATIENT
Start: 2025-08-27 | End: 2025-08-27 | Stop reason: HOSPADM

## 2025-08-27 RX ORDER — ONDANSETRON 8 MG/1
8 TABLET, FILM COATED ORAL EVERY 8 HOURS PRN
Qty: 10 TABLET | Refills: 0 | Status: SHIPPED | OUTPATIENT
Start: 2025-08-27

## 2025-08-27 RX ORDER — URSODIOL 300 MG/1
300 CAPSULE ORAL 2 TIMES DAILY
Qty: 60 CAPSULE | Refills: 11 | Status: SHIPPED | OUTPATIENT
Start: 2025-08-27 | End: 2026-08-27

## 2025-08-27 RX ADMIN — ACETAMINOPHEN 650 MG: 325 TABLET ORAL at 10:08

## 2025-08-27 RX ADMIN — PIPERACILLIN AND TAZOBACTAM 4.5 G: 4; .5 INJECTION, POWDER, LYOPHILIZED, FOR SOLUTION INTRAVENOUS at 09:08

## 2025-08-27 RX ADMIN — Medication 800 MG: at 11:08

## 2025-08-27 RX ADMIN — POTASSIUM CHLORIDE 40 MEQ: 1500 TABLET, EXTENDED RELEASE ORAL at 12:08

## 2025-08-27 RX ADMIN — PANTOPRAZOLE SODIUM 40 MG: 40 TABLET, DELAYED RELEASE ORAL at 09:08

## 2025-08-27 RX ADMIN — INSULIN ASPART 2 UNITS: 100 INJECTION, SOLUTION INTRAVENOUS; SUBCUTANEOUS at 08:08

## 2025-08-27 RX ADMIN — URSODIOL 300 MG: 300 CAPSULE ORAL at 09:08

## 2025-08-28 ENCOUNTER — PATIENT MESSAGE (OUTPATIENT)
Dept: ORTHOPEDICS | Facility: CLINIC | Age: 65
End: 2025-08-28
Payer: MEDICAID

## 2025-08-28 ENCOUNTER — PATIENT MESSAGE (OUTPATIENT)
Dept: SURGERY | Facility: CLINIC | Age: 65
End: 2025-08-28
Payer: MEDICAID

## 2025-08-28 ENCOUNTER — PATIENT OUTREACH (OUTPATIENT)
Dept: FAMILY MEDICINE | Facility: CLINIC | Age: 65
End: 2025-08-28
Payer: MEDICAID

## 2025-08-28 LAB
BACTERIA BLD CULT: NORMAL
BACTERIA BLD CULT: NORMAL

## 2025-08-30 ENCOUNTER — HOSPITAL ENCOUNTER (INPATIENT)
Facility: HOSPITAL | Age: 65
LOS: 1 days | Discharge: HOME | DRG: 872 | End: 2025-09-02
Attending: STUDENT IN AN ORGANIZED HEALTH CARE EDUCATION/TRAINING PROGRAM | Admitting: INTERNAL MEDICINE

## 2025-08-30 DIAGNOSIS — R10.32 LEFT LOWER QUADRANT ABDOMINAL PAIN: ICD-10-CM

## 2025-08-30 DIAGNOSIS — R50.9 FEVER, UNSPECIFIED FEVER CAUSE: Primary | ICD-10-CM

## 2025-08-30 DIAGNOSIS — C25.9 PRIMARY PANCREATIC CANCER WITH METASTASIS TO OTHER SITE: ICD-10-CM

## 2025-08-30 DIAGNOSIS — R07.9 CHEST PAIN: ICD-10-CM

## 2025-08-30 LAB
ABSOLUTE EOSINOPHIL (SMH): 0.3 K/UL
ABSOLUTE MONOCYTE (SMH): 0.47 K/UL (ref 0.3–1)
ABSOLUTE NEUTROPHIL COUNT (SMH): 7.9 K/UL (ref 1.8–7.7)
ALBUMIN SERPL-MCNC: 2.9 G/DL (ref 3.5–5.2)
ALP SERPL-CCNC: 335 UNIT/L (ref 55–135)
ALT SERPL-CCNC: 24 UNIT/L (ref 10–44)
ANION GAP (SMH): 9 MMOL/L (ref 8–16)
AST SERPL-CCNC: 70 UNIT/L (ref 10–40)
BASOPHILS # BLD AUTO: 0.04 K/UL
BASOPHILS NFR BLD AUTO: 0.4 %
BILIRUB SERPL-MCNC: 1.7 MG/DL (ref 0.1–1)
BUN SERPL-MCNC: 8 MG/DL (ref 8–23)
CALCIUM SERPL-MCNC: 8.2 MG/DL (ref 8.7–10.5)
CHLORIDE SERPL-SCNC: 97 MMOL/L (ref 95–110)
CO2 SERPL-SCNC: 27 MMOL/L (ref 23–29)
CREAT SERPL-MCNC: 0.7 MG/DL (ref 0.5–1.4)
ERYTHROCYTE [DISTWIDTH] IN BLOOD BY AUTOMATED COUNT: 18.4 % (ref 11.5–14.5)
GFR SERPLBLD CREATININE-BSD FMLA CKD-EPI: >60 ML/MIN/1.73/M2
GLUCOSE SERPL-MCNC: 136 MG/DL (ref 70–110)
HCT VFR BLD AUTO: 28.5 % (ref 37–48.5)
HGB BLD-MCNC: 8.9 GM/DL (ref 12–16)
IMM GRANULOCYTES # BLD AUTO: 0.33 K/UL (ref 0–0.04)
IMM GRANULOCYTES NFR BLD AUTO: 3.4 % (ref 0–0.5)
LDH SERPL L TO P-CCNC: 0.92 MMOL/L (ref 0.5–2.2)
LYMPHOCYTES # BLD AUTO: 0.82 K/UL (ref 1–4.8)
MAGNESIUM SERPL-MCNC: 1.4 MG/DL (ref 1.6–2.6)
MCH RBC QN AUTO: 27.1 PG (ref 27–31)
MCHC RBC AUTO-ENTMCNC: 31.2 G/DL (ref 32–36)
MCV RBC AUTO: 87 FL (ref 82–98)
NUCLEATED RBC (/100WBC) (SMH): 0 /100 WBC
PHOSPHATE SERPL-MCNC: 3 MG/DL (ref 2.7–4.5)
PLATELET # BLD AUTO: 330 K/UL (ref 150–450)
PMV BLD AUTO: 9.2 FL (ref 9.2–12.9)
POTASSIUM SERPL-SCNC: 3.7 MMOL/L (ref 3.5–5.1)
PROT SERPL-MCNC: 6.5 GM/DL (ref 6–8.4)
RBC # BLD AUTO: 3.29 M/UL (ref 4–5.4)
RELATIVE EOSINOPHIL (SMH): 3.1 % (ref 0–8)
RELATIVE LYMPHOCYTE (SMH): 8.4 % (ref 18–48)
RELATIVE MONOCYTE (SMH): 4.8 % (ref 4–15)
RELATIVE NEUTROPHIL (SMH): 79.9 % (ref 38–73)
SAMPLE: NORMAL
SODIUM SERPL-SCNC: 133 MMOL/L (ref 136–145)
WBC # BLD AUTO: 9.82 K/UL (ref 3.9–12.7)

## 2025-08-30 PROCEDURE — 81003 URINALYSIS AUTO W/O SCOPE: CPT | Performed by: NURSE PRACTITIONER

## 2025-08-30 PROCEDURE — 25000003 PHARM REV CODE 250

## 2025-08-30 PROCEDURE — 85025 COMPLETE CBC W/AUTO DIFF WBC: CPT | Performed by: NURSE PRACTITIONER

## 2025-08-30 PROCEDURE — 83735 ASSAY OF MAGNESIUM: CPT | Performed by: NURSE PRACTITIONER

## 2025-08-30 PROCEDURE — 87502 INFLUENZA DNA AMP PROBE: CPT | Performed by: STUDENT IN AN ORGANIZED HEALTH CARE EDUCATION/TRAINING PROGRAM

## 2025-08-30 PROCEDURE — 36415 COLL VENOUS BLD VENIPUNCTURE: CPT | Performed by: NURSE PRACTITIONER

## 2025-08-30 PROCEDURE — 87040 BLOOD CULTURE FOR BACTERIA: CPT | Performed by: NURSE PRACTITIONER

## 2025-08-30 PROCEDURE — U0002 COVID-19 LAB TEST NON-CDC: HCPCS

## 2025-08-30 PROCEDURE — 63600175 PHARM REV CODE 636 W HCPCS

## 2025-08-30 PROCEDURE — 99285 EMERGENCY DEPT VISIT HI MDM: CPT | Mod: 25

## 2025-08-30 PROCEDURE — 96365 THER/PROPH/DIAG IV INF INIT: CPT

## 2025-08-30 PROCEDURE — 80053 COMPREHEN METABOLIC PANEL: CPT | Performed by: NURSE PRACTITIONER

## 2025-08-30 PROCEDURE — 84100 ASSAY OF PHOSPHORUS: CPT | Performed by: NURSE PRACTITIONER

## 2025-08-30 RX ADMIN — PIPERACILLIN AND TAZOBACTAM 4.5 G: 4; .5 INJECTION, POWDER, FOR SOLUTION INTRAVENOUS at 10:08

## 2025-08-31 PROBLEM — R50.9 FEVER: Status: ACTIVE | Noted: 2025-08-31

## 2025-08-31 LAB
ABSOLUTE EOSINOPHIL (SMH): 0.42 K/UL
ABSOLUTE MONOCYTE (SMH): 0.31 K/UL (ref 0.3–1)
ABSOLUTE NEUTROPHIL COUNT (SMH): 4.9 K/UL (ref 1.8–7.7)
ALBUMIN SERPL-MCNC: 2.6 G/DL (ref 3.5–5.2)
ALP SERPL-CCNC: 284 UNIT/L (ref 55–135)
ALT SERPL-CCNC: 33 UNIT/L (ref 10–44)
ANION GAP (SMH): 6 MMOL/L (ref 8–16)
AST SERPL-CCNC: 100 UNIT/L (ref 10–40)
BASOPHILS # BLD AUTO: 0.03 K/UL
BASOPHILS NFR BLD AUTO: 0.5 %
BILIRUB SERPL-MCNC: 1.4 MG/DL (ref 0.1–1)
BILIRUB UR QL STRIP.AUTO: NEGATIVE
BUN SERPL-MCNC: 8 MG/DL (ref 8–23)
CALCIUM SERPL-MCNC: 7.7 MG/DL (ref 8.7–10.5)
CHLORIDE SERPL-SCNC: 101 MMOL/L (ref 95–110)
CLARITY UR: CLEAR
CO2 SERPL-SCNC: 29 MMOL/L (ref 23–29)
COLOR UR AUTO: COLORLESS
CREAT SERPL-MCNC: 0.7 MG/DL (ref 0.5–1.4)
ERYTHROCYTE [DISTWIDTH] IN BLOOD BY AUTOMATED COUNT: 18.5 % (ref 11.5–14.5)
FLUAV AG UPPER RESP QL IA.RAPID: NEGATIVE
FLUBV AG UPPER RESP QL IA.RAPID: NEGATIVE
GFR SERPLBLD CREATININE-BSD FMLA CKD-EPI: >60 ML/MIN/1.73/M2
GLUCOSE SERPL-MCNC: 177 MG/DL (ref 70–110)
GLUCOSE UR QL STRIP: NEGATIVE
HCT VFR BLD AUTO: 26.4 % (ref 37–48.5)
HGB BLD-MCNC: 8 GM/DL (ref 12–16)
HGB UR QL STRIP: NEGATIVE
IMM GRANULOCYTES # BLD AUTO: 0.16 K/UL (ref 0–0.04)
IMM GRANULOCYTES NFR BLD AUTO: 2.5 % (ref 0–0.5)
KETONES UR QL STRIP: NEGATIVE
LACTATE SERPL-SCNC: 0.7 MMOL/L (ref 0.5–1.9)
LEUKOCYTE ESTERASE UR QL STRIP: NEGATIVE
LYMPHOCYTES # BLD AUTO: 0.56 K/UL (ref 1–4.8)
MAGNESIUM SERPL-MCNC: 1.5 MG/DL (ref 1.6–2.6)
MCH RBC QN AUTO: 26.1 PG (ref 27–31)
MCHC RBC AUTO-ENTMCNC: 30.3 G/DL (ref 32–36)
MCV RBC AUTO: 86 FL (ref 82–98)
NITRITE UR QL STRIP: NEGATIVE
NUCLEATED RBC (/100WBC) (SMH): 0 /100 WBC
PH UR STRIP: 7 [PH]
PLATELET # BLD AUTO: 281 K/UL (ref 150–450)
PMV BLD AUTO: 9.3 FL (ref 9.2–12.9)
POTASSIUM SERPL-SCNC: 3.5 MMOL/L (ref 3.5–5.1)
PROT SERPL-MCNC: 5.7 GM/DL (ref 6–8.4)
PROT UR QL STRIP: NEGATIVE
RBC # BLD AUTO: 3.06 M/UL (ref 4–5.4)
RELATIVE EOSINOPHIL (SMH): 6.6 % (ref 0–8)
RELATIVE LYMPHOCYTE (SMH): 8.8 % (ref 18–48)
RELATIVE MONOCYTE (SMH): 4.9 % (ref 4–15)
RELATIVE NEUTROPHIL (SMH): 76.7 % (ref 38–73)
SARS-COV-2 RDRP RESP QL NAA+PROBE: NEGATIVE
SODIUM SERPL-SCNC: 136 MMOL/L (ref 136–145)
SP GR UR STRIP: <1.005
UROBILINOGEN UR STRIP-ACNC: NEGATIVE EU/DL
WBC # BLD AUTO: 6.33 K/UL (ref 3.9–12.7)

## 2025-08-31 PROCEDURE — 25000003 PHARM REV CODE 250

## 2025-08-31 PROCEDURE — 25500020 PHARM REV CODE 255: Performed by: STUDENT IN AN ORGANIZED HEALTH CARE EDUCATION/TRAINING PROGRAM

## 2025-08-31 PROCEDURE — 63600175 PHARM REV CODE 636 W HCPCS

## 2025-08-31 PROCEDURE — 83605 ASSAY OF LACTIC ACID: CPT | Performed by: NURSE PRACTITIONER

## 2025-08-31 PROCEDURE — 96367 TX/PROPH/DG ADDL SEQ IV INF: CPT

## 2025-08-31 PROCEDURE — G0378 HOSPITAL OBSERVATION PER HR: HCPCS

## 2025-08-31 PROCEDURE — 36415 COLL VENOUS BLD VENIPUNCTURE: CPT

## 2025-08-31 PROCEDURE — 99900035 HC TECH TIME PER 15 MIN (STAT)

## 2025-08-31 PROCEDURE — 83735 ASSAY OF MAGNESIUM: CPT

## 2025-08-31 PROCEDURE — 99900031 HC PATIENT EDUCATION (STAT)

## 2025-08-31 PROCEDURE — 94799 UNLISTED PULMONARY SVC/PX: CPT

## 2025-08-31 PROCEDURE — 80053 COMPREHEN METABOLIC PANEL: CPT

## 2025-08-31 PROCEDURE — 99406 BEHAV CHNG SMOKING 3-10 MIN: CPT

## 2025-08-31 PROCEDURE — 85025 COMPLETE CBC W/AUTO DIFF WBC: CPT

## 2025-08-31 RX ORDER — NALOXONE HCL 0.4 MG/ML
0.02 VIAL (ML) INJECTION
Status: DISCONTINUED | OUTPATIENT
Start: 2025-08-31 | End: 2025-09-02 | Stop reason: HOSPADM

## 2025-08-31 RX ORDER — METOPROLOL SUCCINATE 50 MG/1
50 TABLET, EXTENDED RELEASE ORAL DAILY
Status: DISCONTINUED | OUTPATIENT
Start: 2025-08-31 | End: 2025-09-02 | Stop reason: HOSPADM

## 2025-08-31 RX ORDER — LANOLIN ALCOHOL/MO/W.PET/CERES
800 CREAM (GRAM) TOPICAL
Status: DISCONTINUED | OUTPATIENT
Start: 2025-08-31 | End: 2025-09-02 | Stop reason: HOSPADM

## 2025-08-31 RX ORDER — PANTOPRAZOLE SODIUM 40 MG/1
40 TABLET, DELAYED RELEASE ORAL DAILY
Status: DISCONTINUED | OUTPATIENT
Start: 2025-08-31 | End: 2025-09-02 | Stop reason: HOSPADM

## 2025-08-31 RX ORDER — LEVOTHYROXINE SODIUM 25 UG/1
25 TABLET ORAL
Status: DISCONTINUED | OUTPATIENT
Start: 2025-08-31 | End: 2025-09-02 | Stop reason: HOSPADM

## 2025-08-31 RX ORDER — GLUCAGON 1 MG
1 KIT INJECTION
Status: DISCONTINUED | OUTPATIENT
Start: 2025-08-31 | End: 2025-09-02 | Stop reason: HOSPADM

## 2025-08-31 RX ORDER — SODIUM,POTASSIUM PHOSPHATES 280-250MG
2 POWDER IN PACKET (EA) ORAL
Status: DISCONTINUED | OUTPATIENT
Start: 2025-08-31 | End: 2025-09-02 | Stop reason: HOSPADM

## 2025-08-31 RX ORDER — MAGNESIUM SULFATE HEPTAHYDRATE 40 MG/ML
2 INJECTION, SOLUTION INTRAVENOUS ONCE
Status: COMPLETED | OUTPATIENT
Start: 2025-08-31 | End: 2025-08-31

## 2025-08-31 RX ORDER — TALC
6 POWDER (GRAM) TOPICAL NIGHTLY PRN
Status: DISCONTINUED | OUTPATIENT
Start: 2025-08-31 | End: 2025-09-02 | Stop reason: HOSPADM

## 2025-08-31 RX ORDER — IBUPROFEN 200 MG
24 TABLET ORAL
Status: DISCONTINUED | OUTPATIENT
Start: 2025-08-31 | End: 2025-09-02 | Stop reason: HOSPADM

## 2025-08-31 RX ORDER — AMOXICILLIN 250 MG
1 CAPSULE ORAL DAILY PRN
Status: DISCONTINUED | OUTPATIENT
Start: 2025-08-31 | End: 2025-09-02 | Stop reason: HOSPADM

## 2025-08-31 RX ORDER — URSODIOL 300 MG/1
300 CAPSULE ORAL 2 TIMES DAILY
Status: DISCONTINUED | OUTPATIENT
Start: 2025-08-31 | End: 2025-09-02 | Stop reason: HOSPADM

## 2025-08-31 RX ORDER — ALUMINUM HYDROXIDE, MAGNESIUM HYDROXIDE, AND SIMETHICONE 1200; 120; 1200 MG/30ML; MG/30ML; MG/30ML
30 SUSPENSION ORAL 4 TIMES DAILY PRN
Status: DISCONTINUED | OUTPATIENT
Start: 2025-08-31 | End: 2025-09-02 | Stop reason: HOSPADM

## 2025-08-31 RX ORDER — DULOXETIN HYDROCHLORIDE 30 MG/1
60 CAPSULE, DELAYED RELEASE ORAL NIGHTLY
Status: DISCONTINUED | OUTPATIENT
Start: 2025-08-31 | End: 2025-09-02 | Stop reason: HOSPADM

## 2025-08-31 RX ORDER — IBUPROFEN 200 MG
16 TABLET ORAL
Status: DISCONTINUED | OUTPATIENT
Start: 2025-08-31 | End: 2025-09-02 | Stop reason: HOSPADM

## 2025-08-31 RX ORDER — ONDANSETRON HYDROCHLORIDE 2 MG/ML
4 INJECTION, SOLUTION INTRAVENOUS EVERY 6 HOURS PRN
Status: DISCONTINUED | OUTPATIENT
Start: 2025-08-31 | End: 2025-09-02 | Stop reason: HOSPADM

## 2025-08-31 RX ORDER — ACETAMINOPHEN 325 MG/1
650 TABLET ORAL EVERY 4 HOURS PRN
Status: DISCONTINUED | OUTPATIENT
Start: 2025-08-31 | End: 2025-09-02 | Stop reason: HOSPADM

## 2025-08-31 RX ORDER — ACETAMINOPHEN 325 MG/1
650 TABLET ORAL EVERY 8 HOURS PRN
Status: DISCONTINUED | OUTPATIENT
Start: 2025-08-31 | End: 2025-08-31

## 2025-08-31 RX ADMIN — PANTOPRAZOLE SODIUM 40 MG: 40 TABLET, DELAYED RELEASE ORAL at 09:08

## 2025-08-31 RX ADMIN — MAGNESIUM SULFATE HEPTAHYDRATE 2 G: 40 INJECTION, SOLUTION INTRAVENOUS at 05:08

## 2025-08-31 RX ADMIN — PIPERACILLIN AND TAZOBACTAM 4.5 G: 4; .5 INJECTION, POWDER, FOR SOLUTION INTRAVENOUS at 09:08

## 2025-08-31 RX ADMIN — SODIUM CHLORIDE 1000 ML: 9 INJECTION, SOLUTION INTRAVENOUS at 01:08

## 2025-08-31 RX ADMIN — LEVOTHYROXINE SODIUM 25 MCG: 0.03 TABLET ORAL at 05:08

## 2025-08-31 RX ADMIN — IOHEXOL 100 ML: 350 INJECTION, SOLUTION INTRAVENOUS at 12:08

## 2025-08-31 RX ADMIN — DULOXETINE 60 MG: 30 CAPSULE, DELAYED RELEASE ORAL at 09:08

## 2025-08-31 RX ADMIN — SODIUM CHLORIDE 500 ML: 9 INJECTION, SOLUTION INTRAVENOUS at 01:08

## 2025-08-31 RX ADMIN — PIPERACILLIN AND TAZOBACTAM 4.5 G: 4; .5 INJECTION, POWDER, FOR SOLUTION INTRAVENOUS at 02:08

## 2025-08-31 RX ADMIN — VANCOMYCIN HYDROCHLORIDE 2000 MG: 2 INJECTION, POWDER, LYOPHILIZED, FOR SOLUTION INTRAVENOUS at 01:08

## 2025-08-31 RX ADMIN — URSODIOL 300 MG: 300 CAPSULE ORAL at 09:08

## 2025-08-31 RX ADMIN — PIPERACILLIN AND TAZOBACTAM 4.5 G: 4; .5 INJECTION, POWDER, FOR SOLUTION INTRAVENOUS at 05:08

## 2025-09-01 PROBLEM — R50.9 FEVER, UNSPECIFIED FEVER CAUSE: Status: ACTIVE | Noted: 2025-09-01

## 2025-09-01 LAB
ABSOLUTE EOSINOPHIL (SMH): 0.43 K/UL
ABSOLUTE MONOCYTE (SMH): 0.49 K/UL (ref 0.3–1)
ABSOLUTE NEUTROPHIL COUNT (SMH): 4.6 K/UL (ref 1.8–7.7)
ALBUMIN SERPL-MCNC: 2.7 G/DL (ref 3.5–5.2)
ALP SERPL-CCNC: 265 UNIT/L (ref 55–135)
ALT SERPL-CCNC: 73 UNIT/L (ref 10–44)
ANION GAP (SMH): 5 MMOL/L (ref 8–16)
AST SERPL-CCNC: 148 UNIT/L (ref 10–40)
BASOPHILS # BLD AUTO: 0.02 K/UL
BASOPHILS NFR BLD AUTO: 0.3 %
BILIRUB SERPL-MCNC: 1.1 MG/DL (ref 0.1–1)
BUN SERPL-MCNC: 6 MG/DL (ref 8–23)
CALCIUM SERPL-MCNC: 7.9 MG/DL (ref 8.7–10.5)
CHLORIDE SERPL-SCNC: 101 MMOL/L (ref 95–110)
CO2 SERPL-SCNC: 31 MMOL/L (ref 23–29)
CREAT SERPL-MCNC: 0.6 MG/DL (ref 0.5–1.4)
ERYTHROCYTE [DISTWIDTH] IN BLOOD BY AUTOMATED COUNT: 18.2 % (ref 11.5–14.5)
GFR SERPLBLD CREATININE-BSD FMLA CKD-EPI: >60 ML/MIN/1.73/M2
GLUCOSE SERPL-MCNC: 145 MG/DL (ref 70–110)
HCT VFR BLD AUTO: 27.8 % (ref 37–48.5)
HGB BLD-MCNC: 8.4 GM/DL (ref 12–16)
IMM GRANULOCYTES # BLD AUTO: 0.11 K/UL (ref 0–0.04)
IMM GRANULOCYTES NFR BLD AUTO: 1.7 % (ref 0–0.5)
LYMPHOCYTES # BLD AUTO: 1.06 K/UL (ref 1–4.8)
MAGNESIUM SERPL-MCNC: 1.9 MG/DL (ref 1.6–2.6)
MCH RBC QN AUTO: 26.5 PG (ref 27–31)
MCHC RBC AUTO-ENTMCNC: 30.2 G/DL (ref 32–36)
MCV RBC AUTO: 88 FL (ref 82–98)
NUCLEATED RBC (/100WBC) (SMH): 0 /100 WBC
PLATELET # BLD AUTO: 296 K/UL (ref 150–450)
PMV BLD AUTO: 9.3 FL (ref 9.2–12.9)
POTASSIUM SERPL-SCNC: 3.1 MMOL/L (ref 3.5–5.1)
PROCALCITONIN SERPL-MCNC: 0.27 NG/ML
PROT SERPL-MCNC: 6.1 GM/DL (ref 6–8.4)
RBC # BLD AUTO: 3.17 M/UL (ref 4–5.4)
RELATIVE EOSINOPHIL (SMH): 6.5 % (ref 0–8)
RELATIVE LYMPHOCYTE (SMH): 15.9 % (ref 18–48)
RELATIVE MONOCYTE (SMH): 7.4 % (ref 4–15)
RELATIVE NEUTROPHIL (SMH): 68.2 % (ref 38–73)
SODIUM SERPL-SCNC: 137 MMOL/L (ref 136–145)
WBC # BLD AUTO: 6.66 K/UL (ref 3.9–12.7)

## 2025-09-01 PROCEDURE — 85025 COMPLETE CBC W/AUTO DIFF WBC: CPT

## 2025-09-01 PROCEDURE — 80053 COMPREHEN METABOLIC PANEL: CPT

## 2025-09-01 PROCEDURE — 25000003 PHARM REV CODE 250: Performed by: INTERNAL MEDICINE

## 2025-09-01 PROCEDURE — 36415 COLL VENOUS BLD VENIPUNCTURE: CPT | Performed by: INTERNAL MEDICINE

## 2025-09-01 PROCEDURE — 84145 PROCALCITONIN (PCT): CPT | Performed by: INTERNAL MEDICINE

## 2025-09-01 PROCEDURE — 36415 COLL VENOUS BLD VENIPUNCTURE: CPT

## 2025-09-01 PROCEDURE — 83735 ASSAY OF MAGNESIUM: CPT

## 2025-09-01 PROCEDURE — 25000003 PHARM REV CODE 250

## 2025-09-01 PROCEDURE — 11000001 HC ACUTE MED/SURG PRIVATE ROOM

## 2025-09-01 PROCEDURE — 63600175 PHARM REV CODE 636 W HCPCS

## 2025-09-01 RX ORDER — POTASSIUM CHLORIDE 20 MEQ/1
40 TABLET, EXTENDED RELEASE ORAL EVERY 4 HOURS
Status: COMPLETED | OUTPATIENT
Start: 2025-09-01 | End: 2025-09-01

## 2025-09-01 RX ORDER — LANOLIN ALCOHOL/MO/W.PET/CERES
400 CREAM (GRAM) TOPICAL ONCE
Status: COMPLETED | OUTPATIENT
Start: 2025-09-01 | End: 2025-09-01

## 2025-09-01 RX ADMIN — URSODIOL 300 MG: 300 CAPSULE ORAL at 08:09

## 2025-09-01 RX ADMIN — DULOXETINE 60 MG: 30 CAPSULE, DELAYED RELEASE ORAL at 08:09

## 2025-09-01 RX ADMIN — PIPERACILLIN AND TAZOBACTAM 4.5 G: 4; .5 INJECTION, POWDER, FOR SOLUTION INTRAVENOUS at 05:09

## 2025-09-01 RX ADMIN — LEVOTHYROXINE SODIUM 25 MCG: 0.03 TABLET ORAL at 05:09

## 2025-09-01 RX ADMIN — METOPROLOL SUCCINATE 50 MG: 50 TABLET, EXTENDED RELEASE ORAL at 09:09

## 2025-09-01 RX ADMIN — PIPERACILLIN AND TAZOBACTAM 4.5 G: 4; .5 INJECTION, POWDER, FOR SOLUTION INTRAVENOUS at 03:09

## 2025-09-01 RX ADMIN — URSODIOL 300 MG: 300 CAPSULE ORAL at 09:09

## 2025-09-01 RX ADMIN — Medication 400 MG: at 11:09

## 2025-09-01 RX ADMIN — POTASSIUM CHLORIDE 40 MEQ: 1500 TABLET, EXTENDED RELEASE ORAL at 11:09

## 2025-09-01 RX ADMIN — POTASSIUM CHLORIDE 40 MEQ: 1500 TABLET, EXTENDED RELEASE ORAL at 03:09

## 2025-09-01 RX ADMIN — PIPERACILLIN AND TAZOBACTAM 4.5 G: 4; .5 INJECTION, POWDER, FOR SOLUTION INTRAVENOUS at 10:09

## 2025-09-01 RX ADMIN — PANTOPRAZOLE SODIUM 40 MG: 40 TABLET, DELAYED RELEASE ORAL at 09:09

## 2025-09-02 VITALS
SYSTOLIC BLOOD PRESSURE: 148 MMHG | HEART RATE: 85 BPM | DIASTOLIC BLOOD PRESSURE: 84 MMHG | OXYGEN SATURATION: 97 % | HEIGHT: 63 IN | RESPIRATION RATE: 18 BRPM | TEMPERATURE: 98 F | WEIGHT: 227.5 LBS | BODY MASS INDEX: 40.31 KG/M2

## 2025-09-02 PROBLEM — K83.09 ACUTE CHOLANGITIS: Status: ACTIVE | Noted: 2025-09-02

## 2025-09-02 LAB
ABSOLUTE EOSINOPHIL (SMH): 0.38 K/UL
ABSOLUTE MONOCYTE (SMH): 0.64 K/UL (ref 0.3–1)
ABSOLUTE NEUTROPHIL COUNT (SMH): 5.2 K/UL (ref 1.8–7.7)
ALBUMIN SERPL-MCNC: 2.6 G/DL (ref 3.5–5.2)
ALP SERPL-CCNC: 245 UNIT/L (ref 55–135)
ALT SERPL-CCNC: 61 UNIT/L (ref 10–44)
ANION GAP (SMH): 8 MMOL/L (ref 8–16)
AST SERPL-CCNC: 79 UNIT/L (ref 10–40)
BASOPHILS # BLD AUTO: 0.04 K/UL
BASOPHILS NFR BLD AUTO: 0.5 %
BILIRUB SERPL-MCNC: 1 MG/DL (ref 0.1–1)
BUN SERPL-MCNC: 7 MG/DL (ref 8–23)
CALCIUM SERPL-MCNC: 8.3 MG/DL (ref 8.7–10.5)
CHLORIDE SERPL-SCNC: 103 MMOL/L (ref 95–110)
CO2 SERPL-SCNC: 27 MMOL/L (ref 23–29)
CREAT SERPL-MCNC: 0.8 MG/DL (ref 0.5–1.4)
ERYTHROCYTE [DISTWIDTH] IN BLOOD BY AUTOMATED COUNT: 18.1 % (ref 11.5–14.5)
GFR SERPLBLD CREATININE-BSD FMLA CKD-EPI: >60 ML/MIN/1.73/M2
GLUCOSE SERPL-MCNC: 154 MG/DL (ref 70–110)
HCT VFR BLD AUTO: 28.5 % (ref 37–48.5)
HGB BLD-MCNC: 8.5 GM/DL (ref 12–16)
IMM GRANULOCYTES # BLD AUTO: 0.11 K/UL (ref 0–0.04)
IMM GRANULOCYTES NFR BLD AUTO: 1.4 % (ref 0–0.5)
LYMPHOCYTES # BLD AUTO: 1.73 K/UL (ref 1–4.8)
MAGNESIUM SERPL-MCNC: 1.8 MG/DL (ref 1.6–2.6)
MCH RBC QN AUTO: 26.4 PG (ref 27–31)
MCHC RBC AUTO-ENTMCNC: 29.8 G/DL (ref 32–36)
MCV RBC AUTO: 89 FL (ref 82–98)
NUCLEATED RBC (/100WBC) (SMH): 0 /100 WBC
PLATELET # BLD AUTO: 325 K/UL (ref 150–450)
PMV BLD AUTO: 9.4 FL (ref 9.2–12.9)
POTASSIUM SERPL-SCNC: 3.8 MMOL/L (ref 3.5–5.1)
PROT SERPL-MCNC: 6.1 GM/DL (ref 6–8.4)
RBC # BLD AUTO: 3.22 M/UL (ref 4–5.4)
RELATIVE EOSINOPHIL (SMH): 4.7 % (ref 0–8)
RELATIVE LYMPHOCYTE (SMH): 21.5 % (ref 18–48)
RELATIVE MONOCYTE (SMH): 8 % (ref 4–15)
RELATIVE NEUTROPHIL (SMH): 63.9 % (ref 38–73)
SODIUM SERPL-SCNC: 138 MMOL/L (ref 136–145)
WBC # BLD AUTO: 8.05 K/UL (ref 3.9–12.7)

## 2025-09-02 PROCEDURE — 25000003 PHARM REV CODE 250

## 2025-09-02 PROCEDURE — 85025 COMPLETE CBC W/AUTO DIFF WBC: CPT

## 2025-09-02 PROCEDURE — 36415 COLL VENOUS BLD VENIPUNCTURE: CPT

## 2025-09-02 PROCEDURE — 83735 ASSAY OF MAGNESIUM: CPT

## 2025-09-02 PROCEDURE — 63600175 PHARM REV CODE 636 W HCPCS

## 2025-09-02 PROCEDURE — 80053 COMPREHEN METABOLIC PANEL: CPT

## 2025-09-02 PROCEDURE — A9537 TC99M MEBROFENIN: HCPCS | Performed by: INTERNAL MEDICINE

## 2025-09-02 PROCEDURE — 63600175 PHARM REV CODE 636 W HCPCS: Performed by: INTERNAL MEDICINE

## 2025-09-02 RX ORDER — METRONIDAZOLE 500 MG/1
500 TABLET ORAL 3 TIMES DAILY
Qty: 15 TABLET | Refills: 0 | Status: SHIPPED | OUTPATIENT
Start: 2025-09-02 | End: 2025-09-02 | Stop reason: HOSPADM

## 2025-09-02 RX ORDER — CIPROFLOXACIN 500 MG/1
500 TABLET, FILM COATED ORAL EVERY 12 HOURS
Qty: 10 TABLET | Refills: 0 | Status: SHIPPED | OUTPATIENT
Start: 2025-09-02 | End: 2025-09-07

## 2025-09-02 RX ORDER — KIT FOR THE PREPARATION OF TECHNETIUM TC 99M MEBROFENIN 45 MG/10ML
8.8 INJECTION, POWDER, LYOPHILIZED, FOR SOLUTION INTRAVENOUS
Status: COMPLETED | OUTPATIENT
Start: 2025-09-02 | End: 2025-09-02

## 2025-09-02 RX ORDER — MORPHINE SULFATE 2 MG/ML
2 INJECTION, SOLUTION INTRAMUSCULAR; INTRAVENOUS ONCE
Status: COMPLETED | OUTPATIENT
Start: 2025-09-02 | End: 2025-09-02

## 2025-09-02 RX ADMIN — ACETAMINOPHEN 650 MG: 325 TABLET ORAL at 12:09

## 2025-09-02 RX ADMIN — MORPHINE SULFATE 2 MG: 2 INJECTION, SOLUTION INTRAMUSCULAR; INTRAVENOUS at 03:09

## 2025-09-02 RX ADMIN — PANTOPRAZOLE SODIUM 40 MG: 40 TABLET, DELAYED RELEASE ORAL at 09:09

## 2025-09-02 RX ADMIN — PIPERACILLIN AND TAZOBACTAM 4.5 G: 4; .5 INJECTION, POWDER, FOR SOLUTION INTRAVENOUS at 05:09

## 2025-09-02 RX ADMIN — URSODIOL 300 MG: 300 CAPSULE ORAL at 09:09

## 2025-09-02 RX ADMIN — METOPROLOL SUCCINATE 50 MG: 50 TABLET, EXTENDED RELEASE ORAL at 09:09

## 2025-09-02 RX ADMIN — LEVOTHYROXINE SODIUM 25 MCG: 0.03 TABLET ORAL at 05:09

## 2025-09-02 RX ADMIN — KIT FOR THE PREPARATION OF TECHNETIUM TC 99M MEBROFENIN 8.8 MILLICURIE: 45 INJECTION, POWDER, LYOPHILIZED, FOR SOLUTION INTRAVENOUS at 01:09

## 2025-09-04 ENCOUNTER — TELEPHONE (OUTPATIENT)
Facility: CLINIC | Age: 65
End: 2025-09-04

## 2025-09-04 ENCOUNTER — PATIENT OUTREACH (OUTPATIENT)
Dept: FAMILY MEDICINE | Facility: CLINIC | Age: 65
End: 2025-09-04

## 2025-09-04 ENCOUNTER — TELEPHONE (OUTPATIENT)
Dept: FAMILY MEDICINE | Facility: CLINIC | Age: 65
End: 2025-09-04

## 2025-09-04 LAB
BACTERIA BLD CULT: NORMAL
BACTERIA BLD CULT: NORMAL

## 2025-09-05 ENCOUNTER — TELEPHONE (OUTPATIENT)
Facility: CLINIC | Age: 65
End: 2025-09-05

## (undated) DEVICE — Device

## (undated) DEVICE — SLEEVE SCD EXPRESS CALF MEDIUM

## (undated) DEVICE — SEE MEDLINE ITEM 157131

## (undated) DEVICE — SUT FIBERWIRE 2 38 IN TAPER

## (undated) DEVICE — COVER CAMERA OPERATING ROOM

## (undated) DEVICE — GOWN TOGA SYS PEELWY ZIP 2 XL

## (undated) DEVICE — SEE MEDLINE ITEM 152530

## (undated) DEVICE — APPLICATOR CHLORAPREP ORN 26ML

## (undated) DEVICE — BLADE SAW SGTL 21.2X85.5X1.2

## (undated) DEVICE — DRAPE C ARM 42 X 120 10/BX

## (undated) DEVICE — SUT STRATAFIX SPIRAL VIOLET

## (undated) DEVICE — PACK CUSTOM UNIV BASIN SLI

## (undated) DEVICE — DRAPE C-ARMOR EQUIPMENT COVER

## (undated) DEVICE — DRAPE STERI-DRAPE 1000 17X11IN

## (undated) DEVICE — SYR 50CC LL

## (undated) DEVICE — DECANTER FLUID TRNSF WHITE 9IN

## (undated) DEVICE — BIT DRILL PERC CAL 3.2X300MM

## (undated) DEVICE — DRAPE T TRNSVRS LAP 102X78X121

## (undated) DEVICE — SUT STRATAFIX PDS 1 CTX 18IN

## (undated) DEVICE — SUT MONOCRYL 3-0 PS-1

## (undated) DEVICE — SEE MEDLINE ITEM 146231

## (undated) DEVICE — GUIDEWIRE

## (undated) DEVICE — SUT MCRYL PLUS 4-0 PS2 27IN

## (undated) DEVICE — INTERPULSE SET

## (undated) DEVICE — GUIDEWIRE 2.5
Type: IMPLANTABLE DEVICE | Site: FEMUR | Status: NON-FUNCTIONAL
Removed: 2025-07-06

## (undated) DEVICE — TAPE SILK 3IN

## (undated) DEVICE — LINER SUCTION 3000CC

## (undated) DEVICE — TOURNIQUET SB QC DP 34X4IN

## (undated) DEVICE — DRAPE STRL FLASHPAD DETECTOR

## (undated) DEVICE — BOWL SUMMIT VACUUM CEMENT

## (undated) DEVICE — GUIDEWIRE DRILL TIP 2.5X300
Type: IMPLANTABLE DEVICE | Site: FEMUR | Status: NON-FUNCTIONAL
Removed: 2025-07-06

## (undated) DEVICE — SEALER AQUAMANTYS 3.48MM

## (undated) DEVICE — UNDERGLOVES BIOGEL PI SIZE 8.5

## (undated) DEVICE — PACK LOWER EXTREMITY

## (undated) DEVICE — CHLORAPREP W TINT 26ML APPL

## (undated) DEVICE — SOL NACL IRR 1000ML BTL

## (undated) DEVICE — BLADE SURG CARBON STEEL #10

## (undated) DEVICE — NDL SPINAL 18GX3.5 SPINOCAN

## (undated) DEVICE — GLOVE SENSICARE PI GRN 8

## (undated) DEVICE — ADHESIVE DERMABOND ADVANCED

## (undated) DEVICE — CLOSURE SKIN STERI STRIP 1/2X4

## (undated) DEVICE — TOGA FLYTE PEEL AWAY XLARGE

## (undated) DEVICE — SEE MEDLINE ITEM 152622

## (undated) DEVICE — SUT VICRYL COAT ANTI 0 27IN

## (undated) DEVICE — GLOVE SURG ULTRA TOUCH 8.5

## (undated) DEVICE — TOURNIQUET SB QC SP 44X4IN

## (undated) DEVICE — SCREW 4.5MM CRTX 70MM SLTP
Type: IMPLANTABLE DEVICE | Site: FEMUR | Status: NON-FUNCTIONAL
Removed: 2025-07-06

## (undated) DEVICE — SUT VICRYL 2-0 36 CT-1

## (undated) DEVICE — SPONGE BULKEE II ABSRB 6X6.75

## (undated) DEVICE — SYS PRINEO SKIN CLOSURE

## (undated) DEVICE — NDL ECLIPSE SAFETY 23G 1.5IN

## (undated) DEVICE — PAD CAST SPECIALIST STRL 6

## (undated) DEVICE — ELECTRODE REM PLYHSV RETURN 9

## (undated) DEVICE — SUT PROLENE 2-0 SH 36IN BLU

## (undated) DEVICE — SOL IRR NACL .9% 3000ML

## (undated) DEVICE — BANDAGE MATRIX HK LOOP 6IN 5YD

## (undated) DEVICE — SUT VICRYL PLUS 3-0 SH 18IN

## (undated) DEVICE — SEE MEDLINE ITEM 157166

## (undated) DEVICE — CUBE COLD THERAPY POLAR CARE

## (undated) DEVICE — DRILL BIT

## (undated) DEVICE — MANIFOLD 4 PORT

## (undated) DEVICE — ELECTRODE MEGADYNE RETURN DUAL

## (undated) DEVICE — DRESSING MEPILEX 4X12IN

## (undated) DEVICE — SUT MONOCRYL PLUS UD 3-0 27

## (undated) DEVICE — DRAPE INCISE IOBAN 2 23X17IN

## (undated) DEVICE — DRAPE STERI INSTRUMENT 1018

## (undated) DEVICE — TUBE SUCTION YANKAUER HI CAP

## (undated) DEVICE — ALCOHOL 70% ANTISEPTIC ISO 4OZ

## (undated) DEVICE — SYR 10CC LUER LOCK

## (undated) DEVICE — PACK BASIC

## (undated) DEVICE — BLADE SURG CARBON STEEL SZ11

## (undated) DEVICE — ALCOHOL 70% ISOP RUBBING 4OZ

## (undated) DEVICE — SOL 9P NACL IRR PIC IL

## (undated) DEVICE — GLOVE SENSICARE PI MICRO 7.5

## (undated) DEVICE — SEE MEDLINE ITEM 107746

## (undated) DEVICE — STRAP OR TABLE 5IN X 72IN

## (undated) DEVICE — TRAY LOWER EXTREMITY SMH

## (undated) DEVICE — BIT DRILL

## (undated) DEVICE — BANDAGE ESMARK 6X12

## (undated) DEVICE — ADHESIVE MASTISOL VIAL 48/BX

## (undated) DEVICE — SET DECANTER MEDICHOICE

## (undated) DEVICE — SPONGE SUPER KERLIX 6X6.75IN

## (undated) DEVICE — TUBE SUCTION FRAZIER VENT 12FR

## (undated) DEVICE — COVER LIGHT HANDLE 80/CA

## (undated) DEVICE — PACK MINOR SMH